# Patient Record
Sex: MALE | Race: BLACK OR AFRICAN AMERICAN | NOT HISPANIC OR LATINO | Employment: OTHER | URBAN - METROPOLITAN AREA
[De-identification: names, ages, dates, MRNs, and addresses within clinical notes are randomized per-mention and may not be internally consistent; named-entity substitution may affect disease eponyms.]

---

## 2017-02-06 ENCOUNTER — APPOINTMENT (OUTPATIENT)
Dept: CARDIOLOGY | Facility: HOSPITAL | Age: 27
End: 2017-02-06

## 2017-02-06 ENCOUNTER — APPOINTMENT (OUTPATIENT)
Dept: GENERAL RADIOLOGY | Facility: HOSPITAL | Age: 27
End: 2017-02-06

## 2017-02-06 ENCOUNTER — HOSPITAL ENCOUNTER (INPATIENT)
Facility: HOSPITAL | Age: 27
LOS: 3 days | Discharge: HOME | End: 2017-02-09
Attending: EMERGENCY MEDICINE | Admitting: INTERNAL MEDICINE

## 2017-02-06 DIAGNOSIS — I50.22 CHRONIC SYSTOLIC CONGESTIVE HEART FAILURE (HCC): ICD-10-CM

## 2017-02-06 DIAGNOSIS — R09.02 HYPOXIA: ICD-10-CM

## 2017-02-06 DIAGNOSIS — I50.9 ACUTE ON CHRONIC CONGESTIVE HEART FAILURE, UNSPECIFIED CONGESTIVE HEART FAILURE TYPE: Primary | ICD-10-CM

## 2017-02-06 DIAGNOSIS — R06.02 SHORTNESS OF BREATH: ICD-10-CM

## 2017-02-06 LAB
ALBUMIN SERPL-MCNC: 3.8 G/DL (ref 3.2–4.8)
ALBUMIN/GLOB SERPL: 1.2 G/DL (ref 1.5–2.5)
ALP SERPL-CCNC: 61 U/L (ref 25–100)
ALT SERPL W P-5'-P-CCNC: 19 U/L (ref 7–40)
ANION GAP SERPL CALCULATED.3IONS-SCNC: 7 MMOL/L (ref 3–11)
AST SERPL-CCNC: 17 U/L (ref 0–33)
BASOPHILS # BLD AUTO: 0.01 10*3/MM3 (ref 0–0.2)
BASOPHILS NFR BLD AUTO: 0.1 % (ref 0–1)
BILIRUB SERPL-MCNC: 1.2 MG/DL (ref 0.3–1.2)
BNP SERPL-MCNC: 452 PG/ML (ref 0–100)
BUN BLD-MCNC: 9 MG/DL (ref 9–23)
BUN/CREAT SERPL: 10 (ref 7–25)
CALCIUM SPEC-SCNC: 9.1 MG/DL (ref 8.7–10.4)
CHLORIDE SERPL-SCNC: 100 MMOL/L (ref 99–109)
CO2 SERPL-SCNC: 29 MMOL/L (ref 20–31)
CREAT BLD-MCNC: 0.9 MG/DL (ref 0.6–1.3)
DEPRECATED RDW RBC AUTO: 42.4 FL (ref 37–54)
EOSINOPHIL # BLD AUTO: 0.18 10*3/MM3 (ref 0.1–0.3)
EOSINOPHIL NFR BLD AUTO: 2.6 % (ref 0–3)
ERYTHROCYTE [DISTWIDTH] IN BLOOD BY AUTOMATED COUNT: 15 % (ref 11.3–14.5)
GFR SERPL CREATININE-BSD FRML MDRD: 126 ML/MIN/1.73
GLOBULIN UR ELPH-MCNC: 3.3 GM/DL
GLUCOSE BLD-MCNC: 121 MG/DL (ref 70–100)
HCT VFR BLD AUTO: 37.6 % (ref 38.9–50.9)
HGB BLD-MCNC: 12.3 G/DL (ref 13.1–17.5)
HOLD SPECIMEN: NORMAL
HOLD SPECIMEN: NORMAL
IMM GRANULOCYTES # BLD: 0.01 10*3/MM3 (ref 0–0.03)
IMM GRANULOCYTES NFR BLD: 0.1 % (ref 0–0.6)
LYMPHOCYTES # BLD AUTO: 1.06 10*3/MM3 (ref 0.6–4.8)
LYMPHOCYTES NFR BLD AUTO: 15.1 % (ref 24–44)
MCH RBC QN AUTO: 25.4 PG (ref 27–31)
MCHC RBC AUTO-ENTMCNC: 32.7 G/DL (ref 32–36)
MCV RBC AUTO: 77.7 FL (ref 80–99)
MONOCYTES # BLD AUTO: 0.65 10*3/MM3 (ref 0–1)
MONOCYTES NFR BLD AUTO: 9.3 % (ref 0–12)
NEUTROPHILS # BLD AUTO: 5.11 10*3/MM3 (ref 1.5–8.3)
NEUTROPHILS NFR BLD AUTO: 72.8 % (ref 41–71)
PLATELET # BLD AUTO: 325 10*3/MM3 (ref 150–450)
PMV BLD AUTO: 9.5 FL (ref 6–12)
POTASSIUM BLD-SCNC: 3.5 MMOL/L (ref 3.5–5.5)
PROT SERPL-MCNC: 7.1 G/DL (ref 5.7–8.2)
RBC # BLD AUTO: 4.84 10*6/MM3 (ref 4.2–5.76)
SODIUM BLD-SCNC: 136 MMOL/L (ref 132–146)
TROPONIN I SERPL-MCNC: 0.06 NG/ML (ref 0–0.07)
WBC NRBC COR # BLD: 7.02 10*3/MM3 (ref 3.5–10.8)
WHOLE BLOOD HOLD SPECIMEN: NORMAL
WHOLE BLOOD HOLD SPECIMEN: NORMAL

## 2017-02-06 PROCEDURE — C8929 TTE W OR WO FOL WCON,DOPPLER: HCPCS

## 2017-02-06 PROCEDURE — 83880 ASSAY OF NATRIURETIC PEPTIDE: CPT | Performed by: EMERGENCY MEDICINE

## 2017-02-06 PROCEDURE — 71010 HC CHEST PA OR AP: CPT

## 2017-02-06 PROCEDURE — 25010000002 FUROSEMIDE PER 20 MG: Performed by: EMERGENCY MEDICINE

## 2017-02-06 PROCEDURE — 94799 UNLISTED PULMONARY SVC/PX: CPT

## 2017-02-06 PROCEDURE — 25010000002 SULFUR HEXAFLUORIDE MICROSPH 60.7-25 MG RECONSTITUTED SUSPENSION: Performed by: EMERGENCY MEDICINE

## 2017-02-06 PROCEDURE — 25010000002 ENOXAPARIN PER 10 MG: Performed by: INTERNAL MEDICINE

## 2017-02-06 PROCEDURE — 25010000002 DIGOXIN PER 500 MCG: Performed by: NURSE PRACTITIONER

## 2017-02-06 PROCEDURE — 93005 ELECTROCARDIOGRAM TRACING: CPT

## 2017-02-06 PROCEDURE — 93306 TTE W/DOPPLER COMPLETE: CPT | Performed by: INTERNAL MEDICINE

## 2017-02-06 PROCEDURE — 84484 ASSAY OF TROPONIN QUANT: CPT

## 2017-02-06 PROCEDURE — 99223 1ST HOSP IP/OBS HIGH 75: CPT | Performed by: INTERNAL MEDICINE

## 2017-02-06 PROCEDURE — 80053 COMPREHEN METABOLIC PANEL: CPT | Performed by: EMERGENCY MEDICINE

## 2017-02-06 PROCEDURE — 94640 AIRWAY INHALATION TREATMENT: CPT

## 2017-02-06 PROCEDURE — 85025 COMPLETE CBC W/AUTO DIFF WBC: CPT | Performed by: EMERGENCY MEDICINE

## 2017-02-06 PROCEDURE — 94760 N-INVAS EAR/PLS OXIMETRY 1: CPT

## 2017-02-06 PROCEDURE — 99285 EMERGENCY DEPT VISIT HI MDM: CPT

## 2017-02-06 RX ORDER — SODIUM CHLORIDE 0.9 % (FLUSH) 0.9 %
10 SYRINGE (ML) INJECTION AS NEEDED
Status: DISCONTINUED | OUTPATIENT
Start: 2017-02-06 | End: 2017-02-09 | Stop reason: HOSPADM

## 2017-02-06 RX ORDER — SPIRONOLACTONE 25 MG/1
25 TABLET ORAL DAILY
Status: DISCONTINUED | OUTPATIENT
Start: 2017-02-06 | End: 2017-02-07

## 2017-02-06 RX ORDER — ASPIRIN 81 MG/1
81 TABLET ORAL DAILY
COMMUNITY
End: 2017-02-21 | Stop reason: SDUPTHER

## 2017-02-06 RX ORDER — FUROSEMIDE 10 MG/ML
40 INJECTION INTRAMUSCULAR; INTRAVENOUS EVERY 12 HOURS SCHEDULED
Status: DISCONTINUED | OUTPATIENT
Start: 2017-02-07 | End: 2017-02-07

## 2017-02-06 RX ORDER — LISINOPRIL 20 MG/1
20 TABLET ORAL
Status: DISCONTINUED | OUTPATIENT
Start: 2017-02-06 | End: 2017-02-07

## 2017-02-06 RX ORDER — CARVEDILOL 12.5 MG/1
25 TABLET ORAL 2 TIMES DAILY WITH MEALS
Status: DISCONTINUED | OUTPATIENT
Start: 2017-02-06 | End: 2017-02-09 | Stop reason: HOSPADM

## 2017-02-06 RX ORDER — DIGOXIN 0.25 MG/ML
500 INJECTION INTRAMUSCULAR; INTRAVENOUS ONCE
Status: COMPLETED | OUTPATIENT
Start: 2017-02-06 | End: 2017-02-06

## 2017-02-06 RX ORDER — POTASSIUM CHLORIDE 750 MG/1
40 CAPSULE, EXTENDED RELEASE ORAL ONCE
Status: COMPLETED | OUTPATIENT
Start: 2017-02-06 | End: 2017-02-06

## 2017-02-06 RX ORDER — GUAIFENESIN AND DEXTROMETHORPHAN HYDROBROMIDE 600; 30 MG/1; MG/1
1 TABLET, EXTENDED RELEASE ORAL 2 TIMES DAILY PRN
Status: DISCONTINUED | OUTPATIENT
Start: 2017-02-06 | End: 2017-02-09 | Stop reason: HOSPADM

## 2017-02-06 RX ORDER — METOLAZONE 2.5 MG/1
2.5 TABLET ORAL DAILY
Status: DISCONTINUED | OUTPATIENT
Start: 2017-02-06 | End: 2017-02-07

## 2017-02-06 RX ORDER — FUROSEMIDE 10 MG/ML
60 INJECTION INTRAMUSCULAR; INTRAVENOUS ONCE
Status: COMPLETED | OUTPATIENT
Start: 2017-02-06 | End: 2017-02-06

## 2017-02-06 RX ORDER — FUROSEMIDE 10 MG/ML
40 INJECTION INTRAMUSCULAR; INTRAVENOUS EVERY 12 HOURS SCHEDULED
Status: DISCONTINUED | OUTPATIENT
Start: 2017-02-06 | End: 2017-02-06

## 2017-02-06 RX ORDER — IPRATROPIUM BROMIDE AND ALBUTEROL SULFATE 2.5; .5 MG/3ML; MG/3ML
3 SOLUTION RESPIRATORY (INHALATION)
Status: DISCONTINUED | OUTPATIENT
Start: 2017-02-06 | End: 2017-02-09 | Stop reason: HOSPADM

## 2017-02-06 RX ADMIN — SPIRONOLACTONE 25 MG: 25 TABLET, FILM COATED ORAL at 19:32

## 2017-02-06 RX ADMIN — LISINOPRIL 20 MG: 10 TABLET ORAL at 18:58

## 2017-02-06 RX ADMIN — CARVEDILOL 25 MG: 12.5 TABLET, FILM COATED ORAL at 19:31

## 2017-02-06 RX ADMIN — POTASSIUM CHLORIDE 40 MEQ: 750 CAPSULE, EXTENDED RELEASE ORAL at 17:57

## 2017-02-06 RX ADMIN — METOLAZONE 2.5 MG: 2.5 TABLET ORAL at 18:58

## 2017-02-06 RX ADMIN — IPRATROPIUM BROMIDE AND ALBUTEROL SULFATE 3 ML: .5; 3 SOLUTION RESPIRATORY (INHALATION) at 20:20

## 2017-02-06 RX ADMIN — ENOXAPARIN SODIUM 80 MG: 80 INJECTION SUBCUTANEOUS at 19:33

## 2017-02-06 RX ADMIN — NITROGLYCERIN 1 INCH: 20 OINTMENT TOPICAL at 15:51

## 2017-02-06 RX ADMIN — DIGOXIN 500 MCG: 0.25 INJECTION INTRAMUSCULAR; INTRAVENOUS at 18:57

## 2017-02-06 RX ADMIN — FUROSEMIDE 60 MG: 10 INJECTION, SOLUTION INTRAMUSCULAR; INTRAVENOUS at 15:51

## 2017-02-06 RX ADMIN — SULFUR HEXAFLUORIDE 2 ML: KIT at 18:00

## 2017-02-06 NOTE — ED PROVIDER NOTES
Subjective   HPI Comments: Mr. Gannon is a 26 y.o male who presents to the ED c/o SOA starting 4 days ago. He notes that he has had worsening SOA that causes him to have trouble breathing when laying down. He also complains of congestion, hot flashes, a productive white-yellow cough, and a sore throat. He denies any fever, N/V/D, blood in stool or urine, or any other acute sx at this time. He does report a hx of CHF, and HTN, but denies any hx of MI, or cardiomyopathy.     He does state that these sx are similar to previous CHF exacerbations.     He has a EF of 20-25% on April and November of 2014.    Patient is a 26 y.o. male presenting with shortness of breath.   History provided by:  Patient  Shortness of Breath   Severity:  Moderate  Onset quality:  Sudden  Duration:  4 days  Timing:  Constant  Progression:  Worsening  Chronicity:  Recurrent  Relieved by:  None tried  Exacerbated by: Laying down.  Ineffective treatments:  None tried  Associated symptoms: cough, sore throat and sputum production    Associated symptoms: no abdominal pain, no fever, no headaches and no vomiting    Risk factors: obesity        Review of Systems   Constitutional: Negative for chills and fever.   HENT: Positive for congestion and sore throat.    Respiratory: Positive for cough, sputum production and shortness of breath.    Gastrointestinal: Negative for abdominal pain, diarrhea, nausea and vomiting.   Neurological: Negative for headaches.   All other systems reviewed and are negative.      Past Medical History   Diagnosis Date   • CHF (congestive heart failure)        No Known Allergies    Past Surgical History   Procedure Laterality Date   • Nose surgery     • Adenoidectomy     • Tonsillectomy         History reviewed. No pertinent family history.    Social History     Social History   • Marital status: Single     Spouse name: N/A   • Number of children: N/A   • Years of education: N/A     Social History Main Topics   • Smoking  status: Former Smoker   • Smokeless tobacco: None      Comment: 2009   • Alcohol use No   • Drug use: No   • Sexual activity: Not Asked     Other Topics Concern   • None     Social History Narrative   • None         Objective   Physical Exam   Constitutional: He is oriented to person, place, and time. He appears well-developed and well-nourished. No distress.   HENT:   Head: Normocephalic and atraumatic.   Eyes: Conjunctivae are normal.   Mild injection of the medial aspect of the right eye   Neck: Normal range of motion. Neck supple.   Cardiovascular: Regular rhythm, normal heart sounds and intact distal pulses.  Tachycardia present.    Pulmonary/Chest: Effort normal. No respiratory distress. He has no wheezes. He has no rales.   Decreased breath sounds   Abdominal: Soft. There is no tenderness.   Obese   Musculoskeletal: Normal range of motion. He exhibits edema (Trace BLE edema).   Neurological: He is alert and oriented to person, place, and time.   Skin: Skin is warm and dry.   Psychiatric: He has a normal mood and affect. His behavior is normal.   Nursing note and vitals reviewed.      Procedures         ED Course  ED Course   Comment By Time   Discussed findings and evaluation with the patient.  He reports he has not missed any of his cardiac medications.  Discussed evaluation here in the ED.  He is tachycardic and was hypoxemic on evaluation.  Patient agrees with plan of care Zac Dahl MD 02/06 1544   Consulted cardio and states Dr. Avila will see the pt. - Alvin Galvan 02/06 1645   Pt had phenomenal diuresis but despite that will be seen by cardiology who would like for him to be admitted from the ED. - Alvin Galvan 02/06 1822     Recent Results (from the past 24 hour(s))   Comprehensive Metabolic Panel    Collection Time: 02/06/17  3:48 PM   Result Value Ref Range    Glucose 121 (H) 70 - 100 mg/dL    BUN 9 9 - 23 mg/dL    Creatinine 0.90 0.60 - 1.30 mg/dL    Sodium 136 132 - 146 mmol/L    Potassium  3.5 3.5 - 5.5 mmol/L    Chloride 100 99 - 109 mmol/L    CO2 29.0 20.0 - 31.0 mmol/L    Calcium 9.1 8.7 - 10.4 mg/dL    Total Protein 7.1 5.7 - 8.2 g/dL    Albumin 3.80 3.20 - 4.80 g/dL    ALT (SGPT) 19 7 - 40 U/L    AST (SGOT) 17 0 - 33 U/L    Alkaline Phosphatase 61 25 - 100 U/L    Total Bilirubin 1.2 0.3 - 1.2 mg/dL    eGFR  African Amer 126 >60 mL/min/1.73    Globulin 3.3 gm/dL    A/G Ratio 1.2 (L) 1.5 - 2.5 g/dL    BUN/Creatinine Ratio 10.0 7.0 - 25.0    Anion Gap 7.0 3.0 - 11.0 mmol/L   BNP    Collection Time: 02/06/17  3:48 PM   Result Value Ref Range    .0 (H) 0.0 - 100.0 pg/mL   Light Blue Top    Collection Time: 02/06/17  3:48 PM   Result Value Ref Range    Extra Tube hold for add-on    Green Top (Gel)    Collection Time: 02/06/17  3:48 PM   Result Value Ref Range    Extra Tube Hold for add-ons.    Lavender Top    Collection Time: 02/06/17  3:48 PM   Result Value Ref Range    Extra Tube hold for add-on    Gold Top - SST    Collection Time: 02/06/17  3:48 PM   Result Value Ref Range    Extra Tube Hold for add-ons.    CBC Auto Differential    Collection Time: 02/06/17  3:48 PM   Result Value Ref Range    WBC 7.02 3.50 - 10.80 10*3/mm3    RBC 4.84 4.20 - 5.76 10*6/mm3    Hemoglobin 12.3 (L) 13.1 - 17.5 g/dL    Hematocrit 37.6 (L) 38.9 - 50.9 %    MCV 77.7 (L) 80.0 - 99.0 fL    MCH 25.4 (L) 27.0 - 31.0 pg    MCHC 32.7 32.0 - 36.0 g/dL    RDW 15.0 (H) 11.3 - 14.5 %    RDW-SD 42.4 37.0 - 54.0 fl    MPV 9.5 6.0 - 12.0 fL    Platelets 325 150 - 450 10*3/mm3    Neutrophil % 72.8 (H) 41.0 - 71.0 %    Lymphocyte % 15.1 (L) 24.0 - 44.0 %    Monocyte % 9.3 0.0 - 12.0 %    Eosinophil % 2.6 0.0 - 3.0 %    Basophil % 0.1 0.0 - 1.0 %    Immature Grans % 0.1 0.0 - 0.6 %    Neutrophils, Absolute 5.11 1.50 - 8.30 10*3/mm3    Lymphocytes, Absolute 1.06 0.60 - 4.80 10*3/mm3    Monocytes, Absolute 0.65 0.00 - 1.00 10*3/mm3    Eosinophils, Absolute 0.18 0.10 - 0.30 10*3/mm3    Basophils, Absolute 0.01 0.00 - 0.20 10*3/mm3     Immature Grans, Absolute 0.01 0.00 - 0.03 10*3/mm3   POC Troponin, Rapid    Collection Time: 02/06/17  3:56 PM   Result Value Ref Range    Troponin I 0.06 0.00 - 0.07 ng/mL   Adult Transthoracic Echo Complete    Collection Time: 02/06/17  7:39 PM   Result Value Ref Range    BSA 2.8 m^2     CV ECHO ADE - RVDD 2.7 cm    IVSd 1.1 cm    LVIDd 7.0 cm    LVIDs 6.8 cm    LVPWd 1.2 cm    IVS/LVPW 0.89     FS 3.1 %    EDV(Teich) 257.9 ml    ESV(Teich) 240.0 ml    EF(Teich) 6.9 %    EDV(cubed) 347.4 ml    ESV(cubed) 315.8 ml    EF(cubed) 9.1 %    LV mass(C)d 372.2 grams    LV mass(C)dI 131.6 grams/m^2    SV(Teich) 17.9 ml    SI(Teich) 6.3 ml/m^2    SV(cubed) 31.6 ml    SI(cubed) 11.2 ml/m^2    Ao root diam 3.2 cm    Ao root area 8.0 cm^2    LA dimension 5.3 cm    LA/Ao 1.7     Ao root area (BSA corrected) 1.1     MV E max pravin 54.8 cm/sec    Ao pk pravin 74.5 cm/sec    Ao max PG 2.2 mmHg    Ao max PG (full) 1.7 mmHg    Ao V2 mean 55.6 cm/sec    Ao mean PG 1.0 mmHg    Ao mean PG (full) 1.0 mmHg    Ao V2 VTI 15.1 cm    LV V1 max PG 0.52 mmHg    LV V1 mean PG 0 mmHg    LV V1 max 36.1 cm/sec    LV V1 mean 24.5 cm/sec    LV V1 VTI 5.3 cm    SV(Ao) 121.4 ml    SI(Ao) 42.9 ml/m^2    PA V2 max 104.2 cm/sec    PA max PG 4.5 mmHg    TR max pravin 212.5 cm/sec     CV ECHO ADE - BZI_BMI 61.9 kilograms/m^2     CV ECHO ADE - BSA(HAYCOCK) 3.2 m^2     CV ECHO ADE - BZI_METRIC_WEIGHT 190.1 kg    BH CV ECHO ADE - BZI_METRIC_HEIGHT 175.3 cm    RAP systole 8 mmHg    RVSP(TR) 26 mmHg     Note: In addition to lab results from this visit, the labs listed above may include labs taken at another facility or during a different encounter within the last 24 hours. Please correlate lab times with ED admission and discharge times for further clarification of the services performed during this visit.    XR Chest 1 View   Preliminary Result   Heart is enlarged with slight prominence of the pulmonary   vascularity.       D:  02/06/2017   E:   "02/06/2017                Vitals:    02/06/17 1514 02/06/17 1540 02/06/17 2020   BP: (!) 175/107 157/99    BP Location: Left arm     Patient Position: Sitting     Pulse: (!) 144 (!) 129 118   Resp: 24  24   Temp: 98.2 °F (36.8 °C)     TempSrc: Oral     SpO2: (!) 81% 94% 97%   Weight: (!) 416 lb (189 kg)     Height: 69\" (175.3 cm)       Medications   sodium chloride 0.9 % flush 10 mL (not administered)   carvedilol (COREG) tablet 25 mg (25 mg Oral Given 2/6/17 1931)   albuterol (PROVENTIL) nebulizer solution 0.5% 2.5 mg/0.5mL (not administered)   lisinopril (PRINIVIL,ZESTRIL) tablet 20 mg (20 mg Oral Given 2/6/17 1858)   metOLazone (ZAROXOLYN) tablet 2.5 mg (2.5 mg Oral Given 2/6/17 1858)   spironolactone (ALDACTONE) tablet 25 mg (25 mg Oral Given 2/6/17 1932)   ipratropium-albuterol (DUO-NEB) nebulizer solution 3 mL (3 mL Nebulization Given 2/6/17 2020)   guaifenesin-dextromethorphan (MUCINEX DM) tablet 1 tablet (not administered)   furosemide (LASIX) injection 40 mg (not administered)   furosemide (LASIX) injection 60 mg (60 mg Intravenous Given 2/6/17 1551)   nitroglycerin (NITROSTAT) ointment 1 inch (1 inch Topical Given 2/6/17 1551)   potassium chloride (MICRO-K) CR capsule 40 mEq (40 mEq Oral Given 2/6/17 1757)   digoxin (LANOXIN) injection 500 mcg (500 mcg Intravenous Given 2/6/17 1857)   enoxaparin (LOVENOX) syringe 150 mg (80 mg Subcutaneous Given 2/6/17 1933)   Sulfur Hexafluoride Microsph 60.7-25 MG reconstituted suspension 2 mL (2 mL Intravenous Given 2/6/17 1800)     ECG/EMG Results (last 24 hours)     Procedure Component Value Units Date/Time    ECG 12 Lead [[id number]] Collected:  02/06/17 1519     Updated:  02/06/17 1543    Narrative:       Test Reason : SOA  Blood Pressure : **/** mmHG  Vent. Rate : 129 BPM     Atrial Rate : 129 BPM     P-R Int : 134 ms          QRS Dur : 098 ms      QT Int : 314 ms       P-R-T Axes : 055 059 059 degrees     QTc Int : 460 ms    Sinus tachycardia  Nonspecific T wave " abnormality  Abnormal ECG  When compared with ECG of 09-OCT-2016 19:41,  Sinus rhythm has replaced Ectopic atrial rhythm  Confirmed by TIMI DAHL MD ([id number]) on 2/6/2017 3:43:13 PM    Referred By:  EDMD           Confirmed By:TIMI DAHL MD                          MDM  Number of Diagnoses or Management Options  Chronic systolic congestive heart failure: new and requires workup  Shortness of breath: new and requires workup     Amount and/or Complexity of Data Reviewed  Clinical lab tests: ordered and reviewed  Tests in the radiology section of CPT®: ordered and reviewed  Tests in the medicine section of CPT®: ordered and reviewed  Review and summarize past medical records: yes  Discuss the patient with other providers: yes  Independent visualization of images, tracings, or specimens: yes        Final diagnoses:   Shortness of breath   Chronic systolic congestive heart failure   Hypoxia       Documentation assistance provided by josé JONES.  Information recorded by the scribe was done at my direction and has been verified and validated by me.     Alvin Jones  02/06/17 1544       Alvin Jones  02/06/17 1547       Alvin Jones  02/06/17 1822       Zac Dahl MD  02/06/17 8948

## 2017-02-06 NOTE — H&P
Evan Bhakta  [id number]  1990   LOS: 0 days   Patient Care Team:  No Known Provider as PCP - General  No Known Provider as PCP - Family Medicine     Mr. bhakta is a 26-year-old single   male from [facility]    Chief Complaint:  SOB    Problem List:  1. Dilated cardiomyopathy  A. Echocardiogram 4/28/14; LVEF 0.20-0.25, mild MR, moderate TR   B. Echocardiogram 11/7/2014; LVEF 0.20-0.25, all valves structurally and functionally normal with no hemodynamically significant valve disease  2. Chronic congestive heart failure;   A. Heart failure Center visits December 2015  B. HF clinic December 2016  C. [facility] ED 2/6/17  3. Hypertension  4. Morbid obesity; BMI 61  5. Obstructive sleep apnea; compliant with CPAP nightly  6. History of noncompliance with medication administration/follow up visits  7. Surgical history; tonsils and adenoids, nasal surgery    No Known Allergies    (Not in a hospital admission)  Scheduled Meds:   Continuous Infusions:   PRN Meds:.•  sodium chloride       History of Present Illness:    26-year-old male presents to [facility] ED 2/6/2017 with shortness of breath that started 4 days ago that was more prominent when supine.  He also has complaints of congestion, hot flashes, cough with sputum production and sore throat.  He denies any fever, nausea, vomiting, diaphoresis, diarrhea, melena.  He has a history of congestive heart failure and hypertension, cardiomyopathy.  He denies any MIs, CAD, CVA's, TIAs, PEs, DVTs, stress tests, left heart catheterizations.  He states that these symptoms are similar to his previous CHF exacerbations.  He has had noncompliance with his medications and has ran out of prescriptions of Coreg, lasix, and lisinopril in the past.  He states that with his insurance changes he was unable to get back to the doctor.  He stated that he is able to get his spironolactone but that was the only medication.  His last echocardiogram was a couple  "years ago.  He is inquisitive on whether he is going to have to stay overnight and says he does not want to have a CT scan.  He has obstructive sleep apnea and uses CPAP nightly.    Cardiac risk factors: hypertension, male gender and obesity (BMI >= 30 kg/m2).    Social History     Social History   • Marital status: Single     Spouse name: N/A   • Number of children: N/A   • Years of education: N/A     Occupational History   • Not on file.     Social History Main Topics   • Smoking status: Never   • Smokeless tobacco: Not on file      Comment: 2009   • Alcohol use No   • Drug use: No   • Sexual activity: Not on file     Other Topics Concern   • Not on file     Social History Narrative   • No narrative on file       Mother:  at age 26 during childbirth  Father: Diabetes mellitus  Review of Systems  Pertinent items are noted in HPI and problem list.     Objective:       Physical Exam  Visit Vitals   • /99   • Pulse (!) 129   • Temp 98.2 °F (36.8 °C) (Oral)   • Resp 24   • Ht 69\" (175.3 cm)   • Wt (!) 416 lb (189 kg)   • SpO2 94%   • BMI 61.43 kg/m2     Last 2 weights    17  1514   Weight: (!) 416 lb (189 kg)     Body mass index is 61.43 kg/(m^2).    Intake/Output Summary (Last 24 hours) at 17 1739  Last data filed at 17 1636   Gross per 24 hour   Intake      0 ml   Output   1350 ml   Net  -1350 ml       General Appearance:  Alert, cooperative, no distress, appears stated age   Head:  Normocephalic, without obvious abnormality, atraumatic   Eyes:  PERRL, conjunctiva/corneas clear, EOM's intact, fundi benign, both eyes   Throat: Lips, mucosa, and tongue normal; teeth and gums normal   Neck: Supple, symmetrical, trachea midline, no adenopathy, thyroid: not enlarged, symmetric, no tenderness/mass/nodules, no carotid bruit or JVD   Lungs:    Diminished to auscultation bilaterally, respirations unlabored   Heart:  Regular rhythm and tachycardia rate  S1, S2 normal, grade 1/6 murmur,no rub or " gallop   Abdomen:   Soft, non-tender, no masses, no organomegaly   Extremities: trace edema   Pulses: 2+ and symmetric   Skin: Skin color, texture, turgor normal, no rashes or lesions   Neurologic: Normal       Cardiographics  · EK2017; sinus tachycardia, nonspecific T wave abnormality, 129 bpm  · Echocardiogram 2017; no report  available    Imaging  · Chest x-ray: 2017; heart is enlarged with slight prominence of the pulmonary vascularity; reviewed.    Lab Review     Results from last 7 days  Lab Units 17  1548   SODIUM mmol/L 136   POTASSIUM mmol/L 3.5   CHLORIDE mmol/L 100   TOTAL CO2 mmol/L 29.0   BUN mg/dL 9   CREATININE mg/dL 0.90   GLUCOSE mg/dL 121*   CALCIUM mg/dL 9.1       Results from last 7 days  Lab Units 17  1548   WBC 10*3/mm3 7.02   HEMOGLOBIN g/dL 12.3*   HEMATOCRIT % 37.6*   PLATELETS 10*3/mm3 325                      Assessment:        CHF exacerbation due to non-compliance with medications. Admit for telemetry monitoring and management of CHF exacerbation, diuresis. Would probably benefit from eventual Entresto instead of lisinopril.  Needs to follow-up with Dr. Olsen.  Defer [facility] at this time.      Plan:        1.  Review echocardiogram when available  2. Admit for tele monitoring  3. Strict I/O, daily weights  4. Regular cardiac diet  5. Restart Coreg 25 mg bid, lasix 40 mg bid, lisinopril 20mg daily, zaroxolyn 2.5 daily, spironolactone 25 mg daily  6. Mucinex DM  7. Duonebs q 4-6 hours  8. Digoxin 0.5mg IV x once  9. Follow up with Dr. Olsen  10.Consider eventual ENTRESTO to replace lisinopril as well as the addition of BIDIL if congestive heart failure and hypertension persist.      SONAM Cunningham    I, Kenn Alegria MD, [facility], personally performed the services described in this documentation as scribed by the above named individual in my presence, and it is both accurate and complete.

## 2017-02-07 PROBLEM — R06.02 SHORTNESS OF BREATH: Status: ACTIVE | Noted: 2017-02-07

## 2017-02-07 PROBLEM — J96.01 ACUTE RESPIRATORY FAILURE WITH HYPOXIA: Status: ACTIVE | Noted: 2017-02-07

## 2017-02-07 PROBLEM — I50.23 ACUTE ON CHRONIC SYSTOLIC (CONGESTIVE) HEART FAILURE (HCC): Status: ACTIVE | Noted: 2017-02-07

## 2017-02-07 PROBLEM — E66.01 MORBID OBESITY (HCC): Status: ACTIVE | Noted: 2017-02-07

## 2017-02-07 PROBLEM — G47.33 OBSTRUCTIVE SLEEP APNEA: Status: ACTIVE | Noted: 2017-02-07

## 2017-02-07 PROBLEM — I10 HYPERTENSION: Status: ACTIVE | Noted: 2017-02-07

## 2017-02-07 LAB
ANION GAP SERPL CALCULATED.3IONS-SCNC: 5 MMOL/L (ref 3–11)
ARTERIAL PATENCY WRIST A: ABNORMAL
ATMOSPHERIC PRESS: ABNORMAL MMHG
BASE EXCESS BLDA CALC-SCNC: 7.6 MMOL/L (ref 0–2)
BDY SITE: ABNORMAL
BH CV ECHO MEAS - AO MAX PG (FULL): 1.7 MMHG
BH CV ECHO MEAS - AO MAX PG: 2.2 MMHG
BH CV ECHO MEAS - AO MEAN PG (FULL): 1 MMHG
BH CV ECHO MEAS - AO MEAN PG: 1 MMHG
BH CV ECHO MEAS - AO ROOT AREA (BSA CORRECTED): 1.1
BH CV ECHO MEAS - AO ROOT AREA: 8 CM^2
BH CV ECHO MEAS - AO ROOT DIAM: 3.2 CM
BH CV ECHO MEAS - AO V2 MAX: 74.5 CM/SEC
BH CV ECHO MEAS - AO V2 MEAN: 55.6 CM/SEC
BH CV ECHO MEAS - AO V2 VTI: 15.1 CM
BH CV ECHO MEAS - BSA(HAYCOCK): 3.2 M^2
BH CV ECHO MEAS - BSA: 2.8 M^2
BH CV ECHO MEAS - BZI_BMI: 61.9 KILOGRAMS/M^2
BH CV ECHO MEAS - BZI_METRIC_HEIGHT: 175.3 CM
BH CV ECHO MEAS - BZI_METRIC_WEIGHT: 190.1 KG
BH CV ECHO MEAS - EDV(CUBED): 347.4 ML
BH CV ECHO MEAS - EDV(TEICH): 257.9 ML
BH CV ECHO MEAS - EF(CUBED): 9.1 %
BH CV ECHO MEAS - EF(TEICH): 6.9 %
BH CV ECHO MEAS - ESV(CUBED): 315.8 ML
BH CV ECHO MEAS - ESV(TEICH): 240 ML
BH CV ECHO MEAS - FS: 3.1 %
BH CV ECHO MEAS - IVS/LVPW: 0.89
BH CV ECHO MEAS - IVSD: 1.1 CM
BH CV ECHO MEAS - LA DIMENSION: 5.3 CM
BH CV ECHO MEAS - LA/AO: 1.7
BH CV ECHO MEAS - LV MASS(C)D: 372.2 GRAMS
BH CV ECHO MEAS - LV MASS(C)DI: 131.6 GRAMS/M^2
BH CV ECHO MEAS - LV MAX PG: 0.52 MMHG
BH CV ECHO MEAS - LV MEAN PG: 0 MMHG
BH CV ECHO MEAS - LV V1 MAX: 36.1 CM/SEC
BH CV ECHO MEAS - LV V1 MEAN: 24.5 CM/SEC
BH CV ECHO MEAS - LV V1 VTI: 5.3 CM
BH CV ECHO MEAS - LVIDD: 7 CM
BH CV ECHO MEAS - LVIDS: 6.8 CM
BH CV ECHO MEAS - LVPWD: 1.2 CM
BH CV ECHO MEAS - MV E MAX VEL: 54.8 CM/SEC
BH CV ECHO MEAS - PA MAX PG: 4.5 MMHG
BH CV ECHO MEAS - PA V2 MAX: 104.2 CM/SEC
BH CV ECHO MEAS - RAP SYSTOLE: 8 MMHG
BH CV ECHO MEAS - RVDD: 2.7 CM
BH CV ECHO MEAS - RVSP: 26 MMHG
BH CV ECHO MEAS - SI(AO): 42.9 ML/M^2
BH CV ECHO MEAS - SI(CUBED): 11.2 ML/M^2
BH CV ECHO MEAS - SI(TEICH): 6.3 ML/M^2
BH CV ECHO MEAS - SV(AO): 121.4 ML
BH CV ECHO MEAS - SV(CUBED): 31.6 ML
BH CV ECHO MEAS - SV(TEICH): 17.9 ML
BH CV ECHO MEAS - TR MAX VEL: 212.5 CM/SEC
BUN BLD-MCNC: 9 MG/DL (ref 9–23)
BUN/CREAT SERPL: 9 (ref 7–25)
CALCIUM SPEC-SCNC: 9.4 MG/DL (ref 8.7–10.4)
CHLORIDE SERPL-SCNC: 99 MMOL/L (ref 99–109)
CO2 BLDA-SCNC: 34.8 MMOL/L (ref 22–33)
CO2 SERPL-SCNC: 30 MMOL/L (ref 20–31)
COHGB MFR BLD: 2.1 % (ref 0–2)
CREAT BLD-MCNC: 1 MG/DL (ref 0.6–1.3)
DEPRECATED RDW RBC AUTO: 42.9 FL (ref 37–54)
ERYTHROCYTE [DISTWIDTH] IN BLOOD BY AUTOMATED COUNT: 15.1 % (ref 11.3–14.5)
GFR SERPL CREATININE-BSD FRML MDRD: 111 ML/MIN/1.73
GLUCOSE BLD-MCNC: 119 MG/DL (ref 70–100)
HCO3 BLDA-SCNC: 33.2 MMOL/L (ref 20–26)
HCT VFR BLD AUTO: 39.3 % (ref 38.9–50.9)
HCT VFR BLD CALC: 38 %
HGB BLD-MCNC: 12.8 G/DL (ref 13.1–17.5)
HGB BLDA-MCNC: 12.4 G/DL (ref 13.5–17.5)
HOROWITZ INDEX BLD+IHG-RTO: 30 %
LV EF 2D ECHO EST: 20 %
MCH RBC QN AUTO: 25.5 PG (ref 27–31)
MCHC RBC AUTO-ENTMCNC: 32.6 G/DL (ref 32–36)
MCV RBC AUTO: 78.4 FL (ref 80–99)
METHGB BLD QL: 0.6 % (ref 0–1.5)
MODALITY: ABNORMAL
OXYHGB MFR BLDV: 92.5 % (ref 94–99)
PCO2 BLDA: 52.7 MM HG (ref 35–48)
PH BLDA: 7.41 PH UNITS (ref 7.35–7.45)
PLATELET # BLD AUTO: 366 10*3/MM3 (ref 150–450)
PMV BLD AUTO: 10 FL (ref 6–12)
PO2 BLDA: 73 MM HG (ref 83–108)
POTASSIUM BLD-SCNC: 3.9 MMOL/L (ref 3.5–5.5)
RBC # BLD AUTO: 5.01 10*6/MM3 (ref 4.2–5.76)
SODIUM BLD-SCNC: 134 MMOL/L (ref 132–146)
WBC NRBC COR # BLD: 6.24 10*3/MM3 (ref 3.5–10.8)

## 2017-02-07 PROCEDURE — 25010000002 FUROSEMIDE PER 20 MG: Performed by: INTERNAL MEDICINE

## 2017-02-07 PROCEDURE — 5A09357 ASSISTANCE WITH RESPIRATORY VENTILATION, LESS THAN 24 CONSECUTIVE HOURS, CONTINUOUS POSITIVE AIRWAY PRESSURE: ICD-10-PCS | Performed by: FAMILY MEDICINE

## 2017-02-07 PROCEDURE — 25010000002 FUROSEMIDE PER 20 MG

## 2017-02-07 PROCEDURE — 99232 SBSQ HOSP IP/OBS MODERATE 35: CPT | Performed by: INTERNAL MEDICINE

## 2017-02-07 PROCEDURE — 85027 COMPLETE CBC AUTOMATED: CPT | Performed by: NURSE PRACTITIONER

## 2017-02-07 PROCEDURE — 94799 UNLISTED PULMONARY SVC/PX: CPT

## 2017-02-07 PROCEDURE — 94640 AIRWAY INHALATION TREATMENT: CPT

## 2017-02-07 PROCEDURE — 80048 BASIC METABOLIC PNL TOTAL CA: CPT | Performed by: NURSE PRACTITIONER

## 2017-02-07 PROCEDURE — 94760 N-INVAS EAR/PLS OXIMETRY 1: CPT

## 2017-02-07 PROCEDURE — 94660 CPAP INITIATION&MGMT: CPT

## 2017-02-07 PROCEDURE — 99253 IP/OBS CNSLTJ NEW/EST LOW 45: CPT | Performed by: HOSPITALIST

## 2017-02-07 PROCEDURE — 36600 WITHDRAWAL OF ARTERIAL BLOOD: CPT | Performed by: INTERNAL MEDICINE

## 2017-02-07 PROCEDURE — 82805 BLOOD GASES W/O2 SATURATION: CPT | Performed by: INTERNAL MEDICINE

## 2017-02-07 RX ORDER — FUROSEMIDE 10 MG/ML
80 INJECTION INTRAMUSCULAR; INTRAVENOUS EVERY 12 HOURS SCHEDULED
Status: DISCONTINUED | OUTPATIENT
Start: 2017-02-07 | End: 2017-02-09 | Stop reason: HOSPADM

## 2017-02-07 RX ORDER — METOLAZONE 5 MG/1
5 TABLET ORAL DAILY
Status: DISCONTINUED | OUTPATIENT
Start: 2017-02-08 | End: 2017-02-09 | Stop reason: HOSPADM

## 2017-02-07 RX ORDER — SPIRONOLACTONE 50 MG/1
50 TABLET, FILM COATED ORAL DAILY
Status: DISCONTINUED | OUTPATIENT
Start: 2017-02-08 | End: 2017-02-09 | Stop reason: HOSPADM

## 2017-02-07 RX ORDER — LISINOPRIL 40 MG/1
40 TABLET ORAL
Status: DISCONTINUED | OUTPATIENT
Start: 2017-02-08 | End: 2017-02-09 | Stop reason: HOSPADM

## 2017-02-07 RX ADMIN — IPRATROPIUM BROMIDE AND ALBUTEROL SULFATE 3 ML: .5; 3 SOLUTION RESPIRATORY (INHALATION) at 12:02

## 2017-02-07 RX ADMIN — IPRATROPIUM BROMIDE AND ALBUTEROL SULFATE 3 ML: .5; 3 SOLUTION RESPIRATORY (INHALATION) at 16:07

## 2017-02-07 RX ADMIN — ALBUTEROL SULFATE 2.5 MG: 2.5 SOLUTION RESPIRATORY (INHALATION) at 01:04

## 2017-02-07 RX ADMIN — SPIRONOLACTONE 25 MG: 25 TABLET, FILM COATED ORAL at 08:57

## 2017-02-07 RX ADMIN — IPRATROPIUM BROMIDE AND ALBUTEROL SULFATE 3 ML: .5; 3 SOLUTION RESPIRATORY (INHALATION) at 07:30

## 2017-02-07 RX ADMIN — CARVEDILOL 25 MG: 12.5 TABLET, FILM COATED ORAL at 17:05

## 2017-02-07 RX ADMIN — IPRATROPIUM BROMIDE AND ALBUTEROL SULFATE 3 ML: .5; 3 SOLUTION RESPIRATORY (INHALATION) at 19:55

## 2017-02-07 RX ADMIN — CARVEDILOL 25 MG: 12.5 TABLET, FILM COATED ORAL at 08:57

## 2017-02-07 RX ADMIN — METOLAZONE 2.5 MG: 2.5 TABLET ORAL at 08:57

## 2017-02-07 RX ADMIN — FUROSEMIDE 80 MG: 10 INJECTION, SOLUTION INTRAMUSCULAR; INTRAVENOUS at 17:07

## 2017-02-07 RX ADMIN — FUROSEMIDE 40 MG: 10 INJECTION, SOLUTION INTRAMUSCULAR; INTRAVENOUS at 06:24

## 2017-02-07 RX ADMIN — LISINOPRIL 20 MG: 10 TABLET ORAL at 08:57

## 2017-02-07 NOTE — PROGRESS NOTES
Discharge Planning Assessment  [facility]     Patient Name: Evan Gannon  MRN: [id number]  Today's Date: 2/7/2017    Admit Date: 2/6/2017          Discharge Needs Assessment       02/07/17 1100    Living Environment    Lives With parent(s);other (see comments)   pt reports he lives with his parents but also stays with his girlfriend, Kiah sometimes    Living Arrangements house    Provides Primary Care For no one    Quality Of Family Relationships supportive;helpful;involved    Able to Return to Prior Living Arrangements yes    Discharge Needs Assessment    Concerns To Be Addressed denies needs/concerns at this time    Readmission Within The Last 30 Days no previous admission in last 30 days    Anticipated Changes Related to Illness none    Equipment Currently Used at Home respiratory supplies   pt has CPAP machine    Equipment Needed After Discharge other (see comments)   to be determined    Transportation Available car;family or friend will provide    Discharge Contact Information if Applicable [phone]            Discharge Plan       02/07/17 1102    Case Management/Social Work Plan    Plan home    Patient/Family In Agreement With Plan yes    Additional Comments Spoke with pt at bedside, pt not feeling well and on CPAP currently. Pt does not have a PCP and may need assistance arranging one prior to discharge. Pt plans to return home, unsure if pt will need additional DME prior to discharge. CM will continue to follow.        Discharge Placement     No information found                Demographic Summary       02/07/17 1058    Referral Information    Referral Source admission list    Contact Information    Permission Granted to Share Information With     Primary Care Physician Information    Name no pcp            Functional Status       02/07/17 1058    Functional Status Current    Current Functional Level Comment see previous charting    Functional Status Prior    Ambulation 0-->independent     Transferring 0-->independent    Toileting 0-->independent    Bathing 0-->independent    Dressing 0-->independent    Eating 0-->independent    Communication 0-->understands/communicates without difficulty    Swallowing 0-->swallows foods/liquids without difficulty    IADL    Medications independent   pt confirms he has prescription drug coverage and gillian issues obtaining or affording current medications    Meal Preparation independent    Housekeeping independent    Laundry independent    Shopping independent    Oral Care independent    Activity Tolerance    Current Activity Limitations other (see comments)   shortness of breath    Usual Activity Tolerance moderate    Current Activity Tolerance poor    Employment/Financial    Financial Concerns none   pt denies concerns or disruption in coverage from [employer] or [employer] insurance providers            Psychosocial     None            Abuse/Neglect     None            Legal     None            Substance Abuse     None            Patient Forms     None          Jodi Gutierrez

## 2017-02-07 NOTE — CONSULTS
Hospital Medicine Consult    Date of Admission: 2/6/2017  Date of Consult: 02/07/17    Primary Care Physician: No Known Provider  Referring Physician:     Chief complaint/Reason for consultation SOA  Subjective     History of Present Illness  Mr. Gannon is a 26 year old male with history of dilated cardiomyopathy with EF from echocardiogram this admission of 20%, hypertension, systolic heart failure, morbid obesity, and obstructive sleep apnea presented on 2/6/2017 with cough and increasing shortness of breath ×4 days.  And also has a history with medication noncompliance and states he ran out of prescriptions approximately 1 week prior to admission secondary to insurance changes and inability to follow-up with his physician.  He shouldn't states he has been compliant with CPAP that he does wear nightly.  Patient did have noted BNP that was elevated at 452 and was admitted for acute on chronic systolic heart failure per cardiology.  Patient has had episodes of accelerated hypertension as well as hypoxia/tachypnea since admission.  Hospitalists were consulted for management of obstructive sleep apnea.  Upon evaluation patient is lying supine without any distress and not hypoxic.  He is wearing CPAP and tolerating well.  Review of Systems   Constitutional: Negative.  Negative for appetite change and fatigue.   HENT: Negative.    Eyes: Negative.    Respiratory: Positive for cough and shortness of breath. Negative for chest tightness and wheezing.    Cardiovascular: Negative for chest pain, palpitations and leg swelling.   Gastrointestinal: Negative.    Endocrine: Negative.    Genitourinary: Negative.    Musculoskeletal: Negative.    Neurological: Negative.    Hematological: Negative.    Psychiatric/Behavioral: Negative.       Otherwise complete ROS is negative except as mentioned above.    Past Medical History:  has a past medical history of CHF (congestive heart failure).    Past Surgical History:  has a past surgical  history that includes Nose surgery; Adenoidectomy; and Tonsillectomy.    Family History: family history is not on file.    Social History:  reports that he has quit smoking. He does not have any smokeless tobacco history on file. He reports that he does not drink alcohol or use illicit drugs.    Medications: Scheduled Meds:    carvedilol 25 mg Oral BID With Meals   furosemide 80 mg Intravenous Q12H   ipratropium-albuterol 3 mL Nebulization 4x Daily - RT   [START ON 2/8/2017] lisinopril 40 mg Oral Q24H   [START ON 2/8/2017] metOLazone 5 mg Oral Daily   pharmacy consult - MTM  Does not apply Daily   [START ON 2/8/2017] spironolactone 50 mg Oral Daily     Continuous Infusions:   PRN Meds:.•  albuterol  •  guaifenesin-dextromethorphan  •  sodium chloride  No Known Allergies    Objective    Physical Exam:   Vital Signs have been reviewed  Physical Exam   Constitutional: He is oriented to person, place, and time. He appears well-developed and well-nourished. No distress.   Morbidly obese   HENT:   Head: Normocephalic and atraumatic.   Eyes: Pupils are equal, round, and reactive to light. No scleral icterus.   Neck: Normal range of motion. No JVD present.   Cardiovascular: Normal rate and regular rhythm.    Distant 2/2 body habitus   Pulmonary/Chest: Effort normal. No respiratory distress. He has no wheezes.   Decreased/ distant 2/2 to body habitus   Abdominal: Soft. Bowel sounds are normal. He exhibits no distension. There is no tenderness.   obese   Musculoskeletal: Normal range of motion. He exhibits edema (trace bilateral pedal edema).   Neurological: He is alert and oriented to person, place, and time.   Skin: Skin is warm.   Psychiatric: He has a normal mood and affect. His behavior is normal. Judgment and thought content normal.         Results Reviewed:  I have personally reviewed current lab, radiology, and data and agree with results.    Results from last 7 days  Lab Units 02/07/17  0859   WBC 10*3/mm3 6.24    HEMOGLOBIN g/dL 12.8*   PLATELETS 10*3/mm3 366       Results from last 7 days  Lab Units 02/07/17  0859   SODIUM mmol/L 134   POTASSIUM mmol/L 3.9   TOTAL CO2 mmol/L 30.0   BUN mg/dL 9   CREATININE mg/dL 1.00   GLUCOSE mg/dL 119*   CALCIUM mg/dL 9.4           Principal Problem:    Acute on chronic systolic (congestive) heart failure  Active Problems:    Shortness of breath    Hypertension    Acute respiratory failure with hypoxia    Morbid obesity    Obstructive sleep apnea        Assessment/Plan   Assessment & Plan  26 year old with acute hypoxic respiratory failure in the setting of A/C Systolic CHF, severe ICM with EF 20%, morbid obesity and COLLIN    -- CXR reviewed and consistent with CHF exacerbation  -- Lasix increased to 80mg bid IV per cards  -- medical non compliance: counseled about importance of consistently taking medications and adverse events if not  -- Likely respiratory compromise worsened by immobility/ lying while hospitalized  -- asymptomatic with CPAP. Consult sleep medicine    I discussed the patients findings and my recommendations with:Dr. Paramjit Hannon, APRN  02/07/17  3:20 PM    Consults      Brief Attending Note       I have seen and examined the patient, performing an independent face-to-face diagnostic evaluation with plan of care reviewed and developed with the advanced practice clinician (APC).      Brief Summary Statement/HPI:   Mr. Gannon is a pleasant 25 yo M with history of dilated cardiomyopathy with EF 20%, obstructive sleep apnea, and morbid obesity presents with several days of worsening dyspnea and cough. He had no refills left on his prescriptions for his heart meds and had a recent insurance change, was unable to follow up with a physician and so has been without his meds for at least 1 week. He has COLLIN and is compliant nightly with CPAP, although he says his machine is at least 3 years old and thinks he may need a new one. He has called [employer] and they  are apparently supposed to deliver O2 to his home soon and evaluate his CPAP machine. He feels better since admission, responding well to IV Lasix. No recent fevers, chills, nausea, vomiting, abdominal pain. No chest pain or palpitations. Hospital medicine consulted for management of sleep apnea.     Attending Physical Exam:  Temp:  [97.7 °F (36.5 °C)-99.9 °F (37.7 °C)] 98.3 °F (36.8 °C)  Heart Rate:  [] 93  Resp:  [18-36] 24  BP: (113-149)/() 149/93  Constitutional: no acute distress, awake, alert  Eyes: PERRLA, sclerae anicteric, no conjunctival injection  Neck: supple, no thyromegaly, trachea midline  Respiratory: Diminished breath sounds throughout likely in part due to body habitus, no wheezes or rales appreciated, nonlabored respirations   Cardiovascular: RRR, no murmurs, rubs, or gallops, palpable pedal pulses bilaterally  Gastrointestinal: Positive bowel sounds, soft, nontender, nondistended  Musculoskeletal: 1-2+ bilateral ankle edema, no clubbing or cyanosis to bilateral lower extremities  Psychiatric: oriented x 3, appropriate affect, cooperative  Neurologic: Strength symmetric in all extremities, Cranial Nerves grossly intact to confrontation       Brief Assessment/Plan:  27 yo M with hx of dilated cardiomyopathy and COLLIN who presents with worsening dyspnea and cough.    PLAN:  --Picture is most consistent with acute on chronic systolic heart failure, with underlying morbid obesity and immobility while hospitalized. Diuresing with IV Lasix per Cardiology. Continue daily weights and I/O's.   --Continue CPAP nightly. He is compliant with this at home.   --Consult to Sleep Medicine, although I think he will simply require outpatient follow up and reevaluation of his CPAP machine (which [facility] may already be in the process of doing). Consult CM for assistance.   --Will continue to follow along with you.       See above for further detailed assessment and plan developed with APC which I  have reviewed and/or edited.      Karen Yusuf MD  02/07/17  4:51 PM

## 2017-02-07 NOTE — PROGRESS NOTES
"  [facility] at [facility]   Inpatient Progress Note       LOS: 0 days   Patient Care Team:  No Known Provider as PCP - General  No Known Provider as PCP - Family Medicine    Chief Complaint:  Acute on chronic systolic CHF    Subjective     Interval History:   Patient sitting up eating breakfast. States that he still has some dyspnea but feels that it is improved from yesterday. Denies any chest pain. States that he just \"didn't go back\" to follow-up with us. States that he ran out of refills because he did not keep his follow-up appointments.  Patient is \"sleepy\", and does not really want to talk right now. Thinks his dyspnea is mildly improved, he is wearing BiPAP currently.  He has been noncompliant with medications and has not followed up with us.  His cardiovascular medicines in at least 9 months.  History taken from: patient    Review of Systems:   Pertinent positives noted in history, exam, and assessment. Otherwise reviewed and negative.      Objective     Vitals:  Blood pressure 130/81, pulse 98, temperature 97.7 °F (36.5 °C), temperature source Oral, resp. rate (!) 29, height 69\" (175.3 cm), weight (!) 416 lb (189 kg), SpO2 100 %.     Intake/Output Summary (Last 24 hours) at 02/07/17 0827  Last data filed at 02/06/17 1937   Gross per 24 hour   Intake      0 ml   Output   2150 ml   Net  -2150 ml     Physical Exam   Constitutional: He is oriented to person, place, and time. He appears well-developed and well-nourished.   Morbidly obese   HENT:   Mouth/Throat: Oropharynx is clear and moist.   Neck: No JVD present. Carotid bruit is not present. No thyromegaly present.   Cardiovascular: Regular rhythm, S1 normal, S2 normal, normal heart sounds and intact distal pulses.  Exam reveals no gallop, no S3 and no S4.    No murmur heard.  Pulses:       Carotid pulses are 2+ on the right side, and 2+ on the left side.       Radial pulses are 2+ on the right side, and 2+ on the left side.   Pulmonary/Chest: " He has decreased breath sounds.   Abdominal: Soft. Bowel sounds are normal. He exhibits no mass. There is no tenderness.   Musculoskeletal: He exhibits edema (1-2+ bilateral).   Neurological: He is alert and oriented to person, place, and time.   Skin: Skin is warm and dry. No rash noted.          Results Review:     I reviewed the patient's new clinical results.      Results from last 7 days  Lab Units 02/06/17  1548   WBC 10*3/mm3 7.02   HEMOGLOBIN g/dL 12.3*   HEMATOCRIT % 37.6*   PLATELETS 10*3/mm3 325       Results from last 7 days  Lab Units 02/06/17  1548   SODIUM mmol/L 136   POTASSIUM mmol/L 3.5   CHLORIDE mmol/L 100   TOTAL CO2 mmol/L 29.0   BUN mg/dL 9   CREATININE mg/dL 0.90   CALCIUM mg/dL 9.1   BILIRUBIN mg/dL 1.2   ALK PHOS U/L 61   ALT (SGPT) U/L 19   AST (SGOT) U/L 17   GLUCOSE mg/dL 121*       Results from last 7 days  Lab Units 02/06/17  1548   SODIUM mmol/L 136   POTASSIUM mmol/L 3.5   CHLORIDE mmol/L 100   TOTAL CO2 mmol/L 29.0   BUN mg/dL 9   CREATININE mg/dL 0.90   GLUCOSE mg/dL 121*   CALCIUM mg/dL 9.1         No results found for: TROPONINI              ECHO:LVEF 15-20 percent, with dilated left ventricle.  Tele:  ST    Assessment/Plan     Active Problems:    * No active hospital problems. *      1. Acute on chronic systolic congestive heart failure.  2. Medical noncompliance  3. Morbid obesity  4. Hypertension  5. Obstructive sleep apnea    Plan:  Severe cardiomyopathy, due to hypertension and morbid obesity.  He is medically noncompliant, a lot of his symptoms are volume, but also a significant percentage is due to his morbid obesity and hypertension.  He understands his prognosis is dismal if he does not beginto take his medicines. We will continue diuresis.  Check a blood gas.  And have consultation and help treat his sleep apnea.  Marina Rolon, SONAM  02/07/17  8:27 AM    Please note that portions of this note may have been completed with a voice recognition program. Efforts were  made to edit the dictations, but occasionally words are mistranscribed.

## 2017-02-07 NOTE — PAYOR COMM NOTE
"Sivakumar Queen (26 y.o. Male)     NOTIFICATION OF INPATIENT ADMISSION   SECONDARY PAYOR - PASSPORT ID # [id number]   AWAITING AUTH   THANK YOU   RETURN [phone]       Date of Birth Social Security Number Address Home Phone MRN    1990  [id number] [facility] [id number] [phone] [id number]    Hindu Marital Status          None Single       Admission Date Admission Type Admitting Provider Attending Provider Department, Room/Bed    2/6/17 Emergency Balbir Olsen MD Scully, Kenn GRUBBS MD [facility] 6A, N604/1    Discharge Date Discharge Disposition Discharge Destination                      Attending Provider: Kenn Alegria MD     Allergies:  No Known Allergies    Isolation:  None   Infection:  None   Code Status:  Not on file    Ht:  69\" (175.3 cm)   Wt:  416 lb (189 kg)    Admission Cmt:  None   Principal Problem:  None                Active Insurance as of 2/6/2017     Primary Coverage     Payor Plan Insurance Group Employer/Plan Group    [facility] BLUE CROSS [facility] BLUE CROSS BLUE SHIELD [facility] [id number]     Payor Plan Address Payor Plan Phone Number Effective From Effective To    [address] [phone] 7/1/2014     [place] [address]       Subscriber Name Subscriber Birth Date Member ID       HEATHER QUEEN 12/5/1968 [id number]           Secondary Coverage     Payor Plan Insurance Group Employer/Plan Group    PASSPORT PASSPORT      Payor Plan Address Payor Plan Phone Number Effective From Effective To    [address] [phone] 10/1/2014     [place] [address]       Subscriber Name Subscriber Birth Date Member ID       SIVAKUMAR QUEEN 1990 [id number]                 Emergency Contacts      (Rel.) Home Phone Work Phone Mobile Phone    AmielissethKiah (Significant Other) [phone] -- --    Aramis Queen (Father) [phone] -- --            Problem List           Codes Noted - Resolved       Hospital    Shortness of breath ICD-10-CM: " R06.02  ICD-9-CM: 786.05 2/7/2017 - Present             History & Physical      Kenn Alegria MD at 2/6/2017  5:38 PM            Evan Gannon  [id number]  1990   LOS: 0 days   Patient Care Team:  No Known Provider as PCP - General  No Known Provider as PCP - Family Medicine     Mr. gannon is a 26-year-old single   male from [facility]    Chief Complaint:  SOB    Problem List:  1. Dilated cardiomyopathy  A. Echocardiogram 4/28/14; LVEF 0.20-0.25, mild MR, moderate TR   B. Echocardiogram 11/7/2014; LVEF 0.20-0.25, all valves structurally and functionally normal with no hemodynamically significant valve disease  2. Chronic congestive heart failure;   A. Heart failure Center visits December 2015  B. HF clinic December 2016  C. [facility] ED 2/6/17  3. Hypertension  4. Morbid obesity; BMI 61  5. Obstructive sleep apnea; compliant with CPAP nightly  6. History of noncompliance with medication administration/follow up visits  7. Surgical history; tonsils and adenoids, nasal surgery    No Known Allergies    (Not in a hospital admission)  Scheduled Meds:   Continuous Infusions:   PRN Meds:.•  sodium chloride       History of Present Illness:    26-year-old male presents to [facility] ED 2/6/2017 with shortness of breath that started 4 days ago that was more prominent when supine.  He also has complaints of congestion, hot flashes, cough with sputum production and sore throat.  He denies any fever, nausea, vomiting, diaphoresis, diarrhea, melena.  He has a history of congestive heart failure and hypertension, cardiomyopathy.  He denies any MIs, CAD, CVA's, TIAs, PEs, DVTs, stress tests, left heart catheterizations.  He states that these symptoms are similar to his previous CHF exacerbations.  He has had noncompliance with his medications and has ran out of prescriptions of Coreg, lasix, and lisinopril in the past.  He states that with his insurance changes he was unable to get back to the doctor.   "He stated that he is able to get his spironolactone but that was the only medication.  His last echocardiogram was a couple years ago.  He is inquisitive on whether he is going to have to stay overnight and says he does not want to have a CT scan.  He has obstructive sleep apnea and uses CPAP nightly.    Cardiac risk factors: hypertension, male gender and obesity (BMI >= 30 kg/m2).    Social History     Social History   • Marital status: Single     Spouse name: N/A   • Number of children: N/A   • Years of education: N/A     Occupational History   • Not on file.     Social History Main Topics   • Smoking status: Never   • Smokeless tobacco: Not on file      Comment: 2009   • Alcohol use No   • Drug use: No   • Sexual activity: Not on file     Other Topics Concern   • Not on file     Social History Narrative   • No narrative on file       Mother:  at age 26 during childbirth  Father: Diabetes mellitus  Review of Systems  Pertinent items are noted in HPI and problem list.     Objective:       Physical Exam  Visit Vitals   • /99   • Pulse (!) 129   • Temp 98.2 °F (36.8 °C) (Oral)   • Resp 24   • Ht 69\" (175.3 cm)   • Wt (!) 416 lb (189 kg)   • SpO2 94%   • BMI 61.43 kg/m2     Last 2 weights    17  1514   Weight: (!) 416 lb (189 kg)     Body mass index is 61.43 kg/(m^2).    Intake/Output Summary (Last 24 hours) at 17 1739  Last data filed at 17 1636   Gross per 24 hour   Intake      0 ml   Output   1350 ml   Net  -1350 ml       General Appearance:  Alert, cooperative, no distress, appears stated age   Head:  Normocephalic, without obvious abnormality, atraumatic   Eyes:  PERRL, conjunctiva/corneas clear, EOM's intact, fundi benign, both eyes   Throat: Lips, mucosa, and tongue normal; teeth and gums normal   Neck: Supple, symmetrical, trachea midline, no adenopathy, thyroid: not enlarged, symmetric, no tenderness/mass/nodules, no carotid bruit or JVD   Lungs:    Diminished to auscultation " bilaterally, respirations unlabored   Heart:  Regular rhythm and tachycardia rate  S1, S2 normal, grade 1/6 murmur,no rub or gallop   Abdomen:   Soft, non-tender, no masses, no organomegaly   Extremities: trace edema   Pulses: 2+ and symmetric   Skin: Skin color, texture, turgor normal, no rashes or lesions   Neurologic: Normal       Cardiographics  · EK2017; sinus tachycardia, nonspecific T wave abnormality, 129 bpm  · Echocardiogram 2017; no report  available    Imaging  · Chest x-ray: 2017; heart is enlarged with slight prominence of the pulmonary vascularity; reviewed.    Lab Review     Results from last 7 days  Lab Units 17  1548   SODIUM mmol/L 136   POTASSIUM mmol/L 3.5   CHLORIDE mmol/L 100   TOTAL CO2 mmol/L 29.0   BUN mg/dL 9   CREATININE mg/dL 0.90   GLUCOSE mg/dL 121*   CALCIUM mg/dL 9.1       Results from last 7 days  Lab Units 17  1548   WBC 10*3/mm3 7.02   HEMOGLOBIN g/dL 12.3*   HEMATOCRIT % 37.6*   PLATELETS 10*3/mm3 325                      Assessment:        CHF exacerbation due to non-compliance with medications. Admit for telemetry monitoring and management of CHF exacerbation, diuresis. Would probably benefit from eventual Entresto instead of lisinopril.  Needs to follow-up with Dr. Olsen.  [facility] at this time.      Plan:        1.  Review echocardiogram when available  2. Admit for tele monitoring  3. Strict I/O, daily weights  4. Regular cardiac diet  5. Restart Coreg 25 mg bid, lasix 40 mg bid, lisinopril 20mg daily, zaroxolyn 2.5 daily, spironolactone 25 mg daily  6. Mucinex DM  7. Duonebs q 4-6 hours  8. Digoxin 0.5mg IV x once  9. Follow up with Dr. Olsen  10.Consider eventual ENTRESTO to replace lisinopril as well as the addition of BIDIL if congestive heart failure and hypertension persist.      SONAM Cunningham    I, Kenn Alegria MD, [facility], personally performed the services described in this documentation as scribed by the above named  individual in my presence, and it is both accurate and complete.      Electronically signed by Kenn Alegria MD at 2/6/2017  7:05 PM        Vital Signs (last 24 hours)       02/06 0700  -  02/07 0659 02/07 0700  -  02/07 1449   Most Recent    Temp (°F) 97.7 -  99.9    98.2 -  98.3     98.3 (36.8)    Heart Rate 99 -  (!)144    89 -  98     92    Resp 18 -  24    20 -  (!)36     20    /73 -  (!) 175/107    (!) 146/107 -  149/93     149/93    SpO2 (%) (!)81 -  97    94 -  100     94          Prior to Admission Medications     Prescriptions Last Dose Informant Patient Reported? Taking?    albuterol (PROVENTIL HFA;VENTOLIN HFA) 108 (90 BASE) MCG/ACT inhaler  Self No Yes    Inhale 2 puffs Every 6 (Six) Hours As Needed for wheezing.    aspirin 81 MG EC tablet 2/6/2017 Self Yes Yes    Take 81 mg by mouth Daily.    carvedilol (COREG) 25 MG tablet  Self Yes Yes    Take 25 mg by mouth 2 (Two) Times a Day.    furosemide (LASIX) 40 MG tablet  Self Yes Yes    Take 40 mg by mouth 2 (Two) Times a Day.    ipratropium (ATROVENT HFA) 17 MCG/ACT inhaler  Self No Yes    Inhale 2 puffs 4 (Four) Times a Day.    lisinopril (PRINIVIL,ZESTRIL) 20 MG tablet  Self Yes Yes    Take 20 mg by mouth Daily.    metolazone (ZAROXOLYN) 2.5 MG tablet  Self Yes Yes    Take 2.5 mg by mouth 2 (Two) Times a Day As Needed.    spironolactone (ALDACTONE) 25 MG tablet  Self Yes Yes    Take 25 mg by mouth Daily.          Hospital Medications (all)       Dose Frequency Start End    albuterol (PROVENTIL) nebulizer solution 0.5% 2.5 mg/0.5mL 2.5 mg Every 6 Hours PRN 2/6/2017     Sig - Route: Take 0.5 mL by nebulization Every 6 (Six) Hours As Needed for wheezing. - Nebulization    carvedilol (COREG) tablet 25 mg 25 mg 2 Times Daily With Meals 2/6/2017     Sig - Route: Take 2 tablets by mouth 2 (Two) Times a Day With Meals. - Oral    digoxin (LANOXIN) injection 500 mcg 500 mcg Once 2/6/2017 2/6/2017    Sig - Route: Infuse 2 mL into a venous catheter 1 (One)  Time. - Intravenous    enoxaparin (LOVENOX) syringe 150 mg 0.794 mg/kg × 189 kg Once 2/6/2017 2/6/2017    Sig - Route: Inject 1.5 mL under the skin 1 (One) Time. - Subcutaneous    furosemide (LASIX) injection 60 mg 60 mg Once 2/6/2017 2/6/2017    Sig - Route: Infuse 6 mL into a venous catheter 1 (One) Time. - Intravenous    furosemide (LASIX) injection 80 mg 80 mg Every 12 Hours Scheduled 2/7/2017     Sig - Route: Infuse 8 mL into a venous catheter Every 12 (Twelve) Hours. - Intravenous    guaifenesin-dextromethorphan (MUCINEX DM) tablet 1 tablet 1 tablet 2 Times Daily PRN 2/6/2017     Sig - Route: Take 1 tablet by mouth 2 (Two) Times a Day As Needed for cough. - Oral    ipratropium-albuterol (DUO-NEB) nebulizer solution 3 mL 3 mL 4 Times Daily - RT 2/6/2017     Sig - Route: Take 3 mL by nebulization 4 (Four) Times a Day. - Nebulization    lisinopril (PRINIVIL,ZESTRIL) tablet 40 mg 40 mg Every 24 Hours Scheduled 2/8/2017     Sig - Route: Take 1 tablet by mouth Daily. - Oral    metOLazone (ZAROXOLYN) tablet 5 mg 5 mg Daily 2/8/2017     Sig - Route: Take 1 tablet by mouth Daily. - Oral    nitroglycerin (NITROSTAT) ointment 1 inch 1 inch Once 2/6/2017 2/6/2017    Sig - Route: Apply 1 inch topically 1 (One) Time. - Topical    Pharmacy Consult - [facility]  Daily 2/7/2017     Sig - Route: Daily. - Does not apply    potassium chloride (MICRO-K) CR capsule 40 mEq 40 mEq Once 2/6/2017 2/6/2017    Sig - Route: Take 4 capsules by mouth 1 (One) Time. - Oral    sodium chloride 0.9 % flush 10 mL 10 mL As Needed 2/6/2017     Sig - Route: Infuse 10 mL into a venous catheter As Needed for line care. - Intravenous    Cosign for Ordering: Accepted by Zac Dahl MD on 2/6/2017  3:41 PM    spironolactone (ALDACTONE) tablet 50 mg 50 mg Daily 2/8/2017     Sig - Route: Take 1 tablet by mouth Daily. - Oral    Sulfur Hexafluoride Microsph 60.7-25 MG reconstituted suspension 2 mL 2 mL Once in Imaging 2/6/2017 2/6/2017    Sig - Route: Infuse 2  mL into a venous catheter Once. - Intravenous    furosemide (LASIX) injection 40 mg (Discontinued) 40 mg Every 12 Hours Scheduled 2/6/2017 2/6/2017    Sig - Route: Infuse 4 mL into a venous catheter Every 12 (Twelve) Hours. - Intravenous    furosemide (LASIX) injection 40 mg (Discontinued) 40 mg Every 12 Hours Scheduled 2/7/2017 2/7/2017    Sig - Route: Infuse 4 mL into a venous catheter Every 12 (Twelve) Hours. - Intravenous    ipratropium (ATROVENT HFA) inhaler 2 puff (Discontinued) 2 puff 4 Times Daily - RT 2/6/2017 2/6/2017    Sig - Route: Inhale 2 puffs 4 (Four) Times a Day. - Inhalation    Reason for Discontinue: Duplicate order    lisinopril (PRINIVIL,ZESTRIL) tablet 20 mg (Discontinued) 20 mg Every 24 Hours Scheduled 2/6/2017 2/7/2017    Sig - Route: Take 1 tablet by mouth Daily. - Oral    metOLazone (ZAROXOLYN) tablet 2.5 mg (Discontinued) 2.5 mg Daily 2/6/2017 2/7/2017    Sig - Route: Take 1 tablet by mouth Daily. - Oral    spironolactone (ALDACTONE) tablet 25 mg (Discontinued) 25 mg Daily 2/6/2017 2/7/2017    Sig - Route: Take 1 tablet by mouth Daily. - Oral            Lab Results (last 24 hours)     Procedure Component Value Units Date/Time    CBC & Differential [[id number]] Collected:  02/06/17 1548    Specimen:  Blood Updated:  02/06/17 1558    Narrative:       The following orders were created for panel order CBC & Differential.  Procedure                               Abnormality         Status                     ---------                               -----------         ------                     CBC Auto Differential[[id number]]         Abnormal            Final result                 Please view results for these tests on the individual orders.    CBC Auto Differential [[id number]]  (Abnormal) Collected:  02/06/17 1548    Specimen:  Blood Updated:  02/06/17 1558     WBC 7.02 10*3/mm3      RBC 4.84 10*6/mm3      Hemoglobin 12.3 (L) g/dL      Hematocrit 37.6 (L) %      MCV 77.7 (L) fL      MCH 25.4 (L)  pg      MCHC 32.7 g/dL      RDW 15.0 (H) %      RDW-SD 42.4 fl      MPV 9.5 fL      Platelets 325 10*3/mm3      Neutrophil % 72.8 (H) %      Lymphocyte % 15.1 (L) %      Monocyte % 9.3 %      Eosinophil % 2.6 %      Basophil % 0.1 %      Immature Grans % 0.1 %      Neutrophils, Absolute 5.11 10*3/mm3      Lymphocytes, Absolute 1.06 10*3/mm3      Monocytes, Absolute 0.65 10*3/mm3      Eosinophils, Absolute 0.18 10*3/mm3      Basophils, Absolute 0.01 10*3/mm3      Immature Grans, Absolute 0.01 10*3/mm3     POC Troponin, Rapid [[id number]]  (Normal) Collected:  02/06/17 1556    Specimen:  Blood Updated:  02/06/17 1611     Troponin I 0.06 ng/mL       Serial Number: [id number]    : [id number]       Comprehensive Metabolic Panel [[id number]]  (Abnormal) Collected:  02/06/17 1548    Specimen:  Blood Updated:  02/06/17 1617     Glucose 121 (H) mg/dL      BUN 9 mg/dL      Creatinine 0.90 mg/dL      Sodium 136 mmol/L      Potassium 3.5 mmol/L      Chloride 100 mmol/L      CO2 29.0 mmol/L      Calcium 9.1 mg/dL      Total Protein 7.1 g/dL      Albumin 3.80 g/dL      ALT (SGPT) 19 U/L      AST (SGOT) 17 U/L      Alkaline Phosphatase 61 U/L      Total Bilirubin 1.2 mg/dL      eGFR  African Amer 126 mL/min/1.73      Globulin 3.3 gm/dL      A/G Ratio 1.2 (L) g/dL      BUN/Creatinine Ratio 10.0      Anion Gap 7.0 mmol/L     Narrative:       National Kidney Foundation Guidelines    Stage                           Description                             GFR                      1                               Normal or High                          90+  2                               Mild decrease                            60-89  3                               Moderate decrease                   30-59  4                               Severe decrease                       15-29  5                               Kidney failure                             <15    BNP [[id number]]  (Abnormal) Collected:  02/06/17 1548    Specimen:  Blood  Updated:  02/06/17 1626     .0 (H) pg/mL     [place] Draw [[id number]] Collected:  02/06/17 1548    Specimen:  Blood Updated:  02/06/17 2001    Narrative:       The following orders were created for panel order [place] Draw.  Procedure                               Abnormality         Status                     ---------                               -----------         ------                     Light Blue Top[[id number]]                                    Final result               Green Top (Gel)[[id number]]                                   Final result               Lavender Top[[id number]]                                      Final result               Gold Top - SST[[id number]]                                    Final result               Green Top (No Gel)[[id number]]                                                             Please view results for these tests on the individual orders.    Light Blue Top [[id number]] Collected:  02/06/17 1548    Specimen:  Blood Updated:  02/06/17 2001     Extra Tube hold for add-on       Auto resulted       Green Top (Gel) [[id number]] Collected:  02/06/17 1548    Specimen:  Blood Updated:  02/06/17 2001     Extra Tube Hold for add-ons.       Auto resulted.       Lavender Top [[id number]] Collected:  02/06/17 1548    Specimen:  Blood Updated:  02/06/17 2001     Extra Tube hold for add-on       Auto resulted       Gold Top - SST [[id number]] Collected:  02/06/17 1548    Specimen:  Blood Updated:  02/06/17 2001     Extra Tube Hold for add-ons.       Auto resulted.       CBC (No Diff) [[id number]]  (Abnormal) Collected:  02/07/17 0859    Specimen:  Blood Updated:  02/07/17 0927     WBC 6.24 10*3/mm3      RBC 5.01 10*6/mm3      Hemoglobin 12.8 (L) g/dL      Hematocrit 39.3 %      MCV 78.4 (L) fL      MCH 25.5 (L) pg      MCHC 32.6 g/dL      RDW 15.1 (H) %      RDW-SD 42.9 fl      MPV 10.0 fL      Platelets 366 10*3/mm3     Basic Metabolic Panel [[id number]]  (Abnormal) Collected:  02/07/17  0859    Specimen:  Blood Updated:  02/07/17 0939     Glucose 119 (H) mg/dL      BUN 9 mg/dL      Creatinine 1.00 mg/dL      Sodium 134 mmol/L      Potassium 3.9 mmol/L      Chloride 99 mmol/L      CO2 30.0 mmol/L      Calcium 9.4 mg/dL      eGFR  African Amer 111 mL/min/1.73      BUN/Creatinine Ratio 9.0      Anion Gap 5.0 mmol/L     Narrative:       National Kidney Foundation Guidelines    Stage                           Description                             GFR                      1                               Normal or High                          90+  2                               Mild decrease                            60-89  3                               Moderate decrease                   30-59  4                               Severe decrease                       15-29  5                               Kidney failure                             <15    Blood Gas, Arterial [[id number]]  (Abnormal) Collected:  02/07/17 1322    Specimen:  Arterial Blood Updated:  02/07/17 1330     Site Arterial: right radial      Pro's Test N/A      pH, Arterial 7.407 pH units      pCO2, Arterial 52.7 (H) mm Hg      pO2, Arterial 73.0 (L) mm Hg      HCO3, Arterial 33.2 (H) mmol/L      Base Excess, Arterial 7.6 (H) mmol/L      Hemoglobin, Blood Gas 12.4 (L) g/dL      Hematocrit, Blood Gas 38.0 %      Oxyhemoglobin 92.5 (L) %      Methemoglobin 0.6 %      Carboxyhemoglobin 2.1 (H) %      CO2 Content 34.8 (H)      Barometric Pressure for Blood Gas -- mmHg       N/A        Modality Full Face CPAP      FIO2 30 %         Imaging Results (last 24 hours)     Procedure Component Value Units Date/Time    XR Chest 1 View [[id number]] Collected:  02/06/17 1653     Updated:  02/06/17 1653    Narrative:       EXAMINATION: XR CHEST 1 VW- 02/06/2017     INDICATION: SOA triage protocol      COMPARISON: 10/09/2016     FINDINGS: Portable chest reveals heart to be enlarged. Mild increased  pulmonary vascularity bilaterally. No pleural  effusion or pneumothorax.  Bony structures are unremarkable.           Impression:       Heart is enlarged with slight prominence of the pulmonary  vascularity.     D:  02/06/2017  E:  02/06/2017                Orders (last 24 hrs)     Start     Ordered    02/08/17 0900  spironolactone (ALDACTONE) tablet 50 mg  Daily      02/07/17 1311    02/08/17 0900  lisinopril (PRINIVIL,ZESTRIL) tablet 40 mg  Every 24 Hours Scheduled      02/07/17 1311    02/08/17 0900  metOLazone (ZAROXOLYN) tablet 5 mg  Daily      02/07/17 1311    02/08/17 0600  Basic Metabolic Panel  Morning Draw      02/07/17 0833    02/08/17 0600  Basic Metabolic Panel  Morning Draw      02/07/17 1311    02/08/17 0600  CBC (No Diff)  Morning Draw      02/07/17 1311    02/08/17 0600  Magnesium  Morning Draw      02/07/17 1311    02/07/17 1800  furosemide (LASIX) injection 80 mg  Every 12 Hours Scheduled      02/07/17 1311    02/07/17 1416  Inpatient Consult to Sleep Medicine  Once     Specialty:  Sleep Medicine  Provider:  (Not yet assigned)    02/07/17 1415    02/07/17 1416  No Narcotics / Sedatives / Antiemetics / Sleeping Agents  Once      02/07/17 1415    02/07/17 1416  Respiratory communication  Once     Comments:  CPAP/Bipap at hs    02/07/17 1416    02/07/17 1310  Blood Gas, Arterial  STAT      02/07/17 1311    02/07/17 1309  Inpatient Consult to Hospitalist  Once     Specialty:  Hospitalist  Provider:  Humera Gaviria, DO    02/07/17 1311    02/07/17 1117  Inpatient Admission  Once      02/07/17 1116    02/07/17 1045  Pharmacy Consult - MTM  Daily      02/07/17 1006    02/07/17 0833  Basic Metabolic Panel  STAT      02/07/17 0833    02/07/17 0833  CBC (No Diff)  Once      02/07/17 0833    02/07/17 0600  furosemide (LASIX) injection 40 mg  Every 12 Hours Scheduled,   Status:  Discontinued      02/06/17 1856    02/07/17 0100  . CPAP  Until Discontinued      02/07/17 0250    02/06/17 2100  furosemide (LASIX) injection 40 mg  Every 12 Hours Scheduled,    Status:  Discontinued      02/06/17 1811 02/06/17 2030  ipratropium (ATROVENT HFA) inhaler 2 puff  4 Times Daily - RT,   Status:  Discontinued      02/06/17 1809 02/06/17 2030  ipratropium-albuterol (DUO-NEB) nebulizer solution 3 mL  4 Times Daily - RT      02/06/17 1815 02/06/17 1822  enoxaparin (LOVENOX) syringe 150 mg  Once      02/06/17 1820    02/06/17 1817  digoxin (LANOXIN) injection 500 mcg  Once      02/06/17 1815 02/06/17 1815  guaifenesin-dextromethorphan (MUCINEX DM) tablet 1 tablet  2 Times Daily PRN      02/06/17 1815 02/06/17 1815  Tele Bed Request  Once      02/06/17 1814 02/06/17 1815  Diet Regular; Cardiac  Diet Effective Now      02/06/17 1815 02/06/17 1811  carvedilol (COREG) tablet 25 mg  2 Times Daily With Meals      02/06/17 1809 02/06/17 1811  lisinopril (PRINIVIL,ZESTRIL) tablet 20 mg  Every 24 Hours Scheduled,   Status:  Discontinued      02/06/17 1809 02/06/17 1811  metOLazone (ZAROXOLYN) tablet 2.5 mg  Daily,   Status:  Discontinued      02/06/17 1810 02/06/17 1811  spironolactone (ALDACTONE) tablet 25 mg  Daily,   Status:  Discontinued      02/06/17 1810 02/06/17 1808  albuterol (PROVENTIL) nebulizer solution 0.5% 2.5 mg/0.5mL  Every 6 Hours PRN      02/06/17 1809 02/06/17 1800  Sulfur Hexafluoride Microsph 60.7-25 MG reconstituted suspension 2 mL  Once in Imaging      02/06/17 1940    02/06/17 1749  Adult Transthoracic Echo Complete  Once      02/06/17 1748    02/06/17 1745  potassium chloride (MICRO-K) CR capsule 40 mEq  Once      02/06/17 1743    02/06/17 1612  POC Troponin, Rapid  Once      02/06/17 1611    02/06/17 1545  furosemide (LASIX) injection 60 mg  Once      02/06/17 1543    02/06/17 1545  nitroglycerin (NITROSTAT) ointment 1 inch  Once      02/06/17 1543    02/06/17 1520  NPO Diet  Diet Effective Now,   Status:  Canceled      02/06/17 1519    02/06/17 1520  Undress and Gown  Once      02/06/17 1519    02/06/17 1520  Cardiac monitoring   Per Hospital Policy      02/06/17 1519    02/06/17 1520  Continuous Pulse Oximetry  Per Hospital Policy      02/06/17 1519    02/06/17 1520  Vital Signs  Per Hospital Policy      02/06/17 1519    02/06/17 1520  XR Chest 1 View  1 Time Imaging      02/06/17 1519    02/06/17 1520  Insert peripheral IV  Once      02/06/17 1519    02/06/17 1520  [place] Draw  Once      02/06/17 1519    02/06/17 1520  CBC & Differential  Once      02/06/17 1519    02/06/17 1520  Comprehensive Metabolic Panel  Once      02/06/17 1519    02/06/17 1520  BNP  Once      02/06/17 1519    02/06/17 1520  POCT Troponin, Rapid  Once      02/06/17 1519    02/06/17 1520  Light Blue Top  PROCEDURE ONCE      02/06/17 1519    02/06/17 1520  Green Top (Gel)  PROCEDURE ONCE      02/06/17 1519    02/06/17 1520  Lavender Top  PROCEDURE ONCE      02/06/17 1519    02/06/17 1520  Gold Top - SST  PROCEDURE ONCE      02/06/17 1519    02/06/17 1520  Green Top (No Gel)  PROCEDURE ONCE,   Status:  Canceled      02/06/17 1519    02/06/17 1520  CBC Auto Differential  PROCEDURE ONCE      02/06/17 1519    02/06/17 1519  sodium chloride 0.9 % flush 10 mL  As Needed      02/06/17 1519    02/06/17 1511  ECG 12 Lead  Once      02/06/17 1510    Unscheduled  Oxygen Therapy- Nasal Cannula; 2 LPM; Titrate for spO2: equal to or greater than, 92%  As Needed      02/06/17 1519    Unscheduled  Patient May Use Home CPAP / BIPAP For Sleep or As Needed  As Needed      02/07/17 1415    --  aspirin 81 MG EC tablet  Daily      02/06/17 1841    --  SCANNED - TELEMETRY        02/06/17 0000    --  SCANNED - TELEMETRY        02/06/17 0000             Physician Progress Notes (last 24 hours) (Notes from 2/6/2017  2:49 PM through 2/7/2017  2:49 PM)      Balbir Olsen MD at 2/7/2017  8:26 AM  Version 2 of 2           [facility]   Inpatient Progress Note       LOS: 0 days   Patient Care Team:  No Known Provider as PCP - General  No Known Provider as PCP - Family  "Medicine    Chief Complaint:  Acute on chronic systolic CHF    Subjective     Interval History: sitting up eating breakfast. States that he still has some dyspnea but feels that it is improved from yesterday. Denies any chest pain. States that he just \"didn't go back\" to follow-up with us. States that he ran out of refills because he did not keep his follow-up appointments.is \"sleepy\", and does not really want to talk right now. Thinks his dyspnea is mildly improved, he is wearing BiPAP currently.  He has been noncompliant with medications and has not followed up with us.  His cardiovascular medicines in at least 9 months.  History taken from: patient    Review of Systems:   Pertinent positives noted in history, exam, and assessment. Otherwise reviewed and negative.      Objective     Vitals:  Blood pressure 130/81, pulse 98, temperature 97.7 °F (36.5 °C), temperature source Oral, resp. rate (!) 29, height 69\" (175.3 cm), weight (!) 416 lb (189 kg), SpO2 100 %.     Intake/Output Summary (Last 24 hours) at 02/07/17 0827  Last data filed at 02/06/17 1937   Gross per 24 hour   Intake      0 ml   Output   2150 ml   Net  -2150 ml     Physical Exam   Constitutional: He is oriented to person, place, and time. He appears well-developed and well-nourished.   Morbidly obese   HENT:   Mouth/Throat: Oropharynx is clear and moist.   Neck: No JVD present. Carotid bruit is not present. No thyromegaly present.   Cardiovascular: Regular rhythm, S1 normal, S2 normal, normal heart sounds and intact distal pulses.  Exam reveals no gallop, no S3 and no S4.  murmur heard.  Pulses:       Carotid pulses are 2+ on the right side, and 2+ on the left side.       Radial pulses are 2+ on the right side, and 2+ on the left side.   Pulmonary/Chest: He has decreased breath sounds.   Abdominal: Soft. Bowel sounds are normal. He exhibits no mass. There is no tenderness.   Musculoskeletal: He exhibits edema (1-2+ bilateral).   Neurological: He is " alert and oriented to person, place, and time.   Skin: Skin is warm and dry. No rash noted.          Results Review:     I reviewed the patient's new clinical results.      Results from last 7 days  Lab Units 02/06/17  1548   WBC 10*3/mm3 7.02   HEMOGLOBIN g/dL 12.3*   HEMATOCRIT % 37.6*   PLATELETS 10*3/mm3 325       Results from last 7 days  Lab Units 02/06/17  1548   SODIUM mmol/L 136   POTASSIUM mmol/L 3.5   CHLORIDE mmol/L 100   TOTAL CO2 mmol/L 29.0   BUN mg/dL 9   CREATININE mg/dL 0.90   CALCIUM mg/dL 9.1   BILIRUBIN mg/dL 1.2   ALK PHOS U/L 61   ALT (SGPT) U/L 19   AST (SGOT) U/L 17   GLUCOSE mg/dL 121*       Results from last 7 days  Lab Units 02/06/17  1548   SODIUM mmol/L 136   POTASSIUM mmol/L 3.5   CHLORIDE mmol/L 100   TOTAL CO2 mmol/L 29.0   BUN mg/dL 9   CREATININE mg/dL 0.90   GLUCOSE mg/dL 121*   CALCIUM mg/dL 9.1         No results found for: TROPONINI            :LVEF 15-20 percent, with dilated left ventricle.  Tele:  ST    Assessment&#47;Plan     Active Problems:    * No active hospital problems. *      8. Acute on chronic systolic congestive heart failure.  9. Medical noncompliance  10. Morbid obesity  11. Hypertension  12. Obstructive sleep apnea    Plan:cardiomyopathy, due to hypertension and morbid obesity.  He is medically noncompliant, a lot of his symptoms are volume, but also a significant percentage is due to his morbid obesity and hypertension.  He understands his prognosis is dismal if he does not beginto take his medicines. We will continue diuresis.  Check a blood gas.  And have consultation and help treat his sleep apnea.  SONAM Reynolds  02/07/17  8:27 AM    Please note that portions of this note may have been completed with a voice recognition program. Efforts were made to edit the dictations, but occasionally words are mistranscribed.       Electronically signed by Balbir Olsen MD at 2/7/2017  1:11 PM      SONAM Reynolds at 2/7/2017  8:26 AM  Version 1 of 2  "          [facility]   Inpatient Progress Note       LOS: 0 days   Patient Care Team:  No Known Provider as PCP - General  No Known Provider as PCP - Family Medicine    Chief Complaint:  Acute on chronic systolic CHF    Subjective     Interval History: sitting up eating breakfast. States that he still has some dyspnea but feels that it is improved from yesterday. Denies any chest pain. States that he just \"didn't go back\" to follow-up with us. States that he ran out of refills because he did not keep his follow-up appointments.    History taken from: patient    Review of Systems:   Pertinent positives noted in history, exam, and assessment. Otherwise reviewed and negative.      Objective     Vitals:  Blood pressure 130/81, pulse 98, temperature 97.7 °F (36.5 °C), temperature source Oral, resp. rate (!) 29, height 69\" (175.3 cm), weight (!) 416 lb (189 kg), SpO2 100 %.     Intake/Output Summary (Last 24 hours) at 02/07/17 0827  Last data filed at 02/06/17 1937   Gross per 24 hour   Intake      0 ml   Output   2150 ml   Net  -2150 ml     Physical Exam       Results Review:     I reviewed the patient's new clinical results.      Results from last 7 days  Lab Units 02/06/17  1548   WBC 10*3/mm3 7.02   HEMOGLOBIN g/dL 12.3*   HEMATOCRIT % 37.6*   PLATELETS 10*3/mm3 325       Results from last 7 days  Lab Units 02/06/17  1548   SODIUM mmol/L 136   POTASSIUM mmol/L 3.5   CHLORIDE mmol/L 100   TOTAL CO2 mmol/L 29.0   BUN mg/dL 9   CREATININE mg/dL 0.90   CALCIUM mg/dL 9.1   BILIRUBIN mg/dL 1.2   ALK PHOS U/L 61   ALT (SGPT) U/L 19   AST (SGOT) U/L 17   GLUCOSE mg/dL 121*       Results from last 7 days  Lab Units 02/06/17  1548   SODIUM mmol/L 136   POTASSIUM mmol/L 3.5   CHLORIDE mmol/L 100   TOTAL CO2 mmol/L 29.0   BUN mg/dL 9   CREATININE mg/dL 0.90   GLUCOSE mg/dL 121*   CALCIUM mg/dL 9.1         No results found for: TROPONINI            : pending.    Tele:  ST    Assessment&#47;Plan "     Active Problems:    * No active hospital problems. *      13. Acute on chronic systolic congestive heart failure.  14. Medical noncompliance  15. Morbid obesity  16. Hypertension  17. Obstructive sleep apnea    Plan:  Check labs this AM as they were not ordered for today when he was admitted. Await echo results.    SONAM Reynolds  02/07/17  8:27 AM    Please note that portions of this note may have been completed with a voice recognition program. Efforts were made to edit the dictations, but occasionally words are mistranscribed.       Electronically signed by SONAM Reynolds at 2/7/2017  8:39 AM        Consult Notes (last 24 hours) (Notes from 2/6/2017  2:49 PM through 2/7/2017  2:49 PM)     No notes of this type exist for this encounter.

## 2017-02-07 NOTE — PLAN OF CARE
Problem: Patient Care Overview (Adult)  Goal: Plan of Care Review  Outcome: Ongoing (interventions implemented as appropriate)  Pt vitals are stable.  He slept with Cpap and had a 6 beat run of Vtach around 4 am.      02/07/17 0404   Coping/Psychosocial Response Interventions   Plan Of Care Reviewed With patient   Patient Care Overview   Progress no change

## 2017-02-08 LAB
ANION GAP SERPL CALCULATED.3IONS-SCNC: 7 MMOL/L (ref 3–11)
BUN BLD-MCNC: 12 MG/DL (ref 9–23)
BUN/CREAT SERPL: 12 (ref 7–25)
CALCIUM SPEC-SCNC: 9.6 MG/DL (ref 8.7–10.4)
CHLORIDE SERPL-SCNC: 95 MMOL/L (ref 99–109)
CO2 SERPL-SCNC: 35 MMOL/L (ref 20–31)
CREAT BLD-MCNC: 1 MG/DL (ref 0.6–1.3)
DEPRECATED RDW RBC AUTO: 42.4 FL (ref 37–54)
ERYTHROCYTE [DISTWIDTH] IN BLOOD BY AUTOMATED COUNT: 14.9 % (ref 11.3–14.5)
GFR SERPL CREATININE-BSD FRML MDRD: 111 ML/MIN/1.73
GLUCOSE BLD-MCNC: 114 MG/DL (ref 70–100)
HCT VFR BLD AUTO: 39.7 % (ref 38.9–50.9)
HGB BLD-MCNC: 12.4 G/DL (ref 13.1–17.5)
MAGNESIUM SERPL-MCNC: 1.8 MG/DL (ref 1.3–2.7)
MCH RBC QN AUTO: 24.7 PG (ref 27–31)
MCHC RBC AUTO-ENTMCNC: 31.2 G/DL (ref 32–36)
MCV RBC AUTO: 78.9 FL (ref 80–99)
PLATELET # BLD AUTO: 328 10*3/MM3 (ref 150–450)
PMV BLD AUTO: 10.2 FL (ref 6–12)
POTASSIUM BLD-SCNC: 3.6 MMOL/L (ref 3.5–5.5)
RBC # BLD AUTO: 5.03 10*6/MM3 (ref 4.2–5.76)
SODIUM BLD-SCNC: 137 MMOL/L (ref 132–146)
WBC NRBC COR # BLD: 6.08 10*3/MM3 (ref 3.5–10.8)

## 2017-02-08 PROCEDURE — 85027 COMPLETE CBC AUTOMATED: CPT | Performed by: INTERNAL MEDICINE

## 2017-02-08 PROCEDURE — 99232 SBSQ HOSP IP/OBS MODERATE 35: CPT | Performed by: FAMILY MEDICINE

## 2017-02-08 PROCEDURE — 25010000002 FUROSEMIDE PER 20 MG: Performed by: INTERNAL MEDICINE

## 2017-02-08 PROCEDURE — 94799 UNLISTED PULMONARY SVC/PX: CPT

## 2017-02-08 PROCEDURE — 99231 SBSQ HOSP IP/OBS SF/LOW 25: CPT | Performed by: INTERNAL MEDICINE

## 2017-02-08 PROCEDURE — 25010000002 HEPARIN (PORCINE) PER 1000 UNITS: Performed by: FAMILY MEDICINE

## 2017-02-08 PROCEDURE — 94640 AIRWAY INHALATION TREATMENT: CPT

## 2017-02-08 PROCEDURE — 94760 N-INVAS EAR/PLS OXIMETRY 1: CPT

## 2017-02-08 PROCEDURE — 80048 BASIC METABOLIC PNL TOTAL CA: CPT | Performed by: NURSE PRACTITIONER

## 2017-02-08 PROCEDURE — 83735 ASSAY OF MAGNESIUM: CPT | Performed by: INTERNAL MEDICINE

## 2017-02-08 PROCEDURE — 94660 CPAP INITIATION&MGMT: CPT

## 2017-02-08 RX ORDER — HEPARIN SODIUM 5000 [USP'U]/ML
5000 INJECTION, SOLUTION INTRAVENOUS; SUBCUTANEOUS EVERY 12 HOURS SCHEDULED
Status: DISCONTINUED | OUTPATIENT
Start: 2017-02-08 | End: 2017-02-09 | Stop reason: HOSPADM

## 2017-02-08 RX ORDER — POLYETHYLENE GLYCOL 3350 17 G/17G
17 POWDER, FOR SOLUTION ORAL DAILY
Status: DISCONTINUED | OUTPATIENT
Start: 2017-02-08 | End: 2017-02-09 | Stop reason: HOSPADM

## 2017-02-08 RX ADMIN — CARVEDILOL 25 MG: 12.5 TABLET, FILM COATED ORAL at 08:28

## 2017-02-08 RX ADMIN — IPRATROPIUM BROMIDE AND ALBUTEROL SULFATE 3 ML: .5; 3 SOLUTION RESPIRATORY (INHALATION) at 12:42

## 2017-02-08 RX ADMIN — FUROSEMIDE 80 MG: 10 INJECTION, SOLUTION INTRAMUSCULAR; INTRAVENOUS at 17:40

## 2017-02-08 RX ADMIN — POLYETHYLENE GLYCOL 3350 17 G: 17 POWDER, FOR SOLUTION ORAL at 14:35

## 2017-02-08 RX ADMIN — IPRATROPIUM BROMIDE AND ALBUTEROL SULFATE 3 ML: .5; 3 SOLUTION RESPIRATORY (INHALATION) at 20:40

## 2017-02-08 RX ADMIN — SPIRONOLACTONE 50 MG: 50 TABLET ORAL at 08:29

## 2017-02-08 RX ADMIN — CARVEDILOL 25 MG: 12.5 TABLET, FILM COATED ORAL at 17:40

## 2017-02-08 RX ADMIN — IPRATROPIUM BROMIDE AND ALBUTEROL SULFATE 3 ML: .5; 3 SOLUTION RESPIRATORY (INHALATION) at 16:11

## 2017-02-08 RX ADMIN — METOLAZONE 5 MG: 5 TABLET ORAL at 08:28

## 2017-02-08 RX ADMIN — LISINOPRIL 40 MG: 40 TABLET ORAL at 08:27

## 2017-02-08 RX ADMIN — IPRATROPIUM BROMIDE AND ALBUTEROL SULFATE 3 ML: .5; 3 SOLUTION RESPIRATORY (INHALATION) at 07:24

## 2017-02-08 RX ADMIN — FUROSEMIDE 80 MG: 10 INJECTION, SOLUTION INTRAMUSCULAR; INTRAVENOUS at 05:50

## 2017-02-08 RX ADMIN — HEPARIN SODIUM 5000 UNITS: 5000 INJECTION, SOLUTION INTRAVENOUS; SUBCUTANEOUS at 20:18

## 2017-02-08 NOTE — PROGRESS NOTES
[facility] Medicine Services    ASSESSMENT / PLAN          1.Acute resp failure, use cpap as per need at home, ef 20%, systolic heart failure, acute on chronic,lasix dicammieiper cardio, med adjutsment per cardio  2. COLLIN, OHS contributing to resp failure, continue bipap for now.   3. HTN bettter  4. severe ICM , per cardio  5.Morbid obesity, counseling     Length of Stay 2 Days   Code- FC  DVT Prophylaxis- SQ Hep  Condition-- serious Prognosis-- guarded  Expected Discharge disposition in    2-3       Days to home per cardiology  --Highly complex set of issues with high risk for further serious morbidity and other serious sequela, Time spent >25 minutes with > 50% time spent in the room face to face with patient and relatives       SUBJECTIVE--HPI/ Events overnight / CC- Hospital Follow up visit/ ROS-not detailed ,  as performed below    Feel bter, still sob, no chest pain, no leg pain, swelling legs slight better  Gen -no fevers, chills  CV-no chest pain, palpitations   dry cough   GI-no N/V/D, abd pain      OBJECTIVE        Vital Sign Min/Max for last 24 hours  Temp  Min: 96.9 °F (36.1 °C)  Max: 98.2 °F (36.8 °C)   BP  Min: 126/88  Max: 153/89   Pulse  Min: 84  Max: 96   Resp  Min: 18  Max: 30   SpO2  Min: 90 %  Max: 96 %   No Data Recorded      Intake/Output Summary (Last 24 hours) at 02/08/17 1539  Last data filed at 02/08/17 1356   Gross per 24 hour   Intake    480 ml   Output    301 ml   Net    179 ml           Gen/Constitutional-no acute distress, morbid obese, on bipap   CV-RRR, S1 S2 normal, no m/r/g  Resp-CTAB except bilateral lower lobe crackles , no wheezes  Abd-soft, NT, ND, +BS  Ext-1+ edema, chronic venous staisis skin findings with skin thickning/fibrosis   Neuro-A&Ox3, no focal deficits  Psych-appropriate mood  Skin, no new rashes over last 24 hours     Scheduled Medications    carvedilol 25 mg Oral BID With Meals   furosemide 80 mg Intravenous Q12H   heparin (porcine) 5,000  Units Subcutaneous Q12H   ipratropium-albuterol 3 mL Nebulization 4x Daily - RT   lisinopril 40 mg Oral Q24H   metOLazone 5 mg Oral Daily   pharmacy consult - MTM  Does not apply Daily   polyethylene glycol 17 g Oral Daily   spironolactone 50 mg Oral Daily     I have reviewed the labs, culture data, radiology results, and diagnostic studies.    Results from last 7 days  Lab Units 02/08/17  0453 02/07/17  0859 02/06/17  1548   SODIUM mmol/L 137 134 136   POTASSIUM mmol/L 3.6 3.9 3.5   CHLORIDE mmol/L 95* 99 100   TOTAL CO2 mmol/L 35.0* 30.0 29.0   BUN mg/dL 12 9 9   CREATININE mg/dL 1.00 1.00 0.90   CALCIUM mg/dL 9.6 9.4 9.1   BILIRUBIN mg/dL  --   --  1.2   ALK PHOS U/L  --   --  61   ALT (SGPT) U/L  --   --  19   AST (SGOT) U/L  --   --  17   GLUCOSE mg/dL 114* 119* 121*       Results from last 7 days  Lab Units 02/08/17  0453 02/07/17  0859 02/06/17  1548   WBC 10*3/mm3 6.08 6.24 7.02   HEMOGLOBIN g/dL 12.4* 12.8* 12.3*   HEMATOCRIT % 39.7 39.3 37.6*   PLATELETS 10*3/mm3 328 366 325       Results from last 7 days  Lab Units 02/07/17  1322   PH, ARTERIAL pH units 7.407   PO2 ART mm Hg 73.0*   PCO2, ARTERIAL mm Hg 52.7*   HCO3 ART mmol/L 33.2*       Culture Data:    Radiology Results:  Imaging Results (last 24 hours)     Procedure Component Value Units Date/Time    XR Chest 1 View [[id number]] Collected:  02/06/17 1653     Updated:  02/08/17 0923    Narrative:       EXAMINATION: XR CHEST 1 VW- 02/06/2017     INDICATION: SOA triage protocol      COMPARISON: 10/09/2016     FINDINGS: Portable chest reveals heart to be enlarged. Mild increased  pulmonary vascularity bilaterally. No pleural effusion or pneumothorax.  Bony structures are unremarkable.           Impression:       Heart is enlarged with slight prominence of the pulmonary  vascularity.     D:  02/06/2017  E:  02/06/2017     This report was finalized on 2/8/2017 9:21 AM by Dr. Crystal Brunner MD.           *. Please note that portions of this note were  completed with a voice recognition program. Efforts were made to edit the dictations, but occasionally words are mistranscribed.  Oniel Schroeder MD02/08/173:39 PM

## 2017-02-08 NOTE — PLAN OF CARE
Problem: Patient Care Overview (Adult)  Goal: Plan of Care Review  Outcome: Ongoing (interventions implemented as appropriate)    02/08/17 7967   Outcome Evaluation   Outcome Summary/Follow up Plan pt has had no compliants today. I did give him miralax to have a bm. pt has been on room air all day. pt wears a bipap at night to sleep. no complaints of pain        Goal: Discharge Needs Assessment  Outcome: Ongoing (interventions implemented as appropriate)    Problem: Cardiac: Heart Failure (Adult)  Goal: Signs and Symptoms of Listed Potential Problems Will be Absent or Manageable (Cardiac: Heart Failure)  Outcome: Ongoing (interventions implemented as appropriate)    Problem: Fall Risk (Adult)  Goal: Identify Related Risk Factors and Signs and Symptoms  Outcome: Ongoing (interventions implemented as appropriate)  Goal: Absence of Falls  Outcome: Ongoing (interventions implemented as appropriate)

## 2017-02-08 NOTE — PROGRESS NOTES
"  [facility]   Inpatient Progress Note       LOS: 2 days   Patient Care Team:  No Known Provider as PCP - General  No Known Provider as PCP - Family Medicine    Chief Complaint:  Acute on chronic systolic CHF    Subjective     Interval History:   Patient in bed on CPAP. Denies any current dyspnea. States he feels somewhat better. No CV complaints    History taken from: patient    Review of Systems:   Pertinent positives noted in history, exam, and assessment. Otherwise reviewed and negative.      Objective     Vitals:  Blood pressure 126/88, pulse 93, temperature 96.9 °F (36.1 °C), temperature source Oral, resp. rate (!) 30, height 69\" (175.3 cm), weight (!) 416 lb (189 kg), SpO2 96 %.     Intake/Output Summary (Last 24 hours) at 02/08/17 0759  Last data filed at 02/08/17 0500   Gross per 24 hour   Intake    720 ml   Output    301 ml   Net    419 ml     Physical Exam   Constitutional: He is oriented to person, place, and time. He appears well-developed and well-nourished.   Morbidly obese   HENT:   Mouth/Throat: Oropharynx is clear and moist.   Neck: No JVD present. Carotid bruit is not present. No thyromegaly present.   Cardiovascular: Regular rhythm, S1 normal, S2 normal, normal heart sounds and intact distal pulses.  Exam reveals no gallop, no S3 and no S4.    No murmur heard.  Pulses:       Carotid pulses are 2+ on the right side, and 2+ on the left side.       Radial pulses are 2+ on the right side, and 2+ on the left side.   Pulmonary/Chest: He has decreased breath sounds.   Abdominal: Soft. Bowel sounds are normal. He exhibits no mass. There is no tenderness.   Musculoskeletal: He exhibits edema (1-2+ bilateral).   Neurological: He is alert and oriented to person, place, and time.   Skin: Skin is warm and dry. No rash noted.          Results Review:     I reviewed the patient's new clinical results.      Results from last 7 days  Lab Units 02/08/17  0453   WBC 10*3/mm3 6.08 "   HEMOGLOBIN g/dL 12.4*   HEMATOCRIT % 39.7   PLATELETS 10*3/mm3 328       Results from last 7 days  Lab Units 02/08/17  0453  02/06/17  1548   SODIUM mmol/L 137  < > 136   POTASSIUM mmol/L 3.6  < > 3.5   CHLORIDE mmol/L 95*  < > 100   TOTAL CO2 mmol/L 35.0*  < > 29.0   BUN mg/dL 12  < > 9   CREATININE mg/dL 1.00  < > 0.90   CALCIUM mg/dL 9.6  < > 9.1   BILIRUBIN mg/dL  --   --  1.2   ALK PHOS U/L  --   --  61   ALT (SGPT) U/L  --   --  19   AST (SGOT) U/L  --   --  17   GLUCOSE mg/dL 114*  < > 121*   < > = values in this interval not displayed.    Results from last 7 days  Lab Units 02/08/17  0453   SODIUM mmol/L 137   POTASSIUM mmol/L 3.6   CHLORIDE mmol/L 95*   TOTAL CO2 mmol/L 35.0*   BUN mg/dL 12   CREATININE mg/dL 1.00   GLUCOSE mg/dL 114*   CALCIUM mg/dL 9.6         No results found for: TROPONINI              ECHO:LVEF 15-20 percent, with dilated left ventricle.  Tele:  ST    Assessment/Plan     Principal Problem:    Acute on chronic systolic (congestive) heart failure  Active Problems:    Shortness of breath    Hypertension    Acute respiratory failure with hypoxia    Morbid obesity    Obstructive sleep apnea      1. Acute on chronic systolic congestive heart failure.  2. Medical noncompliance  3. Morbid obesity  4. Hypertension  5. Obstructive sleep apnea    Plan:  Continue diuresis. Await sleep med input. Primary issue is morbid obesity and non compliance which has lead to his HTNive CM.    Marnia Rolon, SONAM  02/08/17  7:59 AM  IBalbir have reviewed the note in full and agree with all aspects of the above including physical exam, assessment, labs and plan with changes made accordingly.     Balbir Olsen MD  02/09/17  9:05 AM        Please note that portions of this note may have been completed with a voice recognition program. Efforts were made to edit the dictations, but occasionally words are mistranscribed.

## 2017-02-09 VITALS
BODY MASS INDEX: 46.65 KG/M2 | WEIGHT: 315 LBS | HEART RATE: 89 BPM | DIASTOLIC BLOOD PRESSURE: 74 MMHG | RESPIRATION RATE: 20 BRPM | OXYGEN SATURATION: 98 % | HEIGHT: 69 IN | TEMPERATURE: 98 F | SYSTOLIC BLOOD PRESSURE: 111 MMHG

## 2017-02-09 PROBLEM — Z91.199 PERSONAL HISTORY OF NONCOMPLIANCE WITH MEDICAL TREATMENT: Status: ACTIVE | Noted: 2017-02-09

## 2017-02-09 PROBLEM — I42.9 CARDIOMYOPATHY (HCC): Status: ACTIVE | Noted: 2017-02-09

## 2017-02-09 LAB
ANION GAP SERPL CALCULATED.3IONS-SCNC: 7 MMOL/L (ref 3–11)
BASOPHILS # BLD AUTO: 0.01 10*3/MM3 (ref 0–0.2)
BASOPHILS NFR BLD AUTO: 0.2 % (ref 0–1)
BUN BLD-MCNC: 15 MG/DL (ref 9–23)
BUN/CREAT SERPL: 13.6 (ref 7–25)
CALCIUM SPEC-SCNC: 9.8 MG/DL (ref 8.7–10.4)
CHLORIDE SERPL-SCNC: 94 MMOL/L (ref 99–109)
CO2 SERPL-SCNC: 35 MMOL/L (ref 20–31)
CREAT BLD-MCNC: 1.1 MG/DL (ref 0.6–1.3)
DEPRECATED RDW RBC AUTO: 42.7 FL (ref 37–54)
EOSINOPHIL # BLD AUTO: 0.28 10*3/MM3 (ref 0.1–0.3)
EOSINOPHIL NFR BLD AUTO: 4.6 % (ref 0–3)
ERYTHROCYTE [DISTWIDTH] IN BLOOD BY AUTOMATED COUNT: 14.8 % (ref 11.3–14.5)
GFR SERPL CREATININE-BSD FRML MDRD: 100 ML/MIN/1.73
GLUCOSE BLD-MCNC: 102 MG/DL (ref 70–100)
HCT VFR BLD AUTO: 40.5 % (ref 38.9–50.9)
HGB BLD-MCNC: 12.9 G/DL (ref 13.1–17.5)
IMM GRANULOCYTES # BLD: 0.01 10*3/MM3 (ref 0–0.03)
IMM GRANULOCYTES NFR BLD: 0.2 % (ref 0–0.6)
LYMPHOCYTES # BLD AUTO: 1.62 10*3/MM3 (ref 0.6–4.8)
LYMPHOCYTES NFR BLD AUTO: 26.6 % (ref 24–44)
MAGNESIUM SERPL-MCNC: 1.9 MG/DL (ref 1.3–2.7)
MCH RBC QN AUTO: 25.3 PG (ref 27–31)
MCHC RBC AUTO-ENTMCNC: 31.9 G/DL (ref 32–36)
MCV RBC AUTO: 79.4 FL (ref 80–99)
MONOCYTES # BLD AUTO: 0.57 10*3/MM3 (ref 0–1)
MONOCYTES NFR BLD AUTO: 9.4 % (ref 0–12)
NEUTROPHILS # BLD AUTO: 3.59 10*3/MM3 (ref 1.5–8.3)
NEUTROPHILS NFR BLD AUTO: 59 % (ref 41–71)
PLATELET # BLD AUTO: 398 10*3/MM3 (ref 150–450)
PMV BLD AUTO: 9.9 FL (ref 6–12)
POTASSIUM BLD-SCNC: 3.7 MMOL/L (ref 3.5–5.5)
RBC # BLD AUTO: 5.1 10*6/MM3 (ref 4.2–5.76)
SODIUM BLD-SCNC: 136 MMOL/L (ref 132–146)
WBC NRBC COR # BLD: 6.08 10*3/MM3 (ref 3.5–10.8)

## 2017-02-09 PROCEDURE — 80048 BASIC METABOLIC PNL TOTAL CA: CPT | Performed by: FAMILY MEDICINE

## 2017-02-09 PROCEDURE — 94760 N-INVAS EAR/PLS OXIMETRY 1: CPT

## 2017-02-09 PROCEDURE — 94640 AIRWAY INHALATION TREATMENT: CPT

## 2017-02-09 PROCEDURE — 25010000002 HEPARIN (PORCINE) PER 1000 UNITS: Performed by: FAMILY MEDICINE

## 2017-02-09 PROCEDURE — 85025 COMPLETE CBC W/AUTO DIFF WBC: CPT | Performed by: FAMILY MEDICINE

## 2017-02-09 PROCEDURE — 94799 UNLISTED PULMONARY SVC/PX: CPT

## 2017-02-09 PROCEDURE — 99232 SBSQ HOSP IP/OBS MODERATE 35: CPT | Performed by: INTERNAL MEDICINE

## 2017-02-09 PROCEDURE — 83735 ASSAY OF MAGNESIUM: CPT | Performed by: FAMILY MEDICINE

## 2017-02-09 PROCEDURE — 99238 HOSP IP/OBS DSCHRG MGMT 30/<: CPT | Performed by: NURSE PRACTITIONER

## 2017-02-09 PROCEDURE — 94660 CPAP INITIATION&MGMT: CPT

## 2017-02-09 PROCEDURE — 25010000002 FUROSEMIDE PER 20 MG: Performed by: INTERNAL MEDICINE

## 2017-02-09 RX ORDER — LISINOPRIL 40 MG/1
40 TABLET ORAL
Qty: 30 TABLET | Refills: 11 | Status: SHIPPED | OUTPATIENT
Start: 2017-02-09 | End: 2017-03-03 | Stop reason: HOSPADM

## 2017-02-09 RX ORDER — METOLAZONE 5 MG/1
5 TABLET ORAL DAILY PRN
Qty: 30 TABLET | Refills: 6 | Status: SHIPPED | OUTPATIENT
Start: 2017-02-09 | End: 2017-04-27 | Stop reason: SDUPTHER

## 2017-02-09 RX ORDER — SPIRONOLACTONE 50 MG/1
50 TABLET, FILM COATED ORAL DAILY
Qty: 30 TABLET | Refills: 11 | Status: SHIPPED | OUTPATIENT
Start: 2017-02-09 | End: 2017-04-27 | Stop reason: SDUPTHER

## 2017-02-09 RX ADMIN — IPRATROPIUM BROMIDE AND ALBUTEROL SULFATE 3 ML: .5; 3 SOLUTION RESPIRATORY (INHALATION) at 08:27

## 2017-02-09 RX ADMIN — CARVEDILOL 25 MG: 12.5 TABLET, FILM COATED ORAL at 08:09

## 2017-02-09 RX ADMIN — SPIRONOLACTONE 50 MG: 50 TABLET ORAL at 08:09

## 2017-02-09 RX ADMIN — FUROSEMIDE 80 MG: 10 INJECTION, SOLUTION INTRAMUSCULAR; INTRAVENOUS at 05:37

## 2017-02-09 RX ADMIN — IPRATROPIUM BROMIDE AND ALBUTEROL SULFATE 3 ML: .5; 3 SOLUTION RESPIRATORY (INHALATION) at 12:24

## 2017-02-09 RX ADMIN — HEPARIN SODIUM 5000 UNITS: 5000 INJECTION, SOLUTION INTRAVENOUS; SUBCUTANEOUS at 08:11

## 2017-02-09 RX ADMIN — METOLAZONE 5 MG: 5 TABLET ORAL at 08:09

## 2017-02-09 RX ADMIN — IPRATROPIUM BROMIDE AND ALBUTEROL SULFATE 3 ML: .5; 3 SOLUTION RESPIRATORY (INHALATION) at 16:50

## 2017-02-09 NOTE — PROGRESS NOTES
Continued Stay Note  [facility]     Patient Name: Evan Gannon  MRN: [id number]  Today's Date: 2/9/2017    Admit Date: 2/6/2017          Discharge Plan       02/09/17 1151    Case Management/Social Work Plan    Plan Home.     Patient/Family In Agreement With Plan yes    Additional Comments Referral received to arrange outpt sleep study. Appointment made with Jeremiah at [facility] at ext [phone] for tomorrow with Dr. Cristofer Shrestha at 1300. Called Jordyn, transition nurse, at ext. [phone] to arrange a PCP appt within 1 week for pt. CM will follow.                Discharge Codes     None        Expected Discharge Date and Time     Expected Discharge Date Expected Discharge Time    Feb 10, 2017             Amie Mondragon

## 2017-02-09 NOTE — PROGRESS NOTES
"    [facility] Medicine Services  INPATIENT PROGRESS NOTE    Date of Admission: 2/6/2017  Length of Stay: 3  Primary Care Physician: No Known Provider    Subjective     Chief Complaint: dyspnea  HPI:  Breathing better; no fever, no chills, urinating \" a lot\"    Review Of Systems:   Review of Systems    No f/c, no dyspnea  Objective      Temp:  [97.2 °F (36.2 °C)-98.2 °F (36.8 °C)] 97.9 °F (36.6 °C)  Heart Rate:  [81-95] 88  Resp:  [18-22] 20  BP: (124-142)/(57-92) 133/78  Physical Exam  Alert, obese, ox4  Ncat, oroph clear  rrr  ctab  abd soft, nontender  No cce  LE edema noted (no warmth or erythema)  Face symmetric, speech clear, equal     Results Review:    I have reviewed the labs, radiology results and diagnostic studies.      Results from last 7 days  Lab Units 02/09/17  0523   WBC 10*3/mm3 6.08   HEMOGLOBIN g/dL 12.9*   PLATELETS 10*3/mm3 398       Results from last 7 days  Lab Units 02/09/17  0522   SODIUM mmol/L 136   POTASSIUM mmol/L 3.7   TOTAL CO2 mmol/L 35.0*   CREATININE mg/dL 1.10   GLUCOSE mg/dL 102*       I have reviewed the medications.    Assessment/Plan   *Consulting for sleep apnea*      Assessment/Problem List  Principal Problem:    Acute on chronic systolic (congestive) heart failure  Active Problems:    Shortness of breath    Hypertension    Acute respiratory failure with hypoxia    Morbid obesity    Obstructive sleep apnea           Plan  Diuresis per cards  -Regarding sleep apnea, has cpap already. I discussed w/ pulm over phone; recommended outpt sleep titration study; case management to call sleep lab and set up this appointment prior to discharge  -Needs PCP; requested staff to provide list [facility] pcp's, and to set up follow up 1st available (within a week of discharge if possible) to establish care & follow up chf & kay  -d/c home when ok w/ cards; not adding much at this point    Scott Vazquez MD   02/09/17   11:28 AM    Please note that portions of " this note may have been completed with a voice recognition program. Efforts were made to edit the dictations, but occasionally words are mistranscribed.

## 2017-02-09 NOTE — DISCHARGE SUMMARY
"Date of Discharge:  2/9/2017  Date of Admit: 2/6/2017    No Known Provider      Discharge Diagnosis:Principal Problem:    Acute on chronic systolic (congestive) heart failure  Active Problems:    Shortness of breath    Hypertension    Acute respiratory failure with hypoxia    Morbid obesity    Obstructive sleep apnea    Cardiomyopathy    Personal history of noncompliance with medical treatment      Hospital Course:   Patient is a 26-year-old morbidly obese -American male who presented to the emergency department on 2/6/17 with complaints of increasing shortness of breath, orthopnea, and edema.  Patient had been noncompliant and not taking any of his medicines for the last 8 months roughly.  He was seen initially by Dr. Kenn Alegria and admitted for further evaluation.  During his admission he underwent diuretic therapy.  He was noted to be lethargic at times, had a known diagnosis of sleep apnea and the hospitalist were consulted.  They consulted sleep medicine, however consultation was not performed in the hospital and an outpatient evaluation was scheduled.  Patient remained in stable condition throughout his course.  It was deemed on 2/9/17 that he had reached maximum benefit from his stay in the hospital and was stable and ready for discharge home.  Arrangements were made at the time of discharge for LifeVest given the patient's noted severely decreased LVEF and high risk for life-threatening arrhythmia.    Procedures Performed  None       Consults     Date and Time Order Name Status Description    2/7/2017 1415 Inpatient Consult to Sleep Medicine      2/7/2017 1311 Inpatient Consult to Hospitalist Completed                 Discharge Physical Exam:  Visit Vitals   • /74 (BP Location: Right arm, Patient Position: Sitting)   • Pulse 86   • Temp 98 °F (36.7 °C) (Oral)   • Resp 20   • Ht 69\" (175.3 cm)   • Wt (!) 416 lb (189 kg)   • SpO2 98%   • BMI 61.43 kg/m2         General Appearance No acute " distress   Neck No adenopathy, supple, trachea midline, no thyromegaly, no carotid bruit, no JVD   Lungs Clear to auscultation,respirations regular, even and unlabored   Heart Regular rhythm and normal rate, normal S1 and S2, no murmur, no gallop, no rub, no click   Chest wall No abnormalities observed   Abdomen Normal bowel sounds, no masses, no hepatomegaly, soft   Extremities Moves all extremities well, no edema, no cyanosis, no redness   Neurological Alert and oriented x 3     Discharge Medications   [place], [facility]   Home Medication Instructions BENI:[id number]    Printed on:02/09/17 1420   Medication Information                      albuterol (PROVENTIL HFA;VENTOLIN HFA) 108 (90 BASE) MCG/ACT inhaler  Inhale 2 puffs Every 6 (Six) Hours As Needed for wheezing.             aspirin 81 MG EC tablet  Take 81 mg by mouth Daily.             carvedilol (COREG) 25 MG tablet  Take 25 mg by mouth 2 (Two) Times a Day.             furosemide (LASIX) 40 MG tablet  Take 40 mg by mouth 2 (Two) Times a Day.             lisinopril (PRINIVIL,ZESTRIL) 40 MG tablet  Take 1 tablet by mouth Daily.             metOLazone (ZAROXOLYN) 5 MG tablet  Take 1 tablet by mouth Daily As Needed (Edema or shortness of breath).             spironolactone (ALDACTONE) 50 MG tablet  Take 1 tablet by mouth Daily.                 Discharge Diet:  cardiac, low-sodium, low carb    Activity at Discharge:  as tolerated    Discharge disposition: home    Condition on Discharge: stable    Follow-up Appointments  Future Appointments  Date Time Provider Department Center   2/10/2017 1:00 PM Cristofer Shrestha MD [facility] None   2/21/2017 1:15 PM Barbara Mills MD [facility] None     Follow-up with the [facility] center in one week  Follow-up with Dr. Olsen in 4 weeks       SONAM Reynolds  02/09/17  2:20 PM      Balbir KAMINSKI have reviewed the note in full and agree with all aspects of the above including physical exam, assessment,  labs and plan with changes made accordingly.     Balbir Olsen MD  02/09/17  2:23 PM        EMR Dragon/Transcription disclaimer:  Much of this encounter note is an electronic transcription/translation of spoken language to printed text. Electronic translation of spoken language may permit erroneous, or at times, nonsensical words or phrases to be inadvertently transcribed. Although I have reviewed the note for such errors, some may still exist.

## 2017-02-09 NOTE — PLAN OF CARE
Problem: Patient Care Overview (Adult)  Goal: Plan of Care Review  Outcome: Ongoing (interventions implemented as appropriate)    02/09/17 0411   Coping/Psychosocial Response Interventions   Plan Of Care Reviewed With patient   Patient Care Overview   Progress no change   Outcome Evaluation   Outcome Summary/Follow up Plan pt slept throughout the night with no c/o pain or distress noted. remains NSR with VSS.          Problem: Cardiac: Heart Failure (Adult)  Goal: Signs and Symptoms of Listed Potential Problems Will be Absent or Manageable (Cardiac: Heart Failure)  Outcome: Ongoing (interventions implemented as appropriate)    Problem: Fall Risk (Adult)  Goal: Identify Related Risk Factors and Signs and Symptoms  Outcome: Ongoing (interventions implemented as appropriate)  Goal: Absence of Falls  Outcome: Ongoing (interventions implemented as appropriate)

## 2017-02-21 ENCOUNTER — OFFICE VISIT (OUTPATIENT)
Dept: INTERNAL MEDICINE | Facility: CLINIC | Age: 27
End: 2017-02-21

## 2017-02-21 VITALS
DIASTOLIC BLOOD PRESSURE: 74 MMHG | BODY MASS INDEX: 46.65 KG/M2 | WEIGHT: 315 LBS | HEIGHT: 69 IN | TEMPERATURE: 97.9 F | SYSTOLIC BLOOD PRESSURE: 130 MMHG

## 2017-02-21 DIAGNOSIS — I10 ESSENTIAL HYPERTENSION: ICD-10-CM

## 2017-02-21 DIAGNOSIS — R06.02 SHORTNESS OF BREATH: ICD-10-CM

## 2017-02-21 DIAGNOSIS — I50.22 CHRONIC SYSTOLIC CONGESTIVE HEART FAILURE (HCC): ICD-10-CM

## 2017-02-21 DIAGNOSIS — I42.0 NONISCHEMIC CONGESTIVE CARDIOMYOPATHY (HCC): Primary | ICD-10-CM

## 2017-02-21 DIAGNOSIS — E66.01 MORBID OBESITY DUE TO EXCESS CALORIES (HCC): ICD-10-CM

## 2017-02-21 PROBLEM — J96.01 ACUTE RESPIRATORY FAILURE WITH HYPOXIA: Status: RESOLVED | Noted: 2017-02-07 | Resolved: 2017-02-21

## 2017-02-21 PROCEDURE — 99204 OFFICE O/P NEW MOD 45 MIN: CPT | Performed by: INTERNAL MEDICINE

## 2017-02-21 RX ORDER — ASPIRIN 81 MG/1
81 TABLET ORAL DAILY
Qty: 365 TABLET | Refills: 0 | Status: SHIPPED | OUTPATIENT
Start: 2017-02-21 | End: 2017-03-27 | Stop reason: SDUPTHER

## 2017-02-21 RX ORDER — ALBUTEROL SULFATE 90 UG/1
2 AEROSOL, METERED RESPIRATORY (INHALATION) EVERY 6 HOURS PRN
Qty: 1 INHALER | Refills: 11 | Status: SHIPPED | OUTPATIENT
Start: 2017-02-21 | End: 2017-03-27 | Stop reason: SDUPTHER

## 2017-02-21 NOTE — PATIENT INSTRUCTIONS
Calorie Counting for Weight Loss  Calories are energy you get from the things you eat and drink. Your body uses this energy to keep you going throughout the day. The number of calories you eat affects your weight. When you eat more calories than your body needs, your body stores the extra calories as fat. When you eat fewer calories than your body needs, your body burns fat to get the energy it needs.  Calorie counting means keeping track of how many calories you eat and drink each day. If you make sure to eat fewer calories than your body needs, you should lose weight. In order for calorie counting to work, you will need to eat the number of calories that are right for you in a day to lose a healthy amount of weight per week. A healthy amount of weight to lose per week is usually 1-2 lb (0.5-0.9 kg). A dietitian can determine how many calories you need in a day and give you suggestions on how to reach your calorie goal.   WHAT IS MY MY PLAN?  My goal is to have __________ calories per day.   If I have this many calories per day, I should lose around __________ pounds per week.  WHAT DO I NEED TO KNOW ABOUT CALORIE COUNTING?  In order to meet your daily calorie goal, you will need to:  · Find out how many calories are in each food you would like to eat. Try to do this before you eat.  · Decide how much of the food you can eat.  · Write down what you ate and how many calories it had. Doing this is called keeping a food log.  WHERE DO I FIND CALORIE INFORMATION?  The number of calories in a food can be found on a Nutrition Facts label. Note that all the information on a label is based on a specific serving of the food. If a food does not have a Nutrition Facts label, try to look up the calories online or ask your dietitian for help.  HOW DO I DECIDE HOW MUCH TO EAT?  To decide how much of the food you can eat, you will need to consider both the number of calories in one serving and the size of one serving. This  information can be found on the Nutrition Facts label. If a food does not have a Nutrition Facts label, look up the information online or ask your dietitian for help.  Remember that calories are listed per serving. If you choose to have more than one serving of a food, you will have to multiply the calories per serving by the amount of servings you plan to eat. For example, the label on a package of bread might say that a serving size is 1 slice and that there are 90 calories in a serving. If you eat 1 slice, you will have eaten 90 calories. If you eat 2 slices, you will have eaten 180 calories.  HOW DO I KEEP A FOOD LOG?  After each meal, record the following information in your food log:  · What you ate.  · How much of it you ate.  · How many calories it had.  · Then, add up your calories.  Keep your food log near you, such as in a small notebook in your pocket. Another option is to use a mobile ivory or website. Some programs will calculate calories for you and show you how many calories you have left each time you add an item to the log.  WHAT ARE SOME CALORIE COUNTING TIPS?  · Use your calories on foods and drinks that will fill you up and not leave you hungry. Some examples of this include foods like nuts and nut butters, vegetables, lean proteins, and high-fiber foods (more than 5 g fiber per serving).  · Eat nutritious foods and avoid empty calories. Empty calories are calories you get from foods or beverages that do not have many nutrients, such as candy and soda. It is better to have a nutritious high-calorie food (such as an avocado) than a food with few nutrients (such as a bag of chips).  · Know how many calories are in the foods you eat most often. This way, you do not have to look up how many calories they have each time you eat them.  · Look out for foods that may seem like low-calorie foods but are really high-calorie foods, such as baked goods, soda, and fat-free candy.  · Pay attention to calories  in drinks. Drinks such as sodas, specialty coffee drinks, alcohol, and juices have a lot of calories yet do not fill you up. Choose low-calorie drinks like water and diet drinks.  · Focus your calorie counting efforts on higher calorie items. Logging the calories in a garden salad that contains only vegetables is less important than calculating the calories in a milk shake.  · Find a way of tracking calories that works for you. Get creative. Most people who are successful find ways to keep track of how much they eat in a day, even if they do not count every calorie.  WHAT ARE SOME PORTION CONTROL TIPS?  · Know how many calories are in a serving. This will help you know how many servings of a certain food you can have.  · Use a measuring cup to measure serving sizes. This is helpful when you start out. With time, you will be able to estimate serving sizes for some foods.  · Take some time to put servings of different foods on your favorite plates, bowls, and cups so you know what a serving looks like.  · Try not to eat straight from a bag or box. Doing this can lead to overeating. Put the amount you would like to eat in a cup or on a plate to make sure you are eating the right portion.  · Use smaller plates, glasses, and bowls to prevent overeating. This is a quick and easy way to practice portion control. If your plate is smaller, less food can fit on it.  · Try not to multitask while eating, such as watching TV or using your computer. If it is time to eat, sit down at a table and enjoy your food. Doing this will help you to start recognizing when you are full. It will also make you more aware of what and how much you are eating.  HOW CAN I CALORIE COUNT WHEN EATING OUT?  · Ask for smaller portion sizes or child-sized portions.  · Consider sharing an entree and sides instead of getting your own entree.  · If you get your own entree, eat only half. Ask for a box at the beginning of your meal and put the rest of your  "entree in it so you are not tempted to eat it.  · Look for the calories on the menu. If calories are listed, choose the lower calorie options.  · Choose dishes that include vegetables, fruits, whole grains, low-fat dairy products, and lean protein. Focusing on smart food choices from each of the 5 food groups can help you stay on track at restaurants.  · Choose items that are boiled, broiled, grilled, or steamed.  · Choose water, milk, unsweetened iced tea, or other drinks without added sugars. If you want an alcoholic beverage, choose a lower calorie option. For example, a regular zay can have up to 700 calories and a glass of wine has around 150.  · Stay away from items that are buttered, battered, fried, or served with cream sauce. Items labeled \"crispy\" are usually fried, unless stated otherwise.  · Ask for dressings, sauces, and syrups on the side. These are usually very high in calories, so do not eat much of them.  · Watch out for salads. Many people think salads are a healthy option, but this is often not the case. Many salads come with lin, fried chicken, lots of cheese, fried chips, and dressing. All of these items have a lot of calories. If you want a salad, choose a garden salad and ask for grilled meats or steak. Ask for the dressing on the side, or ask for olive oil and vinegar or lemon to use as dressing.  · Estimate how many servings of a food you are given. For example, a serving of cooked rice is ½ cup or about the size of half a tennis ball or one cupcake wrapper. Knowing serving sizes will help you be aware of how much food you are eating at restaurants. The list below tells you how big or small some common portion sizes are based on everyday objects.    1 oz--4 stacked dice.    3 oz--1 deck of cards.    1 tsp--1 dice.    1 Tbsp--½ a Ping-Pong ball.    2 Tbsp--1 Ping-Pong ball.    ½ cup--1 tennis ball or 1 cupcake wrapper.    1 cup--1 baseball.     This information is not intended to " replace advice given to you by your health care provider. Make sure you discuss any questions you have with your health care provider.     Document Released: 12/18/2006 Document Revised: 01/08/2016 Document Reviewed: 10/23/2014  [employer] Interactive Patient Education ©2016 [employer]

## 2017-02-21 NOTE — PROGRESS NOTES
"Hal Gannon is a 26 y.o. male here to establish care for nonischemic cardiomyopathy, chronic systolic heart failure, HTN. He was first diagnosed with these 2 years ago when he had what he thought was a URI and he ended up going to ED where they discovered his acute heart failure. He is seen by Marah Almeida and Dr. Olsen. He missed appt yesterday with Elle. He says he takes his meds daily. Has not yet taken spironolactone today. Struggles with his diet, had lost several pounds but is back up now he thinks due to fluid. Lasix does not make him urinate more, he says he doesn't urinate frequently during the day at all. Denies SOA or CP. Supposed to be wearing LifeVest but it keeps coming off due to his size and him not being able to keep it closed. He had been using an ace bandage to keep it closed but that doesn't work so he hasn't been wearing it. It comes off with walking or doing any physical activity. He does not exercise. He has 10lb weight limit on lifting. He does not desire surgery for defibrillator placement or for bariatric surgery due to his history of \"not waking up well\" from general anesthesia in the past. He is very apprehensive about being put to sleep under anesthesia and says he would rather wear the LifeVest the rest of his life than get the surgery at this time.    Review of Systems   Constitutional: Negative.    HENT: Negative.    Eyes: Negative.    Respiratory: Negative.    Cardiovascular: Negative.    Gastrointestinal: Negative.    Endocrine: Negative.    Genitourinary: Negative.    Musculoskeletal: Negative.    Skin: Negative.    Allergic/Immunologic: Negative.    Neurological: Negative.    Hematological: Negative.    Psychiatric/Behavioral: Negative.        Past Medical History   Diagnosis Date   • CHF (congestive heart failure)      Family History   Problem Relation Age of Onset   • Diabetes Father      Past Surgical History   Procedure Laterality Date   • Nose surgery     • " "Adenoidectomy     • Tonsillectomy       Social History     Social History   • Marital status: Single     Spouse name: N/A   • Number of children: N/A   • Years of education: N/A     Occupational History   • Not on file.     Social History Main Topics   • Smoking status: Former Smoker   • Smokeless tobacco: Not on file      Comment: 2009 04/13/2016 never smoker    • Alcohol use No   • Drug use: No   • Sexual activity: Not on file     Other Topics Concern   • Not on file     Social History Narrative         Current Outpatient Prescriptions:   •  albuterol (PROVENTIL HFA;VENTOLIN HFA) 108 (90 BASE) MCG/ACT inhaler, Inhale 2 puffs Every 6 (Six) Hours As Needed for wheezing., Disp: 1 inhaler, Rfl: 0  •  aspirin 81 MG EC tablet, Take 81 mg by mouth Daily., Disp: , Rfl:   •  carvedilol (COREG) 25 MG tablet, Take 25 mg by mouth 2 (Two) Times a Day., Disp: , Rfl:   •  furosemide (LASIX) 40 MG tablet, Take 40 mg by mouth 2 (Two) Times a Day., Disp: , Rfl:   •  lisinopril (PRINIVIL,ZESTRIL) 40 MG tablet, Take 1 tablet by mouth Daily., Disp: 30 tablet, Rfl: 11  •  metOLazone (ZAROXOLYN) 5 MG tablet, Take 1 tablet by mouth Daily As Needed (Edema or shortness of breath)., Disp: 30 tablet, Rfl: 6  •  spironolactone (ALDACTONE) 50 MG tablet, Take 1 tablet by mouth Daily., Disp: 30 tablet, Rfl: 11    Objective   Visit Vitals   • /74 (BP Location: Right arm, Patient Position: Sitting, Cuff Size: Large Adult)   • Temp 97.9 °F (36.6 °C) (Temporal Artery )   • Ht 69\" (175.3 cm)   • Wt (!) 423 lb (192 kg)   • BMI 62.47 kg/m2     Physical Exam   Constitutional: He is oriented to person, place, and time. He appears well-developed and well-nourished.   Morbidly obese   HENT:   Head: Normocephalic and atraumatic.   Cardiovascular: Normal rate, regular rhythm and normal heart sounds.  Exam reveals no gallop and no friction rub.    No murmur heard.  Heart sounds difficult to auscultate given pt habitus    Pulmonary/Chest: Effort normal " and breath sounds normal. He has no wheezes.   Musculoskeletal:   Normal gait and station   Neurological: He is alert and oriented to person, place, and time.   Skin: Skin is warm and dry.   Keloids on skin   Psychiatric: He has a normal mood and affect. His behavior is normal. Judgment and thought content normal.   Vitals reviewed.      Assessment/Plan   Evan was seen today for establish care.    Diagnoses and all orders for this visit:    Nonischemic congestive cardiomyopathy  -     aspirin 81 MG EC tablet; Take 1 tablet by mouth Daily.  -     Ambulatory Referral to Nutrition Services  -     Continue current meds, sees cards. Missed appt yesterday so told pt to reschedule that ASAP. Also gave him appt info for his Dr. Olsen appt in March and told him to              definitely go to that  -     Gave pt rx stating that he should not drive at all due to his severe heart failure and potential for life-threatening cardiac arrhythmia. He does not have a license but was thinking of pursuing that--I told him to not do that at this time as he is prohibited from driving due to medical condition  -     Told pt to wear LifeVest at all times due to possibility of fatal cardiac arrhythmia. Told him to go to [facility] or [facility]'s with vest and see if they have any materials that could help him hold it closed. Rec safety pins with the ace bandage to keep it in place until then    Chronic systolic congestive heart failure  -     aspirin 81 MG EC tablet; Take 1 tablet by mouth Daily.  -     Ambulatory Referral to Nutrition Services    Morbid obesity due to excess calories  -     Ambulatory Referral to Nutrition Services  -     Supposed to be abiding by a low sodium, cardiac, calorie restrictive diet but has difficulty making the right choices    Essential hypertension  -     Ambulatory Referral to Nutrition Services    Shortness of breath  -     albuterol (PROVENTIL HFA;VENTOLIN HFA) 108 (90 BASE) MCG/ACT inhaler; Inhale 2  puffs Every 6 (Six) Hours As Needed for wheezing.

## 2017-02-28 ENCOUNTER — HOSPITAL ENCOUNTER (INPATIENT)
Facility: HOSPITAL | Age: 27
LOS: 3 days | Discharge: HOME | End: 2017-03-03
Attending: EMERGENCY MEDICINE | Admitting: HOSPITALIST

## 2017-02-28 ENCOUNTER — APPOINTMENT (OUTPATIENT)
Dept: GENERAL RADIOLOGY | Facility: HOSPITAL | Age: 27
End: 2017-02-28

## 2017-02-28 DIAGNOSIS — I50.23 ACUTE ON CHRONIC SYSTOLIC (CONGESTIVE) HEART FAILURE (HCC): Primary | ICD-10-CM

## 2017-02-28 DIAGNOSIS — Z74.09 IMPAIRED FUNCTIONAL MOBILITY, BALANCE, GAIT, AND ENDURANCE: ICD-10-CM

## 2017-02-28 DIAGNOSIS — I42.0 NONISCHEMIC CONGESTIVE CARDIOMYOPATHY (HCC): ICD-10-CM

## 2017-02-28 DIAGNOSIS — E66.01 MORBID OBESITY DUE TO EXCESS CALORIES (HCC): ICD-10-CM

## 2017-02-28 DIAGNOSIS — G47.33 OBSTRUCTIVE SLEEP APNEA: ICD-10-CM

## 2017-02-28 PROBLEM — D50.9 MICROCYTIC ANEMIA: Status: ACTIVE | Noted: 2017-02-28

## 2017-02-28 LAB
ALBUMIN SERPL-MCNC: 3.6 G/DL (ref 3.2–4.8)
ALBUMIN/GLOB SERPL: 1.1 G/DL (ref 1.5–2.5)
ALP SERPL-CCNC: 66 U/L (ref 25–100)
ALT SERPL W P-5'-P-CCNC: 17 U/L (ref 7–40)
ANION GAP SERPL CALCULATED.3IONS-SCNC: 5 MMOL/L (ref 3–11)
AST SERPL-CCNC: 17 U/L (ref 0–33)
BASOPHILS # BLD AUTO: 0.01 10*3/MM3 (ref 0–0.2)
BASOPHILS NFR BLD AUTO: 0.2 % (ref 0–1)
BILIRUB SERPL-MCNC: 0.8 MG/DL (ref 0.3–1.2)
BNP SERPL-MCNC: 455 PG/ML (ref 0–100)
BUN BLD-MCNC: 9 MG/DL (ref 9–23)
BUN/CREAT SERPL: 9 (ref 7–25)
CALCIUM SPEC-SCNC: 9.3 MG/DL (ref 8.7–10.4)
CHLORIDE SERPL-SCNC: 105 MMOL/L (ref 99–109)
CO2 SERPL-SCNC: 29 MMOL/L (ref 20–31)
CREAT BLD-MCNC: 1 MG/DL (ref 0.6–1.3)
DEPRECATED RDW RBC AUTO: 43.2 FL (ref 37–54)
EOSINOPHIL # BLD AUTO: 0.03 10*3/MM3 (ref 0.1–0.3)
EOSINOPHIL NFR BLD AUTO: 0.6 % (ref 0–3)
ERYTHROCYTE [DISTWIDTH] IN BLOOD BY AUTOMATED COUNT: 15.3 % (ref 11.3–14.5)
FERRITIN SERPL-MCNC: 57 NG/ML (ref 22–322)
FOLATE SERPL-MCNC: 4.85 NG/ML (ref 3.2–20)
GFR SERPL CREATININE-BSD FRML MDRD: 111 ML/MIN/1.73
GLOBULIN UR ELPH-MCNC: 3.2 GM/DL
GLUCOSE BLD-MCNC: 106 MG/DL (ref 70–100)
HCT VFR BLD AUTO: 37.3 % (ref 38.9–50.9)
HGB BLD-MCNC: 11.9 G/DL (ref 13.1–17.5)
HOLD SPECIMEN: NORMAL
HOLD SPECIMEN: NORMAL
IMM GRANULOCYTES # BLD: 0.01 10*3/MM3 (ref 0–0.03)
IMM GRANULOCYTES NFR BLD: 0.2 % (ref 0–0.6)
IRON 24H UR-MRATE: 32 MCG/DL (ref 50–175)
IRON SATN MFR SERPL: 10 % (ref 20–50)
LYMPHOCYTES # BLD AUTO: 1.35 10*3/MM3 (ref 0.6–4.8)
LYMPHOCYTES NFR BLD AUTO: 28.6 % (ref 24–44)
MCH RBC QN AUTO: 24.7 PG (ref 27–31)
MCHC RBC AUTO-ENTMCNC: 31.9 G/DL (ref 32–36)
MCV RBC AUTO: 77.4 FL (ref 80–99)
MONOCYTES # BLD AUTO: 0.52 10*3/MM3 (ref 0–1)
MONOCYTES NFR BLD AUTO: 11 % (ref 0–12)
NEUTROPHILS # BLD AUTO: 2.8 10*3/MM3 (ref 1.5–8.3)
NEUTROPHILS NFR BLD AUTO: 59.4 % (ref 41–71)
PLATELET # BLD AUTO: 334 10*3/MM3 (ref 150–450)
PMV BLD AUTO: 10 FL (ref 6–12)
POTASSIUM BLD-SCNC: 4.3 MMOL/L (ref 3.5–5.5)
PROT SERPL-MCNC: 6.8 G/DL (ref 5.7–8.2)
RBC # BLD AUTO: 4.82 10*6/MM3 (ref 4.2–5.76)
RETICS/RBC NFR AUTO: 2.41 % (ref 0.5–1.5)
SODIUM BLD-SCNC: 139 MMOL/L (ref 132–146)
TIBC SERPL-MCNC: 328 MCG/DL (ref 250–450)
TROPONIN I SERPL-MCNC: 0.06 NG/ML (ref 0–0.07)
VIT B12 BLD-MCNC: 517 PG/ML (ref 211–911)
WBC NRBC COR # BLD: 4.72 10*3/MM3 (ref 3.5–10.8)
WHOLE BLOOD HOLD SPECIMEN: NORMAL
WHOLE BLOOD HOLD SPECIMEN: NORMAL

## 2017-02-28 PROCEDURE — 83540 ASSAY OF IRON: CPT | Performed by: NURSE PRACTITIONER

## 2017-02-28 PROCEDURE — 80053 COMPREHEN METABOLIC PANEL: CPT | Performed by: EMERGENCY MEDICINE

## 2017-02-28 PROCEDURE — 82728 ASSAY OF FERRITIN: CPT | Performed by: NURSE PRACTITIONER

## 2017-02-28 PROCEDURE — 99284 EMERGENCY DEPT VISIT MOD MDM: CPT

## 2017-02-28 PROCEDURE — 85045 AUTOMATED RETICULOCYTE COUNT: CPT | Performed by: NURSE PRACTITIONER

## 2017-02-28 PROCEDURE — 82746 ASSAY OF FOLIC ACID SERUM: CPT | Performed by: NURSE PRACTITIONER

## 2017-02-28 PROCEDURE — 84484 ASSAY OF TROPONIN QUANT: CPT

## 2017-02-28 PROCEDURE — 99223 1ST HOSP IP/OBS HIGH 75: CPT | Performed by: HOSPITALIST

## 2017-02-28 PROCEDURE — 25010000002 FUROSEMIDE PER 20 MG: Performed by: EMERGENCY MEDICINE

## 2017-02-28 PROCEDURE — 93005 ELECTROCARDIOGRAM TRACING: CPT

## 2017-02-28 PROCEDURE — 82607 VITAMIN B-12: CPT | Performed by: NURSE PRACTITIONER

## 2017-02-28 PROCEDURE — 85025 COMPLETE CBC W/AUTO DIFF WBC: CPT | Performed by: EMERGENCY MEDICINE

## 2017-02-28 PROCEDURE — 71010 HC CHEST PA OR AP: CPT

## 2017-02-28 PROCEDURE — 83880 ASSAY OF NATRIURETIC PEPTIDE: CPT | Performed by: EMERGENCY MEDICINE

## 2017-02-28 PROCEDURE — 83550 IRON BINDING TEST: CPT | Performed by: NURSE PRACTITIONER

## 2017-02-28 RX ORDER — SODIUM CHLORIDE 0.9 % (FLUSH) 0.9 %
10 SYRINGE (ML) INJECTION AS NEEDED
Status: DISCONTINUED | OUTPATIENT
Start: 2017-02-28 | End: 2017-03-03 | Stop reason: HOSPADM

## 2017-02-28 RX ORDER — ACETAMINOPHEN 325 MG/1
650 TABLET ORAL EVERY 4 HOURS PRN
Status: DISCONTINUED | OUTPATIENT
Start: 2017-02-28 | End: 2017-03-03 | Stop reason: HOSPADM

## 2017-02-28 RX ORDER — CARVEDILOL 12.5 MG/1
25 TABLET ORAL 2 TIMES DAILY
Status: DISCONTINUED | OUTPATIENT
Start: 2017-03-01 | End: 2017-03-03 | Stop reason: HOSPADM

## 2017-02-28 RX ORDER — IPRATROPIUM BROMIDE AND ALBUTEROL SULFATE 2.5; .5 MG/3ML; MG/3ML
3 SOLUTION RESPIRATORY (INHALATION) EVERY 4 HOURS PRN
Status: DISCONTINUED | OUTPATIENT
Start: 2017-02-28 | End: 2017-03-03 | Stop reason: HOSPADM

## 2017-02-28 RX ORDER — FUROSEMIDE 10 MG/ML
40 INJECTION INTRAMUSCULAR; INTRAVENOUS DAILY
Status: DISCONTINUED | OUTPATIENT
Start: 2017-03-01 | End: 2017-03-01

## 2017-02-28 RX ORDER — ASPIRIN 81 MG/1
81 TABLET ORAL DAILY
Status: DISCONTINUED | OUTPATIENT
Start: 2017-03-01 | End: 2017-03-03 | Stop reason: HOSPADM

## 2017-02-28 RX ORDER — FUROSEMIDE 10 MG/ML
60 INJECTION INTRAMUSCULAR; INTRAVENOUS ONCE
Status: COMPLETED | OUTPATIENT
Start: 2017-02-28 | End: 2017-02-28

## 2017-02-28 RX ORDER — SODIUM CHLORIDE 0.9 % (FLUSH) 0.9 %
1-10 SYRINGE (ML) INJECTION AS NEEDED
Status: DISCONTINUED | OUTPATIENT
Start: 2017-02-28 | End: 2017-03-03 | Stop reason: HOSPADM

## 2017-02-28 RX ORDER — LISINOPRIL 40 MG/1
40 TABLET ORAL
Status: DISCONTINUED | OUTPATIENT
Start: 2017-03-01 | End: 2017-03-01

## 2017-02-28 RX ADMIN — FUROSEMIDE 60 MG: 10 INJECTION, SOLUTION INTRAMUSCULAR; INTRAVENOUS at 20:28

## 2017-03-01 LAB
ALBUMIN SERPL-MCNC: 3.7 G/DL (ref 3.2–4.8)
ALBUMIN/GLOB SERPL: 1.1 G/DL (ref 1.5–2.5)
ALP SERPL-CCNC: 60 U/L (ref 25–100)
ALT SERPL W P-5'-P-CCNC: 13 U/L (ref 7–40)
ANION GAP SERPL CALCULATED.3IONS-SCNC: 6 MMOL/L (ref 3–11)
AST SERPL-CCNC: 13 U/L (ref 0–33)
BASOPHILS # BLD AUTO: 0.02 10*3/MM3 (ref 0–0.2)
BASOPHILS NFR BLD AUTO: 0.4 % (ref 0–1)
BILIRUB SERPL-MCNC: 0.8 MG/DL (ref 0.3–1.2)
BUN BLD-MCNC: 9 MG/DL (ref 9–23)
BUN/CREAT SERPL: 10 (ref 7–25)
CALCIUM SPEC-SCNC: 9.2 MG/DL (ref 8.7–10.4)
CHLORIDE SERPL-SCNC: 103 MMOL/L (ref 99–109)
CO2 SERPL-SCNC: 28 MMOL/L (ref 20–31)
CREAT BLD-MCNC: 0.9 MG/DL (ref 0.6–1.3)
DEPRECATED RDW RBC AUTO: 42.9 FL (ref 37–54)
EOSINOPHIL # BLD AUTO: 0.05 10*3/MM3 (ref 0.1–0.3)
EOSINOPHIL NFR BLD AUTO: 1 % (ref 0–3)
ERYTHROCYTE [DISTWIDTH] IN BLOOD BY AUTOMATED COUNT: 15.2 % (ref 11.3–14.5)
GFR SERPL CREATININE-BSD FRML MDRD: 126 ML/MIN/1.73
GLOBULIN UR ELPH-MCNC: 3.4 GM/DL
GLUCOSE BLD-MCNC: 100 MG/DL (ref 70–100)
HBA1C MFR BLD: 6 % (ref 4.8–5.6)
HCT VFR BLD AUTO: 37.2 % (ref 38.9–50.9)
HGB BLD-MCNC: 11.8 G/DL (ref 13.1–17.5)
IMM GRANULOCYTES # BLD: 0.02 10*3/MM3 (ref 0–0.03)
IMM GRANULOCYTES NFR BLD: 0.4 % (ref 0–0.6)
LYMPHOCYTES # BLD AUTO: 1.63 10*3/MM3 (ref 0.6–4.8)
LYMPHOCYTES NFR BLD AUTO: 32.5 % (ref 24–44)
MCH RBC QN AUTO: 24.6 PG (ref 27–31)
MCHC RBC AUTO-ENTMCNC: 31.7 G/DL (ref 32–36)
MCV RBC AUTO: 77.7 FL (ref 80–99)
MONOCYTES # BLD AUTO: 0.49 10*3/MM3 (ref 0–1)
MONOCYTES NFR BLD AUTO: 9.8 % (ref 0–12)
NEUTROPHILS # BLD AUTO: 2.81 10*3/MM3 (ref 1.5–8.3)
NEUTROPHILS NFR BLD AUTO: 55.9 % (ref 41–71)
PLATELET # BLD AUTO: 332 10*3/MM3 (ref 150–450)
PMV BLD AUTO: 10.6 FL (ref 6–12)
POTASSIUM BLD-SCNC: 3.7 MMOL/L (ref 3.5–5.5)
PROT SERPL-MCNC: 7.1 G/DL (ref 5.7–8.2)
RBC # BLD AUTO: 4.79 10*6/MM3 (ref 4.2–5.76)
SODIUM BLD-SCNC: 137 MMOL/L (ref 132–146)
WBC NRBC COR # BLD: 5.02 10*3/MM3 (ref 3.5–10.8)

## 2017-03-01 PROCEDURE — 25010000002 ENOXAPARIN PER 10 MG: Performed by: NURSE PRACTITIONER

## 2017-03-01 PROCEDURE — 94660 CPAP INITIATION&MGMT: CPT

## 2017-03-01 PROCEDURE — 94799 UNLISTED PULMONARY SVC/PX: CPT

## 2017-03-01 PROCEDURE — 99233 SBSQ HOSP IP/OBS HIGH 50: CPT | Performed by: INTERNAL MEDICINE

## 2017-03-01 PROCEDURE — 85025 COMPLETE CBC W/AUTO DIFF WBC: CPT | Performed by: NURSE PRACTITIONER

## 2017-03-01 PROCEDURE — 25010000002 NA FERRIC GLUC CPLX PER 12.5 MG: Performed by: INTERNAL MEDICINE

## 2017-03-01 PROCEDURE — 80053 COMPREHEN METABOLIC PANEL: CPT | Performed by: NURSE PRACTITIONER

## 2017-03-01 PROCEDURE — 83036 HEMOGLOBIN GLYCOSYLATED A1C: CPT | Performed by: HOSPITALIST

## 2017-03-01 PROCEDURE — 97162 PT EVAL MOD COMPLEX 30 MIN: CPT

## 2017-03-01 PROCEDURE — 25010000002 FUROSEMIDE PER 20 MG: Performed by: NURSE PRACTITIONER

## 2017-03-01 PROCEDURE — 97110 THERAPEUTIC EXERCISES: CPT

## 2017-03-01 PROCEDURE — 97116 GAIT TRAINING THERAPY: CPT

## 2017-03-01 RX ORDER — TORSEMIDE 20 MG/1
40 TABLET ORAL DAILY
Status: DISCONTINUED | OUTPATIENT
Start: 2017-03-01 | End: 2017-03-03 | Stop reason: HOSPADM

## 2017-03-01 RX ADMIN — ASPIRIN 81 MG: 81 TABLET, COATED ORAL at 08:30

## 2017-03-01 RX ADMIN — CARVEDILOL 25 MG: 12.5 TABLET, FILM COATED ORAL at 17:17

## 2017-03-01 RX ADMIN — SODIUM CHLORIDE 125 MG: 9 INJECTION, SOLUTION INTRAVENOUS at 16:24

## 2017-03-01 RX ADMIN — SACUBITRIL AND VALSARTAN 1 TABLET: 24; 26 TABLET, FILM COATED ORAL at 20:58

## 2017-03-01 RX ADMIN — FUROSEMIDE 40 MG: 10 INJECTION, SOLUTION INTRAMUSCULAR; INTRAVENOUS at 08:33

## 2017-03-01 RX ADMIN — CARVEDILOL 25 MG: 12.5 TABLET, FILM COATED ORAL at 08:32

## 2017-03-01 RX ADMIN — ENOXAPARIN SODIUM 40 MG: 40 INJECTION SUBCUTANEOUS at 08:31

## 2017-03-01 RX ADMIN — LISINOPRIL 40 MG: 40 TABLET ORAL at 08:32

## 2017-03-01 RX ADMIN — ENOXAPARIN SODIUM 40 MG: 40 INJECTION SUBCUTANEOUS at 20:58

## 2017-03-01 RX ADMIN — SACUBITRIL AND VALSARTAN 1 TABLET: 24; 26 TABLET, FILM COATED ORAL at 10:30

## 2017-03-01 NOTE — NURSING NOTE
[facility]  RE:  F    Medical records reviewed.  Hx of non-compliance.  Well known [facility].    Heart Failure Education discussed: What his heart failure, causes, ejection fraction teaching, signs and symptoms of heart failure worsening, decreasing NA.  Discussed fluid restriction of less then 2000 ml or as directed by cardiology, daily weight monitoring, medication management, roll of the heart failure center and when to call.      Pt to be scheduled [facility] f/u at d/c (scheduled 03/16/17)

## 2017-03-01 NOTE — PLAN OF CARE
Problem: Patient Care Overview (Adult)  Goal: Plan of Care Review  Outcome: Ongoing (interventions implemented as appropriate)    03/01/17 1001   Coping/Psychosocial Response Interventions   Plan Of Care Reviewed With patient   Patient Care Overview   Progress progress towards functional goals is fair   Outcome Evaluation   Outcome Summary/Follow up Plan Adm w/ A/C CHF,elev.BNP & ANEMIA; H/o med.non-compliance (only taking 2 of 3 diuretics prescribed, & not wearing Life Vest); presents w/ evolving sympt, incl. elev BP/HR, weaknes/fat., mod incr. SOB & o2 desat to 84% w/ activ.; WILL benefit from IPPT for strengthening, prog. monitored mobility, & instruction in energy conservation measures; recommend HHPT f/u to ensure goals met & pt. ret. to PLOF         Problem: Inpatient Physical Therapy  Goal: Bed Mobility Goal LTG- PT  Outcome: Ongoing (interventions implemented as appropriate)    03/01/17 1001   Bed Mobility PT LTG   Bed Mobility PT LTG, Date Established 03/01/17   Bed Mobility PT LTG, Time to Achieve 5 days   Bed Mobility PT LTG, Activity Type all bed mobility   Bed Mobility PT LTG, [place] Level independent       Goal: Transfer Training Goal 1 LTG- PT  Outcome: Ongoing (interventions implemented as appropriate)    03/01/17 1001   Transfer Training PT LTG   Transfer Training PT LTG, Date Established 03/01/17   Transfer Training PT LTG, Time to Achieve 5 days   Transfer Training PT LTG, Activity Type sit to stand/stand to sit;bed to chair /chair to bed   Transfer Training PT LTG, [place] Level independent   Transfer Training PT LTG, Assist Device (w/ STABLE VS)       Goal: Gait Training Goal LTG- PT  Outcome: Ongoing (interventions implemented as appropriate)    03/01/17 1001   Gait Training PT LTG   Gait Training Goal PT LTG, Date Established 03/01/17   Gait Training Goal PT LTG, Time to Achieve 5 days   Gait Training Goal PT LTG, [place] Level independent   Gait Training Goal PT LTG, Distance  to Achieve 200  (w/ stable VS)

## 2017-03-01 NOTE — PAYOR COMM NOTE
"Sivakumar Queen (26 y.o. Male)     INITIAL NOTIFICATION AND CLINICALS      Date of Birth Social Security Number Address Home Phone MRN    [id number]  [id number] [facility] [address] [phone] [id number]    Yazidi Marital Status          None Single       Admission Date Admission Type Admitting Provider Attending Provider Department, Room/Bed    2/28/17 Emergency Chema Gutierrez MD [place], Phonekeo, MD [facility] 6A, N608/1    Discharge Date Discharge Disposition Discharge Destination                      Attending Provider: Chema Gutierrez MD     Allergies:  No Known Allergies    Isolation:  None   Infection:  None   Code Status:  FULL    Ht:  70\" (177.8 cm)   Wt:  428 lb 9.6 oz (194 kg)    Admission Cmt:  None   Principal Problem:  Acute on chronic systolic (congestive) heart failure [I50.23]                 Active Insurance as of 2/28/2017     Primary Coverage     Payor Plan Insurance Group Employer/Plan Group    ANTHEM BLUE CROSS ANTHEM BLUE CROSS BLUE SHIELD PPO [id number]     Payor Plan Address Payor Plan Phone Number Effective From Effective To    [address] [phone] 7/1/2014     [place] [address]       Subscriber Name Subscriber Birth Date Member ID       HEATHER QUEEN 12/5/1968 [id number]           Secondary Coverage     Payor Plan Insurance Group Employer/Plan Group    PASSPORT PASSPORT      Payor Plan Address Payor Plan Phone Number Effective From Effective To    [address] [phone] 10/1/2014     [place] [address]       Subscriber Name Subscriber Birth Date Member ID       SIVAKUMAR QUEEN 1990 [id number]                 Emergency Contacts      (Rel.) Home Phone Work Phone Mobile Phone    Kiah Austin (Significant Other) [phone] -- --    Aramis Queen (Father) [phone] -- --            Insurance Information                ANTHEM BLUE CROSS/ANTHEM BLUE CROSS BLUE SHIELD PPO Phone: [phone]    Subscriber: " Stephania Gannon Subscriber#: [id number]    Group#: [id number] Precert#:         PASSPORT/PASSPORT Phone: [phone]    Subscriber: Evan Gannon Subscriber#: [id number]    Group#:  Precert#:           History & Physical     No notes of this type exist for this encounter.           Emergency Department Notes      Kael Richmond MD at 2/28/2017  7:41 PM          Subjective   HPI Comments: Evan Gannon is a 26 y.o.male who presents to the ED with c/o shortness of breath. Patient was recently admitted here 2 weeks ago for shortness of breath and orthopnea where he received diuretic therapy. He complained of a productive cough at that time. Since being discharged, he has had persistent shortness of breath and cough. He states that he has been taking his water pill but still feels as if he has fluid on his lungs. He denies any F/C, N/V/D, or any other acute sx at this time.    Patient is a 26 y.o. male presenting with shortness of breath.   History provided by:  Patient  Shortness of Breath   Severity:  Moderate  Onset quality:  Gradual  Duration:  2 weeks  Timing:  Constant  Progression:  Worsening  Relieved by:  Nothing  Worsened by:  Nothing  Ineffective treatments:  Diuretics  Associated symptoms: no abdominal pain, no chest pain, no fever, no headaches, no rash and no sore throat        Review of Systems   Constitutional: Negative for chills, fatigue and fever.   HENT: Negative for congestion and sore throat.    Respiratory: Positive for shortness of breath.    Cardiovascular: Negative for chest pain.   Gastrointestinal: Negative for abdominal pain.   Genitourinary: Negative for dysuria.   Musculoskeletal: Negative for back pain.   Skin: Negative for rash.   Neurological: Negative for headaches.   Psychiatric/Behavioral: Negative for agitation and confusion. other systems reviewed and are negative.      Past Medical History   Diagnosis Date   • CHF (congestive heart failure)        No Known  Allergies    Past Surgical History   Procedure Laterality Date   • Nose surgery     • Adenoidectomy     • Tonsillectomy         Family History   Problem Relation Age of Onset   • Diabetes Father        Social History     Social History   • Marital status: Single     Spouse name: N/A   • Number of children: N/A   • Years of education: N/A     Social History Main Topics   • Smoking status: Never Smoker   • Smokeless tobacco: Never Used   • Alcohol use No   • Drug use: No   • Sexual activity: Defer     Other Topics Concern   • None     Social History Narrative   • None         Objective   Physical Exam   Constitutional: He is oriented to person, place, and time. He appears well-developed and well-nourished. No distress.   HENT:   Head: Normocephalic and atraumatic.   Eyes: Conjunctivae are normal. No scleral icterus.   Neck: Normal range of motion. Neck supple.   Cardiovascular: Normal rate, regular rhythm and normal heart sounds.    Pulmonary/Chest: Effort normal. No respiratory distress. He has rales (bilateral bases).   diminished breath sounds bilaterally   Abdominal: Soft. Bowel sounds are normal. There is no tenderness.   Morbidly obese   Musculoskeletal: Normal range of motion. He exhibits edema.   Neurological: He is alert and oriented to person, place, and time.   Skin: Skin is warm and dry.   Psychiatric: He has a normal mood and affect. His behavior is normal. note and vitals reviewed.      Procedures        ED Course  ED Course   EKG ST.  CXR shows mild CHF.  BNP elevated.  Pt using three PO diuretics without relief.  Recommend admission for further care.  IV Lasix given.    Of note, records indicate pt should be wearing a life vest.  He says it does not fit properly so he is not wearing it.                  MDM  Number of Diagnoses or Management Options  Acute on chronic systolic (congestive) heart failure:      Amount and/or Complexity of Data Reviewed  Clinical lab tests: ordered and reviewed  Tests in  the radiology section of CPT®:  ordered and reviewed  Decide to obtain previous medical records or to obtain history from someone other than the patient: yes  Discuss the patient with other providers: yes  Independent visualization of images, tracings, or specimens: yes        Final diagnoses:   Acute on chronic systolic (congestive) heart failure       Documentation assistance provided by josé Santamaria.  Information recorded by the scribe was done at my direction and has been verified and validated by me.    Jordyn Santamaria  02/28/17 2029      Kael Richmond MD  03/01/17 0124       Electronically signed by Kael Richmond MD at 3/1/2017  1:24 AM        Hospital Medications (active)       Dose Frequency Start End    acetaminophen (TYLENOL) tablet 650 mg 650 mg Every 4 Hours PRN 2/28/2017     Sig - Route: Take 2 tablets by mouth Every 4 (Four) Hours As Needed for mild pain (1-3). - Oral    aspirin EC tablet 81 mg 81 mg Daily 3/1/2017     Sig - Route: Take 1 tablet by mouth Daily. - Oral    carvedilol (COREG) tablet 25 mg 25 mg 2 Times Daily 3/1/2017     Sig - Route: Take 2 tablets by mouth 2 (Two) Times a Day. - Oral    enoxaparin (LOVENOX) syringe 40 mg 40 mg Daily 3/1/2017     Sig - Route: Inject 0.4 mL under the skin Daily. - Subcutaneous    furosemide (LASIX) injection 40 mg 40 mg Daily 3/1/2017     Sig - Route: Infuse 4 mL into a venous catheter Daily. - Intravenous    furosemide (LASIX) injection 60 mg 60 mg Once 2/28/2017 2/28/2017    Sig - Route: Infuse 6 mL into a venous catheter 1 (One) Time. - Intravenous    ipratropium-albuterol (DUO-NEB) nebulizer solution 3 mL 3 mL Every 4 Hours PRN 2/28/2017     Sig - Route: Take 3 mL by nebulization Every 4 (Four) Hours As Needed for shortness of air. - Nebulization    lisinopril (PRINIVIL,ZESTRIL) tablet 40 mg 40 mg Every 24 Hours Scheduled 3/1/2017     Sig - Route: Take 1 tablet by mouth Daily. - Oral    sodium chloride 0.9 % flush 1-10 mL 1-10 mL  As Needed 2/28/2017     Sig - Route: Infuse 1-10 mL into a venous catheter As Needed for line care. - Intravenous    sodium chloride 0.9 % flush 10 mL 10 mL As Needed 2/28/2017     Sig - Route: Infuse 10 mL into a venous catheter As Needed for line care. - Intravenous    Cosign for Ordering: Required by Kael Richmond MD            Lab Results (last 24 hours)     Procedure Component Value Units Date/Time    POC Troponin, Rapid [[id number]]  (Normal) Collected:  02/28/17 1834    Specimen:  Blood Updated:  02/28/17 1850     Troponin I 0.06 ng/mL       Serial Number: [id number]    : [id number]       CBC & Differential [[id number]] Collected:  02/28/17 1827    Specimen:  Blood Updated:  02/28/17 1855    Narrative:       The following orders were created for panel order CBC & Differential.  Procedure                               Abnormality         Status                     ---------                               -----------         ------                     CBC Auto Differential[[id number]]         Abnormal            Final result                 Please view results for these tests on the individual orders.    CBC Auto Differential [[id number]]  (Abnormal) Collected:  02/28/17 1827    Specimen:  Blood Updated:  02/28/17 1855     WBC 4.72 10*3/mm3      RBC 4.82 10*6/mm3      Hemoglobin 11.9 (L) g/dL      Hematocrit 37.3 (L) %      MCV 77.4 (L) fL      MCH 24.7 (L) pg      MCHC 31.9 (L) g/dL      RDW 15.3 (H) %      RDW-SD 43.2 fl      MPV 10.0 fL      Platelets 334 10*3/mm3      Neutrophil % 59.4 %      Lymphocyte % 28.6 %      Monocyte % 11.0 %      Eosinophil % 0.6 %      Basophil % 0.2 %      Immature Grans % 0.2 %      Neutrophils, Absolute 2.80 10*3/mm3      Lymphocytes, Absolute 1.35 10*3/mm3      Monocytes, Absolute 0.52 10*3/mm3      Eosinophils, Absolute 0.03 (L) 10*3/mm3      Basophils, Absolute 0.01 10*3/mm3      Immature Grans, Absolute 0.01 10*3/mm3     Comprehensive Metabolic Panel [[id number]]  (Abnormal)  Collected:  02/28/17 1827    Specimen:  Blood Updated:  02/28/17 1858     Glucose 106 (H) mg/dL      BUN 9 mg/dL      Creatinine 1.00 mg/dL      Sodium 139 mmol/L      Potassium 4.3 mmol/L      Chloride 105 mmol/L      CO2 29.0 mmol/L      Calcium 9.3 mg/dL      Total Protein 6.8 g/dL      Albumin 3.60 g/dL      ALT (SGPT) 17 U/L      AST (SGOT) 17 U/L      Alkaline Phosphatase 66 U/L      Total Bilirubin 0.8 mg/dL      eGFR  African Amer 111 mL/min/1.73      Globulin 3.2 gm/dL      A/G Ratio 1.1 (L) g/dL      BUN/Creatinine Ratio 9.0      Anion Gap 5.0 mmol/L     Narrative:       National Kid[employer] Guidelines    Stage                           Description                             GFR                      1                               Normal or High                          90+  2                               Mild decrease                            60-89  3                               Moderate decrease                   30-59  4                               Severe decrease                       15-29  5                               Kidney failure                             <15    BNP [[id number]]  (Abnormal) Collected:  02/28/17 1827    Specimen:  Blood Updated:  02/28/17 1907     .0 (H) pg/mL     Light Blue Top [[id number]] Collected:  02/28/17 1827    Specimen:  Blood Updated:  02/28/17 2301     Extra Tube hold for add-on       Auto resulted       Gold Top - SST [[id number]] Collected:  02/28/17 1827    Specimen:  Blood Updated:  02/28/17 2301     Extra Tube Hold for add-ons.       Auto resulted.       [place] Draw [[id number]] Collected:  02/28/17 1827    Specimen:  Blood Updated:  02/28/17 2301    Narrative:       The following orders were created for panel order [place] Draw.  Procedure                               Abnormality         Status                     ---------                               -----------         ------                     Light Blue Top[[id number]]                                     Final result               Green Top (Gel)[[id number]]                                   Final result               Lavender Top[[id number]]                                      Final result               Gold Top - SST[[id number]]                                    Final result               Green Top (No Gel)[[id number]]                                                             Please view results for these tests on the individual orders.    Green Top (Gel) [[id number]] Collected:  02/28/17 1827    Specimen:  Blood Updated:  02/28/17 2301     Extra Tube Hold for add-ons.       Auto resulted.       Lavender Top [[id number]] Collected:  02/28/17 1827    Specimen:  Blood Updated:  02/28/17 2301     Extra Tube hold for add-on       Auto resulted       Reticulocytes [[id number]]  (Abnormal) Collected:  02/28/17 2308    Specimen:  Blood Updated:  02/28/17 2310     Reticulocyte % 2.41 (H) %     Vitamin B12 [[id number]]  (Normal) Collected:  02/28/17 2312    Specimen:  Blood Updated:  02/28/17 2335     Vitamin B-12 517 pg/mL     Ferritin [[id number]]  (Normal) Collected:  02/28/17 2312    Specimen:  Blood Updated:  02/28/17 2335     Ferritin 57.00 ng/mL     Folate [[id number]]  (Normal) Collected:  02/28/17 2312    Specimen:  Blood Updated:  02/28/17 2354     Folate 4.85 ng/mL     Narrative:         Folate Reference Ranges:    Deficient:            Less than 1.2 ng/mL  Indeterminant:        1.2-3.1 ng/mL  Normal:               3.2-20.0 ng/mL    Iron Profile [[id number]]  (Abnormal) Collected:  02/28/17 2312    Specimen:  Blood Updated:  02/28/17 2354     Iron 32 (L) mcg/dL      TIBC 328 mcg/dL      Iron Saturation 10 (L) %         Imaging Results (last 24 hours)     Procedure Component Value Units Date/Time    XR Chest 1 View [[id number]]      Updated:  02/28/17 1916          Physician Progress Notes (last 24 hours) (Notes from 2/28/2017  1:40 AM through 3/1/2017  1:40 AM)     No notes of this type exist for this encounter.         Consult Notes (last 24 hours) (Notes from 2/28/2017  1:40 AM through 3/1/2017  1:40 AM)     No notes of this type exist for this encounter.

## 2017-03-01 NOTE — PROGRESS NOTES
"    [facility] Medicine Services  INPATIENT PROGRESS NOTE    Date of Admission: 2/28/2017  Length of Stay: 1  Primary Care Physician: Barbara Mills MD    Subjective   CC: SOB  HPI:  He is feeling better today after some diuresis.  \"I can breathe\".  Feels edema is better.    Review Of Systems:   Review of Systems   Constitutional: Negative for fever.   Respiratory: Positive for shortness of breath.    Cardiovascular: Positive for leg swelling. Negative for chest pain.   All other systems reviewed and are negative.      Objective      Temp:  [96.7 °F (35.9 °C)-98 °F (36.7 °C)] 97.9 °F (36.6 °C)  Heart Rate:  [] 93  Resp:  [16-36] 16  BP: (101-142)/() 132/84  Physical Exam   Constitutional: He is oriented to person, place, and time. He appears well-developed and well-nourished.   Comfortable lying in bed   HENT:   Head: Normocephalic.   Large neck circumference   Eyes: Pupils are equal, round, and reactive to light.   Cardiovascular: Normal rate, regular rhythm and normal heart sounds.    Pulmonary/Chest:   Diminished d/t body habitus   Abdominal:   Morbidly obese, soft, NT   Musculoskeletal:   Has compression stockings on, 2+ edema   Neurological: He is alert and oriented to person, place, and time.   Skin: Skin is warm and dry.   Psychiatric: He has a normal mood and affect.   Vitals reviewed.        Results Review:    I have reviewed the labs, radiology results and diagnostic studies.      Results from last 7 days  Lab Units 03/01/17  0512 02/28/17  1827   WBC 10*3/mm3 5.02 4.72   HEMOGLOBIN g/dL 11.8* 11.9*   HEMATOCRIT % 37.2* 37.3*   PLATELETS 10*3/mm3 332 334       Results from last 7 days  Lab Units 03/01/17  0512 02/28/17  1827   SODIUM mmol/L 137 139   POTASSIUM mmol/L 3.7 4.3   CHLORIDE mmol/L 103 105   TOTAL CO2 mmol/L 28.0 29.0   BUN mg/dL 9 9   CREATININE mg/dL 0.90 1.00   CALCIUM mg/dL 9.2 9.3   BILIRUBIN mg/dL 0.8 0.8   ALK PHOS U/L 60 66   ALT (SGPT) U/L " 13 17   AST (SGOT) U/L 13 17   GLUCOSE mg/dL 100 106*               Imaging Results (last 24 hours)     Procedure Component Value Units Date/Time    XR Chest 1 View [[id number]]      Updated:  02/28/17 1916          I have reviewed the medications.    Current Facility-Administered Medications:   •  acetaminophen (TYLENOL) tablet 650 mg, 650 mg, Oral, Q4H PRN, Hillary Alaina, APRN  •  aspirin EC tablet 81 mg, 81 mg, Oral, Daily, Hillary Alaina, APRN, 81 mg at 03/01/17 0830  •  carvedilol (COREG) tablet 25 mg, 25 mg, Oral, BID, Hillary Alaina, APRN, 25 mg at 03/01/17 0832  •  enoxaparin (LOVENOX) syringe 40 mg, 40 mg, Subcutaneous, Q12H, Debbi Ferris APRN  •  ipratropium-albuterol (DUO-NEB) nebulizer solution 3 mL, 3 mL, Nebulization, Q4H PRN, Ihllary Alaina, APRN  •  Pharmacy Consult - [facility], , Does not apply, Daily, Mukesh Stockton [facility]  •  Pharmacy Meds to Bed Consult, , Does not apply, Daily, Mukesh Stockton [facility]  •  sacubitril-valsartan (ENTRESTO) 24-26 MG tablet 1 tablet, 1 tablet, Oral, Q12H, Louis Bailey MD, 1 tablet at 03/01/17 1030  •  sodium chloride 0.9 % flush 1-10 mL, 1-10 mL, Intravenous, PRN, Hillary Alaina, APRN  •  sodium chloride 0.9 % flush 10 mL, 10 mL, Intravenous, PRN, Kael Richmond MD  •  torsemide (DEMADEX) tablet 40 mg, 40 mg, Oral, Daily, Louis Bailey MD    Assessment/Plan     Problem List  Principal Problem:    Acute on chronic systolic (congestive) heart failure  Active Problems:    Shortness of breath    Hypertension    Morbid obesity    Obstructive sleep apnea    Nonischemic congestive cardiomyopathy    Personal history of noncompliance with medical treatment    Microcytic anemia       Assessment/Plan:  - appreciate card recs.  Plan to d/c ACE, add entresto.  Titrate up BB.  - diuretic changed to torsemide  - needs AICD, and he is starting to consider.  - continue CPAP  - anemia is chronic and stable, iron stores ok.  - discussed possible gastric sleeve and  potential benefits  - home when improved and ok with cardiology  - d/w patient and his father at bedside    DVT prophylaxis: lovtierney Aguilar MD   03/01/17   11:37 AM    Please note that portions of this note may have been completed with a voice recognition program. Efforts were made to edit the dictations, but occasionally words are mistranscribed.

## 2017-03-01 NOTE — ED PROVIDER NOTES
Subjective   HPI Comments: Evan Gannon is a 26 y.o.male who presents to the ED with c/o shortness of breath. Patient was recently admitted here 2 weeks ago for shortness of breath and orthopnea where he received diuretic therapy. He complained of a productive cough at that time. Since being discharged, he has had persistent shortness of breath and cough. He states that he has been taking his water pill but still feels as if he has fluid on his lungs. He denies any F/C, N/V/D, or any other acute sx at this time.    Patient is a 26 y.o. male presenting with shortness of breath.   History provided by:  Patient  Shortness of Breath   Severity:  Moderate  Onset quality:  Gradual  Duration:  2 weeks  Timing:  Constant  Progression:  Worsening  Relieved by:  Nothing  Worsened by:  Nothing  Ineffective treatments:  Diuretics  Associated symptoms: no abdominal pain, no chest pain, no fever, no headaches, no rash and no sore throat        Review of Systems   Constitutional: Negative for chills, fatigue and fever.   HENT: Negative for congestion and sore throat.    Respiratory: Positive for shortness of breath.    Cardiovascular: Negative for chest pain.   Gastrointestinal: Negative for abdominal pain.   Genitourinary: Negative for dysuria.   Musculoskeletal: Negative for back pain.   Skin: Negative for rash.   Neurological: Negative for headaches.   Psychiatric/Behavioral: Negative for agitation and confusion.   All other systems reviewed and are negative.      Past Medical History   Diagnosis Date   • CHF (congestive heart failure)        No Known Allergies    Past Surgical History   Procedure Laterality Date   • Nose surgery     • Adenoidectomy     • Tonsillectomy         Family History   Problem Relation Age of Onset   • Diabetes Father        Social History     Social History   • Marital status: Single     Spouse name: N/A   • Number of children: N/A   • Years of education: N/A     Social History Main Topics   • Smoking  status: Never Smoker   • Smokeless tobacco: Never Used   • Alcohol use No   • Drug use: No   • Sexual activity: Defer     Other Topics Concern   • None     Social History Narrative   • None         Objective   Physical Exam   Constitutional: He is oriented to person, place, and time. He appears well-developed and well-nourished. No distress.   HENT:   Head: Normocephalic and atraumatic.   Eyes: Conjunctivae are normal. No scleral icterus.   Neck: Normal range of motion. Neck supple.   Cardiovascular: Normal rate, regular rhythm and normal heart sounds.    Pulmonary/Chest: Effort normal. No respiratory distress. He has rales (bilateral bases).   diminished breath sounds bilaterally   Abdominal: Soft. Bowel sounds are normal. There is no tenderness.   Morbidly obese   Musculoskeletal: Normal range of motion. He exhibits edema.   Neurological: He is alert and oriented to person, place, and time.   Skin: Skin is warm and dry.   Psychiatric: He has a normal mood and affect. His behavior is normal.   Nursing note and vitals reviewed.      Procedures         ED Course  ED Course   EKG ST.  CXR shows mild CHF.  BNP elevated.  Pt using three PO diuretics without relief.  Recommend admission for further care.  IV Lasix given.    Of note, records indicate pt should be wearing a life vest.  He says it does not fit properly so he is not wearing it.                  MDM  Number of Diagnoses or Management Options  Acute on chronic systolic (congestive) heart failure:      Amount and/or Complexity of Data Reviewed  Clinical lab tests: ordered and reviewed  Tests in the radiology section of CPT®: ordered and reviewed  Decide to obtain previous medical records or to obtain history from someone other than the patient: yes  Discuss the patient with other providers: yes  Independent visualization of images, tracings, or specimens: yes        Final diagnoses:   Acute on chronic systolic (congestive) heart failure       Documentation  assistance provided by josé Santamaria.  Information recorded by the lanieibe was done at my direction and has been verified and validated by me.     Jordyn Santamaria  02/28/17 2029       Kael Richmond MD  03/01/17 0126

## 2017-03-01 NOTE — PLAN OF CARE
Problem: Patient Care Overview (Adult)  Goal: Plan of Care Review  Outcome: Ongoing (interventions implemented as appropriate)    03/01/17 0408   Coping/Psychosocial Response Interventions   Plan Of Care Reviewed With patient   Patient Care Overview   Progress improving   Outcome Evaluation   Outcome Summary/Follow up Plan Pt's vitals stable (slightly Hypertensive 149/112). Pt c/o shortness of air upon arriving to the floor, but had no complaints or event the rest of the night.        Goal: Discharge Needs Assessment  Outcome: Ongoing (interventions implemented as appropriate)    02/28/17 2200 03/01/17 0408   Discharge Needs Assessment   Concerns To Be Addressed --  denies needs/concerns at this time   Readmission Within The Last 30 Days --  no previous admission in last 30 days   Discharge Disposition --  still a patient   Living Environment   Transportation Available car;family or friend will provide --    Self-Care   Equipment Currently Used at Home respiratory supplies --          Problem: Cardiac: Heart Failure (Adult)  Goal: Signs and Symptoms of Listed Potential Problems Will be Absent or Manageable (Cardiac: Heart Failure)  Outcome: Ongoing (interventions implemented as appropriate)    03/01/17 0408   Cardiac: Heart Failure   Problems Assessed (Heart Failure) all   Problems Present (Heart Failure) cardiac pump dysfunction;fluid/electrolyte imbalance;functional decline/self-care deficit;respiratory compromise

## 2017-03-01 NOTE — PROGRESS NOTES
[facility]  IP Progress Note      Chief Complaint/Reason for service:  Congestive heart failure    Subjective: 26-year-old obese -American male who is known to our practice.  Diagnosed with a cardiomyopathy 3 years ago.  He had seen Dr. Olsen in the past.  The patient was offered a AICD previously but he did not 11 because in times past when he had surgery he said he was difficult to arouse and wake up and got the ventilator.  He was also told to consider gastric sleeve surgery to lose weight.  He does have sleep apnea which is quite severe and he wears CPAP with O2 2 L.  Is a history of noncompliance that he states that he's been taking his medication but notices that here when he gets IV Lasix he has a nice diuretic response but at home he doesn't make much at all.  So sounds like he may drink more water than he should.  He denies chest pain palpitations syncope or presyncope    Past medical, surgical, social and family history reviewed in the patient's electronic medical record.         Vital Sign Min/Max for last 24 hours  Temp  Min: 96.7 °F (35.9 °C)  Max: 98 °F (36.7 °C)   BP  Min: 101/80  Max: 142/116   Pulse  Min: 100  Max: 114   Resp  Min: 18  Max: 36   SpO2  Min: 91 %  Max: 100 %   Flow (L/min)  Min: 2  Max: 2      Intake/Output Summary (Last 24 hours) at 03/01/17 0947  Last data filed at 03/01/17 0000   Gross per 24 hour   Intake      0 ml   Output    600 ml   Net   -600 ml             Current Facility-Administered Medications:   •  acetaminophen (TYLENOL) tablet 650 mg, 650 mg, Oral, Q4H PRN, Hillary Alaina, APRN  •  aspirin EC tablet 81 mg, 81 mg, Oral, Daily, Hillary Alaina, APRN, 81 mg at 03/01/17 0830  •  carvedilol (COREG) tablet 25 mg, 25 mg, Oral, BID, Hillary Alaina, APRN, 25 mg at 03/01/17 0832  •  enoxaparin (LOVENOX) syringe 40 mg, 40 mg, Subcutaneous, Daily, Hillary Alaina, APRN, 40 mg at 03/01/17 0831  •  furosemide (LASIX) injection 40 mg,  40 mg, Intravenous, Daily, Hillary Alaina, APRN, 40 mg at 03/01/17 0833  •  ipratropium-albuterol (DUO-NEB) nebulizer solution 3 mL, 3 mL, Nebulization, Q4H PRN, Hillary Alaina, APRN  •  Pharmacy Consult - [facility], , Does not apply, Daily, Mukesh Stockton [facility]  •  Pharmacy Meds to Bed Consult, , Does not apply, Daily, Mukesh Stockton [facility]  •  sacubitril-valsartan (ENTRESTO) 24-26 MG tablet 1 tablet, 1 tablet, Oral, Q12H, Louis Bailey MD  •  sodium chloride 0.9 % flush 1-10 mL, 1-10 mL, Intravenous, PRN, Hillary Alaina, APRN  •  sodium chloride 0.9 % flush 10 mL, 10 mL, Intravenous, PRN, Kael Richmond MD  •  torsemide (DEMADEX) tablet 40 mg, 40 mg, Oral, Daily, Louis Bailey MD    Physical Exam: General  obese -American male no acute distress        HEENT: His neck is very obese/no JVP is noted/tongue is midline/no icterus       Respiratory:  Equal bilateral symmetrical expansion/clear to auscultation bilaterally       Cardiovascular: Tachycardic without murmur gallop or click/no edema       GI: Operative bowel sounds       Lower Extremities: No edema       Neuro: Cranial nerves II through XII are grossly normal/moves all 4 extremities       Skin:  No edema to palpation/no peripheral ecchymosis or petechiae       Psych: Oriented ×3/asked numerous questions/pleasant affect    Results Review:   I reviewed the patient's recent labs in the electronic medical record.      Radiology Results:  Imaging Results (last 72 hours)     Procedure Component Value Units Date/Time    XR Chest 1 View [[id number]]      Updated:  02/28/17 1916          EKG: Sinus rhythm with a borderline increased QRS interval    ECHO: Severe left ventricular dysfunction with EF 2025% which is been documented since 2014    Tele:  Sinus tach    Assessment:  1 this patient has a nonischemic cardiomyopathy with severe left ventricular dysfunction documented for 3 years.  He's been unwilling to have an AICD placed because he  thought he had to have general anesthesia for intensive local anesthesia.  The risk of sudden cardiac death is been explained to the patient and he understands that  2 severe left ventricular dysfunction with readmission for heart failure-states he has been compliant with his current medication  3 morbid obesity  4 severe sleep apnea    Plan: 1 to make several changes in the patient's medications-1 I'm going to DC his lisinopril and start him on Entresto and titrate up as blood pressure will tolerate  2 place him on torsemide 40 mg a day since his Lasix at home does not appear to keep medical heart failure  3 we'll titrate up his Coreg also depending on his blood pressure response to medication change #1  4 encourage the patient to get an AICD since he now knows he doesn't have to have general anesthesia  5 encourage the patient also to go ahead and proceed with gastric sleeve.  His artery seen Dr. Whittington about a previously    Louis Bailey MD  03/01/17  9:47 AM

## 2017-03-01 NOTE — PROGRESS NOTES
Discharge Planning Assessment  [facility]     Patient Name: Evan Gannon  MRN: [id number]  Today's Date: 3/1/2017    Admit Date: 2/28/2017          Discharge Needs Assessment       03/01/17 1103    Living Environment    Lives With parent(s);significant other   pt reports he lives with his parents part time and his S.[place]ing[place] Millana part time    Living Arrangements house    Provides Primary Care For no one    Quality Of Family Relationships supportive;helpful;involved    Able to Return to Prior Living Arrangements yes    Discharge Needs Assessment    Concerns To Be Addressed denies needs/concerns at this time    Readmission Within The Last 30 Days previous discharge plan unsuccessful    Equipment Currently Used at Home respiratory supplies;other (see comments)   pt has a CPAP machine provided by [facility] and a Life Vest, pt does not wear life vest as he reports they are working on getting it to fit him    Equipment Needed After Discharge oxygen   pt reports he was supposed to have oxygen but hasnt arranged with [facility] a delivery date, CM called [facility] and they do not have an oxygen order for the pt. If pt will need oxygen new order will need to be obtained    Transportation Available car;family or friend will provide    Discharge Contact Information if Applicable [phone]            Discharge Plan       03/01/17 1109    Case Management/Social Work Plan    Plan home    Patient/Family In Agreement With Plan yes    Additional Comments Spoke with pt at bedside, pt plans to return home with family. Pt did have request for oxygen at discharge, informed CM he was supposed to have oxygen but he never arranged a time for [facility] to [facility]. CM called [facility] and they do not have an order for oxygen for pt, if pt will need oxygen at discharge he will need an order for DME. CM will continue to follow.         Discharge Placement     No information found        Expected Discharge Date and Time      Expected Discharge Date Expected Discharge Time    Mar 2, 2017               Demographic Summary       03/01/17 1100    Referral Information    Referral Source admission list    Contact Information    Permission Granted to Share Information With     Primary Care Physician Information    Name Barbara Bolivar            Functional Status       03/01/17 1101    Functional Status Current    Current Functional Level Comment see previous charting    Functional Status Prior    Ambulation 0-->independent    Transferring 0-->independent    Toileting 0-->independent    Bathing 0-->independent    Dressing 0-->independent    Eating 0-->independent    Communication 0-->understands/communicates without difficulty    Swallowing 0-->swallows foods/liquids without difficulty    IADL    Medications independent   pt confirms he has prescription drug coverage    Meal Preparation independent    Housekeeping independent    Laundry independent    Shopping independent    Oral Care independent    Activity Tolerance    Current Activity Limitations other (see comments)   shortness of air    Usual Activity Tolerance moderate    Current Activity Tolerance fair    Employment/Financial    Financial Concerns none   pt confirms he has [employer] insurance coverage and denies concerns or disruption in coverage            Psychosocial     None            Abuse/Neglect     None            Legal     None            Substance Abuse     None            Patient Forms     None          Jodi Gutierrez

## 2017-03-01 NOTE — PROGRESS NOTES
Continued Stay Note  [facility]     Patient Name: Evan Gannon  MRN: [id number]  Today's Date: 3/1/2017    Admit Date: 2/28/2017          Discharge Plan     Mr. Gannon consented to participate in the [facility] at d/c. Tracy Alford RN                Discharge Codes     None        Expected Discharge Date and Time     Expected Discharge Date Expected Discharge Time    Mar 2, 2017             Tracy Alford RN

## 2017-03-01 NOTE — PLAN OF CARE
Problem: Patient Care Overview (Adult)  Goal: Plan of Care Review  Outcome: Ongoing (interventions implemented as appropriate)    03/01/17 1617   Coping/Psychosocial Response Interventions   Plan Of Care Reviewed With patient   Patient Care Overview   Progress improving   Outcome Evaluation   Outcome Summary/Follow up Plan Pt still on 2L nasal cannula. VSS. D/C'd lasix and started torsemide. Pt will probably received an ICD during this visit. Remains in NSR to sinus tach on monitor.       Goal: Discharge Needs Assessment  Outcome: Ongoing (interventions implemented as appropriate)    03/01/17 0408 03/01/17 1103   Discharge Needs Assessment   Concerns To Be Addressed --  denies needs/concerns at this time   Readmission Within The Last 30 Days --  previous discharge plan unsuccessful   Equipment Needed After Discharge --  oxygen  (pt reports he was supposed to have oxygen but hasnt arranged with [facility] a delivery date, CM called [facility] and they do not have an oxygen order for the pt. If pt will need oxygen new order will need to be obtained)   Discharge Disposition still a patient --    Living Environment   Transportation Available --  car;family or friend will provide   Self-Care   Equipment Currently Used at Home --  respiratory supplies;other (see comments)  (pt has a CPAP machine provided by [employer] and a Life Vest, pt does not wear life vest as he reports they are working on getting it to fit him)         Problem: Cardiac: Heart Failure (Adult)  Goal: Signs and Symptoms of Listed Potential Problems Will be Absent or Manageable (Cardiac: Heart Failure)  Outcome: Ongoing (interventions implemented as appropriate)    03/01/17 1617   Cardiac: Heart Failure   Problems Assessed (Heart Failure) all   Problems Present (Heart Failure) none

## 2017-03-01 NOTE — PROGRESS NOTES
Acute Care - Physical Therapy Initial Evaluation   [facility]     Patient Name: Evan Gannon  : 1990  MRN: [id number]  Today's Date: 3/1/2017   Onset of Illness/Injury or Date of Surgery Date: 17  Date of Referral to PT: 17  Referring Physician: MD Matt      Admit Date: 2017     Visit Dx:    ICD-10-CM ICD-9-CM   1. Acute on chronic systolic (congestive) heart failure I50.23 428.23     428.0   2. Impaired functional mobility, balance, gait, and endurance Z74.09 V49.89     Patient Active Problem List   Diagnosis   • Shortness of breath   • Hypertension   • Morbid obesity   • Obstructive sleep apnea   • Nonischemic congestive cardiomyopathy   • Personal history of noncompliance with medical treatment   • Acute on chronic systolic (congestive) heart failure   • Microcytic anemia     Past Medical History   Diagnosis Date   • CHF (congestive heart failure)      Past Surgical History   Procedure Laterality Date   • Nose surgery     • Adenoidectomy     • Tonsillectomy            PT ASSESSMENT (last 72 hours)      PT Evaluation       17 1103 17 0825    Rehab Evaluation    Document Type  evaluation  -DM    Subjective Information  agree to therapy;complains of;weakness;swelling;dyspnea   freq void d/t Lasix;req.o2 ext.forBR(done);mult meds per nsg  -DM    Patient Effort, Rehab Treatment  good  -DM    Symptoms Noted During/After Treatment  fatigue;significant change in vital signs;shortness of breath  -DM    Symptoms Noted Comment  desat to 84% w/ gt;incr.o2 to 3L briefly for gt. to room  -DM    General Information    Patient Profile Review  yes  -DM    Onset of Illness/Injury or Date of Surgery Date  17  -DM    Referring Physician  MD Matt  -DM    General Observations  TALAVERA's posn.in bed;o2;IV hep locked;all extrem swollen;nsg assessing,then gave meds/shot;req. void w/ urinal (P.T. stepped out per pt req.)  -DM    Pertinent History Of Current Problem  recent hosp  "[facility] 2-6 TO 2-9-17 W/SOB;placed on 3 diuretics for d/c home;didn't take 3rd one;onset incr.SOB 2-26;ret. to ER; CXR: johnathon.CHF;had elev BNP; readm  &placed on Lasix;was to wear Life Vest at D/C d/t EF=20% but didn't (\"doesn't fit\");Needs AICD per MD but declines (fearful of anesthesia);dx: A/C SYSTOLIC CHF  -DM    Precautions/Limitations  fall precautions;oxygen therapy device and L/min   SHOULD WEAR LIFE VEST ('doesn't fit\")  -DM    Prior Level of Function  independent:;all household mobility;community mobility;ADL's;min assist:;home management   indep gt w/o A.D.\"around house only\";S.O./parents cook,clean  -DM    Equipment Currently Used at Home respiratory supplies;other (see comments)   pt has a CPAP machine provided by [employer] and a Life Vest, pt does not wear life vest as he reports they are working on getting it to fit him  - oxygen  -DM    Plans/Goals Discussed With  patient;agreed upon  -DM    Risks Reviewed  patient:;LOB;increased discomfort;change in vital signs;lines disloged  -DM    Benefits Reviewed  patient:;improve function;increase independence;increase strength;increase balance;increase knowledge  -DM    Barriers to Rehab  ineffective coping   med.non-compliance  -DM    Living Environment    Lives With parent(s);significant other   pt reports he lives with his parents part time and his S.O. Kiah part time  - parent(s);significant other   alt.living w/parents vs.S.O. 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  -DM    Criteria for Skilled Therapeutic Interventions Met  yes;treatment indicated  -DM    " Pathology/Pathophysiology Noted (Describe Specifically for Each System)  cardiovascular  -DM    Impairments Found (describe specific impairments)  gait, locomotion, and balance  -DM    Rehab Potential  good, to achieve stated therapy goals  -DM    Vital Signs    Pre Systolic BP Rehab  145  -DM    Pre Treatment Diastolic BP  87  -DM    Post Systolic BP Rehab  156  -DM    Post Treatment Diastolic BP  94  -DM    Pretreatment Heart Rate (beats/min)  103  -DM    Intratreatment Heart Rate (beats/min)  132  -DM    Posttreatment Heart Rate (beats/min)  104  -DM    Pre SpO2 (%)  94  -DM    O2 Delivery Pre Treatment  supplemental O2   2 L  -DM    Intra SpO2 (%)  84  -DM    O2 Delivery Intra Treatment  supplemental O2   85% w/gt. on 2L;turned to 3L;sat decr to 84% w/return walk  -DM    Post SpO2 (%)  91  -DM    O2 Delivery Post Treatment  supplemental O2  -DM    Pre Patient Position  Supine  -DM    Intra Patient Position  Standing  -DM    Post Patient Position  Sitting  -DM    Pain Assessment    Pain Assessment  No/denies pain  -DM    Cognitive Assessment/Intervention    Current Cognitive/Communication Assessment  functional  -DM    Orientation Status  oriented x 4  -DM    Follows Commands/Answers Questions  100% of the time;able to follow single-step instructions;needs cueing  -DM    Personal Safety  mild impairment;impulsive  -DM    Personal Safety Interventions  fall prevention program maintained;gait belt;nonskid shoes/slippers when out of bed  -DM    Short/Long Term Memory  intact short term memory;intact long term memory  -DM    ROM (Range of Motion)    General ROM  upper extremity range of motion deficits identified;lower extremity range of motion deficits identified   all extrem limited approx 25% by swelling/tissue approx  -DM    MMT (Manual Muscle Testing)    General MMT Assessment  lower extremity strength deficits identified   B hips grossly 3+ to 4-/5  -DM    Bed Mobility, Assessment/Treatment    Bed Mobility,  Assistive Device  head of bed elevated;bed rails  -DM    Bed Mobility, Roll Right, [place]  conditional independence  -DM    Bed Mobility, Scoot/Bridge, [place]  conditional independence  -DM    Bed Mob, Supine to Sit, [place]  conditional independence  -DM    Bed Mobility, Impairments  strength decreased  -DM    Transfer Assessment/Treatment    Transfers, Sit-Stand [place]  verbal cues required;contact guard assist  -DM    Transfers, Stand-Sit [place]  verbal cues required;supervision required  -DM    Toilet Transfer, [place]  supervision required  -DM    Transfer, Safety Issues  impulsivity  -DM    Transfer, Impairments  strength decreased  -DM    Transfer, Comment  after settled in chair,o2 sat.85%;PLB ex;sat incr.(90%)   req. gt to BR for BM;Issued o2 ext.;gt to BR;req.2nd O2 ext.  -DM    Gait Assessment/Treatment    Gait, [place] Level  contact guard assist  -DM    Gait, Distance (Feet)  150   1 stand rest(sats 85%);incr.to 3 L;sats 84% w/ret.to room   -DM    Gait, Gait Pattern Analysis  swing-through gait  -DM    Gait, Gait Deviations  step length decreased  -DM    Gait, Safety Issues  step length decreased;supplemental O2  -DM    Gait, Impairments  strength decreased   mod.SOB;leans on chair rail(cautioned to avoid;offered HHA)  -DM    Gait, Comment  once rested UIC,did cooldowns;sats91%;gt to BR for BM  -DM    Therapy Exercises    Bilateral Lower Extremities  AROM:;10 reps;sitting;ankle pumps/circles;hip flexion;LAQ  -DM    Bilateral Upper Extremity  --   DECLINED  -DM    Positioning and Restraints    Pre-Treatment Position  in bed  -DM    Post Treatment Position  bathroom  -DM    Bathroom  sitting;call light within reach;notified nsg   ON O2 ext.  -DM      02/28/17 2200       General Information    Equipment Currently Used at Home respiratory supplies  -SS     Living Environment    Lives With parent(s);other (see comments)  -     Living Arrangements house  -     Home  Accessibility no concerns  -SS     Stair Railings at Home none  -SS     Type of Financial/Environmental Concern none  -SS     Transportation Available car;family or friend will provide  -SS       User Key  (r) = Recorded By, (t) = Taken By, (c) = Cosigned By    Initials Name Provider Type    DM Ailyn Whaley, PT Physical Therapist    TED Gutierrez     SS Re Castillo, RN Registered Nurse          Physical Therapy Education     Title: PT OT SLP Therapies (Active)     Topic: Physical Therapy (Active)     Point: Mobility training (Active)    Learning Progress Summary    Learner Readiness Method Response Comment Documented by Status   Patient Eager D,E NR  DM 03/01/17 1001 Active               Point: Home exercise program (Active)    Learning Progress Summary    Learner Readiness Method Response Comment Documented by Status   Patient Eager D,E NR  DM 03/01/17 1001 Active               Point: Body mechanics (Active)    Learning Progress Summary    Learner Readiness Method Response Comment Documented by Status   Patient Eager MELVINA,E NR  DM 03/01/17 1001 Active               Point: Precautions (Active)    Learning Progress Summary    Learner Readiness Method Response Comment Documented by Status   Patient Eager MELVINA,E NR  DM 03/01/17 1001 Active                      User Key     Initials Effective Dates Name Provider Type Discipline    DM 06/19/15 -  Ailyn Whaley, PT Physical Therapist PT                PT Recommendation and Plan  Anticipated Discharge Disposition: home with home health  PT Frequency: daily  Plan of Care Review  Plan Of Care Reviewed With: patient  Progress: progress towards functional goals is fair  Outcome Summary/Follow up Plan: Adm w/ A/C CHF,elev.BNP  & ANEMIA; H/o med.non-compliance (only taking 2 of 3 diuretics prescribed, & not wearing Life Vest); presents w/ evolving sympt, incl. elev BP/HR, weaknes/fat., mod incr. SOB & o2 desat to 84% w/ activ.; WILL benefit from IPPT for  strengthening, prog. monitored mobility, & instruction in energy conservation measures; recommend HHPT f/u to ensure goals met & pt. ret. to PLOF          IP PT Goals       03/01/17 1001          Bed Mobility PT LTG    Bed Mobility PT LTG, Date Established 03/01/17  -DM      Bed Mobility PT LTG, Time to Achieve 5 days  -DM      Bed Mobility PT LTG, Activity Type all bed mobility  -DM      Bed Mobility PT LTG, [place] Level independent  -DM      Transfer Training PT LTG    Transfer Training PT LTG, Date Established 03/01/17  -DM      Transfer Training PT LTG, Time to Achieve 5 days  -DM      Transfer Training PT LTG, Activity Type sit to stand/stand to sit;bed to chair /chair to bed  -DM      Transfer Training PT LTG, [place] Level independent  -DM      Transfer Training PT LTG, Assist Device --   w/ STABLE VS  -DM      Gait Training PT LTG    Gait Training Goal PT LTG, Date Established 03/01/17  -DM      Gait Training Goal PT LTG, Time to Achieve 5 days  -DM      Gait Training Goal PT LTG, [place] Level independent  -DM      Gait Training Goal PT LTG, Distance to Achieve 200   w/ stable VS  -DM        User Key  (r) = Recorded By, (t) = Taken By, (c) = Cosigned By    Initials Name Provider Type    DM Ailyn Whaley, PT Physical Therapist                Outcome Measures       03/01/17 0825          How much help from another person do you currently need...    Turning from your back to your side while in flat bed without using bedrails? 4  -DM      Moving from lying on back to sitting on the side of a flat bed without bedrails? 4  -DM      Moving to and from a bed to a chair (including a wheelchair)? 3  -DM      Standing up from a chair using your arms (e.g., wheelchair, bedside chair)? 3  -DM      Climbing 3-5 steps with a railing? 2  -DM      To walk in hospital room? 3  -DM      AM-PAC 6 Clicks Score 19  -DM      Functional Assessment    Outcome Measure Options AM-PAC 6 Clicks Basic Mobility (PT)   -DM        User Key  (r) = Recorded By, (t) = Taken By, (c) = Cosigned By    Initials Name Provider Type    RHIANNON Whaley, PT Physical Therapist           Time Calculation:         PT Charges       03/01/17 1002          Time Calculation    Start Time 0825  -DM      PT Received On 03/01/17  -DM      PT Goal Re-Cert Due Date 03/11/17  -DM      Time Calculation- PT    Total Timed Code Minutes- PT 37 minute(s)  -DM        User Key  (r) = Recorded By, (t) = Taken By, (c) = Cosigned By    Initials Name Provider Type    RHIANNON Whaley, PT Physical Therapist          Therapy Charges for Today     Code Description Service Date Service Provider Modifiers Qty    50436902485 HC PT EVAL MOD COMPLEXITY 4 3/1/2017 Ailyn Whaley, PT GP 1    54362695606 HC PT THER PROC EA 15 MIN 3/1/2017 Ailyn Whaley, PT GP 1    17981586509 HC GAIT TRAINING EA 15 MIN 3/1/2017 Ailyn Whaley, PT GP 1          PT G-Codes  Outcome Measure Options: AM-PAC 6 Clicks Basic Mobility (PT)      Ailyn Whaley, PT  3/1/2017

## 2017-03-01 NOTE — H&P
[facility] Medicine Services  HISTORY AND PHYSICAL    Primary Care Physician: Barbara Mills MD    Subjective     Chief Complaint shortness of air    History of Present Illness  This is a pleasant 26 year old male with a history of non-ischemic cardiomyopathy, Systolic congestive heart failure, COLLIN, and Hypertension who presents to the ED with complaints of increased shortness of air, onset 2 days ago.  Patient was recently admitted to [facility] from 2-6-2017 to 2-9-2017 with shortness of air.  Cardiology was consulted due to the patient having NICM.  He was treated with diuretic therapy and discharged on metolazone, lasix, and aldactone.  He also was instructed to wear a life vest due to his EF 20%.  Since then the patient states he has had increased shortness of air when lying down and with exertion.  He denies any fevers but states he has had intermitted chills.  He also denies any cough, chest pain, dizziness, syncope or palpitations.  The patient does state that he has been taking his lasix and metolazone but was unaware of him needing to take aldactone.  He also has not been able to wear his lifevest due to his obesity.  Patient was told he needed an AICD but states he does not want due to increased fear of anesthesia.  He will be admitted to [facility] under the care of the Hospitalist for further evaluation and treatment.          Review of Systems   Constitutional: Positive for activity change, chills and fatigue. Negative for fever.   HENT: Negative.    Eyes: Negative.    Respiratory: Positive for shortness of breath. Negative for cough and wheezing.    Cardiovascular: Negative for chest pain, palpitations and leg swelling.   Gastrointestinal: Negative for abdominal distention, abdominal pain, constipation, diarrhea, nausea and vomiting.   Endocrine: Negative.    Genitourinary: Negative for difficulty urinating, dysuria, flank pain, frequency and hematuria.   Musculoskeletal:  Negative.    Skin: Negative.    Allergic/Immunologic: Negative.    Neurological: Negative for dizziness, tremors, facial asymmetry, light-headedness and headaches.   Hematological: Negative.    Psychiatric/Behavioral: Negative.       Otherwise complete ROS reviewed and negative except as mentioned in the HPI.      Past Medical History:   Past Medical History   Diagnosis Date   • CHF (congestive heart failure)        Past Surgical History:  Past Surgical History   Procedure Laterality Date   • Nose surgery     • Adenoidectomy     • Tonsillectomy         Family History:   Family History   Problem Relation Age of Onset   • Diabetes Father      Social History:   Social History     Social History   • Marital status: Single     Spouse name: N/A   • Number of children: N/A   • Years of education: N/A     Occupational History   • Not on file.     Social History Main Topics   • Smoking status: Never Smoker   • Smokeless tobacco: Not on file   • Alcohol use No   • Drug use: No   • Sexual activity: Defer     Other Topics Concern   • Not on file     Social History Narrative   • No narrative on file       Medications:  Prescriptions Prior to Admission   Medication Sig Dispense Refill Last Dose   • albuterol (PROVENTIL HFA;VENTOLIN HFA) 108 (90 BASE) MCG/ACT inhaler Inhale 2 puffs Every 6 (Six) Hours As Needed for wheezing. 1 inhaler 11    • aspirin 81 MG EC tablet Take 1 tablet by mouth Daily. 365 tablet 0    • carvedilol (COREG) 25 MG tablet Take 25 mg by mouth 2 (Two) Times a Day.   Taking   • furosemide (LASIX) 40 MG tablet Take 40 mg by mouth 2 (Two) Times a Day.   Taking   • lisinopril (PRINIVIL,ZESTRIL) 40 MG tablet Take 1 tablet by mouth Daily. 30 tablet 11 Taking   • metOLazone (ZAROXOLYN) 5 MG tablet Take 1 tablet by mouth Daily As Needed (Edema or shortness of breath). 30 tablet 6 Taking   • spironolactone (ALDACTONE) 50 MG tablet Take 1 tablet by mouth Daily. 30 tablet 11 Taking     Allergies:  No Known  "Allergies    Objective     Vital Signs:   Visit Vitals   • /80   • Pulse 109   • Temp 98 °F (36.7 °C) (Oral)   • Resp (!) 32   • Ht 70\" (177.8 cm)   • Wt (!) 426 lb (193 kg)   • SpO2 97%   • BMI 61.12 kg/m2     Physical Exam   Constitutional: He is oriented to person, place, and time. He appears well-developed and well-nourished. No distress.   Alert and oriented x3 pleasant cooperative.  Speaks in short sentences due to shortness of air.  Currently sitting up on the side of the bed.    HENT:   Head: Normocephalic and atraumatic.   Mouth/Throat: No oropharyngeal exudate.   Eyes: Conjunctivae and EOM are normal. Pupils are equal, round, and reactive to light. Right eye exhibits no discharge. Left eye exhibits no discharge. No scleral icterus.   Neck: Normal range of motion. Neck supple. No JVD present. No tracheal deviation present. No thyromegaly present.   Cardiovascular: Regular rhythm, normal heart sounds and intact distal pulses.  Exam reveals no gallop and no friction rub.    No murmur heard.  Tachycardic, HR: 109   Pulmonary/Chest: No stridor. He has no wheezes. He has rales. He exhibits no tenderness.   Rales to bilateral lower lobes.     Abdominal: Soft. Bowel sounds are normal. He exhibits no distension and no mass. There is no tenderness. There is no rebound and no guarding. No hernia.   Musculoskeletal: Normal range of motion. He exhibits edema. He exhibits no tenderness or deformity.   moderate bilateral leg edema   Lymphadenopathy:     He has no cervical adenopathy.   Neurological: He is alert and oriented to person, place, and time. He has normal reflexes.   Skin: Skin is warm and dry. No rash noted. He is not diaphoretic. No erythema. No pallor.   Psychiatric: He has a normal mood and affect. His behavior is normal. Judgment and thought content normal.   Vitals reviewed.            Results Reviewed:    Results from last 7 days  Lab Units 02/28/17  1827   WBC 10*3/mm3 4.72   HEMOGLOBIN g/dL 11.9* "   PLATELETS 10*3/mm3 334       Results from last 7 days  Lab Units 02/28/17  1827   SODIUM mmol/L 139   POTASSIUM mmol/L 4.3   TOTAL CO2 mmol/L 29.0   CREATININE mg/dL 1.00   GLUCOSE mg/dL 106*   CALCIUM mg/dL 9.3     Imaging Results (last 24 hours)     Procedure Component Value Units Date/Time    XR Chest 1 View [[id number]]      Updated:  02/28/17 1916          I have personally reviewed and interpreted the radiology studies and ECG obtained at time of admission.     Assessment / Plan      Assessment & Plan  Principal Problem:    Acute on chronic systolic (congestive) heart failure  Active Problems:    Shortness of breath    Nonischemic congestive cardiomyopathy    Microcytic anemia    Hypertension    Morbid obesity    Obstructive sleep apnea    Personal history of noncompliance with medical treatment      1) Acute on Chronic Systolic congestive hear failure  -CXR pending, bnp: 455  -continue lasix  -consult cardiology to see in am  -last echo 2-6-2017: EF 20%  -currently unable to wear life vest due to obesity and does not wish to have AICD  -strict I &O  -daily weights  -duo-nebs prn    2) Non-ischemic congestive cardiomyopathy  -cardiology to see in am  -echo 2-6-2017, EF: 20%  -continue diuretics    3) Microcytic anemia  -appears to be at his baseline  -check anemia panel  -continue to monitor    4) COLLIN  -cpap at HS    5) Essential Hypertension  -continue home medication    6) Non-compliance  -when hospitalized previously patient had not been taking his medications in over 8 months  -currently states he has missed his cardiology F/U appointments    7) VTE prophylaxis  -teds/scds, lovenox    8) code status:  -full code        I discussed the patients findings and my recommendations with:patient and primary care team.     SONAM Kurtz 02/28/17 10:05 PM      Brief Attending Note   I have seen and examined the patient, performing an independent face-to-face diagnostic evaluation    The plan of care reviewed  and developed with the advanced practice clinician (APC):  Maite Foley .  Brief Summary Statement/HPI:   25 y/o AAM with morbid obesity, COLLIN, HTN, SHF/NICM with LVEF 20%, who was supposed to wear his Lefe Vest, but had been noncompliant because it doesn't fit him. He had been compliant with medications.Today he came in due to progressive dyspnea/SOA with orthopnea and productive cough with yellow phlegm. No fever or chills. No chest pain or palpitations. He reports a 5lb weight gain, but stated that it's due to fluid because he had been extra thirsty and drinks a lot.    Attending Physical Exam:  General Assessment: No acute cardiopulmonary distress. Well developed and well nourished.    HEENT: NCAT, PERRL, MMM    Neck: Supple, no carotid bruit bilat    CVS: RRR, S1S2 normal    Resp: Diminished BS with basilar crackles bilaterally, resp only labored with exertion    Abd: Obese, soft, NT, ND, normal BS, no guarding or peritoneal signs    Ext: 2+ edema, both calves are symmetric and NTTP    Neuro: Nonfocal    Skin: W/D/I. + dark/thickened skin at neck    Psych: Affect is appropriate    Brief Assessment/Plan :  1. SOA secondary to volume overload. Diuresed, strict I&O, daily weight  2. NICM/SHF with LVEF 20%. Not able to fit in life vest. Card to evaluate.  3. COLLIN, CPAP  3. Acanthosis nigrans. Check for DM2.    See above for further detailed assessment and plan developed with APC which I have reviewed and/or edited.    I believe this patient requires INPATIENT status due to the need for care which can only be reasonably provided in an hospital setting such as aggressive/expedited ancillary services and/or consultation services and the necessity for IV medications, close physician monitoring and/or the possible need for procedures.  In such, I feel patient’s risk for adverse outcomes and need for care warrant INPATIENT evaluation and predict the patient’s care encounter to likely last beyond 2 midnights.    Hillary  SONAM Foley 02/28/17 10:05 PM

## 2017-03-02 LAB
ANION GAP SERPL CALCULATED.3IONS-SCNC: 0 MMOL/L (ref 3–11)
BNP SERPL-MCNC: 390 PG/ML (ref 0–100)
BNP SERPL-MCNC: 461 PG/ML (ref 0–100)
BUN BLD-MCNC: 12 MG/DL (ref 9–23)
BUN/CREAT SERPL: 13.3 (ref 7–25)
CALCIUM SPEC-SCNC: 9.2 MG/DL (ref 8.7–10.4)
CHLORIDE SERPL-SCNC: 103 MMOL/L (ref 99–109)
CO2 SERPL-SCNC: 35 MMOL/L (ref 20–31)
CREAT BLD-MCNC: 0.9 MG/DL (ref 0.6–1.3)
GFR SERPL CREATININE-BSD FRML MDRD: 126 ML/MIN/1.73
GLUCOSE BLD-MCNC: 85 MG/DL (ref 70–100)
POTASSIUM BLD-SCNC: 4.2 MMOL/L (ref 3.5–5.5)
SODIUM BLD-SCNC: 138 MMOL/L (ref 132–146)

## 2017-03-02 PROCEDURE — 94640 AIRWAY INHALATION TREATMENT: CPT

## 2017-03-02 PROCEDURE — 94760 N-INVAS EAR/PLS OXIMETRY 1: CPT

## 2017-03-02 PROCEDURE — 80048 BASIC METABOLIC PNL TOTAL CA: CPT | Performed by: INTERNAL MEDICINE

## 2017-03-02 PROCEDURE — 94799 UNLISTED PULMONARY SVC/PX: CPT

## 2017-03-02 PROCEDURE — 97116 GAIT TRAINING THERAPY: CPT

## 2017-03-02 PROCEDURE — 83880 ASSAY OF NATRIURETIC PEPTIDE: CPT | Performed by: INTERNAL MEDICINE

## 2017-03-02 PROCEDURE — 97110 THERAPEUTIC EXERCISES: CPT

## 2017-03-02 PROCEDURE — 99232 SBSQ HOSP IP/OBS MODERATE 35: CPT | Performed by: INTERNAL MEDICINE

## 2017-03-02 PROCEDURE — 83880 ASSAY OF NATRIURETIC PEPTIDE: CPT | Performed by: HOSPITALIST

## 2017-03-02 PROCEDURE — 99232 SBSQ HOSP IP/OBS MODERATE 35: CPT | Performed by: NURSE PRACTITIONER

## 2017-03-02 RX ORDER — LISINOPRIL 40 MG/1
40 TABLET ORAL
Status: DISCONTINUED | OUTPATIENT
Start: 2017-03-03 | End: 2017-03-03

## 2017-03-02 RX ADMIN — TORSEMIDE 40 MG: 20 TABLET ORAL at 08:54

## 2017-03-02 RX ADMIN — ASPIRIN 81 MG: 81 TABLET, COATED ORAL at 08:54

## 2017-03-02 RX ADMIN — CARVEDILOL 25 MG: 12.5 TABLET, FILM COATED ORAL at 17:16

## 2017-03-02 RX ADMIN — SACUBITRIL AND VALSARTAN 1 TABLET: 24; 26 TABLET, FILM COATED ORAL at 08:54

## 2017-03-02 RX ADMIN — IPRATROPIUM BROMIDE AND ALBUTEROL SULFATE 3 ML: .5; 3 SOLUTION RESPIRATORY (INHALATION) at 21:44

## 2017-03-02 RX ADMIN — CARVEDILOL 25 MG: 12.5 TABLET, FILM COATED ORAL at 08:54

## 2017-03-02 NOTE — PROGRESS NOTES
"    [facility] Medicine Services  INPATIENT PROGRESS NOTE    Date of Admission: 2/28/2017  Length of Stay: 2  Primary Care Physician: Barbara Mills MD  Subjective   CC: SOB    HPI:  States he didn't sleep all night when discussing why he was refusing his Lovenox, PT/OT, lab draw this morning.  He states that everybody was in the room at the same time and all he wanted do a sleep, so he decided to refuse everything.  Patient has his BiPAP on and was in no distress.  He is feeling better today after some diuresis.  \"I can breathe\".  Feels edema is better.    Review Of Systems:   Review of Systems   Constitutional: Positive for fatigue. Negative for fever.   HENT: Negative for congestion, mouth sores, rhinorrhea and sore throat.    Respiratory: Negative for shortness of breath.    Cardiovascular: Negative for chest pain and leg swelling.   Gastrointestinal: Negative for abdominal pain, diarrhea, nausea and vomiting.   Musculoskeletal: Negative for neck pain and neck stiffness.   Psychiatric/Behavioral: Positive for sleep disturbance (last night). Negative for behavioral problems and confusion. The patient is not nervous/anxious.    All other systems reviewed and are negative.    Objective    Temp:  [97.7 °F (36.5 °C)-98.6 °F (37 °C)] 98.6 °F (37 °C)  Heart Rate:  [] 102  Resp:  [16-24] 24  BP: (115-147)/(64-81) 123/81  Physical Exam   Constitutional: He appears well-developed and well-nourished.   Patient sitting up in bed with BiPAP on, because he wants to go to sleep.  Patient is in no distress    HENT:   Head: Normocephalic.   Mouth/Throat: Oropharynx is clear and moist.   Large neck circumference   Eyes: Conjunctivae are normal. Pupils are equal, round, and reactive to light. No scleral icterus.   Neck: Normal range of motion. Neck supple. No JVD present. No tracheal deviation present.   Cardiovascular: Normal rate, regular rhythm, normal heart sounds and intact distal " pulses.    Pulmonary/Chest: No stridor.   Diminished d/t body habitus   Abdominal:   Morbidly obese, soft, NT   Musculoskeletal: He exhibits no tenderness.   No compression stockings on and no pitting edema.  +2 DP pulses.  Decreased range of motion due to morbid obesity   Neurological: He is alert.   Oriented to person, place, time and situation.  Speech is clear, follows all commands, recent remote memory intact.   Skin: Skin is warm and dry. No rash noted. No erythema.   Psychiatric: He has a normal mood and affect. His behavior is normal. Thought content normal.   Vitals reviewed.    Results Review:    I have reviewed the labs, radiology results and diagnostic studies.    Results from last 7 days  Lab Units 03/01/17  0512 02/28/17  1827   WBC 10*3/mm3 5.02 4.72   HEMOGLOBIN g/dL 11.8* 11.9*   HEMATOCRIT % 37.2* 37.3*   PLATELETS 10*3/mm3 332 334       Results from last 7 days  Lab Units 03/02/17  1006 03/01/17  0512 02/28/17  1827   SODIUM mmol/L 138 137 139   POTASSIUM mmol/L 4.2 3.7 4.3   CHLORIDE mmol/L 103 103 105   TOTAL CO2 mmol/L 35.0* 28.0 29.0   BUN mg/dL 12 9 9   CREATININE mg/dL 0.90 0.90 1.00   CALCIUM mg/dL 9.2 9.2 9.3   BILIRUBIN mg/dL  --  0.8 0.8   ALK PHOS U/L  --  60 66   ALT (SGPT) U/L  --  13 17   AST (SGOT) U/L  --  13 17   GLUCOSE mg/dL 85 100 106*     Imaging Results (last 24 hours)     Procedure Component Value Units Date/Time    XR Chest 1 View [[id number]]      Updated:  02/28/17 1916        I have reviewed the medications.    Current Facility-Administered Medications:   •  acetaminophen (TYLENOL) tablet 650 mg, 650 mg, Oral, Q4H PRN, Hillary Alaina, APRN  •  aspirin EC tablet 81 mg, 81 mg, Oral, Daily, Hillary Alaina, APRN, 81 mg at 03/02/17 0854  •  carvedilol (COREG) tablet 25 mg, 25 mg, Oral, BID, Hillary Alaina, APRN, 25 mg at 03/02/17 0854  •  enoxaparin (LOVENOX) syringe 40 mg, 40 mg, Subcutaneous, Q12H, SONAM Murray, 40 mg at 03/01/17 2058  •  ipratropium-albuterol  (DUO-NEB) nebulizer solution 3 mL, 3 mL, Nebulization, Q4H PRN, Hillary Alaina, APRN  •  [START ON 3/3/2017] lisinopril (PRINIVIL,ZESTRIL) tablet 40 mg, 40 mg, Oral, Q24H, Marina Rolon, APRN  •  Pharmacy Consult - [facility], , Does not apply, Daily, Mukesh Stockton [facility]  •  Pharmacy Meds to Bed Consult, , Does not apply, Daily, Mukesh Stockton [facility]  •  sodium chloride 0.9 % flush 1-10 mL, 1-10 mL, Intravenous, PRN, Hillary Alaina, APRN  •  sodium chloride 0.9 % flush 10 mL, 10 mL, Intravenous, PRN, Kael Richmond MD  •  torsemide (DEMADEX) tablet 40 mg, 40 mg, Oral, Daily, Louis Bailey MD, 40 mg at 03/02/17 0854    Assessment/Plan   Problem List  Principal Problem:    Acute on chronic systolic (congestive) heart failure  Active Problems:    Shortness of breath    Hypertension    Morbid obesity    Obstructive sleep apnea    Nonischemic congestive cardiomyopathy    Personal history of noncompliance with medical treatment    Microcytic anemia     Assessment/Plan:  - appreciate card recs.  Plan was to stop ACE and add Entresto, but the patient cannot afford this medication, so they will keep him on the ACE.  Titrate up BB.  - diuretic changed to torsemide  - needs AICD and he is considering  - continue CPAP  - anemia is chronic and stable, iron stores ok.  - discussed possible gastric sleeve and potential benefits  - home when improved and ok with cardiology (Pretty said in AM)  - Need to get the patient's LifeVest in the hospital and get it fixed so that he can wear it.  Patient states that it's broken and that is reasonable is not wearing it.    DVT prophylaxis: lovneox  Dispo:  Discharge tomorrow; Home O2 order placed    >45 min (20 min in direct pt care) spent with patient and coordinating care with  and nursing staff.    SONAM Murray   03/02/17   2:02 PM    Please note that portions of this note may have been completed with a voice recognition program. Efforts were made to edit  the dictations, but occasionally words are mistranscribed.

## 2017-03-02 NOTE — PROGRESS NOTES
Acute Care - Physical Therapy Treatment Note  [facility]     Patient Name: Evan Gannon  : 1990  MRN: [id number]  Today's Date: 3/2/2017  Onset of Illness/Injury or Date of Surgery Date: 17  Date of Referral to PT: 17  Referring Physician: MD Matt    Admit Date: 2017    Visit Dx:    ICD-10-CM ICD-9-CM   1. Acute on chronic systolic (congestive) heart failure I50.23 428.23     428.0   2. Impaired functional mobility, balance, gait, and endurance Z74.09 V49.89   3. Nonischemic congestive cardiomyopathy I42.9 425.4   4. Morbid obesity due to excess calories E66.01 278.01   5. Obstructive sleep apnea G47.33 327.23     Patient Active Problem List   Diagnosis   • Shortness of breath   • Hypertension   • Morbid obesity   • Obstructive sleep apnea   • Nonischemic congestive cardiomyopathy   • Personal history of noncompliance with medical treatment   • Acute on chronic systolic (congestive) heart failure   • Microcytic anemia               Adult Rehabilitation Note       17 1305          Rehab Assessment/Intervention    Discipline physical therapy assistant  -AS      Document Type therapy note (daily note)  -AS      Subjective Information agree to therapy;complains of;weakness;swelling  -AS      Patient Effort, Rehab Treatment good  -AS      Symptoms Noted During/After Treatment fatigue  -AS      Precautions/Limitations fall precautions;oxygen therapy device and L/min  -AS      Recorded by [AS] Libia Donato PTA      Pain Assessment    Pain Assessment No/denies pain  -AS      Recorded by [AS] Libia Donato PTA      Cognitive Assessment/Intervention    Current Cognitive/Communication Assessment functional  -AS      Orientation Status oriented x 4  -AS      Follows Commands/Answers Questions 100% of the time;able to follow single-step instructions  -AS      Personal Safety mild impairment;impulsive  -AS      Personal Safety Interventions fall prevention program  maintained;gait belt;nonskid shoes/slippers when out of bed  -AS      Recorded by [AS] Libia Donato PTA      Bed Mobility, Assessment/Treatment    Bed Mob, Supine to Sit, [place] conditional independence  -AS      Bed Mobility, Impairments strength decreased  -AS      Recorded by [AS] Libia Donato PTA      Transfer Assessment/Treatment    Transfers, Sit-Stand [place] verbal cues required;contact guard assist  -AS      Transfers, Stand-Sit [place] verbal cues required;contact guard assist  -AS      Transfers, Sit-Stand-Sit, Assist Device rolling walker  -AS      Transfer, Safety Issues impulsivity  -AS      Transfer, Impairments strength decreased  -AS      Transfer, Comment cues for hand placement  -AS      Recorded by [AS] Libia Donato PTA      Gait Assessment/Treatment    Gait, [place] Level verbal cues required;contact guard assist  -AS      Gait, Distance (Feet) 200  -AS      Gait, Gait Deviations step length decreased  -AS      Gait, Safety Issues step length decreased;supplemental O2  -AS      Gait, Impairments strength decreased  -AS      Gait, Comment 1 standing rest break  -AS      Recorded by [AS] Libia Donato PTA      Therapy Exercises    Bilateral Lower Extremities AROM:;10 reps;sitting;ankle pumps/circles;hip flexion;LAQ  -AS      Recorded by [AS] Libia Donato PTA      Positioning and Restraints    Pre-Treatment Position in bed  -AS      Post Treatment Position bed  -AS      In Bed sitting EOB;call light within reach;encouraged to call for assist;notified nsg  -AS      Recorded by [AS] Libia Donato PTA        User Key  (r) = Recorded By, (t) = Taken By, (c) = Cosigned By    Initials Name Effective Dates    AS Libia Donato PTA 06/22/15 -                 IP PT Goals       03/02/17 1430 03/01/17 1001       Bed Mobility PT LTG    Bed Mobility PT LTG, Date Established  03/01/17  -DM     Bed Mobility PT LTG, Time to Achieve  5 days  -DM      Bed Mobility PT LTG, Activity Type  all bed mobility  -DM     Bed Mobility PT LTG, [place] Level  independent  -DM     Bed Mobility PT LTG, Date Goal Reviewed 03/02/17  -AS      Bed Mobility PT LTG, Outcome goal ongoing  -AS      Transfer Training PT LTG    Transfer Training PT LTG, Date Established  03/01/17  -DM     Transfer Training PT LTG, Time to Achieve  5 days  -DM     Transfer Training PT LTG, Activity Type  sit to stand/stand to sit;bed to chair /chair to bed  -DM     Transfer Training PT LTG, [place] Level  independent  -DM     Transfer Training PT LTG, Assist Device  --   w/ STABLE VS  -DM     Transfer Training PT  LTG, Date Goal Reviewed 03/02/17  -AS      Transfer Training PT LTG, Outcome goal ongoing  -AS      Gait Training PT LTG    Gait Training Goal PT LTG, Date Established  03/01/17  -DM     Gait Training Goal PT LTG, Time to Achieve  5 days  -DM     Gait Training Goal PT LTG, [place] Level  independent  -DM     Gait Training Goal PT LTG, Distance to Achieve  200   w/ stable VS  -DM     Gait Training Goal PT LTG, Date Goal Reviewed 03/02/17  -AS      Gait Training Goal PT LTG, Outcome goal ongoing  -AS        User Key  (r) = Recorded By, (t) = Taken By, (c) = Cosigned By    Initials Name Provider Type    DM Ailyn Whaley, PT Physical Therapist    AS Libia Donato PTA Physical Therapy Assistant          Physical Therapy Education     Title: PT OT SLP Therapies (Active)     Topic: Physical Therapy (Active)     Point: Mobility training (Active)    Learning Progress Summary    Learner Readiness Method Response Comment Documented by Status   Patient Acceptance E NR  AS 03/02/17 1430 Active    Eager D,E NR  DM 03/01/17 1001 Active               Point: Home exercise program (Active)    Learning Progress Summary    Learner Readiness Method Response Comment Documented by Status   Patient Acceptance E NR  AS 03/02/17 1430 Active    Eager D,E NR  DM 03/01/17 1001 Active                Point: Body mechanics (Active)    Learning Progress Summary    Learner Readiness Method Response Comment Documented by Status   Patient Acceptance E NR  AS 03/02/17 1430 Active    Eager D,E NR  DM 03/01/17 1001 Active               Point: Precautions (Active)    Learning Progress Summary    Learner Readiness Method Response Comment Documented by Status   Patient Acceptance E NR  AS 03/02/17 1430 Active    Eager D,E NR  DM 03/01/17 1001 Active                      User Key     Initials Effective Dates Name Provider Type Discipline    DM 06/19/15 -  Ailyn Whaley, PT Physical Therapist PT    AS 06/22/15 -  Libia Donato PTA Physical Therapy Assistant PT                    PT Recommendation and Plan  Anticipated Discharge Disposition: home with home health  PT Frequency: daily  Plan of Care Review  Plan Of Care Reviewed With: patient  Progress: improving  Outcome Summary/Follow up Plan: Vital stables throughout session, 1 small standing rest break due to SOA  and weakness.          Outcome Measures       03/02/17 1305 03/01/17 0825       How much help from another person do you currently need...    Turning from your back to your side while in flat bed without using bedrails? 4  -AS 4  -DM     Moving from lying on back to sitting on the side of a flat bed without bedrails? 4  -AS 4  -DM     Moving to and from a bed to a chair (including a wheelchair)? 3  -AS 3  -DM     Standing up from a chair using your arms (e.g., wheelchair, bedside chair)? 3  -AS 3  -DM     Climbing 3-5 steps with a railing? 2  -AS 2  -DM     To walk in hospital room? 3  -AS 3  -DM     AM-PAC 6 Clicks Score 19  -AS 19  -DM     Functional Assessment    Outcome Measure Options AM-PAC 6 Clicks Basic Mobility (PT)  -AS AM-PAC 6 Clicks Basic Mobility (PT)  -DM       User Key  (r) = Recorded By, (t) = Taken By, (c) = Cosigned By    Initials Name Provider Type    DM Ailyn Whaley, PT Physical Therapist    AS Libia Donato PTA Physical  Therapy Assistant           Time Calculation:         PT Charges       03/02/17 1432          Time Calculation    Start Time 1305  -AS      PT Received On 03/02/17  -AS      PT Goal Re-Cert Due Date 03/11/17  -AS      Time Calculation- PT    Total Timed Code Minutes- PT 25 minute(s)  -AS        User Key  (r) = Recorded By, (t) = Taken By, (c) = Cosigned By    Initials Name Provider Type    AS Libia Donato PTA Physical Therapy Assistant          Therapy Charges for Today     Code Description Service Date Service Provider Modifiers Qty    24230024246 HC GAIT TRAINING EA 15 MIN 3/2/2017 Libia Donato PTA GP 1    77367914883 HC PT THER PROC EA 15 MIN 3/2/2017 Libia Donato PTA GP 1    23510105069 HC PT THER SUPP EA 15 MIN 3/2/2017 Libia Donato PTA GP 2          PT G-Codes  Outcome Measure Options: AM-PAC 6 Clicks Basic Mobility (PT)    Libia Donato PTA  3/2/2017

## 2017-03-02 NOTE — PLAN OF CARE
Problem: Inpatient Physical Therapy  Goal: Transfer Training Goal 1 LTG- PT  Outcome: Ongoing (interventions implemented as appropriate)    03/01/17 1001 03/02/17 1430   Transfer Training PT LTG   Transfer Training PT LTG, Date Established 03/01/17 --    Transfer Training PT LTG, Time to Achieve 5 days --    Transfer Training PT LTG, Activity Type sit to stand/stand to sit;bed to chair /chair to bed --    Transfer Training PT LTG, [place] Level independent --    Transfer Training PT LTG, Assist Device (w/ STABLE VS) --    Transfer Training PT LTG, Date Goal Reviewed --  03/02/17   Transfer Training PT LTG, Outcome --  goal ongoing       Goal: Gait Training Goal LTG- PT  Outcome: Ongoing (interventions implemented as appropriate)    03/01/17 1001 03/02/17 1430   Gait Training PT LTG   Gait Training Goal PT LTG, Date Established 03/01/17 --    Gait Training Goal PT LTG, Time to Achieve 5 days --    Gait Training Goal PT LTG, [place] Level independent --    Gait Training Goal PT LTG, Distance to Achieve 200  (w/ stable VS) --    Gait Training Goal PT LTG, Date Goal Reviewed --  03/02/17   Gait Training Goal PT LTG, Outcome --  goal ongoing

## 2017-03-02 NOTE — DISCHARGE PLACEMENT REQUEST
"From: Amie Mondragon, RN, Case Management  [phone]  Sivakumar Queen (26 y.o. Male)     Date of Birth Social Security Number Address Home Phone MRN    1990  [id number] [facility] [phone] [id number]    Pent[employer]al Marital Status          None Single       Admission Date Admission Type Admitting Provider Attending Provider Department, Room/Bed    2/28/17 Emergency Julius Aguilar MD Dossett, Lee M, MD [facility] 6A, N608/1    Discharge Date Discharge Disposition Discharge Destination                      Attending Provider: Julius Aguilar MD     Allergies:  No Known Allergies    Isolation:  None   Infection:  None   Code Status:  FULL    Ht:  70\" (177.8 cm)   Wt:  434 lb 8 oz (197 kg)    Admission Cmt:  None   Principal Problem:  Acute on chronic systolic (congestive) heart failure [I50.23]                 Active Insurance as of 2/28/2017     Primary Coverage     Payor Plan Insurance Group Employer/Plan Group    [facility] BLUE CROSS [facility] BLUE CROSS BLUE [facility] [id number]     Payor Plan Address Payor Plan Phone Number Effective From Effective To    [address] [phone] 7/1/2014     [place] [address]       Subscriber Name Subscriber Birth Date Member ID       HEATHER QUEEN 12/5/1968 [id number]           Secondary Coverage     Payor Plan Insurance Group Employer/Plan Group    PASSPORT PASSPORT      Payor Plan Address Payor Plan Phone Number Effective From Effective To    [address] [phone] 10/1/2014     [place] [address]       Subscriber Name Subscriber Birth Date Member ID       SIVAKUMAR UQEEN 1990 [id number]                 Emergency Contacts      (Rel.) Home Phone Work Phone Mobile Phone    Kiah Austin (Significant Other) [phone] -- --    Aramis Queen (Father) [phone] -- --         Vital Signs (last 2 days)        Date/Time    Temp    Pulse    Resp    BP    O2 Device    SpO2       03/02/17 1323  --  102  --  --  room " air   83      Oxygen Therapy [EQ60] (Order [id number])   General Supply    Date: 3/2/2017   Department: [address]   Ordering/Authorizing: SONAM Murray           Order History  Outpatient       Date/Time Action Taken User Additional Information       03/02/17 1058 Sign Amie Mondragon Ordering Mode: Telephone with readback           Order Details        Frequency Duration Priority Order Class       None None Routine External           Start Date/Time        Start Date       03/02/17           Order Questions        Question Answer Comment       Delivery Modality Nasal Cannula        Liters Per Minute: 2        Duration: Continuous        Equipment  Oxygen Concentrator &  &  Portable Gaseous Oxygen System & Portable Oxygen Contents Gaseous &  Conserving Regulator        The face to face evaluation was performed on 3/2/2017        Length of Need (99 Months = Lifetime) 99 Months = Lifetime        Note:  Custom values to enter length of need for DME           Verbal Order Info        Action Created on Order Mode Entered by Responsible Provider Signed by Signed on       Ordering 03/02/17 1058 Telephone with readback SONAM Rosa             Source Order Set / Preference List        Preference List       AMB SUPPLIES [1416]           Clinical Indications           ICD-10-CM ICD-9-CM       Acute on chronic systolic (congestive) heart failure  - Primary     I50.23 428.23  428.0       Morbid obesity due to excess calories     E66.01 278.01       Obstructive sleep apnea     G47.33 327.23           Reprint Order Requisition        OXYGEN THERAPY (Order #[id number]) on 3/2/17         Encounter-Level Cardiology Documents:        There are no encounter-level cardiology documents.         Encounter        View Encounter         Order Provider Info            Office phone Pager E-mail       Ordering User Amie Mondragon -- -- --       Authorizing Provider SONAM Murray  [phone] -- --       Attending Provider Julius Aguilar [phone] -- --       Entered By Amie Mondragon -- -- --       Ordering Provider SONAM Murray [phone] -- --           Linked Charges        No charges linked to this procedure         Order Transmittal Tracking        OXYGEN THERAPY (Order #[id number]) on 3/2/17         Authorized by: SONAM Murray (NPI: [id number])                 Insurance Information                [facility] BLUE CROSS/[facility] BLUE CROSS BLUE SHIELD PPO Phone: [phone]    Subscriber: Stephania Gannon Subscriber#: [id number]    Group#: [id number] Precert#: [id number]        PASSPORT/PASSPORT Phone: [phone]    Subscriber: Evan Gannon Subscriber#: [id number]    Group#:  Precert#:              History & Physical      Chema Gutierrez MD at 2/28/2017  9:49 PM            [facility]  HISTORY AND PHYSICAL    Primary Care Physician: Barbara Mills MD    Subjective     Chief Complaint shortness of air    History of Present Illness  This is a pleasant 26 year old male with a history of non-ischemic cardiomyopathy, Systolic congestive heart failure, COLLIN, and Hypertension who presents to the ED with complaints of increased shortness of air, onset 2 days ago.  Patient was recently admitted to [facility] from 2-6-2017 to 2-9-2017 with shortness of air.  Cardiology was consulted due to the patient having NICM.  He was treated with diuretic therapy and discharged on metolazone, lasix, and aldactone.  He also was instructed to wear a life vest due to his EF 20%.  Since then the patient states he has had increased shortness of air when lying down and with exertion.  He denies any fevers but states he has had intermitted chills.  He also denies any cough, chest pain, dizziness, syncope or palpitations.  The patient does state that he has been taking his lasix and metolazone but was unaware of him needing to take aldactone.  He  also has not been able to wear his lifevest due to his obesity.  Patient was told he needed an AICD but states he does not want due to increased fear of anesthesia.  He will be admitted to [facility] under the care of the Hospitalist for further evaluation and treatment.          Review of Systems   Constitutional: Positive for activity change, chills and fatigue. Negative for fever.   HENT: Negative.    Eyes: Negative.    Respiratory: Positive for shortness of breath. Negative for cough and wheezing.    Cardiovascular: Negative for chest pain, palpitations and leg swelling.   Gastrointestinal: Negative for abdominal distention, abdominal pain, constipation, diarrhea, nausea and vomiting.   Endocrine: Negative.    Genitourinary: Negative for difficulty urinating, dysuria, flank pain, frequency and hematuria.   Musculoskeletal: Negative.    Skin: Negative.    Allergic/Immunologic: Negative.    Neurological: Negative for dizziness, tremors, facial asymmetry, light-headedness and headaches.   Hematological: Negative.    Psychiatric/Behavioral: Negative.       Otherwise complete ROS reviewed and negative except as mentioned in the HPI.      Past Medical History:   Past Medical History   Diagnosis Date   • CHF (congestive heart failure)        Past Surgical History:  Past Surgical History   Procedure Laterality Date   • Nose surgery     • Adenoidectomy     • Tonsillectomy         Family History:   Family History   Problem Relation Age of Onset   • Diabetes Father      Social History:   Social History     Social History   • Marital status: Single     Spouse name: N/A   • Number of children: N/A   • Years of education: N/A     Occupational History   • Not on file.     Social History Main Topics   • Smoking status: Never Smoker   • Smokeless tobacco: Not on file   • Alcohol use No   • Drug use: No   • Sexual activity: Defer     Other Topics Concern   • Not on file     Social History Narrative   • No narrative on file  "      Medications:  Prescriptions Prior to Admission   Medication Sig Dispense Refill Last Dose   • albuterol (PROVENTIL HFA;VENTOLIN HFA) 108 (90 BASE) MCG/ACT inhaler Inhale 2 puffs Every 6 (Six) Hours As Needed for wheezing. 1 inhaler 11    • aspirin 81 MG EC tablet Take 1 tablet by mouth Daily. 365 tablet 0    • carvedilol (COREG) 25 MG tablet Take 25 mg by mouth 2 (Two) Times a Day.   Taking   • furosemide (LASIX) 40 MG tablet Take 40 mg by mouth 2 (Two) Times a Day.   Taking   • lisinopril (PRINIVIL,ZESTRIL) 40 MG tablet Take 1 tablet by mouth Daily. 30 tablet 11 Taking   • metOLazone (ZAROXOLYN) 5 MG tablet Take 1 tablet by mouth Daily As Needed (Edema or shortness of breath). 30 tablet 6 Taking   • spironolactone (ALDACTONE) 50 MG tablet Take 1 tablet by mouth Daily. 30 tablet 11 Taking     Allergies:  No Known Allergies    Objective     Vital Signs:   Visit Vitals   • /80   • Pulse 109   • Temp 98 °F (36.7 °C) (Oral)   • Resp (!) 32   • Ht 70\" (177.8 cm)   • Wt (!) 426 lb (193 kg)   • SpO2 97%   • BMI 61.12 kg/m2     Physical Exam   Constitutional: He is oriented to person, place, and time. He appears well-developed and well-nourished. No distress.   Alert and oriented x3 pleasant cooperative.  Speaks in short sentences due to shortness of air.  Currently sitting up on the side of the bed.    HENT:   Head: Normocephalic and atraumatic.   Mouth/Throat: No oropharyngeal exudate.   Eyes: Conjunctivae and EOM are normal. Pupils are equal, round, and reactive to light. Right eye exhibits no discharge. Left eye exhibits no discharge. No scleral icterus.   Neck: Normal range of motion. Neck supple. No JVD present. No tracheal deviation present. No thyromegaly present.   Cardiovascular: Regular rhythm, normal heart sounds and intact distal pulses.  Exam reveals no gallop and no friction rub.    No murmur heard.  Tachycardic, HR: 109   Pulmonary/Chest: No stridor. He has no wheezes. He has rales. He exhibits " no tenderness.   Rales to bilateral lower lobes.     Abdominal: Soft. Bowel sounds are normal. He exhibits no distension and no mass. There is no tenderness. There is no rebound and no guarding. No hernia.   Musculoskeletal: Normal range of motion. He exhibits edema. He exhibits no tenderness or deformity.   moderate bilateral leg edema   Lymphadenopathy:     He has no cervical adenopathy.   Neurological: He is alert and oriented to person, place, and time. He has normal reflexes.   Skin: Skin is warm and dry. No rash noted. He is not diaphoretic. No erythema. No pallor.   Psychiatric: He has a normal mood and affect. His behavior is normal. Judgment and thought content normal.   Vitals reviewed.            Results Reviewed:    Results from last 7 days  Lab Units 02/28/17  1827   WBC 10*3/mm3 4.72   HEMOGLOBIN g/dL 11.9*   PLATELETS 10*3/mm3 334       Results from last 7 days  Lab Units 02/28/17  1827   SODIUM mmol/L 139   POTASSIUM mmol/L 4.3   TOTAL CO2 mmol/L 29.0   CREATININE mg/dL 1.00   GLUCOSE mg/dL 106*   CALCIUM mg/dL 9.3     Imaging Results (last 24 hours)     Procedure Component Value Units Date/Time    XR Chest 1 View [[id number]]      Updated:  02/28/17 1916          I have personally reviewed and interpreted the radiology studies and ECG obtained at time of admission.     Assessment / Plan      Assessment & Plan  Principal Problem:    Acute on chronic systolic (congestive) heart failure  Active Problems:    Shortness of breath    Nonischemic congestive cardiomyopathy    Microcytic anemia    Hypertension    Morbid obesity    Obstructive sleep apnea    Personal history of noncompliance with medical treatment      1) Acute on Chronic Systolic congestive hear failure  -CXR pending, bnp: 455  -continue lasix  -consult cardiology to see in am  -last echo 2-6-2017: EF 20%  -currently unable to wear life vest due to obesity and does not wish to have AICD  -strict I &O  -daily weights  -duo-nebs prn    2)  Non-ischemic congestive cardiomyopathy  -cardiology to see in am  -echo 2-6-2017, EF: 20%  -continue diuretics    3) Microcytic anemia  -appears to be at his baseline  -check anemia panel  -continue to monitor    4) COLLIN  -cpap at HS    5) Essential Hypertension  -continue home medication    6) Non-compliance  -when hospitalized previously patient had not been taking his medications in over 8 months  -currently states he has missed his cardiology F/U appointments    7) VTE prophylaxis  -teds/scds, lovenox    8) code status:  -full code        I discussed the patients findings and my recommendations with:patient and primary care team.     Hillary Foley, APRN 02/28/17 10:05 PM      Brief Attending Note   I have seen and examined the patient, performing an independent face-to-face diagnostic evaluation    The plan of care reviewed and developed with the advanced practice clinician (APC):  Maite Foley .  Brief Summary Statement/HPI:   27 y/o AAM with morbid obesity, COLLIN, HTN, SHF/NICM with LVEF 20%, who was supposed to wear his Lefe Vest, but had been noncompliant because it doesn't fit him. He had been compliant with medications.Today he came in due to progressive dyspnea/SOA with orthopnea and productive cough with yellow phlegm. No fever or chills. No chest pain or palpitations. He reports a 5lb weight gain, but stated that it's due to fluid because he had been extra thirsty and drinks a lot.    Attending Physical Exam:  General Assessment: No acute cardiopulmonary distress. Well developed and well nourished.    HEENT: NCAT, PERRL, MMM    Neck: Supple, no carotid bruit bilat    CVS: RRR, S1S2 normal    Resp: Diminished BS with basilar crackles bilaterally, resp only labored with exertion    Abd: Obese, soft, NT, ND, normal BS, no guarding or peritoneal signs    Ext: 2+ edema, both calves are symmetric and NTTP    Neuro: Nonfocal    Skin: W/D/I. + dark/thickened skin at neck    Psych: Affect is  appropriate    Brief Assessment/Plan :  1. SOA secondary to volume overload. Diuresed, strict I&O, daily weight  2. NICM/SHF with LVEF 20%. Not able to fit in life vest. Card to evaluate.  3. COLLIN, CPAP  3. Acanthosis nigrans. Check for DM2.    See above for further detailed assessment and plan developed with APC which I have reviewed and/or edited.    I believe this patient requires INPATIENT status due to the need for care which can only be reasonably provided in an hospital setting such as aggressive/expedited ancillary services and/or consultation services and the necessity for IV medications, close physician monitoring and/or the possible need for procedures.  In such, I feel patient’s risk for adverse outcomes and need for care warrant INPATIENT evaluation and predict the patient’s care encounter to likely last beyond 2 midnights.    SONAM Kurtz 02/28/17 10:05 PM           Electronically signed by Chema Gutierrez MD at 3/1/2017  1:48 AM

## 2017-03-02 NOTE — PROGRESS NOTES
"  [facility]   Inpatient Progress Note       LOS: 2 days   Patient Care Team:  Barbara Mills MD as PCP - General (Internal Medicine)    Chief Complaint:  Follow-up for CHF    Subjective     Interval History:   Patient in bed wearing CPAP. States that he did not have a ride to get to his appointments with Sleep medicine and the heart [facility] valve center. States that he is considering bariatric surgery. States that he feels good currently. Patient has been refusing lab draws this morning and PT/OT. Refused to be weighed.  Patient overall feels better this morning and notes he is diuresing well.  He is now interested in bariatric surgery and his ICD.  History taken from: patient    Review of Systems:   Pertinent positives noted in history, exam, and assessment. Otherwise reviewed and negative.      Objective     Vitals:  Blood pressure 123/81, pulse 95, temperature 98.6 °F (37 °C), temperature source Oral, resp. rate 24, height 70\" (177.8 cm), weight (!) 434 lb 8 oz (197 kg), SpO2 92 %.     Intake/Output Summary (Last 24 hours) at 03/02/17 1000  Last data filed at 03/02/17 0900   Gross per 24 hour   Intake    590 ml   Output   1550 ml   Net   -960 ml     Physical Exam   Constitutional: He is oriented to person, place, and time. He appears well-developed and well-nourished.   Morbidly obese   HENT:   Mouth/Throat: Oropharynx is clear and moist.   Neck: No JVD present. Carotid bruit is not present. No thyromegaly present.   Cardiovascular: Regular rhythm, S1 normal, S2 normal, normal heart sounds and intact distal pulses.  Exam reveals no gallop, no S3 and no S4.    No murmur heard.  Pulses:       Carotid pulses are 2+ on the right side, and 2+ on the left side.       Radial pulses are 2+ on the right side, and 2+ on the left side.   Pulmonary/Chest: He has decreased breath sounds.   Abdominal: Soft. Bowel sounds are normal. He exhibits no mass. There is no tenderness. "   Musculoskeletal: He exhibits edema (1-2+ bilateral).   Neurological: He is alert and oriented to person, place, and time.   Skin: Skin is warm and dry. No rash noted.          Results Review:     I reviewed the patient's new clinical results.      Results from last 7 days  Lab Units 03/01/17  0512   WBC 10*3/mm3 5.02   HEMOGLOBIN g/dL 11.8*   HEMATOCRIT % 37.2*   PLATELETS 10*3/mm3 332       Results from last 7 days  Lab Units 03/01/17  0512   SODIUM mmol/L 137   POTASSIUM mmol/L 3.7   CHLORIDE mmol/L 103   TOTAL CO2 mmol/L 28.0   BUN mg/dL 9   CREATININE mg/dL 0.90   CALCIUM mg/dL 9.2   BILIRUBIN mg/dL 0.8   ALK PHOS U/L 60   ALT (SGPT) U/L 13   AST (SGOT) U/L 13   GLUCOSE mg/dL 100       Results from last 7 days  Lab Units 03/01/17  0512   SODIUM mmol/L 137   POTASSIUM mmol/L 3.7   CHLORIDE mmol/L 103   TOTAL CO2 mmol/L 28.0   BUN mg/dL 9   CREATININE mg/dL 0.90   GLUCOSE mg/dL 100   CALCIUM mg/dL 9.2         No results found for: TROPONINI                  Tele:  SR    Assessment/Plan     Principal Problem:    Acute on chronic systolic (congestive) heart failure  Active Problems:    Shortness of breath    Hypertension    Morbid obesity    Obstructive sleep apnea    Nonischemic congestive cardiomyopathy    Personal history of noncompliance with medical treatment    Microcytic anemia      1. Acute on chronic systolic congestive heart failure  2. Presumed NICM- EF 20% by ECHO earlier this month   3. Hypertension  4. COLLIN  5. Morbid obesity  6. Medical noncompliance     Plan:   Home today is okay with me.. Change home diuretic from lasix to torsemide. Keep outpatient follow-up as scheduled. Needs close follow-up with the heart and valve center. Will not place him on entresto as he will not be able to afford this medication and compliance has been an issue.  As an outpatient will have him see electrophysiology for possible ICD at the patient remains interested.  He has a LifeVest at home from previous admissions,  which he does not wear..    Marina Rolon, APRN  03/02/17  10:00 AM    I, Balbir Olsen MD, personally performed the services described in this documentation as scribed by the above individual in my presence, and it is both accurate and complete    Please note that portions of this note may have been completed with a voice recognition program. Efforts were made to edit the dictations, but occasionally words are mistranscribed.

## 2017-03-02 NOTE — PLAN OF CARE
Problem: Patient Care Overview (Adult)  Goal: Plan of Care Review  Outcome: Ongoing (interventions implemented as appropriate)    03/02/17 0439   Coping/Psychosocial Response Interventions   Plan Of Care Reviewed With patient   Patient Care Overview   Progress improving   Outcome Evaluation   Outcome Summary/Follow up Plan Vitals stable - normotensive. Pt had no complaints or events through out the night.        Goal: Discharge Needs Assessment  Outcome: Ongoing (interventions implemented as appropriate)    03/01/17 0408 03/01/17 1103 03/02/17 0439   Discharge Needs Assessment   Concerns To Be Addressed --  --  denies needs/concerns at this time   Readmission Within The Last 30 Days --  previous discharge plan unsuccessful --    Discharge Disposition still a patient --  --    Living Environment   Transportation Available --  car;family or friend will provide --          Problem: Cardiac: Heart Failure (Adult)  Goal: Signs and Symptoms of Listed Potential Problems Will be Absent or Manageable (Cardiac: Heart Failure)  Outcome: Ongoing (interventions implemented as appropriate)    03/02/17 0439   Cardiac: Heart Failure   Problems Assessed (Heart Failure) all   Problems Present (Heart Failure) fluid/electrolyte imbalance;functional decline/self-care deficit;respiratory compromise

## 2017-03-02 NOTE — PROGRESS NOTES
Continued Stay Note   [place]     Patient Name: Evan Gannon  MRN: [id number]  Today's Date: 3/2/2017    Admit Date: 2/28/2017          Discharge Plan       03/02/17 1424    Case Management/Social Work Plan    Plan oxygen    Patient/Family In Agreement With Plan yes    Additional Comments Discussed with pt about oxygen being ordered and he is agreeable. He will use [facility] since his CPAP is with them. Called and efaxed referral to [place] at [facility]. Per pt, [facility] will bring portable tank to bedside prior to d/c  and arrange home set up. CM will follow.               Discharge Codes     None        Expected Discharge Date and Time     Expected Discharge Date Expected Discharge Time    Mar 2, 2017             Amie Mondragon

## 2017-03-02 NOTE — PROGRESS NOTES
Continued Stay Note   [place]     Patient Name: Evan Gannon  MRN: [id number]  Today's Date: 3/2/2017    Admit Date: 2/28/2017          Discharge Plan       03/02/17 1113    Case Management/Social Work Plan    Plan Home    Patient/Family In Agreement With Plan yes    Additional Comments Order received for Oxygen. Pt will need a qualifying sat and CM will arrange with [facility] if pt agreeable.               Discharge Codes     None        Expected Discharge Date and Time     Expected Discharge Date Expected Discharge Time    Mar 2, 2017             Amie Mondragon

## 2017-03-02 NOTE — NURSING NOTE
"Pt refused lab draw - \"they stuck me twice and I'm tired\". I reviewed the importance of having current labs in order to accurately provide care. Pt still refused, \"they can try later, I'm not doing it right now\".   "

## 2017-03-02 NOTE — PLAN OF CARE
Problem: Patient Care Overview (Adult)  Goal: Plan of Care Review  Outcome: Ongoing (interventions implemented as appropriate)    03/02/17 1430   Coping/Psychosocial Response Interventions   Plan Of Care Reviewed With patient   Patient Care Overview   Progress improving   Outcome Evaluation   Outcome Summary/Follow up Plan Vital stables throughout session, 1 small standing rest break due to SOA and weakness.         Problem: Inpatient Physical Therapy  Goal: Bed Mobility Goal LTG- PT  Outcome: Ongoing (interventions implemented as appropriate)    03/01/17 1001 03/02/17 1430   Bed Mobility PT LTG   Bed Mobility PT LTG, Date Established 03/01/17 --    Bed Mobility PT LTG, Time to Achieve 5 days --    Bed Mobility PT LTG, Activity Type all bed mobility --    Bed Mobility PT LTG, [place] Level independent --    Bed Mobility PT LTG, Date Goal Reviewed --  03/02/17   Bed Mobility PT LTG, Outcome --  goal ongoing

## 2017-03-03 VITALS
OXYGEN SATURATION: 100 % | WEIGHT: 315 LBS | RESPIRATION RATE: 24 BRPM | TEMPERATURE: 97.9 F | SYSTOLIC BLOOD PRESSURE: 122 MMHG | BODY MASS INDEX: 45.1 KG/M2 | DIASTOLIC BLOOD PRESSURE: 80 MMHG | HEIGHT: 70 IN | HEART RATE: 93 BPM

## 2017-03-03 PROCEDURE — 94799 UNLISTED PULMONARY SVC/PX: CPT

## 2017-03-03 PROCEDURE — 99239 HOSP IP/OBS DSCHRG MGMT >30: CPT | Performed by: NURSE PRACTITIONER

## 2017-03-03 RX ORDER — LOSARTAN POTASSIUM 100 MG/1
100 TABLET ORAL
Qty: 30 TABLET | Refills: 0 | Status: SHIPPED | OUTPATIENT
Start: 2017-03-03 | End: 2017-04-27 | Stop reason: SDUPTHER

## 2017-03-03 RX ORDER — TORSEMIDE 20 MG/1
40 TABLET ORAL DAILY
Qty: 30 TABLET | Refills: 0 | Status: SHIPPED | OUTPATIENT
Start: 2017-03-03 | End: 2017-03-03 | Stop reason: SDUPTHER

## 2017-03-03 RX ORDER — LOSARTAN POTASSIUM 50 MG/1
100 TABLET ORAL
Status: DISCONTINUED | OUTPATIENT
Start: 2017-03-03 | End: 2017-03-03 | Stop reason: HOSPADM

## 2017-03-03 RX ORDER — BENZONATATE 100 MG/1
100 CAPSULE ORAL 3 TIMES DAILY PRN
Status: DISCONTINUED | OUTPATIENT
Start: 2017-03-03 | End: 2017-03-03 | Stop reason: HOSPADM

## 2017-03-03 RX ADMIN — LOSARTAN POTASSIUM 100 MG: 50 TABLET, FILM COATED ORAL at 10:04

## 2017-03-03 RX ADMIN — ASPIRIN 81 MG: 81 TABLET, COATED ORAL at 09:03

## 2017-03-03 RX ADMIN — CARVEDILOL 25 MG: 12.5 TABLET, FILM COATED ORAL at 09:03

## 2017-03-03 RX ADMIN — BENZONATATE 100 MG: 100 CAPSULE, LIQUID FILLED ORAL at 01:15

## 2017-03-03 RX ADMIN — TORSEMIDE 40 MG: 20 TABLET ORAL at 09:03

## 2017-03-03 NOTE — PLAN OF CARE
Problem: Patient Care Overview (Adult)  Goal: Plan of Care Review  Outcome: Outcome(s) achieved Date Met:  03/03/17 03/03/17 1024   Coping/Psychosocial Response Interventions   Plan Of Care Reviewed With patient   Patient Care Overview   Progress improving   Outcome Evaluation   Outcome Summary/Follow up Plan VSS. No c/o of cp or soa. Zoll will visit him at home today after D/C. Pt still on 2L nasal cannula. Switched lisinopril to losartan today.        Goal: Adult Individualization and Mutuality  Outcome: Outcome(s) achieved Date Met:  03/03/17  Goal: Discharge Needs Assessment  Outcome: Outcome(s) achieved Date Met:  03/03/17 03/01/17 0408 03/01/17 1103 03/03/17 0502   Discharge Needs Assessment   Concerns To Be Addressed --  --  compliance issue concerns   Readmission Within The Last 30 Days --  previous discharge plan unsuccessful --    Equipment Needed After Discharge --  oxygen  (pt reports he was supposed to have oxygen but hasnt arranged with [facility] a delivery date, CM called [facility] and they do not have an oxygen order for the pt. If pt will need oxygen new order will need to be obtained) --    Discharge Disposition still a patient --  --    Living Environment   Transportation Available --  car;family or friend will provide --    Self-Care   Equipment Currently Used at Home --  respiratory supplies;other (see comments)  (pt has a CPAP machine provided by [employer]. and a Life Vest, pt does not wear life vest as he reports they are working on getting it to fit him) --          Problem: Cardiac: Heart Failure (Adult)  Goal: Signs and Symptoms of Listed Potential Problems Will be Absent or Manageable (Cardiac: Heart Failure)  Outcome: Outcome(s) achieved Date Met:  03/03/17 03/03/17 1024   Cardiac: Heart Failure   Problems Assessed (Heart Failure) all   Problems Present (Heart Failure) decreased quality of life;functional decline/self-care deficit;respiratory compromise

## 2017-03-03 NOTE — PLAN OF CARE
Problem: Patient Care Overview (Adult)  Goal: Plan of Care Review  Outcome: Ongoing (interventions implemented as appropriate)    03/03/17 0502   Coping/Psychosocial Response Interventions   Plan Of Care Reviewed With patient   Patient Care Overview   Progress no change   Outcome Evaluation   Outcome Summary/Follow up Plan Pt's vitals stable. Pt refused Lovenox injection - doesn't believe it's necessary since he is going home tomorrow. Pt appeared confused that Entresto would not be continued, reported he was never told about medication expense. Pt c/o persistent, dry cough - lung sounds clear. Order for Tesselon Perles obtained, humidification added to NC - appeared to help. Pt had no other complaints or events through out the night.        Goal: Discharge Needs Assessment  Outcome: Ongoing (interventions implemented as appropriate)    03/01/17 0408 03/01/17 1103 03/03/17 0502   Discharge Needs Assessment   Concerns To Be Addressed --  --  compliance issue concerns   Readmission Within The Last 30 Days --  previous discharge plan unsuccessful --    Discharge Disposition still a patient --  --    Living Environment   Transportation Available --  car;family or friend will provide --          Problem: Cardiac: Heart Failure (Adult)  Goal: Signs and Symptoms of Listed Potential Problems Will be Absent or Manageable (Cardiac: Heart Failure)  Outcome: Ongoing (interventions implemented as appropriate)    03/02/17 0439 03/03/17 0502   Cardiac: Heart Failure   Problems Assessed (Heart Failure) --  all   Problems Present (Heart Failure) fluid/electrolyte imbalance;functional decline/self-care deficit;respiratory compromise --

## 2017-03-03 NOTE — DISCHARGE SUMMARY
[facility] Medicine Services  DISCHARGE SUMMARY       Date of Admission: 2/28/2017  Date of Discharge:  3/3/2017  Primary Care Physician: Barbara Mills MD    Discharge Diagnoses:  Active Hospital Problems (** Indicates Principal Problem)    Diagnosis Date Noted   • **Acute on chronic systolic (congestive) heart failure [I50.23] 02/28/2017   • Microcytic anemia [D50.9] 02/28/2017   • Nonischemic congestive cardiomyopathy [I42.9] 02/09/2017   • Personal history of noncompliance with medical treatment [Z91.19] 02/09/2017   • Shortness of breath [R06.02] 02/07/2017   • Hypertension [I10] 02/07/2017   • Morbid obesity [E66.01] 02/07/2017   • Obstructive sleep apnea [G47.33] 02/07/2017      Resolved Hospital Problems    Diagnosis Date Noted Date Resolved   No resolved problems to display.       Presenting Problem/History of Present Illness  Acute on chronic systolic (congestive) heart failure [I50.23]     Chief Complaint on Day of Discharge: shortness of breath    History of Present Illness on Day of Discharge:   Patient is sleeping in bed with BiPAP on and in no distress.  Woke to talk with me and states he is ready to go home.  RN reports he is refusing lab work and some medications.  Denies chest pain.  States breathing is better.    Hospital Course  Patient is a 26 y.o. male nonischemic cardiomyopathy, systolic congestive heart failure, COLLIN and hypertension.  He presented to [facility] ED with complaints of increased shortness of air.  Patient had a recent hospitalization at [facility] from 2/6 through 2/9/17 with shortness of air.  Cardiology was consulted during that hospitalization and he was treated with diuretic therapy and was discharge home on metolazone, lasix and aldactone.  he was instructed to wear LifeVest due to his EF of 20% but reports he has not been wearing it.  He was admitted to the hospital medicine service for further evaluation and treatment.   Patient has a history of noncompliance and has been offered an AICD previously but has declined due to difficulty with anesthesia.  He reports that he gets the better response from IV diuretics when he is in the hospital.  Cardiology was consulted and followed patient during hospitalization.  Patient has improved clinically and is ready for discharge home on current medication.  He was treated with diuretics and his medications were adjusted.  His lasix was changed to torsemide.  Initially changed to Entresto from [facility] but unable to afford so switched to losartan by [facility].  Patient will need to follow up with PCP within 1 week.  He needs to keep appointment and close follow up with heart and valve clinic.  Patient is now agreeable to AICD and gastric sleeve surgery.  Patient is medically stable and ready for discharge home today.  Life Vest has been contacted and will go to patient's home to evaluated vest and adjust as needed.  Discharge plans and instructions were reviewed with patient and he verbalized an understanding.      Procedures Performed:  None       Consults:   Consults     Date and Time Order Name Status Description    2/28/2017 2222 Inpatient Consult to Cardiology      2/7/2017 1311 Inpatient Consult to Hospitalist Completed         Pertinent Test Results:    Results from last 7 days  Lab Units 03/01/17  0512 02/28/17  1827   WBC 10*3/mm3 5.02 4.72   HEMOGLOBIN g/dL 11.8* 11.9*   HEMATOCRIT % 37.2* 37.3*   PLATELETS 10*3/mm3 332 334       Results from last 7 days  Lab Units 03/02/17  1006 03/01/17  0512 02/28/17  1827   SODIUM mmol/L 138 137 139   POTASSIUM mmol/L 4.2 3.7 4.3   CHLORIDE mmol/L 103 103 105   TOTAL CO2 mmol/L 35.0* 28.0 29.0   BUN mg/dL 12 9 9   CREATININE mg/dL 0.90 0.90 1.00   GLUCOSE mg/dL 85 100 106*   CALCIUM mg/dL 9.2 9.2 9.3     Imaging Results (last 72 hours)     Procedure Component Value Units Date/Time    XR Chest 1 View [[id number]] Collected:  03/01/17 1223     Updated:   "03/01/17 1252    Narrative:       EXAMINATION: XR CHEST 1 VW-      INDICATION: Shortness of air triage protocol.      COMPARISON: None.     FINDINGS:   1. There is marked enlargement of the cardiac silhouette. Hazy opacities  are seen at both lung bases, worse on the right than left.  2. The upper lung zones are clear.           Impression:       Compared to previous examinations of 02/06/2017, there is  mild volume loss at both bases with slight increase hazy opacity right  base. Cardiomegaly persists and the upper lung zones remain clear.     D:  03/01/2017  E:  03/01/2017     This report was finalized on 3/1/2017 12:50 PM by Dr. Chandu Machuca MD.           Condition on Discharge:  stable    Physical Exam on Discharge:  Visit Vitals   • /80 (BP Location: Right arm)   • Pulse 93   • Temp 97.9 °F (36.6 °C) (Oral)   • Resp 24   • Ht 70\" (177.8 cm)   • Wt (!) 425 lb 12.8 oz (193 kg)   • SpO2 100%   • BMI 61.1 kg/m2     Physical Exam   Constitutional: He is oriented to person, place, and time. He appears well-developed and well-nourished. No distress.   Lying in bed with BiPAP    Morbidly obese   Eyes: Conjunctivae are normal.   Neck: Neck supple.   Large neck circumference   Cardiovascular: Normal rate, regular rhythm and normal heart sounds.    No murmur heard.  Pulmonary/Chest: Effort normal and breath sounds normal. No respiratory distress. He has no wheezes.   Diminished in bases   Abdominal: Soft. Bowel sounds are normal. He exhibits no distension. There is no tenderness.   Musculoskeletal: Normal range of motion. He exhibits edema (mild BLE).   Neurological: He is alert and oriented to person, place, and time.   Skin: Skin is warm and dry.   Psychiatric: He has a normal mood and affect. His behavior is normal.     Discharge Disposition  Home or Self Care    Discharge Medications   Gannon, Evan   Home Medication Instructions BENI:[id number]    Printed on:03/03/17 0944   Medication Information            "           albuterol (PROVENTIL HFA;VENTOLIN HFA) 108 (90 BASE) MCG/ACT inhaler  Inhale 2 puffs Every 6 (Six) Hours As Needed for wheezing.             aspirin 81 MG EC tablet  Take 1 tablet by mouth Daily.             carvedilol (COREG) 25 MG tablet  Take 25 mg by mouth 2 (Two) Times a Day.             losartan (COZAAR) 100 MG tablet  Take 1 tablet by mouth Daily.             metOLazone (ZAROXOLYN) 5 MG tablet  Take 1 tablet by mouth Daily As Needed (Edema or shortness of breath).             spironolactone (ALDACTONE) 50 MG tablet  Take 1 tablet by mouth Daily.             torsemide (DEMADEX) 20 MG tablet  Take 2 tablets by mouth Daily.                 Discharge Diet:   Diet Instructions     Diet: Regular, Cardiac; Thin Liquids, No Restrictions       Discharge Diet:   Regular  Cardiac      Fluid Consistency:  Thin Liquids, No Restrictions                 Activity at Discharge:   Activity Instructions     Activity as Tolerated                     Follow-up Appointments  Future Appointments  Date Time Provider Department Center   3/16/2017 1:00 PM SONAM Oates [facility] None   4/24/2017 2:00 PM Balbir Olsen MD [facility] None   8/22/2017 1:45 PM Barbara Mills MD [facility] None     Additional Instructions for the Follow-ups that You Need to Schedule     Discharge Follow-Up With Specified Provider    As directed    To:  Dr. Whittington   Follow Up Details:  first available for evaluation for gastric sleeve       Discharge Follow-up with PCP    As directed    Follow Up Details:  within 1 week for hospital follow up       Discharge Follow-up with Specialty    As directed    Specialty:  Cardiology   Follow Up Details:  as scheduled       Discharge Follow-up with Specialty    As directed    Specialty:  EP   Follow Up Details:  1-2 weeks for evaluation for AICD                    SONAM Leiva 03/03/17 9:44 AM    Time: Discharge 40 min    Please note that portions of this note may have been  completed with a voice recognition program. Efforts were made to edit the dictations, but occasionally words are mistranscribed.

## 2017-03-03 NOTE — PAYOR COMM NOTE
"Jagdeep Sivakumar (26 y.o. Male) Auth # [id number] Discharge notification    Date of Birth Social Security Number Address Home Phone MRN    1990  [id number] [facility] [address] [phone] [id number]    Qu[employer] Marital Status          None Single       Admission Date Admission Type Admitting Provider Attending Provider Department, Room/Bed    2/28/17 Emergency ArianatJulius MD  [facility] 6A, N608/1    Discharge Date Discharge Disposition Discharge Destination        3/3/2017 Home or Self Care             Attending Provider: (none)    Allergies:  No Known Allergies    Isolation:  None   Infection:  None   Code Status:  FULL    Ht:  70\" (177.8 cm)   Wt:  425 lb 12.8 oz (193 kg)    Admission Cmt:  None   Principal Problem:  Acute on chronic systolic (congestive) heart failure [I50.23]                 Active Insurance as of 2/28/2017     Primary Coverage     Payor Plan Insurance Group Employer/Plan Group    ANTHEM BLUE CROSS ANTHEM BLUE CROSS BLUE SHIELD PPO [id number]     Payor Plan Address Payor Plan Phone Number Effective From Effective To    [address] [phone] 7/1/2014     [place] [address]       Subscriber Name Subscriber Birth Date Member ID       [id number] 12/5/1968 [id number]           Secondary Coverage     Payor Plan Insurance Group Employer/Plan Group    PASSPORT PASSPORT      Payor Plan Address Payor Plan Phone Number Effective From Effective To    [address] [phone] 10/1/2014     [place] [address]       Subscriber Name Subscriber Birth Date Member ID       SIVAKUMAR QUEEN 1990 [id number]                 Emergency Contacts      (Rel.) Home Phone Work Phone Mobile Phone    Kiah Austin (Significant Other) [phone] -- --    JagdeepAramis (Father) [phone] -- --            Insurance Information                ANTHEM BLUE CROSS/ANTHEM BLUE CROSS BLUE SHIELD PPO Phone: [phone]    Subscriber: Stephania Queen Subscriber#: " [id number]    Group#: [id number] Precert#: [id number]        PASSPORT/PASSPORT Phone: [phone]    Subscriber: Evan Gannon Subscriber#: [id number]    Group#:  Precert#:

## 2017-03-06 NOTE — PAYOR COMM NOTE
"Sivakumar Queen (26 y.o. Male)   Auth # [id number]  Libia Zaragoza RN, BSN  Phone # [phone]  Fax # [phone]    Date of Birth Social Security Number Address Home Phone MRN    1990  [id number] [facility] [address] [phone] [id number]    Restorationist Marital Status          None Single       Admission Date Admission Type Admitting Provider Attending Provider Department, Room/Bed    2/28/17 Emergency Julius Aguilar MD  [facility] 6A, N608/1    Discharge Date Discharge Disposition Discharge Destination        3/3/2017 Home or Self Care             Attending Provider: (none)    Allergies:  No Known Allergies    Isolation:  None   Infection:  None   Code Status:  Prior    Ht:  70\" (177.8 cm)   Wt:  425 lb 12.8 oz (193 kg)    Admission Cmt:  None   Principal Problem:  Acute on chronic systolic (congestive) heart failure [I50.23]                 Active Insurance as of 2/28/2017     Primary Coverage     Payor Plan Insurance Group Employer/Plan Group    ANTHEM BLUE CROSS ANTHEM BLUE CROSS BLUE SHIELD PPO [id number]     Payor Plan Address Payor Plan Phone Number Effective From Effective To    [address] [phone] 7/1/2014     [place] [address]       Subscriber Name Subscriber Birth Date Member ID       HEATHER QUEEN [birth year]1970 [id number]           Secondary Coverage     Payor Plan Insurance Group Employer/Plan Group    PASSPORT PASSPORT      Payor Plan Address Payor Plan Phone Number Effective From Effective To    [address] [phone] 10/1/2014     [place] [address]       Subscriber Name Subscriber Birth Date Member ID       SIVAKUMAR QUEEN 1990 [id number]                 Emergency Contacts      (Rel.) Home Phone Work Phone Mobile Phone    Kiah Austin (Significant Other) [phone] -- --    JagdeepAramis (Father) [phone] -- --            Insurance Information                ANTHEM BLUE CROSS/ANTHEM BLUE CROSS BLUE SHIELD PPO Phone: " [phone]    Subscriber: Stephania Gannon Subscriber#: [id number]    Group#: [id number] Precert#: [id number]        PASSPORT/PASSPORT Phone: [phone]    Subscriber: Evan Gannon Subscriber#: [id number]    Group#:  Precert#:              Discharge Summary      SONAM Leiva at 3/3/2017  9:10 AM     Attestation signed by Julius Aguilar MD at 3/3/2017  4:47 PM        I supervised care of the patient on day of discharge with direct care provided by the advanced care provider (APC).                                     [facility] Medicine Services  DISCHARGE SUMMARY       Date of Admission: 2/28/2017  Date of Discharge:  3/3/2017  Primary Care Physician: Barbara Mills MD    Discharge Diagnoses:  Active Hospital Problems (** Indicates Principal Problem)    Diagnosis Date Noted   • **Acute on chronic systolic (congestive) heart failure [I50.23] 02/28/2017   • Microcytic anemia [D50.9] 02/28/2017   • Nonischemic congestive cardiomyopathy [I42.9] 02/09/2017   • Personal history of noncompliance with medical treatment [Z91.19] 02/09/2017   • Shortness of breath [R06.02] 02/07/2017   • Hypertension [I10] 02/07/2017   • Morbid obesity [E66.01] 02/07/2017   • Obstructive sleep apnea [G47.33] 02/07/2017      Resolved Hospital Problems    Diagnosis Date Noted Date Resolved   No resolved problems to display.       Presenting Problem/History of Present Illness  Acute on chronic systolic (congestive) heart failure [I50.23]     Chief Complaint on Day of Discharge: shortness of breath    History of Present Illness on Day of Discharge:   Patient is sleeping in bed with BiPAP on and in no distress.  Woke to talk with me and states he is ready to go home.  RN reports he is refusing lab work and some medications.  Denies chest pain.  States breathing is better.    Hospital Course  Patient is a 26 y.o. male nonischemic cardiomyopathy, systolic congestive heart failure, COLLIN and  hypertension.  He presented to [facility] ED with complaints of increased shortness of air.  Patient had a recent hospitalization at [facility] from 2/6 through 2/9/17 with shortness of air.  Cardiology was consulted during that hospitalization and he was treated with diuretic therapy and was discharge home on metolazone, lasix and aldactone.  he was instructed to wear LifeVest due to his EF of 20% but reports he has not been wearing it.  He was admitted to the hospital medicine service for further evaluation and treatment.  Patient has a history of noncompliance and has been offered an AICD previously but has declined due to difficulty with anesthesia.  He reports that he gets the better response from IV diuretics when he is in the hospital.  Cardiology was consulted and followed patient during hospitalization.  Patient has improved clinically and is ready for discharge home on current medication.  He was treated with diuretics and his medications were adjusted.  His lasix was changed to torsemide.  Initially changed to Entresto from [facility] but unable to afford so switched to losartan by [facility].  Patient will need to follow up with PCP within 1 week.  He needs to keep appointment and close follow up with heart and valve clinic.  Patient is now agreeable to AICD and gastric sleeve surgery.  Patient is medically stable and ready for discharge home today.  Life Vest has been contacted and will go to patient's home to evaluated vest and adjust as needed.  Discharge plans and instructions were reviewed with patient and he verbalized an understanding.      Procedures Performed:  None       Consults:   Consults     Date and Time Order Name Status Description    2/28/2017 2222 Inpatient Consult to Cardiology      2/7/2017 1311 Inpatient Consult to Hospitalist Completed         Pertinent Test Results:    Results from last 7 days  Lab Units 03/01/17  0512 02/28/17  1827   WBC 10*3/mm3 5.02 4.72   HEMOGLOBIN g/dL  "11.8* 11.9*   HEMATOCRIT % 37.2* 37.3*   PLATELETS 10*3/mm3 332 334       Results from last 7 days  Lab Units 03/02/17  1006 03/01/17  0512 02/28/17  1827   SODIUM mmol/L 138 137 139   POTASSIUM mmol/L 4.2 3.7 4.3   CHLORIDE mmol/L 103 103 105   TOTAL CO2 mmol/L 35.0* 28.0 29.0   BUN mg/dL 12 9 9   CREATININE mg/dL 0.90 0.90 1.00   GLUCOSE mg/dL 85 100 106*   CALCIUM mg/dL 9.2 9.2 9.3     Imaging Results (last 72 hours)     Procedure Component Value Units Date/Time    XR Chest 1 View [[id number]] Collected:  03/01/17 1223     Updated:  03/01/17 1252    Narrative:       EXAMINATION: XR CHEST 1 VW-      INDICATION: Shortness of air triage protocol.      COMPARISON: None.     FINDINGS:   1. There is marked enlargement of the cardiac silhouette. Hazy opacities  are seen at both lung bases, worse on the right than left.  2. The upper lung zones are clear.           Impression:       Compared to previous examinations of 02/06/2017, there is  mild volume loss at both bases with slight increase hazy opacity right  base. Cardiomegaly persists and the upper lung zones remain clear.     D:  03/01/2017  E:  03/01/2017     This report was finalized on 3/1/2017 12:50 PM by Dr. Chandu Machuca MD.           Condition on Discharge:  stable    Physical Exam on Discharge:  Visit Vitals   • /80 (BP Location: Right arm)   • Pulse 93   • Temp 97.9 °F (36.6 °C) (Oral)   • Resp 24   • Ht 70\" (177.8 cm)   • Wt (!) 425 lb 12.8 oz (193 kg)   • SpO2 100%   • BMI 61.1 kg/m2     Physical Exam   Constitutional: He is oriented to person, place, and time. He appears well-developed and well-nourished. No distress.   Lying in bed with BiPAP    Morbidly obese   Eyes: Conjunctivae are normal.   Neck: Neck supple.   Large neck circumference   Cardiovascular: Normal rate, regular rhythm and normal heart sounds.    No murmur heard.  Pulmonary/Chest: Effort normal and breath sounds normal. No respiratory distress. He has no wheezes.   Diminished in " bases   Abdominal: Soft. Bowel sounds are normal. He exhibits no distension. There is no tenderness.   Musculoskeletal: Normal range of motion. He exhibits edema (mild BLE).   Neurological: He is alert and oriented to person, place, and time.   Skin: Skin is warm and dry.   Psychiatric: He has a normal mood and affect. His behavior is normal.     Discharge Disposition  Home or Self Care    Discharge Medications   Jagdeep [facility] Medication Instructions BENI:[id number]    Printed on:03/03/17 0982   Medication Information                      albuterol (PROVENTIL HFA;VENTOLIN HFA) 108 (90 BASE) MCG/ACT inhaler  Inhale 2 puffs Every 6 (Six) Hours As Needed for wheezing.             aspirin 81 MG EC tablet  Take 1 tablet by mouth Daily.             carvedilol (COREG) 25 MG tablet  Take 25 mg by mouth 2 (Two) Times a Day.             losartan (COZAAR) 100 MG tablet  Take 1 tablet by mouth Daily.             metOLazone (ZAROXOLYN) 5 MG tablet  Take 1 tablet by mouth Daily As Needed (Edema or shortness of breath).             spironolactone (ALDACTONE) 50 MG tablet  Take 1 tablet by mouth Daily.             torsemide (DEMADEX) 20 MG tablet  Take 2 tablets by mouth Daily.                 Discharge Diet:   Diet Instructions     Diet: Regular, Cardiac; Thin Liquids, No Restrictions       Discharge Diet:   Regular  Cardiac      Fluid Consistency:  Thin Liquids, No Restrictions                 Activity at Discharge:   Activity Instructions     Activity as Tolerated                     Follow-up Appointments  Future Appointments  Date Time Provider Department Center   3/16/2017 1:00 PM SONAM Oates [facility] None   4/24/2017 2:00 PM Balbir Olsen MD [facility] None   8/22/2017 1:45 PM Barbara Mills MD [facility] None     Additional Instructions for the Follow-ups that You Need to Schedule     Discharge Follow-Up With Specified Provider    As directed    To:  Dr. Whittington   Follow Up Details:   first available for evaluation for gastric sleeve       Discharge Follow-up with PCP    As directed    Follow Up Details:  within 1 week for hospital follow up       Discharge Follow-up with Specialty    As directed    Specialty:  Cardiology   Follow Up Details:  as scheduled       Discharge Follow-up with Specialty    As directed    Specialty:  EP   Follow Up Details:  1-2 weeks for evaluation for AICD                    Zoya Zelaya, SONAM 03/03/17 9:44 AM    Time: Discharge 40 min    Please note that portions of this note may have been completed with a voice recognition program. Efforts were made to edit the dictations, but occasionally words are mistranscribed.       Electronically signed by Julius Aguilar MD at 3/3/2017  4:47 PM        Discharge Order     Start     Ordered    03/03/17 0944  Discharge patient  Once     Expected Discharge Date:  03/03/17    Discharge Disposition:  Home or Self Care        03/03/17 0944

## 2017-03-06 NOTE — THERAPY DISCHARGE NOTE
Acute Care - Physical Therapy Discharge Summary   [facility]       Patient Name: Evan Gannon  : 1990  MRN: [id number]    Today's Date: 3/6/2017  Onset of Illness/Injury or Date of Surgery Date: 17    Date of Referral to PT: 17  Referring Physician: MD Matt      Admit Date: 2017      PT Recommendation and Plan    Visit Dx:    ICD-10-CM ICD-9-CM   1. Acute on chronic systolic (congestive) heart failure I50.23 428.23     428.0   2. Impaired functional mobility, balance, gait, and endurance Z74.09 V49.89   3. Nonischemic congestive cardiomyopathy I42.9 425.4   4. Morbid obesity due to excess calories E66.01 278.01   5. Obstructive sleep apnea G47.33 327.23                       IP PT Goals       17 1430 17 1001       Bed Mobility PT LTG    Bed Mobility PT LTG, Date Established  17  -DM     Bed Mobility PT LTG, Time to Achieve  5 days  -DM     Bed Mobility PT LTG, Activity Type  all bed mobility  -DM     Bed Mobility PT LTG, Rogers Level  independent  -DM     Bed Mobility PT LTG, Date Goal Reviewed 17  -AS      Bed Mobility PT LTG, Outcome goal ongoing  -AS      Transfer Training PT LTG    Transfer Training PT LTG, Date Established  17  -DM     Transfer Training PT LTG, Time to Achieve  5 days  -DM     Transfer Training PT LTG, Activity Type  sit to stand/stand to sit;bed to chair /chair to bed  -DM     Transfer Training PT LTG, Rogers Level  independent  -DM     Transfer Training PT LTG, Assist Device  --   w/ STABLE VS  -DM     Transfer Training PT  LTG, Date Goal Reviewed 17  -AS      Transfer Training PT LTG, Outcome goal ongoing  -AS      Gait Training PT LTG    Gait Training Goal PT LTG, Date Established  17  -DM     Gait Training Goal PT LTG, Time to Achieve  5 days  -DM     Gait Training Goal PT LTG, Rogers Level  independent  -DM     Gait Training Goal PT LTG, Distance to Achieve  200   w/ stable VS  -DM     Gait  Training Goal PT LTG, Date Goal Reviewed 03/02/17  -AS      Gait Training Goal PT LTG, Outcome goal ongoing  -AS        User Key  (r) = Recorded By, (t) = Taken By, (c) = Cosigned By    Initials Name Provider Type    DM Ailyn Whaley, PT Physical Therapist    AS Libia Donato, PTA Physical Therapy Assistant            Goals Status: Treatment plan discontinued secondary to discharge from acute facility.    PT Discharge Summary  Reason for Discharge: Discharge from facility  Outcomes Achieved: Refer to plan of care for updates on goals achieved  Discharge Destination: Home with assist      Ginna Zavala, PT   3/6/2017

## 2017-03-16 ENCOUNTER — OFFICE VISIT (OUTPATIENT)
Dept: CARDIOLOGY | Facility: HOSPITAL | Age: 27
End: 2017-03-16

## 2017-03-16 VITALS
RESPIRATION RATE: 20 BRPM | WEIGHT: 315 LBS | TEMPERATURE: 97.9 F | OXYGEN SATURATION: 94 % | BODY MASS INDEX: 46.65 KG/M2 | HEART RATE: 116 BPM | HEIGHT: 69 IN | DIASTOLIC BLOOD PRESSURE: 91 MMHG | SYSTOLIC BLOOD PRESSURE: 137 MMHG

## 2017-03-16 DIAGNOSIS — G47.33 OBSTRUCTIVE SLEEP APNEA: Primary | ICD-10-CM

## 2017-03-16 DIAGNOSIS — I42.0 NONISCHEMIC CONGESTIVE CARDIOMYOPATHY (HCC): ICD-10-CM

## 2017-03-16 DIAGNOSIS — R06.02 SHORTNESS OF BREATH: ICD-10-CM

## 2017-03-16 DIAGNOSIS — I50.22 CHRONIC SYSTOLIC CONGESTIVE HEART FAILURE (HCC): ICD-10-CM

## 2017-03-16 PROCEDURE — 99214 OFFICE O/P EST MOD 30 MIN: CPT | Performed by: NURSE PRACTITIONER

## 2017-03-16 NOTE — PROGRESS NOTES
Encounter Date:03/16/2017      Patient ID: Evan Gannon is a 26 y.o. male.        Subjective:     Chief Complaint: Follow-up (heart failure)     History of Present Illness  Patient presents to the HF center today for ongoing evaluation of his NICM/Systolic heart failure. Patient had 2 admissions in February for heart failure exacerbations. Patient was discharged home with a life vest but patient presents to the office without it today. He notes dyspnea on exertion and an intermittent cough. He denies chest pain, chest pressure, palpitations, tachycardia,  presyncope, syncope, orthopnea, PND, abdominal fullness, early satiety, claudication, or edema. Plan is for ICD implantation in the near future and possible gastric surgery due to morbid obesity.   Patient Active Problem List   Diagnosis   • Shortness of breath   • Hypertension   • Morbid obesity   • Obstructive sleep apnea   • Nonischemic congestive cardiomyopathy   • Personal history of noncompliance with medical treatment   • Acute on chronic systolic (congestive) heart failure   • Microcytic anemia       Past Surgical History:   Procedure Laterality Date   • ADENOIDECTOMY     • NOSE SURGERY     • TONSILLECTOMY         No Known Allergies      Current Outpatient Prescriptions:   •  albuterol (PROVENTIL HFA;VENTOLIN HFA) 108 (90 BASE) MCG/ACT inhaler, Inhale 2 puffs Every 6 (Six) Hours As Needed for Wheezing., Disp: 1 inhaler, Rfl: 11  •  aspirin 81 MG EC tablet, Take 1 tablet by mouth Daily., Disp: 365 tablet, Rfl: 0  •  carvedilol (COREG) 25 MG tablet, Take 1 tablet by mouth 2 (Two) Times a Day., Disp: 60 tablet, Rfl: 5  •  losartan (COZAAR) 100 MG tablet, Take 1 tablet by mouth Daily., Disp: 30 tablet, Rfl: 0  •  metOLazone (ZAROXOLYN) 5 MG tablet, Take 1 tablet by mouth Daily As Needed (Edema or shortness of breath)., Disp: 30 tablet, Rfl: 6  •  spironolactone (ALDACTONE) 50 MG tablet, Take 1 tablet by mouth Daily., Disp: 30 tablet, Rfl: 11  •  torsemide  (DEMADEX) 20 MG tablet, Take 2 tablets by mouth Daily., Disp: 60 tablet, Rfl: 0    The following portions of the chart were reviewed and updated as appropriate: Allergies, current medications, past family history, social history, past medical history.     Review of Systems   Constitution: Positive for weakness and weight loss. Negative for chills, decreased appetite, diaphoresis, fever, malaise/fatigue, night sweats and weight gain.   HENT: Negative for congestion, headaches and nosebleeds.    Eyes: Negative for blurred vision, visual disturbance and visual halos.   Cardiovascular: Positive for dyspnea on exertion. Negative for chest pain, claudication, cyanosis, irregular heartbeat, leg swelling, near-syncope, orthopnea, palpitations, paroxysmal nocturnal dyspnea and syncope.   Respiratory: Positive for cough and shortness of breath. Negative for hemoptysis, sleep disturbances due to breathing, snoring, sputum production and wheezing.    Endocrine: Negative for cold intolerance, heat intolerance, polydipsia, polyphagia and polyuria.   Hematologic/Lymphatic: Does not bruise/bleed easily.   Skin: Negative for dry skin, itching and rash.   Musculoskeletal: Negative for falls, joint pain, joint swelling, muscle weakness and myalgias.   Gastrointestinal: Negative for bloating, abdominal pain, constipation, diarrhea, dysphagia, heartburn, melena, nausea and vomiting.   Genitourinary: Negative for dysuria, flank pain, hematuria and nocturia.   Neurological: Negative for difficulty with concentration, excessive daytime sleepiness, dizziness and loss of balance.   Psychiatric/Behavioral: Negative for altered mental status and depression. The patient is not nervous/anxious.    Allergic/Immunologic: Negative for environmental allergies.           Objective:     Vitals:    03/16/17 1317 03/16/17 1320 03/16/17 1326   BP: 143/88 137/87 137/91   BP Location: Right arm Left arm Left arm   Patient Position: Sitting Sitting Standing  "  Pulse: 112  116   Resp: 20     Temp: 97.9 °F (36.6 °C)     TempSrc: Temporal Artery      SpO2: 94%     Weight: (!) 408 lb (185 kg)     Height: 69\" (175.3 cm)           Physical Exam   Constitutional: He is oriented to person, place, and time. He appears well-developed and well-nourished. He is active and cooperative. No distress.   HENT:   Head: Normocephalic and atraumatic.   Mouth/Throat: Oropharynx is clear and moist.   Eyes: Conjunctivae and EOM are normal. Pupils are equal, round, and reactive to light.   Neck: Normal range of motion. Neck supple. No JVD present. No tracheal deviation present. No thyromegaly present.   Cardiovascular: Normal rate, regular rhythm, normal heart sounds and intact distal pulses.    Pulmonary/Chest: Effort normal and breath sounds normal.   Abdominal: Soft. Bowel sounds are normal. He exhibits no distension. There is no tenderness.   Musculoskeletal: Normal range of motion.   Neurological: He is alert and oriented to person, place, and time.   Skin: Skin is warm, dry and intact.   Psychiatric: He has a normal mood and affect. His behavior is normal.   Nursing note and vitals reviewed.      Lab and Diagnostic Review:    reviewed from recent hospitalization      Assessment and Plan:         1. Nonischemic congestive cardiomyopathy  euvolemic  Patient discharged home with life vest but patient has been noncompliant with life vest. Long discussion with patient regarding the purpose of the life vest and plan for implantation of ICD in the near future.    2. Chronic systolic congestive heart failure  euvolemic  Heart failure education today including signs and symptoms, the role of the heart failure center, daily weights, low sodium diet (less than 1500 mg per day), and daily physical activity. Reviewed HF Zones with patient and family.  Patient to continue current medications as previously ordered.   -3. Shortness of breath    - albuterol (PROVENTIL HFA;VENTOLIN HFA) 108 (90 BASE) " MCG/ACT inhaler; Inhale 2 puffs Every 6 (Six) Hours As Needed for Wheezing.  Dispense: 1 inhaler; Refill: 11    4. Obstructive sleep apnea  Compliant with cpap therapy    It has been a pleasure to participate in the care of this patient.  Patient was instructed to call the [facility] with any questions, concerns, or worsening symptoms.      * Please note that portions of this note were completed with a voice recognition program. Efforts were made to edit the dictation but occasionally words are transcribed.

## 2017-03-27 RX ORDER — CARVEDILOL 25 MG/1
25 TABLET ORAL 2 TIMES DAILY
Qty: 60 TABLET | Refills: 5 | Status: SHIPPED | OUTPATIENT
Start: 2017-03-27 | End: 2017-04-27 | Stop reason: SDUPTHER

## 2017-03-27 RX ORDER — ASPIRIN 81 MG/1
81 TABLET ORAL DAILY
Qty: 365 TABLET | Refills: 0 | Status: SHIPPED | OUTPATIENT
Start: 2017-03-27 | End: 2017-04-27 | Stop reason: SDUPTHER

## 2017-03-27 RX ORDER — ALBUTEROL SULFATE 90 UG/1
2 AEROSOL, METERED RESPIRATORY (INHALATION) EVERY 6 HOURS PRN
Qty: 1 INHALER | Refills: 11 | Status: SHIPPED | OUTPATIENT
Start: 2017-03-27 | End: 2017-04-27 | Stop reason: SDUPTHER

## 2017-04-27 ENCOUNTER — OFFICE VISIT (OUTPATIENT)
Dept: CARDIOLOGY | Facility: HOSPITAL | Age: 27
End: 2017-04-27

## 2017-04-27 VITALS
RESPIRATION RATE: 22 BRPM | OXYGEN SATURATION: 93 % | DIASTOLIC BLOOD PRESSURE: 81 MMHG | TEMPERATURE: 97.4 F | BODY MASS INDEX: 46.65 KG/M2 | HEART RATE: 108 BPM | HEIGHT: 69 IN | WEIGHT: 315 LBS | SYSTOLIC BLOOD PRESSURE: 124 MMHG

## 2017-04-27 DIAGNOSIS — E66.01 MORBID OBESITY DUE TO EXCESS CALORIES (HCC): ICD-10-CM

## 2017-04-27 DIAGNOSIS — I42.0 NONISCHEMIC CONGESTIVE CARDIOMYOPATHY (HCC): Primary | ICD-10-CM

## 2017-04-27 DIAGNOSIS — I10 ESSENTIAL HYPERTENSION: ICD-10-CM

## 2017-04-27 DIAGNOSIS — I50.22 CHRONIC SYSTOLIC HEART FAILURE (HCC): ICD-10-CM

## 2017-04-27 PROCEDURE — 99213 OFFICE O/P EST LOW 20 MIN: CPT | Performed by: NURSE PRACTITIONER

## 2017-04-27 RX ORDER — ALBUTEROL SULFATE 90 UG/1
2 AEROSOL, METERED RESPIRATORY (INHALATION) EVERY 6 HOURS PRN
Qty: 1 INHALER | Refills: 6 | Status: SHIPPED | OUTPATIENT
Start: 2017-04-27 | End: 2017-07-11

## 2017-04-27 RX ORDER — CARVEDILOL 25 MG/1
25 TABLET ORAL 2 TIMES DAILY
Qty: 60 TABLET | Refills: 6 | Status: ON HOLD | OUTPATIENT
Start: 2017-04-27 | End: 2017-07-18

## 2017-04-27 RX ORDER — ASPIRIN 81 MG/1
81 TABLET ORAL DAILY
Qty: 30 TABLET | Refills: 6 | Status: SHIPPED | OUTPATIENT
Start: 2017-04-27 | End: 2017-08-28 | Stop reason: SDUPTHER

## 2017-04-27 RX ORDER — LOSARTAN POTASSIUM 100 MG/1
100 TABLET ORAL
Qty: 30 TABLET | Refills: 6 | Status: SHIPPED | OUTPATIENT
Start: 2017-04-27 | End: 2017-07-19 | Stop reason: HOSPADM

## 2017-04-27 RX ORDER — SPIRONOLACTONE 50 MG/1
50 TABLET, FILM COATED ORAL DAILY
Qty: 30 TABLET | Refills: 6 | Status: ON HOLD | OUTPATIENT
Start: 2017-04-27 | End: 2017-07-18

## 2017-04-27 RX ORDER — TORSEMIDE 20 MG/1
40 TABLET ORAL DAILY
Qty: 60 TABLET | Refills: 6 | Status: SHIPPED | OUTPATIENT
Start: 2017-04-27 | End: 2017-07-11

## 2017-04-27 RX ORDER — METOLAZONE 5 MG/1
5 TABLET ORAL DAILY PRN
Qty: 30 TABLET | Refills: 6 | Status: SHIPPED | OUTPATIENT
Start: 2017-04-27 | End: 2018-02-07 | Stop reason: SDUPTHER

## 2017-04-27 NOTE — PATIENT INSTRUCTIONS
Take Metolazone 5 mg 30  Minutes before Torsade 40 mg for 2 days.    Then resume torsemide 40 mg twice a day  Spironolactone 50 mg daily.    Follow up with Marah in 4 weeks, but sooner if your symptoms worsen.

## 2017-04-27 NOTE — PROGRESS NOTES
[facility]  [facility]      Encounter Date:04/27/2017     Evan Gannon  [address] [facility] [address]  [phone]    1990    Barbara Mills MD    Evan Gannon is a 26 y.o. male.      Subjective:     Chief Complaint:  Follow-up (SHF) and Shortness of Breath       HPI     Pt called yesterday reporting weight gain and SOB for 2 weeks and asking to be seen.  Pt reports he had been doing well, but he had noticed gradually worsening SOA.  Worsened this week after starting his new job.  Stated that he is having trouble working the 6 hours scheduled (fatigue, SOA). State he even vomited X1 but was able to continue working.  Reports that he has been taking his meds as scheduled, but is now out of all his meds and needs refills (hx of med non-compliance).  Denies CP, pressure, palpitations, orthopnea, PND, dizziness, syncope, worsening edema.  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      Patient Active Problem List    Diagnosis   • Acute on chronic systolic (congestive) heart failure [I50.23]   • Microcytic anemia [D50.9]   • Nonischemic congestive cardiomyopathy [I42.9]   • Personal history of noncompliance with medical treatment [Z91.19]   • Shortness of breath [R06.02]   • Hypertension [I10]   • Morbid obesity [E66.01]   • Obstructive sleep apnea [G47.33]     Past Surgical History:   Procedure Laterality Date   • ADENOIDECTOMY     • NOSE SURGERY     • TONSILLECTOMY         No Known Allergies      Current Outpatient Prescriptions:   •  albuterol (PROVENTIL HFA;VENTOLIN HFA) 108 (90 BASE) MCG/ACT inhaler, Inhale 2 puffs Every 6 (Six) Hours As Needed for Wheezing., Disp: 1 inhaler, Rfl: 6  •  aspirin 81 MG EC tablet, Take 1 tablet by mouth Daily., Disp: 30 tablet, Rfl: 6  •  carvedilol (COREG) 25 MG tablet, Take 1 tablet by mouth 2 (Two) Times a Day., Disp: 60 tablet, Rfl: 6  •  losartan (COZAAR) 100 MG  tablet, Take 1 tablet by mouth Daily., Disp: 30 tablet, Rfl: 6  •  metOLazone (ZAROXOLYN) 5 MG tablet, Take 1 tablet by mouth Daily As Needed (Edema or shortness of breath)., Disp: 30 tablet, Rfl: 6  •  spironolactone (ALDACTONE) 50 MG tablet, Take 1 tablet by mouth Daily., Disp: 30 tablet, Rfl: 6  •  torsemide (DEMADEX) 20 MG tablet, Take 2 tablets by mouth Daily., Disp: 60 tablet, Rfl: 6    The following portions of the patient's history were reviewed and updated as appropriate: allergies, current medications, past family history, past medical history, past social history, past surgical history and problem list.    Review of Systems   Constitution: Positive for malaise/fatigue. Negative for chills, decreased appetite, diaphoresis, fever, weakness, night sweats, weight gain and weight loss.   HENT: Negative for congestion, headaches and nosebleeds.    Eyes: Negative for blurred vision, visual disturbance and visual halos.   Cardiovascular: Positive for dyspnea on exertion. Negative for chest pain, claudication, cyanosis, irregular heartbeat, leg swelling, near-syncope, palpitations, paroxysmal nocturnal dyspnea and syncope.   Respiratory: Positive for snoring. Negative for cough, hemoptysis, shortness of breath, sleep disturbances due to breathing, sputum production and wheezing.         CPAP   Endocrine: Positive for polydipsia, polyphagia and polyuria. Negative for cold intolerance and heat intolerance.   Hematologic/Lymphatic: Does not bruise/bleed easily.   Skin: Positive for dry skin, itching and rash.   Musculoskeletal: Negative for falls, joint pain, joint swelling, muscle weakness and myalgias.   Gastrointestinal: Positive for bloating. Negative for abdominal pain, constipation, diarrhea, dysphagia, heartburn, melena, nausea and vomiting.   Genitourinary: Positive for nocturia. Negative for dysuria, flank pain and hematuria.   Neurological: Positive for excessive daytime sleepiness. Negative for difficulty  "with concentration, dizziness and loss of balance.   Psychiatric/Behavioral: Negative for altered mental status and depression. The patient is not nervous/anxious.    Allergic/Immunologic: Negative for environmental allergies.       Objective:     Vitals:    04/27/17 1332   BP: 124/81   BP Location: Right arm   Patient Position: Sitting   Cuff Size: Large Adult   Pulse: 108   Resp: 22   Temp: 97.4 °F (36.3 °C)   TempSrc: Temporal Artery    SpO2: 93%   Weight: (!) 415 lb (188 kg)   Height: 69\" (175.3 cm)         Physical Exam   Constitutional: He is oriented to person, place, and time. He appears well-developed and well-nourished. No distress.   HENT:   Head: Normocephalic and atraumatic.   Mouth/Throat: Oropharynx is clear and moist.   Eyes: Conjunctivae are normal. Pupils are equal, round, and reactive to light. No scleral icterus.   Neck: No hepatojugular reflux and no JVD present. Carotid bruit is not present. No tracheal deviation present. No thyromegaly present.   Cardiovascular: Normal rate, regular rhythm, normal heart sounds and intact distal pulses.  Exam reveals no friction rub.    No murmur heard.  Pulmonary/Chest: Effort normal. He has decreased breath sounds.   Abdominal: Soft. Bowel sounds are normal. He exhibits no distension. There is no tenderness.   Musculoskeletal: He exhibits edema (1+ pitting edema).   Lymphadenopathy:     He has no cervical adenopathy.   Neurological: He is alert and oriented to person, place, and time.   Skin: Skin is warm, dry and intact. No rash noted. No cyanosis or erythema. No pallor.   Psychiatric: He has a normal mood and affect. His behavior is normal. Thought content normal.   Vitals reviewed.      Lab and Diagnostic Review:  Admission on 02/28/2017, Discharged on 03/03/2017   Component Date Value Ref Range Status   • Glucose 02/28/2017 106* 70 - 100 mg/dL Final   • BUN 02/28/2017 9  9 - 23 mg/dL Final   • Creatinine 02/28/2017 1.00  0.60 - 1.30 mg/dL Final   • Sodium " 02/28/2017 139  132 - 146 mmol/L Final   • Potassium 02/28/2017 4.3  3.5 - 5.5 mmol/L Final   • Chloride 02/28/2017 105  99 - 109 mmol/L Final   • CO2 02/28/2017 29.0  20.0 - 31.0 mmol/L Final   • Calcium 02/28/2017 9.3  8.7 - 10.4 mg/dL Final   • Total Protein 02/28/2017 6.8  5.7 - 8.2 g/dL Final   • Albumin 02/28/2017 3.60  3.20 - 4.80 g/dL Final   • ALT (SGPT) 02/28/2017 17  7 - 40 U/L Final   • AST (SGOT) 02/28/2017 17  0 - 33 U/L Final   • Alkaline Phosphatase 02/28/2017 66  25 - 100 U/L Final   • Total Bilirubin 02/28/2017 0.8  0.3 - 1.2 mg/dL Final   • eGFR   Amer 02/28/2017 111  >60 mL/min/1.73 Final   • Globulin 02/28/2017 3.2  gm/dL Final   • A/G Ratio 02/28/2017 1.1* 1.5 - 2.5 g/dL Final   • BUN/Creatinine Ratio 02/28/2017 9.0  7.0 - 25.0 Final   • Anion Gap 02/28/2017 5.0  3.0 - 11.0 mmol/L Final   • BNP 02/28/2017 455.0* 0.0 - 100.0 pg/mL Final   • Extra Tube 02/28/2017 hold for add-on   Final    Auto resulted   • Extra Tube 02/28/2017 Hold for add-ons.   Final    Auto resulted.   • Extra Tube 02/28/2017 hold for add-on   Final    Auto resulted   • Extra Tube 02/28/2017 Hold for add-ons.   Final    Auto resulted.   • WBC 02/28/2017 4.72  3.50 - 10.80 10*3/mm3 Final   • RBC 02/28/2017 4.82  4.20 - 5.76 10*6/mm3 Final   • Hemoglobin 02/28/2017 11.9* 13.1 - 17.5 g/dL Final   • Hematocrit 02/28/2017 37.3* 38.9 - 50.9 % Final   • MCV 02/28/2017 77.4* 80.0 - 99.0 fL Final   • MCH 02/28/2017 24.7* 27.0 - 31.0 pg Final   • MCHC 02/28/2017 31.9* 32.0 - 36.0 g/dL Final   • RDW 02/28/2017 15.3* 11.3 - 14.5 % Final   • RDW-SD 02/28/2017 43.2  37.0 - 54.0 fl Final   • MPV 02/28/2017 10.0  6.0 - 12.0 fL Final   • Platelets 02/28/2017 334  150 - 450 10*3/mm3 Final   • Neutrophil % 02/28/2017 59.4  41.0 - 71.0 % Final   • Lymphocyte % 02/28/2017 28.6  24.0 - 44.0 % Final   • Monocyte % 02/28/2017 11.0  0.0 - 12.0 % Final   • Eosinophil % 02/28/2017 0.6  0.0 - 3.0 % Final   • Basophil % 02/28/2017 0.2  0.0 - 1.0  % Final   • Immature Grans % 02/28/2017 0.2  0.0 - 0.6 % Final   • Neutrophils, Absolute 02/28/2017 2.80  1.50 - 8.30 10*3/mm3 Final   • Lymphocytes, Absolute 02/28/2017 1.35  0.60 - 4.80 10*3/mm3 Final   • Monocytes, Absolute 02/28/2017 0.52  0.00 - 1.00 10*3/mm3 Final   • Eosinophils, Absolute 02/28/2017 0.03* 0.10 - 0.30 10*3/mm3 Final   • Basophils, Absolute 02/28/2017 0.01  0.00 - 0.20 10*3/mm3 Final   • Immature Grans, Absolute 02/28/2017 0.01  0.00 - 0.03 10*3/mm3 Final   • Troponin I 02/28/2017 0.06  0.00 - 0.07 ng/mL Final    Serial Number: [id number]    : [id number]   • Vitamin B-12 02/28/2017 517  211 - 911 pg/mL Final   • Folate 02/28/2017 4.85  3.20 - 20.00 ng/mL Final   • Ferritin 02/28/2017 57.00  22.00 - 322.00 ng/mL Final   • Reticulocyte % 02/28/2017 2.41* 0.50 - 1.50 % Final   • Iron 02/28/2017 32* 50 - 175 mcg/dL Final   • TIBC 02/28/2017 328  250 - 450 mcg/dL Final   • Iron Saturation 02/28/2017 10* 20 - 50 % Final   • WBC 03/01/2017 5.02  3.50 - 10.80 10*3/mm3 Final   • RBC 03/01/2017 4.79  4.20 - 5.76 10*6/mm3 Final   • Hemoglobin 03/01/2017 11.8* 13.1 - 17.5 g/dL Final   • Hematocrit 03/01/2017 37.2* 38.9 - 50.9 % Final   • MCV 03/01/2017 77.7* 80.0 - 99.0 fL Final   • MCH 03/01/2017 24.6* 27.0 - 31.0 pg Final   • MCHC 03/01/2017 31.7* 32.0 - 36.0 g/dL Final   • RDW 03/01/2017 15.2* 11.3 - 14.5 % Final   • RDW-SD 03/01/2017 42.9  37.0 - 54.0 fl Final   • MPV 03/01/2017 10.6  6.0 - 12.0 fL Final   • Platelets 03/01/2017 332  150 - 450 10*3/mm3 Final   • Neutrophil % 03/01/2017 55.9  41.0 - 71.0 % Final   • Lymphocyte % 03/01/2017 32.5  24.0 - 44.0 % Final   • Monocyte % 03/01/2017 9.8  0.0 - 12.0 % Final   • Eosinophil % 03/01/2017 1.0  0.0 - 3.0 % Final   • Basophil % 03/01/2017 0.4  0.0 - 1.0 % Final   • Immature Grans % 03/01/2017 0.4  0.0 - 0.6 % Final   • Neutrophils, Absolute 03/01/2017 2.81  1.50 - 8.30 10*3/mm3 Final   • Lymphocytes, Absolute 03/01/2017 1.63  0.60 - 4.80 10*3/mm3  Final   • Monocytes, Absolute 03/01/2017 0.49  0.00 - 1.00 10*3/mm3 Final   • Eosinophils, Absolute 03/01/2017 0.05* 0.10 - 0.30 10*3/mm3 Final   • Basophils, Absolute 03/01/2017 0.02  0.00 - 0.20 10*3/mm3 Final   • Immature Grans, Absolute 03/01/2017 0.02  0.00 - 0.03 10*3/mm3 Final   • Glucose 03/01/2017 100  70 - 100 mg/dL Final   • BUN 03/01/2017 9  9 - 23 mg/dL Final   • Creatinine 03/01/2017 0.90  0.60 - 1.30 mg/dL Final   • Sodium 03/01/2017 137  132 - 146 mmol/L Final   • Potassium 03/01/2017 3.7  3.5 - 5.5 mmol/L Final   • Chloride 03/01/2017 103  99 - 109 mmol/L Final   • CO2 03/01/2017 28.0  20.0 - 31.0 mmol/L Final   • Calcium 03/01/2017 9.2  8.7 - 10.4 mg/dL Final   • Total Protein 03/01/2017 7.1  5.7 - 8.2 g/dL Final   • Albumin 03/01/2017 3.70  3.20 - 4.80 g/dL Final   • ALT (SGPT) 03/01/2017 13  7 - 40 U/L Final   • AST (SGOT) 03/01/2017 13  0 - 33 U/L Final   • Alkaline Phosphatase 03/01/2017 60  25 - 100 U/L Final   • Total Bilirubin 03/01/2017 0.8  0.3 - 1.2 mg/dL Final   • eGFR  African Amer 03/01/2017 126  >60 mL/min/1.73 Final   • Globulin 03/01/2017 3.4  gm/dL Final   • A/G Ratio 03/01/2017 1.1* 1.5 - 2.5 g/dL Final   • BUN/Creatinine Ratio 03/01/2017 10.0  7.0 - 25.0 Final   • Anion Gap 03/01/2017 6.0  3.0 - 11.0 mmol/L Final   • Hemoglobin A1C 03/01/2017 6.00* 4.80 - 5.60 % Final   • BNP 03/02/2017 461.0* 0.0 - 100.0 pg/mL Final   • BNP 03/02/2017 390.0* 0.0 - 100.0 pg/mL Final   • Glucose 03/02/2017 85  70 - 100 mg/dL Final   • BUN 03/02/2017 12  9 - 23 mg/dL Final   • Creatinine 03/02/2017 0.90  0.60 - 1.30 mg/dL Final   • Sodium 03/02/2017 138  132 - 146 mmol/L Final   • Potassium 03/02/2017 4.2  3.5 - 5.5 mmol/L Final   • Chloride 03/02/2017 103  99 - 109 mmol/L Final   • CO2 03/02/2017 35.0* 20.0 - 31.0 mmol/L Final   • Calcium 03/02/2017 9.2  8.7 - 10.4 mg/dL Final   • eGFR  African Amer 03/02/2017 126  >60 mL/min/1.73 Final   • BUN/Creatinine Ratio 03/02/2017 13.3  7.0 - 25.0 Final    • Anion Gap 03/02/2017 0.0* 3.0 - 11.0 mmol/L Final       Assessment and Plan:         1. Nonischemic congestive cardiomyopathy  Refills patient medications.      Tried to place IV after 3 attempts for IV diuretics unsuccessful.  Pt then refused lab draw    Pt will Resume current medication regimen (with addition of Metolazone 5 mg 30 minutes prior to Torsemide (am dose of torsemide only) daily for 2 days.     Call if s/s worsen or do not improve.    Discussed importance of physical activity to tolerance.  Encouraged pt to consider discuss with employer current needs.  He may need restriction to help until physical activity tolerance improves, but we do want him to attempt working if possible.  Discuss concerns with Dr. Olsen.      Discussed cardiac rehab, pt will consider.    2. Chronic systolic congestive heart failure    3 Hypertension    4  Morbid Obesity    F/u 4 weeks or sooner if s/s worsen or do not improve.    *Please note that portions of this note were completed with a voice recognition program. Efforts were made to edit the dictations, but occasionally words are mistranscribed.

## 2017-05-02 ENCOUNTER — APPOINTMENT (OUTPATIENT)
Dept: GENERAL RADIOLOGY | Facility: HOSPITAL | Age: 27
End: 2017-05-02

## 2017-05-02 ENCOUNTER — HOSPITAL ENCOUNTER (EMERGENCY)
Facility: HOSPITAL | Age: 27
Discharge: HOME | End: 2017-05-02
Attending: EMERGENCY MEDICINE | Admitting: EMERGENCY MEDICINE

## 2017-05-02 ENCOUNTER — APPOINTMENT (OUTPATIENT)
Dept: CT IMAGING | Facility: HOSPITAL | Age: 27
End: 2017-05-02

## 2017-05-02 VITALS
TEMPERATURE: 98.3 F | HEIGHT: 69 IN | SYSTOLIC BLOOD PRESSURE: 93 MMHG | BODY MASS INDEX: 46.65 KG/M2 | WEIGHT: 315 LBS | HEART RATE: 93 BPM | DIASTOLIC BLOOD PRESSURE: 49 MMHG | OXYGEN SATURATION: 100 % | RESPIRATION RATE: 24 BRPM

## 2017-05-02 DIAGNOSIS — R10.84 GENERALIZED ABDOMINAL PAIN: ICD-10-CM

## 2017-05-02 DIAGNOSIS — R06.09 DYSPNEA ON EXERTION: Primary | ICD-10-CM

## 2017-05-02 DIAGNOSIS — I50.9 CONGESTIVE HEART FAILURE, UNSPECIFIED CONGESTIVE HEART FAILURE CHRONICITY, UNSPECIFIED CONGESTIVE HEART FAILURE TYPE: ICD-10-CM

## 2017-05-02 DIAGNOSIS — R06.02 SHORTNESS OF BREATH: ICD-10-CM

## 2017-05-02 DIAGNOSIS — I50.23 ACUTE ON CHRONIC SYSTOLIC (CONGESTIVE) HEART FAILURE (HCC): ICD-10-CM

## 2017-05-02 DIAGNOSIS — I42.0 NONISCHEMIC CONGESTIVE CARDIOMYOPATHY (HCC): ICD-10-CM

## 2017-05-02 LAB
ALBUMIN SERPL-MCNC: 3.6 G/DL (ref 3.2–4.8)
ALBUMIN/GLOB SERPL: 1 G/DL (ref 1.5–2.5)
ALP SERPL-CCNC: 63 U/L (ref 25–100)
ALT SERPL W P-5'-P-CCNC: 10 U/L (ref 7–40)
ANION GAP SERPL CALCULATED.3IONS-SCNC: 9 MMOL/L (ref 3–11)
AST SERPL-CCNC: 13 U/L (ref 0–33)
BASOPHILS # BLD AUTO: 0.02 10*3/MM3 (ref 0–0.2)
BASOPHILS NFR BLD AUTO: 0.4 % (ref 0–1)
BILIRUB SERPL-MCNC: 0.8 MG/DL (ref 0.3–1.2)
BILIRUB UR QL STRIP: ABNORMAL
BNP SERPL-MCNC: 410 PG/ML (ref 0–100)
BUN BLD-MCNC: 12 MG/DL (ref 9–23)
BUN/CREAT SERPL: 10 (ref 7–25)
CALCIUM SPEC-SCNC: 9.2 MG/DL (ref 8.7–10.4)
CHLORIDE SERPL-SCNC: 106 MMOL/L (ref 99–109)
CLARITY UR: ABNORMAL
CO2 SERPL-SCNC: 25 MMOL/L (ref 20–31)
COLOR UR: ABNORMAL
CREAT BLD-MCNC: 1.2 MG/DL (ref 0.6–1.3)
DEPRECATED RDW RBC AUTO: 46 FL (ref 37–54)
EOSINOPHIL # BLD AUTO: 0.02 10*3/MM3 (ref 0.1–0.3)
EOSINOPHIL NFR BLD AUTO: 0.4 % (ref 0–3)
ERYTHROCYTE [DISTWIDTH] IN BLOOD BY AUTOMATED COUNT: 16.7 % (ref 11.3–14.5)
GFR SERPL CREATININE-BSD FRML MDRD: 90 ML/MIN/1.73
GLOBULIN UR ELPH-MCNC: 3.6 GM/DL
GLUCOSE BLD-MCNC: 106 MG/DL (ref 70–100)
GLUCOSE UR STRIP-MCNC: NEGATIVE MG/DL
HCT VFR BLD AUTO: 39.3 % (ref 38.9–50.9)
HGB BLD-MCNC: 12.3 G/DL (ref 13.1–17.5)
HGB UR QL STRIP.AUTO: NEGATIVE
HOLD SPECIMEN: NORMAL
HOLD SPECIMEN: NORMAL
IMM GRANULOCYTES # BLD: 0.02 10*3/MM3 (ref 0–0.03)
IMM GRANULOCYTES NFR BLD: 0.4 % (ref 0–0.6)
KETONES UR QL STRIP: ABNORMAL
LEUKOCYTE ESTERASE UR QL STRIP.AUTO: NEGATIVE
LIPASE SERPL-CCNC: 22 U/L (ref 6–51)
LYMPHOCYTES # BLD AUTO: 1.48 10*3/MM3 (ref 0.6–4.8)
LYMPHOCYTES NFR BLD AUTO: 27.9 % (ref 24–44)
MCH RBC QN AUTO: 24 PG (ref 27–31)
MCHC RBC AUTO-ENTMCNC: 31.3 G/DL (ref 32–36)
MCV RBC AUTO: 76.6 FL (ref 80–99)
MONOCYTES # BLD AUTO: 0.49 10*3/MM3 (ref 0–1)
MONOCYTES NFR BLD AUTO: 9.2 % (ref 0–12)
NEUTROPHILS # BLD AUTO: 3.28 10*3/MM3 (ref 1.5–8.3)
NEUTROPHILS NFR BLD AUTO: 61.7 % (ref 41–71)
NITRITE UR QL STRIP: NEGATIVE
PH UR STRIP.AUTO: 5 [PH] (ref 5–8)
PLAT MORPH BLD: NORMAL
PLATELET # BLD AUTO: 310 10*3/MM3 (ref 150–450)
PMV BLD AUTO: 9.8 FL (ref 6–12)
POTASSIUM BLD-SCNC: 4 MMOL/L (ref 3.5–5.5)
PROT SERPL-MCNC: 7.2 G/DL (ref 5.7–8.2)
PROT UR QL STRIP: ABNORMAL
RBC # BLD AUTO: 5.13 10*6/MM3 (ref 4.2–5.76)
RBC MORPH BLD: NORMAL
SODIUM BLD-SCNC: 140 MMOL/L (ref 132–146)
SP GR UR STRIP: 1.01 (ref 1–1.03)
TROPONIN I SERPL-MCNC: 0.06 NG/ML (ref 0–0.07)
TROPONIN I SERPL-MCNC: 0.08 NG/ML (ref 0–0.07)
UROBILINOGEN UR QL STRIP: ABNORMAL
WBC MORPH BLD: NORMAL
WBC NRBC COR # BLD: 5.31 10*3/MM3 (ref 3.5–10.8)
WHOLE BLOOD HOLD SPECIMEN: NORMAL
WHOLE BLOOD HOLD SPECIMEN: NORMAL

## 2017-05-02 PROCEDURE — 83880 ASSAY OF NATRIURETIC PEPTIDE: CPT | Performed by: EMERGENCY MEDICINE

## 2017-05-02 PROCEDURE — 25010000002 FUROSEMIDE PER 20 MG: Performed by: PHYSICIAN ASSISTANT

## 2017-05-02 PROCEDURE — 93005 ELECTROCARDIOGRAM TRACING: CPT | Performed by: EMERGENCY MEDICINE

## 2017-05-02 PROCEDURE — 71010 HC CHEST PA OR AP: CPT

## 2017-05-02 PROCEDURE — 96374 THER/PROPH/DIAG INJ IV PUSH: CPT

## 2017-05-02 PROCEDURE — 74176 CT ABD & PELVIS W/O CONTRAST: CPT

## 2017-05-02 PROCEDURE — 93005 ELECTROCARDIOGRAM TRACING: CPT | Performed by: PHYSICIAN ASSISTANT

## 2017-05-02 PROCEDURE — 85025 COMPLETE CBC W/AUTO DIFF WBC: CPT | Performed by: EMERGENCY MEDICINE

## 2017-05-02 PROCEDURE — 80053 COMPREHEN METABOLIC PANEL: CPT | Performed by: EMERGENCY MEDICINE

## 2017-05-02 PROCEDURE — 81003 URINALYSIS AUTO W/O SCOPE: CPT | Performed by: PHYSICIAN ASSISTANT

## 2017-05-02 PROCEDURE — 85007 BL SMEAR W/DIFF WBC COUNT: CPT | Performed by: EMERGENCY MEDICINE

## 2017-05-02 PROCEDURE — 99284 EMERGENCY DEPT VISIT MOD MDM: CPT

## 2017-05-02 PROCEDURE — 83690 ASSAY OF LIPASE: CPT | Performed by: PHYSICIAN ASSISTANT

## 2017-05-02 PROCEDURE — 84484 ASSAY OF TROPONIN QUANT: CPT

## 2017-05-02 RX ORDER — FUROSEMIDE 10 MG/ML
40 INJECTION INTRAMUSCULAR; INTRAVENOUS ONCE
Status: COMPLETED | OUTPATIENT
Start: 2017-05-02 | End: 2017-05-02

## 2017-05-02 RX ORDER — SODIUM CHLORIDE 0.9 % (FLUSH) 0.9 %
10 SYRINGE (ML) INJECTION AS NEEDED
Status: DISCONTINUED | OUTPATIENT
Start: 2017-05-02 | End: 2017-05-03 | Stop reason: HOSPADM

## 2017-05-02 RX ADMIN — Medication 10 ML: at 18:20

## 2017-05-02 RX ADMIN — FUROSEMIDE 40 MG: 10 INJECTION, SOLUTION INTRAMUSCULAR; INTRAVENOUS at 18:20

## 2017-06-05 ENCOUNTER — HOSPITAL ENCOUNTER (EMERGENCY)
Facility: HOSPITAL | Age: 27
Discharge: HOME | End: 2017-06-05
Attending: EMERGENCY MEDICINE | Admitting: EMERGENCY MEDICINE

## 2017-06-05 ENCOUNTER — OFFICE VISIT (OUTPATIENT)
Dept: INTERNAL MEDICINE | Facility: CLINIC | Age: 27
End: 2017-06-05

## 2017-06-05 ENCOUNTER — APPOINTMENT (OUTPATIENT)
Dept: GENERAL RADIOLOGY | Facility: HOSPITAL | Age: 27
End: 2017-06-05

## 2017-06-05 VITALS
WEIGHT: 315 LBS | HEIGHT: 69 IN | DIASTOLIC BLOOD PRESSURE: 72 MMHG | BODY MASS INDEX: 46.65 KG/M2 | OXYGEN SATURATION: 94 % | SYSTOLIC BLOOD PRESSURE: 117 MMHG | TEMPERATURE: 97.9 F | HEART RATE: 109 BPM | RESPIRATION RATE: 22 BRPM

## 2017-06-05 VITALS
WEIGHT: 315 LBS | DIASTOLIC BLOOD PRESSURE: 92 MMHG | SYSTOLIC BLOOD PRESSURE: 148 MMHG | BODY MASS INDEX: 46.65 KG/M2 | TEMPERATURE: 97.6 F | HEIGHT: 69 IN

## 2017-06-05 DIAGNOSIS — I50.22 CHRONIC SYSTOLIC CHF (CONGESTIVE HEART FAILURE), NYHA CLASS 3 (HCC): Primary | ICD-10-CM

## 2017-06-05 DIAGNOSIS — G47.33 OBSTRUCTIVE SLEEP APNEA: ICD-10-CM

## 2017-06-05 DIAGNOSIS — E66.01 MORBID OBESITY DUE TO EXCESS CALORIES (HCC): ICD-10-CM

## 2017-06-05 DIAGNOSIS — I42.0 NONISCHEMIC CONGESTIVE CARDIOMYOPATHY (HCC): ICD-10-CM

## 2017-06-05 DIAGNOSIS — R11.0 NAUSEA: ICD-10-CM

## 2017-06-05 DIAGNOSIS — R06.02 SHORTNESS OF BREATH: ICD-10-CM

## 2017-06-05 DIAGNOSIS — I50.23 ACUTE ON CHRONIC SYSTOLIC (CONGESTIVE) HEART FAILURE (HCC): Primary | ICD-10-CM

## 2017-06-05 LAB
ALBUMIN SERPL-MCNC: 3.9 G/DL (ref 3.2–4.8)
ALBUMIN/GLOB SERPL: 1.1 G/DL (ref 1.5–2.5)
ALP SERPL-CCNC: 73 U/L (ref 25–100)
ALT SERPL W P-5'-P-CCNC: 10 U/L (ref 7–40)
ANION GAP SERPL CALCULATED.3IONS-SCNC: 13 MMOL/L (ref 3–11)
AST SERPL-CCNC: 13 U/L (ref 0–33)
BASOPHILS # BLD AUTO: 0.01 10*3/MM3 (ref 0–0.2)
BASOPHILS NFR BLD AUTO: 0.2 % (ref 0–1)
BILIRUB SERPL-MCNC: 0.8 MG/DL (ref 0.3–1.2)
BNP SERPL-MCNC: 525 PG/ML (ref 0–100)
BUN BLD-MCNC: 11 MG/DL (ref 9–23)
BUN/CREAT SERPL: 11 (ref 7–25)
CALCIUM SPEC-SCNC: 9.3 MG/DL (ref 8.7–10.4)
CHLORIDE SERPL-SCNC: 103 MMOL/L (ref 99–109)
CO2 SERPL-SCNC: 22 MMOL/L (ref 20–31)
CREAT BLD-MCNC: 1 MG/DL (ref 0.6–1.3)
DEPRECATED RDW RBC AUTO: 48.4 FL (ref 37–54)
EOSINOPHIL # BLD AUTO: 0.07 10*3/MM3 (ref 0.1–0.3)
EOSINOPHIL NFR BLD AUTO: 1.1 % (ref 0–3)
ERYTHROCYTE [DISTWIDTH] IN BLOOD BY AUTOMATED COUNT: 17.7 % (ref 11.3–14.5)
GFR SERPL CREATININE-BSD FRML MDRD: 111 ML/MIN/1.73
GLOBULIN UR ELPH-MCNC: 3.7 GM/DL
GLUCOSE BLD-MCNC: 114 MG/DL (ref 70–100)
HCT VFR BLD AUTO: 39.6 % (ref 38.9–50.9)
HGB BLD-MCNC: 12.7 G/DL (ref 13.1–17.5)
HOLD SPECIMEN: NORMAL
HOLD SPECIMEN: NORMAL
IMM GRANULOCYTES # BLD: 0.02 10*3/MM3 (ref 0–0.03)
IMM GRANULOCYTES NFR BLD: 0.3 % (ref 0–0.6)
LYMPHOCYTES # BLD AUTO: 1.33 10*3/MM3 (ref 0.6–4.8)
LYMPHOCYTES NFR BLD AUTO: 21.3 % (ref 24–44)
MCH RBC QN AUTO: 24.3 PG (ref 27–31)
MCHC RBC AUTO-ENTMCNC: 32.1 G/DL (ref 32–36)
MCV RBC AUTO: 75.7 FL (ref 80–99)
MONOCYTES # BLD AUTO: 0.51 10*3/MM3 (ref 0–1)
MONOCYTES NFR BLD AUTO: 8.2 % (ref 0–12)
NEUTROPHILS # BLD AUTO: 4.29 10*3/MM3 (ref 1.5–8.3)
NEUTROPHILS NFR BLD AUTO: 68.9 % (ref 41–71)
PLATELET # BLD AUTO: 340 10*3/MM3 (ref 150–450)
PMV BLD AUTO: 10 FL (ref 6–12)
POTASSIUM BLD-SCNC: 3.8 MMOL/L (ref 3.5–5.5)
PROT SERPL-MCNC: 7.6 G/DL (ref 5.7–8.2)
RBC # BLD AUTO: 5.23 10*6/MM3 (ref 4.2–5.76)
SODIUM BLD-SCNC: 138 MMOL/L (ref 132–146)
TROPONIN I SERPL-MCNC: 0.05 NG/ML (ref 0–0.07)
WBC NRBC COR # BLD: 6.23 10*3/MM3 (ref 3.5–10.8)
WHOLE BLOOD HOLD SPECIMEN: NORMAL
WHOLE BLOOD HOLD SPECIMEN: NORMAL

## 2017-06-05 PROCEDURE — 84484 ASSAY OF TROPONIN QUANT: CPT

## 2017-06-05 PROCEDURE — 96374 THER/PROPH/DIAG INJ IV PUSH: CPT

## 2017-06-05 PROCEDURE — 99214 OFFICE O/P EST MOD 30 MIN: CPT | Performed by: INTERNAL MEDICINE

## 2017-06-05 PROCEDURE — 85025 COMPLETE CBC W/AUTO DIFF WBC: CPT | Performed by: EMERGENCY MEDICINE

## 2017-06-05 PROCEDURE — 80053 COMPREHEN METABOLIC PANEL: CPT | Performed by: EMERGENCY MEDICINE

## 2017-06-05 PROCEDURE — 83880 ASSAY OF NATRIURETIC PEPTIDE: CPT | Performed by: EMERGENCY MEDICINE

## 2017-06-05 PROCEDURE — 25010000002 FUROSEMIDE PER 20 MG: Performed by: EMERGENCY MEDICINE

## 2017-06-05 PROCEDURE — 93005 ELECTROCARDIOGRAM TRACING: CPT

## 2017-06-05 PROCEDURE — 99284 EMERGENCY DEPT VISIT MOD MDM: CPT

## 2017-06-05 PROCEDURE — 71010 HC CHEST PA OR AP: CPT

## 2017-06-05 RX ORDER — LOSARTAN POTASSIUM 50 MG/1
100 TABLET ORAL
Status: DISCONTINUED | OUTPATIENT
Start: 2017-06-05 | End: 2017-06-05 | Stop reason: HOSPADM

## 2017-06-05 RX ORDER — FUROSEMIDE 10 MG/ML
80 INJECTION INTRAMUSCULAR; INTRAVENOUS ONCE
Status: COMPLETED | OUTPATIENT
Start: 2017-06-05 | End: 2017-06-05

## 2017-06-05 RX ORDER — RANITIDINE 150 MG/1
150 CAPSULE ORAL EVERY EVENING
Qty: 30 CAPSULE | Refills: 11 | Status: SHIPPED | OUTPATIENT
Start: 2017-06-05 | End: 2017-07-11

## 2017-06-05 RX ORDER — SODIUM CHLORIDE 0.9 % (FLUSH) 0.9 %
10 SYRINGE (ML) INJECTION AS NEEDED
Status: DISCONTINUED | OUTPATIENT
Start: 2017-06-05 | End: 2017-06-05 | Stop reason: HOSPADM

## 2017-06-05 RX ORDER — CARVEDILOL 12.5 MG/1
25 TABLET ORAL EVERY 12 HOURS SCHEDULED
Status: DISCONTINUED | OUTPATIENT
Start: 2017-06-05 | End: 2017-06-05 | Stop reason: HOSPADM

## 2017-06-05 RX ADMIN — CARVEDILOL 25 MG: 12.5 TABLET, FILM COATED ORAL at 18:20

## 2017-06-05 RX ADMIN — FUROSEMIDE 80 MG: 10 INJECTION, SOLUTION INTRAMUSCULAR; INTRAVENOUS at 16:42

## 2017-06-05 RX ADMIN — LOSARTAN POTASSIUM 100 MG: 50 TABLET, FILM COATED ORAL at 18:20

## 2017-06-05 RX ADMIN — NITROGLYCERIN 1 INCH: 20 OINTMENT TOPICAL at 16:43

## 2017-06-05 NOTE — ED PROVIDER NOTES
Subjective   HPI Comments: Evan Gannon is a 26 y.o.male who presents to the ED with c/o SOA. Patient has a history of CHF and hx of being noncompliant with his medications. The patient reports he has not taken his medications the last 3 out of 4 days, including today. He states he began experiencing worsening SOA and BLE swelling onset 3 days ago. He was seen by Dr. Mcfadden today, and referred into the ED for further evaluation. The patient notes SOA is worse with exertion and when lying down flat. Patient c/o occasional nausea, but chest pain,  vomiting, dairrhea, fever, chills, cough, congestion, hematuria, dysuria, urinary frequency, urinary urgency, or any other acute complaints at this time.     Patient is a 26 y.o. male presenting with shortness of breath.   History provided by:  Patient  Shortness of Breath   Severity:  Moderate  Onset quality:  Sudden  Duration:  2 days  Timing:  Constant  Progression:  Unchanged  Chronicity:  New  Context comment:  Sudden onset of worsening sx 3 days ago  Relieved by:  None tried  Worsened by:  Exertion (lying flat)  Ineffective treatments:  None tried  Associated symptoms: no chest pain, no cough, no fever and no vomiting    Risk factors: obesity        Review of Systems   Constitutional: Negative for chills and fever.   HENT: Negative for congestion.    Respiratory: Positive for shortness of breath. Negative for cough.    Cardiovascular: Positive for leg swelling (BLE). Negative for chest pain.   Gastrointestinal: Negative for diarrhea and vomiting.   Genitourinary: Negative for dysuria, frequency and urgency.   All other systems reviewed and are negative.      Past Medical History:   Diagnosis Date   • CHF (congestive heart failure)    • Sleep apnea        No Known Allergies    Past Surgical History:   Procedure Laterality Date   • ADENOIDECTOMY     • NOSE SURGERY     • TONSILLECTOMY         Family History   Problem Relation Age of Onset   • Diabetes Father         Social History     Social History   • Marital status: Single     Spouse name: N/A   • Number of children: 0   • Years of education: N/A     Social History Main Topics   • Smoking status: Never Smoker   • Smokeless tobacco: Never Used   • Alcohol use No   • Drug use: No   • Sexual activity: Defer     Other Topics Concern   • None     Social History Narrative    Patient denies caffeine intake.     Patient lives with girlfriend and at home.              Objective   Physical Exam   Constitutional: He is oriented to person, place, and time. He appears well-developed and well-nourished.   HENT:   Head: Normocephalic and atraumatic.   Eyes: Conjunctivae are normal.   Neck: Normal range of motion. Neck supple.   Neck is supple, thick and short. No obvious JVD.    Cardiovascular: Regular rhythm and normal heart sounds.  Tachycardia present.    Tachycardic s1, s2, and occasionally s3. Regular rhythm.    Pulmonary/Chest: Effort normal and breath sounds normal. No respiratory distress. He has no wheezes. He has no rales.   Lungs are clear. No rales, wheezes, or rhonchi.    Abdominal: Soft. Bowel sounds are normal. There is no tenderness.   Abdomen is soft and non-tender.   Musculoskeletal: Normal range of motion. He exhibits edema.   1+ edema in bilateral lower extremities.   Neurological: He is alert and oriented to person, place, and time.   Skin: Skin is warm and dry.   Psychiatric: He has a normal mood and affect. His behavior is normal.   Nursing note and vitals reviewed.      Procedures         ED Course  ED Course   Comment By Time   Patient is diuresing very well in the ED.  His heart rate remains mildly elevated, but again her Coreg which should improve that.  He reports feeling better and wanted to go home with follow-up to the CHF clinic of his cardiologist concurs.  Chest x-ray does not show pulmonary edema but he has had significant was depressed EF.  He reports he has all of his medications at home to take.   He is not out of any of them.I discussed attention to his medical care in multiple things to improve his longevity health and symptoms.  We discussed weight loss, strict and neurotic attention to his medications, and prompt ED evaluation as he waited 3 days after worsening of his symptoms, we felt the symptoms which he reports really is only shortness of breath dyspnea on exertion and orthopnea, there were significant enough that he stopped taking his medications for 3 to last 4 days.Very pleasant patient verbalizes understanding agreement with the plan of care Zac Dahl MD 06/05 8476   Discussed with Dr. Solorio.  I reviewed his radiographic and laboratory data today as well as his medical history and plan of care.  He is considering a by the ICD.  She feels that he can follow up at the CHF clinic tomorrow to further discuss his options.I discussed strict attention to his medications and if he feels poorly enough instead of stopping his medications to follow-up here at the emergency department with us or his cardiologist or primary care's office in order to optimize his care had maximize his health, functional status, and longevity.I discussed indications for early return to the ED.  Very pleasant patient verbalizes understanding agreement and reports that he'll follow-up the CHF clinic tomorrow as discussed by his cardiologist Zac Dahl MD 06/05 4525     Recent Results (from the past 24 hour(s))   Comprehensive Metabolic Panel    Collection Time: 06/05/17  4:10 PM   Result Value Ref Range    Glucose 114 (H) 70 - 100 mg/dL    BUN 11 9 - 23 mg/dL    Creatinine 1.00 0.60 - 1.30 mg/dL    Sodium 138 132 - 146 mmol/L    Potassium 3.8 3.5 - 5.5 mmol/L    Chloride 103 99 - 109 mmol/L    CO2 22.0 20.0 - 31.0 mmol/L    Calcium 9.3 8.7 - 10.4 mg/dL    Total Protein 7.6 5.7 - 8.2 g/dL    Albumin 3.90 3.20 - 4.80 g/dL    ALT (SGPT) 10 7 - 40 U/L    AST (SGOT) 13 0 - 33 U/L    Alkaline Phosphatase 73 25 - 100  U/L    Total Bilirubin 0.8 0.3 - 1.2 mg/dL    eGFR  African Amer 111 >60 mL/min/1.73    Globulin 3.7 gm/dL    A/G Ratio 1.1 (L) 1.5 - 2.5 g/dL    BUN/Creatinine Ratio 11.0 7.0 - 25.0    Anion Gap 13.0 (H) 3.0 - 11.0 mmol/L   Green Top (Gel)    Collection Time: 06/05/17  4:10 PM   Result Value Ref Range    Extra Tube Hold for add-ons.    BNP    Collection Time: 06/05/17  4:13 PM   Result Value Ref Range    .0 (H) 0.0 - 100.0 pg/mL   Light Blue Top    Collection Time: 06/05/17  4:13 PM   Result Value Ref Range    Extra Tube hold for add-on    Lavender Top    Collection Time: 06/05/17  4:13 PM   Result Value Ref Range    Extra Tube hold for add-on    Gold Top - SST    Collection Time: 06/05/17  4:13 PM   Result Value Ref Range    Extra Tube Hold for add-ons.    CBC Auto Differential    Collection Time: 06/05/17  4:13 PM   Result Value Ref Range    WBC 6.23 3.50 - 10.80 10*3/mm3    RBC 5.23 4.20 - 5.76 10*6/mm3    Hemoglobin 12.7 (L) 13.1 - 17.5 g/dL    Hematocrit 39.6 38.9 - 50.9 %    MCV 75.7 (L) 80.0 - 99.0 fL    MCH 24.3 (L) 27.0 - 31.0 pg    MCHC 32.1 32.0 - 36.0 g/dL    RDW 17.7 (H) 11.3 - 14.5 %    RDW-SD 48.4 37.0 - 54.0 fl    MPV 10.0 6.0 - 12.0 fL    Platelets 340 150 - 450 10*3/mm3    Neutrophil % 68.9 41.0 - 71.0 %    Lymphocyte % 21.3 (L) 24.0 - 44.0 %    Monocyte % 8.2 0.0 - 12.0 %    Eosinophil % 1.1 0.0 - 3.0 %    Basophil % 0.2 0.0 - 1.0 %    Immature Grans % 0.3 0.0 - 0.6 %    Neutrophils, Absolute 4.29 1.50 - 8.30 10*3/mm3    Lymphocytes, Absolute 1.33 0.60 - 4.80 10*3/mm3    Monocytes, Absolute 0.51 0.00 - 1.00 10*3/mm3    Eosinophils, Absolute 0.07 (L) 0.10 - 0.30 10*3/mm3    Basophils, Absolute 0.01 0.00 - 0.20 10*3/mm3    Immature Grans, Absolute 0.02 0.00 - 0.03 10*3/mm3   POC Troponin, Rapid    Collection Time: 06/05/17  4:19 PM   Result Value Ref Range    Troponin I 0.05 0.00 - 0.07 ng/mL     Note: In addition to lab results from this visit, the labs listed above may include labs taken  "at another facility or during a different encounter within the last 24 hours. Please correlate lab times with ED admission and discharge times for further clarification of the services performed during this visit.    XR Chest 1 View   Preliminary Result   Enlargement of the cardiac silhouette. No focal parenchymal   opacification present.       D:  06/05/2017   E:  06/05/2017                Vitals:    06/05/17 1535 06/05/17 1555 06/05/17 1633 06/05/17 1739   BP:  117/94 117/84    BP Location:  Right arm     Patient Position:  Sitting     Pulse: 115 113 109 114   Resp: 24 22     Temp: 97.9 °F (36.6 °C)      TempSrc: Oral      SpO2: 95% 94% 98% 98%   Weight: (!) 416 lb (189 kg)      Height: 69\" (175.3 cm)        Medications   sodium chloride 0.9 % flush 10 mL (not administered)   losartan (COZAAR) tablet 100 mg (not administered)   carvedilol (COREG) tablet 25 mg (not administered)   furosemide (LASIX) injection 80 mg (80 mg Intravenous Given 6/5/17 1642)   nitroglycerin (NITROSTAT) ointment 1 inch (1 inch Topical Given 6/5/17 1643)     ECG/EMG Results (last 24 hours)     Procedure Component Value Units Date/Time    ECG 12 Lead [[id number]] Collected:  06/05/17 1544     Updated:  06/05/17 1730                  MDM    Final diagnoses:   Acute on chronic systolic (congestive) heart failure   Shortness of breath   Morbid obesity due to excess calories   Obstructive sleep apnea       Documentation assistance provided by josé Christina.  Information recorded by the scrvignesh was done at my direction and has been verified and validated by me.     Livia Christina  06/05/17 1654       Livia Christina  06/05/17 1751       Zac Dahl MD  06/05/17 1817    "

## 2017-06-05 NOTE — PATIENT INSTRUCTIONS
Heart Failure  Heart failure is a condition in which the heart has trouble pumping blood. This means your heart does not pump blood efficiently for your body to work well. In some cases of heart failure, fluid may back up into your lungs or you may have swelling (edema) in your lower legs. Heart failure is usually a long-term (chronic) condition. It is important for you to take good care of yourself and follow your health care provider's treatment plan.  CAUSES   Some health conditions can cause heart failure. Those health conditions include:  · High blood pressure (hypertension). Hypertension causes the heart muscle to work harder than normal. When pressure in the blood vessels is high, the heart needs to pump (contract) with more force in order to circulate blood throughout the body. High blood pressure eventually causes the heart to become stiff and weak.  · Coronary artery disease (CAD). CAD is the buildup of cholesterol and fat (plaque) in the arteries of the heart. The blockage in the arteries deprives the heart muscle of oxygen and blood. This can cause chest pain and may lead to a heart attack. High blood pressure can also contribute to CAD.  · Heart attack (myocardial infarction). A heart attack occurs when one or more arteries in the heart become blocked. The loss of oxygen damages the muscle tissue of the heart. When this happens, part of the heart muscle dies. The injured tissue does not contract as well and weakens the heart's ability to pump blood.  · Abnormal heart valves. When the heart valves do not open and close properly, it can cause heart failure. This makes the heart muscle pump harder to keep the blood flowing.  · Heart muscle disease (cardiomyopathy or myocarditis). Heart muscle disease is damage to the heart muscle from a variety of causes. These can include drug or alcohol abuse, infections, or unknown reasons. These can increase the risk of heart failure.  · Lung disease. Lung disease  makes the heart work harder because the lungs do not work properly. This can cause a strain on the heart, leading it to fail.  · Diabetes. Diabetes increases the risk of heart failure. High blood sugar contributes to high fat (lipid) levels in the blood. Diabetes can also cause slow damage to tiny blood vessels that carry important nutrients to the heart muscle. When the heart does not get enough oxygen and food, it can cause the heart to become weak and stiff. This leads to a heart that does not contract efficiently.  · Other conditions can contribute to heart failure. These include abnormal heart rhythms, thyroid problems, and low blood counts (anemia).  Certain unhealthy behaviors can increase the risk of heart failure, including:  · Being overweight.  · Smoking or chewing tobacco.  · Eating foods high in fat and cholesterol.  · Abusing illicit drugs or alcohol.  · Lacking physical activity.  SYMPTOMS   Heart failure symptoms may vary and can be hard to detect. Symptoms may include:  · Shortness of breath with activity, such as climbing stairs.  · Persistent cough.  · Swelling of the feet, ankles, legs, or abdomen.  · Unexplained weight gain.  · Difficulty breathing when lying flat (orthopnea).  · Waking from sleep because of the need to sit up and get more air.  · Rapid heartbeat.  · Fatigue and loss of energy.  · Feeling light-headed, dizzy, or close to fainting.  · Loss of appetite.  · Nausea.  · Increased urination during the night (nocturia).  DIAGNOSIS   A diagnosis of heart failure is based on your history, symptoms, physical examination, and diagnostic tests. Diagnostic tests for heart failure may include:  · Echocardiography.  · Electrocardiography.  · Chest X-ray.  · Blood tests.  · Exercise stress test.  · Cardiac angiography.  · Radionuclide scans.  TREATMENT   Treatment is aimed at managing the symptoms of heart failure. Medicines, behavioral changes, or surgical intervention may be necessary to  treat heart failure.  · Medicines to help treat heart failure may include:    Angiotensin-converting enzyme (ACE) inhibitors. This type of medicine blocks the effects of a blood protein called angiotensin-converting enzyme. ACE inhibitors relax (dilate) the blood vessels and help lower blood pressure.    Angiotensin receptor blockers (ARBs). This type of medicine blocks the actions of a blood protein called angiotensin. Angiotensin receptor blockers dilate the blood vessels and help lower blood pressure.    Water pills (diuretics). Diuretics cause the kidneys to remove salt and water from the blood. The extra fluid is removed through urination. This loss of extra fluid lowers the volume of blood the heart pumps.    Beta blockers. These prevent the heart from beating too fast and improve heart muscle strength.    Digitalis. This increases the force of the heartbeat.  · Healthy behavior changes include:    Obtaining and maintaining a healthy weight.    Stopping smoking or chewing tobacco.    Eating heart-healthy foods.    Limiting or avoiding alcohol.    Stopping illicit drug use.    Physical activity as directed by your health care provider.  · Surgical treatment for heart failure may include:    A procedure to open blocked arteries, repair damaged heart valves, or remove damaged heart muscle tissue.    A pacemaker to improve heart muscle function and control certain abnormal heart rhythms.    An internal cardioverter defibrillator to treat certain serious abnormal heart rhythms.    A left ventricular assist device (LVAD) to assist the pumping ability of the heart.  HOME CARE INSTRUCTIONS   · Take medicines only as directed by your health care provider. Medicines are important in reducing the workload of your heart, slowing the progression of heart failure, and improving your symptoms.    Do not stop taking your medicine unless directed by your health care provider.    Do not skip any dose of medicine.    Refill your  prescriptions before you run out of medicine. Your medicines are needed every day.  · Engage in moderate physical activity if directed by your health care provider. Moderate physical activity can benefit some people. The elderly and people with severe heart failure should consult with a health care provider for physical activity recommendations.  · Eat heart-healthy foods. Food choices should be free of trans fat and low in saturated fat, cholesterol, and salt (sodium). Healthy choices include fresh or frozen fruits and vegetables, fish, lean meats, legumes, fat-free or low-fat dairy products, and whole grain or high fiber foods. Talk to a dietitian to learn more about heart-healthy foods.  · Limit sodium if directed by your health care provider. Sodium restriction may reduce symptoms of heart failure in some people. Talk to a dietitian to learn more about heart-healthy seasonings.  · Use healthy cooking methods. Healthy cooking methods include roasting, grilling, broiling, baking, poaching, steaming, or stir-frying. Talk to a dietitian to learn more about healthy cooking methods.  · Limit fluids if directed by your health care provider. Fluid restriction may reduce symptoms of heart failure in some people.  · Weigh yourself every day. Daily weights are important in the early recognition of excess fluid. You should weigh yourself every morning after you urinate and before you eat breakfast. Wear the same amount of clothing each time you weigh yourself. Record your daily weight. Provide your health care provider with your weight record.  · Monitor and record your blood pressure if directed by your health care provider.  · Check your pulse if directed by your health care provider.  · Lose weight if directed by your health care provider. Weight loss may reduce symptoms of heart failure in some people.  · Stop smoking or chewing tobacco. Nicotine makes your heart work harder by causing your blood vessels to constrict.  Do not use nicotine gum or patches before talking to your health care provider.  · Keep all follow-up visits as directed by your health care provider. This is important.  · Limit alcohol intake to no more than 1 drink per day for nonpregnant women and 2 drinks per day for men. One drink equals 12 ounces of beer, 5 ounces of wine, or 1½ ounces of hard liquor. Drinking more than that is harmful to your heart. Tell your health care provider if you drink alcohol several times a week. Talk with your health care provider about whether alcohol is safe for you. If your heart has already been damaged by alcohol or you have severe heart failure, drinking alcohol should be stopped completely.  · Stop illicit drug use.  · Stay up-to-date with immunizations. It is especially important to prevent respiratory infections through current pneumococcal and influenza immunizations.  · Manage other health conditions such as hypertension, diabetes, thyroid disease, or abnormal heart rhythms as directed by your health care provider.  · Learn to manage stress.  · Plan rest periods when fatigued.  · Learn strategies to manage high temperatures. If the weather is extremely hot:    Avoid vigorous physical activity.    Use air conditioning or fans or seek a cooler location.    Avoid caffeine and alcohol.    Wear loose-fitting, lightweight, and light-colored clothing.  · Learn strategies to manage cold temperatures. If the weather is extremely cold:    Avoid vigorous physical activity.    Layer clothes.    Wear mittens or gloves, a hat, and a scarf when going outside.    Avoid alcohol.  · Obtain ongoing education and support as needed.  · Participate in or seek rehabilitation as needed to maintain or improve independence and quality of life.  SEEK MEDICAL CARE IF:   · You have a rapid weight gain.  · You have increasing shortness of breath that is unusual for you.  · You are unable to participate in your usual physical activities.  · You tire  easily.  · You cough more than normal, especially with physical activity.  · You have any or more swelling in areas such as your hands, feet, ankles, or abdomen.  · You are unable to sleep because it is hard to breathe.  · You feel like your heart is beating fast (palpitations).  · You become dizzy or light-headed upon standing up.  SEEK IMMEDIATE MEDICAL CARE IF:   · You have difficulty breathing.  · There is a change in mental status such as decreased alertness or difficulty with concentration.  · You have a pain or discomfort in your chest.  · You have an episode of fainting (syncope).  MAKE SURE YOU:   · Understand these instructions.  · Will watch your condition.  · Will get help right away if you are not doing well or get worse.     This information is not intended to replace advice given to you by your health care provider. Make sure you discuss any questions you have with your health care provider.     Document Released: 12/18/2006 Document Revised: 05/03/2016 Document Reviewed: 01/17/2014  [employer] Patient Education ©2017 [employer]

## 2017-06-05 NOTE — DISCHARGE INSTRUCTIONS
Take your medications as prescribed    Follow-up with the CHF clinic tomorrow for further discussion of ICD placement, and optimization of medications, initially relieved adequately diuresed between now and then    Regular CPAP every night in any time he will now poor sleep.  This is imperative    Return to the ED at once if you have recurrence of symptoms, increasing shortness of breath, or you're feeling worse as discussed.  Come in early if you're having worsening symptoms to optimize your health

## 2017-06-05 NOTE — PROGRESS NOTES
"Subjective   Evan Gannon is a 26 y.o. male here for follow-up chronic systolic HF, NICM. He is having worsening sx of SOA, dyspnea at rest and worse with exertion, and nausea when he takes his meds. He did go to his [facility] appt but missed his May one recently which he was unaware. He has dyspnea at rest though it is mild and when he walks or does any kind of exertion it is intensified. It is unclear if he has been taking his meds as prescribed, but says he has been taking them last few days and it has caused nausea but no vomiting. This really bothers him. It is a new symptom. He denies any CP, palpitations. He can't wear his shock vest due to it not fitting due to size. He was thinking of going to the ED last few days due to how badly he has felt. He wants a rolling walker with seat as he has to take frequent breaks when he walks. Denies acid reflux or epigastric burning.    The following portions of the patient's history were reviewed and updated as appropriate: allergies, current medications, past medical history, past social history and problem list.    Review of Systems:  General: fatigue  CV: negative, no edema or CP, palpitations  Respiratory: dyspnea at rest and worse with exertion  GI: nausea no vomiting  Neuro: negative  Psych: negative    Objective   /92 (BP Location: Right arm, Patient Position: Sitting, Cuff Size: Large Adult)  Temp 97.6 °F (36.4 °C) (Temporal Artery )   Ht 69\" (175.3 cm)  Wt (!) 416 lb 6.4 oz (189 kg)  BMI 61.49 kg/m2    Physical Exam   Constitutional: He is oriented to person, place, and time. He appears well-developed and well-nourished.   Cardiovascular: Normal rate, regular rhythm and normal heart sounds.    No murmur heard.  No peripheral edema, no JVD   Pulmonary/Chest: Breath sounds normal. He is in respiratory distress. He has no wheezes. He has no rales.   Abdominal: Soft. Bowel sounds are normal.   Musculoskeletal: He exhibits no edema.   Labored gait due " to respiratory status/body habitus   Neurological: He is alert and oriented to person, place, and time. No cranial nerve deficit.   Skin: Skin is warm and dry.   Chronic venous stasis changes b/l LE   Psychiatric: He has a normal mood and affect. His behavior is normal. Thought content normal.   Vitals reviewed.      Assessment/Plan   Evan was seen today for hypertension.    Diagnoses and all orders for this visit:    Chronic systolic CHF (congestive heart failure), NYHA class 3  -quite symptomatic today, a definite decline from the last evaluation though does not appear to be volume overloaded, likely just a progression of his HF (with potential lowering of EF) as his meds at this time are not optimized and he has hx of noncompliance  -advised pt to go to ED again as his respiratory status is labored even at rest today though mild. He needs med titration or some other intervention at this point  -pt not wearing shock vest as it doesn't fit and this has been a chronic problem  -he is considering the ICD placement now as his sx have progressed  -rolling walker with seat rx given per pt request  -will send msg to [facility] updating them and urged pt to definitely make f/u with them as he missed his last appt end of May    Nonischemic congestive cardiomyopathy  -see above    Shortness of breath  -multifactorial due to NICM, sCHF with low EF, morbid obesity, deconditioning  -advised pt to go to ED    Nausea  -     ranitidine (ZANTAC) 150 MG capsule; Take 1 capsule by mouth Every Evening.        -     Could be ASA that is causing his nausea so will try this first to help with sx and compliance

## 2017-06-18 ENCOUNTER — APPOINTMENT (OUTPATIENT)
Dept: GENERAL RADIOLOGY | Facility: HOSPITAL | Age: 27
End: 2017-06-18

## 2017-06-18 ENCOUNTER — HOSPITAL ENCOUNTER (EMERGENCY)
Facility: HOSPITAL | Age: 27
Discharge: HOME | End: 2017-06-18
Attending: EMERGENCY MEDICINE | Admitting: EMERGENCY MEDICINE

## 2017-06-18 VITALS
BODY MASS INDEX: 46.65 KG/M2 | WEIGHT: 315 LBS | SYSTOLIC BLOOD PRESSURE: 113 MMHG | TEMPERATURE: 97.8 F | DIASTOLIC BLOOD PRESSURE: 90 MMHG | HEART RATE: 98 BPM | HEIGHT: 69 IN | OXYGEN SATURATION: 100 % | RESPIRATION RATE: 26 BRPM

## 2017-06-18 DIAGNOSIS — I50.23 ACUTE ON CHRONIC SYSTOLIC (CONGESTIVE) HEART FAILURE (HCC): ICD-10-CM

## 2017-06-18 DIAGNOSIS — I50.21 ACUTE SYSTOLIC CONGESTIVE HEART FAILURE (HCC): Primary | ICD-10-CM

## 2017-06-18 DIAGNOSIS — I42.8 NONISCHEMIC CARDIOMYOPATHY (HCC): ICD-10-CM

## 2017-06-18 LAB
ALBUMIN SERPL-MCNC: 3.9 G/DL (ref 3.2–4.8)
ALBUMIN/GLOB SERPL: 1 G/DL (ref 1.5–2.5)
ALP SERPL-CCNC: 78 U/L (ref 25–100)
ALT SERPL W P-5'-P-CCNC: 12 U/L (ref 7–40)
ANION GAP SERPL CALCULATED.3IONS-SCNC: 7 MMOL/L (ref 3–11)
AST SERPL-CCNC: 14 U/L (ref 0–33)
BASOPHILS # BLD AUTO: 0.02 10*3/MM3 (ref 0–0.2)
BASOPHILS NFR BLD AUTO: 0.3 % (ref 0–1)
BILIRUB SERPL-MCNC: 1.4 MG/DL (ref 0.3–1.2)
BNP SERPL-MCNC: 770 PG/ML (ref 0–100)
BUN BLD-MCNC: 11 MG/DL (ref 9–23)
BUN/CREAT SERPL: 10 (ref 7–25)
CALCIUM SPEC-SCNC: 9.4 MG/DL (ref 8.7–10.4)
CHLORIDE SERPL-SCNC: 104 MMOL/L (ref 99–109)
CO2 SERPL-SCNC: 28 MMOL/L (ref 20–31)
CREAT BLD-MCNC: 1.1 MG/DL (ref 0.6–1.3)
DEPRECATED RDW RBC AUTO: 50.2 FL (ref 37–54)
EOSINOPHIL # BLD AUTO: 0.02 10*3/MM3 (ref 0.1–0.3)
EOSINOPHIL NFR BLD AUTO: 0.3 % (ref 0–3)
ERYTHROCYTE [DISTWIDTH] IN BLOOD BY AUTOMATED COUNT: 17.7 % (ref 11.3–14.5)
GFR SERPL CREATININE-BSD FRML MDRD: 100 ML/MIN/1.73
GLOBULIN UR ELPH-MCNC: 3.8 GM/DL
GLUCOSE BLD-MCNC: 89 MG/DL (ref 70–100)
HCT VFR BLD AUTO: 39.8 % (ref 38.9–50.9)
HGB BLD-MCNC: 12.4 G/DL (ref 13.1–17.5)
HOLD SPECIMEN: NORMAL
HOLD SPECIMEN: NORMAL
IMM GRANULOCYTES # BLD: 0.01 10*3/MM3 (ref 0–0.03)
IMM GRANULOCYTES NFR BLD: 0.2 % (ref 0–0.6)
LYMPHOCYTES # BLD AUTO: 1.41 10*3/MM3 (ref 0.6–4.8)
LYMPHOCYTES NFR BLD AUTO: 23.6 % (ref 24–44)
MCH RBC QN AUTO: 24.4 PG (ref 27–31)
MCHC RBC AUTO-ENTMCNC: 31.2 G/DL (ref 32–36)
MCV RBC AUTO: 78.2 FL (ref 80–99)
MONOCYTES # BLD AUTO: 0.72 10*3/MM3 (ref 0–1)
MONOCYTES NFR BLD AUTO: 12.1 % (ref 0–12)
NEUTROPHILS # BLD AUTO: 3.79 10*3/MM3 (ref 1.5–8.3)
NEUTROPHILS NFR BLD AUTO: 63.5 % (ref 41–71)
PLATELET # BLD AUTO: 296 10*3/MM3 (ref 150–450)
PMV BLD AUTO: 9.6 FL (ref 6–12)
POTASSIUM BLD-SCNC: 4 MMOL/L (ref 3.5–5.5)
PROT SERPL-MCNC: 7.7 G/DL (ref 5.7–8.2)
RBC # BLD AUTO: 5.09 10*6/MM3 (ref 4.2–5.76)
SODIUM BLD-SCNC: 139 MMOL/L (ref 132–146)
TROPONIN I SERPL-MCNC: 0.06 NG/ML (ref 0–0.07)
TROPONIN I SERPL-MCNC: 0.06 NG/ML (ref 0–0.07)
WBC NRBC COR # BLD: 5.97 10*3/MM3 (ref 3.5–10.8)
WHOLE BLOOD HOLD SPECIMEN: NORMAL
WHOLE BLOOD HOLD SPECIMEN: NORMAL

## 2017-06-18 PROCEDURE — 85025 COMPLETE CBC W/AUTO DIFF WBC: CPT | Performed by: EMERGENCY MEDICINE

## 2017-06-18 PROCEDURE — 93005 ELECTROCARDIOGRAM TRACING: CPT

## 2017-06-18 PROCEDURE — 25010000002 FUROSEMIDE PER 20 MG: Performed by: EMERGENCY MEDICINE

## 2017-06-18 PROCEDURE — 84484 ASSAY OF TROPONIN QUANT: CPT

## 2017-06-18 PROCEDURE — 80053 COMPREHEN METABOLIC PANEL: CPT | Performed by: EMERGENCY MEDICINE

## 2017-06-18 PROCEDURE — 93005 ELECTROCARDIOGRAM TRACING: CPT | Performed by: EMERGENCY MEDICINE

## 2017-06-18 PROCEDURE — 83880 ASSAY OF NATRIURETIC PEPTIDE: CPT | Performed by: EMERGENCY MEDICINE

## 2017-06-18 PROCEDURE — 71010 HC CHEST PA OR AP: CPT

## 2017-06-18 PROCEDURE — 99283 EMERGENCY DEPT VISIT LOW MDM: CPT

## 2017-06-18 PROCEDURE — 96374 THER/PROPH/DIAG INJ IV PUSH: CPT

## 2017-06-18 RX ORDER — SODIUM CHLORIDE 0.9 % (FLUSH) 0.9 %
10 SYRINGE (ML) INJECTION AS NEEDED
Status: DISCONTINUED | OUTPATIENT
Start: 2017-06-18 | End: 2017-06-18 | Stop reason: HOSPADM

## 2017-06-18 RX ORDER — FUROSEMIDE 10 MG/ML
80 INJECTION INTRAMUSCULAR; INTRAVENOUS ONCE
Status: COMPLETED | OUTPATIENT
Start: 2017-06-18 | End: 2017-06-18

## 2017-06-18 RX ADMIN — FUROSEMIDE 80 MG: 10 INJECTION, SOLUTION INTRAMUSCULAR; INTRAVENOUS at 15:55

## 2017-06-18 NOTE — PROGRESS NOTES
Discharge Planning Assessment  [facility]     Patient Name: Evan Gannon  MRN: [id number]  Today's Date: 6/18/2017    Admit Date: 6/18/2017          Discharge Needs Assessment     None            Discharge Plan       06/18/17 1605    Case Management/Social Work Plan    Plan d/c    Additional Comments Pt being discharged, needs portable O2 tank for travel home. CM called [facility], [phone] (pt is already their customer) spoke with Gopal, , and he will deliver a tank shortly to the ED room        Discharge Placement     No information found                Demographic Summary     None            Functional Status     None            Psychosocial     None            Abuse/Neglect     None            Legal     None            Substance Abuse     None            Patient Forms     None          Susie Barone

## 2017-06-18 NOTE — ED PROVIDER NOTES
Subjective   HPI Comments: Evan Gannon is a 26 y.o.male who presents to the ED with c/o SOA with onset 3 days ago. He reports he has been feeling progressively more SOA than his baseline. He uses a CPAP with supplemental oxygen while sleeping, however, several days ago the oxygen tank ran out. He had the tank replaced temporarily until yesterday. He has been without the oxygen since. Additionally, he reports having a syncopal episode this morning. 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 He then passed out for a few short moments prior to coming to and returning to his baseline. He states he immediately started driving again but other members of the [employer] contacted EMS, who transported him here for evaluation. He denies cough, fevers, chills, or any other complaints at this time.  He reports that he has been skipping his medications.  He tells me that he feels better off the medicines so skips all of his medicines for entire days.  He skipping for 2 days in a row 3 days ago.         Patient is a 26 y.o. male presenting with shortness of breath.   History provided by:  Patient  Shortness of Breath   Severity:  Moderate  Onset quality:  Gradual  Timing:  Constant  Progression:  Worsening  Chronicity:  Chronic  Context comment:  He uses a CPAP with supplemental oxygen while sleeping, however, several days ago the oxygen tank ran out. He had the tank replaced temporarily until yesterday. He has been without the oxygen since.  Relieved by:  None tried  Worsened by:  Nothing  Ineffective treatments:  None tried  Associated symptoms: diaphoresis and syncope    Associated symptoms: no abdominal pain, no chest pain, no cough, no fever, no headaches, no neck pain, no sore throat and no vomiting    Risk factors: obesity        Review of Systems   Constitutional: Positive for diaphoresis. Negative for chills and fever.        Felt hot.   HENT: Negative for  congestion, rhinorrhea and sore throat.    Respiratory: Positive for shortness of breath. Negative for cough.    Cardiovascular: Positive for syncope. Negative for chest pain.   Gastrointestinal: Negative for abdominal pain, diarrhea, nausea and vomiting.   Musculoskeletal: Negative for back pain and neck pain.   Neurological: Positive for syncope. Negative for dizziness, weakness, light-headedness and headaches.   All other systems reviewed and are negative.      Past Medical History:   Diagnosis Date   • CHF (congestive heart failure)    • Sleep apnea        No Known Allergies    Past Surgical History:   Procedure Laterality Date   • ADENOIDECTOMY     • NOSE SURGERY     • TONSILLECTOMY         Family History   Problem Relation Age of Onset   • Diabetes Father        Social History     Social History   • Marital status: Single     Spouse name: N/A   • Number of children: 0   • Years of education: N/A     Social History Main Topics   • Smoking status: Never Smoker   • Smokeless tobacco: Never Used   • Alcohol use No   • Drug use: No   • Sexual activity: Defer     Other Topics Concern   • None     Social History Narrative    Patient denies caffeine intake.     Patient lives with girlfriend and at home.              Objective   Physical Exam   Constitutional: He is oriented to person, place, and time. He appears well-developed and well-nourished. No distress.   HENT:   Head: Normocephalic and atraumatic.   Nose: Nose normal.   Mouth/Throat: Oropharynx is clear and moist.   Eyes: Conjunctivae are normal. No scleral icterus.   Neck: Normal range of motion. Neck supple. No JVD present.   Cardiovascular: Normal rate, regular rhythm, normal heart sounds and intact distal pulses.    No murmur heard.  Pulmonary/Chest: Effort normal and breath sounds normal. No respiratory distress.   Abdominal: Soft. Bowel sounds are normal. He exhibits no mass. There is no tenderness. There is no rebound and no guarding.   Morbidly obese.    Musculoskeletal: Normal range of motion. He exhibits edema (Mild, pitting edema).   Lymphadenopathy:     He has no cervical adenopathy.   Neurological: He is alert and oriented to person, place, and time.   Skin: Skin is warm and dry.   Psychiatric: He has a normal mood and affect. His behavior is normal.   Nursing note and vitals reviewed.      Procedures         ED Course  ED Course   Comment By Time   I reviewed Mr. bhakta records.  His ejection fraction is less than 20%.  He does not have coronary artery disease. Cirilo Altman MD 06/18 1507   Consultation the  on call to see if we can arrange oxygen for him at home. Cirilo Altman MD 06/18 1550   Mr. bhakta has urinated about 3 L.  He tells me he feels a little bit better.  We have obtained an oxygen tank for him.  He will follow up in the heart and valve clinic.  I urged him to be compliant with his medications.  He tells me that his CPAP mask is broken.  He is going to discuss this with his doctor.  I impressed upon him the importance of being compliant with his CPAP.  He tells me he is able to wear out and make it work the way it is now however. Cirilo Altman MD 06/18 1910                     MDM  Number of Diagnoses or Management Options  Acute on chronic systolic (congestive) heart failure: established and worsening  Acute systolic congestive heart failure: established and worsening  Nonischemic cardiomyopathy: established and worsening     Amount and/or Complexity of Data Reviewed  Clinical lab tests: ordered and reviewed  Tests in the radiology section of CPT®: ordered and reviewed  Review and summarize past medical records: yes  Independent visualization of images, tracings, or specimens: yes    Patient Progress  Patient progress: improved      Final diagnoses:   Acute systolic congestive heart failure   Nonischemic cardiomyopathy   Acute on chronic systolic (congestive) heart failure       Documentation assistance provided by josé  Bing Fernando.  Information recorded by the scribe was done at my direction and has been verified and validated by me.     Bing Fernando  06/18/17 1506       Cirilo Altman MD  06/18/17 [id number]

## 2017-06-18 NOTE — DISCHARGE INSTRUCTIONS
Call your primary care provider in the morning and arrange follow-up with her in the next few days.  Call the heart and valve clinic in the morning and arrange follow-up with them as well.  Return here at any point if worsening breathing or any concerns

## 2017-06-26 ENCOUNTER — OFFICE VISIT (OUTPATIENT)
Dept: CARDIOLOGY | Facility: HOSPITAL | Age: 27
End: 2017-06-26

## 2017-06-26 VITALS
DIASTOLIC BLOOD PRESSURE: 96 MMHG | RESPIRATION RATE: 28 BRPM | WEIGHT: 315 LBS | SYSTOLIC BLOOD PRESSURE: 169 MMHG | TEMPERATURE: 97.8 F | OXYGEN SATURATION: 95 % | HEART RATE: 110 BPM | HEIGHT: 70 IN | BODY MASS INDEX: 45.1 KG/M2

## 2017-06-26 DIAGNOSIS — I50.22 CHRONIC SYSTOLIC HEART FAILURE (HCC): ICD-10-CM

## 2017-06-26 DIAGNOSIS — I42.8 NICM (NONISCHEMIC CARDIOMYOPATHY) (HCC): Primary | ICD-10-CM

## 2017-06-26 DIAGNOSIS — I10 ESSENTIAL HYPERTENSION: ICD-10-CM

## 2017-06-26 DIAGNOSIS — G47.33 OSA (OBSTRUCTIVE SLEEP APNEA): ICD-10-CM

## 2017-06-26 PROCEDURE — 99215 OFFICE O/P EST HI 40 MIN: CPT | Performed by: NURSE PRACTITIONER

## 2017-06-26 NOTE — PROGRESS NOTES
Encounter Date:2017      Patient ID: Evan Gannon is a 26 y.o. male.        Subjective:     Chief Complaint: Follow-up (s/p ED Visit for Syncope)   NICM, Chronic systolic heart failure  History of Present Illness patient presents [facility] the [facility] today for ongoing evaluation of his nonischemic cardiomyopathy and chronic systolic heart failure.  Patient has had multiple visits to the ER at [facility] recently due to fluid overload.  Patient notes he is not wearing his life vest that had been prescribed.  Patient notes he only wears his oxygen intermittently at home but never wears oxygen when he is away from home.  He reports his dry weight to be 400 pounds.  Patient notes he is nervous about establishing with bariatric surgery but is agreeable for consultation to discuss various bariatric surgeries.  Patient notes that he has been compliant with his medicines although he has a long history of noncompliance in the past.  He reports he is currently taking his metolazone daily for symptom management.  Patient notes dyspnea on exertion, pedal edema, intermittent dizziness and lightheadedness and recent syncopal episode.  Patient reports he experienced syncope after significant shortness of breath.  Please note patient was not wearing his oxygen at that time.  Patient notes a weight loss of 8 pounds over the last week but is concerned about fluid overload.  He does deny chest pain, chest pressure, palpitations, tachycardia, orthopnea, PND, abdominal fullness, early satiety, claudication.  Patient notes that he is not currently wearing his CPAP at home due to an  prescription.  He is unable to recall where he had his sleep study and notes he was a teenager when he had his last sleep study.  Patient Active Problem List   Diagnosis   • Shortness of breath   • Hypertension   • Morbid obesity   • Obstructive sleep apnea   • Nonischemic congestive cardiomyopathy   • Personal history  of noncompliance with medical treatment   • Acute on chronic systolic (congestive) heart failure   • Microcytic anemia     Past Surgical History:   Procedure Laterality Date   • ADENOIDECTOMY     • NOSE SURGERY     • TONSILLECTOMY         No Known Allergies      Current Outpatient Prescriptions:   •  albuterol (PROVENTIL HFA;VENTOLIN HFA) 108 (90 BASE) MCG/ACT inhaler, Inhale 2 puffs Every 6 (Six) Hours As Needed for Wheezing., Disp: 1 inhaler, Rfl: 6  •  aspirin 81 MG EC tablet, Take 1 tablet by mouth Daily., Disp: 30 tablet, Rfl: 6  •  carvedilol (COREG) 25 MG tablet, Take 1 tablet by mouth 2 (Two) Times a Day., Disp: 60 tablet, Rfl: 6  •  losartan (COZAAR) 100 MG tablet, Take 1 tablet by mouth Daily., Disp: 30 tablet, Rfl: 6  •  metOLazone (ZAROXOLYN) 5 MG tablet, Take 1 tablet by mouth Daily As Needed (Edema or shortness of breath)., Disp: 30 tablet, Rfl: 6  •  spironolactone (ALDACTONE) 50 MG tablet, Take 1 tablet by mouth Daily., Disp: 30 tablet, Rfl: 6  •  torsemide (DEMADEX) 20 MG tablet, Take 2 tablets by mouth Daily., Disp: 60 tablet, Rfl: 6  •  ranitidine (ZANTAC) 150 MG capsule, Take 1 capsule by mouth Every Evening., Disp: 30 capsule, Rfl: 11    The following portions of the chart were reviewed and updated as appropriate: Allergies, current medications, past family history, social history, past medical history.     Review of Systems   Constitution: Positive for weight loss (8 lbs). Negative for chills, decreased appetite, diaphoresis, fever, weakness, malaise/fatigue and night sweats.   HENT: Negative for congestion, headaches, hearing loss, hoarse voice and nosebleeds.    Eyes: Negative for blurred vision, visual disturbance and visual halos.   Cardiovascular: Positive for dyspnea on exertion, leg swelling and syncope. Negative for chest pain, claudication, cyanosis, irregular heartbeat, near-syncope, orthopnea, palpitations and paroxysmal nocturnal dyspnea.   Respiratory: Positive for shortness of  "breath and sleep disturbances due to breathing. Negative for cough, hemoptysis, snoring, sputum production and wheezing.    Hematologic/Lymphatic: Negative for bleeding problem. Does not bruise/bleed easily.   Skin: Positive for itching. Negative for dry skin and rash.   Musculoskeletal: Negative for arthritis, joint pain, joint swelling and myalgias.   Gastrointestinal: Negative for bloating, abdominal pain, constipation, diarrhea, flatus, heartburn, hematemesis, hematochezia, melena, nausea and vomiting.   Genitourinary: Negative for dysuria, frequency, hematuria, nocturia and urgency.   Neurological: Positive for dizziness and light-headedness. Negative for excessive daytime sleepiness and loss of balance.   Psychiatric/Behavioral: Negative for depression. The patient does not have insomnia and is not nervous/anxious.            Objective:     Vitals:    06/26/17 1142 06/26/17 1145 06/26/17 1146   BP: 151/97 154/95 169/96   BP Location: Right arm Left arm Left arm   Patient Position: Sitting Sitting Standing   Cuff Size: Adult     Pulse: 112  110   Resp: 28     Temp: 97.8 °F (36.6 °C)     TempSrc: Temporal Artery      SpO2: 95%     Weight: (!) 404 lb (183 kg)     Height: 70\" (177.8 cm)         Physical Exam   Constitutional: He is oriented to person, place, and time. He appears well-developed and well-nourished. He is active and cooperative. No distress.   HENT:   Head: Normocephalic and atraumatic.   Mouth/Throat: Oropharynx is clear and moist.   Eyes: Conjunctivae and EOM are normal. Pupils are equal, round, and reactive to light.   Neck: Normal range of motion. Neck supple. No JVD present. No tracheal deviation present. No thyromegaly present.   Cardiovascular: Normal rate, regular rhythm, normal heart sounds and intact distal pulses.    Pulmonary/Chest: Effort normal and breath sounds normal.   Abdominal: Soft. Bowel sounds are normal. He exhibits no distension. There is no tenderness.   Musculoskeletal: " Normal range of motion. He exhibits edema (trace edema noted bilaterally).   Neurological: He is alert and oriented to person, place, and time.   Skin: Skin is warm, dry and intact.   Psychiatric: He has a normal mood and affect. His behavior is normal.   Nursing note and vitals reviewed.      Lab and Diagnostic Review:      Results for orders placed or performed during the hospital encounter of 06/18/17   Comprehensive Metabolic Panel   Result Value Ref Range    Glucose 89 70 - 100 mg/dL    BUN 11 9 - 23 mg/dL    Creatinine 1.10 0.60 - 1.30 mg/dL    Sodium 139 132 - 146 mmol/L    Potassium 4.0 3.5 - 5.5 mmol/L    Chloride 104 99 - 109 mmol/L    CO2 28.0 20.0 - 31.0 mmol/L    Calcium 9.4 8.7 - 10.4 mg/dL    Total Protein 7.7 5.7 - 8.2 g/dL    Albumin 3.90 3.20 - 4.80 g/dL    ALT (SGPT) 12 7 - 40 U/L    AST (SGOT) 14 0 - 33 U/L    Alkaline Phosphatase 78 25 - 100 U/L    Total Bilirubin 1.4 (H) 0.3 - 1.2 mg/dL    eGFR  African Amer 100 >60 mL/min/1.73    Globulin 3.8 gm/dL    A/G Ratio 1.0 (L) 1.5 - 2.5 g/dL    BUN/Creatinine Ratio 10.0 7.0 - 25.0    Anion Gap 7.0 3.0 - 11.0 mmol/L   BNP   Result Value Ref Range    .0 (H) 0.0 - 100.0 pg/mL   CBC Auto Differential   Result Value Ref Range    WBC 5.97 3.50 - 10.80 10*3/mm3    RBC 5.09 4.20 - 5.76 10*6/mm3    Hemoglobin 12.4 (L) 13.1 - 17.5 g/dL    Hematocrit 39.8 38.9 - 50.9 %    MCV 78.2 (L) 80.0 - 99.0 fL    MCH 24.4 (L) 27.0 - 31.0 pg    MCHC 31.2 (L) 32.0 - 36.0 g/dL    RDW 17.7 (H) 11.3 - 14.5 %    RDW-SD 50.2 37.0 - 54.0 fl    MPV 9.6 6.0 - 12.0 fL    Platelets 296 150 - 450 10*3/mm3    Neutrophil % 63.5 41.0 - 71.0 %    Lymphocyte % 23.6 (L) 24.0 - 44.0 %    Monocyte % 12.1 (H) 0.0 - 12.0 %    Eosinophil % 0.3 0.0 - 3.0 %    Basophil % 0.3 0.0 - 1.0 %    Immature Grans % 0.2 0.0 - 0.6 %    Neutrophils, Absolute 3.79 1.50 - 8.30 10*3/mm3    Lymphocytes, Absolute 1.41 0.60 - 4.80 10*3/mm3    Monocytes, Absolute 0.72 0.00 - 1.00 10*3/mm3    Eosinophils,  Absolute 0.02 (L) 0.10 - 0.30 10*3/mm3    Basophils, Absolute 0.02 0.00 - 0.20 10*3/mm3    Immature Grans, Absolute 0.01 0.00 - 0.03 10*3/mm3   POC Troponin, Rapid   Result Value Ref Range    Troponin I 0.06 0.00 - 0.07 ng/mL   POC Troponin, Rapid   Result Value Ref Range    Troponin I 0.06 0.00 - 0.07 ng/mL       Assessment and Plan:         1. NICM (nonischemic cardiomyopathy)  Patient to increase torsemide to 3 tablets (60 mg) daily.   - Ambulatory Referral to Bariatric Surgery  Long discussion with patient about history of noncompliance. Patient encouraged to wear life vest. Long discussion with patient regarding low EF and progression of disease if he continues with noncompliance.  2. Chronic systolic heart failure  euvolemic today but patient has had frequent ER visits due to fluid overload.   Patient to increase torsemide to 60 mg qd  Heart failure education today including signs and symptoms, the role of the heart failure center, daily weights, low sodium diet (less than 1500 mg per day), and daily physical activity. Reviewed HF Zones with patient and family.  Patient to continue current medications as previously ordered.     3. COLLIN (obstructive sleep apnea)  Patient has been noncompliant with CPAP/BIPAP usage due to broken equipment  Patient to be re-evaluated at sleep center for re-evaluation  - Ambulatory Referral to Sleep Medicine    4. Essential hypertension  Patient usually under good control but notes that he has not taken his medications yet today  HTN Education provided today including signs and symptoms, medication management, daily blood pressure monitoring. Patient encouraged to call the Heart and Valve center with any abnormal readings.   - Ambulatory Referral to Bariatric Surgery    5. BMI 50.0-59.9, adult    - Ambulatory Referral to Bariatric Surgery-Dr Whittington to discuss bypass surgery      It has been a pleasure to participate in the care of this patient.  Patient was instructed to call the  [facility] with any questions, concerns, or worsening symptoms.    * Please note that portions of this note were completed with a voice recognition program. Efforts were made to edit the dictation but occasionally words are transcribed.

## 2017-07-11 ENCOUNTER — APPOINTMENT (OUTPATIENT)
Dept: GENERAL RADIOLOGY | Facility: HOSPITAL | Age: 27
End: 2017-07-11

## 2017-07-11 ENCOUNTER — APPOINTMENT (OUTPATIENT)
Dept: CT IMAGING | Facility: HOSPITAL | Age: 27
End: 2017-07-11

## 2017-07-11 ENCOUNTER — HOSPITAL ENCOUNTER (INPATIENT)
Facility: HOSPITAL | Age: 27
LOS: 8 days | Discharge: HOME | End: 2017-07-19
Attending: EMERGENCY MEDICINE | Admitting: INTERNAL MEDICINE

## 2017-07-11 DIAGNOSIS — R07.9 CHEST PAIN, UNSPECIFIED TYPE: Primary | ICD-10-CM

## 2017-07-11 DIAGNOSIS — I42.9 CARDIOMYOPATHY (HCC): ICD-10-CM

## 2017-07-11 DIAGNOSIS — I21.4 NSTEMI (NON-ST ELEVATED MYOCARDIAL INFARCTION) (HCC): ICD-10-CM

## 2017-07-11 DIAGNOSIS — I50.23 ACUTE ON CHRONIC SYSTOLIC (CONGESTIVE) HEART FAILURE (HCC): ICD-10-CM

## 2017-07-11 DIAGNOSIS — R06.02 SHORTNESS OF BREATH: ICD-10-CM

## 2017-07-11 DIAGNOSIS — I50.23 ACUTE ON CHRONIC SYSTOLIC CONGESTIVE HEART FAILURE (HCC): ICD-10-CM

## 2017-07-11 DIAGNOSIS — I50.22 CHRONIC SYSTOLIC HEART FAILURE (HCC): ICD-10-CM

## 2017-07-11 PROBLEM — I50.9 CHF (CONGESTIVE HEART FAILURE), NYHA CLASS IV: Status: ACTIVE | Noted: 2017-07-11

## 2017-07-11 PROBLEM — R07.1 CHEST PAIN ON BREATHING: Status: ACTIVE | Noted: 2017-07-11

## 2017-07-11 LAB
ALBUMIN SERPL-MCNC: 3.9 G/DL (ref 3.2–4.8)
ALBUMIN/GLOB SERPL: 1 G/DL (ref 1.5–2.5)
ALP SERPL-CCNC: 78 U/L (ref 25–100)
ALT SERPL W P-5'-P-CCNC: 15 U/L (ref 7–40)
ANION GAP SERPL CALCULATED.3IONS-SCNC: 7 MMOL/L (ref 3–11)
AST SERPL-CCNC: 16 U/L (ref 0–33)
BASOPHILS # BLD AUTO: 0.01 10*3/MM3 (ref 0–0.2)
BASOPHILS NFR BLD AUTO: 0.2 % (ref 0–1)
BILIRUB SERPL-MCNC: 0.8 MG/DL (ref 0.3–1.2)
BNP SERPL-MCNC: 809 PG/ML (ref 0–100)
BUN BLD-MCNC: 10 MG/DL (ref 9–23)
BUN/CREAT SERPL: 11.1 (ref 7–25)
CALCIUM SPEC-SCNC: 9.7 MG/DL (ref 8.7–10.4)
CHLORIDE SERPL-SCNC: 104 MMOL/L (ref 99–109)
CO2 SERPL-SCNC: 25 MMOL/L (ref 20–31)
CREAT BLD-MCNC: 0.9 MG/DL (ref 0.6–1.3)
DEPRECATED RDW RBC AUTO: 49.4 FL (ref 37–54)
EOSINOPHIL # BLD AUTO: 0.03 10*3/MM3 (ref 0–0.3)
EOSINOPHIL NFR BLD AUTO: 0.6 % (ref 0–3)
ERYTHROCYTE [DISTWIDTH] IN BLOOD BY AUTOMATED COUNT: 17.2 % (ref 11.3–14.5)
GFR SERPL CREATININE-BSD FRML MDRD: 126 ML/MIN/1.73
GLOBULIN UR ELPH-MCNC: 4.1 GM/DL
GLUCOSE BLD-MCNC: 96 MG/DL (ref 70–100)
HCT VFR BLD AUTO: 41.1 % (ref 38.9–50.9)
HGB BLD-MCNC: 13 G/DL (ref 13.1–17.5)
HOLD SPECIMEN: NORMAL
HOLD SPECIMEN: NORMAL
IMM GRANULOCYTES # BLD: 0.01 10*3/MM3 (ref 0–0.03)
IMM GRANULOCYTES NFR BLD: 0.2 % (ref 0–0.6)
LYMPHOCYTES # BLD AUTO: 1.42 10*3/MM3 (ref 0.6–4.8)
LYMPHOCYTES NFR BLD AUTO: 29.4 % (ref 24–44)
MCH RBC QN AUTO: 24.7 PG (ref 27–31)
MCHC RBC AUTO-ENTMCNC: 31.6 G/DL (ref 32–36)
MCV RBC AUTO: 78.1 FL (ref 80–99)
MONOCYTES # BLD AUTO: 0.53 10*3/MM3 (ref 0–1)
MONOCYTES NFR BLD AUTO: 11 % (ref 0–12)
NEUTROPHILS # BLD AUTO: 2.83 10*3/MM3 (ref 1.5–8.3)
NEUTROPHILS NFR BLD AUTO: 58.6 % (ref 41–71)
PLATELET # BLD AUTO: 312 10*3/MM3 (ref 150–450)
PMV BLD AUTO: 10.5 FL (ref 6–12)
POTASSIUM BLD-SCNC: 3.7 MMOL/L (ref 3.5–5.5)
PROT SERPL-MCNC: 8 G/DL (ref 5.7–8.2)
RBC # BLD AUTO: 5.26 10*6/MM3 (ref 4.2–5.76)
SODIUM BLD-SCNC: 136 MMOL/L (ref 132–146)
TROPONIN I SERPL-MCNC: 0.13 NG/ML (ref 0–0.07)
TROPONIN I SERPL-MCNC: 0.14 NG/ML (ref 0–0.07)
WBC NRBC COR # BLD: 4.83 10*3/MM3 (ref 3.5–10.8)
WHOLE BLOOD HOLD SPECIMEN: NORMAL
WHOLE BLOOD HOLD SPECIMEN: NORMAL

## 2017-07-11 PROCEDURE — 94799 UNLISTED PULMONARY SVC/PX: CPT

## 2017-07-11 PROCEDURE — 94660 CPAP INITIATION&MGMT: CPT

## 2017-07-11 PROCEDURE — 84484 ASSAY OF TROPONIN QUANT: CPT

## 2017-07-11 PROCEDURE — 71275 CT ANGIOGRAPHY CHEST: CPT

## 2017-07-11 PROCEDURE — 99223 1ST HOSP IP/OBS HIGH 75: CPT | Performed by: INTERNAL MEDICINE

## 2017-07-11 PROCEDURE — 99285 EMERGENCY DEPT VISIT HI MDM: CPT

## 2017-07-11 PROCEDURE — 0 IOPAMIDOL PER 1 ML: Performed by: INTERNAL MEDICINE

## 2017-07-11 PROCEDURE — 99222 1ST HOSP IP/OBS MODERATE 55: CPT | Performed by: INTERNAL MEDICINE

## 2017-07-11 PROCEDURE — 85025 COMPLETE CBC W/AUTO DIFF WBC: CPT | Performed by: EMERGENCY MEDICINE

## 2017-07-11 PROCEDURE — 71020 HC CHEST PA AND LATERAL: CPT

## 2017-07-11 PROCEDURE — 80053 COMPREHEN METABOLIC PANEL: CPT | Performed by: EMERGENCY MEDICINE

## 2017-07-11 PROCEDURE — 93005 ELECTROCARDIOGRAM TRACING: CPT

## 2017-07-11 PROCEDURE — 83880 ASSAY OF NATRIURETIC PEPTIDE: CPT | Performed by: EMERGENCY MEDICINE

## 2017-07-11 PROCEDURE — 25010000002 FUROSEMIDE PER 20 MG: Performed by: EMERGENCY MEDICINE

## 2017-07-11 PROCEDURE — 25010000002 HEPARIN (PORCINE) PER 1000 UNITS: Performed by: INTERNAL MEDICINE

## 2017-07-11 PROCEDURE — 25010000002 FUROSEMIDE PER 20 MG: Performed by: INTERNAL MEDICINE

## 2017-07-11 PROCEDURE — 94760 N-INVAS EAR/PLS OXIMETRY 1: CPT

## 2017-07-11 PROCEDURE — 94640 AIRWAY INHALATION TREATMENT: CPT

## 2017-07-11 RX ORDER — FUROSEMIDE 10 MG/ML
80 INJECTION INTRAMUSCULAR; INTRAVENOUS ONCE
Status: COMPLETED | OUTPATIENT
Start: 2017-07-11 | End: 2017-07-11

## 2017-07-11 RX ORDER — ONDANSETRON 2 MG/ML
4 INJECTION INTRAMUSCULAR; INTRAVENOUS EVERY 6 HOURS PRN
Status: DISCONTINUED | OUTPATIENT
Start: 2017-07-11 | End: 2017-07-19 | Stop reason: HOSPADM

## 2017-07-11 RX ORDER — SODIUM CHLORIDE 0.9 % (FLUSH) 0.9 %
10 SYRINGE (ML) INJECTION AS NEEDED
Status: DISCONTINUED | OUTPATIENT
Start: 2017-07-11 | End: 2017-07-19 | Stop reason: HOSPADM

## 2017-07-11 RX ORDER — CARVEDILOL 12.5 MG/1
25 TABLET ORAL 2 TIMES DAILY WITH MEALS
Status: DISCONTINUED | OUTPATIENT
Start: 2017-07-11 | End: 2017-07-14

## 2017-07-11 RX ORDER — FAMOTIDINE 20 MG/1
20 TABLET, FILM COATED ORAL 2 TIMES DAILY
Status: DISCONTINUED | OUTPATIENT
Start: 2017-07-11 | End: 2017-07-19 | Stop reason: HOSPADM

## 2017-07-11 RX ORDER — TORSEMIDE 20 MG/1
60 TABLET ORAL DAILY
COMMUNITY
End: 2017-07-19 | Stop reason: HOSPADM

## 2017-07-11 RX ORDER — ACETAMINOPHEN 325 MG/1
650 TABLET ORAL EVERY 4 HOURS PRN
Status: DISCONTINUED | OUTPATIENT
Start: 2017-07-11 | End: 2017-07-19 | Stop reason: HOSPADM

## 2017-07-11 RX ORDER — HEPARIN SODIUM 5000 [USP'U]/ML
5000 INJECTION, SOLUTION INTRAVENOUS; SUBCUTANEOUS EVERY 8 HOURS SCHEDULED
Status: DISCONTINUED | OUTPATIENT
Start: 2017-07-11 | End: 2017-07-13

## 2017-07-11 RX ORDER — ASPIRIN 81 MG/1
81 TABLET ORAL DAILY
Status: DISCONTINUED | OUTPATIENT
Start: 2017-07-11 | End: 2017-07-13

## 2017-07-11 RX ORDER — TORSEMIDE 20 MG/1
40 TABLET ORAL DAILY
Status: DISCONTINUED | OUTPATIENT
Start: 2017-07-11 | End: 2017-07-11

## 2017-07-11 RX ORDER — ONDANSETRON 4 MG/1
4 TABLET, FILM COATED ORAL EVERY 6 HOURS PRN
Status: DISCONTINUED | OUTPATIENT
Start: 2017-07-11 | End: 2017-07-19 | Stop reason: HOSPADM

## 2017-07-11 RX ORDER — SPIRONOLACTONE 50 MG/1
50 TABLET, FILM COATED ORAL DAILY
Status: DISCONTINUED | OUTPATIENT
Start: 2017-07-11 | End: 2017-07-19 | Stop reason: HOSPADM

## 2017-07-11 RX ORDER — FUROSEMIDE 10 MG/ML
40 INJECTION INTRAMUSCULAR; INTRAVENOUS EVERY 12 HOURS
Status: DISCONTINUED | OUTPATIENT
Start: 2017-07-11 | End: 2017-07-13

## 2017-07-11 RX ORDER — HYDROCODONE BITARTRATE AND ACETAMINOPHEN 5; 325 MG/1; MG/1
1 TABLET ORAL EVERY 4 HOURS PRN
Status: DISCONTINUED | OUTPATIENT
Start: 2017-07-11 | End: 2017-07-19 | Stop reason: HOSPADM

## 2017-07-11 RX ORDER — ALBUTEROL SULFATE 90 UG/1
AEROSOL, METERED RESPIRATORY (INHALATION)
Status: DISPENSED
Start: 2017-07-11 | End: 2017-07-12

## 2017-07-11 RX ORDER — LOSARTAN POTASSIUM 50 MG/1
100 TABLET ORAL
Status: DISCONTINUED | OUTPATIENT
Start: 2017-07-11 | End: 2017-07-11

## 2017-07-11 RX ORDER — METOLAZONE 5 MG/1
5 TABLET ORAL DAILY PRN
Status: DISCONTINUED | OUTPATIENT
Start: 2017-07-11 | End: 2017-07-19 | Stop reason: HOSPADM

## 2017-07-11 RX ORDER — ALBUTEROL SULFATE 90 UG/1
2 AEROSOL, METERED RESPIRATORY (INHALATION) EVERY 6 HOURS PRN
Status: DISCONTINUED | OUTPATIENT
Start: 2017-07-11 | End: 2017-07-19 | Stop reason: HOSPADM

## 2017-07-11 RX ORDER — SODIUM CHLORIDE 0.9 % (FLUSH) 0.9 %
1-10 SYRINGE (ML) INJECTION AS NEEDED
Status: DISCONTINUED | OUTPATIENT
Start: 2017-07-11 | End: 2017-07-19 | Stop reason: HOSPADM

## 2017-07-11 RX ADMIN — FUROSEMIDE 40 MG: 10 INJECTION, SOLUTION INTRAMUSCULAR; INTRAVENOUS at 16:22

## 2017-07-11 RX ADMIN — IOPAMIDOL 80 ML: 755 INJECTION, SOLUTION INTRAVENOUS at 14:30

## 2017-07-11 RX ADMIN — LOSARTAN POTASSIUM 100 MG: 50 TABLET, FILM COATED ORAL at 16:21

## 2017-07-11 RX ADMIN — HEPARIN SODIUM 5000 UNITS: 5000 INJECTION, SOLUTION INTRAVENOUS; SUBCUTANEOUS at 16:21

## 2017-07-11 RX ADMIN — FAMOTIDINE 20 MG: 20 TABLET ORAL at 17:37

## 2017-07-11 RX ADMIN — SPIRONOLACTONE 50 MG: 50 TABLET ORAL at 17:37

## 2017-07-11 RX ADMIN — CARVEDILOL 25 MG: 12.5 TABLET, FILM COATED ORAL at 17:37

## 2017-07-11 RX ADMIN — ALBUTEROL SULFATE 2 PUFF: 90 AEROSOL, METERED RESPIRATORY (INHALATION) at 15:06

## 2017-07-11 RX ADMIN — FUROSEMIDE 80 MG: 10 INJECTION, SOLUTION INTRAMUSCULAR; INTRAVENOUS at 13:14

## 2017-07-11 NOTE — PROGRESS NOTES
Discharge Planning Assessment  [facility]     Patient Name: Evan Gannon  MRN: [id number]  Today's Date: 7/11/2017    Admit Date: 7/11/2017          Discharge Needs Assessment       07/11/17 1423    Living Environment    Lives With parent(s);significant other    Living Arrangements house    Transportation Available family or friend will provide    Living Environment    Provides Primary Care For no one    Primary Care Provided By parent(s)    Quality Of Family Relationships supportive    Able to Return to Prior Living Arrangements yes    Discharge Needs Assessment    Concerns To Be Addressed transportation    Concerns Comments Pt requesting to speak to  regarding transportation options to MD appointments, pt currently has Wheels    Readmission Within The Last 30 Days no previous admission in last 30 days    Equipment Currently Used at Home oxygen;bipap/ cpap   Cont. oxygen @ 2L/NC provided by [employer], pt will need a portable tank at [place].     Discharge Disposition still a patient            Discharge Plan       07/11/17 1432    Case Management/Social Work Plan    Plan Home    Patient/Family In Agreement With Plan yes    Additional Comments Spoke with patient at . Pt lives with his parents some and his girlfriend some in [facility]. Pt. is independent with ADL's and mobility. Patient uses a CPAP, cont. oxygen @3L/NC provided by  [employer] (portable oxygen tank needed) and does not use any other DME or HH currently. His  PCP is Barbara Mills and medications are covered by insurance. Pt is requesting to speak to  regarding transportation options to and from MD appointment; he currently has Wheels.  CM will continue to follow. Goal is to discharge home with family when medically ready.        Discharge Placement     No information found                Demographic Summary       07/11/17 1428    Referral Information    Arrived From home or self-care    Reason For Consult discharge planning     Contact Information    Permission Granted to Share Information With family/designee    Comments AmieKiah montanez (Significant Other) Home Phone: [phone]    Primary Care Physician Information    Name KEON CRUZ            Functional Status       07/11/17 1420    Functional Status Prior    Ambulation 0-->independent    Transferring 0-->independent    Toileting 0-->independent    Bathing 0-->independent    Dressing 0-->independent    Eating 0-->independent    Communication 0-->understands/communicates without difficulty    Swallowing 0-->swallows foods/liquids without difficulty    IADL    Medications independent    Meal Preparation independent    Housekeeping independent    Laundry independent    Shopping independent    Oral Care independent    Activity Tolerance    Usual Activity Tolerance fair    Current Activity Tolerance poor    Employment/Financial    Employment/Finance Comments Healthcare and medication coverage: [employer], [employer]            Psychosocial     None            Abuse/Neglect     None            Legal     None            Substance Abuse     None            Patient Forms     None          Jolene Lincoln RN

## 2017-07-11 NOTE — H&P
Hospital Medicine History and Physical    Primary Care Physician: Barbara Mills MD    Chief complaint sob, pleuretic CP    Subjective     History of Present Illness  Patient is a 26-year-old -American male with past history significant for nonischemic cardiomyopathy with very low ejection fraction around 20%, hypertension, obstructive sleep apnea, morbid obesity, psoriasis.  Patient also is noncompliant with medication and follow-up.  He has had multiple admissions to the hospital for exacerbation of heart failure.  Last time that he was here during March evidently patient was agreeable for AICD placement and also gastric bypass surgery.  She tells me that he was in his usual state of health until about 3-4 days ago when he started noticing that the swelling of lower extremities is getting worse.  Concomitant with that he started having more breathing problem.  In terms gradually got worse and today patient decided to come to the emergency room because the symptoms were very bad.  Also tells me that for the past couple days he has had right-sided pleuritic chest pain/discomfort.  He tells me that only when he takes a deep breath he has discomfort on the right side.  Denies any fever or chills.  No nausea, vomiting, diarrhea, abdominal pain.  At the emergency room patient was given 80 mg of Lasix IV and currently his symptoms are actually better.  Review of Systems   Otherwise complete ROS is negative except as mentioned in the HPI.    Past Medical History:   Past Medical History:   Diagnosis Date   • CHF (congestive heart failure)    • Hypertension    • Plaque psoriasis    • Sleep apnea        Past Surgical History:  Past Surgical History:   Procedure Laterality Date   • ADENOIDECTOMY     • NOSE SURGERY     • TONSILLECTOMY         Family History: family history includes Diabetes in his father.     Social History:  reports that he has never smoked. He has never used smokeless tobacco. He reports  "that he does not drink alcohol or use illicit drugs.    Medications:    (Not in a hospital admission)  No Known Allergies    Objective    Physical Exam:   Vital Signs: /86  Pulse 106  Temp 97.3 °F (36.3 °C) (Oral)   Resp 28  Ht 69\" (175.3 cm)  Wt (!) 400 lb (181 kg)  SpO2 96%  BMI 59.07 kg/m2  Physical Exam  General: Resting in bed in no acute distress and looks comfortable.  Neurological exam: Awake, alert, oriented ×3, no focal weakness or numbness present.  HEENT: PERRLA.  Neck: Supple, no cervical lymphadenopathy palpable.  Lungs: Diminished lung sounds bilaterally, clear to auscultation, breathing without any labor.  Cardiovascular: Very distant heart sounds.  Regular rhythm and rate, no murmur, gallop, rubs audible.  GI: Abdomen is very obese, soft, nontender, not distended, bowel sounds present.  Extremities: Severe edema lower extremities bilaterally.  Thickened skin of lower extremities bilaterally secondary to chronic edema.  Skin: Singular color and scaly lesions on trunk, limbs, scalp.  Lesions are psoriasis.    Results Reviewed:  I have personally reviewed current lab, radiology, and data and agree.    Results from last 7 days  Lab Units 07/11/17  1138   WBC 10*3/mm3 4.83   HEMOGLOBIN g/dL 13.0*   PLATELETS 10*3/mm3 312       Results from last 7 days  Lab Units 07/11/17  1138   SODIUM mmol/L 136   POTASSIUM mmol/L 3.7   CO2 mmol/L 25.0   CREATININE mg/dL 0.90   GLUCOSE mg/dL 96   CALCIUM mg/dL 9.7           Assessment/Plan   Assessment & Plan  Active Problems:    Acute on chronic systolic CHF (congestive heart failure), NYHA class IV. EF about  20 %.    Nonischemic cardiomyopathy with dietitian of ventricles.    Pleuritic chest pain on the right side.    Pleural effusion and chest a evident evidence of pulmonary congestion.      Hypertension      Morbid obesity      Obstructive sleep apnea      Personal history of noncompliance with medical treatment      Plan:  - Admit to " telemetry.  -Continue home medication and also increase diuretics with IV Lasix.  -Repeat labs in a.m.  - Soft cardiology  - Heparin and SCD for DVT prophylaxis.        I discussed the patients findings and my recommendations with the patient at the bedside.    Nico Obando MD 07/11/17 2:04 PM

## 2017-07-11 NOTE — CONSULTS
[facility] at [facility]  CARDIOLOGY CONSULTATION NOTE    Evan Gannon  : 1990  MRN:[id number]  Home Phone:[phone]    Date of Admission:2017  Date of Consultation: 17    PCP: Barbara Mills MD    IDENTIFICATION: A 26 y.o. male resident of [place], [place].    Chief Complaint   Patient presents with   • Shortness of Breath   • Chest Pain         Problem List:  1. Dilated cardiomyopathy    A. Echocardiogram 14; LVEF 0.20-0.25, mild MR,    moderate TR     B. Echocardiogram 2014; LVEF 0.20-0.25, all valves    structurally and functionally normal with no                     C. Echocardiogram  EF 20%    D. Life Vest, Medical therapy BB,ARB,diuretics    E. Multiple ER visits for acute SHF    F. Admit to [facility] ER with Pleuritic chest pain and Positive   troponin 17  2. Chronic congestive heart failure;     A. Heart failure Center visits 2015    B. HF clinic 2016    C. [facility] ED 17, 17  3. Hypertension  4. Morbid obesity; BMI 61  5. Obstructive sleep apnea; compliant with CPAP nightly  6. History of noncompliance with medication administration/follow up visits  7. Surgical history; tonsils and adenoids, nasal surgery        ALLERGIES: No Known Allergies    HPI:   The patient is a pleasant and unfortunate 26-year-old Afro-American male who is admitted to [facility] today for worsening shortness of breath symptoms and sudden onset pleuritic right-sided chest pain occurring early this a.m.  The patient has had a cardiomyopathy for 3 years and has been managed medically by Dr. Olsen as well as heart failure clinic.  There is a history of some noncompliance with medications although recently the patient has been doing his best to take his medicines on a regular basis and has been seen by the heart failure clinic on a regular basis.  He has been wearing his LifeVest most the time but sometimes takes it off as is  uncomfortable.  He recently was in the heart failure clinic and had increased his torsemide to 3 tablets a day.  However, he states starting on Sunday he began having worsening shortness of breath.  He has been a little bit less active the past few days as shortness of breath limited his ability to get up and move very often.  He also developed some pains in the back of his calf some both legs.  This  mid morning he began develop sharp stabbing knifelike pains on the right side of his chest worse when he takes deep breaths.  He does have some orthopnea and peripheral edema as well as severe obstructive sleep apnea which she is wearing his CPAP at home with.  He states he has been scheduled to see Dr. Whittington regarding possible weight loss therapy and he is schedule near future to see Dr. Olsen with follow-up echocardiogram.  He presented to the ER today because of this new onset pleuritic chest pain as well as shortness breath over the past 3 or 4 days.  He denies fevers, chills, cough.  He denies any palpitations near-syncope or syncope events.  Recurrent dyspnea orthopnea and new onset pleuritic chest pain fairly rapid over the last 1-2 weeks.  Claims being compliant with CPAP, not so much with his LifeVest.  Claims medical compliance.  See Dr. Whittington in the near future.  ROS: All systems have been reviewed and are negative with the exception of those mentioned in the HPI and problem list above.    Surgical History:   Past Surgical History:   Procedure Laterality Date   • ADENOIDECTOMY     • NOSE SURGERY     • TONSILLECTOMY         Social History:   Social History     Social History   • Marital status: Single     Spouse name: N/A   • Number of children: 0   • Years of education: N/A     Occupational History   • disabled      Social History Main Topics   • Smoking status: Never Smoker   • Smokeless tobacco: Never Used   • Alcohol use No   • Drug use: No   • Sexual activity: Defer     Other Topics Concern   • Not on  "file     Social History Narrative    Patient denies caffeine intake.     Patient lives with girlfriend and at home.            Family History:   Family History   Problem Relation Age of Onset   • Diabetes Father        Objective     /86  Pulse 106  Temp 97.3 °F (36.3 °C) (Oral)   Resp 28  Ht 69\" (175.3 cm)  Wt (!) 400 lb (181 kg)  SpO2 96%  BMI 59.07 kg/m2  No intake or output data in the 24 hours ending 07/11/17 1343    PHYSICAL EXAM:  Constitutional:  Morbidly obese, cooperative, in no acute distress.   Head:  Normocephalic, without obvious abnormality, atraumatic.   Neck: No adenopathy, supple, trachea midline, no thyromegaly, no    carotid bruit, no JVD.   Respiratory:   Clear to auscultation bilaterally; respirations regular, even and unlabored. No wheezes, rales or rhonchi.    Cardiovascular:  Regular rhythm and normal rate, normal S1 and S2, no            murmur, no gallop, no rub, no click.   Pulses: Peripheral pulses are present and equal bilaterally.   GI:   Soft, non-distended. Bowel sounds heard throughout. No organomegaly or masses. Non-tender to palpation, no guarding.   Extremities: Two plus edema.   Skin: Large areas of psoriasis and dry \"alligator like\" skin on lower extremities. No bleeding, bruising or rash.   Neurological: Alert, oriented to time, person and place. No focal deficits.   I have examined the patient and agree with the above  Labs/Diagnostic Data    Results from last 7 days  Lab Units 07/11/17  1138   SODIUM mmol/L 136   POTASSIUM mmol/L 3.7   CHLORIDE mmol/L 104   CO2 mmol/L 25.0   BUN mg/dL 10   CREATININE mg/dL 0.90   GLUCOSE mg/dL 96   CALCIUM mg/dL 9.7           Results from last 7 days  Lab Units 07/11/17  1138   WBC 10*3/mm3 4.83   HEMOGLOBIN g/dL 13.0*   HEMATOCRIT % 41.1   PLATELETS 10*3/mm3 312                               I personally reviewed the patient's EKG/Telemetry data    Radiology Data:   CXR: CHF, Cardiomegaly  CTA: Pending  Current Medications:     "          Interpretation Summary  2/17  · Left ventricular function is severely decreased. Estimated EF = 20%.  · Right ventricular cavity is mildly dilated.  · The cardiac valves are anatomically and functionally normal.  · The left ventricular cavity is moderately dilated.         Assessment and Plan:     1. Pleuritic Chest pain and elevated Troponin.   A)Rule out PE   B)Serial Cardiac markers, EKG's  2. Severe Cardiomyopathy  3. Acute on chronic SHF  4. Severe obesity  5.  Severe Plaque psoriasis     PLAN:  · CTA rule out PE  · Serial cardiac markers, Repeat Echocardiogram, last LVEF 20%.  · Continue medical therapy for cardiomyopathy, SHF  · Consider [facility] possible CBI in future, although extremely unlikely he has an ischemic cardiomyopathy.  This most likely represents a combination of obstructive sleep apnea, hypertension, noncompliance, and morbid obesity.  · Transition from ACE inhibitor to Entresto    Scribed for Dr. Olsen by Cuong Carrizales PA-C. 7/11/2017  1:43 PM  I, Balbir Olsen MD, personally performed the services described in this documentation as scribed by the above individual in my presence, and it is both accurate and complete    Thank you for allowing me to participate in the care of Evan Gannon. Feel free to contact me directly with any further questions or concerns.

## 2017-07-11 NOTE — ED PROVIDER NOTES
Subjective   HPI Comments: Evan Gannon is a 26 y.o.male who presents to the ED with c/o CP. For the past three days, Mr. Gannon has developed worsening SoA and intermittent mid sternal CP. He states that today the pain is exacerbated by deep inspiration and not relieved by anything, although it seems to randomly resolve. Due to continued sx, he presents to the ED where he says that he has been frequently dizzy and in the last few days he has been swelling slightly more than baseline. He denies N/V, HA, diarrhea, abdominal pain or any other acute sx. He sleeps with an oxygen mask. Mr. Gannon denies similar pains in the past.    Patient is a 26 y.o. male presenting with chest pain.   History provided by:  Patient  Chest Pain   Chest pain location: Mid sternal area.  Pain radiates to:  Does not radiate  Onset quality:  Gradual  Duration:  3 days  Timing:  Constant  Progression:  Worsening  Context: breathing    Relieved by:  Nothing  Worsened by:  Deep breathing  Ineffective treatments:  None tried  Associated symptoms: dizziness, lower extremity edema and shortness of breath    Associated symptoms: no abdominal pain, no fever, no headache, no nausea and no vomiting        Review of Systems   Constitutional: Negative for fever.   Respiratory: Positive for shortness of breath.    Cardiovascular: Positive for chest pain and leg swelling.   Gastrointestinal: Negative for abdominal pain, nausea and vomiting.   Neurological: Positive for dizziness. Negative for headaches.   All other systems reviewed and are negative.      Past Medical History:   Diagnosis Date   • CHF (congestive heart failure)    • Hypertension    • Plaque psoriasis    • Sleep apnea        No Known Allergies    Past Surgical History:   Procedure Laterality Date   • ADENOIDECTOMY     • NOSE SURGERY     • TONSILLECTOMY         Family History   Problem Relation Age of Onset   • Diabetes Father        Social History     Social History   • Marital status:  Single     Spouse name: N/A   • Number of children: 0   • Years of education: N/A     Occupational History   • disabled      Social History Main Topics   • Smoking status: Never Smoker   • Smokeless tobacco: Never Used   • Alcohol use No   • Drug use: No   • Sexual activity: Defer     Other Topics Concern   • None     Social History Narrative    Patient denies caffeine intake.     Patient lives with girlfriend and at home.              Objective   Physical Exam   Constitutional: He is oriented to person, place, and time. He appears well-developed and well-nourished. No distress.   Obese   HENT:   Head: Normocephalic and atraumatic.   Mouth/Throat: No oropharyngeal exudate.   Eyes: Conjunctivae are normal. No scleral icterus.   Neck: Normal range of motion. Neck supple. No JVD present.   Cardiovascular: Normal rate, regular rhythm and normal heart sounds.  Exam reveals no gallop and no friction rub.    No murmur heard.  Pulmonary/Chest: Effort normal and breath sounds normal. No respiratory distress. He has no wheezes. He has no rales.   Abdominal: Soft. Bowel sounds are normal. He exhibits no distension. There is no tenderness. There is no rebound and no guarding.   Musculoskeletal: Normal range of motion. He exhibits edema.   Non pitting edema of lower extremities   Neurological: He is alert and oriented to person, place, and time.   Skin: Skin is warm and dry. Rash noted. He is not diaphoretic.   Skin rash consistent with psoriasis.   Psychiatric: He has a normal mood and affect. His behavior is normal.   Nursing note and vitals reviewed.      Procedures         ED Course  ED Course   Comment By Time   His chest x-ray voice clip indicates pulmonary edema.  His troponin is mildly elevated.  I called Dr. Olsen's office and requested a consult.  I have ordered 80 mg of IV Lasix Cirilo Altman MD 07/11 1233   Dr. Altman spoke with Dr. Tafoya, hospitalist, who will admit. Saurabh Hebert 07/11 1322   I spoke with  the cardiology PA who was on the phone with Dr. Olsen.  They're both very familiar with Evan.  They asked that the hospitalist admit and attempt to evaluate for PE.  Hopefully we can get a CTA although at this point don't have enough IV access for that.  I spoke with Dr. Tafoya who will admit. Cirilo Altman MD 07/11 1326                     MDM  Number of Diagnoses or Management Options  Acute on chronic systolic congestive heart failure: established and worsening  Cardiomyopathy: established and worsening  Chest pain, unspecified type: new and requires workup  Shortness of breath: established and worsening     Amount and/or Complexity of Data Reviewed  Clinical lab tests: ordered and reviewed  Tests in the radiology section of CPT®: ordered and reviewed  Review and summarize past medical records: yes  Discuss the patient with other providers: yes  Independent visualization of images, tracings, or specimens: yes    Patient Progress  Patient progress: stable    EMR Dragon/Transcription disclaimer:   Much of this encounter note is an electronic transcription/translation of spoken language to printed text. The electronic translation of spoken language may permit erroneous, or at times, nonsensical words or phrases to be inadvertently transcribed; Although I have reviewed the note for such errors, some may still exist.     Final diagnoses:   Chest pain, unspecified type   Shortness of breath   Cardiomyopathy   Acute on chronic systolic congestive heart failure       Documentation assistance provided by josé Hebert.  Information recorded by the josé was done at my direction and has been verified and validated by me.     Saurabh Hebert  07/11/17 1146       Cirilo Altman MD  07/11/17 3952

## 2017-07-11 NOTE — PROGRESS NOTES
Continued Stay Note  [facility]     Patient Name: Evan Gannon  MRN: [id number]  Today's Date: 7/11/2017    Admit Date: 7/11/2017          Discharge Plan       07/11/17 1509    Case Management/Social Work Plan    Plan Social work called and Mr. Gannon is eligible for Medicaid [employer], [phone]. Social work can complete a [employer] application that can take up to 21 days for the application to be processed.        07/11/17 1505    Case Management/Social Work Plan    Plan Social work called [employer]      07/11/17 1433    Case Management/Social Work Plan    Plan Home    Patient/Family In Agreement With Plan yes    Additional Comments Spoke with patient at . Pt lives with his parents some and his girlfriend some in [facility] [place]. Pt. is independent with ADL's and mobility. Patient uses a CPAP, cont. oxygen @3L/NC provided by  [employer] (portable oxygen tank needed) and does not use any other DME or HH currently. His  PCP is Barbara Mills and medications are covered by insurance. Pt is requesting to speak to  regarding transportation options to and from MD appointment; he currently has Whe[employer].  CM will continue to follow. Goal is to discharge home with family when medically ready.              Discharge Codes     None            SAWYER Edmondson

## 2017-07-12 LAB
ALBUMIN SERPL-MCNC: 3.9 G/DL (ref 3.2–4.8)
ALBUMIN/GLOB SERPL: 1 G/DL (ref 1.5–2.5)
ALP SERPL-CCNC: 80 U/L (ref 25–100)
ALT SERPL W P-5'-P-CCNC: 14 U/L (ref 7–40)
ANION GAP SERPL CALCULATED.3IONS-SCNC: 6 MMOL/L (ref 3–11)
ARTICHOKE IGE QN: 80 MG/DL (ref 0–130)
AST SERPL-CCNC: 16 U/L (ref 0–33)
BILIRUB SERPL-MCNC: 0.7 MG/DL (ref 0.3–1.2)
BUN BLD-MCNC: 10 MG/DL (ref 9–23)
BUN/CREAT SERPL: 10 (ref 7–25)
CALCIUM SPEC-SCNC: 9.6 MG/DL (ref 8.7–10.4)
CHLORIDE SERPL-SCNC: 104 MMOL/L (ref 99–109)
CHOLEST SERPL-MCNC: 109 MG/DL (ref 0–200)
CO2 SERPL-SCNC: 26 MMOL/L (ref 20–31)
CREAT BLD-MCNC: 1 MG/DL (ref 0.6–1.3)
GFR SERPL CREATININE-BSD FRML MDRD: 111 ML/MIN/1.73
GLOBULIN UR ELPH-MCNC: 4 GM/DL
GLUCOSE BLD-MCNC: 107 MG/DL (ref 70–100)
HDLC SERPL-MCNC: 24 MG/DL (ref 40–60)
MAGNESIUM SERPL-MCNC: 1.9 MG/DL (ref 1.3–2.7)
PHOSPHATE SERPL-MCNC: 4.3 MG/DL (ref 2.4–5.1)
POTASSIUM BLD-SCNC: 4 MMOL/L (ref 3.5–5.5)
PROT SERPL-MCNC: 7.9 G/DL (ref 5.7–8.2)
SODIUM BLD-SCNC: 136 MMOL/L (ref 132–146)
TRIGL SERPL-MCNC: 77 MG/DL (ref 0–150)

## 2017-07-12 PROCEDURE — 80061 LIPID PANEL: CPT | Performed by: INTERNAL MEDICINE

## 2017-07-12 PROCEDURE — 99232 SBSQ HOSP IP/OBS MODERATE 35: CPT | Performed by: FAMILY MEDICINE

## 2017-07-12 PROCEDURE — 84100 ASSAY OF PHOSPHORUS: CPT | Performed by: INTERNAL MEDICINE

## 2017-07-12 PROCEDURE — 99232 SBSQ HOSP IP/OBS MODERATE 35: CPT | Performed by: INTERNAL MEDICINE

## 2017-07-12 PROCEDURE — 80053 COMPREHEN METABOLIC PANEL: CPT | Performed by: INTERNAL MEDICINE

## 2017-07-12 PROCEDURE — 94799 UNLISTED PULMONARY SVC/PX: CPT

## 2017-07-12 PROCEDURE — 83735 ASSAY OF MAGNESIUM: CPT | Performed by: INTERNAL MEDICINE

## 2017-07-12 PROCEDURE — 25010000002 FUROSEMIDE PER 20 MG: Performed by: INTERNAL MEDICINE

## 2017-07-12 PROCEDURE — 94660 CPAP INITIATION&MGMT: CPT

## 2017-07-12 RX ORDER — DOCUSATE SODIUM 100 MG/1
100 CAPSULE, LIQUID FILLED ORAL 2 TIMES DAILY
Status: DISCONTINUED | OUTPATIENT
Start: 2017-07-12 | End: 2017-07-19 | Stop reason: HOSPADM

## 2017-07-12 RX ADMIN — SACUBITRIL AND VALSARTAN 1 TABLET: 97; 103 TABLET, FILM COATED ORAL at 09:08

## 2017-07-12 RX ADMIN — CARVEDILOL 25 MG: 12.5 TABLET, FILM COATED ORAL at 09:08

## 2017-07-12 RX ADMIN — CARVEDILOL 25 MG: 12.5 TABLET, FILM COATED ORAL at 18:14

## 2017-07-12 RX ADMIN — SPIRONOLACTONE 50 MG: 50 TABLET ORAL at 09:08

## 2017-07-12 RX ADMIN — DOCUSATE SODIUM 100 MG: 100 CAPSULE, LIQUID FILLED ORAL at 18:14

## 2017-07-12 RX ADMIN — SACUBITRIL AND VALSARTAN 1 TABLET: 97; 103 TABLET, FILM COATED ORAL at 20:04

## 2017-07-12 RX ADMIN — FAMOTIDINE 20 MG: 20 TABLET ORAL at 09:09

## 2017-07-12 RX ADMIN — ASPIRIN 81 MG: 81 TABLET, COATED ORAL at 09:08

## 2017-07-12 RX ADMIN — FUROSEMIDE 40 MG: 10 INJECTION, SOLUTION INTRAMUSCULAR; INTRAVENOUS at 03:43

## 2017-07-12 RX ADMIN — FAMOTIDINE 20 MG: 20 TABLET ORAL at 18:13

## 2017-07-12 RX ADMIN — Medication 5 MG: at 00:57

## 2017-07-12 RX ADMIN — FUROSEMIDE 40 MG: 10 INJECTION, SOLUTION INTRAMUSCULAR; INTRAVENOUS at 16:01

## 2017-07-12 NOTE — PROGRESS NOTES
"  [facility] at [facility]   Inpatient Progress Note       LOS: 1 day   Patient Care Team:  Barbara Mills MD as PCP - General (Internal Medicine)    Chief Complaint:  Heart failure nonischemic cardio myopathy    Subjective     Interval History:   Post dyspnea is better, thinks his improved since admission with diuresis..  Edema not as bad as usual    History taken from: patient chart    Review of Systems:   Pertinent positives noted in history, exam, and assessment. Otherwise reviewed and negative.      Objective     Vitals:  Blood pressure 113/78, pulse 85, temperature 97.4 °F (36.3 °C), temperature source Oral, resp. rate 18, height 69\" (175.3 cm), weight (!) 408 lb 4.8 oz (185 kg), SpO2 100 %.     Intake/Output Summary (Last 24 hours) at 07/12/17 1049  Last data filed at 07/11/17 1700   Gross per 24 hour   Intake              480 ml   Output             2300 ml   Net            -1820 ml     Physical Exam   Constitutional: He is oriented to person, place, and time. He appears well-developed and well-nourished.   HENT:   Mouth/Throat: Oropharynx is clear and moist.   Neck: No JVD present. Carotid bruit is not present. No thyromegaly present.   Cardiovascular: Regular rhythm, S1 normal, S2 normal, normal heart sounds and intact distal pulses.  Exam reveals no gallop, no S3 and no S4.    No murmur heard.  Pulses:       Carotid pulses are 2+ on the right side, and 2+ on the left side.       Radial pulses are 2+ on the right side, and 2+ on the left side.   Pulmonary/Chest: Breath sounds normal.   Abdominal: Soft. Bowel sounds are normal. He exhibits no mass. There is no tenderness.   Musculoskeletal: He exhibits edema (2+ bilateral).   Neurological: He is alert and oriented to person, place, and time.   Skin: Skin is warm and dry. Rash noted.          Results Review:     I reviewed the patient's new clinical results.      Results from last 7 days  Lab Units 07/11/17  1138   WBC 10*3/mm3 4.83 "   HEMOGLOBIN g/dL 13.0*   HEMATOCRIT % 41.1   PLATELETS 10*3/mm3 312       Results from last 7 days  Lab Units 07/12/17  0828   SODIUM mmol/L 136   POTASSIUM mmol/L 4.0   CHLORIDE mmol/L 104   CO2 mmol/L 26.0   BUN mg/dL 10   CREATININE mg/dL 1.00   CALCIUM mg/dL 9.6   BILIRUBIN mg/dL 0.7   ALK PHOS U/L 80   ALT (SGPT) U/L 14   AST (SGOT) U/L 16   GLUCOSE mg/dL 107*       Results from last 7 days  Lab Units 07/12/17  0828   SODIUM mmol/L 136   POTASSIUM mmol/L 4.0   CHLORIDE mmol/L 104   CO2 mmol/L 26.0   BUN mg/dL 10   CREATININE mg/dL 1.00   GLUCOSE mg/dL 107*   CALCIUM mg/dL 9.6         No results found for: TROPONINI        Results from last 7 days  Lab Units 07/12/17  0828   CHOLESTEROL mg/dL 109   TRIGLYCERIDES mg/dL 77   HDL CHOL mg/dL 24*             Tele:  Sinus rhythm    Assessment/Plan     Active Problems:    Hypertension    Morbid obesity    Obstructive sleep apnea    Personal history of noncompliance with medical treatment    Acute on chronic systolic (congestive) heart failure    Chest pain on breathing    CHF (congestive heart failure), NYHA class IV      Continue diuresis.  Needs evaluation for possible bariatric surgery.  Initiate Entresto today.  Echocardiogram pending.        Balbir Olsen MD  07/12/17  10:49 AM    Please note that portions of this note may have been completed with a voice recognition program. Efforts were made to edit the dictations, but occasionally words are mistranscribed.

## 2017-07-12 NOTE — PLAN OF CARE
Problem: Cardiac: Heart Failure (Adult)  Goal: Signs and Symptoms of Listed Potential Problems Will be Absent or Manageable (Cardiac: Heart Failure)  Outcome: Ongoing (interventions implemented as appropriate)    Problem: Patient Care Overview (Adult)  Goal: Plan of Care Review  Outcome: Ongoing (interventions implemented as appropriate)  Goal: Adult Individualization and Mutuality  Outcome: Ongoing (interventions implemented as appropriate)  Goal: Discharge Needs Assessment  Outcome: Ongoing (interventions implemented as appropriate)

## 2017-07-12 NOTE — PLAN OF CARE
Problem: Cardiac: Heart Failure (Adult)  Goal: Signs and Symptoms of Listed Potential Problems Will be Absent or Manageable (Cardiac: Heart Failure)  Outcome: Ongoing (interventions implemented as appropriate)    Problem: Patient Care Overview (Adult)  Goal: Plan of Care Review  Outcome: Ongoing (interventions implemented as appropriate)    07/12/17 0435   Coping/Psychosocial Response Interventions   Plan Of Care Reviewed With patient   Patient Care Overview   Progress no change   Outcome Evaluation   Outcome Summary/Follow up Plan PT complains of SOA with exertion, and tachypneic. No complaints of pain, refused SQ heparin

## 2017-07-12 NOTE — PROGRESS NOTES
Continued Stay Note  [facility]     Patient Name: Evan Gannon  MRN: [id number]  Today's Date: 7/12/2017    Admit Date: 7/11/2017          Discharge Plan     Consent obtained for the participation in the [facility] Transitions Program. Tracy Alford RN                Discharge Codes     None        Expected Discharge Date and Time     Expected Discharge Date Expected Discharge Time    Jul 14, 2017             Tracy Alford RN

## 2017-07-12 NOTE — PROGRESS NOTES
[facility] HOSPITALIST    PROGRESS NOTE    Name:  Evan Gannon   Age:  26 y.o.  Sex:  male  :  1990  MRN:  [id number]   Visit Number:  [id number]  Admission Date:  2017  Date Of Service:  17  Primary Care Physician:  Barbara Mills MD     LOS: 1 day :      Chief Complaint:  Shortness of breath. Follow up chf exacerbation        Subjective / Interval History: Patient was up last night, not slept well so he is sleeping this morning. Late entry note, as patient seen around 0900 today.     He awakens easily, was wearing bipap as ordered. He denies chest pain, nausea, vomiting, abdominal pain, worsening edema. He had shortness of breath with pleuritic chest pain, currently resolved at rest on bipap machine.       Review of Systems:     General ROS: Patient denies any fevers, chills or loss of consciousness.  Ophthalmic ROS: Denies any diplopia or transient loss of vision.  ENT ROS: Denies sore throat, nasal congestion or ear pain.   Respiratory ROS: Denies cough or shortness of breath.  Cardiovascular ROS: Denies chest pain or palpitations. No history of exertional chest pain. Currently improved shortness of breath and pleuritic chest pain  Gastrointestinal ROS: Denies nausea and vomiting. Denies any abdominal pain. No diarrhea.  Genito-Urinary ROS: Denies dysuria or hematuria.  Musculoskeletal ROS: Denies chronic back pain. No muscle pain. No calf pain.  Neurological ROS: Denies any focal weakness. No loss of consciousness. Denies any numbness. Denies neck pain.   Dermatological ROS: Denies any redness or pruritis.    Vital Signs:    Temp:  [97 °F (36.1 °C)-98.6 °F (37 °C)] 98.6 °F (37 °C)  Heart Rate:  [] 75  Resp:  [16-20] 18  BP: (110-126)/(42-84) 110/42    Intake and output:    I/O last 3 completed shifts:  In: 480 [P.O.:480]  Out: 2300 [Urine:2300]       Physical Examination:    General Appearance:    Alert after easily awakened and cooperative, not in  any acute distress. Morbidly obese, lying in bed asleep on arrival on bipap   Head:    Atraumatic and normocephalic, without obvious abnormality.   Eyes:            PERRLA, conjunctivae and sclerae normal, no Icterus. No pallor. Extra-occular movements are within normal limits.   Throat:   No oral lesions, no thrush, oral mucosa moist.   Neck:   Supple, trachea midline, no thyromegaly, no carotid bruit.   Lungs:     Chest shape is normal. Breath sounds heard bilaterally equally but greatly reduced.  No crackles or wheezing. No Pleural rub or bronchial breathing.    Heart:    Normal S1 and S2, no murmur, no gallop, no rub. No JVD   Abdomen:     Normal bowel sounds, no masses, no organomegaly. Soft        non-tender, non-distended, no guarding, no rebound                Tenderness, severely obese   Extremities:   Moves all extremities well, tr edema, no cyanosis, no             clubbing   Skin:   No bleeding, bruising or rash. Chronic venous stasis dermatitis   Neurologic:   Cranial nerves 2 - 12 grossly intact, sensation intact, Motor power is normal and equal bilaterally.   Laboratory results:    Lab Results (last 24 hours)     Procedure Component Value Units Date/Time    [facility] Draw [[id number]] Collected:  07/11/17 1138    Specimen:  Blood Updated:  07/11/17 2580    Narrative:       The following orders were created for panel order [facility].  Procedure                               Abnormality         Status                     ---------                               -----------         ------                     Light Blue Top[[id number]]                                   Final result               Green Top (Gel)[[id number]]                                  Final result               Lavender Top[[id number]]                                     Final result               Gold Top - SST[[id number]]                                   Final result               Green Top (No Gel)[[id number]]                                                             Please view results for these tests on the individual orders.    Comprehensive Metabolic Panel [[id number]]  (Abnormal) Collected:  07/12/17 0828    Specimen:  Blood Updated:  07/12/17 0914     Glucose 107 (H) mg/dL      BUN 10 mg/dL      Creatinine 1.00 mg/dL      Sodium 136 mmol/L      Potassium 4.0 mmol/L      Chloride 104 mmol/L      CO2 26.0 mmol/L      Calcium 9.6 mg/dL      Total Protein 7.9 g/dL      Albumin 3.90 g/dL      ALT (SGPT) 14 U/L      AST (SGOT) 16 U/L      Alkaline Phosphatase 80 U/L      Total Bilirubin 0.7 mg/dL      eGFR  African Amer 111 mL/min/1.73      Globulin 4.0 gm/dL      A/G Ratio 1.0 (L) g/dL      BUN/Creatinine Ratio 10.0     Anion Gap 6.0 mmol/L     Narrative:       [employer] Guidelines    Stage     Description        GFR  1         Normal or High     90+  2         Mild decrease      60-89  3         Moderate decrease  30-59  4         Severe decrease    15-29  5         Kidney failure     <15    Lipid Panel [[id number]]  (Abnormal) Collected:  07/12/17 0828    Specimen:  Blood Updated:  07/12/17 0914     Total Cholesterol 109 mg/dL      Triglycerides 77 mg/dL      HDL Cholesterol 24 (L) mg/dL      LDL Cholesterol  80 mg/dL     Narrative:       Cholesterol Reference Ranges:   Desirable       < 200 mg/dL   Borderline    200-239 mg/dL   High Risk       > 239 mg/dL    Triglyceride Reference Ranges:   Normal          < 150 mg/dL   Borderline    150-199 mg/dL   High          200-499 mg/dL   Very High       > 499 mg/dL    HDL Reference Ranges:   Low              < 40 mg/dL   High             > 59 mg/dL    LDL Reference Ranges:   Optimal         < 100 mg/dL   Near Optimal  100-129 mg/dL   Borderline    130-159 mg/dL   High          160-189 mg/dL   Very High       > 189 mg/dL    Magnesium [[id number]]  (Normal) Collected:  07/12/17 0828    Specimen:  Blood Updated:  07/12/17 0914     Magnesium 1.9 mg/dL     Phosphorus [[id number]]  (Normal)  Collected:  07/12/17 0828    Specimen:  Blood Updated:  07/12/17 0914     Phosphorus 4.3 mg/dL           I have reviewed the patient's laboratory results.    Radiology results:    Imaging Results (last 24 hours)     Procedure Component Value Units Date/Time    CT Angiogram Chest With Contrast [[id number]] Collected:  07/11/17 1611     Updated:  07/11/17 1903    Narrative:       EXAMINATION: CT ANGIOGRAM CHEST W CONTRAST- 0711/2017     INDICATION: R07.9-Chest pain, unspecified; R06.02-Shortness of breath;  I42.9-Cardiomyopathy, unspecified; I50.23-Acute on chronic systolic  (congestive) heart failure         TECHNIQUE: CT of the chest utilizing CTA protocol with intravenous  contrast administration.     The radiation dose reduction device was turned on for each scan per the  ALARA (As Low as Reasonably Achievable) protocol.     COMPARISON: NONE     FINDINGS: Thyroid is homogeneous. Residual thymic tissue within the  anterior mediastinum, however no suspicious mediastinal masses or  mediastinal adenopathy. Central pulmonary venous congestion. No focal  airspace opacity. Extensive four-chamber cardiomegaly. No evidence of  pulmonary arterial filling defect to suggest embolus. No right heart  strain. RV/LV ratio is normal. No acute osseous abnormality.     Visualized portions of the upper abdomen demonstrate low-attenuation  throughout the liver, consistent with congestive hepatopathy given  reflux of contrast into the IVC and hepatic veins from intravenous  contrast administration.       Impression:       1. No PE.  2. Extensive cardiomegaly with reflux of contrast into the IVC and  hepatic veins consistent with right pathology and resultant  low-attenuation in the liver likely congestive hepatopathy.  3. Pulmonary venous congestion without overt edema.     D:  07/11/2017  E:  07/11/2017        This report was finalized on 7/11/2017 7:01 PM by Dr. Giorgi Blanco.             I have reviewed the patient's radiology  reports.    Medication Review:     I have reviewed the patients active and prn medications.     Assessment:    Active Problems:    Hypertension    Morbid obesity    Obstructive sleep apnea    Personal history of noncompliance with medical treatment    Acute on chronic systolic (congestive) heart failure    Chest pain on breathing    CHF (congestive heart failure), NYHA class IV    Patient admitted with history of nonischemic cardiomyopathy EF 20%, with acute on chronic CHF, pleuritic chest pain, and elevated troponin.    Plan:  Continue iv lasix. Entro added. Continue to monitor on telemetry. Daily labs. Titrate medications on daily basis as needed. Echo pending.    Continue bipap.     Anticipate discharge in the next 2 days.       Medication risks and benefits were discussed in detail. Patient reported satisfaction with care delivered and treatment plan.     Keyona Vazquez DO  07/12/17  4:23 PM        Addendum: Will discuss possible bariatric surgery with the patient.

## 2017-07-12 NOTE — PAYOR COMM NOTE
"Hayden Queenabhay BLAKE (26 y.o. Male)   Id # [id number]  Libia Zaragoza RN, BSN  Phone # [phone]  Fax # [phone]    Date of Birth Social Security Number Address Home Phone MRN    1990  [id number] [facility] [id number] [phone] [id number]    Baptism Marital Status          None Single       Admission Date Admission Type Admitting Provider Attending Provider Department, Room/Bed    7/11/17 Emergency Keyona Vazquez, Keyona Varghese,  [facility] 3E, S337/1    Discharge Date Discharge Disposition Discharge Destination                      Attending Provider: Keyona Vazquez DO     Allergies:  No Known Allergies    Isolation:  None   Infection:  None   Code Status:  FULL    Ht:  69\" (175.3 cm)   Wt:  408 lb 4.8 oz (185 kg)    Admission Cmt:  None   Principal Problem:  None                Active Insurance as of 7/11/2017     Primary Coverage     Payor Plan Insurance Group Employer/Plan Group    ANTHEM BLUE CROSS ANTHEM BLUE CROSS BLUE SHIELD PPO [id number]     Payor Plan Address Payor Plan Phone Number Effective From Effective To    [address] [phone] 7/1/2014     [place] [address]       Subscriber Name Subscriber Birth Date Member ID       [id number] 12/5/1968 [id number]           Secondary Coverage     Payor Plan Insurance Group Employer/Plan Group    PASSPORT PASSPORT      Payor Plan Address Payor Plan Phone Number Effective From Effective To    [address] [phone] 10/1/2014     [place] [address]       Subscriber Name Subscriber Birth Date Member ID       SIVAKUMAR QUEEN 1990 [id number]                 Emergency Contacts      (Rel.) Home Phone Work Phone Mobile Phone    Kiah Austin (Significant Other) [phone] -- --    Aramis Queen (Father) [phone] -- --            Insurance Information                ANTHEM BLUE CROSS/ANTHEM BLUE CROSS BLUE SHIELD PPO Phone: [phone]    Subscriber: Stephania Queen Subscriber#: " [id number]    Group#: [id number] Precert#: [id number]        PASSPORT/PASSPORT Phone: [phone]    Subscriber: Evan Gannon Subscriber#: [id number]    Group#:  Precert#:              History & Physical      Nico Obando MD at 7/11/2017  2:03 PM          Hospital Medicine History and Physical    Primary Care Physician: Barbara Mills MD    Chief complaint sob, pleuretic CP    Subjective     History of Present Illness  Patient is a 26-year-old -American male with past history significant for nonischemic cardiomyopathy with very low ejection fraction around 20%, hypertension, obstructive sleep apnea, morbid obesity, psoriasis.  Patient also is noncompliant with medication and follow-up.  He has had multiple admissions to the hospital for exacerbation of heart failure.  Last time that he was here during March evidently patient was agreeable for AICD placement and also gastric bypass surgery.  She tells me that he was in his usual state of health until about 3-4 days ago when he started noticing that the swelling of lower extremities is getting worse.  Concomitant with that he started having more breathing problem.  In terms gradually got worse and today patient decided to come to the emergency room because the symptoms were very bad.  Also tells me that for the past couple days he has had right-sided pleuritic chest pain/discomfort.  He tells me that only when he takes a deep breath he has discomfort on the right side.  Denies any fever or chills.  No nausea, vomiting, diarrhea, abdominal pain.  At the emergency room patient was given 80 mg of Lasix IV and currently his symptoms are actually better.  Review of Systems   Otherwise complete ROS is negative except as mentioned in the HPI.    Past Medical History:   Past Medical History:   Diagnosis Date   • CHF (congestive heart failure)    • Hypertension    • Plaque psoriasis    • Sleep apnea        Past Surgical History:  Past Surgical  "History:   Procedure Laterality Date   • ADENOIDECTOMY     • NOSE SURGERY     • TONSILLECTOMY         Family History: family history includes Diabetes in his father.     Social History:  reports that he has never smoked. He has never used smokeless tobacco. He reports that he does not drink alcohol or use illicit drugs.    Medications:    (Not in a hospital admission)  No Known Allergies    Objective    Physical Exam:   Vital Signs: /86  Pulse 106  Temp 97.3 °F (36.3 °C) (Oral)   Resp 28  Ht 69\" (175.3 cm)  Wt (!) 400 lb (181 kg)  SpO2 96%  BMI 59.07 kg/m2  Physical Exam  General: Resting in bed in no acute distress and looks comfortable.  Neurological exam: Awake, alert, oriented ×3, no focal weakness or numbness present.  HEENT: PERRLA.  Neck: Supple, no cervical lymphadenopathy palpable.  Lungs: Diminished lung sounds bilaterally, clear to auscultation, breathing without any labor.  Cardiovascular: Very distant heart sounds.  Regular rhythm and rate, no murmur, gallop, rubs audible.  GI: Abdomen is very obese, soft, nontender, not distended, bowel sounds present.  Extremities: Severe edema lower extremities bilaterally.  Thickened skin of lower extremities bilaterally secondary to chronic edema.  Skin: Singular color and scaly lesions on trunk, limbs, scalp.  Lesions are psoriasis.    Results Reviewed:  I have personally reviewed current lab, radiology, and data and agree.    Results from last 7 days  Lab Units 07/11/17  1138   WBC 10*3/mm3 4.83   HEMOGLOBIN g/dL 13.0*   PLATELETS 10*3/mm3 312       Results from last 7 days  Lab Units 07/11/17  1138   SODIUM mmol/L 136   POTASSIUM mmol/L 3.7   CO2 mmol/L 25.0   CREATININE mg/dL 0.90   GLUCOSE mg/dL 96   CALCIUM mg/dL 9.7           Assessment/Plan   Assessment & Plan  Active Problems:    Acute on chronic systolic CHF (congestive heart failure), NYHA class IV. EF about  20 %.    Nonischemic cardiomyopathy with dietitian of ventricles.    Pleuritic " chest pain on the right side.    Pleural effusion and chest a evident evidence of pulmonary congestion.      Hypertension      Morbid obesity      Obstructive sleep apnea      Personal history of noncompliance with medical treatment      Plan:  - Admit to telemetry.  -Continue home medication and also increase diuretics with IV Lasix.  -Repeat labs in a.m.  - Soft cardiology  - Heparin and SCD for DVT prophylaxis.        I discussed the patients findings and my recommendations with the patient at the bedside.    Nico Obando MD 07/11/17 2:04 PM         Electronically signed by Nico Obando MD at 7/11/2017  2:16 PM           Emergency Department Notes      Cirilo Altman MD at 7/11/2017 11:28 AM          Subjective   HPI Comments: Evan Gannon is a 26 y.o.male who presents to the ED with c/o CP. For the past three days, Mr. Gannon has developed worsening SoA and intermittent mid sternal CP. He states that today the pain is exacerbated by deep inspiration and not relieved by anything, although it seems to randomly resolve. Due to continued sx, he presents to the ED where he says that he has been frequently dizzy and in the last few days he has been swelling slightly more than baseline. He denies N/V, HA, diarrhea, abdominal pain or any other acute sx. He sleeps with an oxygen mask. Mr. Gannon denies similar pains in the past.    Patient is a 26 y.o. male presenting with chest pain.   History provided by:  Patient  Chest Pain   Chest pain location: Mid sternal area.  Pain radiates to:  Does not radiate  Onset quality:  Gradual  Duration:  3 days  Timing:  Constant  Progression:  Worsening  Context: breathing    Relieved by:  Nothing  Worsened by:  Deep breathing  Ineffective treatments:  None tried  Associated symptoms: dizziness, lower extremity edema and shortness of breath    Associated symptoms: no abdominal pain, no fever, no headache, no nausea and no vomiting        Review of Systems    Constitutional: Negative for fever.   Respiratory: Positive for shortness of breath.    Cardiovascular: Positive for chest pain and leg swelling.   Gastrointestinal: Negative for abdominal pain, nausea and vomiting.   Neurological: Positive for dizziness. Negative for headaches.   All other systems reviewed and are negative.      Past Medical History:   Diagnosis Date   • CHF (congestive heart failure)    • Hypertension    • Plaque psoriasis    • Sleep apnea        No Known Allergies    Past Surgical History:   Procedure Laterality Date   • ADENOIDECTOMY     • NOSE SURGERY     • TONSILLECTOMY         Family History   Problem Relation Age of Onset   • Diabetes Father        Social History     Social History   • Marital status: Single     Spouse name: N/A   • Number of children: 0   • Years of education: N/A     Occupational History   • disabled      Social History Main Topics   • Smoking status: Never Smoker   • Smokeless tobacco: Never Used   • Alcohol use No   • Drug use: No   • Sexual activity: Defer     Other Topics Concern   • None     Social History Narrative    Patient denies caffeine intake.     Patient lives with girlfriend and at home.              Objective   Physical Exam   Constitutional: He is oriented to person, place, and time. He appears well-developed and well-nourished. No distress.   Obese   HENT:   Head: Normocephalic and atraumatic.   Mouth/Throat: No oropharyngeal exudate.   Eyes: Conjunctivae are normal. No scleral icterus.   Neck: Normal range of motion. Neck supple. No JVD present.   Cardiovascular: Normal rate, regular rhythm and normal heart sounds.  Exam reveals no gallop and no friction rub.    No murmur heard.  Pulmonary/Chest: Effort normal and breath sounds normal. No respiratory distress. He has no wheezes. He has no rales.   Abdominal: Soft. Bowel sounds are normal. He exhibits no distension. There is no tenderness. There is no rebound and no guarding.   Musculoskeletal: Normal  range of motion. He exhibits edema.   Non pitting edema of lower extremities   Neurological: He is alert and oriented to person, place, and time.   Skin: Skin is warm and dry. Rash noted. He is not diaphoretic.   Skin rash consistent with psoriasis.   Psychiatric: He has a normal mood and affect. His behavior is normal.   Nursing note and vitals reviewed.      Procedures        ED Course  ED Course   Comment By Time   His chest x-ray voice clip indicates pulmonary edema.  His troponin is mildly elevated.  I called Dr. Olsen's office and requested a consult.  I have ordered 80 mg of IV Lasix Cirilo Altman MD 07/11 1233   Dr. Altman spoke with Dr. Tafoya, hospitalist, who will admit. Saurabh Hebert 07/11 1322   I spoke with the cardiology PA who was on the phone with Dr. Olsen.  They're both very familiar with Evan.  They asked that the hospitalist admit and attempt to evaluate for PE.  Hopefully we can get a CTA although at this point don't have enough IV access for that.  I spoke with Dr. Tafoya who will admit. Cirilo Altman MD 07/11 1326                     MDM  Number of Diagnoses or Management Options  Acute on chronic systolic congestive heart failure: established and worsening  Cardiomyopathy: established and worsening  Chest pain, unspecified type: new and requires workup  Shortness of breath: established and worsening     Amount and/or Complexity of Data Reviewed  Clinical lab tests: ordered and reviewed  Tests in the radiology section of CPT®:  ordered and reviewed  Review and summarize past medical records: yes  Discuss the patient with other providers: yes  Independent visualization of images, tracings, or specimens: yes    Patient Progress  Patient progress: stable    EMR Dragon/Transcription disclaimer:   Much of this encounter note is an electronic transcription/translation of spoken language to printed text. The electronic translation of spoken language may permit erroneous, or at times,  nonsensical words or phrases to be inadvertently transcribed; Although I have reviewed the note for such errors, some may still exist.     Final diagnoses:   Chest pain, unspecified type   Shortness of breath   Cardiomyopathy   Acute on chronic systolic congestive heart failure       Documentation assistance provided by josé Hebert.  Information recorded by the scribe was done at my direction and has been verified and validated by me.     Saurabh Hebert  17 1146       Cirilo Altman MD  17 1746       Electronically signed by Cirilo Altman MD at 2017  5:46 PM        Operative/Procedure Notes (last 72 hours) (Notes from 2017  8:54 AM through 2017  8:54 AM)     No notes of this type exist for this encounter.        Physician Progress Notes (last 72 hours) (Notes from 2017  8:54 AM through 2017  8:54 AM)     No notes of this type exist for this encounter.           Consult Notes (last 72 hours) (Notes from 2017  8:54 AM through 2017  8:54 AM)      Balbir Olsen MD at 2017  1:21 PM  Version 2 of 2         [facility]  CARDIOLOGY CONSULTATION NOTE    Evan Gannon  : 1990  MRN:[id number]  Home Phone:[phone]    Date of Admission:2017  Date of Consultation: 17    PCP: Barbara Mills MD    IDENTIFICATION: A 26 y.o. male resident of [place], [place].    Chief Complaint   Patient presents with   • Shortness of Breath   • Chest Pain         Problem List:  1. Dilated cardiomyopathy    A. Echocardiogram 14; LVEF 0.20-0.25, mild MR,    moderate TR     B. Echocardiogram 2014; LVEF 0.20-0.25, all valves    structurally and functionally normal with no                     C. Echocardiogram  EF 20%    D. Life Vest, Medical therapy BB,ARB,diuretics    E. Multiple ER visits for acute SHF    F. Admit to [facility] ER with Pleuritic chest pain and Positive   troponin 17  2. Chronic  congestive heart failure;     A. Heart failure Center visits December 2015    B. HF clinic December 2016    C. [facility] ED 2/6/17, 6/5/17  3. Hypertension  4. Morbid obesity; BMI 61  5. Obstructive sleep apnea; compliant with CPAP nightly  6. History of noncompliance with medication administration/follow up visits  7. Surgical history; tonsils and adenoids, nasal surgery        ALLERGIES: No Known Allergies    HPI:   The patient is a pleasant and unfortunate 26-year-old Afro-American male who is admitted to [facility] today for worsening shortness of breath symptoms and sudden onset pleuritic right-sided chest pain occurring early this a.m.  The patient has had a cardiomyopathy for 3 years and has been managed medically by Dr. Olsen as well as heart failure clinic.  There is a history of some noncompliance with medications although recently the patient has been doing his best to take his medicines on a regular basis and has been seen by the heart failure clinic on a regular basis.  He has been wearing his LifeVest most the time but sometimes takes it off as is uncomfortable.  He recently was in the heart failure clinic and had increased his torsemide to 3 tablets a day.  However, he states starting on Sunday he began having worsening shortness of breath.  He has been a little bit less active the past few days as shortness of breath limited his ability to get up and move very often.  He also developed some pains in the back of his calf some both legs.  This  mid morning he began develop sharp stabbing knifelike pains on the right side of his chest worse when he takes deep breaths.  He does have some orthopnea and peripheral edema as well as severe obstructive sleep apnea which she is wearing his CPAP at home with.  He states he has been scheduled to see Dr. Whittington regarding possible weight loss therapy and he is schedule near future to see Dr. Olsen with follow-up echocardiogram.  He presented to the ER  "today because of this new onset pleuritic chest pain as well as shortness breath over the past 3 or 4 days.  He denies fevers, chills, cough.  He denies any palpitations near-syncope or syncope events.  Recurrent dyspnea orthopnea and new onset pleuritic chest pain fairly rapid over the last 1-2 weeks.  Claims being compliant with CPAP, not so much with his LifeVest.  Claims medical compliance.  See Dr. Whittington in the near future.  ROS: All systems have been reviewed and are negative with the exception of those mentioned in the HPI and problem list above.    Surgical History:   Past Surgical History:   Procedure Laterality Date   • ADENOIDECTOMY     • NOSE SURGERY     • TONSILLECTOMY         Social History:   Social History     Social History   • Marital status: Single     Spouse name: N/A   • Number of children: 0   • Years of education: N/A     Occupational History   • disabled      Social History Main Topics   • Smoking status: Never Smoker   • Smokeless tobacco: Never Used   • Alcohol use No   • Drug use: No   • Sexual activity: Defer     Other Topics Concern   • Not on file     Social History Narrative    Patient denies caffeine intake.     Patient lives with girlfriend and at home.            Family History:   Family History   Problem Relation Age of Onset   • Diabetes Father        Objective     /86  Pulse 106  Temp 97.3 °F (36.3 °C) (Oral)   Resp 28  Ht 69\" (175.3 cm)  Wt (!) 400 lb (181 kg)  SpO2 96%  BMI 59.07 kg/m2  No intake or output data in the 24 hours ending 07/11/17 1343    PHYSICAL EXAM:  Constitutional:  Morbidly obese, cooperative, in no acute distress.   Head:  Normocephalic, without obvious abnormality, atraumatic.   Neck: No adenopathy, supple, trachea midline, no thyromegaly, no    carotid bruit, no JVD.   Respiratory:   Clear to auscultation bilaterally; respirations regular, even and unlabored. No wheezes, rales or rhonchi.    Cardiovascular:  Regular rhythm and normal rate, " "normal S1 and S2, no            murmur, no gallop, no rub, no click.   Pulses: Peripheral pulses are present and equal bilaterally.   GI:   Soft, non-distended. Bowel sounds heard throughout. No organomegaly or masses. Non-tender to palpation, no guarding.   Extremities: Two plus edema.   Skin: Large areas of psoriasis and dry \"alligator like\" skin on lower extremities. No bleeding, bruising or rash.   Neurological: Alert, oriented to time, person and place. No focal deficits.   I have examined the patient and agree with the above  Labs/Diagnostic Data    Results from last 7 days  Lab Units 07/11/17  1138   SODIUM mmol/L 136   POTASSIUM mmol/L 3.7   CHLORIDE mmol/L 104   CO2 mmol/L 25.0   BUN mg/dL 10   CREATININE mg/dL 0.90   GLUCOSE mg/dL 96   CALCIUM mg/dL 9.7           Results from last 7 days  Lab Units 07/11/17  1138   WBC 10*3/mm3 4.83   HEMOGLOBIN g/dL 13.0*   HEMATOCRIT % 41.1   PLATELETS 10*3/mm3 312                               I personally reviewed the patient's EKG/Telemetry data    Radiology Data:   CXR: CHF, Cardiomegaly  CTA: Pending  Current Medications:              Interpretation Summary  2/17  · Left ventricular function is severely decreased. Estimated EF = 20%.  · Right ventricular cavity is mildly dilated.  · The cardiac valves are anatomically and functionally normal.  · The left ventricular cavity is moderately dilated.         Assessment and Plan:     1. Pleuritic Chest pain and elevated Troponin.   A)Rule out PE   B)Serial Cardiac markers, EKG's  2. Severe Cardiomyopathy  3. Acute on chronic SHF  4. Severe obesity  5.  Severe Plaque psoriasis     PLAN:  · CTA rule out PE  · Serial cardiac markers, Repeat Echocardiogram, last LVEF 20%.  · Continue medical therapy for cardiomyopathy, SHF  · Consider [facility] possible CBI in future, although extremely unlikely he has an ischemic cardiomyopathy.  This most likely represents a combination of obstructive sleep apnea, hypertension, noncompliance, and " morbid obesity.  · Transition from ACE inhibitor to Entresto    Scribed for Dr. Olsen by Cuong Carrizales PA-C. 2017  1:43 PM  I, Balbir Olsen MD, personally performed the services described in this documentation as scribed by the above individual in my presence, and it is both accurate and complete    Thank you for allowing me to participate in the care of Evan Gannon. Feel free to contact me directly with any further questions or concerns.       Electronically signed by Balbir Olsen MD at 2017  5:13 PM      CARLOS Douglass at 2017  1:21 PM  Version 1 of 2         [facility] at [facility]  CARDIOLOGY CONSULTATION NOTE    Evan Gannon  : 1990  MRN:[id number]  Home Phone:[phone]    Date of Admission:2017  Date of Consultation: 17    PCP: Barbara Mills MD    IDENTIFICATION: A 26 y.o. male resident of [place], [place].    Chief Complaint   Patient presents with   • Shortness of Breath   • Chest Pain         Problem List:  6. Dilated cardiomyopathy    A. Echocardiogram 14; LVEF 0.20-0.25, mild MR,    moderate TR     B. Echocardiogram 2014; LVEF 0.20-0.25, all valves    structurally and functionally normal with no                     C. Echocardiogram  EF 20%    D. Life Vest, Medical therapy BB,ARB,diuretics    E. Multiple ER visits for acute SHF    F. Admit to [facility] operated by [facility] ER with Pleuritic chest pain and Positive   troponin 17  3. Chronic congestive heart failure;     A. Heart failure Center visits 2015    B. HF clinic 2016    C. [facility] ED 17, 17  8. Hypertension  9. Morbid obesity; BMI 61  10. Obstructive sleep apnea; compliant with CPAP nightly  11. History of noncompliance with medication administration/follow up visits  12. Surgical history; tonsils and adenoids, nasal surgery        ALLERGIES: No Known Allergies    HPI:   The patient is a pleasant and unfortunate 26-year-old Afro-American  male who is admitted to [facility] today for worsening shortness of breath symptoms and sudden onset pleuritic right-sided chest pain occurring early this a.m.  The patient has had a cardiomyopathy for 3 years and has been managed medically by Dr. Olsen as well as heart failure clinic.  There is a history of some noncompliance with medications although recently the patient has been doing his best to take his medicines on a regular basis and has been seen by the heart failure clinic on a regular basis.  He has been wearing his LifeVest most the time but sometimes takes it off as is uncomfortable.  He recently was in the heart failure clinic and had increased his torsemide to 3 tablets a day.  However, he states starting on Sunday he began having worsening shortness of breath.  He has been a little bit less active the past few days as shortness of breath limited his ability to get up and move very often.  He also developed some pains in the back of his calf some both legs.  This  mid morning he began develop sharp stabbing knifelike pains on the right side of his chest worse when he takes deep breaths.  He does have some orthopnea and peripheral edema as well as severe obstructive sleep apnea which she is wearing his CPAP at home with.  He states he has been scheduled to see Dr. Whittington regarding possible weight loss therapy and he is schedule near future to see Dr. Olsen with follow-up echocardiogram.  He presented to the ER today because of this new onset pleuritic chest pain as well as shortness breath over the past 3 or 4 days.  He denies fevers, chills, cough.  He denies any palpitations near-syncope or syncope events.    ROS: All systems have been reviewed and are negative with the exception of those mentioned in the HPI and problem list above.    Surgical History:   Past Surgical History:   Procedure Laterality Date   • ADENOIDECTOMY     • NOSE SURGERY     • TONSILLECTOMY         Social History:  "  Social History     Social History   • Marital status: Single     Spouse name: N/A   • Number of children: 0   • Years of education: N/A     Occupational History   • disabled      Social History Main Topics   • Smoking status: Never Smoker   • Smokeless tobacco: Never Used   • Alcohol use No   • Drug use: No   • Sexual activity: Defer     Other Topics Concern   • Not on file     Social History Narrative    Patient denies caffeine intake.     Patient lives with girlfriend and at home.            Family History:   Family History   Problem Relation Age of Onset   • Diabetes Father        Objective     /86  Pulse 106  Temp 97.3 °F (36.3 °C) (Oral)   Resp 28  Ht 69\" (175.3 cm)  Wt (!) 400 lb (181 kg)  SpO2 96%  BMI 59.07 kg/m2  No intake or output data in the 24 hours ending 07/11/17 1343    PHYSICAL EXAM:  Constitutional:  Morbidly obese, cooperative, in no acute distress.   Head:  Normocephalic, without obvious abnormality, atraumatic.   Neck: No adenopathy, supple, trachea midline, no thyromegaly, no    carotid bruit, no JVD.   Respiratory:   Clear to auscultation bilaterally; respirations regular, even and unlabored. No wheezes, rales or rhonchi.    Cardiovascular:  Regular rhythm and normal rate, normal S1 and S2, no            murmur, no gallop, no rub, no click.   Pulses: Peripheral pulses are present and equal bilaterally.   GI:   Soft, non-distended. Bowel sounds heard throughout. No organomegaly or masses. Non-tender to palpation, no guarding.   Extremities: One plus edema.   Skin: Large areas of psoriasis and dry \"alligator like\" skin on lower extremities. No bleeding, bruising or rash.   Neurological: Alert, oriented to time, person and place. No focal deficits.     Labs/Diagnostic Data    Results from last 7 days  Lab Units 07/11/17  1138   SODIUM mmol/L 136   POTASSIUM mmol/L 3.7   CHLORIDE mmol/L 104   CO2 mmol/L 25.0   BUN mg/dL 10   CREATININE mg/dL 0.90   GLUCOSE mg/dL 96   CALCIUM mg/dL " 9.7           Results from last 7 days  Lab Units 07/11/17  1138   WBC 10*3/mm3 4.83   HEMOGLOBIN g/dL 13.0*   HEMATOCRIT % 41.1   PLATELETS 10*3/mm3 312                               I personally reviewed the patient's EKG/Telemetry data    Radiology Data:   CXR: CHF, Cardiomegaly  CTA: Pending  Current Medications:              Interpretation Summary  2/17  · Left ventricular function is severely decreased. Estimated EF = 20%.  · Right ventricular cavity is mildly dilated.  · The cardiac valves are anatomically and functionally normal.  · The left ventricular cavity is moderately dilated.         Assessment and Plan:     1. Pleuritic Chest pain and elevated Troponin.   A)Rule out PE   B)Serial Cardiac markers, EKG's  2. Severe Cardiomyopathy  3. Acute on chronic SHF  4. Severe obesity  5.  Severe Plaque psoriasis     PLAN:  · CTA rule out PE  · Serial cardiac markers, Repeat Echocardiogram  · Continue medical therapy for cardiomyopathy, SHF  · Consider [facility] possible CBI in future.    Scribed for Dr. Olsen by Cuong Carrizales PA-C. 7/11/2017  1:43 PM    Thank you for allowing me to participate in the care of Evan Gannon. Feel free to contact me directly with any further questions or concerns.       Electronically signed by CARLOS Douglass at 7/11/2017  1:44 PM

## 2017-07-13 ENCOUNTER — APPOINTMENT (OUTPATIENT)
Dept: CARDIOLOGY | Facility: HOSPITAL | Age: 27
End: 2017-07-13

## 2017-07-13 LAB
ANION GAP SERPL CALCULATED.3IONS-SCNC: 7 MMOL/L (ref 3–11)
BH CV ECHO MEAS - AO ROOT AREA (BSA CORRECTED): 1
BH CV ECHO MEAS - AO ROOT AREA: 6.6 CM^2
BH CV ECHO MEAS - AO ROOT DIAM: 2.9 CM
BH CV ECHO MEAS - BSA(HAYCOCK): 3.1 M^2
BH CV ECHO MEAS - BSA: 2.8 M^2
BH CV ECHO MEAS - BZI_BMI: 60.1 KILOGRAMS/M^2
BH CV ECHO MEAS - BZI_METRIC_HEIGHT: 175.3 CM
BH CV ECHO MEAS - BZI_METRIC_WEIGHT: 184.6 KG
BH CV ECHO MEAS - EDV(CUBED): 356.4 ML
BH CV ECHO MEAS - EDV(TEICH): 262.9 ML
BH CV ECHO MEAS - EF(CUBED): 17.1 %
BH CV ECHO MEAS - EF(TEICH): 13.2 %
BH CV ECHO MEAS - ESV(CUBED): 295.4 ML
BH CV ECHO MEAS - ESV(TEICH): 228.2 ML
BH CV ECHO MEAS - FS: 6.1 %
BH CV ECHO MEAS - IVS/LVPW: 0.77
BH CV ECHO MEAS - IVSD: 0.95 CM
BH CV ECHO MEAS - LA DIMENSION: 4 CM
BH CV ECHO MEAS - LA/AO: 1.4
BH CV ECHO MEAS - LV MASS(C)D: 365.8 GRAMS
BH CV ECHO MEAS - LV MASS(C)DI: 130.9 GRAMS/M^2
BH CV ECHO MEAS - LVIDD: 7.1 CM
BH CV ECHO MEAS - LVIDS: 6.7 CM
BH CV ECHO MEAS - LVOT AREA (M): 4.5 CM^2
BH CV ECHO MEAS - LVOT AREA: 4.5 CM^2
BH CV ECHO MEAS - LVOT DIAM: 2.4 CM
BH CV ECHO MEAS - LVPWD: 1.2 CM
BH CV ECHO MEAS - MV A MAX VEL: 28.1 CM/SEC
BH CV ECHO MEAS - MV E MAX VEL: 62.7 CM/SEC
BH CV ECHO MEAS - MV E/A: 2.2
BH CV ECHO MEAS - PA ACC SLOPE: 451 CM/SEC^2
BH CV ECHO MEAS - PA ACC TIME: 0.09 SEC
BH CV ECHO MEAS - PA PR(ACCEL): 38.5 MMHG
BH CV ECHO MEAS - RAP SYSTOLE: 8 MMHG
BH CV ECHO MEAS - RVDD: 3.1 CM
BH CV ECHO MEAS - RVSP: 26.1 MMHG
BH CV ECHO MEAS - SI(CUBED): 21.8 ML/M^2
BH CV ECHO MEAS - SI(TEICH): 12.4 ML/M^2
BH CV ECHO MEAS - SV(CUBED): 61 ML
BH CV ECHO MEAS - SV(TEICH): 34.6 ML
BH CV ECHO MEAS - TR MAX VEL: 211 CM/SEC
BNP SERPL-MCNC: 386 PG/ML (ref 0–100)
BUN BLD-MCNC: 12 MG/DL (ref 9–23)
BUN/CREAT SERPL: 13.3 (ref 7–25)
CALCIUM SPEC-SCNC: 9.3 MG/DL (ref 8.7–10.4)
CHLORIDE SERPL-SCNC: 100 MMOL/L (ref 99–109)
CO2 SERPL-SCNC: 26 MMOL/L (ref 20–31)
CREAT BLD-MCNC: 0.9 MG/DL (ref 0.6–1.3)
DEPRECATED RDW RBC AUTO: 48.8 FL (ref 37–54)
ERYTHROCYTE [DISTWIDTH] IN BLOOD BY AUTOMATED COUNT: 17.2 % (ref 11.3–14.5)
GFR SERPL CREATININE-BSD FRML MDRD: 126 ML/MIN/1.73
GLUCOSE BLD-MCNC: 113 MG/DL (ref 70–100)
GLUCOSE BLDC GLUCOMTR-MCNC: 115 MG/DL (ref 70–130)
HCT VFR BLD AUTO: 42 % (ref 38.9–50.9)
HGB BLD-MCNC: 13.4 G/DL (ref 13.1–17.5)
LV EF 2D ECHO EST: 20 %
MCH RBC QN AUTO: 25 PG (ref 27–31)
MCHC RBC AUTO-ENTMCNC: 31.9 G/DL (ref 32–36)
MCV RBC AUTO: 78.5 FL (ref 80–99)
PLATELET # BLD AUTO: 314 10*3/MM3 (ref 150–450)
PMV BLD AUTO: 10.1 FL (ref 6–12)
POTASSIUM BLD-SCNC: 3.8 MMOL/L (ref 3.5–5.5)
RBC # BLD AUTO: 5.35 10*6/MM3 (ref 4.2–5.76)
SODIUM BLD-SCNC: 133 MMOL/L (ref 132–146)
TROPONIN I SERPL-MCNC: 1.14 NG/ML
WBC NRBC COR # BLD: 5.5 10*3/MM3 (ref 3.5–10.8)

## 2017-07-13 PROCEDURE — 25010000002 MIDAZOLAM PER 1 MG: Performed by: INTERNAL MEDICINE

## 2017-07-13 PROCEDURE — C1894 INTRO/SHEATH, NON-LASER: HCPCS | Performed by: INTERNAL MEDICINE

## 2017-07-13 PROCEDURE — 93306 TTE W/DOPPLER COMPLETE: CPT | Performed by: INTERNAL MEDICINE

## 2017-07-13 PROCEDURE — C8929 TTE W OR WO FOL WCON,DOPPLER: HCPCS

## 2017-07-13 PROCEDURE — 99024 POSTOP FOLLOW-UP VISIT: CPT | Performed by: INTERNAL MEDICINE

## 2017-07-13 PROCEDURE — 25010000002 FENTANYL CITRATE (PF) 100 MCG/2ML SOLUTION: Performed by: INTERNAL MEDICINE

## 2017-07-13 PROCEDURE — 93458 L HRT ARTERY/VENTRICLE ANGIO: CPT | Performed by: INTERNAL MEDICINE

## 2017-07-13 PROCEDURE — 99232 SBSQ HOSP IP/OBS MODERATE 35: CPT | Performed by: FAMILY MEDICINE

## 2017-07-13 PROCEDURE — 93005 ELECTROCARDIOGRAM TRACING: CPT | Performed by: FAMILY MEDICINE

## 2017-07-13 PROCEDURE — 84484 ASSAY OF TROPONIN QUANT: CPT | Performed by: INTERNAL MEDICINE

## 2017-07-13 PROCEDURE — B2111ZZ FLUOROSCOPY OF MULTIPLE CORONARY ARTERIES USING LOW OSMOLAR CONTRAST: ICD-10-PCS | Performed by: INTERNAL MEDICINE

## 2017-07-13 PROCEDURE — 94799 UNLISTED PULMONARY SVC/PX: CPT

## 2017-07-13 PROCEDURE — 83880 ASSAY OF NATRIURETIC PEPTIDE: CPT | Performed by: FAMILY MEDICINE

## 2017-07-13 PROCEDURE — B2151ZZ FLUOROSCOPY OF LEFT HEART USING LOW OSMOLAR CONTRAST: ICD-10-PCS | Performed by: INTERNAL MEDICINE

## 2017-07-13 PROCEDURE — 25010000002 ONDANSETRON PER 1 MG: Performed by: INTERNAL MEDICINE

## 2017-07-13 PROCEDURE — 93010 ELECTROCARDIOGRAM REPORT: CPT | Performed by: INTERNAL MEDICINE

## 2017-07-13 PROCEDURE — 94660 CPAP INITIATION&MGMT: CPT

## 2017-07-13 PROCEDURE — 4A023N7 MEASUREMENT OF CARDIAC SAMPLING AND PRESSURE, LEFT HEART, PERCUTANEOUS APPROACH: ICD-10-PCS | Performed by: INTERNAL MEDICINE

## 2017-07-13 PROCEDURE — 25010000002 FUROSEMIDE PER 20 MG: Performed by: INTERNAL MEDICINE

## 2017-07-13 PROCEDURE — 85027 COMPLETE CBC AUTOMATED: CPT | Performed by: FAMILY MEDICINE

## 2017-07-13 PROCEDURE — 82962 GLUCOSE BLOOD TEST: CPT

## 2017-07-13 PROCEDURE — 25010000002 HEPARIN (PORCINE) PER 1000 UNITS: Performed by: INTERNAL MEDICINE

## 2017-07-13 PROCEDURE — 0 IOPAMIDOL PER 1 ML: Performed by: INTERNAL MEDICINE

## 2017-07-13 PROCEDURE — C1769 GUIDE WIRE: HCPCS | Performed by: INTERNAL MEDICINE

## 2017-07-13 PROCEDURE — 25010000002 SULFUR HEXAFLUORIDE MICROSPH 60.7-25 MG RECONSTITUTED SUSPENSION: Performed by: FAMILY MEDICINE

## 2017-07-13 PROCEDURE — 80048 BASIC METABOLIC PNL TOTAL CA: CPT | Performed by: FAMILY MEDICINE

## 2017-07-13 RX ORDER — CLOPIDOGREL BISULFATE 75 MG/1
75 TABLET ORAL DAILY
Status: DISCONTINUED | OUTPATIENT
Start: 2017-07-14 | End: 2017-07-14

## 2017-07-13 RX ORDER — NALOXONE HCL 0.4 MG/ML
0.4 VIAL (ML) INJECTION
Status: DISCONTINUED | OUTPATIENT
Start: 2017-07-13 | End: 2017-07-19 | Stop reason: HOSPADM

## 2017-07-13 RX ORDER — MORPHINE SULFATE 2 MG/ML
1 INJECTION, SOLUTION INTRAMUSCULAR; INTRAVENOUS EVERY 4 HOURS PRN
Status: DISCONTINUED | OUTPATIENT
Start: 2017-07-13 | End: 2017-07-19 | Stop reason: HOSPADM

## 2017-07-13 RX ORDER — FENTANYL CITRATE 50 UG/ML
INJECTION, SOLUTION INTRAMUSCULAR; INTRAVENOUS AS NEEDED
Status: DISCONTINUED | OUTPATIENT
Start: 2017-07-13 | End: 2017-07-13 | Stop reason: HOSPADM

## 2017-07-13 RX ORDER — DIPHENHYDRAMINE HYDROCHLORIDE 50 MG/ML
25 INJECTION INTRAMUSCULAR; INTRAVENOUS EVERY 6 HOURS PRN
Status: DISCONTINUED | OUTPATIENT
Start: 2017-07-13 | End: 2017-07-19 | Stop reason: HOSPADM

## 2017-07-13 RX ORDER — CLOPIDOGREL BISULFATE 75 MG/1
600 TABLET ORAL ONCE
Status: COMPLETED | OUTPATIENT
Start: 2017-07-13 | End: 2017-07-13

## 2017-07-13 RX ORDER — HYDROCODONE BITARTRATE AND ACETAMINOPHEN 5; 325 MG/1; MG/1
1 TABLET ORAL EVERY 4 HOURS PRN
Status: DISCONTINUED | OUTPATIENT
Start: 2017-07-13 | End: 2017-07-19 | Stop reason: HOSPADM

## 2017-07-13 RX ORDER — ASPIRIN 81 MG/1
243 TABLET, CHEWABLE ORAL ONCE
Status: COMPLETED | OUTPATIENT
Start: 2017-07-13 | End: 2017-07-13

## 2017-07-13 RX ORDER — LIDOCAINE HYDROCHLORIDE 10 MG/ML
INJECTION, SOLUTION INFILTRATION; PERINEURAL AS NEEDED
Status: DISCONTINUED | OUTPATIENT
Start: 2017-07-13 | End: 2017-07-13 | Stop reason: HOSPADM

## 2017-07-13 RX ORDER — SODIUM CHLORIDE 0.9 % (FLUSH) 0.9 %
1-10 SYRINGE (ML) INJECTION AS NEEDED
Status: DISCONTINUED | OUTPATIENT
Start: 2017-07-13 | End: 2017-07-19 | Stop reason: HOSPADM

## 2017-07-13 RX ORDER — ACETAMINOPHEN 325 MG/1
650 TABLET ORAL EVERY 4 HOURS PRN
Status: DISCONTINUED | OUTPATIENT
Start: 2017-07-13 | End: 2017-07-19 | Stop reason: HOSPADM

## 2017-07-13 RX ORDER — NITROGLYCERIN 0.4 MG/1
0.4 TABLET SUBLINGUAL
Status: DISCONTINUED | OUTPATIENT
Start: 2017-07-13 | End: 2017-07-19 | Stop reason: HOSPADM

## 2017-07-13 RX ORDER — MIDAZOLAM HYDROCHLORIDE 1 MG/ML
INJECTION INTRAMUSCULAR; INTRAVENOUS AS NEEDED
Status: DISCONTINUED | OUTPATIENT
Start: 2017-07-13 | End: 2017-07-13 | Stop reason: HOSPADM

## 2017-07-13 RX ORDER — ASPIRIN 81 MG/1
81 TABLET ORAL DAILY
Status: DISCONTINUED | OUTPATIENT
Start: 2017-07-14 | End: 2017-07-19 | Stop reason: HOSPADM

## 2017-07-13 RX ORDER — BUMETANIDE 0.25 MG/ML
2 INJECTION INTRAMUSCULAR; INTRAVENOUS ONCE
Status: COMPLETED | OUTPATIENT
Start: 2017-07-13 | End: 2017-07-13

## 2017-07-13 RX ORDER — ATORVASTATIN CALCIUM 10 MG/1
10 TABLET, FILM COATED ORAL DAILY
Status: DISCONTINUED | OUTPATIENT
Start: 2017-07-13 | End: 2017-07-19 | Stop reason: HOSPADM

## 2017-07-13 RX ADMIN — SACUBITRIL AND VALSARTAN 1 TABLET: 97; 103 TABLET, FILM COATED ORAL at 08:38

## 2017-07-13 RX ADMIN — ASPIRIN 81 MG: 81 TABLET, COATED ORAL at 08:38

## 2017-07-13 RX ADMIN — HYDROCODONE BITARTRATE AND ACETAMINOPHEN 1 TABLET: 5; 325 TABLET ORAL at 18:30

## 2017-07-13 RX ADMIN — ONDANSETRON 4 MG: 2 INJECTION INTRAMUSCULAR; INTRAVENOUS at 08:58

## 2017-07-13 RX ADMIN — FUROSEMIDE 40 MG: 10 INJECTION, SOLUTION INTRAMUSCULAR; INTRAVENOUS at 02:12

## 2017-07-13 RX ADMIN — ASPIRIN 243 MG: 81 TABLET, CHEWABLE ORAL at 11:49

## 2017-07-13 RX ADMIN — NITROGLYCERIN 0.4 MG: 0.4 TABLET SUBLINGUAL at 08:59

## 2017-07-13 RX ADMIN — SACUBITRIL AND VALSARTAN 1 TABLET: 97; 103 TABLET, FILM COATED ORAL at 20:34

## 2017-07-13 RX ADMIN — BUMETANIDE 2 MG: 0.25 INJECTION INTRAMUSCULAR; INTRAVENOUS at 15:59

## 2017-07-13 RX ADMIN — BUMETANIDE 1 MG/HR: 0.25 INJECTION INTRAMUSCULAR; INTRAVENOUS at 15:59

## 2017-07-13 RX ADMIN — CLOPIDOGREL BISULFATE 75 MG: 75 TABLET ORAL at 11:49

## 2017-07-13 RX ADMIN — Medication: at 09:59

## 2017-07-13 RX ADMIN — CARVEDILOL 25 MG: 12.5 TABLET, FILM COATED ORAL at 18:24

## 2017-07-13 RX ADMIN — SULFUR HEXAFLUORIDE 3 ML: KIT at 11:45

## 2017-07-13 RX ADMIN — DOCUSATE SODIUM 100 MG: 100 CAPSULE, LIQUID FILLED ORAL at 08:38

## 2017-07-13 RX ADMIN — FAMOTIDINE 20 MG: 20 TABLET ORAL at 08:38

## 2017-07-13 RX ADMIN — ATORVASTATIN CALCIUM 10 MG: 10 TABLET, FILM COATED ORAL at 11:53

## 2017-07-13 RX ADMIN — FAMOTIDINE 20 MG: 20 TABLET ORAL at 18:24

## 2017-07-13 RX ADMIN — DOCUSATE SODIUM 100 MG: 100 CAPSULE, LIQUID FILLED ORAL at 18:25

## 2017-07-13 RX ADMIN — SPIRONOLACTONE 50 MG: 50 TABLET ORAL at 08:38

## 2017-07-13 RX ADMIN — CARVEDILOL 25 MG: 12.5 TABLET, FILM COATED ORAL at 08:39

## 2017-07-13 RX ADMIN — HYDROCODONE BITARTRATE AND ACETAMINOPHEN 1 TABLET: 5; 325 TABLET ORAL at 08:37

## 2017-07-13 RX ADMIN — NITROGLYCERIN 0.4 MG: 0.4 TABLET SUBLINGUAL at 08:40

## 2017-07-13 NOTE — PROGRESS NOTES
[facility] HOSPITALIST    PROGRESS NOTE    Name:  Evan Gannon   Age:  26 y.o.  Sex:  male  :  1990  MRN:  [id number]   Visit Number:  [id number]  Admission Date:  2017  Date Of Service:  17  Primary Care Physician:  Barbara Mills MD     LOS: 2 days :      Chief Complaint:  Chest pain. Follow up chf exacerbation        Subjective / Interval History:   The patient is sitting up in bed today. He just at breakfast. I first saw him about an hour ago as well when he had acute pain in midsternal, felt like squeezing and congestion in his chest, improved after 2 nitro and zofran.     He is currently lying in bed, resting comfortably for nap on his cpap per home. He awakened to discuss with me elevated troponin >1 today.    He denies further active chest pain since nitroglycerin. He denies chest pain, nausea, vomiting, abdominal pain current. He has still shortness of breath with minimal movement.     He had previously been ordered heparin, which he refused but now agreeable to all medications.    He had previously declined possible intervention but now with elevated troponin and NSTEMI, he has agreed to heart cath if needed. He is placed NPO since breakfast.          Review of Systems:     General ROS: Patient denies any fevers, chills or loss of consciousness.  Ophthalmic ROS: Denies any diplopia or transient loss of vision.  ENT ROS: Denies sore throat, nasal congestion or ear pain.   Respiratory ROS: Denies cough or shortness of breath.  Cardiovascular ROS: Positive chest pain or palpitations. Positivef exertional chest pain. Positive shortness of breath and pleuritic chest pain improved after nitro x 2  Gastrointestinal ROS: Denies nausea and vomiting. Denies any abdominal pain. No diarrhea.  Genito-Urinary ROS: Denies dysuria or hematuria.  Musculoskeletal ROS: Denies chronic back pain. No muscle pain. No calf pain.  Neurological ROS: Denies any focal weakness.  No loss of consciousness. Denies any numbness. Denies neck pain.   Dermatological ROS: Denies any redness or pruritis.    Vital Signs:    Temp:  [97.5 °F (36.4 °C)-98.6 °F (37 °C)] 97.6 °F (36.4 °C)  Heart Rate:  [70-91] 88  Resp:  [16-18] 18  BP: (104-134)/(42-86) 120/71    Intake and output:            Physical Examination:    General Appearance:    Alert and cooperative, not in any acute distress. Morbidly obese,sitting up earlier and now sleeping with cpap in place   Head:    Atraumatic and normocephalic, without obvious abnormality.   Eyes:            PERRLA, conjunctivae and sclerae normal, no Icterus. No pallor. Extra-occular movements are within normal limits.   Throat:   No oral lesions, no thrush, oral mucosa moist.   Neck:   Supple, trachea midline, no thyromegaly, no carotid bruit.   Lungs:     Chest shape is normal. Breath sounds heard bilaterally equally but greatly reduced but improved from yesterday.  No crackles or wheezing. No Pleural rub or bronchial breathing.    Heart:    Normal S1 and S2, no murmur, no gallop, no rub. No JVD   Abdomen:     Normal bowel sounds, no masses, no organomegaly. Soft        non-tender, non-distended, no guarding, no rebound                Tenderness, severely obese   Extremities:   Moves all extremities well, no edema, no cyanosis, no             clubbing   Skin:   No bleeding, bruising or rash. Chronic venous stasis dermatitis with diffuse psoriatic lesions throughout all dry without warmth or drainage   Neurologic:   Cranial nerves 2 - 12 grossly intact, sensation intact, Motor power is normal and equal bilaterally.   Laboratory results:    Lab Results (last 24 hours)     Procedure Component Value Units Date/Time    CBC (No Diff) [[id number]]  (Abnormal) Collected:  07/13/17 0854    Specimen:  Blood Updated:  07/13/17 0907     WBC 5.50 10*3/mm3      RBC 5.35 10*6/mm3      Hemoglobin 13.4 g/dL      Hematocrit 42.0 %      MCV 78.5 (L) fL      MCH 25.0 (L) pg      MCHC  31.9 (L) g/dL      RDW 17.2 (H) %      RDW-SD 48.8 fl      MPV 10.1 fL      Platelets 314 10*3/mm3     POC Glucose Fingerstick [[id number]]  (Normal) Collected:  07/13/17 0851    Specimen:  Blood Updated:  07/13/17 0913     Glucose 115 mg/dL     Narrative:       Meter: [id number] : [id number] Aaron Palmer    Basic Metabolic Panel [[id number]]  (Abnormal) Collected:  07/13/17 0854    Specimen:  Blood Updated:  07/13/17 0939     Glucose 113 (H) mg/dL      BUN 12 mg/dL      Creatinine 0.90 mg/dL      Sodium 133 mmol/L      Potassium 3.8 mmol/L      Chloride 100 mmol/L      CO2 26.0 mmol/L      Calcium 9.3 mg/dL      eGFR  African Amer 126 mL/min/1.73      BUN/Creatinine Ratio 13.3     Anion Gap 7.0 mmol/L     Narrative:       National Kidney Foundation Guidelines    Stage     Description        GFR  1         Normal or High     90+  2         Mild decrease      60-89  3         Moderate decrease  30-59  4         Severe decrease    15-29  5         Kidney failure     <15    Troponin [[id number]]  (Abnormal) Collected:  07/13/17 0854    Specimen:  Blood Updated:  07/13/17 0945     Troponin I 1.142 (C) ng/mL     Narrative:       Ultra Troponin I Reference Range:    <=0.039 ng/mL: Negative  0.04-0.779 ng/mL: Indeterminate Range. Clinical correlation required.  >=0.78  ng/mL: Consistent with myocardial injury. Clinical correlation required.    BNP [[id number]]  (Abnormal) Collected:  07/13/17 0854    Specimen:  Blood Updated:  07/13/17 0949     .0 (H) pg/mL           I have reviewed the patient's laboratory results.    Radiology results:    Imaging Results (last 24 hours)     ** No results found for the last 24 hours. **          I have reviewed the patient's radiology reports.    Medication Review:     I have reviewed the patients active and prn medications.     Assessment:  Patient is a 26 yr old admitted with history of nonischemic cardiomyopathy EF 20%, with acute on chronic CHF, pleuritic chest pain, and  elevated troponin.    1. Unstable Angina  2. Acute on chronic systolic congestive heart failure  3. Nonischemic cardiomyopathy with EF 20%  4. Obstructive sleep apnea  5. Noncompliance with medications        Plan:  Plavix load and daily ordered. Aspirin load and daily ordered.  Heparin drip initiated. Continue iv lasix. He has received Entresto. Echo pending. I placed him NPO until follow up cardiology again today. Again the patient is agreeable to intervention if cardiology feels necessary.     Continue aspirin, plavix, heparin, statin, betablocker with coreg. Nitroglycerin continued as needed if if persistant pain will start nitro drip.     Continue CPAP/bipap per home orders while napping or sleeping.     Anticipate discharge in the next 2 days.     He is interested in bariatric appointment after discharge.    Prophylaxis: Pepcid, heparin  Code: Full code    Medication risks and benefits were discussed in detail. Patient reported satisfaction with care delivered and treatment plan.     Keyona Vazquez DO  07/13/17  10:05 AM

## 2017-07-13 NOTE — PROGRESS NOTES
Continued Stay Note  [facility]     Patient Name: Evan Gannon  MRN: [id number]  Today's Date: 7/13/2017    Admit Date: 7/11/2017          Discharge Plan       07/13/17 1508    Case Management/Social Work Plan    Plan Home    Patient/Family In Agreement With Plan yes    Additional Comments SW will arrange for pt to get portable tank by [facility] prior to d/c.  SW continuing to follow for any other d/c needs he may have.              Discharge Codes     None        Expected Discharge Date and Time     Expected Discharge Date Expected Discharge Time    Jul 14, 2017             SAWYER Morales

## 2017-07-13 NOTE — PLAN OF CARE
Problem: Cardiac: Heart Failure (Adult)  Goal: Signs and Symptoms of Listed Potential Problems Will be Absent or Manageable (Cardiac: Heart Failure)  Outcome: Ongoing (interventions implemented as appropriate)    Problem: Patient Care Overview (Adult)  Goal: Plan of Care Review  Outcome: Ongoing (interventions implemented as appropriate)    07/13/17 0432   Coping/Psychosocial Response Interventions   Plan Of Care Reviewed With patient   Patient Care Overview   Progress no change   Outcome Evaluation   Outcome Summary/Follow up Plan VSS, NSR on monitor, on room air during the day and BIPAP at night. No complaints of pain or SOA.

## 2017-07-13 NOTE — PROGRESS NOTES
"  [facility] at [facility]   Inpatient Progress Note       LOS: 2 days   Patient Care Team:  Barbara Mills MD as PCP - General (Internal Medicine)    Chief Complaint:  Heart failure nonischemic cardio myopathy, NSTEMI    Subjective     Interval History:   Patient states that overall he is feeling a little better today. However, this morning while he was eating breakfast he had sudden onset of chest pain that was helped with NTG and zofran. Rapid response was called and troponin level now noted to be at NSTEMI level. Now pain free patient feels better today, chest pain was very atypical.      History taken from: patient chart    Review of Systems:   Pertinent positives noted in history, exam, and assessment. Otherwise reviewed and negative.      Objective     Vitals:  Blood pressure 120/71, pulse 88, temperature 97.6 °F (36.4 °C), temperature source Axillary, resp. rate 18, height 69\" (175.3 cm), weight (!) 407 lb 12.8 oz (185 kg), SpO2 96 %.     Physical Exam   Constitutional: He is oriented to person, place, and time. He appears well-developed and well-nourished. No distress.   Neck: No JVD present. No tracheal deviation present.   Cardiovascular: Normal rate, regular rhythm and normal heart sounds.  Exam reveals no friction rub.    No murmur heard.  Right allens test positive. Left allens marginal   Pulmonary/Chest: Effort normal and breath sounds normal.   Abdominal: Soft. Bowel sounds are normal. There is no tenderness.   Musculoskeletal: He exhibits edema (trace ble edema). He exhibits no deformity.   Neurological: He is alert and oriented to person, place, and time.   Skin: Skin is warm and dry.   Multiple psoriatic areas noted all over body     Agree with the above findings and have examined the patient     Results Review:     I reviewed the patient's new clinical results.      Results from last 7 days  Lab Units 07/13/17  0854   WBC 10*3/mm3 5.50   HEMOGLOBIN g/dL 13.4   HEMATOCRIT " % 42.0   PLATELETS 10*3/mm3 314       Results from last 7 days  Lab Units 07/13/17  0854 07/12/17  0828   SODIUM mmol/L 133 136   POTASSIUM mmol/L 3.8 4.0   CHLORIDE mmol/L 100 104   CO2 mmol/L 26.0 26.0   BUN mg/dL 12 10   CREATININE mg/dL 0.90 1.00   CALCIUM mg/dL 9.3 9.6   BILIRUBIN mg/dL  --  0.7   ALK PHOS U/L  --  80   ALT (SGPT) U/L  --  14   AST (SGOT) U/L  --  16   GLUCOSE mg/dL 113* 107*       Results from last 7 days  Lab Units 07/13/17  0854   SODIUM mmol/L 133   POTASSIUM mmol/L 3.8   CHLORIDE mmol/L 100   CO2 mmol/L 26.0   BUN mg/dL 12   CREATININE mg/dL 0.90   GLUCOSE mg/dL 113*   CALCIUM mg/dL 9.3           0  Lab Value Date/Time   TROPONINI 1.142 (C) 07/13/2017 0854           Results from last 7 days  Lab Units 07/12/17  0828   CHOLESTEROL mg/dL 109   TRIGLYCERIDES mg/dL 77   HDL CHOL mg/dL 24*             Tele:  Sinus rhythm    Assessment/Plan     Active Problems:    Hypertension    Morbid obesity    Obstructive sleep apnea    Personal history of noncompliance with medical treatment    Acute on chronic systolic (congestive) heart failure    Chest pain on breathing    CHF (congestive heart failure), NYHA class IV    Plan:  1. Mildly elevated troponins, not due to a myocardial infarction  - recurrent chest pain this morning.  2. Acute on chronic systolic congestive heart failure  3. Morbid obesity  4. Sleep apnea    Plan:  Left heart catheter today: Showed normal coronary arteries   His left ventricle by echo and cardiac catheter is severely dilated with ejection fraction of approximately 10%.  LV end-diastolic pressures greater than 35, significantly elevated.  He needs much more aggressive diuresis which has been ordered.  Discussed with the patient and his family, he may need some inotropic support if he does not diurese well with this.  Given his young age, despite his size, we will continue discussed the possibility of LVAD/transplantation.    07/13/17  10:45 AM  Balbir Olsen MD    Please  note that portions of this note may have been completed with a voice recognition program. Efforts were made to edit the dictations, but occasionally words are mistranscribed.

## 2017-07-14 LAB
ANION GAP SERPL CALCULATED.3IONS-SCNC: 6 MMOL/L (ref 3–11)
ARTICHOKE IGE QN: 68 MG/DL (ref 0–130)
BUN BLD-MCNC: 15 MG/DL (ref 9–23)
BUN/CREAT SERPL: 12.5 (ref 7–25)
CALCIUM SPEC-SCNC: 9.1 MG/DL (ref 8.7–10.4)
CHLORIDE SERPL-SCNC: 102 MMOL/L (ref 99–109)
CHOLEST SERPL-MCNC: 101 MG/DL (ref 0–200)
CO2 SERPL-SCNC: 28 MMOL/L (ref 20–31)
CREAT BLD-MCNC: 1.2 MG/DL (ref 0.6–1.3)
DEPRECATED RDW RBC AUTO: 50 FL (ref 37–54)
ERYTHROCYTE [DISTWIDTH] IN BLOOD BY AUTOMATED COUNT: 17.4 % (ref 11.3–14.5)
GFR SERPL CREATININE-BSD FRML MDRD: 90 ML/MIN/1.73
GLUCOSE BLD-MCNC: 102 MG/DL (ref 70–100)
HBA1C MFR BLD: 6.7 % (ref 4.8–5.6)
HCT VFR BLD AUTO: 42.2 % (ref 38.9–50.9)
HDLC SERPL-MCNC: 24 MG/DL (ref 40–60)
HGB BLD-MCNC: 13.1 G/DL (ref 13.1–17.5)
MCH RBC QN AUTO: 24.6 PG (ref 27–31)
MCHC RBC AUTO-ENTMCNC: 31 G/DL (ref 32–36)
MCV RBC AUTO: 79.2 FL (ref 80–99)
PLATELET # BLD AUTO: 298 10*3/MM3 (ref 150–450)
PMV BLD AUTO: 9.7 FL (ref 6–12)
POTASSIUM BLD-SCNC: 4.1 MMOL/L (ref 3.5–5.5)
RBC # BLD AUTO: 5.33 10*6/MM3 (ref 4.2–5.76)
SODIUM BLD-SCNC: 136 MMOL/L (ref 132–146)
TRIGL SERPL-MCNC: 97 MG/DL (ref 0–150)
WBC NRBC COR # BLD: 4.94 10*3/MM3 (ref 3.5–10.8)

## 2017-07-14 PROCEDURE — 94799 UNLISTED PULMONARY SVC/PX: CPT

## 2017-07-14 PROCEDURE — 99232 SBSQ HOSP IP/OBS MODERATE 35: CPT | Performed by: NURSE PRACTITIONER

## 2017-07-14 PROCEDURE — 80061 LIPID PANEL: CPT | Performed by: INTERNAL MEDICINE

## 2017-07-14 PROCEDURE — 80048 BASIC METABOLIC PNL TOTAL CA: CPT | Performed by: INTERNAL MEDICINE

## 2017-07-14 PROCEDURE — 85027 COMPLETE CBC AUTOMATED: CPT | Performed by: INTERNAL MEDICINE

## 2017-07-14 PROCEDURE — 99233 SBSQ HOSP IP/OBS HIGH 50: CPT | Performed by: INTERNAL MEDICINE

## 2017-07-14 PROCEDURE — 94660 CPAP INITIATION&MGMT: CPT

## 2017-07-14 PROCEDURE — 83036 HEMOGLOBIN GLYCOSYLATED A1C: CPT | Performed by: INTERNAL MEDICINE

## 2017-07-14 PROCEDURE — 25010000002 FUROSEMIDE PER 20 MG: Performed by: NURSE PRACTITIONER

## 2017-07-14 PROCEDURE — 25010000003 DOBUTAMINE PER 250 MG: Performed by: INTERNAL MEDICINE

## 2017-07-14 PROCEDURE — 25010000002 FUROSEMIDE PER 20 MG: Performed by: PHYSICIAN ASSISTANT

## 2017-07-14 RX ORDER — POLYETHYLENE GLYCOL 3350 17 G/17G
17 POWDER, FOR SOLUTION ORAL DAILY
Status: DISCONTINUED | OUTPATIENT
Start: 2017-07-14 | End: 2017-07-19 | Stop reason: HOSPADM

## 2017-07-14 RX ORDER — FUROSEMIDE 10 MG/ML
80 INJECTION INTRAMUSCULAR; INTRAVENOUS EVERY 12 HOURS
Status: DISCONTINUED | OUTPATIENT
Start: 2017-07-14 | End: 2017-07-17 | Stop reason: SDUPTHER

## 2017-07-14 RX ORDER — SENNA AND DOCUSATE SODIUM 50; 8.6 MG/1; MG/1
2 TABLET, FILM COATED ORAL 2 TIMES DAILY PRN
Status: DISCONTINUED | OUTPATIENT
Start: 2017-07-14 | End: 2017-07-19 | Stop reason: HOSPADM

## 2017-07-14 RX ORDER — DOBUTAMINE HYDROCHLORIDE 100 MG/100ML
2.5 INJECTION INTRAVENOUS CONTINUOUS
Status: DISCONTINUED | OUTPATIENT
Start: 2017-07-14 | End: 2017-07-18

## 2017-07-14 RX ORDER — FUROSEMIDE 10 MG/ML
40 INJECTION INTRAMUSCULAR; INTRAVENOUS ONCE
Status: COMPLETED | OUTPATIENT
Start: 2017-07-14 | End: 2017-07-14

## 2017-07-14 RX ORDER — FUROSEMIDE 10 MG/ML
80 INJECTION INTRAMUSCULAR; INTRAVENOUS ONCE
Status: COMPLETED | OUTPATIENT
Start: 2017-07-14 | End: 2017-07-14

## 2017-07-14 RX ADMIN — Medication 2 TABLET: at 17:23

## 2017-07-14 RX ADMIN — ATORVASTATIN CALCIUM 10 MG: 10 TABLET, FILM COATED ORAL at 08:48

## 2017-07-14 RX ADMIN — SPIRONOLACTONE 50 MG: 50 TABLET ORAL at 08:48

## 2017-07-14 RX ADMIN — DOBUTAMINE IN DEXTROSE 2.5 MCG/KG/MIN: 100 INJECTION, SOLUTION INTRAVENOUS at 17:27

## 2017-07-14 RX ADMIN — ACETAMINOPHEN 650 MG: 325 TABLET, FILM COATED ORAL at 12:28

## 2017-07-14 RX ADMIN — CLOPIDOGREL BISULFATE 75 MG: 75 TABLET, FILM COATED ORAL at 08:47

## 2017-07-14 RX ADMIN — FUROSEMIDE 40 MG: 10 INJECTION, SOLUTION INTRAMUSCULAR; INTRAVENOUS at 21:00

## 2017-07-14 RX ADMIN — ASPIRIN 81 MG: 81 TABLET, COATED ORAL at 08:46

## 2017-07-14 RX ADMIN — FAMOTIDINE 20 MG: 20 TABLET ORAL at 17:23

## 2017-07-14 RX ADMIN — FUROSEMIDE 80 MG: 10 INJECTION, SOLUTION INTRAMUSCULAR; INTRAVENOUS at 12:29

## 2017-07-14 RX ADMIN — DOCUSATE SODIUM 100 MG: 100 CAPSULE, LIQUID FILLED ORAL at 08:46

## 2017-07-14 RX ADMIN — ACETAMINOPHEN 650 MG: 325 TABLET ORAL at 12:31

## 2017-07-14 RX ADMIN — DOCUSATE SODIUM 100 MG: 100 CAPSULE, LIQUID FILLED ORAL at 17:23

## 2017-07-14 RX ADMIN — POLYETHYLENE GLYCOL 3350 17 G: 17 POWDER, FOR SOLUTION ORAL at 14:57

## 2017-07-14 RX ADMIN — FAMOTIDINE 20 MG: 20 TABLET ORAL at 08:46

## 2017-07-14 NOTE — PROGRESS NOTES
[facility] HOSPITALIST    PROGRESS NOTE    Name:  Evan Gannon   Age:  26 y.o.  Sex:  male  :  1990  MRN:  [id number]   Visit Number:  [id number]  Admission Date:  2017  Date Of Service:  17  Primary Care Physician:  Barbara Mills MD     LOS: 3 days :      Chief Complaint:  Chest pain. Follow up chf exacerbation        Subjective / Interval History:   Sitting up in bed with family at bedside. No pain today and feeling pretty good without soa. Asking if there is a gym in the facility to lift weights.     Only complaint is constipation    He had previously been ordered heparin, which he refused but now agreeable to all medications. Would like ZACHARIAH hose    He had previously declined possible intervention but now with elevated troponin and NSTEMI, he has agreed to heart cath if needed. He is placed NPO since breakfast.          Review of Systems:     General ROS: Patient denies any fevers, chills or loss of consciousness.  Ophthalmic ROS: Denies any diplopia or transient loss of vision.  ENT ROS: Denies sore throat, nasal congestion or ear pain.   Respiratory ROS: Denies cough or shortness of breath.  Cardiovascular ROS: denies chest pain or palpitations.   Gastrointestinal ROS: Denies nausea and vomiting. Denies any abdominal pain. No diarrhea.+ constipation  Genito-Urinary ROS: Denies dysuria or hematuria.  Musculoskeletal ROS: Denies chronic back pain. No muscle pain. No calf pain.  Neurological ROS: Denies any focal weakness. No loss of consciousness. Denies any numbness. Denies neck pain.   Dermatological ROS: Denies any redness or pruritis.    Vital Signs:    Temp:  [97.5 °F (36.4 °C)-98 °F (36.7 °C)] 98 °F (36.7 °C)  Heart Rate:  [76-88] 81  Resp:  [18-20] 18  BP: ()/(45-83) 100/73    Intake and output:    I/O last 3 completed shifts:  In: 500 [P.O.:500]  Out: 950 [Urine:950]  I/O this shift:  In: 240 [P.O.:240]  Out: -     Physical  Examination:    General Appearance:    Alert and cooperative, not in any acute distress. Morbidly obese,sitting up earlier and now sleeping with cpap in place   Head:    Atraumatic and normocephalic, without obvious abnormality.   Eyes:            PERRLA, conjunctivae and sclerae normal, no Icterus. No pallor. Extra-occular movements are within normal limits.   Throat:   No oral lesions, no thrush, oral mucosa moist.   Neck:   Supple, trachea midline, no thyromegaly, no carotid bruit.   Lungs:     Chest shape is normal. Breath sounds heard bilaterally equally but greatly reduced but improved from yesterday.  No crackles or wheezing. No Pleural rub or bronchial breathing.    Heart:    Normal S1 and S2, no murmur, no gallop, no rub. No JVD   Abdomen:     Normal bowel sounds, no masses, no organomegaly. Soft        non-tender, non-distended, no guarding, no rebound                Tenderness, severely obese   Extremities:   Moves all extremities well, no edema, no cyanosis, no             clubbing   Skin:   No bleeding, bruising or rash. Chronic venous stasis dermatitis with diffuse psoriatic lesions throughout all dry without warmth or drainage   Neurologic:   Cranial nerves 2 - 12 grossly intact, sensation intact, Motor power is normal and equal bilaterally.   Laboratory results:    Lab Results (last 24 hours)     Procedure Component Value Units Date/Time    CBC (No Diff) [[id number]]  (Abnormal) Collected:  07/14/17 0635    Specimen:  Blood Updated:  07/14/17 0701     WBC 4.94 10*3/mm3      RBC 5.33 10*6/mm3      Hemoglobin 13.1 g/dL      Hematocrit 42.2 %      MCV 79.2 (L) fL      MCH 24.6 (L) pg      MCHC 31.0 (L) g/dL      RDW 17.4 (H) %      RDW-SD 50.0 fl      MPV 9.7 fL      Platelets 298 10*3/mm3     Basic Metabolic Panel [[id number]]  (Abnormal) Collected:  07/14/17 0635    Specimen:  Blood Updated:  07/14/17 0724     Glucose 102 (H) mg/dL      BUN 15 mg/dL      Creatinine 1.20 mg/dL      Sodium 136 mmol/L       Potassium 4.1 mmol/L      Chloride 102 mmol/L      CO2 28.0 mmol/L      Calcium 9.1 mg/dL      eGFR  African Amer 90 mL/min/1.73      BUN/Creatinine Ratio 12.5     Anion Gap 6.0 mmol/L     Narrative:       National Kidney Foundation Guidelines    Stage     Description        GFR  1         Normal or High     90+  2         Mild decrease      60-89  3         Moderate decrease  30-59  4         Severe decrease    15-29  5         Kidney failure     <15    Lipid Panel [[id number]]  (Abnormal) Collected:  07/14/17 0635    Specimen:  Blood Updated:  07/14/17 0724     Total Cholesterol 101 mg/dL      Triglycerides 97 mg/dL      HDL Cholesterol 24 (L) mg/dL      LDL Cholesterol  68 mg/dL     Narrative:       Cholesterol Reference Ranges:   Desirable       < 200 mg/dL   Borderline    200-239 mg/dL   High Risk       > 239 mg/dL    Triglyceride Reference Ranges:   Normal          < 150 mg/dL   Borderline    150-199 mg/dL   High          200-499 mg/dL   Very High       > 499 mg/dL    HDL Reference Ranges:   Low              < 40 mg/dL   High             > 59 mg/dL    LDL Reference Ranges:   Optimal         < 100 mg/dL   Near Optimal  100-129 mg/dL   Borderline    130-159 mg/dL   High          160-189 mg/dL   Very High       > 189 mg/dL    Hemoglobin A1c [[id number]]  (Abnormal) Collected:  07/14/17 0635    Specimen:  Blood Updated:  07/14/17 0836     Hemoglobin A1C 6.70 (H) %     Narrative:       The American Diabetes Association recommends maintenance of Hemoglobin A1C at 7.0% or lower. Goals for Hemoglobin A1C reduction may need to be modified if hypoglycemia is a problem.          I have reviewed the patient's laboratory results.    Radiology results:    Imaging Results (last 24 hours)     ** No results found for the last 24 hours. **          I have reviewed the patient's radiology reports.    Medication Review:     I have reviewed the patients active and prn medications.     Assessment:  Patient is a 26 yr old admitted  with history of nonischemic cardiomyopathy EF 20%, with acute on chronic CHF, pleuritic chest pain, and elevated troponin.    1. Unstable Angina  2. Acute on chronic systolic congestive heart failure  3. Nonischemic cardiomyopathy with EF 20%  4. Obstructive sleep apnea  5. Noncompliance with medications        Plan:  Plavix daily ordered. Aspirin daily ordered.  Continue iv lasix. He has received Entresto. Needs continued diuresis, but BP low. Holding Entresto and coreg in order to give IV lasix    · ECHO: Left ventricular systolic function is severely decreased. Estimated EF = 20%.  · Moderately reduced right ventricular systolic function noted.    Mild mitral valve regurgitation is present    Continue aspirin, plavix, heparin, statin, betablocker with coreg. Nitroglycerin continued as needed if if persistant pain will start nitro drip.     Continue CPAP/bipap per home orders while napping or sleeping.     Anticipate discharge in the next 2 days.     He is interested in bariatric appointment after discharge.    Add bowel regimen    Prophylaxis: Pepcid, heparin  Code: Full code    Medication risks and benefits were discussed in detail. Patient reported satisfaction with care delivered and treatment plan.     Humera Hannon, APRN  07/14/17  12:06 PM

## 2017-07-14 NOTE — PLAN OF CARE
Problem: Cardiac: Heart Failure (Adult)  Goal: Signs and Symptoms of Listed Potential Problems Will be Absent or Manageable (Cardiac: Heart Failure)  Outcome: Ongoing (interventions implemented as appropriate)  Patient low bp today. Medications held for bp this am. Lasix, laxative ordered.     Problem: Patient Care Overview (Adult)  Goal: Plan of Care Review  Outcome: Ongoing (interventions implemented as appropriate)  Goal: Adult Individualization and Mutuality  Outcome: Ongoing (interventions implemented as appropriate)  Goal: Discharge Needs Assessment  Outcome: Ongoing (interventions implemented as appropriate)

## 2017-07-14 NOTE — PROGRESS NOTES
"  [facility] at [facility]   Inpatient Progress Note       LOS: 3 days   Patient Care Team:  Barbara Mills MD as PCP - General (Internal Medicine)    Chief Complaint:  Heart failure nonischemic cardio myopathy, NSTEMI    Subjective     Interval History:   Patient doesn't feel that he diuresed well with bumex. States that he usually does better with lasix. Otherwise no specific complaints. SBP has been in the 90's this morning prior to medications.  Overall feels that is near his baseline.  Mild edema.  Has not ambulated.      History taken from: patient chart    Review of Systems:   Pertinent positives noted in history, exam, and assessment. Otherwise reviewed and negative.      Objective      Intake/Output Summary (Last 24 hours) at 07/14/17 1032  Last data filed at 07/14/17 0940   Gross per 24 hour   Intake              240 ml   Output              950 ml   Net             -710 ml         Vitals:  Blood pressure 100/73, pulse 81, temperature 98 °F (36.7 °C), temperature source Axillary, resp. rate 18, height 69\" (175.3 cm), weight (!) 411 lb 3.2 oz (187 kg), SpO2 100 %.     Physical Exam   Constitutional: He is oriented to person, place, and time. He appears well-developed and well-nourished. No distress.   Neck: No JVD present. No tracheal deviation present.   Cardiovascular: Normal rate, regular rhythm and normal heart sounds.  Exam reveals no friction rub.    No murmur heard.  Right wrist, mildly tender but no hematoma or bruit   Pulmonary/Chest: Effort normal and breath sounds normal.   Abdominal: Soft. Bowel sounds are normal. There is no tenderness.   Musculoskeletal: He exhibits edema (trace ble edema). He exhibits no deformity.   Neurological: He is alert and oriented to person, place, and time.   Skin: Skin is warm and dry.   Multiple psoriatic areas noted all over body     I've examined the patient and agree with the above findings     Results Review:     I reviewed the " patient's new clinical results.      Results from last 7 days  Lab Units 07/14/17  0635   WBC 10*3/mm3 4.94   HEMOGLOBIN g/dL 13.1   HEMATOCRIT % 42.2   PLATELETS 10*3/mm3 298       Results from last 7 days  Lab Units 07/14/17  0635  07/12/17  0828   SODIUM mmol/L 136  < > 136   POTASSIUM mmol/L 4.1  < > 4.0   CHLORIDE mmol/L 102  < > 104   CO2 mmol/L 28.0  < > 26.0   BUN mg/dL 15  < > 10   CREATININE mg/dL 1.20  < > 1.00   CALCIUM mg/dL 9.1  < > 9.6   BILIRUBIN mg/dL  --   --  0.7   ALK PHOS U/L  --   --  80   ALT (SGPT) U/L  --   --  14   AST (SGOT) U/L  --   --  16   GLUCOSE mg/dL 102*  < > 107*   < > = values in this interval not displayed.    Results from last 7 days  Lab Units 07/14/17  0635   SODIUM mmol/L 136   POTASSIUM mmol/L 4.1   CHLORIDE mmol/L 102   CO2 mmol/L 28.0   BUN mg/dL 15   CREATININE mg/dL 1.20   GLUCOSE mg/dL 102*   CALCIUM mg/dL 9.1           0  Lab Value Date/Time   TROPONINI 1.142 (C) 07/13/2017 0854           Results from last 7 days  Lab Units 07/14/17  0635   CHOLESTEROL mg/dL 101   TRIGLYCERIDES mg/dL 97   HDL CHOL mg/dL 24*             Tele:  Sinus rhythm    Assessment/Plan     Active Problems:    Hypertension    Morbid obesity    Obstructive sleep apnea    Personal history of noncompliance with medical treatment    Acute on chronic systolic (congestive) heart failure    Chest pain on breathing    CHF (congestive heart failure), NYHA class IV    Plan:  1. Mildly elevated troponins, not due to a myocardial infarction  - Normal coronary arteries by cardiac catheter 7/13/70  2. Acute on chronic systolic congestive heart failure  - Class IV  - EF 10% by LV gram 7/13/17  3. Morbid obesity  4. Sleep apnea  5. Hypotension, secondary to cardiomyopathy and medications.        Plan:  Continue with diuresis today. Will likely need inotropic assistance and will add milrinone. Will currently hold coreg and entresto to help keep BP up and assist with diuresis.  I discussed this case with Dr. YANDY  "Omer, regarding possible LVAD/transplant evaluation.  His weight is an issue.  It may preclude further evaluation of this.  They are discussing his case, and we'll \"get back to us\".  For now we will continue IV diuresis, given his significantly elevated left ventricular end-diastolic pressures, and we'll try to aid with diuresis/output, with some IV inotropes.  Discussed the possibility of ICD, we will obviously need one, however will defer until we can assess whether or not he is a candidate for an LVAD.  07/14/17  10:31 AM  SONAM Reynolds, Balbir Olsen have reviewed the note in full and agree with all aspects of the above including physical exam, assessment, labs and plan with changes made accordingly.     Balbir Olsen MD  07/14/17  4:21 PM      Please note that portions of this note may have been completed with a voice recognition program. Efforts were made to edit the dictations, but occasionally words are mistranscribed.  "

## 2017-07-15 LAB
ANION GAP SERPL CALCULATED.3IONS-SCNC: 6 MMOL/L (ref 3–11)
BNP SERPL-MCNC: 100 PG/ML (ref 0–100)
BUN BLD-MCNC: 12 MG/DL (ref 9–23)
BUN/CREAT SERPL: 12 (ref 7–25)
CALCIUM SPEC-SCNC: 9.5 MG/DL (ref 8.7–10.4)
CHLORIDE SERPL-SCNC: 101 MMOL/L (ref 99–109)
CO2 SERPL-SCNC: 30 MMOL/L (ref 20–31)
CREAT BLD-MCNC: 1 MG/DL (ref 0.6–1.3)
DEPRECATED RDW RBC AUTO: 47.7 FL (ref 37–54)
ERYTHROCYTE [DISTWIDTH] IN BLOOD BY AUTOMATED COUNT: 17 % (ref 11.3–14.5)
GFR SERPL CREATININE-BSD FRML MDRD: 111 ML/MIN/1.73
GLUCOSE BLD-MCNC: 107 MG/DL (ref 70–100)
HCT VFR BLD AUTO: 44.6 % (ref 38.9–50.9)
HGB BLD-MCNC: 14.2 G/DL (ref 13.1–17.5)
MAGNESIUM SERPL-MCNC: 1.9 MG/DL (ref 1.3–2.7)
MCH RBC QN AUTO: 24.9 PG (ref 27–31)
MCHC RBC AUTO-ENTMCNC: 31.8 G/DL (ref 32–36)
MCV RBC AUTO: 78.1 FL (ref 80–99)
PLATELET # BLD AUTO: 266 10*3/MM3 (ref 150–450)
PMV BLD AUTO: 9.8 FL (ref 6–12)
POTASSIUM BLD-SCNC: 4.4 MMOL/L (ref 3.5–5.5)
RBC # BLD AUTO: 5.71 10*6/MM3 (ref 4.2–5.76)
SODIUM BLD-SCNC: 137 MMOL/L (ref 132–146)
WBC NRBC COR # BLD: 5.79 10*3/MM3 (ref 3.5–10.8)

## 2017-07-15 PROCEDURE — 80048 BASIC METABOLIC PNL TOTAL CA: CPT | Performed by: NURSE PRACTITIONER

## 2017-07-15 PROCEDURE — 94799 UNLISTED PULMONARY SVC/PX: CPT

## 2017-07-15 PROCEDURE — 25010000003 DOBUTAMINE PER 250 MG: Performed by: INTERNAL MEDICINE

## 2017-07-15 PROCEDURE — 99232 SBSQ HOSP IP/OBS MODERATE 35: CPT | Performed by: INTERNAL MEDICINE

## 2017-07-15 PROCEDURE — 83735 ASSAY OF MAGNESIUM: CPT | Performed by: INTERNAL MEDICINE

## 2017-07-15 PROCEDURE — 85027 COMPLETE CBC AUTOMATED: CPT | Performed by: INTERNAL MEDICINE

## 2017-07-15 PROCEDURE — 94660 CPAP INITIATION&MGMT: CPT

## 2017-07-15 PROCEDURE — 25010000002 FUROSEMIDE PER 20 MG: Performed by: INTERNAL MEDICINE

## 2017-07-15 PROCEDURE — 99232 SBSQ HOSP IP/OBS MODERATE 35: CPT | Performed by: FAMILY MEDICINE

## 2017-07-15 PROCEDURE — 83880 ASSAY OF NATRIURETIC PEPTIDE: CPT | Performed by: INTERNAL MEDICINE

## 2017-07-15 RX ADMIN — SPIRONOLACTONE 50 MG: 50 TABLET ORAL at 09:14

## 2017-07-15 RX ADMIN — DOCUSATE SODIUM 100 MG: 100 CAPSULE, LIQUID FILLED ORAL at 09:15

## 2017-07-15 RX ADMIN — DOBUTAMINE IN DEXTROSE 2.5 MCG/KG/MIN: 100 INJECTION, SOLUTION INTRAVENOUS at 15:18

## 2017-07-15 RX ADMIN — DOBUTAMINE IN DEXTROSE 2.5 MCG/KG/MIN: 100 INJECTION, SOLUTION INTRAVENOUS at 04:23

## 2017-07-15 RX ADMIN — FUROSEMIDE 80 MG: 10 INJECTION, SOLUTION INTRAMUSCULAR; INTRAVENOUS at 04:30

## 2017-07-15 RX ADMIN — FUROSEMIDE 80 MG: 10 INJECTION, SOLUTION INTRAMUSCULAR; INTRAVENOUS at 17:16

## 2017-07-15 RX ADMIN — ACETAMINOPHEN 650 MG: 325 TABLET, FILM COATED ORAL at 20:06

## 2017-07-15 RX ADMIN — FAMOTIDINE 20 MG: 20 TABLET ORAL at 17:16

## 2017-07-15 RX ADMIN — DOCUSATE SODIUM 100 MG: 100 CAPSULE, LIQUID FILLED ORAL at 17:16

## 2017-07-15 RX ADMIN — FAMOTIDINE 20 MG: 20 TABLET ORAL at 09:15

## 2017-07-15 RX ADMIN — ASPIRIN 81 MG: 81 TABLET, COATED ORAL at 09:15

## 2017-07-15 RX ADMIN — ATORVASTATIN CALCIUM 10 MG: 10 TABLET, FILM COATED ORAL at 09:14

## 2017-07-15 RX ADMIN — POLYETHYLENE GLYCOL 3350 17 G: 17 POWDER, FOR SOLUTION ORAL at 09:15

## 2017-07-15 NOTE — PLAN OF CARE
Problem: Cardiac: Heart Failure (Adult)  Goal: Signs and Symptoms of Listed Potential Problems Will be Absent or Manageable (Cardiac: Heart Failure)  Outcome: Ongoing (interventions implemented as appropriate)    07/15/17 0344   Cardiac: Heart Failure   Problems Assessed (Heart Failure) all   Problems Present (Heart Failure) decreased quality of life;cardiac pump dysfunction;functional decline/self-care deficit;sleep-disordered breathing

## 2017-07-15 NOTE — PROGRESS NOTES
[facility]PITAL[facility]    PROGRESS NOTE    Name:  Evan Gannon   Age:  26 y.o.  Sex:  male  :  1990  MRN:  [id number]   Visit Number:  [id number]  Admission Date:  2017  Date Of Service:  07/15/17  Primary Care Physician:  Barbara Mills MD     LOS: 4 days :      Chief Complaint:  Dyspnea improved.  Follow up chf exacerbation        Subjective / Interval History:   The patient is sitting up bed talking to family. He is wearing oxygen and CPAP as ordered.    He denies chest pain, shortness of breath, abdominal pain. He denies increased leg edema. He is smiling, sitting up awake and alert.       Review of Systems:     General ROS: Patient denies any fevers, chills or loss of consciousness.  Ophthalmic ROS: Denies any diplopia or transient loss of vision.  ENT ROS: Denies sore throat, nasal congestion or ear pain.   Respiratory ROS: Denies cough with improvement shortness of breath.  Cardiovascular ROS: Resolved chest pain or palpitations.   Gastrointestinal ROS: Denies nausea and vomiting. Denies any abdominal pain. No diarrhea.+ constipation  Genito-Urinary ROS: Denies dysuria or hematuria.  Musculoskeletal ROS: Denies chronic back pain. No muscle pain. No calf pain.  Neurological ROS: Denies any focal weakness. No loss of consciousness. Denies any numbness. Denies neck pain.   Dermatological ROS: Denies any redness or pruritis.    Vital Signs:    Temp:  [97.5 °F (36.4 °C)-99 °F (37.2 °C)] 99 °F (37.2 °C)  Heart Rate:  [] 86  Resp:  [16-18] 18  BP: (105-144)/(47-91) 133/82    Intake and output:    I/O last 3 completed shifts:  In: 1320 [P.O.:1320]  Out: 3050 [Urine:3050]  I/O this shift:  In: 720 [P.O.:720]  Out: 1900 [Urine:1900]    Physical Examination:    General Appearance:    Alert and cooperative, not in any acute distress. Morbidly obese,sitting up in bed talking to family.   Head:    Atraumatic and normocephalic, without obvious abnormality.   Eyes:             PERRLA, conjunctivae and sclerae normal, no Icterus. No pallor. Extra-occular movements are within normal limits.   Throat:   No oral lesions, no thrush, oral mucosa moist.   Neck:   Supple, trachea midline, no thyromegaly, no carotid bruit.   Lungs:     Chest shape is normal. Breath sounds heard bilaterally equally reduced.  No crackles or wheezing. No Pleural rub or bronchial breathing.    Heart:    Normal S1 and S2, no murmur, no gallop, no rub. No JVD   Abdomen:     Normal bowel sounds, no masses, no organomegaly. Soft        non-tender, non-distended, no guarding, no rebound                Tenderness, severely obese   Extremities:   Moves all extremities well, no edema, no cyanosis, no             clubbing   Skin:   No bleeding, bruising or rash. Chronic venous stasis dermatitis with diffuse psoriatic lesions throughout all dry without warmth or drainage   Neurologic:   Cranial nerves 2 - 12 grossly intact, sensation intact, Motor power is normal and equal bilaterally.   Laboratory results:    Lab Results (last 24 hours)     Procedure Component Value Units Date/Time    CBC (No Diff) [[id number]]  (Abnormal) Collected:  07/15/17 1227    Specimen:  Blood Updated:  07/15/17 1246     WBC 5.79 10*3/mm3      RBC 5.71 10*6/mm3      Hemoglobin 14.2 g/dL      Hematocrit 44.6 %      MCV 78.1 (L) fL      MCH 24.9 (L) pg      MCHC 31.8 (L) g/dL      RDW 17.0 (H) %      RDW-SD 47.7 fl      MPV 9.8 fL      Platelets 266 10*3/mm3     Basic Metabolic Panel [[id number]]  (Abnormal) Collected:  07/15/17 1227    Specimen:  Blood Updated:  07/15/17 1257     Glucose 107 (H) mg/dL      BUN 12 mg/dL      Creatinine 1.00 mg/dL      Sodium 137 mmol/L      Potassium 4.4 mmol/L      Chloride 101 mmol/L      CO2 30.0 mmol/L      Calcium 9.5 mg/dL      eGFR  African Amer 111 mL/min/1.73      BUN/Creatinine Ratio 12.0     Anion Gap 6.0 mmol/L     Narrative:       [employer] Guidelines    Stage     Description         GFR  1         Normal or High     90+  2         Mild decrease      60-89  3         Moderate decrease  30-59  4         Severe decrease    15-29  5         Kidney failure     <15    Magnesium [[id number]]  (Normal) Collected:  07/15/17 1227    Specimen:  Blood Updated:  07/15/17 1257     Magnesium 1.9 mg/dL     BNP [[id number]]  (Normal) Collected:  07/15/17 1411    Specimen:  Blood Updated:  07/15/17 1504     .0 pg/mL           I have reviewed the patient's laboratory results.    Radiology results:    Imaging Results (last 24 hours)     ** No results found for the last 24 hours. **          I have reviewed the patient's radiology reports.    Medication Review:     I have reviewed the patients active and prn medications.     Assessment:  Patient is a 26 yr old admitted with history of nonischemic cardiomyopathy EF 20%, with acute on chronic CHF, pleuritic chest pain, and elevated troponin.    1. Unstable Angina, improved   2. Acute on chronic systolic congestive heart failure on dobutamine   3. Nonischemic cardiomyopathy with EF 20%  4. Obstructive sleep apnea on cpap  5. Noncompliance with medications  6. Elevated troponin not due to myocardial infarction with normal coronaries on cardiac cath 7-13-17  7. Hypotension, secondary to medications      Plan:  Continue dobutamine, lasix iv.    Continue betablocker, statin, aspirin, aldactone.     Oxygen and cpap.     Await [facility] opinion if he would be candidate for LVAD/transplant vs defibrillator     He is interested in bariatric appointment after discharge.    Continue daily labs and monitor and titrate medications accordingly, monitoring strict in and out.        Prophylaxis: Pepcid, heparin  Code: Full code    Medication risks and benefits were discussed in detail. Patient reported satisfaction with care delivered and treatment plan.     Keyona Vazquez DO  07/15/17  7:00 PM

## 2017-07-15 NOTE — PLAN OF CARE
Problem: Patient Care Overview (Adult)  Goal: Plan of Care Review  Outcome: Ongoing (interventions implemented as appropriate)    07/15/17 0347   Coping/Psychosocial Response Interventions   Plan Of Care Reviewed With patient   Patient Care Overview   Progress no change   Outcome Evaluation   Outcome Summary/Follow up Plan PT non-compliant with diet. Spoke with PT regarding decreasing amount of sodium.

## 2017-07-15 NOTE — PROGRESS NOTES
"  [facility] at [facility]   Inpatient Progress Note       LOS: 4 days   Patient Care Team:  Barbara Mills MD as PCP - General (Internal Medicine)    Chief Complaint:  Heart failure nonischemic cardio myopathy, NSTEMI    Subjective     Interval History:   Patient doesn't feel that he diuresed well with bumex. States that he usually does better with lasix. Otherwise no specific complaints. SBP has been in the 90's this morning prior to medications.  Overall feels that is near his baseline.  Mild edema.  Has not ambulated.      History taken from: patient chart    Review of Systems:   Pertinent positives noted in history, exam, and assessment. Otherwise reviewed and negative.      Objective      Intake/Output Summary (Last 24 hours) at 07/15/17 1119  Last data filed at 07/15/17 0938   Gross per 24 hour   Intake             1440 ml   Output             2100 ml   Net             -660 ml         Vitals:  Blood pressure 131/91, pulse 84, temperature 97.6 °F (36.4 °C), temperature source Oral, resp. rate 18, height 69\" (175.3 cm), weight (!) 411 lb 3.2 oz (187 kg), SpO2 97 %.     Physical Exam   Constitutional: He is oriented to person, place, and time. He appears well-developed and well-nourished. No distress.   Neck: No JVD present. No tracheal deviation present.   Cardiovascular: Normal rate, regular rhythm and normal heart sounds.  Exam reveals no friction rub.    No murmur heard.  Right wrist, mildly tender but no hematoma or bruit   Pulmonary/Chest: Effort normal and breath sounds normal.   Abdominal: Soft. Bowel sounds are normal. There is no tenderness.   Musculoskeletal: He exhibits edema (trace ble edema). He exhibits no deformity.   Neurological: He is alert and oriented to person, place, and time.   Skin: Skin is warm and dry.   Multiple psoriatic areas noted all over body     I've examined the patient and agree with the above findings     Results Review:     I reviewed the patient's " new clinical results.      Results from last 7 days  Lab Units 07/14/17  0635   WBC 10*3/mm3 4.94   HEMOGLOBIN g/dL 13.1   HEMATOCRIT % 42.2   PLATELETS 10*3/mm3 298       Results from last 7 days  Lab Units 07/14/17  0635  07/12/17  0828   SODIUM mmol/L 136  < > 136   POTASSIUM mmol/L 4.1  < > 4.0   CHLORIDE mmol/L 102  < > 104   CO2 mmol/L 28.0  < > 26.0   BUN mg/dL 15  < > 10   CREATININE mg/dL 1.20  < > 1.00   CALCIUM mg/dL 9.1  < > 9.6   BILIRUBIN mg/dL  --   --  0.7   ALK PHOS U/L  --   --  80   ALT (SGPT) U/L  --   --  14   AST (SGOT) U/L  --   --  16   GLUCOSE mg/dL 102*  < > 107*   < > = values in this interval not displayed.    Results from last 7 days  Lab Units 07/14/17  0635   SODIUM mmol/L 136   POTASSIUM mmol/L 4.1   CHLORIDE mmol/L 102   CO2 mmol/L 28.0   BUN mg/dL 15   CREATININE mg/dL 1.20   GLUCOSE mg/dL 102*   CALCIUM mg/dL 9.1           0  Lab Value Date/Time   TROPONINI 1.142 (C) 07/13/2017 0854           Results from last 7 days  Lab Units 07/14/17  0635   CHOLESTEROL mg/dL 101   TRIGLYCERIDES mg/dL 97   HDL CHOL mg/dL 24*             Tele:  Sinus rhythm    Assessment/Plan     Active Problems:    Hypertension    Morbid obesity    Obstructive sleep apnea    Personal history of noncompliance with medical treatment    Acute on chronic systolic (congestive) heart failure    Chest pain on breathing    CHF (congestive heart failure), NYHA class IV    Plan:  1. Mildly elevated troponins, not due to a myocardial infarction  - Normal coronary arteries by cardiac catheter 7/13/07  2. Acute on chronic systolic congestive heart failure  - Class IV  - EF 10% by LV gram 7/13/17  3. Morbid obesity  4. Sleep apnea  5. Hypotension, secondary to cardiomyopathy and medications.        Plan:  Continue with diuresis today. On inotropic assistance w dobutrex . Will currently hold coreg and entresto to help keep BP up and assist with diuresis.   DR Olsen discussed this case with , Dr. Tello, regarding possible  "LVAD/transplant evaluation.  His weight is an issue.  It may preclude further evaluation of this.  They are discussing his case, and we'll \"get back to us\".  For now we will continue IV diuresis, given his significantly elevated left ventricular end-diastolic pressures, and we'll try to aid with diuresis/output, with some IV inotropes.  Discussed the possibility of ICD, we will obviously need one, however will defer until we can assess whether or not he is a candidate for an LVAD.    "

## 2017-07-16 PROCEDURE — 94799 UNLISTED PULMONARY SVC/PX: CPT

## 2017-07-16 PROCEDURE — 94660 CPAP INITIATION&MGMT: CPT

## 2017-07-16 PROCEDURE — 25010000003 DOBUTAMINE PER 250 MG: Performed by: INTERNAL MEDICINE

## 2017-07-16 PROCEDURE — 99232 SBSQ HOSP IP/OBS MODERATE 35: CPT | Performed by: FAMILY MEDICINE

## 2017-07-16 PROCEDURE — 25010000002 FUROSEMIDE PER 20 MG: Performed by: INTERNAL MEDICINE

## 2017-07-16 RX ADMIN — FAMOTIDINE 20 MG: 20 TABLET ORAL at 17:15

## 2017-07-16 RX ADMIN — DOBUTAMINE IN DEXTROSE 2.5 MCG/KG/MIN: 100 INJECTION, SOLUTION INTRAVENOUS at 21:52

## 2017-07-16 RX ADMIN — ASPIRIN 81 MG: 81 TABLET, COATED ORAL at 08:19

## 2017-07-16 RX ADMIN — SPIRONOLACTONE 50 MG: 50 TABLET ORAL at 08:19

## 2017-07-16 RX ADMIN — DOBUTAMINE IN DEXTROSE 2.5 MCG/KG/MIN: 100 INJECTION, SOLUTION INTRAVENOUS at 11:18

## 2017-07-16 RX ADMIN — ATORVASTATIN CALCIUM 10 MG: 10 TABLET, FILM COATED ORAL at 08:19

## 2017-07-16 RX ADMIN — DOBUTAMINE IN DEXTROSE 2.5 MCG/KG/MIN: 100 INJECTION, SOLUTION INTRAVENOUS at 00:01

## 2017-07-16 RX ADMIN — FAMOTIDINE 20 MG: 20 TABLET ORAL at 08:19

## 2017-07-16 RX ADMIN — FUROSEMIDE 80 MG: 10 INJECTION, SOLUTION INTRAMUSCULAR; INTRAVENOUS at 17:01

## 2017-07-16 RX ADMIN — FUROSEMIDE 80 MG: 10 INJECTION, SOLUTION INTRAMUSCULAR; INTRAVENOUS at 05:29

## 2017-07-16 NOTE — PROGRESS NOTES
"[facility] at [facility] Progress Note     LOS: 5 days   Patient Care Team:  Barbara Mills MD as PCP - General (Internal Medicine)  PCP:  Barbara Mills MD    Chief Complaint:  NICM    SUBJECTIVE:   Pt asleep on CPAP, I came back later and he was still asleep so I woke him. He is drowsy but denies CP, dyspnea and edema are improved close to baseline. BPs are higher.    Review of Systems:   All systems have been reviewed and are negative with the exception of those mentioned above.      OBJECTIVE:    Vital Sign Min/Max for last 24 hours  Temp  Min: 97.3 °F (36.3 °C)  Max: 99 °F (37.2 °C)   BP  Min: 125/73  Max: 146/97   Pulse  Min: 67  Max: 95   Resp  Min: 16  Max: 18   SpO2  Min: 94 %  Max: 96 %   Flow (L/min)  Min: 2  Max: 2   No Data Recorded     Flowsheet Rows         First Filed Value    Admission Height  69\" (175.3 cm) Documented at 07/11/2017 1117    Admission Weight  (!)  400 lb (181 kg) Documented at 07/11/2017 1117          Telemetry: nsr      Intake/Output Summary (Last 24 hours) at 07/16/17 1134  Last data filed at 07/16/17 1038   Gross per 24 hour   Intake              360 ml   Output             3600 ml   Net            -3240 ml     Intake & Output (last 3 days)       07/13 0701 - 07/14 0700 07/14 0701 - 07/15 0700 07/15 0701 - 07/16 0700 07/16 0701 - 07/17 0700    P.O.  1320 720     Total Intake(mL/kg)  1320 (7.1) 720 (3.9)     Urine (mL/kg/hr) 950 (0.2) 2100 (0.5) 3200 (0.7) 950 (1.1)    Total Output 950 2100 3200 950    Net -950 -780 -2480 -950            Unmeasured Urine Occurrence 1 x              Physical Exam:  Physical Exam   Constitutional: He is oriented to person, place, and time. He appears well-developed and well-nourished. No distress.   Neck: Neck supple. No JVD present.   Cardiovascular: Normal rate, regular rhythm, normal heart sounds and intact distal pulses.    No murmur heard.  Pulses:       Radial pulses are 2+ on the right side, and " 2+ on the left side.        Dorsalis pedis pulses are 2+ on the right side, and 2+ on the left side.        Posterior tibial pulses are 2+ on the right side, and 2+ on the left side.   Pulmonary/Chest: Effort normal and breath sounds normal. He has no wheezes. He has no rales.   Abdominal: Soft. Bowel sounds are normal. There is no tenderness. There is no guarding.   Musculoskeletal: He exhibits edema (trace bilat). He exhibits no tenderness.   Neurological: He is alert and oriented to person, place, and time.   Skin: Rash (poriasis) noted.   Nursing note and vitals reviewed.       LABS/DIAGNOSTIC DATA:    Results from last 7 days  Lab Units 07/15/17  1227 07/14/17  0635 07/13/17  0854   WBC 10*3/mm3 5.79 4.94 5.50   HEMOGLOBIN g/dL 14.2 13.1 13.4   HEMATOCRIT % 44.6 42.2 42.0   PLATELETS 10*3/mm3 266 298 314             Results from last 7 days  Lab Units 07/15/17  1227 07/14/17  0635 07/13/17  0854 07/12/17  0828 07/11/17  1138   SODIUM mmol/L 137 136 133 136 136   POTASSIUM mmol/L 4.4 4.1 3.8 4.0 3.7   CHLORIDE mmol/L 101 102 100 104 104   CO2 mmol/L 30.0 28.0 26.0 26.0 25.0   BUN mg/dL 12 15 12 10 10   CREATININE mg/dL 1.00 1.20 0.90 1.00 0.90   CALCIUM mg/dL 9.5 9.1 9.3 9.6 9.7   BILIRUBIN mg/dL  --   --   --  0.7 0.8   ALK PHOS U/L  --   --   --  80 78   ALT (SGPT) U/L  --   --   --  14 15   AST (SGOT) U/L  --   --   --  16 16   GLUCOSE mg/dL 107* 102* 113* 107* 96       Results from last 7 days  Lab Units 07/14/17  0635   HEMOGLOBIN A1C % 6.70*       Results from last 7 days  Lab Units 07/14/17  0635   CHOLESTEROL mg/dL 101   TRIGLYCERIDES mg/dL 97   HDL CHOL mg/dL 24*           Results from last 7 days  Lab Units 07/15/17  1411 07/13/17  0854 07/11/17  1138   BNP pg/mL 100.0 386.0* 809.0*       Medication Review:     aspirin 81 mg Oral Daily   atorvastatin 10 mg Oral Daily   docusate sodium 100 mg Oral BID   famotidine 20 mg Oral BID   furosemide 80 mg Intravenous Q12H   pharmacy consult - MTM  Does not  "apply Daily   Pharmacy Meds to Bed Consult  Does not apply Daily   polyethylene glycol 17 g Oral Daily   spironolactone 50 mg Oral Daily        DOBUTamine 2.5 mcg/kg/min Last Rate: 2.5 mcg/kg/min (07/16/17 1118)        Active Problems:    Hypertension    Morbid obesity    Obstructive sleep apnea    Personal history of noncompliance with medical treatment    Acute on chronic systolic (congestive) heart failure    Chest pain on breathing    CHF (congestive heart failure), NYHA class IV      ASSESSMENT/PLAN:    Assessment:  1. Mildly elevated troponins, not due to a MI  - Normal coronary arteries by cardiac catheter 7/13/07  2. Acute on chronic systolic congestive heart failure  - Class IV  - EF 10% by LV gram 7/13/17  3. Morbid obesity  4. Sleep apnea  5. Hypotension, secondary to cardiomyopathy and medications.    Plan:  Continue with diuresis, IV bumex ordered through tomorrow. Will reevaluate then. On inotropic assistance w dobutrex, BP is coming up. Will currently hold coreg and entresto to help keep BP up and assist with diuresis.     Dr. Olsen discussed this case with [facility], Dr. Tello, regarding possible LVAD/transplant evaluation. His weight is an issue. It may preclude further evaluation of this. They are discussing his case, and will \"get back to us\".   For now we will continue IV diuresis, given his significantly elevated left ventricular end-diastolic pressures, and we'll try to aid with diuresis/output, with some IV inotropes. Discussed the possibility of ICD,  however will defer until we can assess whether or not he is a candidate for an LVAD.    CARLOS Ziegler   07/16/17  11:34 AM    "

## 2017-07-16 NOTE — PROGRESS NOTES
[facility] HOSPITALIST    PROGRESS NOTE    Name:  Evan Gannon   Age:  26 y.o.  Sex:  male  :  1990  MRN:  [id number]   Visit Number:  [id number]  Admission Date:  2017  Date Of Service:  17  Primary Care Physician:  Barbara Mills MD     LOS: 5 days :      Chief Complaint:  Chest pain resolved. Low blood pressure improving.  Follow up chf exacerbation        Subjective / Interval History:   He was sitting up on bedside. He wore his CPAP overnight. Blood pressure improved  The patient is sitting up bed talking to family. He is wearing oxygen and CPAP as ordered.    He denies chest pain, shortness of breath, abdominal pain. He denies increased leg edema or weight gain. He is smiling, sitting up awake and alert.       Review of Systems:     General ROS: Patient denies any fevers, chills or loss of consciousness.  Ophthalmic ROS: Denies any diplopia or transient loss of vision.  ENT ROS: Denies sore throat, nasal congestion or ear pain.   Respiratory ROS: Denies cough and denies shortness of breath.  Cardiovascular ROS: No further chest pain or palpitations.   Gastrointestinal ROS: Denies nausea and vomiting. Denies any abdominal pain. No diarrhea.+ constipation  Genito-Urinary ROS: Denies dysuria or hematuria.  Musculoskeletal ROS: Denies chronic back pain. No muscle pain. No calf pain.  Neurological ROS: Denies any focal weakness. No loss of consciousness. Denies any numbness. Denies neck pain.   Dermatological ROS: Denies any redness or pruritis.    Vital Signs:    Temp:  [97.3 °F (36.3 °C)-99 °F (37.2 °C)] 97.3 °F (36.3 °C)  Heart Rate:  [] 67  Resp:  [16-18] 18  BP: (125-146)/(73-97) 125/73    Intake and output:    I/O last 3 completed shifts:  In: 1440 [P.O.:1440]  Out: 5300 [Urine:5300]       Physical Examination:    General Appearance:    Alert and cooperative, not in any acute distress. Morbidly obese,sitting up in bed talking to family.   Head:     Atraumatic and normocephalic, without obvious abnormality.   Eyes:            PERRLA, conjunctivae and sclerae normal, no Icterus. No pallor. Extra-occular movements are within normal limits.   Throat:   No oral lesions, no thrush, oral mucosa moist.   Neck:   Supple, trachea midline, no thyromegaly, no carotid bruit.   Lungs:     Chest shape is normal. Breath sounds heard bilaterally equally reduced.  No crackles or wheezing. No Pleural rub or bronchial breathing.    Heart:    Normal S1 and S2, no murmur   Abdomen:     Normal bowel sounds, no masses, no organomegaly. Soft        non-tender, non-distended, no guarding, no rebound                Tenderness, severely obese   Extremities:   Moves all extremities well, no edema, no cyanosis, no             clubbing   Skin:   No bleeding, bruising or rash. Chronic venous stasis dermatitis  With diffuse back and leg psoriatic changes chronic   Neurologic:   sensation intact, Motor power is normal and equal bilaterally.   Laboratory results:    Lab Results (last 24 hours)     Procedure Component Value Units Date/Time    CBC (No Diff) [[id number]]  (Abnormal) Collected:  07/15/17 1227    Specimen:  Blood Updated:  07/15/17 1246     WBC 5.79 10*3/mm3      RBC 5.71 10*6/mm3      Hemoglobin 14.2 g/dL      Hematocrit 44.6 %      MCV 78.1 (L) fL      MCH 24.9 (L) pg      MCHC 31.8 (L) g/dL      RDW 17.0 (H) %      RDW-SD 47.7 fl      MPV 9.8 fL      Platelets 266 10*3/mm3     Basic Metabolic Panel [[id number]]  (Abnormal) Collected:  07/15/17 1227    Specimen:  Blood Updated:  07/15/17 1257     Glucose 107 (H) mg/dL      BUN 12 mg/dL      Creatinine 1.00 mg/dL      Sodium 137 mmol/L      Potassium 4.4 mmol/L      Chloride 101 mmol/L      CO2 30.0 mmol/L      Calcium 9.5 mg/dL      eGFR  African Amer 111 mL/min/1.73      BUN/Creatinine Ratio 12.0     Anion Gap 6.0 mmol/L     Narrative:       [employer] Guidelines    Stage     Description        GFR  1         Normal  or High     90+  2         Mild decrease      60-89  3         Moderate decrease  30-59  4         Severe decrease    15-29  5         Kidney failure     <15    Magnesium [[id number]]  (Normal) Collected:  07/15/17 1227    Specimen:  Blood Updated:  07/15/17 1257     Magnesium 1.9 mg/dL     BNP [[id number]]  (Normal) Collected:  07/15/17 1411    Specimen:  Blood Updated:  07/15/17 1504     .0 pg/mL           I have reviewed the patient's laboratory results.    Radiology results:    Imaging Results (last 24 hours)     ** No results found for the last 24 hours. **          I have reviewed the patient's radiology reports.    Medication Review:     I have reviewed the patients active and prn medications.     Assessment:  Patient is a 26 yr old admitted with history of nonischemic cardiomyopathy EF 20%, with acute on chronic CHF, pleuritic chest pain, and elevated troponin.    1. Unstable Angina, improved   2. Acute on chronic systolic congestive heart failure    3. Nonischemic cardiomyopathy with EF 20%  4. Obstructive sleep apnea on cpap  5. Noncompliance with medications  6. Elevated troponin not due to myocardial infarction with normal coronaries on cardiac cath 7-13-17  7. Hypotension, secondary to medications      Plan:    Continue medications per cardiology. He has received dobutamine and lasix.  Continue CPAP and oxygen prn.  Await decision from cardiology regarding final treatment plan with defibrillator vs LVAD/transplant.  Expect discharge in the next 1-2 days.   Patient happy with current treatment plan.     Prophylaxis: Pepcid, heparin  Code: Full code    Medication risks and benefits were discussed in detail. Patient reported satisfaction with care delivered and treatment plan.     Keyona Vazquez DO  07/16/17  7:03 AM

## 2017-07-16 NOTE — PLAN OF CARE
Problem: Cardiac: Heart Failure (Adult)  Goal: Signs and Symptoms of Listed Potential Problems Will be Absent or Manageable (Cardiac: Heart Failure)  Outcome: Ongoing (interventions implemented as appropriate)    07/16/17 0040   Cardiac: Heart Failure   Problems Assessed (Heart Failure) all   Problems Present (Heart Failure) cardiac pump dysfunction;decreased quality of life

## 2017-07-17 LAB
ANION GAP SERPL CALCULATED.3IONS-SCNC: 9 MMOL/L (ref 3–11)
BUN BLD-MCNC: 11 MG/DL (ref 9–23)
BUN/CREAT SERPL: 13.8 (ref 7–25)
CALCIUM SPEC-SCNC: 10.3 MG/DL (ref 8.7–10.4)
CHLORIDE SERPL-SCNC: 102 MMOL/L (ref 99–109)
CO2 SERPL-SCNC: 25 MMOL/L (ref 20–31)
CREAT BLD-MCNC: 0.8 MG/DL (ref 0.6–1.3)
GFR SERPL CREATININE-BSD FRML MDRD: 144 ML/MIN/1.73
GLUCOSE BLD-MCNC: 107 MG/DL (ref 70–100)
POTASSIUM BLD-SCNC: 4.6 MMOL/L (ref 3.5–5.5)
SODIUM BLD-SCNC: 136 MMOL/L (ref 132–146)

## 2017-07-17 PROCEDURE — 99222 1ST HOSP IP/OBS MODERATE 55: CPT | Performed by: INTERNAL MEDICINE

## 2017-07-17 PROCEDURE — 80048 BASIC METABOLIC PNL TOTAL CA: CPT | Performed by: INTERNAL MEDICINE

## 2017-07-17 PROCEDURE — 25010000003 DOBUTAMINE PER 250 MG: Performed by: INTERNAL MEDICINE

## 2017-07-17 PROCEDURE — 94799 UNLISTED PULMONARY SVC/PX: CPT

## 2017-07-17 PROCEDURE — 99232 SBSQ HOSP IP/OBS MODERATE 35: CPT | Performed by: INTERNAL MEDICINE

## 2017-07-17 PROCEDURE — 25010000002 FUROSEMIDE PER 20 MG: Performed by: INTERNAL MEDICINE

## 2017-07-17 PROCEDURE — 94660 CPAP INITIATION&MGMT: CPT

## 2017-07-17 RX ORDER — FUROSEMIDE 10 MG/ML
80 INJECTION INTRAMUSCULAR; INTRAVENOUS EVERY 12 HOURS
Status: DISCONTINUED | OUTPATIENT
Start: 2017-07-17 | End: 2017-07-18

## 2017-07-17 RX ADMIN — ATORVASTATIN CALCIUM 10 MG: 10 TABLET, FILM COATED ORAL at 08:18

## 2017-07-17 RX ADMIN — FAMOTIDINE 20 MG: 20 TABLET ORAL at 08:19

## 2017-07-17 RX ADMIN — ACETAMINOPHEN 650 MG: 325 TABLET ORAL at 22:50

## 2017-07-17 RX ADMIN — DOBUTAMINE IN DEXTROSE 2.5 MCG/KG/MIN: 100 INJECTION, SOLUTION INTRAVENOUS at 09:25

## 2017-07-17 RX ADMIN — FUROSEMIDE 80 MG: 10 INJECTION, SOLUTION INTRAMUSCULAR; INTRAVENOUS at 05:51

## 2017-07-17 RX ADMIN — FAMOTIDINE 20 MG: 20 TABLET ORAL at 17:53

## 2017-07-17 RX ADMIN — FUROSEMIDE 80 MG: 10 INJECTION, SOLUTION INTRAMUSCULAR; INTRAVENOUS at 17:54

## 2017-07-17 RX ADMIN — SPIRONOLACTONE 50 MG: 50 TABLET ORAL at 08:18

## 2017-07-17 RX ADMIN — ASPIRIN 81 MG: 81 TABLET, COATED ORAL at 08:18

## 2017-07-17 RX ADMIN — SACUBITRIL AND VALSARTAN 1 TABLET: 24; 26 TABLET, FILM COATED ORAL at 20:31

## 2017-07-17 RX ADMIN — DOBUTAMINE IN DEXTROSE 2.5 MCG/KG/MIN: 100 INJECTION, SOLUTION INTRAVENOUS at 20:39

## 2017-07-17 NOTE — PROGRESS NOTES
[facility] HOSPITALIST    PROGRESS NOTE    Name:  Evan Gannon   Age:  26 y.o.  Sex:  male  :  1990  MRN:  [id number]   Visit Number:  [id number]  Admission Date:  2017  Date Of Service:  17  Primary Care Physician:  Barbara Mills MD     LOS: 6 days :      Chief Complaint:    chf    Subjective / Interval History:   Dyspnea on exertion but much improved from admisison  No fever, no chills, no chest pain, no abd pain, no n/v/d    Review of Systems:     General ROS: Patient denies any fevers, chills or loss of consciousness.  Ophthalmic ROS: Denies any diplopia or transient loss of vision.  ENT ROS: Denies sore throat, nasal congestion or ear pain.   Respiratory ROS: Denies cough and denies shortness of breath.  Cardiovascular ROS: No further chest pain or palpitations.   Gastrointestinal ROS: Denies nausea and vomiting. Denies any abdominal pain. No diarrhea.+ constipation  Genito-Urinary ROS: Denies dysuria or hematuria.  Musculoskeletal ROS: Denies chronic back pain. No muscle pain. No calf pain.  Neurological ROS: Denies any focal weakness. No loss of consciousness. Denies any numbness. Denies neck pain.       Vital Signs:    Temp:  [97.7 °F (36.5 °C)-98.3 °F (36.8 °C)] 97.9 °F (36.6 °C)  Heart Rate:  [] 91  Resp:  [16-22] 16  BP: (130-137)/(82-91) 134/82    Intake and output:    I/O last 3 completed shifts:  In: 400 [I.V.:400]  Out: 5705 [Urine:5705]       Physical Examination:    Alert, ox4, nontoxic appearing, no distress  Ncat, oroph clear  Morbidly obese, neck supple  Speech clear, equal   rrr  Distant/difficult auscultation due to obesity; however, grossly clear lungs, normal respiratory effort  abd soft, obese, nontender  No cce  Normal affect    Lab Results (last 24 hours)     ** No results found for the last 24 hours. **          I have reviewed the patient's laboratory results.    Radiology results:    Imaging Results (last 24 hours)      ** No results found for the last 24 hours. **          I have reviewed the patient's radiology reports.    Medication Review:     I have reviewed the patients active and prn medications.     Assessment:  Patient is a 26 yr old admitted with history of nonischemic cardiomyopathy EF 20%, with acute on chronic CHF, pleuritic chest pain, and elevated troponin.    *Mildly elevated Troponins (not due to myocardial infarction)   -normal left heart cath this hospitalization  *Acute on Chronic Systolic Heart failure  *Non-ischemic Cardiomyopathy (EF 10% by LV gram 7/13/17)  * Obstructive sleep apnea (on cpap)  *Morbid Obesity  *Hypotension (due to cardiomyopathy and medications)        Plan:  ------------------------------------  -Intravenous diuresis per Cards  -On dobutamin continuous infusion  -bmp pending  -Continue entresto, aldactone, cardiac meds per cards  -a.m. Bmp, mag   -Continue CPAP and oxygen prn.  -Await decision from cardiology regarding final treatment plan with defibrillator vs LVAD/transplant, apparently cards in contact w/ [facility]  -Discharge date unclear at this point.       Prophylaxis: Pepcid, heparin  Code: Full code    Medication risks and benefits were discussed in detail. Patient reported satisfaction with care delivered and treatment plan.     Scott Vazquez MD  07/17/17  10:41 AM

## 2017-07-17 NOTE — PROGRESS NOTES
"  [facility]   Inpatient Progress Note       LOS: 6 days   Patient Care Team:  Barbara Mills MD as PCP - General (Internal Medicine)    Chief Complaint:  Heart failure nonischemic cardio myopathy, NSTEMI    Subjective     Interval History:   Patient eating breakfast. States that his breathing feels good. Still on dobutrex. Has had good diuresis.  Awaiting word on possible LVAD since transplant although clinically improving      History taken from: patient chart    Review of Systems:   Pertinent positives noted in history, exam, and assessment. Otherwise reviewed and negative.      Objective      Intake/Output Summary (Last 24 hours) at 07/17/17 075  Last data filed at 07/17/17 0350   Gross per 24 hour   Intake              400 ml   Output             4405 ml   Net            -4005 ml         Vitals:  Blood pressure 134/82, pulse 91, temperature 97.9 °F (36.6 °C), temperature source Oral, resp. rate 16, height 69\" (175.3 cm), weight (!) 405 lb 1.6 oz (184 kg), SpO2 99 %.     Physical Exam   Constitutional: He is oriented to person, place, and time. He appears well-developed and well-nourished. No distress.   Neck: No JVD present. No tracheal deviation present.   Cardiovascular: Normal rate, regular rhythm and normal heart sounds.  Exam reveals no friction rub.    No murmur heard.  Right wrist, mildly tender but no hematoma or bruit   Pulmonary/Chest: Effort normal and breath sounds normal.   Abdominal: Soft. Bowel sounds are normal. There is no tenderness.   Musculoskeletal: He exhibits edema (trace ble edema). He exhibits no deformity.   Neurological: He is alert and oriented to person, place, and time.   Skin: Skin is warm and dry.   Multiple psoriatic areas noted all over body     Of examined the patient and agree with the above     Results Review:     I reviewed the patient's new clinical results.      Results from last 7 days  Lab Units 07/15/17  1227   WBC 10*3/mm3 5.79 "   HEMOGLOBIN g/dL 14.2   HEMATOCRIT % 44.6   PLATELETS 10*3/mm3 266       Results from last 7 days  Lab Units 07/15/17  1227  07/12/17  0828   SODIUM mmol/L 137  < > 136   POTASSIUM mmol/L 4.4  < > 4.0   CHLORIDE mmol/L 101  < > 104   CO2 mmol/L 30.0  < > 26.0   BUN mg/dL 12  < > 10   CREATININE mg/dL 1.00  < > 1.00   CALCIUM mg/dL 9.5  < > 9.6   BILIRUBIN mg/dL  --   --  0.7   ALK PHOS U/L  --   --  80   ALT (SGPT) U/L  --   --  14   AST (SGOT) U/L  --   --  16   GLUCOSE mg/dL 107*  < > 107*   < > = values in this interval not displayed.    Results from last 7 days  Lab Units 07/15/17  1227   SODIUM mmol/L 137   POTASSIUM mmol/L 4.4   CHLORIDE mmol/L 101   CO2 mmol/L 30.0   BUN mg/dL 12   CREATININE mg/dL 1.00   GLUCOSE mg/dL 107*   CALCIUM mg/dL 9.5           0  Lab Value Date/Time   TROPONINI 1.142 (C) 07/13/2017 0854           Results from last 7 days  Lab Units 07/14/17  0635   CHOLESTEROL mg/dL 101   TRIGLYCERIDES mg/dL 97   HDL CHOL mg/dL 24*             Tele:  Sinus rhythm    Assessment/Plan     Active Problems:    Hypertension    Morbid obesity    Obstructive sleep apnea    Personal history of noncompliance with medical treatment    Acute on chronic systolic (congestive) heart failure    Chest pain on breathing    CHF (congestive heart failure), NYHA class IV    Plan:  1. Mildly elevated troponins, not due to a myocardial infarction  - Normal coronary arteries by cardiac catheter 7/13/17  2. Acute on chronic systolic congestive heart failure  - Class IV  - EF 10% by LV gram 7/13/17  - on IV diuretics  3. Morbid obesity  4. Sleep apnea  5. Hypotension, secondary to cardiomyopathy and medications.  - currently on dobutrex to aid diuresis.        Plan:  Tinea. IV diuretic order another day.  Awaiting Dobutrex after today.  Arrange follow-up at [facility] transplant/heart failure clinic.    7:51 AM  SONAM Reynolds, Balbir Olsen have reviewed the note in full and agree with all aspects of the above  including physical exam, assessment, labs and plan with changes made accordingly.     Balbir Olsen MD  07/17/17  9:13 AM        Please note that portions of this note may have been completed with a voice recognition program. Efforts were made to edit the dictations, but occasionally words are mistranscribed.

## 2017-07-17 NOTE — PAYOR COMM NOTE
"Sivakumar Queen (26 y.o. Male)   Auth # [id number]  Libia Zaragoza RN, BSN  Phone # [phone]  Fax # [phone]    Date of Birth Social Security Number Address Home Phone MRN    1990  [id number] [facility] [id number] [phone] [id number]    Holiness Marital Status          None Single       Admission Date Admission Type Admitting Provider Attending Provider Department, Room/Bed    7/11/17 Emergency Scott Vazquez MD West, Christopher R, MD [facility] 6B, N644/1    Discharge Date Discharge Disposition Discharge Destination                      Attending Provider: Scott Vazquez MD     Allergies:  No Known Allergies    Isolation:  None   Infection:  None   Code Status:  FULL    Ht:  69\" (175.3 cm)   Wt:  405 lb 1.6 oz (184 kg)    Admission Cmt:  None   Principal Problem:  None                Active Insurance as of 7/11/2017     Primary Coverage     Payor Plan Insurance Group Employer/Plan Group    ANTH BLUE CROSS [facility] BLUE CROSS BLUE [facility] PPO [id number]     Payor Plan Address Payor Plan Phone Number Effective From Effective To    [address] [phone] 7/1/2014     [place] [address]       Subscriber Name Subscriber Birth Date Member ID       HEATHER QUEEN 12/5/1970 [id number]           Secondary Coverage     Payor Plan Insurance Group Employer/Plan Group    PASSPORT PASSPORT      Payor Plan Address Payor Plan Phone Number Effective From Effective To    [address] [phone] 10/1/2014     [place] [address]       Subscriber Name Subscriber Birth Date Member ID       SIVAKUMAR QUEEN 1990 [id number]                 Emergency Contacts      (Rel.) Home Phone Work Phone Mobile Phone    Kiah Austin (Significant Other) [phone] -- --    Aramis Queen (Father) [phone] -- --            Operative/Procedure Notes (last 72 hours) (Notes from 7/14/2017  1:55 PM through 7/17/2017  1:55 PM)     No notes of this type exist for this " "encounter.           Physician Progress Notes (last 72 hours) (Notes from 7/14/2017  1:55 PM through 7/17/2017  1:55 PM)      Chandu Perkins MD at 7/15/2017 11:19 AM  Version 1 of 1           [facility]   Inpatient Progress Note       LOS: 4 days   Patient Care Team:  Barbara Mills MD as PCP - General (Internal Medicine)    Chief Complaint:  Heart failure nonischemic cardio myopathy, NSTEMI    Subjective     Interval History:   Patient doesn't feel that he diuresed well with bumex. States that he usually does better with lasix. Otherwise no specific complaints. SBP has been in the 90's this morning prior to medications.  Overall feels that is near his baseline.  Mild edema.  Has not ambulated.      History taken from: patient chart    Review of Systems:   Pertinent positives noted in history, exam, and assessment. Otherwise reviewed and negative.      Objective      Intake/Output Summary (Last 24 hours) at 07/15/17 1119  Last data filed at 07/15/17 0938   Gross per 24 hour   Intake             1440 ml   Output             2100 ml   Net             -660 ml         Vitals:  Blood pressure 131/91, pulse 84, temperature 97.6 °F (36.4 °C), temperature source Oral, resp. rate 18, height 69\" (175.3 cm), weight (!) 411 lb 3.2 oz (187 kg), SpO2 97 %.     Physical Exam   Constitutional: He is oriented to person, place, and time. He appears well-developed and well-nourished. No distress.   Neck: No JVD present. No tracheal deviation present.   Cardiovascular: Normal rate, regular rhythm and normal heart sounds.  Exam reveals no friction rub.    No murmur heard.  Right wrist, mildly tender but no hematoma or bruit   Pulmonary/Chest: Effort normal and breath sounds normal.   Abdominal: Soft. Bowel sounds are normal. There is no tenderness.   Musculoskeletal: He exhibits edema (trace ble edema). He exhibits no deformity.   Neurological: He is alert and oriented to person, place, and time. "   Skin: Skin is warm and dry.   Multiple psoriatic areas noted all over body     I've examined the patient and agree with the above findings     Results Review:     I reviewed the patient's new clinical results.      Results from last 7 days  Lab Units 07/14/17  0635   WBC 10*3/mm3 4.94   HEMOGLOBIN g/dL 13.1   HEMATOCRIT % 42.2   PLATELETS 10*3/mm3 298       Results from last 7 days  Lab Units 07/14/17  0635  07/12/17  0828   SODIUM mmol/L 136  < > 136   POTASSIUM mmol/L 4.1  < > 4.0   CHLORIDE mmol/L 102  < > 104   CO2 mmol/L 28.0  < > 26.0   BUN mg/dL 15  < > 10   CREATININE mg/dL 1.20  < > 1.00   CALCIUM mg/dL 9.1  < > 9.6   BILIRUBIN mg/dL  --   --  0.7   ALK PHOS U/L  --   --  80   ALT (SGPT) U/L  --   --  14   AST (SGOT) U/L  --   --  16   GLUCOSE mg/dL 102*  < > 107*   < > = values in this interval not displayed.    Results from last 7 days  Lab Units 07/14/17  0635   SODIUM mmol/L 136   POTASSIUM mmol/L 4.1   CHLORIDE mmol/L 102   CO2 mmol/L 28.0   BUN mg/dL 15   CREATININE mg/dL 1.20   GLUCOSE mg/dL 102*   CALCIUM mg/dL 9.1           0  Lab Value Date/Time   TROPONINI 1.142 (C) 07/13/2017 0854           Results from last 7 days  Lab Units 07/14/17  0635   CHOLESTEROL mg/dL 101   TRIGLYCERIDES mg/dL 97   HDL CHOL mg/dL 24*             Tele:  Sinus rhythm    Assessment/Plan     Active Problems:    Hypertension    Morbid obesity    Obstructive sleep apnea    Personal history of noncompliance with medical treatment    Acute on chronic systolic (congestive) heart failure    Chest pain on breathing    CHF (congestive heart failure), NYHA class IV    Plan:  1. Mildly elevated troponins, not due to a myocardial infarction  - Normal coronary arteries by cardiac catheter 7/13/07  2. Acute on chronic systolic congestive heart failure  - Class IV  - EF 10% by LV gram 7/13/17  3. Morbid obesity  4. Sleep apnea  5. Hypotension, secondary to cardiomyopathy and medications.        Plan:  Continue with diuresis today. On  "inotropic assistance w dobutrex . Will currently hold coreg and entresto to help keep BP up and assist with diuresis.   DR Olsen discussed this case with [facility], Dr. Tello, regarding possible LVAD/transplant evaluation.  His weight is an issue.  It may preclude further evaluation of this.  They are discussing his case, and we'll \"get back to us\".  For now we will continue IV diuresis, given his significantly elevated left ventricular end-diastolic pressures, and we'll try to aid with diuresis/output, with some IV inotropes.  Discussed the possibility of ICD, we will obviously need one, however will defer until we can assess whether or not he is a candidate for an LVAD.       Electronically signed by Chandu Perkins MD at 7/15/2017 11:25 AM      Keyona Vazquez DO at 7/15/2017  7:00 PM  Version 1 of 1               [facility]    Name:  Evan Gannon   Age:  26 y.o.  Sex:  male  :  1990  MRN:  [id number]   Visit Number:  [id number]  Admission Date:  2017  Date Of Service:  07/15/17  Primary Care Physician:  Barbara Mills MD     LOS: 4 days :      Chief Complaint:  Dyspnea improved.  Follow up chf exacerbation        Subjective / Interval History:   The patient is sitting up bed talking to family. He is wearing oxygen and CPAP as ordered.    He denies chest pain, shortness of breath, abdominal pain. He denies increased leg edema. He is smiling, sitting up awake and alert.       Review of Systems:     General ROS: Patient denies any fevers, chills or loss of consciousness.  Ophthalmic ROS: Denies any diplopia or transient loss of vision.  ENT ROS: Denies sore throat, nasal congestion or ear pain.   Respiratory ROS: Denies cough with improvement shortness of breath.  Cardiovascular ROS: Resolved chest pain or palpitations.   Gastrointestinal ROS: Denies nausea and vomiting. Denies any abdominal pain. No diarrhea.+ constipation  Genito-Urinary ROS: " Denies dysuria or hematuria.  Musculoskeletal ROS: Denies chronic back pain. No muscle pain. No calf pain.  Neurological ROS: Denies any focal weakness. No loss of consciousness. Denies any numbness. Denies neck pain.   Dermatological ROS: Denies any redness or pruritis.    Vital Signs:    Temp:  [97.5 °F (36.4 °C)-99 °F (37.2 °C)] 99 °F (37.2 °C)  Heart Rate:  [] 86  Resp:  [16-18] 18  BP: (105-144)/(47-91) 133/82    Intake and output:    I/O last 3 completed shifts:  In: 1320 [P.O.:1320]  Out: 3050 [Urine:3050]  I/O this shift:  In: 720 [P.O.:720]  Out: 1900 [Urine:1900]    Physical Examination:    General Appearance:    Alert and cooperative, not in any acute distress. Morbidly obese,sitting up in bed talking to family.   Head:    Atraumatic and normocephalic, without obvious abnormality.   Eyes:            PERRLA, conjunctivae and sclerae normal, no Icterus. No pallor. Extra-occular movements are within normal limits.   Throat:   No oral lesions, no thrush, oral mucosa moist.   Neck:   Supple, trachea midline, no thyromegaly, no carotid bruit.   Lungs:     Chest shape is normal. Breath sounds heard bilaterally equally reduced.  No crackles or wheezing. No Pleural rub or bronchial breathing.    Heart:    Normal S1 and S2, no murmur, no gallop, no rub. No JVD   Abdomen:     Normal bowel sounds, no masses, no organomegaly. Soft        non-tender, non-distended, no guarding, no rebound                Tenderness, severely obese   Extremities:   Moves all extremities well, no edema, no cyanosis, no             clubbing   Skin:   No bleeding, bruising or rash. Chronic venous stasis dermatitis with diffuse psoriatic lesions throughout all dry without warmth or drainage   Neurologic:   Cranial nerves 2 - 12 grossly intact, sensation intact, Motor power is normal and equal bilaterally.   Laboratory results:    Lab Results (last 24 hours)     Procedure Component Value Units Date/Time    CBC (No Diff) [[id number]]   (Abnormal) Collected:  07/15/17 1227    Specimen:  Blood Updated:  07/15/17 1246     WBC 5.79 10*3/mm3      RBC 5.71 10*6/mm3      Hemoglobin 14.2 g/dL      Hematocrit 44.6 %      MCV 78.1 (L) fL      MCH 24.9 (L) pg      MCHC 31.8 (L) g/dL      RDW 17.0 (H) %      RDW-SD 47.7 fl      MPV 9.8 fL      Platelets 266 10*3/mm3     Basic Metabolic Panel [[id number]]  (Abnormal) Collected:  07/15/17 1227    Specimen:  Blood Updated:  07/15/17 1257     Glucose 107 (H) mg/dL      BUN 12 mg/dL      Creatinine 1.00 mg/dL      Sodium 137 mmol/L      Potassium 4.4 mmol/L      Chloride 101 mmol/L      CO2 30.0 mmol/L      Calcium 9.5 mg/dL      eGFR  African Amer 111 mL/min/1.73      BUN/Creatinine Ratio 12.0     Anion Gap 6.0 mmol/L     Narrative:       National Kidney Foundation Guidelines    Stage     Description        GFR  1         Normal or High     90+  2         Mild decrease      60-89  3         Moderate decrease  30-59  4         Severe decrease    15-29  5         Kidney failure     <15    Magnesium [[id number]]  (Normal) Collected:  07/15/17 1227    Specimen:  Blood Updated:  07/15/17 1257     Magnesium 1.9 mg/dL     BNP [[id number]]  (Normal) Collected:  07/15/17 1411    Specimen:  Blood Updated:  07/15/17 1504     .0 pg/mL           I have reviewed the patient's laboratory results.    Radiology results:    Imaging Results (last 24 hours)     ** No results found for the last 24 hours. **          I have reviewed the patient's radiology reports.    Medication Review:     I have reviewed the patients active and prn medications.     Assessment:  Patient is a 26 yr old admitted with history of nonischemic cardiomyopathy EF 20%, with acute on chronic CHF, pleuritic chest pain, and elevated troponin.    1. Unstable Angina, improved   2. Acute on chronic systolic congestive heart failure on dobutamine   3. Nonischemic cardiomyopathy with EF 20%  4. Obstructive sleep apnea on cpap  5. Noncompliance with  medications  6. Elevated troponin not due to myocardial infarction with normal coronaries on cardiac cath 17  7. Hypotension, secondary to medications      Plan:  Continue dobutamine, lasix iv.    Continue betablocker, statin, aspirin, aldactone.     Oxygen and cpap.     Await [facility] opinion if he would be candidate for LVAD/transplant vs defibrillator     He is interested in bariatric appointment after discharge.    Continue daily labs and monitor and titrate medications accordingly, monitoring strict in and out.        Prophylaxis: Pepcid, heparin  Code: Full code    Medication risks and benefits were discussed in detail. Patient reported satisfaction with care delivered and treatment plan.     Keyona Vazquez DO  07/15/17  7:00 PM             Electronically signed by Keyona Vazquez DO at 7/15/2017  8:26 PM      Keyona Vazquez DO at 2017  7:03 AM  Version 1 of 1               [facility]    Name:  Evan Gannon   Age:  26 y.o.  Sex:  male  :  1990  MRN:  [id number]   Visit Number:  [id number]  Admission Date:  2017  Date Of Service:  17  Primary Care Physician:  Barbara Mills MD     LOS: 5 days :      Chief Complaint:  Chest pain resolved. Low blood pressure improving.  Follow up chf exacerbation        Subjective / Interval History:   He was sitting up on bedside. He wore his CPAP overnight. Blood pressure improved  The patient is sitting up bed talking to family. He is wearing oxygen and CPAP as ordered.    He denies chest pain, shortness of breath, abdominal pain. He denies increased leg edema or weight gain. He is smiling, sitting up awake and alert.       Review of Systems:     General ROS: Patient denies any fevers, chills or loss of consciousness.  Ophthalmic ROS: Denies any diplopia or transient loss of vision.  ENT ROS: Denies sore throat, nasal congestion or ear pain.   Respiratory ROS: Denies cough and denies  shortness of breath.  Cardiovascular ROS: No further chest pain or palpitations.   Gastrointestinal ROS: Denies nausea and vomiting. Denies any abdominal pain. No diarrhea.+ constipation  Genito-Urinary ROS: Denies dysuria or hematuria.  Musculoskeletal ROS: Denies chronic back pain. No muscle pain. No calf pain.  Neurological ROS: Denies any focal weakness. No loss of consciousness. Denies any numbness. Denies neck pain.   Dermatological ROS: Denies any redness or pruritis.    Vital Signs:    Temp:  [97.3 °F (36.3 °C)-99 °F (37.2 °C)] 97.3 °F (36.3 °C)  Heart Rate:  [] 67  Resp:  [16-18] 18  BP: (125-146)/(73-97) 125/73    Intake and output:    I/O last 3 completed shifts:  In: 1440 [P.O.:1440]  Out: 5300 [Urine:5300]       Physical Examination:    General Appearance:    Alert and cooperative, not in any acute distress. Morbidly obese,sitting up in bed talking to family.   Head:    Atraumatic and normocephalic, without obvious abnormality.   Eyes:            PERRLA, conjunctivae and sclerae normal, no Icterus. No pallor. Extra-occular movements are within normal limits.   Throat:   No oral lesions, no thrush, oral mucosa moist.   Neck:   Supple, trachea midline, no thyromegaly, no carotid bruit.   Lungs:     Chest shape is normal. Breath sounds heard bilaterally equally reduced.  No crackles or wheezing. No Pleural rub or bronchial breathing.    Heart:    Normal S1 and S2, no murmur   Abdomen:     Normal bowel sounds, no masses, no organomegaly. Soft        non-tender, non-distended, no guarding, no rebound                Tenderness, severely obese   Extremities:   Moves all extremities well, no edema, no cyanosis, no             clubbing   Skin:   No bleeding, bruising or rash. Chronic venous stasis dermatitis  With diffuse back and leg psoriatic changes chronic   Neurologic:   sensation intact, Motor power is normal and equal bilaterally.   Laboratory results:    Lab Results (last 24 hours)     Procedure  Component Value Units Date/Time    CBC (No Diff) [[id number]]  (Abnormal) Collected:  07/15/17 1227    Specimen:  Blood Updated:  07/15/17 1246     WBC 5.79 10*3/mm3      RBC 5.71 10*6/mm3      Hemoglobin 14.2 g/dL      Hematocrit 44.6 %      MCV 78.1 (L) fL      MCH 24.9 (L) pg      MCHC 31.8 (L) g/dL      RDW 17.0 (H) %      RDW-SD 47.7 fl      MPV 9.8 fL      Platelets 266 10*3/mm3     Basic Metabolic Panel [[id number]]  (Abnormal) Collected:  07/15/17 1227    Specimen:  Blood Updated:  07/15/17 1257     Glucose 107 (H) mg/dL      BUN 12 mg/dL      Creatinine 1.00 mg/dL      Sodium 137 mmol/L      Potassium 4.4 mmol/L      Chloride 101 mmol/L      CO2 30.0 mmol/L      Calcium 9.5 mg/dL      eGFR  African Amer 111 mL/min/1.73      BUN/Creatinine Ratio 12.0     Anion Gap 6.0 mmol/L     Narrative:       National Kidney Foundation Guidelines    Stage     Description        GFR  1         Normal or High     90+  2         Mild decrease      60-89  3         Moderate decrease  30-59  4         Severe decrease    15-29  5         Kidney failure     <15    Magnesium [[id number]]  (Normal) Collected:  07/15/17 1227    Specimen:  Blood Updated:  07/15/17 1257     Magnesium 1.9 mg/dL     BNP [[id number]]  (Normal) Collected:  07/15/17 1411    Specimen:  Blood Updated:  07/15/17 1504     .0 pg/mL           I have reviewed the patient's laboratory results.    Radiology results:    Imaging Results (last 24 hours)     ** No results found for the last 24 hours. **          I have reviewed the patient's radiology reports.    Medication Review:     I have reviewed the patients active and prn medications.     Assessment:  Patient is a 26 yr old admitted with history of nonischemic cardiomyopathy EF 20%, with acute on chronic CHF, pleuritic chest pain, and elevated troponin.    1. Unstable Angina, improved   2. Acute on chronic systolic congestive heart failure    3. Nonischemic cardiomyopathy with EF 20%  4. Obstructive sleep  "apnea on cpap  5. Noncompliance with medications  6. Elevated troponin not due to myocardial infarction with normal coronaries on cardiac cath 7-13-17  7. Hypotension, secondary to medications      Plan:    Continue medications per cardiology. He has received dobutamine and lasix.  Continue CPAP and oxygen prn.  Await decision from cardiology regarding final treatment plan with defibrillator vs LVAD/transplant.  Expect discharge in the next 1-2 days.   Patient happy with current treatment plan.     Prophylaxis: Pepcid, heparin  Code: Full code    Medication risks and benefits were discussed in detail. Patient reported satisfaction with care delivered and treatment plan.     Keyona Vazquez DO  07/16/17  7:03 AM             Electronically signed by Keyona Vazquez DO at 7/16/2017  8:43 AM      Chandu Perkins MD at 7/16/2017 10:38 AM  Version 2 of 2         [facility] Progress Note     LOS: 5 days   Patient Care Team:  Barbara Mills MD as PCP - General (Internal Medicine)  PCP:  Barbara Mills MD    Chief Complaint:  NICM    SUBJECTIVE:   Pt asleep on CPAP, I came back later and he was still asleep so I woke him. He is drowsy but denies CP, dyspnea and edema are improved close to baseline. BPs are higher.    Review of Systems:   All systems have been reviewed and are negative with the exception of those mentioned above.      OBJECTIVE:    Vital Sign Min/Max for last 24 hours  Temp  Min: 97.3 °F (36.3 °C)  Max: 99 °F (37.2 °C)   BP  Min: 125/73  Max: 146/97   Pulse  Min: 67  Max: 95   Resp  Min: 16  Max: 18   SpO2  Min: 94 %  Max: 96 %   Flow (L/min)  Min: 2  Max: 2   No Data Recorded     Flowsheet Rows         First Filed Value    Admission Height  69\" (175.3 cm) Documented at 07/11/2017 1117    Admission Weight  (!)  400 lb (181 kg) Documented at 07/11/2017 1117          Telemetry: nsr      Intake/Output Summary (Last 24 hours) at 07/16/17 1134  Last " data filed at 07/16/17 1038   Gross per 24 hour   Intake              360 ml   Output             3600 ml   Net            -3240 ml     Intake & Output (last 3 days)       07/13 0701 - 07/14 0700 07/14 0701 - 07/15 0700 07/15 0701 - 07/16 0700 07/16 0701 - 07/17 0700    P.O.  1320 720     Total Intake(mL/kg)  1320 (7.1) 720 (3.9)     Urine (mL/kg/hr) 950 (0.2) 2100 (0.5) 3200 (0.7) 950 (1.1)    Total Output 950 2100 3200 950    Net -950 -780 -2480 -950            Unmeasured Urine Occurrence 1 x              Physical Exam:  Physical Exam   Constitutional: He is oriented to person, place, and time. He appears well-developed and well-nourished. No distress.   Neck: Neck supple. No JVD present.   Cardiovascular: Normal rate, regular rhythm, normal heart sounds and intact distal pulses.    No murmur heard.  Pulses:       Radial pulses are 2+ on the right side, and 2+ on the left side.        Dorsalis pedis pulses are 2+ on the right side, and 2+ on the left side.        Posterior tibial pulses are 2+ on the right side, and 2+ on the left side.   Pulmonary/Chest: Effort normal and breath sounds normal. He has no wheezes. He has no rales.   Abdominal: Soft. Bowel sounds are normal. There is no tenderness. There is no guarding.   Musculoskeletal: He exhibits edema (trace bilat). He exhibits no tenderness.   Neurological: He is alert and oriented to person, place, and time.   Skin: Rash (poriasis) noted.   Nursing note and vitals reviewed.       LABS/DIAGNOSTIC DATA:    Results from last 7 days  Lab Units 07/15/17  1227 07/14/17  0635 07/13/17  0854   WBC 10*3/mm3 5.79 4.94 5.50   HEMOGLOBIN g/dL 14.2 13.1 13.4   HEMATOCRIT % 44.6 42.2 42.0   PLATELETS 10*3/mm3 266 298 314             Results from last 7 days  Lab Units 07/15/17  1227 07/14/17  0635 07/13/17  0854 07/12/17  0828 07/11/17  1138   SODIUM mmol/L 137 136 133 136 136   POTASSIUM mmol/L 4.4 4.1 3.8 4.0 3.7   CHLORIDE mmol/L 101 102 100 104 104   CO2 mmol/L 30.0  28.0 26.0 26.0 25.0   BUN mg/dL 12 15 12 10 10   CREATININE mg/dL 1.00 1.20 0.90 1.00 0.90   CALCIUM mg/dL 9.5 9.1 9.3 9.6 9.7   BILIRUBIN mg/dL  --   --   --  0.7 0.8   ALK PHOS U/L  --   --   --  80 78   ALT (SGPT) U/L  --   --   --  14 15   AST (SGOT) U/L  --   --   --  16 16   GLUCOSE mg/dL 107* 102* 113* 107* 96       Results from last 7 days  Lab Units 07/14/17  0635   HEMOGLOBIN A1C % 6.70*       Results from last 7 days  Lab Units 07/14/17  0635   CHOLESTEROL mg/dL 101   TRIGLYCERIDES mg/dL 97   HDL CHOL mg/dL 24*           Results from last 7 days  Lab Units 07/15/17  1411 07/13/17  0854 07/11/17  1138   BNP pg/mL 100.0 386.0* 809.0*       Medication Review:     aspirin 81 mg Oral Daily   atorvastatin 10 mg Oral Daily   docusate sodium 100 mg Oral BID   famotidine 20 mg Oral BID   furosemide 80 mg Intravenous Q12H   pharmacy consult - MTM  Does not apply Daily   Pharmacy Meds to Bed Consult  Does not apply Daily   polyethylene glycol 17 g Oral Daily   spironolactone 50 mg Oral Daily        DOBUTamine 2.5 mcg/kg/min Last Rate: 2.5 mcg/kg/min (07/16/17 1118)        Active Problems:    Hypertension    Morbid obesity    Obstructive sleep apnea    Personal history of noncompliance with medical treatment    Acute on chronic systolic (congestive) heart failure    Chest pain on breathing    CHF (congestive heart failure), NYHA class IV      ASSESSMENT/PLAN:    Assessment:  1. Mildly elevated troponins, not due to a MI  - Normal coronary arteries by cardiac catheter 7/13/07  2. Acute on chronic systolic congestive heart failure  - Class IV  - EF 10% by LV gram 7/13/17  3. Morbid obesity  4. Sleep apnea  5. Hypotension, secondary to cardiomyopathy and medications.    Plan:  Continue with diuresis, IV bumex ordered through tomorrow. Will reevaluate then. On inotropic assistance w dobutrex, BP is coming up. Will currently hold coreg and entresto to help keep BP up and assist with diuresis.     Dr. Olsen discussed this  "case with , Dr. Tello, regarding possible LVAD/transplant evaluation. His weight is an issue. It may preclude further evaluation of this. They are discussing his case, and will \"get back to us\".   For now we will continue IV diuresis, given his significantly elevated left ventricular end-diastolic pressures, and we'll try to aid with diuresis/output, with some IV inotropes. Discussed the possibility of ICD,  however will defer until we can assess whether or not he is a candidate for an LVAD.    CARLOS Ziegler   07/16/17  11:34 AM       Electronically signed by Chandu Perkins MD at 7/16/2017  1:26 PM      Chandu Perkins MD at 7/16/2017 10:38 AM  Version 1 of 2         [facility] Cardiology at [facility] Progress Note     LOS: 5 days   Patient Care Team:  Barbara Mills MD as PCP - General (Internal Medicine)  PCP:  Barbara Mills MD    Chief Complaint:  NICM    SUBJECTIVE:   Pt asleep on CPAP, I came back later and he was still asleep so I woke him. He is drowsy but denies CP, dyspnea and edema are improved close to baseline. BPs are higher.    Review of Systems:   All systems have been reviewed and are negative with the exception of those mentioned above.      OBJECTIVE:    Vital Sign Min/Max for last 24 hours  Temp  Min: 97.3 °F (36.3 °C)  Max: 99 °F (37.2 °C)   BP  Min: 125/73  Max: 146/97   Pulse  Min: 67  Max: 95   Resp  Min: 16  Max: 18   SpO2  Min: 94 %  Max: 96 %   Flow (L/min)  Min: 2  Max: 2   No Data Recorded     Flowsheet Rows         First Filed Value    Admission Height  69\" (175.3 cm) Documented at 07/11/2017 1117    Admission Weight  (!)  400 lb (181 kg) Documented at 07/11/2017 1117          Telemetry: nsr      Intake/Output Summary (Last 24 hours) at 07/16/17 1134  Last data filed at 07/16/17 1038   Gross per 24 hour   Intake              360 ml   Output             3600 ml   Net            -3240 ml     Intake & Output (last 3 days)       07/13 0701 - " 07/14 0700 07/14 0701 - 07/15 0700 07/15 0701 - 07/16 0700 07/16 0701 - 07/17 0700    P.O.  1320 720     Total Intake(mL/kg)  1320 (7.1) 720 (3.9)     Urine (mL/kg/hr) 950 (0.2) 2100 (0.5) 3200 (0.7) 950 (1.1)    Total Output 950 2100 3200 950    Net -950 -780 -2480 -950            Unmeasured Urine Occurrence 1 x              Physical Exam:  Physical Exam   Constitutional: He is oriented to person, place, and time. He appears well-developed and well-nourished. No distress.   Neck: Neck supple. No JVD present.   Cardiovascular: Normal rate, regular rhythm, normal heart sounds and intact distal pulses.    No murmur heard.  Pulses:       Radial pulses are 2+ on the right side, and 2+ on the left side.        Dorsalis pedis pulses are 2+ on the right side, and 2+ on the left side.        Posterior tibial pulses are 2+ on the right side, and 2+ on the left side.   Pulmonary/Chest: Effort normal and breath sounds normal. He has no wheezes. He has no rales.   Abdominal: Soft. Bowel sounds are normal. There is no tenderness. There is no guarding.   Musculoskeletal: He exhibits edema (trace bilat). He exhibits no tenderness.   Neurological: He is alert and oriented to person, place, and time.   Skin: Rash (poriasis) noted.   Nursing note and vitals reviewed.       LABS/DIAGNOSTIC DATA:    Results from last 7 days  Lab Units 07/15/17  1227 07/14/17  0635 07/13/17  0854   WBC 10*3/mm3 5.79 4.94 5.50   HEMOGLOBIN g/dL 14.2 13.1 13.4   HEMATOCRIT % 44.6 42.2 42.0   PLATELETS 10*3/mm3 266 298 314             Results from last 7 days  Lab Units 07/15/17  1227 07/14/17  0635 07/13/17  0854 07/12/17  0828 07/11/17  1138   SODIUM mmol/L 137 136 133 136 136   POTASSIUM mmol/L 4.4 4.1 3.8 4.0 3.7   CHLORIDE mmol/L 101 102 100 104 104   CO2 mmol/L 30.0 28.0 26.0 26.0 25.0   BUN mg/dL 12 15 12 10 10   CREATININE mg/dL 1.00 1.20 0.90 1.00 0.90   CALCIUM mg/dL 9.5 9.1 9.3 9.6 9.7   BILIRUBIN mg/dL  --   --   --  0.7 0.8   ALK PHOS U/L  --   " --   --  80 78   ALT (SGPT) U/L  --   --   --  14 15   AST (SGOT) U/L  --   --   --  16 16   GLUCOSE mg/dL 107* 102* 113* 107* 96       Results from last 7 days  Lab Units 07/14/17  0635   HEMOGLOBIN A1C % 6.70*       Results from last 7 days  Lab Units 07/14/17  0635   CHOLESTEROL mg/dL 101   TRIGLYCERIDES mg/dL 97   HDL CHOL mg/dL 24*           Results from last 7 days  Lab Units 07/15/17  1411 07/13/17  0854 07/11/17  1138   BNP pg/mL 100.0 386.0* 809.0*       Medication Review:     aspirin 81 mg Oral Daily   atorvastatin 10 mg Oral Daily   docusate sodium 100 mg Oral BID   famotidine 20 mg Oral BID   furosemide 80 mg Intravenous Q12H   pharmacy consult - MTM  Does not apply Daily   Pharmacy Meds to Bed Consult  Does not apply Daily   polyethylene glycol 17 g Oral Daily   spironolactone 50 mg Oral Daily        DOBUTamine 2.5 mcg/kg/min Last Rate: 2.5 mcg/kg/min (07/16/17 1118)        Active Problems:    Hypertension    Morbid obesity    Obstructive sleep apnea    Personal history of noncompliance with medical treatment    Acute on chronic systolic (congestive) heart failure    Chest pain on breathing    CHF (congestive heart failure), NYHA class IV      ASSESSMENT/PLAN:    Assessment:  2. Mildly elevated troponins, not due to a MI  - Normal coronary arteries by cardiac catheter 7/13/07  3. Acute on chronic systolic congestive heart failure  - Class IV  - EF 10% by LV gram 7/13/17  6. Morbid obesity  7. Sleep apnea  8. Hypotension, secondary to cardiomyopathy and medications.    Plan:  Continue with diuresis, IV bumex ordered through tomorrow. Will reevaluate then. On inotropic assistance w dobutrex, BP is coming up. Will currently hold coreg and entresto to help keep BP up and assist with diuresis.     Dr. Olsen discussed this case with , Dr. Tello, regarding possible LVAD/transplant evaluation. His weight is an issue. It may preclude further evaluation of this. They are discussing his case, and will \"get " "back to us\".   For now we will continue IV diuresis, given his significantly elevated left ventricular end-diastolic pressures, and we'll try to aid with diuresis/output, with some IV inotropes. Discussed the possibility of ICD, we will obviously need one, however will defer until we can assess whether or not he is a candidate for an LVAD.    CARLOS Ziegler   07/16/17  11:34 AM       Electronically signed by Chandu Perkins MD at 7/16/2017  1:25 PM      Balbir Olsen MD at 7/17/2017  7:51 AM  Version 2 of 2           [facility]   Inpatient Progress Note       LOS: 6 days   Patient Care Team:  Barbara Mills MD as PCP - General (Internal Medicine)    Chief Complaint:  Heart failure nonischemic cardio myopathy, NSTEMI    Subjective     Interval History:   Patient eating breakfast. States that his breathing feels good. Still on dobutrex. Has had good diuresis.  Awaiting word on possible LVAD since transplant although clinically improving      History taken from: patient chart    Review of Systems:   Pertinent positives noted in history, exam, and assessment. Otherwise reviewed and negative.      Objective      Intake/Output Summary (Last 24 hours) at 07/17/17 0751  Last data filed at 07/17/17 0358   Gross per 24 hour   Intake              400 ml   Output             4405 ml   Net            -4005 ml         Vitals:  Blood pressure 134/82, pulse 91, temperature 97.9 °F (36.6 °C), temperature source Oral, resp. rate 16, height 69\" (175.3 cm), weight (!) 405 lb 1.6 oz (184 kg), SpO2 99 %.     Physical Exam   Constitutional: He is oriented to person, place, and time. He appears well-developed and well-nourished. No distress.   Neck: No JVD present. No tracheal deviation present.   Cardiovascular: Normal rate, regular rhythm and normal heart sounds.  Exam reveals no friction rub.    No murmur heard.  Right wrist, mildly tender but no hematoma or bruit   Pulmonary/Chest: Effort " normal and breath sounds normal.   Abdominal: Soft. Bowel sounds are normal. There is no tenderness.   Musculoskeletal: He exhibits edema (trace ble edema). He exhibits no deformity.   Neurological: He is alert and oriented to person, place, and time.   Skin: Skin is warm and dry.   Multiple psoriatic areas noted all over body     Of examined the patient and agree with the above     Results Review:     I reviewed the patient's new clinical results.      Results from last 7 days  Lab Units 07/15/17  1227   WBC 10*3/mm3 5.79   HEMOGLOBIN g/dL 14.2   HEMATOCRIT % 44.6   PLATELETS 10*3/mm3 266       Results from last 7 days  Lab Units 07/15/17  1227  07/12/17  0828   SODIUM mmol/L 137  < > 136   POTASSIUM mmol/L 4.4  < > 4.0   CHLORIDE mmol/L 101  < > 104   CO2 mmol/L 30.0  < > 26.0   BUN mg/dL 12  < > 10   CREATININE mg/dL 1.00  < > 1.00   CALCIUM mg/dL 9.5  < > 9.6   BILIRUBIN mg/dL  --   --  0.7   ALK PHOS U/L  --   --  80   ALT (SGPT) U/L  --   --  14   AST (SGOT) U/L  --   --  16   GLUCOSE mg/dL 107*  < > 107*   < > = values in this interval not displayed.    Results from last 7 days  Lab Units 07/15/17  1227   SODIUM mmol/L 137   POTASSIUM mmol/L 4.4   CHLORIDE mmol/L 101   CO2 mmol/L 30.0   BUN mg/dL 12   CREATININE mg/dL 1.00   GLUCOSE mg/dL 107*   CALCIUM mg/dL 9.5           0  Lab Value Date/Time   TROPONINI 1.142 (C) 07/13/2017 0854           Results from last 7 days  Lab Units 07/14/17  0635   CHOLESTEROL mg/dL 101   TRIGLYCERIDES mg/dL 97   HDL CHOL mg/dL 24*             Tele:  Sinus rhythm    Assessment/Plan     Active Problems:    Hypertension    Morbid obesity    Obstructive sleep apnea    Personal history of noncompliance with medical treatment    Acute on chronic systolic (congestive) heart failure    Chest pain on breathing    CHF (congestive heart failure), NYHA class IV    Plan:  6. Mildly elevated troponins, not due to a myocardial infarction  - Normal coronary arteries by cardiac catheter  "7/13/17  7. Acute on chronic systolic congestive heart failure  - Class IV  - EF 10% by LV gram 7/13/17  - on IV diuretics  8. Morbid obesity  9. Sleep apnea  10. Hypotension, secondary to cardiomyopathy and medications.  - currently on dobutrex to aid diuresis.        Plan:  Tinea. IV diuretic order another day.  Awaiting Dobutrex after today.  Arrange follow-up at  transplant/heart failure clinic.    7:51 AM  SONAM Reynolds    I, Balbir Olsen have reviewed the note in full and agree with all aspects of the above including physical exam, assessment, labs and plan with changes made accordingly.     Balbir Olsen MD  07/17/17  9:13 AM        Please note that portions of this note may have been completed with a voice recognition program. Efforts were made to edit the dictations, but occasionally words are mistranscribed.     Electronically signed by Balbir Olsen MD at 7/17/2017  9:13 AM      SONAM Reynolds at 7/17/2017  7:51 AM  Version 1 of 2           [facility]   Inpatient Progress Note       LOS: 6 days   Patient Care Team:  Barbara Mills MD as PCP - General (Internal Medicine)    Chief Complaint:  Heart failure nonischemic cardio myopathy, NSTEMI    Subjective     Interval History:   Patient eating breakfast. States that his breathing feels good. Still on dobutrex. Has had good diuresis.       History taken from: patient chart    Review of Systems:   Pertinent positives noted in history, exam, and assessment. Otherwise reviewed and negative.      Objective      Intake/Output Summary (Last 24 hours) at 07/17/17 0751  Last data filed at 07/17/17 0358   Gross per 24 hour   Intake              400 ml   Output             4405 ml   Net            -4005 ml         Vitals:  Blood pressure 134/82, pulse 91, temperature 97.9 °F (36.6 °C), temperature source Oral, resp. rate 16, height 69\" (175.3 cm), weight (!) 405 lb 1.6 oz (184 kg), SpO2 99 %. "     Physical Exam   Constitutional: He is oriented to person, place, and time. He appears well-developed and well-nourished. No distress.   Neck: No JVD present. No tracheal deviation present.   Cardiovascular: Normal rate, regular rhythm and normal heart sounds.  Exam reveals no friction rub.    No murmur heard.  Right wrist, mildly tender but no hematoma or bruit   Pulmonary/Chest: Effort normal and breath sounds normal.   Abdominal: Soft. Bowel sounds are normal. There is no tenderness.   Musculoskeletal: He exhibits edema (trace ble edema). He exhibits no deformity.   Neurological: He is alert and oriented to person, place, and time.   Skin: Skin is warm and dry.   Multiple psoriatic areas noted all over body          Results Review:     I reviewed the patient's new clinical results.      Results from last 7 days  Lab Units 07/15/17  1227   WBC 10*3/mm3 5.79   HEMOGLOBIN g/dL 14.2   HEMATOCRIT % 44.6   PLATELETS 10*3/mm3 266       Results from last 7 days  Lab Units 07/15/17  1227  07/12/17  0828   SODIUM mmol/L 137  < > 136   POTASSIUM mmol/L 4.4  < > 4.0   CHLORIDE mmol/L 101  < > 104   CO2 mmol/L 30.0  < > 26.0   BUN mg/dL 12  < > 10   CREATININE mg/dL 1.00  < > 1.00   CALCIUM mg/dL 9.5  < > 9.6   BILIRUBIN mg/dL  --   --  0.7   ALK PHOS U/L  --   --  80   ALT (SGPT) U/L  --   --  14   AST (SGOT) U/L  --   --  16   GLUCOSE mg/dL 107*  < > 107*   < > = values in this interval not displayed.    Results from last 7 days  Lab Units 07/15/17  1227   SODIUM mmol/L 137   POTASSIUM mmol/L 4.4   CHLORIDE mmol/L 101   CO2 mmol/L 30.0   BUN mg/dL 12   CREATININE mg/dL 1.00   GLUCOSE mg/dL 107*   CALCIUM mg/dL 9.5           0  Lab Value Date/Time   TROPONINI 1.142 (C) 07/13/2017 0854           Results from last 7 days  Lab Units 07/14/17  0635   CHOLESTEROL mg/dL 101   TRIGLYCERIDES mg/dL 97   HDL CHOL mg/dL 24*             Tele:  Sinus rhythm    Assessment/Plan     Active Problems:    Hypertension    Morbid obesity     Obstructive sleep apnea    Personal history of noncompliance with medical treatment    Acute on chronic systolic (congestive) heart failure    Chest pain on breathing    CHF (congestive heart failure), NYHA class IV    Plan:  11. Mildly elevated troponins, not due to a myocardial infarction  - Normal coronary arteries by cardiac catheter 17  12. Acute on chronic systolic congestive heart failure  - Class IV  - EF 10% by LV gram 17  - on IV diuretics  13. Morbid obesity  14. Sleep apnea  15. Hypotension, secondary to cardiomyopathy and medications.  - currently on dobutrex to aid diuresis.        Plan:  Needs either further IV diuretic or switched to PO. IV diuretic order is .    17  7:51 AM  SONAM Reynolds      Please note that portions of this note may have been completed with a voice recognition program. Efforts were made to edit the dictations, but occasionally words are mistranscribed.     Electronically signed by SONAM Reynolds at 2017  8:12 AM      Scott Vazquez MD at 2017 10:41 AM  Version 1 of 1               [facility]    [facility]    Name:  Evan Gannon   Age:  26 y.o.  Sex:  male  :  1990  MRN:  [id number]   Visit Number:  [id number]  Admission Date:  2017  Date Of Service:  17  Primary Care Physician:  Barbara Mills MD     LOS: 6 days :      Chief Complaint:    chf    Subjective / Interval History:   Dyspnea on exertion but much improved from admisison  No fever, no chills, no chest pain, no abd pain, no n/v/d    Review of Systems:     General ROS: Patient denies any fevers, chills or loss of consciousness.  Ophthalmic ROS: Denies any diplopia or transient loss of vision.  ENT ROS: Denies sore throat, nasal congestion or ear pain.   Respiratory ROS: Denies cough and denies shortness of breath.  Cardiovascular ROS: No further chest pain or palpitations.   Gastrointestinal ROS: Denies  nausea and vomiting. Denies any abdominal pain. No diarrhea.+ constipation  Genito-Urinary ROS: Denies dysuria or hematuria.  Musculoskeletal ROS: Denies chronic back pain. No muscle pain. No calf pain.  Neurological ROS: Denies any focal weakness. No loss of consciousness. Denies any numbness. Denies neck pain.       Vital Signs:    Temp:  [97.7 °F (36.5 °C)-98.3 °F (36.8 °C)] 97.9 °F (36.6 °C)  Heart Rate:  [] 91  Resp:  [16-22] 16  BP: (130-137)/(82-91) 134/82    Intake and output:    I/O last 3 completed shifts:  In: 400 [I.V.:400]  Out: 5705 [Urine:5705]       Physical Examination:    Alert, ox4, nontoxic appearing, no distress  [place]cat, oroph clear  Morbidly obese, neck supple  Speech clear, equal   rrr  Distant/difficult auscultation due to obesity; however, grossly clear lungs, normal respiratory effort  abd soft, obese, nontender  No cce  Normal affect    Lab Results (last 24 hours)     ** No results found for the last 24 hours. **          I have reviewed the patient's laboratory results.    Radiology results:    Imaging Results (last 24 hours)     ** No results found for the last 24 hours. **          I have reviewed the patient's radiology reports.    Medication Review:     I have reviewed the patients active and prn medications.     Assessment:  Patient is a 26 yr old admitted with history of nonischemic cardiomyopathy EF 20%, with acute on chronic CHF, pleuritic chest pain, and elevated troponin.    *Mildly elevated Troponins (not due to myocardial infarction)   -normal left heart cath this hospitalization  *Acute on Chronic Systolic Heart failure  *Non-ischemic Cardiomyopathy (EF 10% by LV gram 7/13/17)  * Obstructive sleep apnea (on cpap)  *Morbid Obesity  *Hypotension (due to cardiomyopathy and medications)        Plan:  ------------------------------------  -Intravenous diuresis per Cards  -On dobutamin continuous infusion  -bmp pending  -Continue entresto, aldactone, cardiac meds per  cards  -a.m. Bmp, mag   -Continue CPAP and oxygen prn.  -Await decision from cardiology regarding final treatment plan with defibrillator vs LVAD/transplant, apparently cards in contact w/ [facility]  -Discharge date unclear at this point.       Prophylaxis: Pepcid, heparin  Code: Full code    Medication risks and benefits were discussed in detail. Patient reported satisfaction with care delivered and treatment plan.     Scott Vazquez MD  07/17/17  10:41 AM             Electronically signed by Scott Vazquez MD at 7/17/2017 11:10 AM        Consult Notes (last 72 hours) (Notes from 7/14/2017  1:55 PM through 7/17/2017  1:55 PM)     No notes of this type exist for this encounter.

## 2017-07-17 NOTE — CONSULTS
[facility] at [facility]  Cardiovascular Consultation Note      Evan Gannon  [id number]  [id number]  1990    DATE OF ADMISSION: 7/11/2017  DATE OF CONSULTATION:  7/17/2017    Barbara Mills MD    Chief complaint/ Reason for Consultation: CMP  Consult Requested by: Dr Olsen    PROBLEM LIST  Problem List:  1. Dilated cardiomyopathy  A. Echocardiogram 4/28/14; LVEF 0.20-0.25, mild MR,    moderate TR   B. Echocardiogram 11/7/2014; LVEF 0.20-0.25, all valves    structurally and functionally normal with no   C. Echocardiogram 2/17 EF 20%  D. Life Vest, Medical therapy BB,ARB,diuretics  E. Multiple ER visits for acute SHF  F. Admit to [facility] ER with Pleuritic chest pain and Positive   troponin 7/11/17  2. Chronic congestive heart failure; EF 20% mild MR  3. Normal cors, EF 10%, LVED 30  A. Heart failure Center visits December 2015  B. HF clinic December 2016  C. [facility] ED 2/6/17, 6/5/17  3. Hypertension  4. Morbid obesity; BMI 61  5. Obstructive sleep apnea; compliant with CPAP nightly  6. History of noncompliance with medication administration/follow up visits  7. Surgical history; tonsils and adenoids, nasal surgery    History of Present Illness:  The patient is a pleasant and unfortunate 26-year-old Afro-American male who is admitted to [facility] today for worsening shortness of breath symptoms and sudden onset pleuritic right-sided chest pain occurring early this a.m. The patient has had a cardiomyopathy for 3 years and has been managed medically by Dr. Olsen as well as heart failure clinic. There is a history of some noncompliance with medications although recently the patient has been doing his best to take his medicines on a regular basis and has been seen by the heart failure clinic on a regular basis. He has been wearing his LifeVest most the time but sometimes takes it off as is uncomfortable. He recently was in the heart failure clinic and had increased  his torsemide to 3 tablets a day. However, he states starting on Sunday he began having worsening shortness of breath. He has been a little bit less active the past few days as shortness of breath limited his ability to get up and move very often. He also developed some pains in the back of his calf some both legs. On admission he began develop sharp stabbing knifelike pains on the right side of his chest worse when he takes deep breaths. He does have some orthopnea and peripheral edema as well as severe obstructive sleep apnea which she is wearing his CPAP at home with. He states he has been scheduled to see Dr. Whittington regarding possible weight loss therapy and he is schedule near future to see Dr. Olsen with follow-up echocardiogram. He presented to the ER because of this new onset pleuritic chest pain as well as shortness breath over the past 3 or 4 days. He denies fevers, chills, cough. He denies any palpitations near-syncope or syncope events.  Recurrent dyspnea orthopnea and new onset pleuritic chest pain fairly rapid over the last 1-2 weeks. Claims being compliant with CPAP, not so much with his LifeVest. Claims medical compliance.     Continued to be diuresed and had an echocardiogram this hospitalization ejection fraction still of 20%.  Left heart catheterization as well by Dr. Candelario with ejection fraction of 10-15% and LVEDP of 30 at that time.  We are consulted for possible VVI ICD.        Past Medical History:   Diagnosis Date   • CHF (congestive heart failure)    • Hypertension    • Plaque psoriasis    • Sleep apnea        Past Surgical History:   Procedure Laterality Date   • ADENOIDECTOMY     • CARDIAC CATHETERIZATION N/A 7/13/2017    Procedure: Left Heart Cath;  Surgeon: Balbir Olsen MD;  Location: [facility] CATH INVASIVE LOCATION;  Service:    • NOSE SURGERY     • TONSILLECTOMY         No current facility-administered medications on file prior to encounter.      Current Outpatient Prescriptions on  "File Prior to Encounter   Medication Sig Dispense Refill   • aspirin 81 MG EC tablet Take 1 tablet by mouth Daily. 30 tablet 6   • carvedilol (COREG) 25 MG tablet Take 1 tablet by mouth 2 (Two) Times a Day. 60 tablet 6   • losartan (COZAAR) 100 MG tablet Take 1 tablet by mouth Daily. 30 tablet 6   • metOLazone (ZAROXOLYN) 5 MG tablet Take 1 tablet by mouth Daily As Needed (Edema or shortness of breath). 30 tablet 6   • spironolactone (ALDACTONE) 50 MG tablet Take 1 tablet by mouth Daily. 30 tablet 6       Social History     Social History   • Marital status: Single     Spouse name: N/A   • Number of children: 0   • Years of education: N/A     Occupational History   • disabled      Social History Main Topics   • Smoking status: Never Smoker   • Smokeless tobacco: Never Used   • Alcohol use No   • Drug use: No   • Sexual activity: Defer     Other Topics Concern   • Not on file     Social History Narrative    Patient denies caffeine intake.     Patient lives with girlfriend and at home.          Family History   Problem Relation Age of Onset   • Diabetes Father          REVIEW OF SYSTEMS:   14 point ROS was performed and is Negative except as outlined in HPI    Objective:     Vitals:    07/17/17 0025 07/17/17 0358 07/17/17 0740 07/17/17 1236   BP: 137/91 134/82  147/88   BP Location: Left arm Left arm     Patient Position: Sitting Lying     Pulse: 99 81 91 96   Resp: 20 22 16 16   Temp: 98.3 °F (36.8 °C)  97.9 °F (36.6 °C) 97.7 °F (36.5 °C)   TempSrc: Oral  Oral Oral   SpO2: 97% 99% 99% 96%   Weight:  (!) 405 lb 1.6 oz (184 kg)     Height:         Body mass index is 59.82 kg/(m^2).  Flowsheet Rows         First Filed Value    Admission Height  69\" (175.3 cm) Documented at 07/11/2017 1117    Admission Weight  (!)  400 lb (181 kg) Documented at 07/11/2017 1117          Intake/Output Summary (Last 24 hours) at 07/17/17 1813  Last data filed at 07/17/17 0358   Gross per 24 hour   Intake                0 ml   Output         "     1375 ml   Net            -1375 ml       Physical Exam   Constitutional: oriented to person, place, and time.  well-developed and well-nourished. No distress.   HENT: Normocephalic.   Eyes: Conjunctivae are normal. No scleral icterus.   Neck: Normal carotid pulses, no hepatojugular reflux and no JVD present. Carotid bruit is not present. No tracheal deviation, no edema and no erythema present. No thyromegaly present.   Cardiovascular: Normal rate, regular rhythm, S1 normal, S2 normal, normal heart sounds and intact distal pulses.   No extrasystoles are present. PMI is not displaced.  Exam reveals no gallop, no distant heart sounds and no friction rub.    + JADE noted  Pulses:       Radial pulses are 2+ on the right side, and 2+ on the left side.       Dorsalis pedis pulses are 2+ on the right side, and 2+ on the left side.   Pulmonary/Chest: Effort normal and breath sounds normal. No respiratory distress.  no wheezes,  Rhonchi or rales.  no tenderness. Some decreased BS at bases  Abdominal: Soft. Bowel sounds are normal. She exhibits no distension and no mass. There is no hepatosplenomegaly. There is no tenderness. There is no rebound and no guarding.   Musculoskeletal:  exhibits no edema, tenderness or deformity.   EXT: minimal LE edema  Neurological: is alert and oriented to person, place, and time.   Skin: Skin is warm and dry. No rash noted. No diaphoretic. No cyanosis or erythema. No pallor. Nails show no clubbing.   Psychiatric: Normal mood and affect.Speech is normal and behavior is normal.      Lab Review:                  Results from last 7 days  Lab Units 07/17/17  1113   SODIUM mmol/L 136   POTASSIUM mmol/L 4.6   CHLORIDE mmol/L 102   CO2 mmol/L 25.0   BUN mg/dL 11   CREATININE mg/dL 0.80   GLUCOSE mg/dL 107*   CALCIUM mg/dL 10.3       Results from last 7 days  Lab Units 07/13/17  0854   TROPONIN I ng/mL 1.142*       Results from last 7 days  Lab Units 07/15/17  1227   WBC 10*3/mm3 5.79   HEMOGLOBIN  g/dL 14.2   HEMATOCRIT % 44.6   PLATELETS 10*3/mm3 266           Results from last 7 days  Lab Units 07/14/17  0635   CHOLESTEROL mg/dL 101       Results from last 7 days  Lab Units 07/15/17  1227   MAGNESIUM mg/dL 1.9       Results from last 7 days  Lab Units 07/14/17  0635   CHOLESTEROL mg/dL 101   TRIGLYCERIDES mg/dL 97   HDL CHOL mg/dL 24*     EKG: Normal sinus rhythm heart rate of 87 bpm.  IVCD QRS duration 110 ms.      I personally viewed and interpreted the patient's EKG/Telemetry data      Assessment/Plan:   1. Dilated nonischemic cardiomyopathy with ejection fraction of less than 25% since 2014 still wearing a LifeVest.  Patient has been on optimal medical therapy.  He continue have multiple ER visits for acute systolic heart failure presents most recently with acute on chronic congestive heart failure.  He did have an increased troponin with normal left heart catheterization.  The increased troponins were not due to an acute coronary syndrome but more likely due to acute fluid overload.  --> I do think the patient would benefit from a VVI ICD in the future.  I will have him continue wearing his LifeVest and continue on optimal medical therapy including Coreg, Entresto and Aldactone. Most recent EF 20% on echo.  She did have increased troponins however not secondary to acute coronary syndrome more secondary to the acute on chronic congestive heart failure/fluid overload.  I will set the defibrillator up as an outpatient.  --> Continue with LifeVest for now and will have the patient follow-up with  transplant center.    2.  On chronic systolic congestive heart failure.  He is currently a class 3-4.  Most recent echocardiogram EF 20% by LV gram 10%.  Continuing on diuretics.    3.  Morbid obesity    4.  Sleep apnea    Okay per my standpoint to be discharged home and will set the patient up for a VVI ICD as a outpt. Continue with LifeVest.  I think he needs to see the [facility] transplant center in the near future  as well.  Explained this to the patient at length.          Thank you for allowing me to participate in the care of Evan Gannon. Feel free to contact me directly with any further questions or concerns.    CC: Balbir Richmond,   07/17/17  6:13 PM.    EMR Dragon/Transcription disclaimer:  Much of this encounter note is an electronic transcription/translation of spoken language to printed text. Electronic translation of spoken language may permit erroneous, or at times, nonsensical words or phrases to be inadvertently transcribed. Although I have reviewed the note for such errors, some may still exist.

## 2017-07-17 NOTE — PLAN OF CARE
Problem: Cardiac: Heart Failure (Adult)  Goal: Signs and Symptoms of Listed Potential Problems Will be Absent or Manageable (Cardiac: Heart Failure)  Outcome: Ongoing (interventions implemented as appropriate)    07/16/17 0040   Cardiac: Heart Failure   Problems Assessed (Heart Failure) all   Problems Present (Heart Failure) cardiac pump dysfunction;decreased quality of life         Problem: Patient Care Overview (Adult)  Goal: Plan of Care Review  Outcome: Ongoing (interventions implemented as appropriate)  Goal: Adult Individualization and Mutuality  Outcome: Ongoing (interventions implemented as appropriate)  Goal: Discharge Needs Assessment  Outcome: Ongoing (interventions implemented as appropriate)

## 2017-07-17 NOTE — PROGRESS NOTES
"Continued Stay Note  [facility]     Patient Name: Evan Gannon  MRN: [id number]  Today's Date: 7/17/2017    Admit Date: 7/11/2017          Discharge Plan       07/17/17 1406    Case Management/Social Work Plan    Plan Home    Patient/Family In Agreement With Plan yes    Additional Comments Spoke to patient at bedside regarding discharge plan.  Patient concerned about if he is getting a Life Vest and says a few weeks ago he turne one in due ot being old and needing a new one but ended up in the hospital before he could get a new one.   Patient wanting a \"\" at home to help him do things around the house.  Advised patient that he could hire a private sitter and that Home Health would only be once or twice a week for a few weeks.  Patient verbalizes no other concerns/discharge needs at this time.  Patient plans to discharge home via car with family to transport.                Discharge Codes     None        Expected Discharge Date and Time     Expected Discharge Date Expected Discharge Time    Jul 14, 2017             Monica Saleem RN    "

## 2017-07-18 LAB
ANION GAP SERPL CALCULATED.3IONS-SCNC: 2 MMOL/L (ref 3–11)
BUN BLD-MCNC: 14 MG/DL (ref 9–23)
BUN/CREAT SERPL: 20 (ref 7–25)
CALCIUM SPEC-SCNC: 10.4 MG/DL (ref 8.7–10.4)
CHLORIDE SERPL-SCNC: 97 MMOL/L (ref 99–109)
CO2 SERPL-SCNC: 32 MMOL/L (ref 20–31)
CREAT BLD-MCNC: 0.7 MG/DL (ref 0.6–1.3)
GFR SERPL CREATININE-BSD FRML MDRD: >150 ML/MIN/1.73
GLUCOSE BLD-MCNC: 99 MG/DL (ref 70–100)
MAGNESIUM SERPL-MCNC: 2 MG/DL (ref 1.3–2.7)
POTASSIUM BLD-SCNC: 4.4 MMOL/L (ref 3.5–5.5)
SODIUM BLD-SCNC: 131 MMOL/L (ref 132–146)

## 2017-07-18 PROCEDURE — 83735 ASSAY OF MAGNESIUM: CPT | Performed by: INTERNAL MEDICINE

## 2017-07-18 PROCEDURE — 80048 BASIC METABOLIC PNL TOTAL CA: CPT | Performed by: INTERNAL MEDICINE

## 2017-07-18 PROCEDURE — 94660 CPAP INITIATION&MGMT: CPT

## 2017-07-18 PROCEDURE — 94799 UNLISTED PULMONARY SVC/PX: CPT

## 2017-07-18 PROCEDURE — 99232 SBSQ HOSP IP/OBS MODERATE 35: CPT | Performed by: NURSE PRACTITIONER

## 2017-07-18 PROCEDURE — 99232 SBSQ HOSP IP/OBS MODERATE 35: CPT | Performed by: INTERNAL MEDICINE

## 2017-07-18 PROCEDURE — 25010000002 FUROSEMIDE PER 20 MG: Performed by: INTERNAL MEDICINE

## 2017-07-18 PROCEDURE — 25010000003 DOBUTAMINE PER 250 MG: Performed by: INTERNAL MEDICINE

## 2017-07-18 RX ORDER — SPIRONOLACTONE 25 MG/1
25 TABLET ORAL DAILY
Qty: 30 TABLET | Refills: 11 | Status: SHIPPED | OUTPATIENT
Start: 2017-07-18 | End: 2017-08-28 | Stop reason: SDUPTHER

## 2017-07-18 RX ORDER — CARVEDILOL 6.25 MG/1
6.25 TABLET ORAL 2 TIMES DAILY
Qty: 60 TABLET | Refills: 11 | Status: SHIPPED | OUTPATIENT
Start: 2017-07-18 | End: 2017-08-14 | Stop reason: SDUPTHER

## 2017-07-18 RX ORDER — FUROSEMIDE 40 MG/1
80 TABLET ORAL 2 TIMES DAILY
Status: DISCONTINUED | OUTPATIENT
Start: 2017-07-18 | End: 2017-07-19 | Stop reason: HOSPADM

## 2017-07-18 RX ORDER — FUROSEMIDE 80 MG
80 TABLET ORAL DAILY
Qty: 30 TABLET | Refills: 11 | Status: SHIPPED | OUTPATIENT
Start: 2017-07-18 | End: 2017-08-14 | Stop reason: ALTCHOICE

## 2017-07-18 RX ADMIN — SPIRONOLACTONE 50 MG: 50 TABLET ORAL at 09:00

## 2017-07-18 RX ADMIN — SACUBITRIL AND VALSARTAN 1 TABLET: 24; 26 TABLET, FILM COATED ORAL at 09:00

## 2017-07-18 RX ADMIN — FAMOTIDINE 20 MG: 20 TABLET ORAL at 17:22

## 2017-07-18 RX ADMIN — ATORVASTATIN CALCIUM 10 MG: 10 TABLET, FILM COATED ORAL at 09:00

## 2017-07-18 RX ADMIN — FUROSEMIDE 80 MG: 40 TABLET ORAL at 17:22

## 2017-07-18 RX ADMIN — FAMOTIDINE 20 MG: 20 TABLET ORAL at 08:59

## 2017-07-18 RX ADMIN — FUROSEMIDE 80 MG: 10 INJECTION, SOLUTION INTRAMUSCULAR; INTRAVENOUS at 06:10

## 2017-07-18 RX ADMIN — ASPIRIN 81 MG: 81 TABLET, COATED ORAL at 08:59

## 2017-07-18 RX ADMIN — HYDROCODONE BITARTRATE AND ACETAMINOPHEN 1 TABLET: 5; 325 TABLET ORAL at 09:00

## 2017-07-18 RX ADMIN — DOBUTAMINE IN DEXTROSE 2.5 MCG/KG/MIN: 100 INJECTION, SOLUTION INTRAVENOUS at 06:11

## 2017-07-18 NOTE — PROGRESS NOTES
"  [facility] at [facility]   Inpatient Progress Note       LOS: 7 days   Patient Care Team:  Barbara Mills MD as PCP - General (Internal Medicine)    Chief Complaint:  Heart failure nonischemic cardio myopathy, NSTEMI    Subjective     Interval History:   Patient in bed. States that he feels good. Denies dyspnea. Wants to go home.      History taken from: patient chart    Review of Systems:   Pertinent positives noted in history, exam, and assessment. Otherwise reviewed and negative.      Objective      Intake/Output Summary (Last 24 hours) at 07/18/17 0929  Last data filed at 07/18/17 0903   Gross per 24 hour   Intake              646 ml   Output             5750 ml   Net            -5104 ml         Vitals:  Blood pressure 119/90, pulse 92, temperature 97.8 °F (36.6 °C), temperature source Oral, resp. rate 16, height 69\" (175.3 cm), weight (!) 405 lb 1.6 oz (184 kg), SpO2 96 %.     Physical Exam   Constitutional: He is oriented to person, place, and time. He appears well-developed and well-nourished. No distress.   Neck: No JVD present. No tracheal deviation present.   Cardiovascular: Normal rate, regular rhythm and normal heart sounds.  Exam reveals no friction rub.    No murmur heard.  Pulmonary/Chest: Effort normal and breath sounds normal.   Abdominal: Soft. Bowel sounds are normal. There is no tenderness.   Musculoskeletal: He exhibits edema (trace ble edema). He exhibits no deformity.   Neurological: He is alert and oriented to person, place, and time.   Skin: Skin is warm and dry.   Multiple psoriatic areas noted all over body   Psychiatric: His behavior is normal. Thought content normal.     I have examined the patient and agree with the above findings     Results Review:     I reviewed the patient's new clinical results.      Results from last 7 days  Lab Units 07/15/17  1227   WBC 10*3/mm3 5.79   HEMOGLOBIN g/dL 14.2   HEMATOCRIT % 44.6   PLATELETS 10*3/mm3 266       Results " from last 7 days  Lab Units 07/17/17  1113  07/12/17  0828   SODIUM mmol/L 136  < > 136   POTASSIUM mmol/L 4.6  < > 4.0   CHLORIDE mmol/L 102  < > 104   CO2 mmol/L 25.0  < > 26.0   BUN mg/dL 11  < > 10   CREATININE mg/dL 0.80  < > 1.00   CALCIUM mg/dL 10.3  < > 9.6   BILIRUBIN mg/dL  --   --  0.7   ALK PHOS U/L  --   --  80   ALT (SGPT) U/L  --   --  14   AST (SGOT) U/L  --   --  16   GLUCOSE mg/dL 107*  < > 107*   < > = values in this interval not displayed.    Results from last 7 days  Lab Units 07/17/17  1113   SODIUM mmol/L 136   POTASSIUM mmol/L 4.6   CHLORIDE mmol/L 102   CO2 mmol/L 25.0   BUN mg/dL 11   CREATININE mg/dL 0.80   GLUCOSE mg/dL 107*   CALCIUM mg/dL 10.3           0  Lab Value Date/Time   TROPONINI 1.142 (C) 07/13/2017 0854           Results from last 7 days  Lab Units 07/14/17  0635   CHOLESTEROL mg/dL 101   TRIGLYCERIDES mg/dL 97   HDL CHOL mg/dL 24*             Tele:  Sinus rhythm    Assessment/Plan     Active Problems:    Hypertension    Morbid obesity    Obstructive sleep apnea    Personal history of noncompliance with medical treatment    Acute on chronic systolic (congestive) heart failure    Chest pain on breathing    CHF (congestive heart failure), NYHA class IV    Plan:  1. Mildly elevated troponins, not due to a myocardial infarction  - Normal coronary arteries by cardiac catheter 7/13/17  2. Acute on chronic systolic congestive heart failure  - Class IV  - EF 10% by LV gram 7/13/17  - on IV diuretics  3. Morbid obesity  4. Sleep apnea  5. Hypotension, secondary to cardiomyopathy and medications.        Plan:    Our office is arranging outpatient follow-up with [facility] regarding transplant evaluation, They have been contacted, and will set up the patient for consultation within the next 2-3 weeks.. Our office will also be in contact with the patient regarding upcoming ICD implant that will be performed as an outpatient. Change diuretics to PO. Stop dobutamine and monitor BP. If stable could  possibly discharge later today or in the morning. Will reevaluate BP later today for further disposition. Will arrange lifevest as patient had to return his most recent one.Will add low dose carvedilol, to the patient's Entresto.  We will see him again as an outpatient in 1-2 weeks.    9:29 AM  SONAM Reynolds   07/18/17    I, Balbir Olsen have reviewed the note in full and agree with all aspects of the above including physical exam, assessment, labs and plan with changes made accordingly.     Balbir Olsen MD  07/18/17  11:31 AM        Please note that portions of this note may have been completed with a voice recognition program. Efforts were made to edit the dictations, but occasionally words are mistranscribed.

## 2017-07-18 NOTE — PLAN OF CARE
Problem: Cardiac: Heart Failure (Adult)  Goal: Signs and Symptoms of Listed Potential Problems Will be Absent or Manageable (Cardiac: Heart Failure)  Outcome: Ongoing (interventions implemented as appropriate)    Problem: Patient Care Overview (Adult)  Goal: Plan of Care Review  Outcome: Ongoing (interventions implemented as appropriate)    07/18/17 0505   Coping/Psychosocial Response Interventions   Plan Of Care Reviewed With patient   Patient Care Overview   Progress improving   Outcome Evaluation   Outcome Summary/Follow up Plan Pt had no acute episodes during my shift. Had one complaint of L foot pain relieved by medication. VSS and resting comfortably.        Goal: Adult Individualization and Mutuality  Outcome: Ongoing (interventions implemented as appropriate)  Goal: Discharge Needs Assessment  Outcome: Ongoing (interventions implemented as appropriate)

## 2017-07-18 NOTE — PROGRESS NOTES
[facility] HOSPITALIST    PROGRESS NOTE    Name:  Evan Gannon   Age:  26 y.o.  Sex:  male  :  1990  MRN:  [id number]   Visit Number:  [id number]  Admission Date:  2017  Date Of Service:  17  Primary Care Physician:  Barbara Mills MD     LOS: 7 days :      Chief Complaint:    chf    Subjective / Interval History:   Resting in bed with oxygen on, states breathing is back to baseline and feels well. Eager to go home.   No fever, no chills, no chest pain, no abd pain, no n/v/d    Review of Systems:     General ROS: Patient denies any fevers, chills or loss of consciousness.  Ophthalmic ROS: Denies any diplopia or transient loss of vision.  ENT ROS: Denies sore throat, nasal congestion or ear pain.   Respiratory ROS: Denies cough and denies shortness of breath.  Cardiovascular ROS: No further chest pain or palpitations.   Gastrointestinal ROS: Denies nausea and vomiting. Denies any abdominal pain. No diarrhea.+ constipation  Genito-Urinary ROS: Denies dysuria or hematuria.  Musculoskeletal ROS: Denies chronic back pain. No muscle pain. No calf pain.  Neurological ROS: Denies any focal weakness. No loss of consciousness. Denies any numbness. Denies neck pain.       Vital Signs:    Temp:  [97.8 °F (36.6 °C)-98.3 °F (36.8 °C)] 98.3 °F (36.8 °C)  Heart Rate:  [92] 92  Resp:  [16] 16  BP: (119-136)/(68-90) 136/68    Intake and output:    I/O last 3 completed shifts:  In: 646 [I.V.:646]  Out: 5275 [Urine:5275]  I/O this shift:  In: -   Out: 1550 [Urine:1550]    Physical Examination:    Alert, ox4, nontoxic appearing, no distress  Ncat, oroph clear  Morbidly obese, neck supple  Speech clear, equal   rrr  Distant/difficult auscultation due to obesity; however, grossly clear lungs, normal respiratory effort  abd soft, obese, nontender  No cce  Normal affect  Exam is unchanged     Lab Results (last 24 hours)     Procedure Component Value Units Date/Time    Magnesium  [[id number]]  (Normal) Collected:  07/18/17 1141    Specimen:  Blood Updated:  07/18/17 1304     Magnesium 2.0 mg/dL     Basic Metabolic Panel [[id number]]  (Abnormal) Collected:  07/18/17 1141    Specimen:  Blood Updated:  07/18/17 1309     Glucose 99 mg/dL      BUN 14 mg/dL      Creatinine 0.70 mg/dL      Sodium 131 (L) mmol/L      Potassium 4.4 mmol/L      Chloride 97 (L) mmol/L      CO2 32.0 (H) mmol/L      Calcium 10.4 mg/dL      eGFR  African Amer >150 mL/min/1.73      BUN/Creatinine Ratio 20.0     Anion Gap 2.0 (L) mmol/L     Narrative:       [employer] Guidelines    Stage     Description        GFR  1         Normal or High     90+  2         Mild decrease      60-89  3         Moderate decrease  30-59  4         Severe decrease    15-29  5         Kidney failure     <15          I have reviewed the patient's laboratory results.    Radiology results:    Imaging Results (last 24 hours)     ** No results found for the last 24 hours. **          I have reviewed the patient's radiology reports.    Medication Review:     I have reviewed the patients active and prn medications.     Assessment:  Patient is a 26 yr old admitted with history of nonischemic cardiomyopathy EF 20%, with acute on chronic CHF, pleuritic chest pain, and elevated troponin.    *Mildly elevated Troponins (not due to myocardial infarction)   -normal left heart cath this hospitalization  *Acute on Chronic Systolic Heart failure  *Non-ischemic Cardiomyopathy (EF 10% by LV gram 7/13/17)  * Obstructive sleep apnea (on cpap)  *Morbid Obesity  *Hypotension (due to cardiomyopathy and medications)        Plan:  ------------------------------------  -Intravenous diuresis changed to oral per Cards in anticipation of discharge  -dobutamin continuous infusion stopped today per cards; monitoring blood pressures and awaiting life vest.   - labs stable   -Continue entresto, aldactone, cardiac meds per cards  -Continue CPAP and oxygen prn.  -Per  Cardiology will be discharged with life vest, possibly tomorrow morning. Will follow up with Dr Olsen in 1-2 weeks to discuss ICD options as outpt. Added low dose Coreg today in addition to Entresto.   Cardiology office getting in contact with [facility] transplant office, office will call patient with an appointment/ consultation in 2-3 weeks to discuss options   I anticipate patient being discharged in AM if Life Vest is in place.     Prophylaxis: Pepcid, mechanical DVT PPX   Code: Full code    Medication risks and benefits were discussed in detail. Patient reported satisfaction with care delivered and treatment plan.     Shanda Melgoza, APRN  07/18/17  6:01 PM

## 2017-07-18 NOTE — PLAN OF CARE
Problem: Skin Integrity Impairment, Risk/Actual (Adult)  Goal: Identify Related Risk Factors and Signs and Symptoms  Outcome: Revised  Patient noted with Psoriasis from his head to his feet.  Pt. Reports this is not new and it does not bother him.  Skin issue reported to [facility], no new orders noted.    07/18/17 1553   Skin Integrity Impairment, Risk/Actual   Skin Integrity Impairment, Risk/Actual: Related Risk Factors edema;environmental exposure;immobility;skin disorders       Goal: Skin Integrity/Wound Healing  Outcome: Revised

## 2017-07-18 NOTE — PROGRESS NOTES
"Adult Nutrition  Assessment/PES    Patient Name:  Evan Gannon  YOB: 1990  MRN: [id number]  Admit Date:  7/11/2017    Assessment Date:  7/18/2017        Reason for Assessment       07/18/17 1022    Reason for Assessment    Reason For Assessment/Visit length of stay    Time Spent (min) 20    Cardiac HTN;CHF;Cardiomyopathy    Pulmonary/Critical Care Obstructive sleep apnea;Other (comment)   pleural effusion, pulmonary congestion    Other diagnosis unstable angina; h/o psoriasis, morbid obesity                Anthropometrics       07/18/17 1025    Anthropometrics    Height 175.3 cm (69\")    Weight (!)  184 kg (405 lb 1.6 oz)    Ideal Body Weight (IBW)    Ideal Body Weight (IBW), Male (kg) 73.69    % Ideal Body Weight 249.89    Body Mass Index (BMI)    BMI (kg/m2) 59.95            Labs/Tests/Procedures/Meds       07/18/17 1025    Labs/Tests/Procedures/Meds    Labs/Tests Review Reviewed                Nutrition Prescription Ordered       07/18/17 1025    Nutrition Prescription PO    Current PO Diet Regular    Common Modifiers Cardiac            Evaluation of Received Nutrient/Fluid Intake       07/18/17 1025    PO Evaluation    Number of Meals 6    % PO Intake 92              Problem/Interventions:        Problem 1       07/18/17 1025    Nutrition Diagnoses Problem 1    Problem 1 No Nutrition Diagnosis at this Time                    Intervention Goal       07/18/17 1025    Intervention Goal    General Nutrition support treatment            Nutrition Intervention       07/18/17 1025    Nutrition Intervention    RD/Tech Action Care plan reviewd;Follow Tx progress              Education/Evaluation       07/18/17 1025    Monitor/Evaluation    Monitor Per protocol            Electronically signed by:  Leonila Mancuso RD  07/18/17 10:25 AM  "

## 2017-07-18 NOTE — PROGRESS NOTES
Continued Stay Note  [facility]     Patient Name: Evan Gannon  MRN: [id number]  Today's Date: 7/18/2017    Admit Date: 7/11/2017          Discharge Plan       07/18/17 2970    Case Management/Social Work Plan    Plan update    Patient/Family In Agreement With Plan yes    Additional Comments CM submitted prior authorization for Entresto to Cover My Meds and provided 1 month free cards as well as copay cards.  Patient awaiting Life Vest.  CM following for discharge needs.              Discharge Codes     None        Expected Discharge Date and Time     Expected Discharge Date Expected Discharge Time    Jul 21, 2017             Monica Saleem, RN

## 2017-07-19 ENCOUNTER — DOCUMENTATION (OUTPATIENT)
Dept: CARDIOLOGY | Facility: CLINIC | Age: 27
End: 2017-07-19

## 2017-07-19 VITALS
OXYGEN SATURATION: 95 % | HEIGHT: 69 IN | RESPIRATION RATE: 18 BRPM | WEIGHT: 315 LBS | TEMPERATURE: 97.9 F | DIASTOLIC BLOOD PRESSURE: 77 MMHG | HEART RATE: 97 BPM | BODY MASS INDEX: 46.65 KG/M2 | SYSTOLIC BLOOD PRESSURE: 117 MMHG

## 2017-07-19 DIAGNOSIS — I42.0 DILATED CARDIOMYOPATHY (HCC): Primary | ICD-10-CM

## 2017-07-19 DIAGNOSIS — I50.22 CHRONIC SYSTOLIC CONGESTIVE HEART FAILURE (HCC): ICD-10-CM

## 2017-07-19 PROCEDURE — 99239 HOSP IP/OBS DSCHRG MGMT >30: CPT | Performed by: NURSE PRACTITIONER

## 2017-07-19 PROCEDURE — 94660 CPAP INITIATION&MGMT: CPT

## 2017-07-19 PROCEDURE — 94799 UNLISTED PULMONARY SVC/PX: CPT

## 2017-07-19 RX ORDER — ALBUTEROL SULFATE 90 UG/1
2 AEROSOL, METERED RESPIRATORY (INHALATION) EVERY 6 HOURS PRN
Start: 2017-07-19 | End: 2017-08-14

## 2017-07-19 RX ADMIN — ASPIRIN 81 MG: 81 TABLET, COATED ORAL at 08:34

## 2017-07-19 RX ADMIN — FAMOTIDINE 20 MG: 20 TABLET ORAL at 08:30

## 2017-07-19 RX ADMIN — ATORVASTATIN CALCIUM 10 MG: 10 TABLET, FILM COATED ORAL at 08:32

## 2017-07-19 RX ADMIN — FUROSEMIDE 80 MG: 40 TABLET ORAL at 08:30

## 2017-07-19 RX ADMIN — ACETAMINOPHEN 650 MG: 325 TABLET, FILM COATED ORAL at 08:33

## 2017-07-19 RX ADMIN — SACUBITRIL AND VALSARTAN 1 TABLET: 24; 26 TABLET, FILM COATED ORAL at 08:32

## 2017-07-19 RX ADMIN — SPIRONOLACTONE 50 MG: 50 TABLET ORAL at 08:30

## 2017-07-19 NOTE — PLAN OF CARE
Problem: Cardiac: Heart Failure (Adult)  Goal: Signs and Symptoms of Listed Potential Problems Will be Absent or Manageable (Cardiac: Heart Failure)  Outcome: Ongoing (interventions implemented as appropriate)    Problem: Patient Care Overview (Adult)  Goal: Plan of Care Review  Outcome: Ongoing (interventions implemented as appropriate)    07/19/17 0445   Outcome Evaluation   Outcome Summary/Follow up Plan PT appeared to rest well; no new compliants. Hopefully home today with lifevest and with f/u appts wt Dr. Olsen for possible ICD and with [facility]plant team.        Goal: Adult Individualization and Mutuality  Outcome: Ongoing (interventions implemented as appropriate)  Goal: Discharge Needs Assessment  Outcome: Ongoing (interventions implemented as appropriate)    Problem: Skin Integrity Impairment, Risk/Actual (Adult)  Goal: Identify Related Risk Factors and Signs and Symptoms  Outcome: Ongoing (interventions implemented as appropriate)  Goal: Skin Integrity/Wound Healing  Outcome: Ongoing (interventions implemented as appropriate)

## 2017-07-19 NOTE — DISCHARGE INSTR - LAB
Follow up with Dr. Olsen August 14, 2017 at 1:30pm    Call  [facility]IMARY [facility] (--)    [address] [address] [phone]             And ask to speak to Demetria to get setup with a new PCP.

## 2017-07-19 NOTE — CONSULTS
Adult Nutrition  Assessment/PES    Patient Name:  Evan Gannon  YOB: 1990  MRN: [id number]  Admit Date:  7/11/2017    Assessment Date:  7/19/2017        Reason for Assessment       07/19/17 1159    Reason for Assessment    Reason For Assessment/Visit physician consult    Identified At Risk By Screening Criteria need for education   pt requested to see RD r/t dietary management of CHF    Time Spent (min) 45              Nutrition/Diet History       07/19/17 1159    Nutrition/Diet History    Reported/Observed By Patient    Other Patient reports interest in recipes that are lower in sodium/fat. He eats at home more often than eating at restaurants; however, he usually consumes frozen/canned/processed foods.              Labs/Tests/Procedures/Meds       07/19/17 1200    Labs/Tests/Procedures/Meds    Labs/Tests Review Reviewed                Nutrition Prescription Ordered       07/19/17 1200    Nutrition Prescription PO    Current PO Diet Regular    Common Modifiers Cardiac                Problem/Interventions:        Problem 1       07/19/17 1201    Nutrition Diagnoses Problem 1    Problem 1 Knowledge Deficit    Etiology (related to) --   dietary management of Congestive Heart Failure    Signs/Symptoms (evidenced by) Reported  Information Deficit                    Intervention Goal       07/19/17 1201    Intervention Goal    General Provide information regarding MNT for treatment/condition            Nutrition Intervention       07/19/17 1201    Nutrition Intervention    RD/Tech Action Care plan reviewd;Follow Tx progress              Education/Evaluation       07/19/17 1201    Education    Education RD provided nutrition education. Written education materials provided: Nutrition Care Manual (Low sodium/Cardiac diet), [facility] materials (CHF and sodium). Pt voiced understanding of information reviewed and asked appropriate questions. Advised patient to follow-up with RD as needed during this adm and  outpatient services after discharge.    Provided education regarding;Education topics;Advised regarding habits/behavior    Provided education regarding Diet rationale;Key food habit change;Medical diagnosis    Education Topics Cardiac heart health;CHF;Low fat    Advised Regarding Habits/Behavior Appropriate portions;Appropriate beverage;Eating out;Food choices;Food prep;Food shopping;Label reading;Seasoning food;Snacks    Monitor/Evaluation    Monitor Per protocol    Education Follow-up Reinforce PRN            Electronically signed by:  Leonila Mancuso RD  07/19/17 2:13 PM

## 2017-07-19 NOTE — PROGRESS NOTES
Continued Stay Note  [facility]     Patient Name: Evan Gannon  MRN: [id number]  Today's Date: 7/19/2017    Admit Date: 7/11/2017          Discharge Plan       07/19/17 1405    Case Management/Social Work Plan    Plan Home    Patient/Family In Agreement With Plan yes    Additional Comments CM consulted to arrange for a portable O2 tank for transport home. Patient is current with [employer]. CM called and spoke with Dimitris, she will have a tank delivered to patient's room today. CM will follow.               Discharge Codes     None        Expected Discharge Date and Time     Expected Discharge Date Expected Discharge Time    Jul 19, 2017             Mary Ponce RN

## 2017-07-19 NOTE — DISCHARGE SUMMARY
[facility]  DISCHARGE SUMMARY       Date of Admission: 7/11/2017  Date of Discharge:  7/19/2017  Primary Care Physician: Barbara Mills MD  Consulting Physician(s)     Provider Relationship    Balbir Olsen MD Consulting Physician          Discharge Diagnoses:  Active Hospital Problems (** Indicates Principal Problem)    Diagnosis Date Noted   • Chest pain on breathing [R07.1] 07/11/2017   • CHF (congestive heart failure), NYHA class IV [I50.9] 07/11/2017   • Acute on chronic systolic (congestive) heart failure [I50.23] 02/28/2017   • Personal history of noncompliance with medical treatment [Z91.19] 02/09/2017   • Hypertension [I10] 02/07/2017   • Morbid obesity [E66.01] 02/07/2017   • Obstructive sleep apnea [G47.33] 02/07/2017      Resolved Hospital Problems    Diagnosis Date Noted Date Resolved   No resolved problems to display.       Presenting Problem/History of Present Illness  CHF (congestive heart failure), NYHA class IV, acute on chronic, systolic [I50.23]     Chief Complaint on Day of Discharge: chf    History of Present Illness on Day of Discharge:   Patient sitting up in chair and states he is ready to go home.  No chest pain noted.  States breathing is back at baseline.  Patient waiting to get life Vest today.  Hospital Course  Patient is a 26 y.o. -American male with past medical history significant for nonischemic cardiomyopathy with very low ejection fraction around 20%, hypertension, obstructive sleep apnea, morbid obesity, and psoriasis.  Patient also has history of noncompliance with medication and follow-up.  He has had multiple admissions to the hospital for exacerbation of heart failure.  Patient presented to [facility] ED with complaint of chest pain and worsening shortness of breath.  He reported pain is exacerbated by deep inspiration.  In the ED, patient was given 80 mg of Lasix IV and symptoms improved.  Patient was admitted to the  hospital medicine service for further evaluation and management.  Cardiology was consulted.  Troponins were mildly elevated not due to myocardial infarction.  Echo showed left ventricular systolic function severely decreased with estimated EF 20%.  Moderately reduced right ventricular systolic function noted and mild mitral valve regurgitation present.  Left heart catheterization showed normal coronary arteries.  Enresto was added to regimen and Coreg dose decreased per cardiology.  Cardiology office is arranging outpatient follow-up with [facility] regarding transplant evaluation.  I will contact patient for evaluation within the next 2-3 weeks.  Cardiology office will also contact patient regarding upcoming ICD implant that will be performed as an outpatient.  Life Vest has been arranged and delivered to patient prior to discharge.  Patient is medically stable and ready for discharge home today.  He will follow-up with cardiology in 1-2 weeks.  Discharge plans and instructions were reviewed with patient and he verbalized understanding    Procedures Performed  Procedure(s):  Left Heart Cath       Consults:   Consults     Date and Time Order Name Status Description    7/17/2017 1813 Inpatient Consult to Cardiac Electrophysiologist Completed     7/11/2017 1359 Inpatient Consult to Cardiology          Pertinent Test Results:    Results from last 7 days  Lab Units 07/15/17  1227 07/14/17  0635 07/13/17  0854   WBC 10*3/mm3 5.79 4.94 5.50   HEMOGLOBIN g/dL 14.2 13.1 13.4   HEMATOCRIT % 44.6 42.2 42.0   PLATELETS 10*3/mm3 266 298 314       Results from last 7 days  Lab Units 07/18/17  1141 07/17/17  1113 07/15/17  1227   SODIUM mmol/L 131* 136 137   POTASSIUM mmol/L 4.4 4.6 4.4   CHLORIDE mmol/L 97* 102 101   CO2 mmol/L 32.0* 25.0 30.0   BUN mg/dL 14 11 12   CREATININE mg/dL 0.70 0.80 1.00   GLUCOSE mg/dL 99 107* 107*   CALCIUM mg/dL 10.4 10.3 9.5     Imaging Results (last 72 hours)     ** No results found for the last 72 hours.  "**        Condition on Discharge:  stable    Physical Exam on Discharge:/77 (BP Location: Left arm, Patient Position: Sitting)  Pulse 97  Temp 97.9 °F (36.6 °C) (Oral)   Resp 18  Ht 69\" (175.3 cm)  Wt (!) 405 lb 3.2 oz (184 kg)  SpO2 95%  BMI 59.84 kg/m2  Physical Exam  Gen-no acute distress, morbidly obese  CV-RRR, S1 S2 normal, no m/r/g  Resp-CTAB, no wheezes, on oxygen at 2 liters  Abd-soft, NT, ND, +BS  Ext-no edema  Neuro-A&Ox3, no focal deficits  Psych-appropriate mood    Discharge Disposition  Home or Self Care    Discharge Medications   Evan Gannon   Home Medication Instructions BENI:[id number]    Printed on:07/19/17 0070   Medication Information                      albuterol (PROVENTIL HFA;VENTOLIN HFA) 108 (90 BASE) MCG/ACT inhaler  Inhale 2 puffs Every 6 (Six) Hours As Needed for Wheezing.             aspirin 81 MG EC tablet  Take 1 tablet by mouth Daily.             carvedilol (COREG) 6.25 MG tablet  Take 1 tablet by mouth 2 (Two) Times a Day.             furosemide (LASIX) 80 MG tablet  Take 1 tablet by mouth Daily.             metOLazone (ZAROXOLYN) 5 MG tablet  Take 1 tablet by mouth Daily As Needed (Edema or shortness of breath).             sacubitril-valsartan (ENTRESTO) 24-26 MG tablet  Take 1 tablet by mouth Every 12 (Twelve) Hours.             spironolactone (ALDACTONE) 25 MG tablet  Take 1 tablet by mouth Daily.               Discharge Diet:   Diet Instructions     Diet: Regular, Cardiac; Thin Liquids, No Restrictions       Discharge Diet:   Regular  Cardiac      Fluid Consistency:  Thin Liquids, No Restrictions               Discharge Care Plan / Instructions:    Activity at Discharge:   Activity Instructions     Activity as Tolerated                   Follow-up Appointments  Future Appointments  Date Time Provider Department Center   8/28/2017 2:45 PM MD JOSE Blankenship None     Additional Instructions for the Follow-ups that You Need to Schedule  "    Discharge Follow-Up With Specified Provider    As directed    To:  Dr. Olsen   Follow Up Details:  1-2 weeks       Discharge Follow-up with PCP    As directed    Follow Up Details:  within 1 week for hospital follow up               Test Results Pending at Discharge       SONAM Leiva 07/19/17 12:48 PM    Time: Discharge 45 min    Please note that portions of this note may have been completed with a voice recognition program. Efforts were made to edit the dictations, but occasionally words are mistranscribed.

## 2017-07-25 PROBLEM — I50.22 CHRONIC SYSTOLIC CONGESTIVE HEART FAILURE: Status: ACTIVE | Noted: 2017-07-25

## 2017-07-25 PROBLEM — I42.0 DILATED CARDIOMYOPATHY (HCC): Status: ACTIVE | Noted: 2017-07-25

## 2017-07-27 ENCOUNTER — TELEPHONE (OUTPATIENT)
Dept: INTERNAL MEDICINE | Facility: CLINIC | Age: 27
End: 2017-07-27

## 2017-07-31 ENCOUNTER — OFFICE VISIT (OUTPATIENT)
Dept: CARDIOLOGY | Facility: HOSPITAL | Age: 27
End: 2017-07-31

## 2017-07-31 VITALS
TEMPERATURE: 97.7 F | SYSTOLIC BLOOD PRESSURE: 140 MMHG | OXYGEN SATURATION: 95 % | HEIGHT: 69 IN | DIASTOLIC BLOOD PRESSURE: 95 MMHG | RESPIRATION RATE: 26 BRPM | HEART RATE: 112 BPM

## 2017-07-31 DIAGNOSIS — I10 ESSENTIAL HYPERTENSION: ICD-10-CM

## 2017-07-31 DIAGNOSIS — E66.01 MORBID OBESITY DUE TO EXCESS CALORIES (HCC): ICD-10-CM

## 2017-07-31 DIAGNOSIS — I42.0 DILATED CARDIOMYOPATHY (HCC): ICD-10-CM

## 2017-07-31 DIAGNOSIS — I50.23 ACUTE ON CHRONIC SYSTOLIC HEART FAILURE (HCC): Primary | ICD-10-CM

## 2017-07-31 LAB
ANION GAP SERPL CALCULATED.3IONS-SCNC: 8 MMOL/L (ref 3–11)
BNP SERPL-MCNC: 695 PG/ML (ref 0–100)
BUN BLD-MCNC: 10 MG/DL (ref 9–23)
BUN/CREAT SERPL: 10 (ref 7–25)
CALCIUM SPEC-SCNC: 9.4 MG/DL (ref 8.7–10.4)
CHLORIDE SERPL-SCNC: 102 MMOL/L (ref 99–109)
CO2 SERPL-SCNC: 27 MMOL/L (ref 20–31)
CREAT BLD-MCNC: 1 MG/DL (ref 0.6–1.3)
GFR SERPL CREATININE-BSD FRML MDRD: 111 ML/MIN/1.73
GLUCOSE BLD-MCNC: 117 MG/DL (ref 70–100)
POTASSIUM BLD-SCNC: 3.5 MMOL/L (ref 3.5–5.5)
SODIUM BLD-SCNC: 137 MMOL/L (ref 132–146)

## 2017-07-31 PROCEDURE — 80048 BASIC METABOLIC PNL TOTAL CA: CPT | Performed by: NURSE PRACTITIONER

## 2017-07-31 PROCEDURE — 99214 OFFICE O/P EST MOD 30 MIN: CPT | Performed by: NURSE PRACTITIONER

## 2017-07-31 PROCEDURE — 83880 ASSAY OF NATRIURETIC PEPTIDE: CPT | Performed by: NURSE PRACTITIONER

## 2017-07-31 NOTE — PROGRESS NOTES
Encounter Date:07/31/2017      Patient ID: Evan Gannon is a 26 y.o. male.        Subjective:     Chief Complaint: Follow-up (s/p Hospitalization on 7/11/17) and Shortness of Breath     History of Present Illness patient presents to the heart and valve center today for ongoing evaluation of his stage IV systolic heart failure. Patient was recently hospitalized at [facility] 7/11/2017 7/19/2017.  Patient underwent a left heart catheter per Dr. Olsen which showed no coronary disease.  He was aggressively diuresed and started on entresto prior to discharge from the hospital.  Patient was sent home with a life vest and is scheduled for ICD implant in the near future. He is scheduled for a LVAD workup at  in the near future.   He presents today with complaints of worsening dyspnea, abdominal fullness, pedal edema and fatigue. He notes compliance with his meds since discharge.   Patient Active Problem List   Diagnosis   • Shortness of breath   • Hypertension   • Morbid obesity   • Obstructive sleep apnea   • Nonischemic congestive cardiomyopathy   • Personal history of noncompliance with medical treatment   • Acute on chronic systolic (congestive) heart failure   • Microcytic anemia   • Chest pain on breathing   • CHF (congestive heart failure), NYHA class IV   • Dilated cardiomyopathy   • Chronic systolic congestive heart failure       Past Surgical History:   Procedure Laterality Date   • ADENOIDECTOMY     • CARDIAC CATHETERIZATION N/A 7/13/2017    Procedure: Left Heart Cath;  Surgeon: Balbir Olsen MD;  Location: [facility] INVASIVE LOCATION;  Service:    • NOSE SURGERY     • TONSILLECTOMY         No Known Allergies      Current Outpatient Prescriptions:   •  albuterol (PROVENTIL HFA;VENTOLIN HFA) 108 (90 BASE) MCG/ACT inhaler, Inhale 2 puffs Every 6 (Six) Hours As Needed for Wheezing., Disp: , Rfl:   •  aspirin 81 MG EC tablet, Take 1 tablet by mouth Daily., Disp: 30 tablet, Rfl: 6  •   carvedilol (COREG) 6.25 MG tablet, Take 1 tablet by mouth 2 (Two) Times a Day., Disp: 60 tablet, Rfl: 11  •  furosemide (LASIX) 80 MG tablet, Take 1 tablet by mouth Daily., Disp: 30 tablet, Rfl: 11  •  metOLazone (ZAROXOLYN) 5 MG tablet, Take 1 tablet by mouth Daily As Needed (Edema or shortness of breath)., Disp: 30 tablet, Rfl: 6  •  sacubitril-valsartan (ENTRESTO) 24-26 MG tablet, Take 1 tablet by mouth Every 12 (Twelve) Hours., Disp: 60 tablet, Rfl: 11  •  spironolactone (ALDACTONE) 25 MG tablet, Take 1 tablet by mouth Daily., Disp: 30 tablet, Rfl: 11    The following portions of the chart were reviewed and updated as appropriate: Allergies, current medications, past family history, social history, past medical history.     Review of Systems   Constitution: Positive for malaise/fatigue and weight gain (3-5 lbs in 3 days). Negative for chills, decreased appetite, diaphoresis, fever, weakness, night sweats and weight loss.   HENT: Negative for congestion, headaches, hearing loss, hoarse voice and nosebleeds.    Eyes: Negative for blurred vision, visual disturbance and visual halos.   Cardiovascular: Positive for chest pain, claudication, dyspnea on exertion, irregular heartbeat, leg swelling and orthopnea. Negative for cyanosis, near-syncope, palpitations, paroxysmal nocturnal dyspnea and syncope.   Respiratory: Positive for shortness of breath and sleep disturbances due to breathing. Negative for cough, hemoptysis, snoring, sputum production and wheezing.    Endocrine: Positive for polydipsia and polyphagia.   Hematologic/Lymphatic: Negative for bleeding problem. Does not bruise/bleed easily.   Skin: Negative for dry skin, itching and rash.   Musculoskeletal: Positive for joint pain. Negative for arthritis, joint swelling and myalgias.   Gastrointestinal: Negative for bloating, abdominal pain, constipation, diarrhea, flatus, heartburn, hematemesis, hematochezia, melena, nausea and vomiting.   Genitourinary:  "Negative for dysuria, frequency, hematuria, nocturia and urgency.   Neurological: Positive for excessive daytime sleepiness and dizziness. Negative for light-headedness and loss of balance.   Psychiatric/Behavioral: Negative for depression. The patient does not have insomnia and is not nervous/anxious.            Objective:     Vitals:    07/31/17 1143   BP: 140/95   BP Location: Right arm   Patient Position: Sitting   Cuff Size: Large Adult   Pulse: 112   Resp: 26   Temp: 97.7 °F (36.5 °C)   TempSrc: Temporal Artery    SpO2: 95%   Height: 69\" (175.3 cm)         Physical Exam   Constitutional: He is oriented to person, place, and time. He appears well-developed and well-nourished. He is active and cooperative. No distress.   HENT:   Head: Normocephalic and atraumatic.   Mouth/Throat: Oropharynx is clear and moist.   Eyes: Conjunctivae and EOM are normal. Pupils are equal, round, and reactive to light.   Neck: Normal range of motion. Neck supple. No JVD present. No tracheal deviation present. No thyromegaly present.   Cardiovascular: Normal rate, regular rhythm, normal heart sounds and intact distal pulses.    Pulmonary/Chest: Effort normal. He has rales.   Abdominal: Bowel sounds are normal. He exhibits distension. There is tenderness.   Musculoskeletal: Normal range of motion. He exhibits edema (2+ pitting edema noted bilaterally).   Neurological: He is alert and oriented to person, place, and time.   Skin: Skin is warm, dry and intact.   Psychiatric: He has a normal mood and affect. His behavior is normal.   Nursing note and vitals reviewed.      Lab and Diagnostic Review:      Results for orders placed or performed in visit on 07/31/17   Basic Metabolic Panel   Result Value Ref Range    Glucose 117 (H) 70 - 100 mg/dL    BUN 10 9 - 23 mg/dL    Creatinine 1.00 0.60 - 1.30 mg/dL    Sodium 137 132 - 146 mmol/L    Potassium 3.5 3.5 - 5.5 mmol/L    Chloride 102 99 - 109 mmol/L    CO2 27.0 20.0 - 31.0 mmol/L    Calcium " 9.4 8.7 - 10.4 mg/dL    eGFR  African Amer 111 >60 mL/min/1.73    BUN/Creatinine Ratio 10.0 7.0 - 25.0    Anion Gap 8.0 3.0 - 11.0 mmol/L   BNP   Result Value Ref Range    .0 (H) 0.0 - 100.0 pg/mL         Assessment and Plan:         1. Acute on chronic systolic heart failure  IV diuresis today in office. Patient received lasix 80 mg NDC [id number]  today through a # 22 in the left ac over slow IV push. During IV diuresis, vitals were monitored and stable. Please see IV diuresis record for those vitals. Patient voided 1200 ml in the office prior to discharge from the office. IV was d/c'd and area was free of erythema, ecchymosis, or drainage.  Patient was  instructed to record urinary output for the next 24 hours. Patient will receive a follow up call from the HF center in 24 hours to evaluate urinary output and reassess signs and symptoms.   - Basic Metabolic Panel  - BNP  Continue wearing life vest  icd implant in the near future  2.Essential hypertension  Under good control  HTN Education provided today including signs and symptoms, medication management, daily blood pressure monitoring. Patient encouraged to call the Heart and Valve center with any abnormal readings.     3. Dilated cardiomyopathy      4. Morbid obesity due to excess calories    Plan is for patient to undergo gastric bypass surgery at some point     It has been a pleasure to participate in the care of this patient.  Patient was instructed to call the Heart and Valve Center with any questions, concerns, or worsening symptoms.    * Please note that portions of this note were completed with a voice recognition program. Efforts were made to edit the dictation but occasionally words are transcribed.

## 2017-08-01 ENCOUNTER — TELEPHONE (OUTPATIENT)
Dept: CARDIOLOGY | Facility: HOSPITAL | Age: 27
End: 2017-08-01

## 2017-08-01 ENCOUNTER — PREP FOR SURGERY (OUTPATIENT)
Dept: OTHER | Facility: HOSPITAL | Age: 27
End: 2017-08-01

## 2017-08-01 DIAGNOSIS — I50.22 CHRONIC SYSTOLIC (CONGESTIVE) HEART FAILURE (HCC): Primary | ICD-10-CM

## 2017-08-01 RX ORDER — SODIUM CHLORIDE 0.9 % (FLUSH) 0.9 %
1-10 SYRINGE (ML) INJECTION AS NEEDED
Status: CANCELLED | OUTPATIENT
Start: 2017-08-01

## 2017-08-01 RX ORDER — CEFAZOLIN SODIUM 2 G/100ML
2 INJECTION, SOLUTION INTRAVENOUS
Status: CANCELLED | OUTPATIENT
Start: 2017-08-02 | End: 2017-08-03

## 2017-08-01 NOTE — TELEPHONE ENCOUNTER
F/u call to patient today. Patient notes significant improvement in his dyspnea, pedal edema and abdominal fullness. Patient to continue current medications and was instructed to call the office with any changes in symptoms.

## 2017-08-08 ENCOUNTER — APPOINTMENT (OUTPATIENT)
Dept: PREADMISSION TESTING | Facility: HOSPITAL | Age: 27
End: 2017-08-08

## 2017-08-08 DIAGNOSIS — I50.22 CHRONIC SYSTOLIC (CONGESTIVE) HEART FAILURE (HCC): ICD-10-CM

## 2017-08-08 LAB
ANION GAP SERPL CALCULATED.3IONS-SCNC: 6 MMOL/L (ref 3–11)
BUN BLD-MCNC: 7 MG/DL (ref 9–23)
BUN/CREAT SERPL: 7.8 (ref 7–25)
CALCIUM SPEC-SCNC: 8.9 MG/DL (ref 8.7–10.4)
CHLORIDE SERPL-SCNC: 106 MMOL/L (ref 99–109)
CO2 SERPL-SCNC: 24 MMOL/L (ref 20–31)
CREAT BLD-MCNC: 0.9 MG/DL (ref 0.6–1.3)
DEPRECATED RDW RBC AUTO: 44.3 FL (ref 37–54)
ERYTHROCYTE [DISTWIDTH] IN BLOOD BY AUTOMATED COUNT: 16.2 % (ref 11.3–14.5)
GFR SERPL CREATININE-BSD FRML MDRD: 126 ML/MIN/1.73
GLUCOSE BLD-MCNC: 128 MG/DL (ref 70–100)
HCT VFR BLD AUTO: 42.1 % (ref 38.9–50.9)
HGB BLD-MCNC: 13.5 G/DL (ref 13.1–17.5)
INR PPP: 1.12
MAGNESIUM SERPL-MCNC: 1.7 MG/DL (ref 1.3–2.7)
MCH RBC QN AUTO: 24.2 PG (ref 27–31)
MCHC RBC AUTO-ENTMCNC: 32.1 G/DL (ref 32–36)
MCV RBC AUTO: 75.4 FL (ref 80–99)
PLATELET # BLD AUTO: 353 10*3/MM3 (ref 150–450)
PMV BLD AUTO: 9.6 FL (ref 6–12)
POTASSIUM BLD-SCNC: 3.9 MMOL/L (ref 3.5–5.5)
PROTHROMBIN TIME: 12.3 SECONDS (ref 9.6–11.5)
RBC # BLD AUTO: 5.58 10*6/MM3 (ref 4.2–5.76)
SODIUM BLD-SCNC: 136 MMOL/L (ref 132–146)
WBC NRBC COR # BLD: 4.31 10*3/MM3 (ref 3.5–10.8)

## 2017-08-08 PROCEDURE — 85610 PROTHROMBIN TIME: CPT | Performed by: PHYSICIAN ASSISTANT

## 2017-08-08 PROCEDURE — 36415 COLL VENOUS BLD VENIPUNCTURE: CPT

## 2017-08-08 PROCEDURE — 80048 BASIC METABOLIC PNL TOTAL CA: CPT | Performed by: PHYSICIAN ASSISTANT

## 2017-08-08 PROCEDURE — 85027 COMPLETE CBC AUTOMATED: CPT | Performed by: PHYSICIAN ASSISTANT

## 2017-08-08 PROCEDURE — 83735 ASSAY OF MAGNESIUM: CPT | Performed by: PHYSICIAN ASSISTANT

## 2017-08-08 NOTE — DISCHARGE INSTRUCTIONS
The following instructions given during Pre Admission Testing visit:    Do not eat or drink anything after MN except for sips of water with your a.m. Prescription meds unless otherwise instructed by your physician.    Glasses and jewelry may be worn, but dentures must be removed prior to cath/procedure.    Leave any items you consider valuable at home.    Family members may wait in [facility] waiting area during procedure.    Bring all medications in their original containers the day of procedure.    Bring photo ID and insurance cards on the day of procedure.    Need to make arrangements for transportation prior to discharge.    The following handouts were given:     Heart Cath pathway (if applicable)   Cardiac Cath booklet published by [facility]    OR appropriate [facility] procedure booklet    If applicable, pt instructed to bring CPAP mask and tubing the day of procedure.

## 2017-08-14 ENCOUNTER — OFFICE VISIT (OUTPATIENT)
Dept: CARDIOLOGY | Facility: CLINIC | Age: 27
End: 2017-08-14

## 2017-08-14 VITALS
HEIGHT: 69 IN | HEART RATE: 119 BPM | SYSTOLIC BLOOD PRESSURE: 116 MMHG | BODY MASS INDEX: 46.65 KG/M2 | WEIGHT: 315 LBS | DIASTOLIC BLOOD PRESSURE: 86 MMHG

## 2017-08-14 DIAGNOSIS — I10 ESSENTIAL HYPERTENSION: ICD-10-CM

## 2017-08-14 DIAGNOSIS — I50.23 ACUTE ON CHRONIC SYSTOLIC (CONGESTIVE) HEART FAILURE (HCC): ICD-10-CM

## 2017-08-14 DIAGNOSIS — G47.33 OBSTRUCTIVE SLEEP APNEA: ICD-10-CM

## 2017-08-14 DIAGNOSIS — E66.01 MORBID OBESITY DUE TO EXCESS CALORIES (HCC): ICD-10-CM

## 2017-08-14 DIAGNOSIS — I42.0 NONISCHEMIC CONGESTIVE CARDIOMYOPATHY (HCC): Primary | ICD-10-CM

## 2017-08-14 PROCEDURE — S0260 H&P FOR SURGERY: HCPCS | Performed by: INTERNAL MEDICINE

## 2017-08-14 PROCEDURE — 93000 ELECTROCARDIOGRAM COMPLETE: CPT | Performed by: INTERNAL MEDICINE

## 2017-08-14 RX ORDER — TORSEMIDE 20 MG/1
60 TABLET ORAL DAILY
Qty: 90 TABLET | Refills: 11 | Status: SHIPPED | OUTPATIENT
Start: 2017-08-14 | End: 2018-02-07 | Stop reason: SDUPTHER

## 2017-08-14 RX ORDER — CARVEDILOL 12.5 MG/1
12.5 TABLET ORAL 2 TIMES DAILY
Qty: 60 TABLET | Refills: 11 | Status: SHIPPED | OUTPATIENT
Start: 2017-08-14 | End: 2017-08-28 | Stop reason: SDUPTHER

## 2017-08-14 NOTE — PROGRESS NOTES
Subjective:     Encounter Date:08/14/2017      Patient ID: Evan Gannon is a 26 y.o. male.    Chief Complaint: Congestive Heart Failure; Shortness of Breath; and Hypertension    PROBLEM LIST:  1. Acute on chronic systolic (congestive) heart failure  a. Echo, 2/7/2017: LVEF 20%.  b. Echo, 7/13/2017: LVEF 20%.  c. LHC, 7/13/2017: LVEF 10%; left ventricular end-diastolic pressure of 30.  d. July 2017 cardiac catheterization with normal coronary arteries.  2. Shortness of breath  3. Hypertension  4. Morbid obesity   5. Obstructive sleep apnea  6. Nonischemic congestive cardiomyopathy  7. Personal history of noncompliance with medical treatment  8. Acute on chronic systolic (congestive) heart failure  9. Microcytic anemia     History of Present Illness  Evan Gannon returns today for a 3 week follow up s/p left hearth catheterization And nonischemic cardiomyopathy.  Since last being seen patient notes that he is beginning to have some more shortness of breath.  His weight is up roughly 14 pounds.  He does not think that the Lasix is working well for him.  States that he seemed to do better when ever he was on torsemide.  He has home health coming into his home and was recently given some IV diuretics.  He has been attempting to take his Zaroxolyn therapy but notes that this does not seem to be helping.  He is scheduled to undergo ICD implant tomorrow.  He is been referred to the [facility] transplant center but notes that he has not heard from their office regarding an appointment.  Takes that he has been compliant with his medications.    No Known Allergies      Current Outpatient Prescriptions:   •  aspirin 81 MG EC tablet, Take 1 tablet by mouth Daily., Disp: 30 tablet, Rfl: 6  •  carvedilol (COREG) 6.25 MG tablet, Take 1 tablet by mouth 2 (Two) Times a Day., Disp: 60 tablet, Rfl: 11  •  furosemide (LASIX) 80 MG tablet, Take 1 tablet by mouth Daily., Disp: 30 tablet, Rfl: 11  •  metOLazone  "(ZAROXOLYN) 5 MG tablet, Take 1 tablet by mouth Daily As Needed (Edema or shortness of breath)., Disp: 30 tablet, Rfl: 6  •  sacubitril-valsartan (ENTRESTO) 24-26 MG tablet, Take 1 tablet by mouth Every 12 (Twelve) Hours., Disp: 60 tablet, Rfl: 11  •  spironolactone (ALDACTONE) 25 MG tablet, Take 1 tablet by mouth Daily., Disp: 30 tablet, Rfl: 11    The following portions of the patient's history were reviewed and updated as appropriate: allergies, current medications, past family history, past medical history, past social history, past surgical history and problem list.    Review of Systems   Constitution: Negative.   Respiratory: Negative.    Hematologic/Lymphatic: Negative for bleeding problem. Does not bruise/bleed easily.   Skin: Negative for rash.   Musculoskeletal: Negative for muscle weakness and myalgias.   Gastrointestinal: Negative for heartburn, nausea and vomiting.   Neurological: Negative.    Psychiatric/Behavioral: Negative.           Objective:     Vitals:    08/14/17 1331   BP: 116/86   BP Location: Right arm   Patient Position: Sitting   Pulse: 119   Weight: (!) 414 lb 9.6 oz (188 kg)   Height: 69\" (175.3 cm)         Physical Exam    Lab Review:      ECG 12 Lead  Date/Time: 8/14/2017 4:46 PM  Performed by: JLUIS CORDOBA  Authorized by: JLUIS CORDOBA   Rhythm: sinus tachycardia  Other findings: LAE and MAYA  Comments: Nonspecific T changes                Assessment:   Evan was seen today for congestive heart failure, shortness of breath and hypertension.    Diagnoses and all orders for this visit:    CHF (congestive heart failure), NYHA class IV, acute on chronic, combined    Nonischemic congestive cardiomyopathy    Morbid obesity due to excess calories        Impression  1. Nonischemic cardiomyopathy, most recent ejection fraction of 10%.With recent 14 pound weight gain, class III heart failure  2. Acute on chronic systolic congestive heart failure.  3. Morbid obesity    Plan:  1. Change Lasix " to torsemide 60 mg daily.  2. Increase Coreg to 12.5 mg twice daily.  3. Proceed with ICD implant tomorrow.  4. Attempt to reach [facility] transplant [place] for appointment.  5. Case was discussed with Dr. Richmond, patient will receive IV diuresis tomorrow after his ICD implant given his symptoms.  6. Revisit in 3 MO, or sooner as needed.    Scribed for Balbir Olsen MD by  Marina MASTERS. 8/14/2017  1:49 PM    I, Balbir Olsen MD, personally performed the services described in this documentation as scribed by the above individual in my presence, and it is both accurate and complete    Please note that portions of this note may have been completed with a voice recognition program. Efforts were made to edit the dictations, but occasionally words are mistranscribed.

## 2017-08-15 ENCOUNTER — HOSPITAL ENCOUNTER (OUTPATIENT)
Facility: HOSPITAL | Age: 27
Setting detail: OBSERVATION
Discharge: HOME | End: 2017-08-16
Attending: INTERNAL MEDICINE | Admitting: INTERNAL MEDICINE

## 2017-08-15 DIAGNOSIS — I42.0 DILATED CARDIOMYOPATHY (HCC): ICD-10-CM

## 2017-08-15 DIAGNOSIS — I50.22 CHRONIC SYSTOLIC CONGESTIVE HEART FAILURE (HCC): ICD-10-CM

## 2017-08-15 PROCEDURE — 25010000002 MIDAZOLAM PER 1 MG: Performed by: INTERNAL MEDICINE

## 2017-08-15 PROCEDURE — G0378 HOSPITAL OBSERVATION PER HR: HCPCS

## 2017-08-15 PROCEDURE — C1722 AICD, SINGLE CHAMBER: HCPCS | Performed by: INTERNAL MEDICINE

## 2017-08-15 PROCEDURE — 33249 INSJ/RPLCMT DEFIB W/LEAD(S): CPT | Performed by: INTERNAL MEDICINE

## 2017-08-15 PROCEDURE — 25010000002 ONDANSETRON PER 1 MG: Performed by: INTERNAL MEDICINE

## 2017-08-15 PROCEDURE — C1892 INTRO/SHEATH,FIXED,PEEL-AWAY: HCPCS | Performed by: INTERNAL MEDICINE

## 2017-08-15 PROCEDURE — 25010000002 FENTANYL CITRATE (PF) 100 MCG/2ML SOLUTION: Performed by: INTERNAL MEDICINE

## 2017-08-15 PROCEDURE — 94660 CPAP INITIATION&MGMT: CPT

## 2017-08-15 PROCEDURE — 25010000003 CEFAZOLIN IN DEXTROSE 2-4 GM/100ML-% SOLUTION: Performed by: PHYSICIAN ASSISTANT

## 2017-08-15 PROCEDURE — C1777 LEAD, AICD, ENDO SINGLE COIL: HCPCS | Performed by: INTERNAL MEDICINE

## 2017-08-15 DEVICE — IMPLANTABLE DEVICE: Type: IMPLANTABLE DEVICE | Site: CHEST | Status: FUNCTIONAL

## 2017-08-15 DEVICE — IMPLANTABLE DEVICE
Type: IMPLANTABLE DEVICE | Site: CHEST | Status: FUNCTIONAL
Brand: IPERIA 7 VR-T DX

## 2017-08-15 RX ORDER — BUPIVACAINE HYDROCHLORIDE 5 MG/ML
INJECTION, SOLUTION EPIDURAL; INTRACAUDAL AS NEEDED
Status: DISCONTINUED | OUTPATIENT
Start: 2017-08-15 | End: 2017-08-15 | Stop reason: HOSPADM

## 2017-08-15 RX ORDER — SODIUM CHLORIDE 0.9 % (FLUSH) 0.9 %
1-10 SYRINGE (ML) INJECTION AS NEEDED
Status: DISCONTINUED | OUTPATIENT
Start: 2017-08-15 | End: 2017-08-16 | Stop reason: HOSPADM

## 2017-08-15 RX ORDER — LIDOCAINE HYDROCHLORIDE 10 MG/ML
INJECTION, SOLUTION INFILTRATION; PERINEURAL AS NEEDED
Status: DISCONTINUED | OUTPATIENT
Start: 2017-08-15 | End: 2017-08-15 | Stop reason: HOSPADM

## 2017-08-15 RX ORDER — SODIUM CHLORIDE 0.9 % (FLUSH) 0.9 %
1-10 SYRINGE (ML) INJECTION AS NEEDED
Status: DISCONTINUED | OUTPATIENT
Start: 2017-08-15 | End: 2017-08-15 | Stop reason: HOSPADM

## 2017-08-15 RX ORDER — CARVEDILOL 6.25 MG/1
6.25 TABLET ORAL 2 TIMES DAILY
Status: DISCONTINUED | OUTPATIENT
Start: 2017-08-15 | End: 2017-08-16 | Stop reason: HOSPADM

## 2017-08-15 RX ORDER — FUROSEMIDE 80 MG
80 TABLET ORAL DAILY
COMMUNITY
End: 2017-08-28

## 2017-08-15 RX ORDER — MIDAZOLAM HYDROCHLORIDE 1 MG/ML
INJECTION INTRAMUSCULAR; INTRAVENOUS AS NEEDED
Status: DISCONTINUED | OUTPATIENT
Start: 2017-08-15 | End: 2017-08-15 | Stop reason: HOSPADM

## 2017-08-15 RX ORDER — ASPIRIN 81 MG/1
81 TABLET ORAL DAILY
Status: DISCONTINUED | OUTPATIENT
Start: 2017-08-15 | End: 2017-08-16 | Stop reason: HOSPADM

## 2017-08-15 RX ORDER — FENTANYL CITRATE 50 UG/ML
INJECTION, SOLUTION INTRAMUSCULAR; INTRAVENOUS AS NEEDED
Status: DISCONTINUED | OUTPATIENT
Start: 2017-08-15 | End: 2017-08-15 | Stop reason: HOSPADM

## 2017-08-15 RX ORDER — CEFAZOLIN SODIUM 2 G/100ML
2 INJECTION, SOLUTION INTRAVENOUS
Status: COMPLETED | OUTPATIENT
Start: 2017-08-15 | End: 2017-08-15

## 2017-08-15 RX ORDER — OXYCODONE HYDROCHLORIDE AND ACETAMINOPHEN 5; 325 MG/1; MG/1
1 TABLET ORAL EVERY 4 HOURS PRN
Status: DISCONTINUED | OUTPATIENT
Start: 2017-08-15 | End: 2017-08-16 | Stop reason: HOSPADM

## 2017-08-15 RX ORDER — SODIUM CHLORIDE 9 MG/ML
INJECTION, SOLUTION INTRAVENOUS CONTINUOUS PRN
Status: DISCONTINUED | OUTPATIENT
Start: 2017-08-15 | End: 2017-08-15 | Stop reason: HOSPADM

## 2017-08-15 RX ORDER — ONDANSETRON 2 MG/ML
4 INJECTION INTRAMUSCULAR; INTRAVENOUS EVERY 6 HOURS PRN
Status: DISCONTINUED | OUTPATIENT
Start: 2017-08-15 | End: 2017-08-16 | Stop reason: HOSPADM

## 2017-08-15 RX ORDER — ACETAMINOPHEN 325 MG/1
650 TABLET ORAL EVERY 4 HOURS PRN
Status: DISCONTINUED | OUTPATIENT
Start: 2017-08-15 | End: 2017-08-16 | Stop reason: HOSPADM

## 2017-08-15 RX ORDER — SPIRONOLACTONE 25 MG/1
25 TABLET ORAL DAILY
Status: DISCONTINUED | OUTPATIENT
Start: 2017-08-15 | End: 2017-08-16 | Stop reason: HOSPADM

## 2017-08-15 RX ORDER — ONDANSETRON 2 MG/ML
INJECTION INTRAMUSCULAR; INTRAVENOUS AS NEEDED
Status: DISCONTINUED | OUTPATIENT
Start: 2017-08-15 | End: 2017-08-15 | Stop reason: HOSPADM

## 2017-08-15 RX ADMIN — SACUBITRIL AND VALSARTAN 1 TABLET: 24; 26 TABLET, FILM COATED ORAL at 21:00

## 2017-08-15 RX ADMIN — CARVEDILOL 6.25 MG: 6.25 TABLET, FILM COATED ORAL at 21:00

## 2017-08-15 RX ADMIN — OXYCODONE AND ACETAMINOPHEN 1 TABLET: 5; 325 TABLET ORAL at 21:00

## 2017-08-15 RX ADMIN — CEFAZOLIN SODIUM 2 G: 2 INJECTION, SOLUTION INTRAVENOUS at 08:48

## 2017-08-15 RX ADMIN — BUMETANIDE 1 MG/HR: 0.25 INJECTION INTRAMUSCULAR; INTRAVENOUS at 11:31

## 2017-08-15 NOTE — H&P (VIEW-ONLY)
Subjective:     Encounter Date:08/14/2017      Patient ID: Evan Gannon is a 26 y.o. male.    Chief Complaint: Congestive Heart Failure; Shortness of Breath; and Hypertension    PROBLEM LIST:  1. Acute on chronic systolic (congestive) heart failure  a. Echo, 2/7/2017: LVEF 20%.  b. Echo, 7/13/2017: LVEF 20%.  c. LHC, 7/13/2017: LVEF 10%; left ventricular end-diastolic pressure of 30.  d. July 2017 cardiac catheterization with normal coronary arteries.  2. Shortness of breath  3. Hypertension  4. Morbid obesity   5. Obstructive sleep apnea  6. Nonischemic congestive cardiomyopathy  7. Personal history of noncompliance with medical treatment  8. Acute on chronic systolic (congestive) heart failure  9. Microcytic anemia     History of Present Illness  Evan Gannon returns today for a 3 week follow up s/p left hearth catheterization And nonischemic cardiomyopathy.  Since last being seen patient notes that he is beginning to have some more shortness of breath.  His weight is up roughly 14 pounds.  He does not think that the Lasix is working well for him.  States that he seemed to do better when ever he was on torsemide.  He has home health coming into his home and was recently given some IV diuretics.  He has been attempting to take his Zaroxolyn therapy but notes that this does not seem to be helping.  He is scheduled to undergo ICD implant tomorrow.  He is been referred to the [facility] transplant center but notes that he has not heard from their office regarding an appointment.  Takes that he has been compliant with his medications.    No Known Allergies      Current Outpatient Prescriptions:   •  aspirin 81 MG EC tablet, Take 1 tablet by mouth Daily., Disp: 30 tablet, Rfl: 6  •  carvedilol (COREG) 6.25 MG tablet, Take 1 tablet by mouth 2 (Two) Times a Day., Disp: 60 tablet, Rfl: 11  •  furosemide (LASIX) 80 MG tablet, Take 1 tablet by mouth Daily., Disp: 30 tablet, Rfl: 11  •  metOLazone  "(ZAROXOLYN) 5 MG tablet, Take 1 tablet by mouth Daily As Needed (Edema or shortness of breath)., Disp: 30 tablet, Rfl: 6  •  sacubitril-valsartan (ENTRESTO) 24-26 MG tablet, Take 1 tablet by mouth Every 12 (Twelve) Hours., Disp: 60 tablet, Rfl: 11  •  spironolactone (ALDACTONE) 25 MG tablet, Take 1 tablet by mouth Daily., Disp: 30 tablet, Rfl: 11    The following portions of the patient's history were reviewed and updated as appropriate: allergies, current medications, past family history, past medical history, past social history, past surgical history and problem list.    Review of Systems   Constitution: Negative.   Respiratory: Negative.    Hematologic/Lymphatic: Negative for bleeding problem. Does not bruise/bleed easily.   Skin: Negative for rash.   Musculoskeletal: Negative for muscle weakness and myalgias.   Gastrointestinal: Negative for heartburn, nausea and vomiting.   Neurological: Negative.    Psychiatric/Behavioral: Negative.           Objective:     Vitals:    08/14/17 1331   BP: 116/86   BP Location: Right arm   Patient Position: Sitting   Pulse: 119   Weight: (!) 414 lb 9.6 oz (188 kg)   Height: 69\" (175.3 cm)         Physical Exam    Lab Review:      ECG 12 Lead  Date/Time: 8/14/2017 4:46 PM  Performed by: JLUIS CORDOBA  Authorized by: JLUIS CORDOBA   Rhythm: sinus tachycardia  Other findings: LAE and MAYA  Comments: Nonspecific T changes                Assessment:   Evan was seen today for congestive heart failure, shortness of breath and hypertension.    Diagnoses and all orders for this visit:    CHF (congestive heart failure), NYHA class IV, acute on chronic, combined    Nonischemic congestive cardiomyopathy    Morbid obesity due to excess calories        Impression  1. Nonischemic cardiomyopathy, most recent ejection fraction of 10%.With recent 14 pound weight gain, class III heart failure  2. Acute on chronic systolic congestive heart failure.  3. Morbid obesity    Plan:  1. Change Lasix " to torsemide 60 mg daily.  2. Increase Coreg to 12.5 mg twice daily.  3. Proceed with ICD implant tomorrow.  4. Attempt to reach [facility] transplant [place] for appointment.  5. Case was discussed with Dr. Richmond, patient will receive IV diuresis tomorrow after his ICD implant given his symptoms.  6. Revisit in 3 MO, or sooner as needed.    Scribed for Balbir Olsen MD by  Marina MASTERS. 8/14/2017  1:49 PM    I, Balbir Olsen MD, personally performed the services described in this documentation as scribed by the above individual in my presence, and it is both accurate and complete    Please note that portions of this note may have been completed with a voice recognition program. Efforts were made to edit the dictations, but occasionally words are mistranscribed.

## 2017-08-15 NOTE — INTERVAL H&P NOTE
H&P reviewed. The patient was examined and there are no changes to the H&P.    Hailey Roberson PA-C      STAFF:  Patient is a 26-year-old male with a history of nonischemic cardiomyopathy with an ejection fraction that has been less than or equal to 20% since February 2017 well maintained on optimal medical therapy.  His most recent ejection fraction was noted to be 15%.  Patient is functional class 2-3.  Also with a history of morbid obesity obstructive sleep apnea and hypertension.  Patient has been maintained on optimal tolerable medical therapy.  Therefore presents today for an implantation of a VVI ICD.  He understands all risk and benefits and wished to proceed.  Seems that today he is volume overloaded as well and we'll start the patient on a Bumex drip for at least the next 4-5 hours to try to diurese him prior to DC.     Nathanael Richmond DO  10:42 AM  08/15/17

## 2017-08-15 NOTE — PLAN OF CARE
Problem: Patient Care Overview (Adult)  Goal: Plan of Care Review  Outcome: Ongoing (interventions implemented as appropriate)    08/15/17 0638   Coping/Psychosocial Response Interventions   Plan Of Care Reviewed With patient   Patient Care Overview   Progress no change   Outcome Evaluation   Outcome Summary/Follow up Plan awaiting icd implant

## 2017-08-16 ENCOUNTER — DOCUMENTATION (OUTPATIENT)
Dept: CARDIOLOGY | Facility: CLINIC | Age: 27
End: 2017-08-16

## 2017-08-16 ENCOUNTER — APPOINTMENT (OUTPATIENT)
Dept: GENERAL RADIOLOGY | Facility: HOSPITAL | Age: 27
End: 2017-08-16

## 2017-08-16 VITALS
SYSTOLIC BLOOD PRESSURE: 125 MMHG | OXYGEN SATURATION: 94 % | WEIGHT: 315 LBS | HEART RATE: 90 BPM | DIASTOLIC BLOOD PRESSURE: 82 MMHG | TEMPERATURE: 97.9 F | HEIGHT: 69 IN | BODY MASS INDEX: 46.65 KG/M2 | RESPIRATION RATE: 22 BRPM

## 2017-08-16 PROCEDURE — 93010 ELECTROCARDIOGRAM REPORT: CPT | Performed by: INTERNAL MEDICINE

## 2017-08-16 PROCEDURE — 94660 CPAP INITIATION&MGMT: CPT

## 2017-08-16 PROCEDURE — 99024 POSTOP FOLLOW-UP VISIT: CPT | Performed by: INTERNAL MEDICINE

## 2017-08-16 PROCEDURE — 93005 ELECTROCARDIOGRAM TRACING: CPT | Performed by: INTERNAL MEDICINE

## 2017-08-16 PROCEDURE — 94799 UNLISTED PULMONARY SVC/PX: CPT

## 2017-08-16 PROCEDURE — 71020 HC CHEST PA AND LATERAL: CPT

## 2017-08-16 PROCEDURE — G0378 HOSPITAL OBSERVATION PER HR: HCPCS

## 2017-08-16 RX ADMIN — OXYCODONE AND ACETAMINOPHEN 1 TABLET: 5; 325 TABLET ORAL at 06:49

## 2017-08-16 RX ADMIN — SACUBITRIL AND VALSARTAN 1 TABLET: 24; 26 TABLET, FILM COATED ORAL at 08:30

## 2017-08-16 RX ADMIN — ASPIRIN 81 MG: 81 TABLET, COATED ORAL at 08:30

## 2017-08-16 RX ADMIN — CARVEDILOL 6.25 MG: 6.25 TABLET, FILM COATED ORAL at 08:30

## 2017-08-16 RX ADMIN — SPIRONOLACTONE 25 MG: 25 TABLET, FILM COATED ORAL at 08:30

## 2017-08-16 NOTE — PROGRESS NOTES
Spoke with pt's nurse Ady to let patient know he needs to call the [facility] trasplant team for evaluation at [phone].  I spoke with Inocencia Short RN there was said they have tried to reach him several times.

## 2017-08-16 NOTE — PROGRESS NOTES
Discharge Planning Assessment  [facility]     Patient Name: Evan Gannon  MRN: [id number]  Today's Date: 8/16/2017    Admit Date: 8/15/2017          Discharge Needs Assessment       08/16/17 1137    Living Environment    Lives With parent(s);significant other    Living Arrangements house    Home Accessibility no concerns    Type of Financial/Environmental Concern none    Transportation Available car;family or friend will provide    Living Environment    Provides Primary Care For no one    Quality Of Family Relationships supportive;helpful;involved    Living Arrangement Comments Patient stays with parents sometimes and s.o. at others     Discharge Needs Assessment    Concerns To Be Addressed no discharge needs identified;denies needs/concerns at this time    Readmission Within The Last 30 Days planned readmission   Readmitted for ICD placement    Equipment Currently Used at Home oxygen;bipap/ cpap   Home oxygen and CPAP supplied by [facility]    Equipment Needed After Discharge none    Discharge Disposition still a patient    Discharge Contact Information if Applicable Aramis Gannon, father, [phone]            Discharge Plan       08/16/17 1139    Case Management/Social Work Plan    Plan Home    Patient/Family In Agreement With Plan yes    Additional Comments Met with patient in the room to initiate discharge planning. Patient lives with his parents or significant other in [facility]. He is independent with ADLS and wears home oxygen PRN and a CPAP at night. His goal is home at discharge, and he denies any needs at this time. His friend will provide his ride home. CM will continue to follow.         Discharge Placement     No information found        Expected Discharge Date and Time     Expected Discharge Date Expected Discharge Time    Aug 16, 2017               Demographic Summary       08/16/17 1136    Referral Information    Referral Source admission list    Reason For Consult discharge planning    Record  Reviewed history and physical;medical record;clinical discipline documentation    Contact Information    Permission Granted to Share Information With ;family/designee   father, Aramis Gannon; significant other, Kiah Amielisseth    Primary Care Physician Information    Name Barbara Mcfadden            Functional Status       08/16/17 1136    Functional Status Prior    Ambulation 0-->independent    Transferring 0-->independent    Toileting 0-->independent    Bathing 0-->independent    Dressing 0-->independent    Eating 0-->independent    Communication 0-->understands/communicates without difficulty    Swallowing 0-->swallows foods/liquids without difficulty    IADL    Medications independent    Meal Preparation independent    Housekeeping independent    Laundry independent    Shopping independent    Oral Care independent    Employment/Financial    Employment/Finance Comments Medical and rx coverage through [employer]            Psychosocial     None            Abuse/Neglect     None            Legal     None            Substance Abuse     None            Patient Forms     None          Lynn Carrillo

## 2017-08-16 NOTE — PROGRESS NOTES
Continued Stay Note   [facility]     Patient Name: Evan Gannon  MRN: [id number]  Today's Date: 8/16/2017    Admit Date: 8/15/2017          Discharge Plan       08/16/17 1139    Case Management/Social Work Plan    Plan Home    Patient/Family In Agreement With Plan yes    Additional Comments Met with patient in the room to initiate discharge planning. Patient lives with his parents or significant other in [facility]. He is independent with ADLS and wears home oxygen and a CPAP at night. His goal is home at discharge, and he denies any needs at this time. His friend will provide his ride home. CM will continue to follow.               Discharge Codes       08/16/17 1141    Discharge Codes    Discharge Codes 01  Discharge to home        Expected Discharge Date and Time     Expected Discharge Date Expected Discharge Time    Aug 16, 2017             Lynn Carrillo

## 2017-08-16 NOTE — PROGRESS NOTES
[facility]  Cardiovascular Progress Note  Evan Gannon  S347/1  [id number]  1990    DATE OF ADMISSION: 8/15/2017  DATE OF FOLLOW UP:  8/16/17    Barbara Mills MD    Subjective:     Patient ID: Evan Gannon is a 26 y.o. male.    Chief Complaint: ICD implant f/u     No Known Allergies    Current Facility-Administered Medications:   •  acetaminophen (TYLENOL) tablet 650 mg, 650 mg, Oral, Q4H PRN, Nathanael Yi, DO  •  aspirin EC tablet 81 mg, 81 mg, Oral, Daily, Nathanael Yi, DO, 81 mg at 08/16/17 0830  •  carvedilol (COREG) tablet 6.25 mg, 6.25 mg, Oral, BID, Nathanael Yi, DO, 6.25 mg at 08/16/17 0830  •  ondansetron (ZOFRAN) injection 4 mg, 4 mg, Intravenous, Q6H PRN, Nathanael Yi, DO  •  oxyCODONE-acetaminophen (PERCOCET) 5-325 MG per tablet 1 tablet, 1 tablet, Oral, Q4H PRN, Nathanael Yi, DO, 1 tablet at 08/16/17 0649  •  sacubitril-valsartan (ENTRESTO) 24-26 MG tablet 1 tablet, 1 tablet, Oral, Q12H, Nathanael Yi, DO, 1 tablet at 08/16/17 0830  •  sodium chloride 0.9 % flush 1-10 mL, 1-10 mL, Intravenous, PRN, Nathanael Yi, DO  •  spironolactone (ALDACTONE) tablet 25 mg, 25 mg, Oral, Daily, Nathanael Yi, DO, 25 mg at 08/16/17 0830    History of Present Illness    No specific complaints this morning except that he is tired. Breathing improved. Diuresed 4.5 L on Bumex gtt.     ROS   14 point ROS negative except as outlined in problem list, HPI and other parts of the note.    Procedures       Objective:       Vitals:    08/15/17 2352 08/16/17 0300 08/16/17 0355 08/16/17 0736   BP: 145/72  110/79 125/82   BP Location: Right arm  Right arm Right arm   Patient Position: Lying  Lying Lying   Pulse:  97 89 90   Resp: 18  18 22   Temp: 98.5 °F (36.9 °C)  96.4 °F (35.8 °C) 97.9 °F (36.6 °C)   TempSrc: Oral  Axillary Axillary   SpO2:       Weight:       Height:           Intake/Output Summary (Last 24 hours) at 08/16/17 0915  Last data filed at  08/16/17 0650   Gross per 24 hour   Intake             1160 ml   Output             5450 ml   Net            -4290 ml       GENERAL: Well-developed, well-nourished, morbidly obese patient in no acute distress.  HEENT: Normocephalic, atraumatic, PERRLA.   NECK: No JVD present at 30°. No carotid bruits auscultated.  LUNGS: Clear to auscultation.  CARDIOVASCULAR: Heart has a regular rate and rhythm. No murmurs, gallops or rubs noted.   EXT: pulses intact, no swelling.   SKIN: Pink, warm. ICD incision with no hematoma and steri strips CDI.   Abd: Obese    The patient's old records including ambulatory rhythm recordings (ECGs, Holter/event monitor) were reviewed and discussed.      CXR Lead in good position no PTX noted.       Assessment & Plan:     1. NICM w/ persistently low EF despite optimal rx   - s/p VVI ICD. CXR this AM looks good.   - continue optimal rx.   - patient was volume overloaded and s/p bumex gtt w/ 5L out. Continue home diuretics on d/c and now back to his baseline.     2. Morbid obesity     3. HTN       Patient will be discharged home in stable condition. Wound check in 7-10 days. Follow-up in 10-12 weeks as well as follow up with Heart Failure clinic in the next couple weeks and the Transplant center at     [facility]  08/16/17  9:15 AM    I, Nathanael Richmond, have reviewed the note in full and agree with all aspects of the above including physical exam, assessment, labs and plan with changes made accordingly. Face to Face Time was spent with the patient.    Nathanael Richmond DO  08/16/17  10:47 AM

## 2017-08-17 ENCOUNTER — TRANSITIONAL CARE MANAGEMENT TELEPHONE ENCOUNTER (OUTPATIENT)
Dept: INTERNAL MEDICINE | Facility: CLINIC | Age: 27
End: 2017-08-17

## 2017-08-24 ENCOUNTER — OFFICE VISIT (OUTPATIENT)
Dept: CARDIOLOGY | Facility: CLINIC | Age: 27
End: 2017-08-24

## 2017-08-24 ENCOUNTER — OFFICE VISIT (OUTPATIENT)
Dept: CARDIOLOGY | Facility: HOSPITAL | Age: 27
End: 2017-08-24

## 2017-08-24 VITALS
OXYGEN SATURATION: 91 % | HEIGHT: 69 IN | SYSTOLIC BLOOD PRESSURE: 130 MMHG | TEMPERATURE: 97 F | RESPIRATION RATE: 24 BRPM | BODY MASS INDEX: 46.65 KG/M2 | DIASTOLIC BLOOD PRESSURE: 96 MMHG | WEIGHT: 315 LBS | HEART RATE: 107 BPM

## 2017-08-24 DIAGNOSIS — I42.0 DILATED CARDIOMYOPATHY (HCC): ICD-10-CM

## 2017-08-24 DIAGNOSIS — I42.0 NONISCHEMIC CONGESTIVE CARDIOMYOPATHY (HCC): Primary | ICD-10-CM

## 2017-08-24 DIAGNOSIS — Z91.199 PERSONAL HISTORY OF NONCOMPLIANCE WITH MEDICAL TREATMENT: ICD-10-CM

## 2017-08-24 DIAGNOSIS — I10 ESSENTIAL HYPERTENSION: ICD-10-CM

## 2017-08-24 DIAGNOSIS — I50.43 CHF (CONGESTIVE HEART FAILURE), NYHA CLASS IV, ACUTE ON CHRONIC, COMBINED (HCC): Primary | ICD-10-CM

## 2017-08-24 DIAGNOSIS — I50.22 CHF (CONGESTIVE HEART FAILURE), NYHA CLASS IV, CHRONIC, SYSTOLIC (HCC): ICD-10-CM

## 2017-08-24 PROCEDURE — 99214 OFFICE O/P EST MOD 30 MIN: CPT | Performed by: NURSE PRACTITIONER

## 2017-08-24 PROCEDURE — 99024 POSTOP FOLLOW-UP VISIT: CPT | Performed by: INTERNAL MEDICINE

## 2017-08-24 NOTE — PROGRESS NOTES
WOUND CHECK    2017    Evan Gannon, : 1990    WOUND CHECK    B/P: (Sitting)  152/102 RIGHT ARM  (Standing)     Pulse: 101    Patient has fever: [] Temperature if indicated: 97.6    Wound Location: LEFT INFRACLAVICULAR     Dressing Removed [x]        Old Dressing Appearance:  Clean, dry []                  Old, bloody drainage []   MINIMAL                          Moist, serous drainage []                Moist, thick yellow/green drainage []       Wound Appearance: Redness []                  Drainage []                  Culture obtained []        Color: N/A     Consistency: N/A     Amount: none         Gloves used, wound cleansed with sterile 4x4 and peroxide [x]       MD notified [] MD orders:   Antibiotic started []  If checked, type   Other:     Appointment for follow-up scheduled for 3 months post procedure [x]    Future Appointments  Date Time Provider Department Center   2017 2:45 PM Barbara Mills MD [facility] None   2017 11:00 AM SONAM Oates [facility]   10/25/2017 3:30 PM Cristofer Shrestha MD [facility] None   2017 11:15 AM Nathanael Richmond DO [facility] None   2017 11:45 AM Balbir Olsen MD [facility] None           Taty Mendoza MA, 17      MD Signature:______________________________ Completed By/Date:

## 2017-08-24 NOTE — PROGRESS NOTES
"Encounter Date:08/24/2017      Patient ID: Evan Gannon is a 26 y.o. male.        Subjective:     Chief Complaint: Follow-up   F  History of Present Illness patient presents to the HF center today for ongoing evaluation of his chronic systolic heart failure. Patient recently underwent implantation of VVI ICD per Dr Richmond on 8/15/17. He was given IV bumex during his hospital stay and diuresed roughly 5 liters. He presents today and notes dyspnea, a dry cough and generalized weakness. HE notes that he has been taking torsemide and lasix together since discharge from the hospital and he notes that he \"feels Zapped\". Patient currently has his left arm in a sling but reports that he is not wearing his sling all the time.  Patient denies chest pain, chest pressure, palpitations, tachycardia, presyncope, syncope, orthopnea, PND, abdominal fullness, early satiety, claudication, or edema.  He notes that  has contacted him to set an appointment but that he has not returned their call.  Patient Active Problem List   Diagnosis   • Shortness of breath   • Hypertension   • Morbid obesity   • Obstructive sleep apnea   • Nonischemic congestive cardiomyopathy   • Personal history of noncompliance with medical treatment   • Acute on chronic systolic (congestive) heart failure   • Microcytic anemia   • Chest pain on breathing   • CHF (congestive heart failure), NYHA class IV   • Dilated cardiomyopathy   • Chronic systolic congestive heart failure   ;    Past Surgical History:   Procedure Laterality Date   • ADENOIDECTOMY     • CARDIAC CATHETERIZATION N/A 7/13/2017    Procedure: Left Heart Cath;  Surgeon: Balbir Olsen MD;  Location:  [facility];  Service:    • CARDIAC ELECTROPHYSIOLOGY PROCEDURE N/A 8/15/2017    Procedure: VVI ICD Implant;  Surgeon: Nathanael Richmond DO;  Location:  [facility];  Service:    • NOSE SURGERY     • TONSILLECTOMY         No Known Allergies      Current Outpatient " Prescriptions:   •  aspirin 81 MG EC tablet, Take 1 tablet by mouth Daily., Disp: 30 tablet, Rfl: 6  •  carvedilol (COREG) 12.5 MG tablet, Take 1 tablet by mouth 2 (Two) Times a Day. (Patient taking differently: Take 6.25 mg by mouth 2 (Two) Times a Day.), Disp: 60 tablet, Rfl: 11  •  furosemide (LASIX) 80 MG tablet, Take 80 mg by mouth Daily., Disp: , Rfl:   •  metOLazone (ZAROXOLYN) 5 MG tablet, Take 1 tablet by mouth Daily As Needed (Edema or shortness of breath)., Disp: 30 tablet, Rfl: 6  •  sacubitril-valsartan (ENTRESTO) 24-26 MG tablet, Take 1 tablet by mouth Every 12 (Twelve) Hours., Disp: 60 tablet, Rfl: 11  •  spironolactone (ALDACTONE) 25 MG tablet, Take 1 tablet by mouth Daily., Disp: 30 tablet, Rfl: 11  •  torsemide (DEMADEX) 20 MG tablet, Take 3 tablets by mouth Daily. (Patient taking differently: Take 60 mg by mouth Daily. Prescribed, has not taken yet), Disp: 90 tablet, Rfl: 11    The following portions of the chart were reviewed and updated as appropriate: Allergies, current medications, past family history, social history, past medical history.     Review of Systems   Constitution: Positive for weakness. Negative for chills, decreased appetite, diaphoresis, fever, malaise/fatigue, night sweats, weight gain and weight loss.   HENT: Negative for congestion, headaches, hearing loss, hoarse voice and nosebleeds.    Eyes: Negative for blurred vision, visual disturbance and visual halos.   Cardiovascular: Positive for dyspnea on exertion. Negative for chest pain, claudication, cyanosis, irregular heartbeat, leg swelling, near-syncope, orthopnea, palpitations, paroxysmal nocturnal dyspnea and syncope.   Respiratory: Positive for cough and shortness of breath. Negative for hemoptysis, sleep disturbances due to breathing, snoring, sputum production and wheezing.    Hematologic/Lymphatic: Negative for bleeding problem. Does not bruise/bleed easily.   Skin: Negative for dry skin, itching and rash.  "  Musculoskeletal: Negative for arthritis, joint pain, joint swelling and myalgias.   Gastrointestinal: Negative for bloating, abdominal pain, constipation, diarrhea, flatus, heartburn, hematemesis, hematochezia, melena, nausea and vomiting.   Genitourinary: Positive for nocturia. Negative for dysuria, frequency, hematuria and urgency.   Neurological: Positive for excessive daytime sleepiness and dizziness. Negative for light-headedness and loss of balance.   Psychiatric/Behavioral: Negative for depression. The patient does not have insomnia and is not nervous/anxious.            Objective:     Vitals:    08/24/17 1133 08/24/17 1134   BP: 124/90 130/96   BP Location: Right arm Right arm   Patient Position: Sitting Standing   Pulse: 106 107   Resp: 24    Temp: 97 °F (36.1 °C)    TempSrc: Temporal Artery     SpO2: 91%    Weight: (!) 414 lb 12.8 oz (188 kg)    Height: 69\" (175.3 cm)          Physical Exam   Constitutional: He is oriented to person, place, and time. He appears well-developed and well-nourished. He is active and cooperative. No distress.   Morbidly obese   HENT:   Head: Normocephalic and atraumatic.   Mouth/Throat: Oropharynx is clear and moist.   Eyes: Conjunctivae and EOM are normal. Pupils are equal, round, and reactive to light.   Neck: Normal range of motion. Neck supple. No JVD present. No tracheal deviation present. No thyromegaly present.   Cardiovascular: Normal rate, regular rhythm, normal heart sounds and intact distal pulses.    Pulmonary/Chest: Effort normal. He has rales (faint rales posterior bases).   Abdominal: Soft. Bowel sounds are normal. He exhibits no distension. There is no tenderness.   Musculoskeletal: Normal range of motion.   Neurological: He is alert and oriented to person, place, and time.   Skin: Skin is warm, dry and intact.   Psychiatric: He has a normal mood and affect. His behavior is normal.   Nursing note and vitals reviewed.  dressing to left subclavian CDI   Left arm " in sling    Lab and Diagnostic Review:   8/8/2017: Magnesium 1.7, glucose 128, BUN 7, creatinine 0.9, sodium 136, potassium 3.9, chloride 106, carbon dioxide 24, calcium 8.9 estimated        Assessment and Plan:         1. CHF (congestive heart failure), NYHA class IV, acute on chronic, combined  Patient to begin metolazone 5 mg 30 minutes prior to torsemide dose of 60 mg today and tomorrow. Patient may repeat on Saturday if dyspnea persists/  Patient has wound check immediately following this appt today; s/p ICD implant per Dr Richmond on 8/15/17  --discussed post ICD implant expectations and reviewed lifting restrictions and other pertinent information  Patient to be scheduled at [facility] for Heart transplant evaluation  Heart failure education today including signs and symptoms, the role of the heart failure center, daily weights, low sodium diet (less than 1500 mg per day), and daily physical activity. Reviewed HF Zones with patient and family.    2. Essential hypertension  Under good control  HTN Education provided today including signs and symptoms, medication management, daily blood pressure monitoring. Patient encouraged to call the [facility] center with any abnormal readings.     3. Dilated cardiomyopathy  Heart failure education today including signs and symptoms, the role of the heart failure center, daily weights, low sodium diet (less than 1500 mg per day), and daily physical activity. Reviewed HF Zones with patient and family.  Patient to continue current medications as previously ordered.     4. Personal history of noncompliance with medical treatment  Long discussion with the patient regarding compliance with medications and treatment plan      It has been a pleasure to participate in the care of this patient.  Patient was instructed to call the [facility] with any questions, concerns, or worsening symptoms.    * Please note that portions of this note were completed with a voice  recognition program. Efforts were made to edit the dictation but occasionally words are transcribed.

## 2017-08-24 NOTE — PATIENT INSTRUCTIONS
Please take metolazone 30 minutes before your torsemide today and then continue metolazone Tomorrow  May take metolazone again on Saturday if symptoms are still present  Stop lasix

## 2017-08-28 ENCOUNTER — OFFICE VISIT (OUTPATIENT)
Dept: INTERNAL MEDICINE | Facility: CLINIC | Age: 27
End: 2017-08-28

## 2017-08-28 VITALS
SYSTOLIC BLOOD PRESSURE: 128 MMHG | BODY MASS INDEX: 46.65 KG/M2 | TEMPERATURE: 97.1 F | OXYGEN SATURATION: 92 % | DIASTOLIC BLOOD PRESSURE: 85 MMHG | HEART RATE: 108 BPM | HEIGHT: 69 IN | WEIGHT: 315 LBS

## 2017-08-28 DIAGNOSIS — I42.0 NONISCHEMIC CONGESTIVE CARDIOMYOPATHY (HCC): Primary | ICD-10-CM

## 2017-08-28 DIAGNOSIS — I50.22 CHRONIC SYSTOLIC HEART FAILURE (HCC): ICD-10-CM

## 2017-08-28 DIAGNOSIS — I50.22 CHF (CONGESTIVE HEART FAILURE), NYHA CLASS IV, CHRONIC, SYSTOLIC (HCC): ICD-10-CM

## 2017-08-28 DIAGNOSIS — I10 ESSENTIAL HYPERTENSION: ICD-10-CM

## 2017-08-28 DIAGNOSIS — I42.0 DILATED CARDIOMYOPATHY (HCC): ICD-10-CM

## 2017-08-28 PROCEDURE — 99495 TRANSJ CARE MGMT MOD F2F 14D: CPT | Performed by: INTERNAL MEDICINE

## 2017-08-28 RX ORDER — CARVEDILOL 12.5 MG/1
12.5 TABLET ORAL 2 TIMES DAILY
Qty: 60 TABLET | Refills: 11
Start: 2017-08-28 | End: 2017-08-28 | Stop reason: SDUPTHER

## 2017-08-28 RX ORDER — SPIRONOLACTONE 25 MG/1
25 TABLET ORAL DAILY
Qty: 30 TABLET | Refills: 11 | Status: SHIPPED | OUTPATIENT
Start: 2017-08-28 | End: 2018-02-07 | Stop reason: SDUPTHER

## 2017-08-28 RX ORDER — CARVEDILOL 12.5 MG/1
12.5 TABLET ORAL 2 TIMES DAILY
Qty: 60 TABLET | Refills: 11 | Status: SHIPPED | OUTPATIENT
Start: 2017-08-28 | End: 2017-10-19 | Stop reason: HOSPADM

## 2017-08-28 RX ORDER — ASPIRIN 81 MG/1
81 TABLET ORAL DAILY
Qty: 365 TABLET | Refills: 0 | Status: SHIPPED | OUTPATIENT
Start: 2017-08-28 | End: 2018-02-07 | Stop reason: SDUPTHER

## 2017-08-28 NOTE — PATIENT INSTRUCTIONS
Torsemide tablets  What is this medicine?  TORSEMIDE (TORE se mide) is a diuretic. It helps you make more urine and to lose salt and excess water from your body. This medicine is used to treat high blood pressure, and edema or swelling from heart, kidney, or liver disease.  This medicine may be used for other purposes; ask your health care provider or pharmacist if you have questions.  COMMON BRAND NAME(S): Demadex  What should I tell my health care provider before I take this medicine?  They need to know if you have any of these conditions:  -abnormal blood electrolytes  -diabetes  -gout  -heart disease  -kidney disease  -liver disease  -small amounts of urine, or difficulty passing urine  -an unusual or allergic reaction to torsemide, sulfa drugs, other medicines, foods, dyes, or preservatives  -pregnant or trying to get pregnant  -breast-feeding  How should I use this medicine?  Take this medicine by mouth with a glass of water. Follow the directions on the prescription label. You may take this medicine with or without food. If it upsets your stomach, take it with food or milk. Do not take your medicine more often than directed. Remember that you will need to pass more urine after taking this medicine. Do not take your medicine at a time of day that will cause you problems. Do not take at bedtime.  Talk to your pediatrician regarding the use of this medicine in children. Special care may be needed.  Overdosage: If you think you have taken too much of this medicine contact a poison control center or emergency room at once.  NOTE: This medicine is only for you. Do not share this medicine with others.  What if I miss a dose?  If you miss a dose, take it as soon as you can. If it is almost time for your next dose, take only that dose. Do not take double or extra doses.  What may interact with this medicine?  -alcohol  -certain antibiotics given by injection  certain heart medicines like  digoxin  -diuretics  -lithium  -medicines for diabetes  -medicines for blood pressure  -medicines for cholesterol like cholestyramine  -medicines that relax muscles for surgery  -NSAIDs, medicines for pain and inflammation, like ibuprofen or naproxen  -OTC supplements like ginseng and ephedra  -probenecid  -steroid medicines like prednisone or cortisone  This list may not describe all possible interactions. Give your health care provider a list of all the medicines, herbs, non-prescription drugs, or dietary supplements you use. Also tell them if you smoke, drink alcohol, or use illegal drugs. Some items may interact with your medicine.  What should I watch for while using this medicine?  Visit your doctor or health care professional for regular checks on your progress. Check your blood pressure regularly. Ask your doctor or health care professional what your blood pressure should be, and when you should contact him or her. If you are a diabetic, check your blood sugar as directed.  You may need to be on a special diet while taking this medicine. Check with your doctor. Also, ask how many glasses of fluid you need to drink a day. You must not get dehydrated.  You may get drowsy or dizzy. Do not drive, use machinery, or do anything that needs mental alertness until you know how this drug affects you. Do not stand or sit up quickly, especially if you are an older patient. This reduces the risk of dizzy or fainting spells. Alcohol can make you more drowsy and dizzy. Avoid alcoholic drinks.  What side effects may I notice from receiving this medicine?  Side effects that you should report to your doctor or health care professional as soon as possible:  -allergic reactions such as skin rash or itching, hives, swelling of the lips, mouth, tongue or throat  -blood in urine or stool  -dry mouth  -hearing loss or ringing in the ears  -irregular heartbeat  -muscle pain, weakness or cramps  -pain or difficulty passing  urine  -unusually weak or tired  -vomiting or diarrhea  Side effects that usually do not require medical attention (report to your doctor or health care professional if they continue or are bothersome):  -dizzy or lightheaded  -headache  -increased thirst  -passing large amounts of urine  -sexual difficulties  -stomach pain, upset or nausea  This list may not describe all possible side effects. Call your doctor for medical advice about side effects. You may report side effects to FDA at 5-157-CAK-0437.  Where should I keep my medicine?  Keep out of the reach of children.  Store at room temperature between 15 and 30 degrees C (59 and 86 degrees F). Throw away any unused medicine after the expiration date.  NOTE: This sheet is a summary. It may not cover all possible information. If you have questions about this medicine, talk to your doctor, pharmacist, or health care provider.     © 2017, [employer]. (2009-09-03 11:35:45)

## 2017-08-28 NOTE — PROGRESS NOTES
Transitional Care Follow Up Visit  Subjective     Evan Gannon is a 26 y.o. male who presents for a transitional care management visit.    Within 48 business hours after discharge our office contacted him via telephone to coordinate his care and needs.      I reviewed and discussed the details of that call along with the discharge summary, hospital problems, inpatient lab results, inpatient diagnostic studies, and consultation reports with Evan.    Date of TCM Phone Call 8/17/2017   [facility]   Date of Admission 8/15/2017   Date of Discharge 8/16/2017   Risk for Readmission (LACE Score) 7   Discharge Disposition Home or Self Care       History of Present Illness   Course During Hospital Stay:  Pt was admitted on 8/15/17 and discharged the next day for ICD placement. He has hx of NICM and chronic systolic HF with reduced EF. His meds were adjusted the day before admission in cards clinic due to SOA and volume overload, and he has continued on with those. He has been following up with his cardiologist. He has noticed some migration of the ICD though there is no pain or drainage from insertion site. It has not fired. He meets with [facility] on Thursday for eval as his HF is severe and he has little hope of improvement given his age.  Complains of NESS and dry cough today both of which are improving. He is supposed to be on O2 but the O2 company has not delivered his tanks. Says he is trying to stay within the 1500mg Na per day but it can sometimes be hard.     The following portions of the patient's history were reviewed and updated as appropriate: allergies, current medications, past family history, past medical history, past social history, past surgical history and problem list.    Review of Systems   Constitutional: Positive for fatigue. Negative for unexpected weight change.   HENT: Negative.    Respiratory: Positive for cough and shortness of breath. Negative for choking, chest  tightness and wheezing.    Cardiovascular: Negative.    Neurological: Negative.    Hematological: Negative.    Psychiatric/Behavioral: Negative.        Objective   Physical Exam   Constitutional: He is oriented to person, place, and time. He appears well-developed and well-nourished.   Cardiovascular: Normal rate, regular rhythm and normal heart sounds.    Pulmonary/Chest: Effort normal and breath sounds normal. He has no wheezes. He has no rales.   Neurological: He is alert and oriented to person, place, and time.   Skin: Skin is warm and dry.   Psychiatric: He has a normal mood and affect. His behavior is normal. Thought content normal.   Vitals reviewed.      Assessment/Plan   Evan was seen today for follow-up.    Diagnoses and all orders for this visit:    Nonischemic congestive cardiomyopathy  -     aspirin 81 MG EC tablet; Take 1 tablet by mouth Daily.  -     spironolactone (ALDACTONE) 25 MG tablet; Take 1 tablet by mouth Daily.  -     carvedilol (COREG) 12.5 MG tablet; Take 1 tablet by mouth 2 (Two) Times a Day.    Essential hypertension  -no changes today, defer to cards    Dilated cardiomyopathy  -eval for tx    CHF (congestive heart failure), NYHA class IV, chronic, systolic  -stressed importance of 1500mg Na per day, weight checks, taking all meds    Chronic systolic heart failure  -     spironolactone (ALDACTONE) 25 MG tablet; Take 1 tablet by mouth Daily.

## 2017-08-30 ENCOUNTER — TELEPHONE (OUTPATIENT)
Dept: CARDIOLOGY | Facility: HOSPITAL | Age: 27
End: 2017-08-30

## 2017-08-30 NOTE — TELEPHONE ENCOUNTER
-spoke with patient today who notes that he still has some minor swelling at incision site of ICD. He denies any pain, drainage, redness or chills. Assured patient that minor swelling at the incision site Patient instructed to call the office with any questions, concerns or change in symptoms. Patient is scheduled to establish care at [facility] tomorrow 8/31/17 for workup for possible heart transplant.      ---- Message from Britt Alexis sent at 8/30/2017  2:44 PM EDT -----  Contact: patient  Please give him a call.

## 2017-08-31 ENCOUNTER — TELEPHONE (OUTPATIENT)
Dept: INTERNAL MEDICINE | Facility: CLINIC | Age: 27
End: 2017-08-31

## 2017-09-03 ENCOUNTER — APPOINTMENT (OUTPATIENT)
Dept: GENERAL RADIOLOGY | Facility: HOSPITAL | Age: 27
End: 2017-09-03

## 2017-09-03 ENCOUNTER — HOSPITAL ENCOUNTER (EMERGENCY)
Facility: HOSPITAL | Age: 27
Discharge: HOME | End: 2017-09-04
Attending: EMERGENCY MEDICINE | Admitting: EMERGENCY MEDICINE

## 2017-09-03 DIAGNOSIS — R06.02 SHORTNESS OF BREATH: ICD-10-CM

## 2017-09-03 DIAGNOSIS — E66.01 MORBID OBESITY DUE TO EXCESS CALORIES (HCC): ICD-10-CM

## 2017-09-03 DIAGNOSIS — R05.8 PRODUCTIVE COUGH: ICD-10-CM

## 2017-09-03 DIAGNOSIS — I42.0 DILATED CARDIOMYOPATHY (HCC): ICD-10-CM

## 2017-09-03 DIAGNOSIS — J20.9 ACUTE BRONCHITIS, UNSPECIFIED ORGANISM: Primary | ICD-10-CM

## 2017-09-03 DIAGNOSIS — G47.33 OBSTRUCTIVE SLEEP APNEA: ICD-10-CM

## 2017-09-03 DIAGNOSIS — I50.22 CHRONIC SYSTOLIC CONGESTIVE HEART FAILURE (HCC): ICD-10-CM

## 2017-09-03 LAB
ALBUMIN SERPL-MCNC: 3.6 G/DL (ref 3.2–4.8)
ALBUMIN/GLOB SERPL: 1 G/DL (ref 1.5–2.5)
ALP SERPL-CCNC: 92 U/L (ref 25–100)
ALT SERPL W P-5'-P-CCNC: 14 U/L (ref 7–40)
ANION GAP SERPL CALCULATED.3IONS-SCNC: 4 MMOL/L (ref 3–11)
AST SERPL-CCNC: 18 U/L (ref 0–33)
BASOPHILS # BLD AUTO: 0.01 10*3/MM3 (ref 0–0.2)
BASOPHILS NFR BLD AUTO: 0.1 % (ref 0–1)
BILIRUB SERPL-MCNC: 0.6 MG/DL (ref 0.3–1.2)
BNP SERPL-MCNC: 786 PG/ML (ref 0–100)
BUN BLD-MCNC: 12 MG/DL (ref 9–23)
BUN/CREAT SERPL: 13.3 (ref 7–25)
CALCIUM SPEC-SCNC: 8.6 MG/DL (ref 8.7–10.4)
CHLORIDE SERPL-SCNC: 104 MMOL/L (ref 99–109)
CO2 SERPL-SCNC: 27 MMOL/L (ref 20–31)
CREAT BLD-MCNC: 0.9 MG/DL (ref 0.6–1.3)
DEPRECATED RDW RBC AUTO: 45.7 FL (ref 37–54)
EOSINOPHIL # BLD AUTO: 0.16 10*3/MM3 (ref 0–0.3)
EOSINOPHIL NFR BLD AUTO: 2.3 % (ref 0–3)
ERYTHROCYTE [DISTWIDTH] IN BLOOD BY AUTOMATED COUNT: 16.3 % (ref 11.3–14.5)
GFR SERPL CREATININE-BSD FRML MDRD: 126 ML/MIN/1.73
GLOBULIN UR ELPH-MCNC: 3.6 GM/DL
GLUCOSE BLD-MCNC: 112 MG/DL (ref 70–100)
HCT VFR BLD AUTO: 40.8 % (ref 38.9–50.9)
HGB BLD-MCNC: 12.8 G/DL (ref 13.1–17.5)
IMM GRANULOCYTES # BLD: 0.02 10*3/MM3 (ref 0–0.03)
IMM GRANULOCYTES NFR BLD: 0.3 % (ref 0–0.6)
LYMPHOCYTES # BLD AUTO: 1.02 10*3/MM3 (ref 0.6–4.8)
LYMPHOCYTES NFR BLD AUTO: 14.4 % (ref 24–44)
MCH RBC QN AUTO: 24.2 PG (ref 27–31)
MCHC RBC AUTO-ENTMCNC: 31.4 G/DL (ref 32–36)
MCV RBC AUTO: 77 FL (ref 80–99)
MONOCYTES # BLD AUTO: 0.65 10*3/MM3 (ref 0–1)
MONOCYTES NFR BLD AUTO: 9.2 % (ref 0–12)
NEUTROPHILS # BLD AUTO: 5.23 10*3/MM3 (ref 1.5–8.3)
NEUTROPHILS NFR BLD AUTO: 73.7 % (ref 41–71)
PLATELET # BLD AUTO: 336 10*3/MM3 (ref 150–450)
PMV BLD AUTO: 9.9 FL (ref 6–12)
POTASSIUM BLD-SCNC: 3.9 MMOL/L (ref 3.5–5.5)
PROT SERPL-MCNC: 7.2 G/DL (ref 5.7–8.2)
RBC # BLD AUTO: 5.3 10*6/MM3 (ref 4.2–5.76)
SODIUM BLD-SCNC: 135 MMOL/L (ref 132–146)
TROPONIN I SERPL-MCNC: 0.07 NG/ML (ref 0–0.07)
WBC NRBC COR # BLD: 7.09 10*3/MM3 (ref 3.5–10.8)

## 2017-09-03 PROCEDURE — 83880 ASSAY OF NATRIURETIC PEPTIDE: CPT | Performed by: EMERGENCY MEDICINE

## 2017-09-03 PROCEDURE — 93005 ELECTROCARDIOGRAM TRACING: CPT

## 2017-09-03 PROCEDURE — 84484 ASSAY OF TROPONIN QUANT: CPT

## 2017-09-03 PROCEDURE — 85025 COMPLETE CBC W/AUTO DIFF WBC: CPT | Performed by: EMERGENCY MEDICINE

## 2017-09-03 PROCEDURE — 94640 AIRWAY INHALATION TREATMENT: CPT

## 2017-09-03 PROCEDURE — 93005 ELECTROCARDIOGRAM TRACING: CPT | Performed by: EMERGENCY MEDICINE

## 2017-09-03 PROCEDURE — 94799 UNLISTED PULMONARY SVC/PX: CPT

## 2017-09-03 PROCEDURE — 25010000002 DEXAMETHASONE PER 1 MG: Performed by: EMERGENCY MEDICINE

## 2017-09-03 PROCEDURE — 96372 THER/PROPH/DIAG INJ SC/IM: CPT

## 2017-09-03 PROCEDURE — 71020 HC CHEST PA AND LATERAL: CPT

## 2017-09-03 PROCEDURE — 80053 COMPREHEN METABOLIC PANEL: CPT | Performed by: EMERGENCY MEDICINE

## 2017-09-03 PROCEDURE — 99285 EMERGENCY DEPT VISIT HI MDM: CPT

## 2017-09-03 RX ORDER — SODIUM CHLORIDE 0.9 % (FLUSH) 0.9 %
10 SYRINGE (ML) INJECTION AS NEEDED
Status: DISCONTINUED | OUTPATIENT
Start: 2017-09-03 | End: 2017-09-04 | Stop reason: HOSPADM

## 2017-09-03 RX ORDER — IPRATROPIUM BROMIDE AND ALBUTEROL SULFATE 2.5; .5 MG/3ML; MG/3ML
3 SOLUTION RESPIRATORY (INHALATION) ONCE
Status: COMPLETED | OUTPATIENT
Start: 2017-09-03 | End: 2017-09-03

## 2017-09-03 RX ORDER — FUROSEMIDE 40 MG/1
40 TABLET ORAL ONCE
Status: COMPLETED | OUTPATIENT
Start: 2017-09-03 | End: 2017-09-03

## 2017-09-03 RX ADMIN — DEXAMETHASONE SODIUM PHOSPHATE 10 MG: 10 INJECTION INTRAMUSCULAR; INTRAVENOUS at 23:56

## 2017-09-03 RX ADMIN — FUROSEMIDE 40 MG: 40 TABLET ORAL at 23:57

## 2017-09-03 RX ADMIN — IPRATROPIUM BROMIDE AND ALBUTEROL SULFATE 3 ML: .5; 3 SOLUTION RESPIRATORY (INHALATION) at 23:42

## 2017-09-04 VITALS
WEIGHT: 315 LBS | DIASTOLIC BLOOD PRESSURE: 95 MMHG | RESPIRATION RATE: 24 BRPM | TEMPERATURE: 97.8 F | HEIGHT: 69 IN | SYSTOLIC BLOOD PRESSURE: 135 MMHG | OXYGEN SATURATION: 97 % | HEART RATE: 110 BPM | BODY MASS INDEX: 46.65 KG/M2

## 2017-09-04 LAB
HOLD SPECIMEN: NORMAL
HOLD SPECIMEN: NORMAL
TROPONIN I SERPL-MCNC: 0.07 NG/ML (ref 0–0.07)
WHOLE BLOOD HOLD SPECIMEN: NORMAL
WHOLE BLOOD HOLD SPECIMEN: NORMAL

## 2017-09-04 PROCEDURE — 93005 ELECTROCARDIOGRAM TRACING: CPT | Performed by: EMERGENCY MEDICINE

## 2017-09-04 PROCEDURE — 84484 ASSAY OF TROPONIN QUANT: CPT

## 2017-09-04 RX ORDER — BUMETANIDE 0.25 MG/ML
0.5 INJECTION INTRAMUSCULAR; INTRAVENOUS ONCE
Status: DISCONTINUED | OUTPATIENT
Start: 2017-09-04 | End: 2017-09-04

## 2017-09-04 RX ORDER — DOXYCYCLINE HYCLATE 100 MG
100 TABLET ORAL ONCE
Status: COMPLETED | OUTPATIENT
Start: 2017-09-04 | End: 2017-09-04

## 2017-09-04 RX ORDER — DOXYCYCLINE 100 MG/1
100 CAPSULE ORAL 2 TIMES DAILY
Qty: 14 CAPSULE | Refills: 0 | Status: ON HOLD | OUTPATIENT
Start: 2017-09-04 | End: 2017-09-08

## 2017-09-04 RX ORDER — BENZONATATE 100 MG/1
100 CAPSULE ORAL 4 TIMES DAILY PRN
Qty: 21 CAPSULE | Refills: 0 | Status: SHIPPED | OUTPATIENT
Start: 2017-09-04 | End: 2017-09-05

## 2017-09-04 RX ORDER — BUMETANIDE 0.25 MG/ML
0.5 INJECTION INTRAMUSCULAR; INTRAVENOUS ONCE
Status: COMPLETED | OUTPATIENT
Start: 2017-09-04 | End: 2017-09-04

## 2017-09-04 RX ADMIN — BUMETANIDE 0.5 MG: 0.25 INJECTION, SOLUTION INTRAMUSCULAR; INTRAVENOUS at 03:25

## 2017-09-04 RX ADMIN — DOXYCYCLINE HYCLATE 100 MG: 100 TABLET, COATED ORAL at 02:44

## 2017-09-04 NOTE — ED PROVIDER NOTES
Subjective   HPI Comments: Mr. Evan Gannon is a 26 y.o. male who presents to the ED with c/o SoA with onset 3 days ago. He notes of increase from his chronic SoA secondary to his CHF. He notes this SoA is exertional and that he is out of his albuterol so he is not able to alleviate his sx. He is chronically on 2L NC, and CPAP at night. He also has had a few episodes of hemoptysis  around noon today. He also reports of a burning, and scratching sore throat that onset yesterday that seems to coincide with the seasonal temperature changes this past week. He does complain of some chills, but denies any abdominal pain, N/V/D, constipation, dysuria, increased BLE edema from baseline, or any other acute sx at this time. He has had an ICD placed 3 weeks ago and a heart catheterization about one month ago. He has had a tonsillectomy.     Patient is a 26 y.o. male presenting with shortness of breath.   History provided by:  Patient  Shortness of Breath   Severity:  Moderate  Onset quality:  Sudden  Duration:  3 days  Timing:  Constant  Progression:  Worsening  Chronicity:  New  Relieved by:  None tried  Worsened by:  Exertion  Ineffective treatments:  None tried  Associated symptoms: cough (With blood), hemoptysis and sore throat    Associated symptoms: no abdominal pain, no fever and no vomiting        Review of Systems   Constitutional: Positive for chills. Negative for fever.   HENT: Positive for sore throat.    Respiratory: Positive for cough (With blood), hemoptysis and shortness of breath.    Cardiovascular: Positive for leg swelling (chronic and baseline).   Gastrointestinal: Negative for abdominal pain, constipation, diarrhea, nausea and vomiting.   Genitourinary: Negative for dysuria.   All other systems reviewed and are negative.      Past Medical History:   Diagnosis Date   • CHF (congestive heart failure)    • CPAP (continuous positive airway pressure) dependence    • Hypertension    • Morbid obesity    •  Myocardial infarction    • On home O2     2l at bedtime and prn   • Plaque psoriasis    • Sleep apnea    • Wears glasses        No Known Allergies    Past Surgical History:   Procedure Laterality Date   • ADENOIDECTOMY     • CARDIAC CATHETERIZATION N/A 7/13/2017    Procedure: Left Heart Cath;  Surgeon: Balbir Olsen MD;  Location:  [facility];  Service:    • CARDIAC ELECTROPHYSIOLOGY PROCEDURE N/A 8/15/2017    Procedure: VVI ICD Implant;  Surgeon: Nathanael Richmond DO;  Location:  [facility] INVAS[facility];  Service:    • NOSE SURGERY     • TONSILLECTOMY         Family History   Problem Relation Age of Onset   • Diabetes Father        Social History     Social History   • Marital status: Single     Spouse name: N/A   • Number of children: 0   • Years of education: N/A     Occupational History   • disabled      Social History Main Topics   • Smoking status: Never Smoker   • Smokeless tobacco: Never Used   • Alcohol use No   • Drug use: No   • Sexual activity: Defer     Other Topics Concern   • None     Social History Narrative    Patient denies caffeine intake.     Patient lives with girlfriend and at home.              Objective   Physical Exam   Constitutional: He is oriented to person, place, and time. He appears well-developed and well-nourished.   HENT:   Head: Normocephalic and atraumatic.   Nose: Nose normal.   Posterior oropharynx erythema   Eyes: Conjunctivae are normal. No scleral icterus.   Neck: Normal range of motion. Neck supple.   Cardiovascular: Normal rate, regular rhythm and intact distal pulses.    Decreased heart sounds   Pulmonary/Chest: Tachypnea noted. No respiratory distress. He exhibits tenderness (Tenderness at ICD site but site is clean, dy, and intact).   Decreased breath sounds   Abdominal: Soft. Bowel sounds are normal. There is no tenderness.   Musculoskeletal: Normal range of motion. He exhibits edema (2+ BLE, pitting).   BLE venous statis changes   Neurological: He  is alert and oriented to person, place, and time.   Skin: Skin is warm and dry. He is not diaphoretic.   Psychiatric: He has a normal mood and affect. His behavior is normal.   Nursing note and vitals reviewed.      Procedures         ED Course  ED Course   Value Comment By Time   BNP: (!) 786.0 (Reviewed) Laci Corea MD 09/03 2301   Troponin I: 0.07 (Reviewed) Laci Corea MD 09/04 0202    The patient has 2 stable sets of cardiac enzymes which are actually lower than his past values.  Patient has findings consistent with acute bronchitis without evidence of pneumonia.  He also has a chronic congestive heart failure from his cardiomyopathy.  This point there is no indication for keeping the patient in the hospital and he wants to go home.  We'll discharge and follow-up with his doctor for ongoing care with return to the ER for any concerns.  He voices understanding and agrees. Laci Corea MD 09/04 0202    We will prescribe antibiotics for this patient secondary to significant comorbidities and chronic respiratory issues with little reserved capacity. Laci Corea MD 09/04 0206     Recent Results (from the past 24 hour(s))   Comprehensive Metabolic Panel    Collection Time: 09/03/17 10:15 PM   Result Value Ref Range    Glucose 112 (H) 70 - 100 mg/dL    BUN 12 9 - 23 mg/dL    Creatinine 0.90 0.60 - 1.30 mg/dL    Sodium 135 132 - 146 mmol/L    Potassium 3.9 3.5 - 5.5 mmol/L    Chloride 104 99 - 109 mmol/L    CO2 27.0 20.0 - 31.0 mmol/L    Calcium 8.6 (L) 8.7 - 10.4 mg/dL    Total Protein 7.2 5.7 - 8.2 g/dL    Albumin 3.60 3.20 - 4.80 g/dL    ALT (SGPT) 14 7 - 40 U/L    AST (SGOT) 18 0 - 33 U/L    Alkaline Phosphatase 92 25 - 100 U/L    Total Bilirubin 0.6 0.3 - 1.2 mg/dL    eGFR  African Amer 126 >60 mL/min/1.73    Globulin 3.6 gm/dL    A/G Ratio 1.0 (L) 1.5 - 2.5 g/dL    BUN/Creatinine Ratio 13.3 7.0 - 25.0    Anion Gap 4.0 3.0 - 11.0 mmol/L   BNP    Collection Time: 09/03/17 10:15 PM    Result Value Ref Range    .0 (H) 0.0 - 100.0 pg/mL   Light Blue Top    Collection Time: 09/03/17 10:15 PM   Result Value Ref Range    Extra Tube hold for add-on    Green Top (Gel)    Collection Time: 09/03/17 10:15 PM   Result Value Ref Range    Extra Tube Hold for add-ons.    Lavender Top    Collection Time: 09/03/17 10:15 PM   Result Value Ref Range    Extra Tube hold for add-on    Gold Top - SST    Collection Time: 09/03/17 10:15 PM   Result Value Ref Range    Extra Tube Hold for add-ons.    CBC Auto Differential    Collection Time: 09/03/17 10:15 PM   Result Value Ref Range    WBC 7.09 3.50 - 10.80 10*3/mm3    RBC 5.30 4.20 - 5.76 10*6/mm3    Hemoglobin 12.8 (L) 13.1 - 17.5 g/dL    Hematocrit 40.8 38.9 - 50.9 %    MCV 77.0 (L) 80.0 - 99.0 fL    MCH 24.2 (L) 27.0 - 31.0 pg    MCHC 31.4 (L) 32.0 - 36.0 g/dL    RDW 16.3 (H) 11.3 - 14.5 %    RDW-SD 45.7 37.0 - 54.0 fl    MPV 9.9 6.0 - 12.0 fL    Platelets 336 150 - 450 10*3/mm3    Neutrophil % 73.7 (H) 41.0 - 71.0 %    Lymphocyte % 14.4 (L) 24.0 - 44.0 %    Monocyte % 9.2 0.0 - 12.0 %    Eosinophil % 2.3 0.0 - 3.0 %    Basophil % 0.1 0.0 - 1.0 %    Immature Grans % 0.3 0.0 - 0.6 %    Neutrophils, Absolute 5.23 1.50 - 8.30 10*3/mm3    Lymphocytes, Absolute 1.02 0.60 - 4.80 10*3/mm3    Monocytes, Absolute 0.65 0.00 - 1.00 10*3/mm3    Eosinophils, Absolute 0.16 0.00 - 0.30 10*3/mm3    Basophils, Absolute 0.01 0.00 - 0.20 10*3/mm3    Immature Grans, Absolute 0.02 0.00 - 0.03 10*3/mm3   POC Troponin, Rapid    Collection Time: 09/03/17 10:20 PM   Result Value Ref Range    Troponin I 0.07 0.00 - 0.07 ng/mL   POC Troponin, Rapid    Collection Time: 09/04/17  1:39 AM   Result Value Ref Range    Troponin I 0.07 0.00 - 0.07 ng/mL     Note: In addition to lab results from this visit, the labs listed above may include labs taken at another facility or during a different encounter within the last 24 hours. Please correlate lab times with ED admission and discharge times  for further clarification of the services performed during this visit.    XR Chest 2 View   Final Result   Abnormal     No acute cardiopulmonary disease.      THIS DOCUMENT HAS BEEN ELECTRONICALLY SIGNED BY QUINTIN BARROW MD        Vitals:    09/04/17 0040 09/04/17 0100 09/04/17 0137 09/04/17 0230   BP: 137/94 151/74  135/95   BP Location:       Patient Position:       Pulse: 116 116  110   Resp:       Temp:   97.8 °F (36.6 °C)    TempSrc:   Oral    SpO2: 96% 95%  97%   Weight:       Height:         Medications   sodium chloride 0.9 % flush 10 mL (not administered)   dexamethasone (DECADRON) 10 MG/ML oral solution 10 mg (10 mg Oral Given 9/3/17 2356)   ipratropium-albuterol (DUO-NEB) nebulizer solution 3 mL (3 mL Nebulization Given 9/3/17 2342)   furosemide (LASIX) tablet 40 mg (40 mg Oral Given 9/3/17 2357)   doxycycline (VIBRAMYICN) tablet 100 mg (100 mg Oral Given 9/4/17 0244)   bumetanide (BUMEX) injection 0.5 mg (0.5 mg Intramuscular Given 9/4/17 0325)     ECG/EMG Results (last 24 hours)     Procedure Component Value Units Date/Time    ECG 12 Lead [[id number]] Collected:  09/03/17 2155     Updated:  09/03/17 2155                          MDM  Number of Diagnoses or Management Options  Diagnosis management comments: ECG/EMG Results (last 24 hours)     Procedure Component Value Units Date/Time    ECG 12 Lead ([id number]) Collected:  09/04/17 0142     Updated:  09/04/17 0220    Narrative:       Test Reason : 2nd set  Blood Pressure : **/** mmHG  Vent. Rate : 111 BPM     Atrial Rate : 111 BPM     P-R Int : 166 ms          QRS Dur : 102 ms      QT Int : 356 ms       P-R-T Axes : 048 059 092 degrees     QTc Int : 484 ms    Sinus tachycardia  Nonspecific T wave abnormality  Abnormal ECG  When compared with ECG of 03-SEP-2017 21:55, (Unconfirmed)  No significant change was found  Confirmed by GENEVA PRATT MD ([id number]) on 9/4/2017 2:20:17 AM    Referred By:  SANTA           Confirmed By:GENEVA PRATT MD             Amount  and/or Complexity of Data Reviewed  Clinical lab tests: reviewed  Tests in the radiology section of CPT®: reviewed  Review and summarize past medical records: yes  Independent visualization of images, tracings, or specimens: yes        Final diagnoses:   Acute bronchitis, unspecified organism   Dilated cardiomyopathy   Chronic systolic congestive heart failure   Shortness of breath   Morbid obesity due to excess calories   Obstructive sleep apnea   Productive cough       Documentation assistance provided by josé JONES.  Information recorded by the josé was done at my direction and has been verified and validated by me.     Alvin Jones  09/03/17 1913       Alvin Jones  09/03/17 1051       Laci Corea MD  09/04/17 5451

## 2017-09-05 ENCOUNTER — TELEPHONE (OUTPATIENT)
Dept: CARDIOLOGY | Facility: HOSPITAL | Age: 27
End: 2017-09-05

## 2017-09-05 ENCOUNTER — HOSPITAL ENCOUNTER (OUTPATIENT)
Facility: HOSPITAL | Age: 27
Setting detail: OBSERVATION
Discharge: HOME | End: 2017-09-08
Attending: EMERGENCY MEDICINE | Admitting: INTERNAL MEDICINE

## 2017-09-05 ENCOUNTER — APPOINTMENT (OUTPATIENT)
Dept: GENERAL RADIOLOGY | Facility: HOSPITAL | Age: 27
End: 2017-09-05

## 2017-09-05 DIAGNOSIS — I50.22 CHRONIC SYSTOLIC CONGESTIVE HEART FAILURE (HCC): ICD-10-CM

## 2017-09-05 DIAGNOSIS — I42.8 NONISCHEMIC CARDIOMYOPATHY (HCC): ICD-10-CM

## 2017-09-05 DIAGNOSIS — R06.02 SHORTNESS OF BREATH: Primary | ICD-10-CM

## 2017-09-05 DIAGNOSIS — I42.0 DILATED CARDIOMYOPATHY (HCC): ICD-10-CM

## 2017-09-05 PROBLEM — L40.9 PSORIASIS: Status: ACTIVE | Noted: 2017-09-05

## 2017-09-05 LAB
ALBUMIN SERPL-MCNC: 3.8 G/DL (ref 3.2–4.8)
ALBUMIN/GLOB SERPL: 1 G/DL (ref 1.5–2.5)
ALP SERPL-CCNC: 90 U/L (ref 25–100)
ALT SERPL W P-5'-P-CCNC: 16 U/L (ref 7–40)
ANION GAP SERPL CALCULATED.3IONS-SCNC: 7 MMOL/L (ref 3–11)
AST SERPL-CCNC: 16 U/L (ref 0–33)
BASOPHILS # BLD AUTO: 0.01 10*3/MM3 (ref 0–0.2)
BASOPHILS NFR BLD AUTO: 0.1 % (ref 0–1)
BILIRUB SERPL-MCNC: 0.5 MG/DL (ref 0.3–1.2)
BNP SERPL-MCNC: 452 PG/ML (ref 0–100)
BUN BLD-MCNC: 12 MG/DL (ref 9–23)
BUN/CREAT SERPL: 13.3 (ref 7–25)
CALCIUM SPEC-SCNC: 9.2 MG/DL (ref 8.7–10.4)
CHLORIDE SERPL-SCNC: 104 MMOL/L (ref 99–109)
CO2 SERPL-SCNC: 28 MMOL/L (ref 20–31)
CREAT BLD-MCNC: 0.9 MG/DL (ref 0.6–1.3)
DEPRECATED RDW RBC AUTO: 46.1 FL (ref 37–54)
EOSINOPHIL # BLD AUTO: 0.03 10*3/MM3 (ref 0–0.3)
EOSINOPHIL NFR BLD AUTO: 0.4 % (ref 0–3)
ERYTHROCYTE [DISTWIDTH] IN BLOOD BY AUTOMATED COUNT: 16.4 % (ref 11.3–14.5)
GFR SERPL CREATININE-BSD FRML MDRD: 126 ML/MIN/1.73
GLOBULIN UR ELPH-MCNC: 3.9 GM/DL
GLUCOSE BLD-MCNC: 105 MG/DL (ref 70–100)
HCT VFR BLD AUTO: 42.3 % (ref 38.9–50.9)
HGB BLD-MCNC: 13.1 G/DL (ref 13.1–17.5)
HOLD SPECIMEN: NORMAL
HOLD SPECIMEN: NORMAL
IMM GRANULOCYTES # BLD: 0.04 10*3/MM3 (ref 0–0.03)
IMM GRANULOCYTES NFR BLD: 0.5 % (ref 0–0.6)
LYMPHOCYTES # BLD AUTO: 1.05 10*3/MM3 (ref 0.6–4.8)
LYMPHOCYTES NFR BLD AUTO: 12.4 % (ref 24–44)
MCH RBC QN AUTO: 23.7 PG (ref 27–31)
MCHC RBC AUTO-ENTMCNC: 31 G/DL (ref 32–36)
MCV RBC AUTO: 76.5 FL (ref 80–99)
MONOCYTES # BLD AUTO: 0.86 10*3/MM3 (ref 0–1)
MONOCYTES NFR BLD AUTO: 10.1 % (ref 0–12)
NEUTROPHILS # BLD AUTO: 6.49 10*3/MM3 (ref 1.5–8.3)
NEUTROPHILS NFR BLD AUTO: 76.5 % (ref 41–71)
PLATELET # BLD AUTO: 362 10*3/MM3 (ref 150–450)
PMV BLD AUTO: 9.5 FL (ref 6–12)
POTASSIUM BLD-SCNC: 3.7 MMOL/L (ref 3.5–5.5)
PROT SERPL-MCNC: 7.7 G/DL (ref 5.7–8.2)
RBC # BLD AUTO: 5.53 10*6/MM3 (ref 4.2–5.76)
SODIUM BLD-SCNC: 139 MMOL/L (ref 132–146)
TROPONIN I SERPL-MCNC: 0.06 NG/ML (ref 0–0.07)
TROPONIN I SERPL-MCNC: 0.07 NG/ML (ref 0–0.07)
WBC NRBC COR # BLD: 8.48 10*3/MM3 (ref 3.5–10.8)
WHOLE BLOOD HOLD SPECIMEN: NORMAL
WHOLE BLOOD HOLD SPECIMEN: NORMAL

## 2017-09-05 PROCEDURE — G0378 HOSPITAL OBSERVATION PER HR: HCPCS

## 2017-09-05 PROCEDURE — 94799 UNLISTED PULMONARY SVC/PX: CPT

## 2017-09-05 PROCEDURE — 85025 COMPLETE CBC W/AUTO DIFF WBC: CPT | Performed by: EMERGENCY MEDICINE

## 2017-09-05 PROCEDURE — 84484 ASSAY OF TROPONIN QUANT: CPT

## 2017-09-05 PROCEDURE — 99220 PR INITIAL OBSERVATION CARE/DAY 70 MINUTES: CPT | Performed by: NURSE PRACTITIONER

## 2017-09-05 PROCEDURE — 96376 TX/PRO/DX INJ SAME DRUG ADON: CPT

## 2017-09-05 PROCEDURE — 93005 ELECTROCARDIOGRAM TRACING: CPT

## 2017-09-05 PROCEDURE — 99284 EMERGENCY DEPT VISIT MOD MDM: CPT

## 2017-09-05 PROCEDURE — 25010000002 FUROSEMIDE PER 20 MG: Performed by: EMERGENCY MEDICINE

## 2017-09-05 PROCEDURE — 25010000002 METHYLPREDNISOLONE PER 125 MG: Performed by: EMERGENCY MEDICINE

## 2017-09-05 PROCEDURE — 83880 ASSAY OF NATRIURETIC PEPTIDE: CPT | Performed by: EMERGENCY MEDICINE

## 2017-09-05 PROCEDURE — 71010 HC CHEST PA OR AP: CPT

## 2017-09-05 PROCEDURE — 25010000002 METHYLPREDNISOLONE PER 125 MG: Performed by: FAMILY MEDICINE

## 2017-09-05 PROCEDURE — 36415 COLL VENOUS BLD VENIPUNCTURE: CPT

## 2017-09-05 PROCEDURE — 96375 TX/PRO/DX INJ NEW DRUG ADDON: CPT

## 2017-09-05 PROCEDURE — 93005 ELECTROCARDIOGRAM TRACING: CPT | Performed by: EMERGENCY MEDICINE

## 2017-09-05 PROCEDURE — 85025 COMPLETE CBC W/AUTO DIFF WBC: CPT | Performed by: FAMILY MEDICINE

## 2017-09-05 PROCEDURE — 80053 COMPREHEN METABOLIC PANEL: CPT | Performed by: EMERGENCY MEDICINE

## 2017-09-05 PROCEDURE — 25010000002 HEPARIN (PORCINE) PER 1000 UNITS: Performed by: NURSE PRACTITIONER

## 2017-09-05 PROCEDURE — 94640 AIRWAY INHALATION TREATMENT: CPT

## 2017-09-05 PROCEDURE — 96374 THER/PROPH/DIAG INJ IV PUSH: CPT

## 2017-09-05 PROCEDURE — 94660 CPAP INITIATION&MGMT: CPT

## 2017-09-05 RX ORDER — BISACODYL 10 MG
10 SUPPOSITORY, RECTAL RECTAL DAILY PRN
Status: DISCONTINUED | OUTPATIENT
Start: 2017-09-05 | End: 2017-09-08 | Stop reason: HOSPADM

## 2017-09-05 RX ORDER — METHYLPREDNISOLONE SODIUM SUCCINATE 125 MG/2ML
125 INJECTION, POWDER, LYOPHILIZED, FOR SOLUTION INTRAMUSCULAR; INTRAVENOUS ONCE
Status: COMPLETED | OUTPATIENT
Start: 2017-09-05 | End: 2017-09-05

## 2017-09-05 RX ORDER — SODIUM CHLORIDE 0.9 % (FLUSH) 0.9 %
1-10 SYRINGE (ML) INJECTION AS NEEDED
Status: DISCONTINUED | OUTPATIENT
Start: 2017-09-05 | End: 2017-09-08 | Stop reason: HOSPADM

## 2017-09-05 RX ORDER — ALBUTEROL SULFATE 90 UG/1
2 AEROSOL, METERED RESPIRATORY (INHALATION) EVERY 6 HOURS PRN
COMMUNITY
End: 2018-02-07 | Stop reason: SDUPTHER

## 2017-09-05 RX ORDER — DOXYCYCLINE HYCLATE 100 MG/1
100 CAPSULE ORAL EVERY 12 HOURS SCHEDULED
Status: DISCONTINUED | OUTPATIENT
Start: 2017-09-05 | End: 2017-09-08 | Stop reason: HOSPADM

## 2017-09-05 RX ORDER — SODIUM CHLORIDE 0.9 % (FLUSH) 0.9 %
10 SYRINGE (ML) INJECTION AS NEEDED
Status: DISCONTINUED | OUTPATIENT
Start: 2017-09-05 | End: 2017-09-08 | Stop reason: HOSPADM

## 2017-09-05 RX ORDER — SPIRONOLACTONE 25 MG/1
25 TABLET ORAL DAILY
Status: DISCONTINUED | OUTPATIENT
Start: 2017-09-06 | End: 2017-09-08 | Stop reason: HOSPADM

## 2017-09-05 RX ORDER — DOCUSATE SODIUM 100 MG/1
100 CAPSULE, LIQUID FILLED ORAL 2 TIMES DAILY PRN
Status: DISCONTINUED | OUTPATIENT
Start: 2017-09-05 | End: 2017-09-08 | Stop reason: HOSPADM

## 2017-09-05 RX ORDER — ACETAMINOPHEN 325 MG/1
650 TABLET ORAL EVERY 4 HOURS PRN
Status: DISCONTINUED | OUTPATIENT
Start: 2017-09-05 | End: 2017-09-08 | Stop reason: HOSPADM

## 2017-09-05 RX ORDER — IPRATROPIUM BROMIDE AND ALBUTEROL SULFATE 2.5; .5 MG/3ML; MG/3ML
3 SOLUTION RESPIRATORY (INHALATION) ONCE
Status: COMPLETED | OUTPATIENT
Start: 2017-09-05 | End: 2017-09-05

## 2017-09-05 RX ORDER — FUROSEMIDE 10 MG/ML
40 INJECTION INTRAMUSCULAR; INTRAVENOUS ONCE
Status: COMPLETED | OUTPATIENT
Start: 2017-09-05 | End: 2017-09-05

## 2017-09-05 RX ORDER — TORSEMIDE 20 MG/1
60 TABLET ORAL DAILY
Status: DISCONTINUED | OUTPATIENT
Start: 2017-09-06 | End: 2017-09-08 | Stop reason: HOSPADM

## 2017-09-05 RX ORDER — BENZONATATE 100 MG/1
100 CAPSULE ORAL 3 TIMES DAILY PRN
Status: DISCONTINUED | OUTPATIENT
Start: 2017-09-05 | End: 2017-09-08 | Stop reason: HOSPADM

## 2017-09-05 RX ORDER — METHYLPREDNISOLONE SODIUM SUCCINATE 40 MG/ML
40 INJECTION, POWDER, LYOPHILIZED, FOR SOLUTION INTRAMUSCULAR; INTRAVENOUS EVERY 12 HOURS
Status: DISCONTINUED | OUTPATIENT
Start: 2017-09-06 | End: 2017-09-06

## 2017-09-05 RX ORDER — CARVEDILOL 12.5 MG/1
12.5 TABLET ORAL 2 TIMES DAILY
Status: DISCONTINUED | OUTPATIENT
Start: 2017-09-06 | End: 2017-09-08 | Stop reason: HOSPADM

## 2017-09-05 RX ORDER — IPRATROPIUM BROMIDE AND ALBUTEROL SULFATE 2.5; .5 MG/3ML; MG/3ML
3 SOLUTION RESPIRATORY (INHALATION)
Status: DISCONTINUED | OUTPATIENT
Start: 2017-09-05 | End: 2017-09-08 | Stop reason: HOSPADM

## 2017-09-05 RX ORDER — HEPARIN SODIUM 5000 [USP'U]/ML
5000 INJECTION, SOLUTION INTRAVENOUS; SUBCUTANEOUS EVERY 12 HOURS SCHEDULED
Status: DISCONTINUED | OUTPATIENT
Start: 2017-09-05 | End: 2017-09-08 | Stop reason: HOSPADM

## 2017-09-05 RX ORDER — ASPIRIN 81 MG/1
81 TABLET ORAL DAILY
Status: DISCONTINUED | OUTPATIENT
Start: 2017-09-06 | End: 2017-09-08 | Stop reason: HOSPADM

## 2017-09-05 RX ORDER — METHYLPREDNISOLONE SODIUM SUCCINATE 125 MG/2ML
60 INJECTION, POWDER, LYOPHILIZED, FOR SOLUTION INTRAMUSCULAR; INTRAVENOUS ONCE
Status: COMPLETED | OUTPATIENT
Start: 2017-09-05 | End: 2017-09-05

## 2017-09-05 RX ORDER — IPRATROPIUM BROMIDE AND ALBUTEROL SULFATE 2.5; .5 MG/3ML; MG/3ML
3 SOLUTION RESPIRATORY (INHALATION) EVERY 6 HOURS PRN
Status: DISCONTINUED | OUTPATIENT
Start: 2017-09-05 | End: 2017-09-08 | Stop reason: HOSPADM

## 2017-09-05 RX ADMIN — NITROGLYCERIN 0.5 INCH: 20 OINTMENT TOPICAL at 13:15

## 2017-09-05 RX ADMIN — DOXYCYCLINE HYCLATE 100 MG: 100 CAPSULE ORAL at 23:23

## 2017-09-05 RX ADMIN — IPRATROPIUM BROMIDE AND ALBUTEROL SULFATE 3 ML: .5; 3 SOLUTION RESPIRATORY (INHALATION) at 15:34

## 2017-09-05 RX ADMIN — SACUBITRIL AND VALSARTAN 1 TABLET: 24; 26 TABLET, FILM COATED ORAL at 23:23

## 2017-09-05 RX ADMIN — HEPARIN SODIUM 5000 UNITS: 5000 INJECTION, SOLUTION INTRAVENOUS; SUBCUTANEOUS at 23:35

## 2017-09-05 RX ADMIN — METHYLPREDNISOLONE SODIUM SUCCINATE 125 MG: 125 INJECTION, POWDER, FOR SOLUTION INTRAMUSCULAR; INTRAVENOUS at 23:24

## 2017-09-05 RX ADMIN — METHYLPREDNISOLONE SODIUM SUCCINATE 125 MG: 125 INJECTION, POWDER, FOR SOLUTION INTRAMUSCULAR; INTRAVENOUS at 13:22

## 2017-09-05 RX ADMIN — IPRATROPIUM BROMIDE AND ALBUTEROL SULFATE 3 ML: .5; 3 SOLUTION RESPIRATORY (INHALATION) at 14:06

## 2017-09-05 RX ADMIN — FUROSEMIDE 40 MG: 10 INJECTION, SOLUTION INTRAMUSCULAR; INTRAVENOUS at 13:12

## 2017-09-05 NOTE — TELEPHONE ENCOUNTER
-spoke with patient who notes that he is headed to the ER for further evaluation. He notes that he was seen in the ER this weekend. He declined appt at  center today.       ---- Message from Shobha Muniz Rep sent at 9/5/2017  9:47 AM EDT -----  Regarding: FW: karen call  Contact: [phone]      ----- Message -----     From: Shobha Muniz Rep     Sent: 9/5/2017   8:30 AM       To: Isabel Mckeon  Subject: sandyn call                                     Patient states he called and left message last week and no one returned his call. He states he was in the ER over the weekend and he has some questions about his defibilator .

## 2017-09-06 LAB
ANION GAP SERPL CALCULATED.3IONS-SCNC: 7 MMOL/L (ref 3–11)
BASOPHILS # BLD AUTO: 0 10*3/MM3 (ref 0–0.2)
BASOPHILS # BLD AUTO: 0 10*3/MM3 (ref 0–0.2)
BASOPHILS NFR BLD AUTO: 0 % (ref 0–1)
BASOPHILS NFR BLD AUTO: 0 % (ref 0–1)
BNP SERPL-MCNC: 626 PG/ML (ref 0–100)
BUN BLD-MCNC: 15 MG/DL (ref 9–23)
BUN/CREAT SERPL: 18.8 (ref 7–25)
CALCIUM SPEC-SCNC: 9 MG/DL (ref 8.7–10.4)
CHLORIDE SERPL-SCNC: 101 MMOL/L (ref 99–109)
CO2 SERPL-SCNC: 27 MMOL/L (ref 20–31)
CREAT BLD-MCNC: 0.8 MG/DL (ref 0.6–1.3)
DEPRECATED RDW RBC AUTO: 46.4 FL (ref 37–54)
DEPRECATED RDW RBC AUTO: 46.8 FL (ref 37–54)
EOSINOPHIL # BLD AUTO: 0 10*3/MM3 (ref 0–0.3)
EOSINOPHIL # BLD AUTO: 0 10*3/MM3 (ref 0–0.3)
EOSINOPHIL NFR BLD AUTO: 0 % (ref 0–3)
EOSINOPHIL NFR BLD AUTO: 0 % (ref 0–3)
ERYTHROCYTE [DISTWIDTH] IN BLOOD BY AUTOMATED COUNT: 16.3 % (ref 11.3–14.5)
ERYTHROCYTE [DISTWIDTH] IN BLOOD BY AUTOMATED COUNT: 16.5 % (ref 11.3–14.5)
GFR SERPL CREATININE-BSD FRML MDRD: 144 ML/MIN/1.73
GLUCOSE BLD-MCNC: 142 MG/DL (ref 70–100)
HCT VFR BLD AUTO: 41.2 % (ref 38.9–50.9)
HCT VFR BLD AUTO: 42.8 % (ref 38.9–50.9)
HGB BLD-MCNC: 13.1 G/DL (ref 13.1–17.5)
HGB BLD-MCNC: 13.4 G/DL (ref 13.1–17.5)
IMM GRANULOCYTES # BLD: 0.02 10*3/MM3 (ref 0–0.03)
IMM GRANULOCYTES # BLD: 0.03 10*3/MM3 (ref 0–0.03)
IMM GRANULOCYTES NFR BLD: 0.3 % (ref 0–0.6)
IMM GRANULOCYTES NFR BLD: 0.4 % (ref 0–0.6)
LYMPHOCYTES # BLD AUTO: 0.41 10*3/MM3 (ref 0.6–4.8)
LYMPHOCYTES # BLD AUTO: 0.44 10*3/MM3 (ref 0.6–4.8)
LYMPHOCYTES NFR BLD AUTO: 5.8 % (ref 24–44)
LYMPHOCYTES NFR BLD AUTO: 6.5 % (ref 24–44)
MCH RBC QN AUTO: 24.2 PG (ref 27–31)
MCH RBC QN AUTO: 24.7 PG (ref 27–31)
MCHC RBC AUTO-ENTMCNC: 31.3 G/DL (ref 32–36)
MCHC RBC AUTO-ENTMCNC: 31.8 G/DL (ref 32–36)
MCV RBC AUTO: 77.4 FL (ref 80–99)
MCV RBC AUTO: 77.7 FL (ref 80–99)
MONOCYTES # BLD AUTO: 0.28 10*3/MM3 (ref 0–1)
MONOCYTES # BLD AUTO: 0.4 10*3/MM3 (ref 0–1)
MONOCYTES NFR BLD AUTO: 4.1 % (ref 0–12)
MONOCYTES NFR BLD AUTO: 5.7 % (ref 0–12)
NEUTROPHILS # BLD AUTO: 6.04 10*3/MM3 (ref 1.5–8.3)
NEUTROPHILS # BLD AUTO: 6.18 10*3/MM3 (ref 1.5–8.3)
NEUTROPHILS NFR BLD AUTO: 88.2 % (ref 41–71)
NEUTROPHILS NFR BLD AUTO: 89 % (ref 41–71)
PLATELET # BLD AUTO: 364 10*3/MM3 (ref 150–450)
PLATELET # BLD AUTO: 371 10*3/MM3 (ref 150–450)
PMV BLD AUTO: 9.6 FL (ref 6–12)
PMV BLD AUTO: 9.7 FL (ref 6–12)
POTASSIUM BLD-SCNC: 4.4 MMOL/L (ref 3.5–5.5)
RBC # BLD AUTO: 5.3 10*6/MM3 (ref 4.2–5.76)
RBC # BLD AUTO: 5.53 10*6/MM3 (ref 4.2–5.76)
SODIUM BLD-SCNC: 135 MMOL/L (ref 132–146)
WBC NRBC COR # BLD: 6.79 10*3/MM3 (ref 3.5–10.8)
WBC NRBC COR # BLD: 7.01 10*3/MM3 (ref 3.5–10.8)

## 2017-09-06 PROCEDURE — 94799 UNLISTED PULMONARY SVC/PX: CPT

## 2017-09-06 PROCEDURE — 90732 PPSV23 VACC 2 YRS+ SUBQ/IM: CPT | Performed by: INTERNAL MEDICINE

## 2017-09-06 PROCEDURE — 85025 COMPLETE CBC W/AUTO DIFF WBC: CPT | Performed by: NURSE PRACTITIONER

## 2017-09-06 PROCEDURE — 99212 OFFICE O/P EST SF 10 MIN: CPT | Performed by: INTERNAL MEDICINE

## 2017-09-06 PROCEDURE — G0008 ADMIN INFLUENZA VIRUS VAC: HCPCS | Performed by: FAMILY MEDICINE

## 2017-09-06 PROCEDURE — 90674 CCIIV4 VAC NO PRSV 0.5 ML IM: CPT | Performed by: FAMILY MEDICINE

## 2017-09-06 PROCEDURE — 83880 ASSAY OF NATRIURETIC PEPTIDE: CPT | Performed by: FAMILY MEDICINE

## 2017-09-06 PROCEDURE — 80048 BASIC METABOLIC PNL TOTAL CA: CPT | Performed by: FAMILY MEDICINE

## 2017-09-06 PROCEDURE — G0009 ADMIN PNEUMOCOCCAL VACCINE: HCPCS | Performed by: INTERNAL MEDICINE

## 2017-09-06 PROCEDURE — 25010000002 HEPARIN (PORCINE) PER 1000 UNITS: Performed by: NURSE PRACTITIONER

## 2017-09-06 PROCEDURE — G0378 HOSPITAL OBSERVATION PER HR: HCPCS

## 2017-09-06 PROCEDURE — 25010000002 METHYLPREDNISOLONE PER 40 MG: Performed by: NURSE PRACTITIONER

## 2017-09-06 PROCEDURE — 96376 TX/PRO/DX INJ SAME DRUG ADON: CPT

## 2017-09-06 PROCEDURE — 94760 N-INVAS EAR/PLS OXIMETRY 1: CPT

## 2017-09-06 PROCEDURE — 25010000002 INFLUENZA VAC SPLIT QUAD 0.5 ML SUSPENSION PREFILLED SYRINGE: Performed by: FAMILY MEDICINE

## 2017-09-06 PROCEDURE — 25010000002 PNEUMOCOCCAL VAC POLYVALENT PER 0.5 ML: Performed by: INTERNAL MEDICINE

## 2017-09-06 PROCEDURE — 99225 PR SBSQ OBSERVATION CARE/DAY 25 MINUTES: CPT | Performed by: INTERNAL MEDICINE

## 2017-09-06 RX ORDER — PREDNISONE 20 MG/1
40 TABLET ORAL
Status: DISCONTINUED | OUTPATIENT
Start: 2017-09-07 | End: 2017-09-08 | Stop reason: HOSPADM

## 2017-09-06 RX ADMIN — SPIRONOLACTONE 25 MG: 25 TABLET, FILM COATED ORAL at 10:00

## 2017-09-06 RX ADMIN — INFLUENZA VIRUS VACCINE 0.5 ML: 15; 15; 15; 15 SUSPENSION INTRAMUSCULAR at 19:49

## 2017-09-06 RX ADMIN — CARVEDILOL 12.5 MG: 12.5 TABLET, FILM COATED ORAL at 17:41

## 2017-09-06 RX ADMIN — DOXYCYCLINE HYCLATE 100 MG: 100 CAPSULE ORAL at 12:20

## 2017-09-06 RX ADMIN — IPRATROPIUM BROMIDE AND ALBUTEROL SULFATE 3 ML: .5; 3 SOLUTION RESPIRATORY (INHALATION) at 23:36

## 2017-09-06 RX ADMIN — TORSEMIDE 60 MG: 20 TABLET ORAL at 10:11

## 2017-09-06 RX ADMIN — CARVEDILOL 12.5 MG: 12.5 TABLET, FILM COATED ORAL at 10:00

## 2017-09-06 RX ADMIN — PNEUMOCOCCAL VACCINE POLYVALENT 0.5 ML
25; 25; 25; 25; 25; 25; 25; 25; 25; 25; 25; 25; 25; 25; 25; 25; 25; 25; 25; 25; 25; 25; 25 INJECTION, SOLUTION INTRAMUSCULAR; SUBCUTANEOUS at 19:48

## 2017-09-06 RX ADMIN — HEPARIN SODIUM 5000 UNITS: 5000 INJECTION, SOLUTION INTRAVENOUS; SUBCUTANEOUS at 20:39

## 2017-09-06 RX ADMIN — IPRATROPIUM BROMIDE AND ALBUTEROL SULFATE 3 ML: .5; 3 SOLUTION RESPIRATORY (INHALATION) at 15:21

## 2017-09-06 RX ADMIN — METHYLPREDNISOLONE SODIUM SUCCINATE 40 MG: 40 INJECTION, POWDER, FOR SOLUTION INTRAMUSCULAR; INTRAVENOUS at 10:11

## 2017-09-06 RX ADMIN — IPRATROPIUM BROMIDE AND ALBUTEROL SULFATE 3 ML: .5; 3 SOLUTION RESPIRATORY (INHALATION) at 18:52

## 2017-09-06 RX ADMIN — SACUBITRIL AND VALSARTAN 1 TABLET: 24; 26 TABLET, FILM COATED ORAL at 10:09

## 2017-09-06 RX ADMIN — HEPARIN SODIUM 5000 UNITS: 5000 INJECTION, SOLUTION INTRAVENOUS; SUBCUTANEOUS at 09:59

## 2017-09-06 RX ADMIN — DOXYCYCLINE HYCLATE 100 MG: 100 CAPSULE ORAL at 20:39

## 2017-09-06 RX ADMIN — SACUBITRIL AND VALSARTAN 1 TABLET: 24; 26 TABLET, FILM COATED ORAL at 20:39

## 2017-09-06 RX ADMIN — IPRATROPIUM BROMIDE AND ALBUTEROL SULFATE 3 ML: .5; 3 SOLUTION RESPIRATORY (INHALATION) at 09:23

## 2017-09-06 RX ADMIN — ASPIRIN 81 MG: 81 TABLET, COATED ORAL at 10:00

## 2017-09-07 PROCEDURE — 94660 CPAP INITIATION&MGMT: CPT

## 2017-09-07 PROCEDURE — G0378 HOSPITAL OBSERVATION PER HR: HCPCS

## 2017-09-07 PROCEDURE — 99213 OFFICE O/P EST LOW 20 MIN: CPT | Performed by: INTERNAL MEDICINE

## 2017-09-07 PROCEDURE — 94799 UNLISTED PULMONARY SVC/PX: CPT

## 2017-09-07 PROCEDURE — 63710000001 PREDNISONE PER 1 MG: Performed by: INTERNAL MEDICINE

## 2017-09-07 PROCEDURE — 96376 TX/PRO/DX INJ SAME DRUG ADON: CPT

## 2017-09-07 PROCEDURE — 94640 AIRWAY INHALATION TREATMENT: CPT

## 2017-09-07 PROCEDURE — 25010000002 HEPARIN (PORCINE) PER 1000 UNITS: Performed by: NURSE PRACTITIONER

## 2017-09-07 PROCEDURE — 99225 PR SBSQ OBSERVATION CARE/DAY 25 MINUTES: CPT | Performed by: INTERNAL MEDICINE

## 2017-09-07 RX ADMIN — IPRATROPIUM BROMIDE AND ALBUTEROL SULFATE 3 ML: .5; 3 SOLUTION RESPIRATORY (INHALATION) at 07:11

## 2017-09-07 RX ADMIN — HEPARIN SODIUM 5000 UNITS: 5000 INJECTION, SOLUTION INTRAVENOUS; SUBCUTANEOUS at 08:35

## 2017-09-07 RX ADMIN — SACUBITRIL AND VALSARTAN 1 TABLET: 24; 26 TABLET, FILM COATED ORAL at 21:50

## 2017-09-07 RX ADMIN — TORSEMIDE 60 MG: 20 TABLET ORAL at 08:34

## 2017-09-07 RX ADMIN — IPRATROPIUM BROMIDE AND ALBUTEROL SULFATE 3 ML: .5; 3 SOLUTION RESPIRATORY (INHALATION) at 12:45

## 2017-09-07 RX ADMIN — IPRATROPIUM BROMIDE AND ALBUTEROL SULFATE 3 ML: .5; 3 SOLUTION RESPIRATORY (INHALATION) at 18:36

## 2017-09-07 RX ADMIN — ASPIRIN 81 MG: 81 TABLET, COATED ORAL at 08:35

## 2017-09-07 RX ADMIN — DOXYCYCLINE HYCLATE 100 MG: 100 CAPSULE ORAL at 08:35

## 2017-09-07 RX ADMIN — SACUBITRIL AND VALSARTAN 1 TABLET: 24; 26 TABLET, FILM COATED ORAL at 08:35

## 2017-09-07 RX ADMIN — CARVEDILOL 12.5 MG: 12.5 TABLET, FILM COATED ORAL at 08:35

## 2017-09-07 RX ADMIN — CARVEDILOL 12.5 MG: 12.5 TABLET, FILM COATED ORAL at 16:56

## 2017-09-07 RX ADMIN — IPRATROPIUM BROMIDE AND ALBUTEROL SULFATE 3 ML: .5; 3 SOLUTION RESPIRATORY (INHALATION) at 02:53

## 2017-09-07 RX ADMIN — SPIRONOLACTONE 25 MG: 25 TABLET, FILM COATED ORAL at 08:35

## 2017-09-07 RX ADMIN — DOXYCYCLINE HYCLATE 100 MG: 100 CAPSULE ORAL at 21:51

## 2017-09-07 RX ADMIN — PREDNISONE 40 MG: 20 TABLET ORAL at 08:35

## 2017-09-08 VITALS
HEIGHT: 69 IN | BODY MASS INDEX: 46.65 KG/M2 | DIASTOLIC BLOOD PRESSURE: 89 MMHG | SYSTOLIC BLOOD PRESSURE: 138 MMHG | OXYGEN SATURATION: 96 % | WEIGHT: 315 LBS | HEART RATE: 99 BPM | TEMPERATURE: 98.1 F | RESPIRATION RATE: 20 BRPM

## 2017-09-08 PROCEDURE — 96376 TX/PRO/DX INJ SAME DRUG ADON: CPT

## 2017-09-08 PROCEDURE — G0378 HOSPITAL OBSERVATION PER HR: HCPCS

## 2017-09-08 PROCEDURE — 63710000001 PREDNISONE PER 1 MG: Performed by: INTERNAL MEDICINE

## 2017-09-08 PROCEDURE — 25010000002 HEPARIN (PORCINE) PER 1000 UNITS: Performed by: NURSE PRACTITIONER

## 2017-09-08 PROCEDURE — 94799 UNLISTED PULMONARY SVC/PX: CPT

## 2017-09-08 PROCEDURE — 99217 PR OBSERVATION CARE DISCHARGE MANAGEMENT: CPT | Performed by: NURSE PRACTITIONER

## 2017-09-08 PROCEDURE — 94640 AIRWAY INHALATION TREATMENT: CPT

## 2017-09-08 RX ORDER — ALBUTEROL SULFATE 90 UG/1
2 AEROSOL, METERED RESPIRATORY (INHALATION) EVERY 6 HOURS PRN
Status: DISCONTINUED | OUTPATIENT
Start: 2017-09-08 | End: 2017-09-08

## 2017-09-08 RX ORDER — PREDNISONE 20 MG/1
TABLET ORAL
Qty: 7 TABLET | Refills: 0 | Status: SHIPPED | OUTPATIENT
Start: 2017-09-08 | End: 2017-10-11

## 2017-09-08 RX ORDER — ALBUTEROL SULFATE 90 UG/1
2 AEROSOL, METERED RESPIRATORY (INHALATION) EVERY 6 HOURS PRN
Qty: 18 G | Refills: 0 | Status: ON HOLD | OUTPATIENT
Start: 2017-09-08 | End: 2017-10-16

## 2017-09-08 RX ORDER — DOXYCYCLINE 100 MG/1
100 CAPSULE ORAL 2 TIMES DAILY
Qty: 14 CAPSULE | Refills: 0 | Status: SHIPPED | OUTPATIENT
Start: 2017-09-08 | End: 2017-10-11

## 2017-09-08 RX ADMIN — IPRATROPIUM BROMIDE AND ALBUTEROL SULFATE 3 ML: .5; 3 SOLUTION RESPIRATORY (INHALATION) at 00:04

## 2017-09-08 RX ADMIN — CARVEDILOL 12.5 MG: 12.5 TABLET, FILM COATED ORAL at 08:35

## 2017-09-08 RX ADMIN — IPRATROPIUM BROMIDE AND ALBUTEROL SULFATE 3 ML: .5; 3 SOLUTION RESPIRATORY (INHALATION) at 02:56

## 2017-09-08 RX ADMIN — HEPARIN SODIUM 5000 UNITS: 5000 INJECTION, SOLUTION INTRAVENOUS; SUBCUTANEOUS at 08:35

## 2017-09-08 RX ADMIN — SPIRONOLACTONE 25 MG: 25 TABLET, FILM COATED ORAL at 08:34

## 2017-09-08 RX ADMIN — IPRATROPIUM BROMIDE AND ALBUTEROL SULFATE 3 ML: .5; 3 SOLUTION RESPIRATORY (INHALATION) at 12:10

## 2017-09-08 RX ADMIN — DOXYCYCLINE HYCLATE 100 MG: 100 CAPSULE ORAL at 08:34

## 2017-09-08 RX ADMIN — SACUBITRIL AND VALSARTAN 1 TABLET: 24; 26 TABLET, FILM COATED ORAL at 08:34

## 2017-09-08 RX ADMIN — ASPIRIN 81 MG: 81 TABLET, COATED ORAL at 08:35

## 2017-09-08 RX ADMIN — TORSEMIDE 60 MG: 20 TABLET ORAL at 08:35

## 2017-09-08 RX ADMIN — IPRATROPIUM BROMIDE AND ALBUTEROL SULFATE 3 ML: .5; 3 SOLUTION RESPIRATORY (INHALATION) at 06:46

## 2017-09-08 RX ADMIN — PREDNISONE 40 MG: 20 TABLET ORAL at 08:34

## 2017-09-11 ENCOUNTER — TRANSITIONAL CARE MANAGEMENT TELEPHONE ENCOUNTER (OUTPATIENT)
Dept: INTERNAL MEDICINE | Facility: CLINIC | Age: 27
End: 2017-09-11

## 2017-09-11 NOTE — OUTREACH NOTE
The patient was discharged from [facility] 9/8/17 and has a [facility] hospital f/u visit scheduled with PCP DR. Mills 9/12/17 which is within 2 business days of discharge.

## 2017-09-12 ENCOUNTER — TRANSITIONAL CARE MANAGEMENT TELEPHONE ENCOUNTER (OUTPATIENT)
Dept: INTERNAL MEDICINE | Facility: CLINIC | Age: 27
End: 2017-09-12

## 2017-09-12 ENCOUNTER — TELEPHONE (OUTPATIENT)
Dept: CARDIOLOGY | Facility: CLINIC | Age: 27
End: 2017-09-12

## 2017-09-12 NOTE — TELEPHONE ENCOUNTER
"Patient is wanting to be released to lift heavy weights per his request. He is s/p ICD on 8/15/17 with [facility]. I advised him for at least 6 weeks s/p implant he cannot lift anything heavier than 10 pounds and lift his arms above his heads. He is asking if he can lift \"heavy weights\" immediately after the 6 weeks is up or do you prefer him to wait until his next follow up which is 11/20?   He will be starting cardiac rehab soon.   "

## 2017-09-15 NOTE — TELEPHONE ENCOUNTER
Notified patient of instructions. He cannot lift heavy weights until his 11/20 appt. He inquired about playing drums in which I told him he would need to ask Dr. Richmond about since he put his ICD in and can give him more specific instructions.

## 2017-09-21 ENCOUNTER — OFFICE VISIT (OUTPATIENT)
Dept: INTERNAL MEDICINE | Facility: CLINIC | Age: 27
End: 2017-09-21

## 2017-09-21 VITALS
TEMPERATURE: 97.8 F | BODY MASS INDEX: 46.65 KG/M2 | SYSTOLIC BLOOD PRESSURE: 130 MMHG | HEIGHT: 69 IN | WEIGHT: 315 LBS | DIASTOLIC BLOOD PRESSURE: 82 MMHG

## 2017-09-21 DIAGNOSIS — Z09 HOSPITAL DISCHARGE FOLLOW-UP: Primary | ICD-10-CM

## 2017-09-21 DIAGNOSIS — L40.0 PLAQUE PSORIASIS: ICD-10-CM

## 2017-09-21 DIAGNOSIS — J40 BRONCHITIS: ICD-10-CM

## 2017-09-21 DIAGNOSIS — I50.22 CHRONIC SYSTOLIC CONGESTIVE HEART FAILURE (HCC): ICD-10-CM

## 2017-09-21 PROBLEM — L40.9 PSORIASIS: Status: RESOLVED | Noted: 2017-09-05 | Resolved: 2017-09-21

## 2017-09-21 PROCEDURE — 99495 TRANSJ CARE MGMT MOD F2F 14D: CPT | Performed by: INTERNAL MEDICINE

## 2017-09-21 NOTE — PROGRESS NOTES
Transitional Care Follow Up Visit  Subjective     Evan Gannon is a 26 y.o. male who presents for a transitional care management visit.    Within 48 business hours after discharge [facility] nurse contacted him via telephone to coordinate his care and needs.      I reviewed and discussed the details of that call along with the discharge summary, hospital problems, inpatient lab results, inpatient diagnostic studies, and consultation reports with Evan.     Current outpatient and discharge medications have been reconciled for the patient.    Date of TCM Phone Call 8/17/2017 9/11/2017 9/12/2017   [facility]   Date of Admission 8/15/2017 9/5/2017 9/5/2017   Date of Discharge 8/16/2017 9/8/2017 9/8/2017   Risk for Readmission (LACE Score) 7 9 -   Discharge Disposition Home or Self Care Home or Self Care Home or Self Care     Risk for Readmission (LACE) Score: 9    History of Present Illness   Course During Hospital Stay:  Pt was admitted on 9/5 and discharged 9/8 for acute bronchitis. He went to ED day before and was given abx, but breathing continued to worsen so he went to ED and was admitted given his significant comorbidities. It was not thought he was having acute CHF exacerbation though he did get cards consult and a dose of lasix while inpt. No cardiac meds were changed or added inpt. He was discharged on doxy, steroids, tessalon. He has finished those. He is still having productive cough with scant sputum but his breathing feels overall back to baseline. Struggling to get cards to write for his O2 concentrator. He is supposed to be on 2L at home. Needs referral for derm for plaque psoriasis. Has put steroid cream on it that does not help.     The following portions of the patient's history were reviewed and updated as appropriate: allergies, current medications, past medical history, past social history and problem list.    Review of Systems    Constitutional: Negative.    HENT: Negative.    Respiratory: Positive for cough. Negative for chest tightness, shortness of breath and wheezing.    Cardiovascular: Negative.    Genitourinary: Negative.    Musculoskeletal: Negative.    Skin: Positive for color change.   Allergic/Immunologic: Negative.    Neurological: Negative.        Objective   Physical Exam   Constitutional: He is oriented to person, place, and time. He appears well-developed and well-nourished.   Cardiovascular: Normal rate, regular rhythm and normal heart sounds.    No murmur heard.  Pulmonary/Chest: Effort normal and breath sounds normal. He has no wheezes. He has no rales.   Musculoskeletal: He exhibits no edema.   Neurological: He is alert and oriented to person, place, and time.   Skin: Skin is warm and dry.   Plaque psoriasis on arms. Cobblestoning on LE to the knee    Psychiatric: He has a normal mood and affect. His behavior is normal. Thought content normal.   Vitals reviewed.      Assessment/Plan   Evan was seen today for follow-up.    Diagnoses and all orders for this visit:    Hospital discharge follow-up  -finished steroids and abx  -continues to cough but that is expected, lung exam nl and no signs of decompensated HF    Chronic systolic congestive heart failure  -no med change made inpt, told pt when his cards f/u is  -pt will call [facility] today about O2 concentrator and cards if rx is not done    Bronchitis  -improving, still having some coughing which is to be expected    Plaque psoriasis  -self-referral to derm, discussed this with pt

## 2017-10-11 ENCOUNTER — OFFICE VISIT (OUTPATIENT)
Dept: CARDIOLOGY | Facility: HOSPITAL | Age: 27
End: 2017-10-11

## 2017-10-11 VITALS
HEIGHT: 69 IN | DIASTOLIC BLOOD PRESSURE: 73 MMHG | HEART RATE: 100 BPM | TEMPERATURE: 97.5 F | OXYGEN SATURATION: 93 % | WEIGHT: 315 LBS | RESPIRATION RATE: 22 BRPM | BODY MASS INDEX: 46.65 KG/M2 | SYSTOLIC BLOOD PRESSURE: 117 MMHG

## 2017-10-11 DIAGNOSIS — G47.33 OBSTRUCTIVE SLEEP APNEA: ICD-10-CM

## 2017-10-11 DIAGNOSIS — I10 ESSENTIAL HYPERTENSION: ICD-10-CM

## 2017-10-11 DIAGNOSIS — I50.22 CHRONIC SYSTOLIC HEART FAILURE (HCC): Primary | ICD-10-CM

## 2017-10-11 DIAGNOSIS — I42.0 NONISCHEMIC CONGESTIVE CARDIOMYOPATHY (HCC): ICD-10-CM

## 2017-10-11 PROCEDURE — 99214 OFFICE O/P EST MOD 30 MIN: CPT | Performed by: NURSE PRACTITIONER

## 2017-10-11 NOTE — PROGRESS NOTES
Encounter Date:10/11/2017      Patient ID: Evan Gannon is a 26 y.o. male.        Subjective:     Chief Complaint: Follow-up   [facility]  History of Present Illness patient presents to the office today for ongoing evaluation of his chronic systolic heart failure. Patient underwent ICD implant 8/15/17. He notes that he is doing well from a cardiac standpoint.  Patient denies chest pain, chest pressure, palpitations, tachycardia, dyspnea, presyncope, syncope, orthopnea, PND, abdominal fullness, early satiety, claudication, cough or edema. He is eager to start cardiac rehab.     Patient Active Problem List   Diagnosis   • Shortness of breath   • Hypertension   • Morbid obesity   • Obstructive sleep apnea   • Nonischemic congestive cardiomyopathy   • Personal history of noncompliance with medical treatment   • Acute on chronic systolic (congestive) heart failure   • Microcytic anemia   • Chest pain on breathing   • CHF (congestive heart failure), NYHA class IV   • Dilated cardiomyopathy   • Chronic systolic congestive heart failure   • Plaque psoriasis       Past Surgical History:   Procedure Laterality Date   • ADENOIDECTOMY     • CARDIAC CATHETERIZATION N/A 7/13/2017    Procedure: Left Heart Cath;  Surgeon: Balbir Olsen MD;  Location:  [facility] CATH [facility]VASIVE [facility];  Service:    • CARDIAC DEFIBRILLATOR PLACEMENT  08/2017   • CARDIAC ELECTROPHYSIOLOGY PROCEDURE N/A 8/15/2017    Procedure: VVI ICD Implant;  Surgeon: Nathanael Richmond DO;  Location:  [facility] EP INVASIVE LOCATION;  Service:    • INTERNAL CARDIAC DEFIBRILLATOR INSERTION Left 2017   • NOSE SURGERY     • TONSILLECTOMY         No Known Allergies      Current Outpatient Prescriptions:   •  albuterol (PROVENTIL HFA;VENTOLIN HFA) 108 (90 Base) MCG/ACT inhaler, Inhale 2 puffs Every 6 (Six) Hours As Needed for Wheezing., Disp: , Rfl:   •  albuterol (PROVENTIL HFA;VENTOLIN HFA) 108 (90 Base) MCG/ACT inhaler, Inhale 2 puffs Every 6 (Six) Hours As Needed for Wheezing.,  Disp: 18 g, Rfl: 0  •  aspirin 81 MG EC tablet, Take 1 tablet by mouth Daily., Disp: 365 tablet, Rfl: 0  •  carvedilol (COREG) 12.5 MG tablet, Take 1 tablet by mouth 2 (Two) Times a Day., Disp: 60 tablet, Rfl: 11  •  metOLazone (ZAROXOLYN) 5 MG tablet, Take 1 tablet by mouth Daily As Needed (Edema or shortness of breath)., Disp: 30 tablet, Rfl: 6  •  sacubitril-valsartan (ENTRESTO) 24-26 MG tablet, Take 1 tablet by mouth Every 12 (Twelve) Hours., Disp: 60 tablet, Rfl: 11  •  spironolactone (ALDACTONE) 25 MG tablet, Take 1 tablet by mouth Daily., Disp: 30 tablet, Rfl: 11  •  torsemide (DEMADEX) 20 MG tablet, Take 3 tablets by mouth Daily., Disp: 90 tablet, Rfl: 11    The following portions of the chart were reviewed and updated as appropriate: Allergies, current medications, past family history, social history, past medical history.     Review of Systems   Constitution: Negative for chills, decreased appetite, diaphoresis, fever, weakness, malaise/fatigue, night sweats, weight gain and weight loss.   HENT: Negative for congestion and nosebleeds.    Eyes: Negative for blurred vision, visual disturbance and visual halos.   Cardiovascular: Negative for chest pain, claudication, cyanosis, dyspnea on exertion, irregular heartbeat, leg swelling, near-syncope, orthopnea, palpitations, paroxysmal nocturnal dyspnea and syncope.   Respiratory: Negative for cough, hemoptysis, shortness of breath, sleep disturbances due to breathing, snoring, sputum production and wheezing.    Endocrine: Negative for cold intolerance, heat intolerance, polydipsia, polyphagia and polyuria.   Hematologic/Lymphatic: Does not bruise/bleed easily.   Skin: Negative for dry skin, itching and rash.   Musculoskeletal: Negative for falls, joint pain, joint swelling, muscle weakness and myalgias.   Gastrointestinal: Negative for bloating, abdominal pain, constipation, diarrhea, dysphagia, heartburn, melena, nausea and vomiting.   Genitourinary: Negative  "for dysuria, flank pain, hematuria and nocturia.   Neurological: Negative for difficulty with concentration, excessive daytime sleepiness, dizziness, headaches and loss of balance.   Psychiatric/Behavioral: Negative for altered mental status and depression. The patient is not nervous/anxious.    Allergic/Immunologic: Negative for environmental allergies.           Objective:     Vitals:    10/11/17 1256 10/11/17 1258 10/11/17 1300   BP: 138/83 129/76 117/73   BP Location: Right arm Left arm Left arm   Patient Position: Sitting Sitting Standing   Pulse: 102 104 100   Resp: 22     Temp: 97.5 °F (36.4 °C)     TempSrc: Temporal Artery      SpO2: 93%     Weight: (!) 416 lb (189 kg)     Height: 69\" (175.3 cm)           Physical Exam   Constitutional: He is oriented to person, place, and time. He appears well-developed and well-nourished. He is active and cooperative. No distress.   HENT:   Head: Normocephalic and atraumatic.   Mouth/Throat: Oropharynx is clear and moist.   Eyes: Conjunctivae and EOM are normal. Pupils are equal, round, and reactive to light.   Neck: Normal range of motion. Neck supple. No JVD present. No tracheal deviation present. No thyromegaly present.   Cardiovascular: Normal rate, regular rhythm, normal heart sounds and intact distal pulses.    Pulmonary/Chest: Effort normal and breath sounds normal.   Abdominal: Soft. Bowel sounds are normal. He exhibits no distension. There is no tenderness.   Musculoskeletal: Normal range of motion.   Neurological: He is alert and oriented to person, place, and time.   Skin: Skin is warm, dry and intact.   Psychiatric: He has a normal mood and affect. His behavior is normal.   Nursing note and vitals reviewed.      Lab and Diagnostic Review:      Results for orders placed or performed during the hospital encounter of 09/05/17   Comprehensive Metabolic Panel   Result Value Ref Range    Glucose 105 (H) 70 - 100 mg/dL    BUN 12 9 - 23 mg/dL    Creatinine 0.90 0.60 - " 1.30 mg/dL    Sodium 139 132 - 146 mmol/L    Potassium 3.7 3.5 - 5.5 mmol/L    Chloride 104 99 - 109 mmol/L    CO2 28.0 20.0 - 31.0 mmol/L    Calcium 9.2 8.7 - 10.4 mg/dL    Total Protein 7.7 5.7 - 8.2 g/dL    Albumin 3.80 3.20 - 4.80 g/dL    ALT (SGPT) 16 7 - 40 U/L    AST (SGOT) 16 0 - 33 U/L    Alkaline Phosphatase 90 25 - 100 U/L    Total Bilirubin 0.5 0.3 - 1.2 mg/dL    eGFR  African Amer 126 >60 mL/min/1.73    Globulin 3.9 gm/dL    A/G Ratio 1.0 (L) 1.5 - 2.5 g/dL    BUN/Creatinine Ratio 13.3 7.0 - 25.0    Anion Gap 7.0 3.0 - 11.0 mmol/L   BNP   Result Value Ref Range    .0 (H) 0.0 - 100.0 pg/mL   CBC Auto Differential   Result Value Ref Range    WBC 8.48 3.50 - 10.80 10*3/mm3    RBC 5.53 4.20 - 5.76 10*6/mm3    Hemoglobin 13.1 13.1 - 17.5 g/dL    Hematocrit 42.3 38.9 - 50.9 %    MCV 76.5 (L) 80.0 - 99.0 fL    MCH 23.7 (L) 27.0 - 31.0 pg    MCHC 31.0 (L) 32.0 - 36.0 g/dL    RDW 16.4 (H) 11.3 - 14.5 %    RDW-SD 46.1 37.0 - 54.0 fl    MPV 9.5 6.0 - 12.0 fL    Platelets 362 150 - 450 10*3/mm3    Neutrophil % 76.5 (H) 41.0 - 71.0 %    Lymphocyte % 12.4 (L) 24.0 - 44.0 %    Monocyte % 10.1 0.0 - 12.0 %    Eosinophil % 0.4 0.0 - 3.0 %    Basophil % 0.1 0.0 - 1.0 %    Immature Grans % 0.5 0.0 - 0.6 %    Neutrophils, Absolute 6.49 1.50 - 8.30 10*3/mm3    Lymphocytes, Absolute 1.05 0.60 - 4.80 10*3/mm3    Monocytes, Absolute 0.86 0.00 - 1.00 10*3/mm3    Eosinophils, Absolute 0.03 0.00 - 0.30 10*3/mm3    Basophils, Absolute 0.01 0.00 - 0.20 10*3/mm3    Immature Grans, Absolute 0.04 (H) 0.00 - 0.03 10*3/mm3       Assessment and Plan:         1. Chronic systolic heart failure  euvolemic  Heart failure education today including signs and symptoms, the role of the heart failure center, daily weights, low sodium diet (less than 1500 mg per day), and daily physical activity. Reviewed HF Zones with patient and family.  Patient to continue current medications as previously ordered.   - Ambulatory Referral to Cardiac  Rehab    2. Essential hypertension  Well controlled  HTN Education provided today including signs and symptoms, medication management, daily blood pressure monitoring. Patient encouraged to call the Heart and Valve center with any abnormal readings.     3. Nonischemic congestive cardiomyopathy  euvolemic  S/p ICD for primary prevention 8/15/17 Dr Richmond    4. Obstructive sleep apnea  Complaint with cpap therapy    It has been a pleasure to participate in the care of this patient.  Patient was instructed to call the [facility] with any questions, concerns, or worsening symptoms.      * Please note that portions of this note were completed with a voice recognition program. Efforts were made to edit the dictation but occasionally words are transcribed.

## 2017-10-16 ENCOUNTER — HOSPITAL ENCOUNTER (INPATIENT)
Facility: HOSPITAL | Age: 27
LOS: 3 days | Discharge: HOME | End: 2017-10-19
Attending: EMERGENCY MEDICINE | Admitting: INTERNAL MEDICINE

## 2017-10-16 ENCOUNTER — APPOINTMENT (OUTPATIENT)
Dept: GENERAL RADIOLOGY | Facility: HOSPITAL | Age: 27
End: 2017-10-16

## 2017-10-16 ENCOUNTER — APPOINTMENT (OUTPATIENT)
Dept: CARDIOLOGY | Facility: HOSPITAL | Age: 27
End: 2017-10-16

## 2017-10-16 DIAGNOSIS — R06.02 SHORTNESS OF BREATH: ICD-10-CM

## 2017-10-16 DIAGNOSIS — I50.9 CONGESTIVE HEART FAILURE, UNSPECIFIED CONGESTIVE HEART FAILURE CHRONICITY, UNSPECIFIED CONGESTIVE HEART FAILURE TYPE: Primary | ICD-10-CM

## 2017-10-16 DIAGNOSIS — I50.23 ACUTE ON CHRONIC SYSTOLIC CONGESTIVE HEART FAILURE (HCC): ICD-10-CM

## 2017-10-16 DIAGNOSIS — I42.0 NONISCHEMIC CONGESTIVE CARDIOMYOPATHY (HCC): ICD-10-CM

## 2017-10-16 DIAGNOSIS — I50.9 EDEMA DUE TO CONGESTIVE HEART FAILURE (HCC): ICD-10-CM

## 2017-10-16 LAB
ALBUMIN SERPL-MCNC: 4 G/DL (ref 3.2–4.8)
ALBUMIN/GLOB SERPL: 1.1 G/DL (ref 1.5–2.5)
ALP SERPL-CCNC: 84 U/L (ref 25–100)
ALT SERPL W P-5'-P-CCNC: 14 U/L (ref 7–40)
ANION GAP SERPL CALCULATED.3IONS-SCNC: 7 MMOL/L (ref 3–11)
AST SERPL-CCNC: 18 U/L (ref 0–33)
BASOPHILS # BLD AUTO: 0.02 10*3/MM3 (ref 0–0.2)
BASOPHILS NFR BLD AUTO: 0.5 % (ref 0–1)
BILIRUB SERPL-MCNC: 0.7 MG/DL (ref 0.3–1.2)
BNP SERPL-MCNC: 708 PG/ML (ref 0–100)
BUN BLD-MCNC: 10 MG/DL (ref 9–23)
BUN/CREAT SERPL: 8.3 (ref 7–25)
CALCIUM SPEC-SCNC: 9 MG/DL (ref 8.7–10.4)
CHLORIDE SERPL-SCNC: 98 MMOL/L (ref 99–109)
CO2 SERPL-SCNC: 32 MMOL/L (ref 20–31)
CREAT BLD-MCNC: 1.2 MG/DL (ref 0.6–1.3)
DEPRECATED RDW RBC AUTO: 46 FL (ref 37–54)
EOSINOPHIL # BLD AUTO: 0.06 10*3/MM3 (ref 0–0.3)
EOSINOPHIL NFR BLD AUTO: 1.4 % (ref 0–3)
ERYTHROCYTE [DISTWIDTH] IN BLOOD BY AUTOMATED COUNT: 16.6 % (ref 11.3–14.5)
GFR SERPL CREATININE-BSD FRML MDRD: 90 ML/MIN/1.73
GLOBULIN UR ELPH-MCNC: 3.6 GM/DL
GLUCOSE BLD-MCNC: 118 MG/DL (ref 70–100)
HCT VFR BLD AUTO: 43.9 % (ref 38.9–50.9)
HGB BLD-MCNC: 14.3 G/DL (ref 13.1–17.5)
HOLD SPECIMEN: NORMAL
HOLD SPECIMEN: NORMAL
IMM GRANULOCYTES # BLD: 0.01 10*3/MM3 (ref 0–0.03)
IMM GRANULOCYTES NFR BLD: 0.2 % (ref 0–0.6)
LYMPHOCYTES # BLD AUTO: 1.28 10*3/MM3 (ref 0.6–4.8)
LYMPHOCYTES NFR BLD AUTO: 29 % (ref 24–44)
MCH RBC QN AUTO: 25 PG (ref 27–31)
MCHC RBC AUTO-ENTMCNC: 32.6 G/DL (ref 32–36)
MCV RBC AUTO: 76.6 FL (ref 80–99)
MONOCYTES # BLD AUTO: 0.72 10*3/MM3 (ref 0–1)
MONOCYTES NFR BLD AUTO: 16.3 % (ref 0–12)
NEUTROPHILS # BLD AUTO: 2.33 10*3/MM3 (ref 1.5–8.3)
NEUTROPHILS NFR BLD AUTO: 52.6 % (ref 41–71)
PLATELET # BLD AUTO: 243 10*3/MM3 (ref 150–450)
PMV BLD AUTO: 9.8 FL (ref 6–12)
POTASSIUM BLD-SCNC: 3.5 MMOL/L (ref 3.5–5.5)
PROT SERPL-MCNC: 7.6 G/DL (ref 5.7–8.2)
RBC # BLD AUTO: 5.73 10*6/MM3 (ref 4.2–5.76)
SODIUM BLD-SCNC: 137 MMOL/L (ref 132–146)
TROPONIN I SERPL-MCNC: 0.06 NG/ML (ref 0–0.07)
TROPONIN I SERPL-MCNC: 0.08 NG/ML (ref 0–0.07)
WBC NRBC COR # BLD: 4.42 10*3/MM3 (ref 3.5–10.8)
WHOLE BLOOD HOLD SPECIMEN: NORMAL
WHOLE BLOOD HOLD SPECIMEN: NORMAL

## 2017-10-16 PROCEDURE — G0378 HOSPITAL OBSERVATION PER HR: HCPCS

## 2017-10-16 PROCEDURE — 25010000002 SULFUR HEXAFLUORIDE MICROSPH 60.7-25 MG RECONSTITUTED SUSPENSION: Performed by: INTERNAL MEDICINE

## 2017-10-16 PROCEDURE — 80053 COMPREHEN METABOLIC PANEL: CPT | Performed by: EMERGENCY MEDICINE

## 2017-10-16 PROCEDURE — 93005 ELECTROCARDIOGRAM TRACING: CPT | Performed by: EMERGENCY MEDICINE

## 2017-10-16 PROCEDURE — 94640 AIRWAY INHALATION TREATMENT: CPT

## 2017-10-16 PROCEDURE — 83880 ASSAY OF NATRIURETIC PEPTIDE: CPT | Performed by: EMERGENCY MEDICINE

## 2017-10-16 PROCEDURE — 85025 COMPLETE CBC W/AUTO DIFF WBC: CPT | Performed by: EMERGENCY MEDICINE

## 2017-10-16 PROCEDURE — 25010000002 FUROSEMIDE PER 20 MG: Performed by: NURSE PRACTITIONER

## 2017-10-16 PROCEDURE — 94799 UNLISTED PULMONARY SVC/PX: CPT

## 2017-10-16 PROCEDURE — 93308 TTE F-UP OR LMTD: CPT | Performed by: INTERNAL MEDICINE

## 2017-10-16 PROCEDURE — 99285 EMERGENCY DEPT VISIT HI MDM: CPT

## 2017-10-16 PROCEDURE — 93308 TTE F-UP OR LMTD: CPT

## 2017-10-16 PROCEDURE — 71010 HC CHEST PA OR AP: CPT

## 2017-10-16 PROCEDURE — 94660 CPAP INITIATION&MGMT: CPT

## 2017-10-16 PROCEDURE — 84484 ASSAY OF TROPONIN QUANT: CPT

## 2017-10-16 PROCEDURE — 99222 1ST HOSP IP/OBS MODERATE 55: CPT | Performed by: INTERNAL MEDICINE

## 2017-10-16 PROCEDURE — 93005 ELECTROCARDIOGRAM TRACING: CPT

## 2017-10-16 RX ORDER — ALBUTEROL SULFATE 90 UG/1
2 AEROSOL, METERED RESPIRATORY (INHALATION) EVERY 6 HOURS PRN
Status: DISCONTINUED | OUTPATIENT
Start: 2017-10-16 | End: 2017-10-16 | Stop reason: SDUPTHER

## 2017-10-16 RX ORDER — ASPIRIN 81 MG/1
81 TABLET ORAL DAILY
Status: DISCONTINUED | OUTPATIENT
Start: 2017-10-16 | End: 2017-10-19 | Stop reason: HOSPADM

## 2017-10-16 RX ORDER — SPIRONOLACTONE 25 MG/1
25 TABLET ORAL DAILY
Status: DISCONTINUED | OUTPATIENT
Start: 2017-10-16 | End: 2017-10-19 | Stop reason: HOSPADM

## 2017-10-16 RX ORDER — METOLAZONE 5 MG/1
5 TABLET ORAL DAILY PRN
Status: DISCONTINUED | OUTPATIENT
Start: 2017-10-16 | End: 2017-10-19 | Stop reason: HOSPADM

## 2017-10-16 RX ORDER — CARVEDILOL 12.5 MG/1
25 TABLET ORAL 2 TIMES DAILY WITH MEALS
Status: DISCONTINUED | OUTPATIENT
Start: 2017-10-16 | End: 2017-10-19 | Stop reason: HOSPADM

## 2017-10-16 RX ORDER — FUROSEMIDE 10 MG/ML
40 INJECTION INTRAMUSCULAR; INTRAVENOUS ONCE
Status: COMPLETED | OUTPATIENT
Start: 2017-10-16 | End: 2017-10-16

## 2017-10-16 RX ORDER — IPRATROPIUM BROMIDE AND ALBUTEROL SULFATE 2.5; .5 MG/3ML; MG/3ML
3 SOLUTION RESPIRATORY (INHALATION)
Status: DISCONTINUED | OUTPATIENT
Start: 2017-10-16 | End: 2017-10-19 | Stop reason: HOSPADM

## 2017-10-16 RX ORDER — FUROSEMIDE 10 MG/ML
40 INJECTION INTRAMUSCULAR; INTRAVENOUS EVERY 12 HOURS
Status: DISCONTINUED | OUTPATIENT
Start: 2017-10-16 | End: 2017-10-19 | Stop reason: HOSPADM

## 2017-10-16 RX ORDER — SODIUM CHLORIDE 0.9 % (FLUSH) 0.9 %
10 SYRINGE (ML) INJECTION AS NEEDED
Status: DISCONTINUED | OUTPATIENT
Start: 2017-10-16 | End: 2017-10-19 | Stop reason: HOSPADM

## 2017-10-16 RX ORDER — CARVEDILOL 12.5 MG/1
12.5 TABLET ORAL EVERY 12 HOURS SCHEDULED
Status: DISCONTINUED | OUTPATIENT
Start: 2017-10-16 | End: 2017-10-16 | Stop reason: SDUPTHER

## 2017-10-16 RX ORDER — METOLAZONE 2.5 MG/1
5 TABLET ORAL ONCE
Status: COMPLETED | OUTPATIENT
Start: 2017-10-16 | End: 2017-10-16

## 2017-10-16 RX ORDER — IPRATROPIUM BROMIDE AND ALBUTEROL SULFATE 2.5; .5 MG/3ML; MG/3ML
3 SOLUTION RESPIRATORY (INHALATION) ONCE
Status: COMPLETED | OUTPATIENT
Start: 2017-10-16 | End: 2017-10-16

## 2017-10-16 RX ADMIN — NITROGLYCERIN 0.5 INCH: 20 OINTMENT TOPICAL at 10:20

## 2017-10-16 RX ADMIN — SULFUR HEXAFLUORIDE 2 ML: KIT at 15:45

## 2017-10-16 RX ADMIN — CARVEDILOL 25 MG: 12.5 TABLET, FILM COATED ORAL at 16:32

## 2017-10-16 RX ADMIN — ASPIRIN 81 MG: 81 TABLET, COATED ORAL at 16:32

## 2017-10-16 RX ADMIN — SACUBITRIL AND VALSARTAN 1 TABLET: 24; 26 TABLET, FILM COATED ORAL at 20:45

## 2017-10-16 RX ADMIN — IPRATROPIUM BROMIDE AND ALBUTEROL SULFATE 3 ML: .5; 3 SOLUTION RESPIRATORY (INHALATION) at 15:56

## 2017-10-16 RX ADMIN — METOLAZONE 5 MG: 2.5 TABLET ORAL at 13:57

## 2017-10-16 RX ADMIN — SACUBITRIL AND VALSARTAN 1 TABLET: 24; 26 TABLET, FILM COATED ORAL at 16:32

## 2017-10-16 RX ADMIN — FUROSEMIDE 40 MG: 10 INJECTION, SOLUTION INTRAMUSCULAR; INTRAVENOUS at 10:17

## 2017-10-16 RX ADMIN — IPRATROPIUM BROMIDE AND ALBUTEROL SULFATE 3 ML: .5; 3 SOLUTION RESPIRATORY (INHALATION) at 20:11

## 2017-10-16 RX ADMIN — IPRATROPIUM BROMIDE AND ALBUTEROL SULFATE 3 ML: .5; 3 SOLUTION RESPIRATORY (INHALATION) at 09:44

## 2017-10-16 RX ADMIN — SPIRONOLACTONE 25 MG: 25 TABLET, FILM COATED ORAL at 16:32

## 2017-10-16 NOTE — PLAN OF CARE
Problem: Cardiac: Heart Failure (Adult)  Goal: Signs and Symptoms of Listed Potential Problems Will be Absent or Manageable (Cardiac: Heart Failure)  Outcome: Ongoing (interventions implemented as appropriate)    Problem: Activity Intolerance (Adult)  Goal: Identify Related Risk Factors and Signs and Symptoms  Outcome: Ongoing (interventions implemented as appropriate)

## 2017-10-16 NOTE — H&P
"Evan Gannon  [id number]  1990   LOS: 0 days   Patient Care Team:  INTERNIST: Brabara Mills MD  CARDIOLOGIST: Balbir Olsen MD, [facility]  ELECTROPHYSIOLOGIST: Nathanael Richmond DO, [facility], [facility]    Mr. Gannon is a 26 year old single  male from [place], [place], [facility]    Chief Complaint:  SOB    Problem List:  1. Acute on chronic systolic (congestive) heart failure/NICM  a. Echo, 2/7/2017: LVEF 20%.  b. Echo, 7/13/2017: LVEF 20%.  c. LHC, 7/13/2017: LVEF 10%; left ventricular end-diastolic pressure of 30.  d. July 2017 cardiac catheterization with normal coronary arteries.  e. EPS/Biotronik BiVICD implantation 8/15/17  f. [facility] 9/3/17-9/4/17 for CHF exacerbation   g. [facility] 9/5/17-9/8/17 for CHF exacerbation   h. [facility] 10/16/17 for CHF exacerbation   i. CCS class I  Chest discomfort/NYHA class III-IV CHF  2. Shortness of breath; 2L O2 NC PRN and @hs  3. Hypertension  4. Morbid obesity; BMI 61  5. Obstructive sleep apnea with CPAP  6. Personal history of noncompliance with medical treatment  7. Microcytic anemia   8. Plaque psoriasis  9. Surgical history  a. Tonsils and adenoids  b. Left heart catheterization July 2017  c. Biventricular ICD placement 8/15/17    No Known Allergies    (Not in a hospital admission)  Scheduled Meds:   Continuous Infusions:   PRN Meds:.•  sodium chloride       History of Present Illness:      26-year-old -American male presents to [facility] ED for CHF exacerbation.  This is his third [facility] admission for similar since September 2017.  He had a biventricular ICD placed 8/15/17.  He states that he has become more short of breath and having more orthopnea since Thursday.  He believes that he has gained 4 pounds since that time.  He denies any fevers, chills, but does admit to having some \"sweats\" and having a productive cough at first, but now is just having a cough.  He feels that his lower extremities are swollen.  He states that he has " "been compliant with all of his medications at home without missed doses and has been using his CPAP nightly.  He denies any chest pressure, palpitations, presyncope, syncope.  Occasionally he will have a twinge of pain where his ICD is, but it resolves in 1-2 seconds.  In the ED, he received 40 mg of Lasix ×1.    Cardiac risk factors: hypertension, male gender, obesity (BMI >= 30 kg/m2) and sedentary lifestyle.    Social History     Social History   • Marital status: Single     Spouse name: N/A   • Number of children: 0   • Years of education: N/A     Occupational History   • disabled      Social History Main Topics   • Smoking status: Never Smoker   • Smokeless tobacco: Never Used   • Alcohol use No   • Drug use: No   • Sexual activity: Defer     Other Topics Concern   • Not on file     Social History Narrative    Caffeine use: some soda.    Patient lives with parents         Family History   Problem Relation Age of Onset   • Diabetes Father    • Diabetes Paternal Grandmother    • Diabetes Maternal Grandfather        Review of Systems  Pertinent items are noted in HPI and problem list.     Objective:       Physical Exam  BP 92/80  Pulse 103  Temp 98.6 °F (37 °C) (Oral)   Resp 24  Ht 69\" (175.3 cm)  Wt (!) 416 lb (189 kg)  SpO2 94%  BMI 61.43 kg/m2  Last 2 weights    10/16/17  0843   Weight: (!) 416 lb (189 kg)     Body mass index is 61.43 kg/(m^2).    Intake/Output Summary (Last 24 hours) at 10/16/17 1300  Last data filed at 10/16/17 1113   Gross per 24 hour   Intake                0 ml   Output             1150 ml   Net            -1150 ml       General Appearance:  Alert, cooperative, patient appears distressed, appears older than stated age, morbidly obese   Head:  Normocephalic, without obvious abnormality, atraumatic   Eyes:  PERRL, conjunctiva/corneas clear, EOM's intact, fundi benign, both eyes   Throat: Lips, mucosa, and tongue normal; teeth and gums normal   Neck: Supple, symmetrical, trachea " midline, no adenopathy, thyroid: not enlarged, symmetric, no tenderness/mass/nodules, no carotid bruit or JVD   Lungs:   Diminished with mild expiratory wheezes to auscultation bilaterally, respirations labored, 2 L O2 NC   Heart:  Regular rate and rhythm, S1, S2 normal, no murmur, rub or gallop   Abdomen:   Soft, non-tender, no masses, no organomegaly, bowel sounds audible x4   Extremities: 2+ edema, normal range of motion   Pulses: 1+ and symmetric   Skin: Skin color, texture, turgor normal, psoriasis   Neurologic: Normal       Cardiographics  · EKG:10/16/17;Normal sinus rhythm  Possible Left atrial enlargement  Prolonged QT  Abnormal ECG    Imaging  · Chest x-ray:10/16/17;Heart is enlarged with slight prominence of the pulmonary  vascularity. No acute parenchymal disease    Lab Review     Results from last 7 days  Lab Units 10/16/17  0901   SODIUM mmol/L 137   POTASSIUM mmol/L 3.5   CHLORIDE mmol/L 98*   CO2 mmol/L 32.0*   BUN mg/dL 10   CREATININE mg/dL 1.20   GLUCOSE mg/dL 118*   CALCIUM mg/dL 9.0       Results from last 7 days  Lab Units 10/16/17  0901   WBC 10*3/mm3 4.42   HEMOGLOBIN g/dL 14.3   HEMATOCRIT % 43.9   PLATELETS 10*3/mm3 243                     Assessment:    Patient with nonischemic cardiomyopathy status post biventricular ICD placement August 2017 here for CHF exacerbation with elevated BNP of 708. Will defer increasing Entresto due to renal function but we will up-titrate Coreg to 25mg bid.            Plan:   1. Zaroxolyn 5 mg x once  2. IV lasix 40mg bid  3. Strict I/O, daily weights  4. Increase Coreg to 25mg bid  5. Duonebs q6 PRN  6. Echocardiogram  7. BMP, ECG in morning  8. Cardiac diet  9. Dr. Olsen to see in morning   t       Scribed for Louis Bailey MD by Victoria Puri, APRN. 10/16/2017  1:26 PM   The Patient states he is compliant with his medical therapy and his CPAP.  He states a couple days ago he began to cough and is coughing is got worse.  He admits that when  he does have heart failure he coughs.  He was recently here with a heart failure exacerbation and was treated with doxycycline.  He admits that he is thirsty all the time is been drinking more water.  Lungs are relatively clear and his neck has no JVP but he has significant edema.  This point time her to diurese the patient and if his creatinine goes up despite heart failure we may put him on low dose dobutamine therapy.  We will restart his Entresto may need to titrate that up as high as possible as his blood pressure will tolerate

## 2017-10-16 NOTE — ED PROVIDER NOTES
Subjective   Patient is a 26 y.o. male presenting with shortness of breath.   Shortness of Breath   Severity:  Moderate  Onset quality:  Gradual  Timing:  Constant  Progression:  Worsening  Chronicity:  Recurrent  Context: activity    Relieved by:  Rest, sitting up and position changes  Worsened by:  Exertion  Associated symptoms: cough    Associated symptoms: no abdominal pain, no chest pain and no fever    Associated symptoms comment:  LE edema      Review of Systems   Constitutional: Negative for fever.   Respiratory: Positive for cough and shortness of breath.    Cardiovascular: Negative for chest pain.   Gastrointestinal: Negative for abdominal pain.   All other systems reviewed and are negative.      Past Medical History:   Diagnosis Date   • CHF (congestive heart failure)    • CPAP (continuous positive airway pressure) dependence    • Hypertension    • Morbid obesity    • Myocardial infarction    • On home O2     2l at bedtime and prn   • Plaque psoriasis    • Pneumonia    • Psoriasis    • Sleep apnea    • Wears glasses        No Known Allergies    Past Surgical History:   Procedure Laterality Date   • ADENOIDECTOMY     • CARDIAC CATHETERIZATION N/A 7/13/2017    Procedure: Left Heart Cath;  Surgeon: Balbir Olsen MD;  Location:  [facility]ATH [facility];  Service:    • CARDIAC DEFIBRILLATOR PLACEMENT  08/2017   • CARDIAC ELECTROPHYSIOLOGY PROCEDURE N/A 8/15/2017    Procedure: VVI ICD Implant;  Surgeon: Nathanael Richmond DO;  Location:  [facility] EP INVASIVE LOCATION;  Service:    • INTERNAL CARDIAC DEFIBRILLATOR INSERTION Left 2017   • NOSE SURGERY     • TONSILLECTOMY         Family History   Problem Relation Age of Onset   • Diabetes Father    • Diabetes Paternal Grandmother    • Diabetes Maternal Grandfather        Social History     Social History   • Marital status: Single     Spouse name: N/A   • Number of children: 0   • Years of education: N/A     Occupational History   • disabled      Social History  Main Topics   • Smoking status: Never Smoker   • Smokeless tobacco: Never Used   • Alcohol use No   • Drug use: No   • Sexual activity: Defer     Other Topics Concern   • None     Social History Narrative    Caffeine use: some soda.    Patient lives with parents               Objective   Physical Exam   Constitutional: He is oriented to person, place, and time. He appears well-developed and well-nourished. He appears distressed.   HENT:   Head: Normocephalic and atraumatic.   Right Ear: External ear normal.   Left Ear: External ear normal.   Nose: Nose normal.   Mouth/Throat: Oropharynx is clear and moist.   Eyes: Conjunctivae and EOM are normal. Pupils are equal, round, and reactive to light.   Neck: Normal range of motion. Neck supple.   Cardiovascular: Normal rate, regular rhythm, normal heart sounds and intact distal pulses.    Pulmonary/Chest: Effort normal. He has decreased breath sounds.   Abdominal: Soft. Bowel sounds are normal.   Musculoskeletal: Normal range of motion. He exhibits edema. He exhibits no tenderness.   Neurological: He is alert and oriented to person, place, and time.   Skin: Skin is warm and dry.   Psychiatric: He has a normal mood and affect. His behavior is normal. Judgment normal.       Procedures         ED Course  ED Course   Comment By Time   CB Cards consulted via SONAM Jorgensen 10/16 1113   Awaiting cards consult SONAM Pitts 10/16 1304   Spoke with [facility] in cards. They are aware of the pt. SONAM Pitts 10/16 1311   Card report to nurse to be admitted to dr. Pretty Gonsalez, SONAM 10/16 8608                  [facility]    Final diagnoses:   Congestive heart failure, unspecified congestive heart failure chronicity, unspecified congestive heart failure type   Shortness of breath   Edema due to congestive heart failure            SONAM Pitts  10/16/17 5335

## 2017-10-17 PROBLEM — I50.9 CONGESTIVE HEART FAILURE: Status: ACTIVE | Noted: 2017-10-17

## 2017-10-17 LAB
ANION GAP SERPL CALCULATED.3IONS-SCNC: 13 MMOL/L (ref 3–11)
BH CV ECHO MEAS - AO ROOT AREA (BSA CORRECTED): 0.93
BH CV ECHO MEAS - AO ROOT AREA: 5.4 CM^2
BH CV ECHO MEAS - AO ROOT DIAM: 2.6 CM
BH CV ECHO MEAS - BSA(HAYCOCK): 3.2 M^2
BH CV ECHO MEAS - BSA: 2.8 M^2
BH CV ECHO MEAS - BZI_BMI: 61.7 KILOGRAMS/M^2
BH CV ECHO MEAS - BZI_METRIC_HEIGHT: 175.3 CM
BH CV ECHO MEAS - BZI_METRIC_WEIGHT: 189.6 KG
BH CV ECHO MEAS - EDV(CUBED): 415.7 ML
BH CV ECHO MEAS - EDV(TEICH): 295 ML
BH CV ECHO MEAS - EF(CUBED): 29.6 %
BH CV ECHO MEAS - EF(TEICH): 23.2 %
BH CV ECHO MEAS - ESV(CUBED): 292.8 ML
BH CV ECHO MEAS - ESV(TEICH): 226.7 ML
BH CV ECHO MEAS - FS: 11 %
BH CV ECHO MEAS - IVS/LVPW: 0.71
BH CV ECHO MEAS - IVSD: 0.99 CM
BH CV ECHO MEAS - LA DIMENSION: 6 CM
BH CV ECHO MEAS - LA/AO: 2.3
BH CV ECHO MEAS - LV MASS(C)D: 445.6 GRAMS
BH CV ECHO MEAS - LV MASS(C)DI: 157.7 GRAMS/M^2
BH CV ECHO MEAS - LVIDD: 7.5 CM
BH CV ECHO MEAS - LVIDS: 6.6 CM
BH CV ECHO MEAS - LVPWD: 1.4 CM
BH CV ECHO MEAS - RVDD: 3.3 CM
BH CV ECHO MEAS - SI(CUBED): 43.5 ML/M^2
BH CV ECHO MEAS - SI(TEICH): 24.2 ML/M^2
BH CV ECHO MEAS - SV(CUBED): 123 ML
BH CV ECHO MEAS - SV(TEICH): 68.4 ML
BUN BLD-MCNC: 12 MG/DL (ref 9–23)
BUN/CREAT SERPL: 12 (ref 7–25)
CALCIUM SPEC-SCNC: 8.8 MG/DL (ref 8.7–10.4)
CHLORIDE SERPL-SCNC: 96 MMOL/L (ref 99–109)
CO2 SERPL-SCNC: 26 MMOL/L (ref 20–31)
CREAT BLD-MCNC: 1 MG/DL (ref 0.6–1.3)
GFR SERPL CREATININE-BSD FRML MDRD: 111 ML/MIN/1.73
GLUCOSE BLD-MCNC: 123 MG/DL (ref 70–100)
LV EF 2D ECHO EST: 15 %
POTASSIUM BLD-SCNC: 3.9 MMOL/L (ref 3.5–5.5)
SODIUM BLD-SCNC: 135 MMOL/L (ref 132–146)

## 2017-10-17 PROCEDURE — 94799 UNLISTED PULMONARY SVC/PX: CPT

## 2017-10-17 PROCEDURE — 93010 ELECTROCARDIOGRAM REPORT: CPT | Performed by: INTERNAL MEDICINE

## 2017-10-17 PROCEDURE — 99232 SBSQ HOSP IP/OBS MODERATE 35: CPT | Performed by: INTERNAL MEDICINE

## 2017-10-17 PROCEDURE — 93005 ELECTROCARDIOGRAM TRACING: CPT | Performed by: NURSE PRACTITIONER

## 2017-10-17 PROCEDURE — 94660 CPAP INITIATION&MGMT: CPT

## 2017-10-17 PROCEDURE — 25010000002 FUROSEMIDE PER 20 MG: Performed by: NURSE PRACTITIONER

## 2017-10-17 PROCEDURE — 80048 BASIC METABOLIC PNL TOTAL CA: CPT | Performed by: NURSE PRACTITIONER

## 2017-10-17 PROCEDURE — 5A09357 ASSISTANCE WITH RESPIRATORY VENTILATION, LESS THAN 24 CONSECUTIVE HOURS, CONTINUOUS POSITIVE AIRWAY PRESSURE: ICD-10-PCS | Performed by: INTERNAL MEDICINE

## 2017-10-17 PROCEDURE — 94640 AIRWAY INHALATION TREATMENT: CPT

## 2017-10-17 PROCEDURE — 25010000002 ENOXAPARIN PER 10 MG: Performed by: INTERNAL MEDICINE

## 2017-10-17 RX ORDER — METOLAZONE 5 MG/1
5 TABLET ORAL ONCE
Status: COMPLETED | OUTPATIENT
Start: 2017-10-17 | End: 2017-10-17

## 2017-10-17 RX ADMIN — SPIRONOLACTONE 25 MG: 25 TABLET, FILM COATED ORAL at 09:49

## 2017-10-17 RX ADMIN — CARVEDILOL 25 MG: 12.5 TABLET, FILM COATED ORAL at 17:30

## 2017-10-17 RX ADMIN — METOLAZONE 5 MG: 5 TABLET ORAL at 09:51

## 2017-10-17 RX ADMIN — CARVEDILOL 25 MG: 12.5 TABLET, FILM COATED ORAL at 11:32

## 2017-10-17 RX ADMIN — FUROSEMIDE 40 MG: 10 INJECTION, SOLUTION INTRAMUSCULAR; INTRAVENOUS at 06:08

## 2017-10-17 RX ADMIN — IPRATROPIUM BROMIDE AND ALBUTEROL SULFATE 3 ML: .5; 3 SOLUTION RESPIRATORY (INHALATION) at 16:32

## 2017-10-17 RX ADMIN — IPRATROPIUM BROMIDE AND ALBUTEROL SULFATE 3 ML: .5; 3 SOLUTION RESPIRATORY (INHALATION) at 12:28

## 2017-10-17 RX ADMIN — IPRATROPIUM BROMIDE AND ALBUTEROL SULFATE 3 ML: .5; 3 SOLUTION RESPIRATORY (INHALATION) at 21:03

## 2017-10-17 RX ADMIN — IPRATROPIUM BROMIDE AND ALBUTEROL SULFATE 3 ML: .5; 3 SOLUTION RESPIRATORY (INHALATION) at 07:46

## 2017-10-17 RX ADMIN — SACUBITRIL AND VALSARTAN 1 TABLET: 24; 26 TABLET, FILM COATED ORAL at 21:10

## 2017-10-17 RX ADMIN — ENOXAPARIN SODIUM 40 MG: 40 INJECTION SUBCUTANEOUS at 17:30

## 2017-10-17 RX ADMIN — SACUBITRIL AND VALSARTAN 1 TABLET: 24; 26 TABLET, FILM COATED ORAL at 09:49

## 2017-10-17 RX ADMIN — ASPIRIN 81 MG: 81 TABLET, COATED ORAL at 09:49

## 2017-10-17 RX ADMIN — FUROSEMIDE 40 MG: 10 INJECTION, SOLUTION INTRAMUSCULAR; INTRAVENOUS at 17:30

## 2017-10-17 NOTE — PAYOR COMM NOTE
"Initial inpatient notification   Christy Mcpherson RN   Utilization Review  [phone]    Sivakumar Queen (26 y.o. Male)     Date of Birth Social Security Number Address Home Phone MRN    1990  [id number] [facility] [address] [phone] [id number]    Shinto Marital Status            Nondenominational Single       Admission Date Admission Type Admitting Provider Attending Provider Department, Room/Bed    10/16/17 Emergency Balbir Olsen MD Marano, Anthony, MD [facility] 4G, S452/1    Discharge Date Discharge Disposition Discharge Destination                      Attending Provider: Balbir Olsen MD     Allergies:  No Known Allergies    Isolation:  None   Infection:  None   Code Status:  Prior    Ht:  69\" (175.3 cm)   Wt:  409 lb 6.4 oz (186 kg)    Admission Cmt:  None   Principal Problem:  None                Active Insurance as of 10/16/2017     Primary Coverage     Payor Plan Insurance Group Employer/Plan Group    PASSPORT PASSPORT MEDICAID     Payor Plan Address Payor Plan Phone Number Effective From Effective To    [address] [phone] 10/1/2014     [place] [address]       Subscriber Name Subscriber Birth Date Member ID       SIVAKUMAR QUEEN 1990 [id number]                 Emergency Contacts      (Rel.) Home Phone Work Phone Mobile Phone    Aramis Queen (Father) [phone] -- [phone]    Kiah Austin (Significant Other) [phone] -- [phone]            Insurance Information                PASSPORT/PASSPORT Phone: [phone]    Subscriber: Sivakumar Queen Subscriber#: [id number]    Group#: [id number] Precert#:              History & Physical      Louis Bailey MD at 10/16/2017  1:00 PM            Sivakumar Queen  [id number]  1990   LOS: 0 days   Patient Care Team:  INTERNIST: Barbara Mills MD  CARDIOLOGIST: Balbir Olsen MD, FACC  ELECTROPHYSIOLOGIST: Nathanael Richmond DO, FACC, RS    Mr. Queen is a 26 year old " "single  male from [place], [place], disabled    Chief Complaint:  SOB    Problem List:  1. Acute on chronic systolic (congestive) heart failure/NICM  a. Echo, 2/7/2017: LVEF 20%.  b. Echo, 7/13/2017: LVEF 20%.  c. LHC, 7/13/2017: LVEF 10%; left ventricular end-diastolic pressure of 30.  d. July 2017 cardiac catheterization with normal coronary arteries.  e. EPS/Biotronik BiVICD implantation 8/15/17  f. [facility] 9/3/17-9/4/17 for CHF exacerbation   g. [facility] 9/5/17-9/8/17 for CHF exacerbation   h. [facility] 10/16/17 for CHF exacerbation   i. CCS class I  Chest discomfort/NYHA class III-IV CHF  2. Shortness of breath; 2L O2 NC PRN and @hs  3. Hypertension  4. Morbid obesity; BMI 61  5. Obstructive sleep apnea with CPAP  6. Personal history of noncompliance with medical treatment  7. Microcytic anemia   8. Plaque psoriasis  9. Surgical history  a. Tonsils and adenoids  b. Left heart catheterization July 2017  c. Biventricular ICD placement 8/15/17    No Known Allergies    (Not in a hospital admission)  Scheduled Meds:   Continuous Infusions:   PRN Meds:.•  sodium chloride       History of Present Illness:      26-year-old -American male presents to [facility] ED for CHF exacerbation.  This is his third [facility] admission for similar since September 2017.  He had a biventricular ICD placed 8/15/17.  He states that he has become more short of breath and having more orthopnea since Thursday.  He believes that he has gained 4 pounds since that time.  He denies any fevers, chills, but does admit to having some \"sweats\" and having a productive cough at first, but now is just having a cough.  He feels that his lower extremities are swollen.  He states that he has been compliant with all of his medications at home without missed doses and has been using his CPAP nightly.  He denies any chest pressure, palpitations, presyncope, syncope.  Occasionally he will have a twinge of pain where his ICD is, but it " "resolves in 1-2 seconds.  In the ED, he received 40 mg of Lasix ×1.    Cardiac risk factors: hypertension, male gender, obesity (BMI >= 30 kg/m2) and sedentary lifestyle.    Social History     Social History   • Marital status: Single     Spouse name: N/A   • Number of children: 0   • Years of education: N/A     Occupational History   • disabled      Social History Main Topics   • Smoking status: Never Smoker   • Smokeless tobacco: Never Used   • Alcohol use No   • Drug use: No   • Sexual activity: Defer     Other Topics Concern   • Not on file     Social History Narrative    Caffeine use: some soda.    Patient lives with parents         Family History   Problem Relation Age of Onset   • Diabetes Father    • Diabetes Paternal Grandmother    • Diabetes Maternal Grandfather        Review of Systems  Pertinent items are noted in HPI and problem list.     Objective:       Physical Exam  BP 92/80  Pulse 103  Temp 98.6 °F (37 °C) (Oral)   Resp 24  Ht 69\" (175.3 cm)  Wt (!) 416 lb (189 kg)  SpO2 94%  BMI 61.43 kg/m2  Last 2 weights    10/16/17  0843   Weight: (!) 416 lb (189 kg)     Body mass index is 61.43 kg/(m^2).    Intake/Output Summary (Last 24 hours) at 10/16/17 1300  Last data filed at 10/16/17 1113   Gross per 24 hour   Intake                0 ml   Output             1150 ml   Net            -1150 ml       General Appearance:  Alert, cooperative, patient appears distressed, appears older than stated age, morbidly obese   Head:  Normocephalic, without obvious abnormality, atraumatic   Eyes:  PERRL, conjunctiva/corneas clear, EOM's intact, fundi benign, both eyes   Throat: Lips, mucosa, and tongue normal; teeth and gums normal   Neck: Supple, symmetrical, trachea midline, no adenopathy, thyroid: not enlarged, symmetric, no tenderness/mass/nodules, no carotid bruit or JVD   Lungs:   Diminished with mild expiratory wheezes to auscultation bilaterally, respirations labored, 2 L O2 NC   Heart:  Regular rate and " rhythm, S1, S2 normal, no murmur, rub or gallop   Abdomen:   Soft, non-tender, no masses, no organomegaly, bowel sounds audible x4   Extremities: 2+ edema, normal range of motion   Pulses: 1+ and symmetric   Skin: Skin color, texture, turgor normal, psoriasis   Neurologic: Normal       Cardiographics  · EKG:10/16/17;Normal sinus rhythm  Possible Left atrial enlargement  Prolonged QT  Abnormal ECG    Imaging  · Chest x-ray:10/16/17;Heart is enlarged with slight prominence of the pulmonary  vascularity. No acute parenchymal disease    Lab Review     Results from last 7 days  Lab Units 10/16/17  0901   SODIUM mmol/L 137   POTASSIUM mmol/L 3.5   CHLORIDE mmol/L 98*   CO2 mmol/L 32.0*   BUN mg/dL 10   CREATININE mg/dL 1.20   GLUCOSE mg/dL 118*   CALCIUM mg/dL 9.0       Results from last 7 days  Lab Units 10/16/17  0901   WBC 10*3/mm3 4.42   HEMOGLOBIN g/dL 14.3   HEMATOCRIT % 43.9   PLATELETS 10*3/mm3 243                     Assessment:    Patient with nonischemic cardiomyopathy status post biventricular ICD placement August 2017 here for CHF exacerbation with elevated BNP of 708. Will defer increasing Entresto due to renal function but we will up-titrate Coreg to 25mg bid.            Plan:   1. Zaroxolyn 5 mg x once  2. IV lasix 40mg bid  3. Strict I/O, daily weights  4. Increase Coreg to 25mg bid  5. Duonebs q6 PRN  6. Echocardiogram  7. BMP, ECG in morning  8. Cardiac diet  9. Dr. Olsen to see in morning   t       Scribed for Louis Bailey MD by Victoria Puri, SONAM. 10/16/2017  1:26 PM   The Patient states he is compliant with his medical therapy and his CPAP.  He states a couple days ago he began to cough and is coughing is got worse.  He admits that when he does have heart failure he coughs.  He was recently here with a heart failure exacerbation and was treated with doxycycline.  He admits that he is thirsty all the time is been drinking more water.  Lungs are relatively clear and his neck has no  JVP but he has significant edema.  This point time her to diurese the patient and if his creatinine goes up despite heart failure we may put him on low dose dobutamine therapy.  We will restart his Entresto may need to titrate that up as high as possible as his blood pressure will tolerate       Electronically signed by Louis Bailey MD at 10/16/2017  1:59 PM           Emergency Department Notes      SONAM Pitts at 10/16/2017  8:49 AM     Attestation signed by Cirilo Altman MD at 10/16/2017  4:36 PM              For this patient encounter, I reviewed the NP or PA documentation, treatment plan, and medical decision making. Cirilo Altman MD 10/16/2017 4:36 PM                               Subjective   Patient is a 26 y.o. male presenting with shortness of breath.   Shortness of Breath   Severity:  Moderate  Onset quality:  Gradual  Timing:  Constant  Progression:  Worsening  Chronicity:  Recurrent  Context: activity    Relieved by:  Rest, sitting up and position changes  Worsened by:  Exertion  Associated symptoms: cough    Associated symptoms: no abdominal pain, no chest pain and no fever    Associated symptoms comment:  LE edema      Review of Systems   Constitutional: Negative for fever.   Respiratory: Positive for cough and shortness of breath.    Cardiovascular: Negative for chest pain.   Gastrointestinal: Negative for abdominal pain.   All other systems reviewed and are negative.      Past Medical History:   Diagnosis Date   • CHF (congestive heart failure)    • CPAP (continuous positive airway pressure) dependence    • Hypertension    • Morbid obesity    • Myocardial infarction    • On home O2     2l at bedtime and prn   • Plaque psoriasis    • Pneumonia    • Psoriasis    • Sleep apnea    • Wears glasses        No Known Allergies    Past Surgical History:   Procedure Laterality Date   • ADENOIDECTOMY     • CARDIAC CATHETERIZATION N/A 7/13/2017    Procedure: Left Heart Cath;  Surgeon: Balbir  MD Pretty;  Location:  [facility];  Service:    • CARDIAC DEFIBRILLATOR PLACEMENT  08/2017   • CARDIAC ELECTROPHYSIOLOGY PROCEDURE N/A 8/15/2017    Procedure: VVI ICD Implant;  Surgeon: Nathanael Richmond DO;  Location:  [facility] EP INVASIVE LOCATION;  Service:    • INTERNAL CARDIAC DEFIBRILLATOR INSERTION Left 2017   • NOSE SURGERY     • TONSILLECTOMY         Family History   Problem Relation Age of Onset   • Diabetes Father    • Diabetes Paternal Grandmother    • Diabetes Maternal Grandfather        Social History     Social History   • Marital status: Single     Spouse name: N/A   • Number of children: 0   • Years of education: N/A     Occupational History   • disabled      Social History Main Topics   • Smoking status: Never Smoker   • Smokeless tobacco: Never Used   • Alcohol use No   • Drug use: No   • Sexual activity: Defer     Other Topics Concern   • None     Social History Narrative    Caffeine use: some soda.    Patient lives with parents               Objective   Physical Exam   Constitutional: He is oriented to person, place, and time. He appears well-developed and well-nourished. He appears distressed.   HENT:   Head: Normocephalic and atraumatic.   Right Ear: External ear normal.   Left Ear: External ear normal.   Nose: Nose normal.   Mouth/Throat: Oropharynx is clear and moist.   Eyes: Conjunctivae and EOM are normal. Pupils are equal, round, and reactive to light.   Neck: Normal range of motion. Neck supple.   Cardiovascular: Normal rate, regular rhythm, normal heart sounds and intact distal pulses.    Pulmonary/Chest: Effort normal. He has decreased breath sounds.   Abdominal: Soft. Bowel sounds are normal.   Musculoskeletal: Normal range of motion. He exhibits edema. He exhibits no tenderness.   Neurological: He is alert and oriented to person, place, and time.   Skin: Skin is warm and dry.   Psychiatric: He has a normal mood and affect. His behavior is normal. Judgment normal.        Procedures        ED Course  ED Course   Comment By Time   [facility] Cards consulted via Diamond Gonsalez, APRN 10/16 1113   Awaiting cards consult Cristofer Gonsalez, APRN 10/16 1304   Spoke with [facility] in cards. They are aware of the pt. Cristofer Gonsalez, APRN 10/16 1311   Card report to nurse to be admitted to dr. Pretty Gonsalez, APRN 10/16 1355                  MDM    Final diagnoses:   Congestive heart failure, unspecified congestive heart failure chronicity, unspecified congestive heart failure type   Shortness of breath   Edema due to congestive heart failure            Cristofer Gonsalez, APRKARLOS  10/16/17 1356       Electronically signed by Cirilo Altman MD at 10/16/2017  4:36 PM        Vital Signs (last 24 hours)       10/16 0700  -  10/17 0659 10/17 0700  -  10/17 1011   Most Recent    Temp (°F) 98.1 -  98.6      97.6     97.6 (36.4)    Heart Rate 85 -  112    80 -  89     80    Resp 16 -  26    16 -  18     18    BP 92/80 -  127/80      142/77     142/77    SpO2 (%) 92 -  99      100     100          Intake & Output (last day)       10/16 0701 - 10/17 0700 10/17 0701 - 10/18 0700    Urine (mL/kg/hr) 2625 1500 (2.5)    Total Output 2625 1500    Net -2625 -1500              [facility] Medications (all)       Dose Frequency Start End    albuterol (PROVENTIL) nebulizer solution 0.5% 2.5 mg/0.5mL 2.5 mg Every 6 Hours PRN 10/16/2017     Sig - Route: Take 0.5 mL by nebulization Every 6 (Six) Hours As Needed for Wheezing. - Nebulization    aspirin EC tablet 81 mg 81 mg Daily 10/16/2017     Sig - Route: Take 1 tablet by mouth Daily. - Oral    carvedilol (COREG) tablet 25 mg 25 mg 2 Times Daily With Meals 10/16/2017     Sig - Route: Take 2 tablets by mouth 2 (Two) Times a Day With Meals. - Oral    furosemide (LASIX) injection 40 mg 40 mg Once 10/16/2017 10/16/2017    Sig - Route: Infuse 4 mL into a venous catheter 1 (One) Time. - Intravenous    furosemide (LASIX) injection 40 mg 40 mg Every 12 Hours 10/16/2017     Sig - Route:  Infuse 4 mL into a venous catheter Every 12 (Twelve) Hours. - Intravenous    ipratropium-albuterol (DUO-NEB) nebulizer solution 3 mL 3 mL Once 10/16/2017 10/16/2017    Sig - Route: Take 3 mL by nebulization 1 (One) Time. - Nebulization    ipratropium-albuterol (DUO-NEB) nebulizer solution 3 mL 3 mL 4 Times Daily - RT 10/16/2017     Sig - Route: Take 3 mL by nebulization 4 (Four) Times a Day. - Nebulization    metOLazone (ZAROXOLYN) tablet 5 mg 5 mg Once 10/16/2017 10/16/2017    Sig - Route: Take 2 tablets by mouth 1 (One) Time. - Oral    metOLazone (ZAROXOLYN) tablet 5 mg 5 mg Daily PRN 10/16/2017     Sig - Route: Take 1 tablet by mouth Daily As Needed (Edema or shortness of breath). - Oral    metOLazone (ZAROXOLYN) tablet 5 mg 5 mg Once 10/17/2017 10/17/2017    Sig - Route: Take 1 tablet by mouth 1 (One) Time. - Oral    nitroglycerin (NITROSTAT) ointment 0.5 inch 0.5 inch Once 10/16/2017 10/16/2017    Sig - Route: Apply 0.5 inches topically 1 (One) Time. - Topical    Pharmacy Consult - [facility]  Daily 10/17/2017     Sig - Route: Daily. - Does not apply    sacubitril-valsartan (ENTRESTO) 24-26 MG tablet 1 tablet 1 tablet Every 12 Hours Scheduled 10/16/2017     Sig - Route: Take 1 tablet by mouth Every 12 (Twelve) Hours. - Oral    sodium chloride 0.9 % flush 10 mL 10 mL As Needed 10/16/2017     Sig - Route: Infuse 10 mL into a venous catheter As Needed for Line Care. - Intravenous    Cosign for Ordering: Required by Cirilo Altman MD    spironolactone (ALDACTONE) tablet 25 mg 25 mg Daily 10/16/2017     Sig - Route: Take 1 tablet by mouth Daily. - Oral    Sulfur Hexafluoride Microsph 60.7-25 MG reconstituted suspension 2 mL 2 mL Once in Imaging 10/16/2017 10/16/2017    Sig - Route: Infuse 2 mL into a venous catheter Once. - Intravenous    albuterol (PROVENTIL HFA;VENTOLIN HFA) inhaler 2 puff (Discontinued) 2 puff Every 6 Hours PRN 10/16/2017 10/16/2017    Sig - Route: Inhale 2 puffs Every 6 (Six) Hours As Needed for  "Wheezing. - Inhalation    Reason for Discontinue: Duplicate order    carvedilol (COREG) tablet 12.5 mg (Discontinued) 12.5 mg Every 12 Hours Scheduled 10/16/2017 10/16/2017    Sig - Route: Take 1 tablet by mouth Every 12 (Twelve) Hours. - Oral    Reason for Discontinue: Duplicate order    nitroglycerin (NITROSTAT) ointment 0.5 inch (Discontinued) 0.5 inch Every 6 Hours Scheduled 10/16/2017 10/16/2017    Sig - Route: Apply 0.5 inches topically Every 6 (Six) Hours. - Topical    sacubitril-valsartan (ENTRESTO) 24-26 MG tablet 1 tablet (Discontinued) 1 tablet Every 12 Hours Scheduled 10/16/2017 10/16/2017    Sig - Route: Take 1 tablet by mouth Every 12 (Twelve) Hours. - Oral    Reason for Discontinue: Duplicate order             Physician Progress Notes (all)      SONAM Reynolds at 10/17/2017  9:15 AM  Version 2 of 2           [facility]   Inpatient Progress Note       LOS: 0 days   Patient Care Team:  Barbara Mills MD as PCP - General (Internal Medicine)  SONAM Oates as Nurse Practitioner (Cardiology)  Balbir Olsen MD as Consulting Physician (Cardiology)    Chief Complaint:  Follow-up for heart failure    Subjective     Interval History:   Feeling better but still thinks he has some extra fluid on board      Review of Systems:   Pertinent positives noted in history, exam, and assessment. Otherwise reviewed and negative.      Objective     Vitals:  Blood pressure 142/77, pulse 80, temperature 97.6 °F (36.4 °C), temperature source Axillary, resp. rate 18, height 69\" (175.3 cm), weight (!) 409 lb 6.4 oz (186 kg), SpO2 100 %.     Intake/Output Summary (Last 24 hours) at 10/17/17 0915  Last data filed at 10/17/17 0800   Gross per 24 hour   Intake                0 ml   Output             3425 ml   Net            -3425 ml     Physical Exam   Constitutional: He is oriented to person, place, and time. He appears well-developed.   Morbidly obese   Neck: No JVD present. " No tracheal deviation present.   Cardiovascular: Normal rate, regular rhythm and normal heart sounds.  Exam reveals no friction rub.    No murmur heard.  Pulmonary/Chest: Effort normal. No respiratory distress.   Decreased but clear   Abdominal: Soft. Bowel sounds are normal. There is no tenderness.   Musculoskeletal: He exhibits edema (1-2+ ble edema). He exhibits no deformity.   Neurological: He is alert and oriented to person, place, and time.   Skin: Skin is warm and dry.   Multiple psoriatic areas noted diffusely          Results Review:     I reviewed the patient's new clinical results.      Results from last 7 days  Lab Units 10/16/17  0901   WBC 10*3/mm3 4.42   HEMOGLOBIN g/dL 14.3   HEMATOCRIT % 43.9   PLATELETS 10*3/mm3 243       Results from last 7 days  Lab Units 10/17/17  0514 10/16/17  0901   SODIUM mmol/L 135 137   POTASSIUM mmol/L 3.9 3.5   CHLORIDE mmol/L 96* 98*   CO2 mmol/L 26.0 32.0*   BUN mg/dL 12 10   CREATININE mg/dL 1.00 1.20   CALCIUM mg/dL 8.8 9.0   BILIRUBIN mg/dL  --  0.7   ALK PHOS U/L  --  84   ALT (SGPT) U/L  --  14   AST (SGOT) U/L  --  18   GLUCOSE mg/dL 123* 118*       Results from last 7 days  Lab Units 10/17/17  0514   SODIUM mmol/L 135   POTASSIUM mmol/L 3.9   CHLORIDE mmol/L 96*   CO2 mmol/L 26.0   BUN mg/dL 12   CREATININE mg/dL 1.00   GLUCOSE mg/dL 123*   CALCIUM mg/dL 8.8           Lab Results  Lab Value Date/Time   TROPONINI 1.142 (C) 07/13/2017 0854                     Tele:      Assessment/Plan     Active Problems:    * No active hospital problems. *      1. Acute on chronic systolic congestive heart failure  - Has been referred to [facility] for transplant (has to lose 200 lbs to qualify)  - s/p BIVICD  - on Entresto, Coreg, aldactone, lasix.  - good diuresis with lasix and metolazone.  - Reported increased fluid intake.  2. Morbid obesity  3. Hypertension     Plan:  Continue diuresis. Daily weights. Continue to trend BP. Will give another dose of metolazone today as  "well.    SONAM Reynolds  10/17/17  9:15 AM    Dictated utilizing Dragon dictation       Electronically signed by SONAM Reynolds at 10/17/2017  9:45 AM      SONAM Reynolds at 10/17/2017  9:15 AM  Version 1 of 2           [facility]   Inpatient Progress Note       LOS: 0 days   Patient Care Team:  Barbara Mills MD as PCP - General (Internal Medicine)  SONAM Oates as Nurse Practitioner (Cardiology)  Balbir Olsen MD as Consulting Physician (Cardiology)    Chief Complaint:  Follow-up for heart failure    Subjective     Interval History:   Feeling better but still thinks he has some extra fluid on board      Review of Systems:   Pertinent positives noted in history, exam, and assessment. Otherwise reviewed and negative.      Objective     Vitals:  Blood pressure 142/77, pulse 80, temperature 97.6 °F (36.4 °C), temperature source Axillary, resp. rate 18, height 69\" (175.3 cm), weight (!) 409 lb 6.4 oz (186 kg), SpO2 100 %.     Intake/Output Summary (Last 24 hours) at 10/17/17 0915  Last data filed at 10/17/17 0800   Gross per 24 hour   Intake                0 ml   Output             3425 ml   Net            -3425 ml     Physical Exam   Constitutional: He is oriented to person, place, and time. He appears well-developed.   Morbidly obese   Neck: No JVD present. No tracheal deviation present.   Cardiovascular: Normal rate, regular rhythm and normal heart sounds.  Exam reveals no friction rub.    No murmur heard.  Pulmonary/Chest: Effort normal. No respiratory distress.   Decreased but clear   Abdominal: Soft. Bowel sounds are normal. There is no tenderness.   Musculoskeletal: He exhibits edema (1-2+ ble edema). He exhibits no deformity.   Neurological: He is alert and oriented to person, place, and time.   Skin: Skin is warm and dry.   Multiple psoriatic areas noted diffusely          Results Review:     I reviewed the patient's new clinical " results.      Results from last 7 days  Lab Units 10/16/17  0901   WBC 10*3/mm3 4.42   HEMOGLOBIN g/dL 14.3   HEMATOCRIT % 43.9   PLATELETS 10*3/mm3 243       Results from last 7 days  Lab Units 10/17/17  0514 10/16/17  0901   SODIUM mmol/L 135 137   POTASSIUM mmol/L 3.9 3.5   CHLORIDE mmol/L 96* 98*   CO2 mmol/L 26.0 32.0*   BUN mg/dL 12 10   CREATININE mg/dL 1.00 1.20   CALCIUM mg/dL 8.8 9.0   BILIRUBIN mg/dL  --  0.7   ALK PHOS U/L  --  84   ALT (SGPT) U/L  --  14   AST (SGOT) U/L  --  18   GLUCOSE mg/dL 123* 118*       Results from last 7 days  Lab Units 10/17/17  0514   SODIUM mmol/L 135   POTASSIUM mmol/L 3.9   CHLORIDE mmol/L 96*   CO2 mmol/L 26.0   BUN mg/dL 12   CREATININE mg/dL 1.00   GLUCOSE mg/dL 123*   CALCIUM mg/dL 8.8           Lab Results  Lab Value Date/Time   TROPONINI 1.142 (C) 07/13/2017 0854                     Tele:      Assessment/Plan     Active Problems:    * No active hospital problems. *      4. Acute on chronic systolic congestive heart failure  - Has been referred to  for transplant (has to lose 200 lbs to qualify)  - s/p BIVICD  - on Entresto, Coreg, aldactone, lasix.  - good diuresis with lasix and metolazone.  5. Morbid obesity  6. Hypertension     Plan:  Continue diuresis. Daily weights. Continue to trend BP. Will give another dose of metolazone today as well.    SONAM Reynolds  10/17/17  9:15 AM    Dictated utilizing Dragon dictation       Electronically signed by SONAM Reynolds at 10/17/2017  9:45 AM        Consult Notes (all)     No notes of this type exist for this encounter.

## 2017-10-17 NOTE — PLAN OF CARE
Problem: Cardiac: Heart Failure (Adult)  Goal: Signs and Symptoms of Listed Potential Problems Will be Absent or Manageable (Cardiac: Heart Failure)  Outcome: Ongoing (interventions implemented as appropriate)    Problem: Activity Intolerance (Adult)  Goal: Identify Related Risk Factors and Signs and Symptoms  Outcome: Ongoing (interventions implemented as appropriate)    Problem: Patient Care Overview (Adult)  Goal: Plan of Care Review  Outcome: Ongoing (interventions implemented as appropriate)    10/17/17 0551   Coping/Psychosocial Response Interventions   Plan Of Care Reviewed With patient   Patient Care Overview   Progress no change   Outcome Evaluation   Outcome Summary/Follow up Plan patient c/o cough and has peripherial edema. Tolerating iv lasix.

## 2017-10-17 NOTE — PROGRESS NOTES
Discharge Planning Assessment  [facility]     Patient Name: Evan Gannon  MRN: [id number]  Today's Date: 10/17/2017    Admit Date: 10/16/2017          Discharge Needs Assessment       10/17/17 1043    Living Environment    Lives With parent(s)    Transportation Available car;family or friend will provide    Living Environment    Provides Primary Care For no one, unable/limited ability to care for self    Quality Of Family Relationships supportive    Able to Return to Prior Living Arrangements yes    Discharge Needs Assessment    Concerns To Be Addressed denies needs/concerns at this time;no discharge needs identified    Readmission Within The Last 30 Days no previous admission in last 30 days    Anticipated Changes Related to Illness none    Equipment Currently Used at Home bipap/ cpap;oxygen    Equipment Needed After Discharge none            Discharge Plan       10/17/17 1045    Case Management/Social Work Plan    Plan HOME     Patient/Family In Agreement With Plan yes    Additional Comments Met with pt at bedside.  He resides with his parents in [facility]  Relatively independent with ADLs.  No current HH.  Home C-pap and O2 provided per [facility].  Pt relates that he has given his previous hospital bed to his grandmother and does not feel that he needs one at present.  Pt is interested in completing cardiac rehab upon discharge.  Spoke with Lina at [facility] cardiac rehab.  She related that to be eligible/appropriate for cardiac rehab, pt must complete 6 weeks of treatment with the heart failure clinic with optimal treatment without a hospital readmission during that 6 week period.  Discussed this with pt.  He verbalized understanding.  CM will cont to follow for DCP.        Discharge Placement     No information found                Demographic Summary       10/17/17 1042    Referral Information    Admission Type inpatient    Referral Source admission list    Reason For Consult discharge planning     Record Reviewed history and physical;medical record    Contact Information    Permission Granted to Share Information With             Functional Status       10/17/17 1042    Functional Status Prior    Ambulation 0-->independent    Transferring 0-->independent    Toileting 0-->independent    Bathing 0-->independent    Dressing 0-->independent    Eating 0-->independent    Communication 0-->understands/communicates without difficulty    Swallowing 0-->swallows foods/liquids without difficulty    IADL    Medications independent    Meal Preparation independent    Housekeeping independent    Laundry independent    Shopping independent    Oral Care independent    Activity Tolerance    Usual Activity Tolerance moderate            Psychosocial     None            Abuse/Neglect     None            Legal     None            Substance Abuse     None            Patient Forms     None          Re [place]

## 2017-10-17 NOTE — PROGRESS NOTES
"  [facility] at [facility]   Inpatient Progress Note       LOS: 0 days   Patient Care Team:  Barbara Mills MD as PCP - General (Internal Medicine)  SONAM Oates as Nurse Practitioner (Cardiology)  Balbir Olsen MD as Consulting Physician (Cardiology)    Chief Complaint:  Follow-up for heart failure    Subjective     Interval History:   Feeling better but still thinks he has some extra fluid on board  Paced and resting with BiPAP.  He is improving.  Notes he is drinking lots of water at home which is likely the cause of his heart failure recurrent    Review of Systems:   Pertinent positives noted in history, exam, and assessment. Otherwise reviewed and negative.      Objective     Vitals:  Blood pressure 142/77, pulse 80, temperature 97.6 °F (36.4 °C), temperature source Axillary, resp. rate 16, height 69\" (175.3 cm), weight (!) 409 lb 6.4 oz (186 kg), SpO2 100 %.     Intake/Output Summary (Last 24 hours) at 10/17/17 1306  Last data filed at 10/17/17 0900   Gross per 24 hour   Intake              400 ml   Output             2300 ml   Net            -1900 ml     Physical Exam   Constitutional: He is oriented to person, place, and time. He appears well-developed.   Morbidly obese   Neck: No JVD present. No tracheal deviation present.   Cardiovascular: Normal rate, regular rhythm and normal heart sounds.  Exam reveals no friction rub.    No murmur heard.  Pulmonary/Chest: Effort normal. No respiratory distress.   Decreased but clear   Abdominal: Soft. Bowel sounds are normal. There is no tenderness.   Musculoskeletal: He exhibits edema (2+ ble edema). He exhibits no deformity.   Neurological: He is alert and oriented to person, place, and time.   Skin: Skin is warm and dry.   Multiple psoriatic areas noted diffusely          Results Review:     I reviewed the patient's new clinical results.      Results from last 7 days  Lab Units 10/16/17  0901   WBC 10*3/mm3 4.42   HEMOGLOBIN g/dL " 14.3   HEMATOCRIT % 43.9   PLATELETS 10*3/mm3 243       Results from last 7 days  Lab Units 10/17/17  0514 10/16/17  0901   SODIUM mmol/L 135 137   POTASSIUM mmol/L 3.9 3.5   CHLORIDE mmol/L 96* 98*   CO2 mmol/L 26.0 32.0*   BUN mg/dL 12 10   CREATININE mg/dL 1.00 1.20   CALCIUM mg/dL 8.8 9.0   BILIRUBIN mg/dL  --  0.7   ALK PHOS U/L  --  84   ALT (SGPT) U/L  --  14   AST (SGOT) U/L  --  18   GLUCOSE mg/dL 123* 118*       Results from last 7 days  Lab Units 10/17/17  0514   SODIUM mmol/L 135   POTASSIUM mmol/L 3.9   CHLORIDE mmol/L 96*   CO2 mmol/L 26.0   BUN mg/dL 12   CREATININE mg/dL 1.00   GLUCOSE mg/dL 123*   CALCIUM mg/dL 8.8           Lab Results  Lab Value Date/Time   TROPONINI 1.142 (C) 07/13/2017 0854                     Tele:      Assessment/Plan     Active Problems:    Congestive heart failure      1. Acute on chronic systolic congestive heart failure  - Has been referred to [facility] for transplant (has to lose 200 lbs to qualify)  - s/p BIVICD  - on Entresto, Coreg, aldactone, lasix.  - good diuresis with lasix and metolazone.  - Reported increased fluid intake.  2. Morbid obesity  3. Hypertension     Plan:  Continue diuresis. Daily weights. Continue to trend BP. Will give another dose of metolazone today as well.  Continue maximal therapy.  Discussed decreasing by mouth fluid intake    Balbir Olsen MD  10/17/17  1:06 PM    Dictated utilizing Dragon dictation

## 2017-10-18 PROCEDURE — 94640 AIRWAY INHALATION TREATMENT: CPT

## 2017-10-18 PROCEDURE — 25010000002 FUROSEMIDE PER 20 MG: Performed by: NURSE PRACTITIONER

## 2017-10-18 PROCEDURE — 94660 CPAP INITIATION&MGMT: CPT

## 2017-10-18 PROCEDURE — 94799 UNLISTED PULMONARY SVC/PX: CPT

## 2017-10-18 PROCEDURE — 25010000002 ENOXAPARIN PER 10 MG: Performed by: INTERNAL MEDICINE

## 2017-10-18 PROCEDURE — 94760 N-INVAS EAR/PLS OXIMETRY 1: CPT

## 2017-10-18 RX ORDER — BENZONATATE 100 MG/1
100 CAPSULE ORAL 2 TIMES DAILY PRN
Status: DISCONTINUED | OUTPATIENT
Start: 2017-10-18 | End: 2017-10-19 | Stop reason: HOSPADM

## 2017-10-18 RX ORDER — METOLAZONE 5 MG/1
5 TABLET ORAL ONCE
Status: COMPLETED | OUTPATIENT
Start: 2017-10-18 | End: 2017-10-18

## 2017-10-18 RX ADMIN — SPIRONOLACTONE 25 MG: 25 TABLET, FILM COATED ORAL at 08:44

## 2017-10-18 RX ADMIN — BENZONATATE 100 MG: 100 CAPSULE ORAL at 23:00

## 2017-10-18 RX ADMIN — IPRATROPIUM BROMIDE AND ALBUTEROL SULFATE 3 ML: .5; 3 SOLUTION RESPIRATORY (INHALATION) at 08:18

## 2017-10-18 RX ADMIN — IPRATROPIUM BROMIDE AND ALBUTEROL SULFATE 3 ML: .5; 3 SOLUTION RESPIRATORY (INHALATION) at 19:51

## 2017-10-18 RX ADMIN — CARVEDILOL 25 MG: 12.5 TABLET, FILM COATED ORAL at 17:10

## 2017-10-18 RX ADMIN — ASPIRIN 81 MG: 81 TABLET, COATED ORAL at 08:44

## 2017-10-18 RX ADMIN — CARVEDILOL 25 MG: 12.5 TABLET, FILM COATED ORAL at 08:44

## 2017-10-18 RX ADMIN — SACUBITRIL AND VALSARTAN 1 TABLET: 24; 26 TABLET, FILM COATED ORAL at 20:38

## 2017-10-18 RX ADMIN — FUROSEMIDE 40 MG: 10 INJECTION, SOLUTION INTRAMUSCULAR; INTRAVENOUS at 17:10

## 2017-10-18 RX ADMIN — IPRATROPIUM BROMIDE AND ALBUTEROL SULFATE 3 ML: .5; 3 SOLUTION RESPIRATORY (INHALATION) at 16:30

## 2017-10-18 RX ADMIN — IPRATROPIUM BROMIDE AND ALBUTEROL SULFATE 3 ML: .5; 3 SOLUTION RESPIRATORY (INHALATION) at 13:04

## 2017-10-18 RX ADMIN — SACUBITRIL AND VALSARTAN 1 TABLET: 24; 26 TABLET, FILM COATED ORAL at 08:44

## 2017-10-18 RX ADMIN — FUROSEMIDE 40 MG: 10 INJECTION, SOLUTION INTRAMUSCULAR; INTRAVENOUS at 06:22

## 2017-10-18 RX ADMIN — METOLAZONE 5 MG: 5 TABLET ORAL at 11:57

## 2017-10-18 RX ADMIN — ENOXAPARIN SODIUM 40 MG: 40 INJECTION SUBCUTANEOUS at 17:10

## 2017-10-18 NOTE — PLAN OF CARE
Problem: Cardiac: Heart Failure (Adult)  Goal: Signs and Symptoms of Listed Potential Problems Will be Absent or Manageable (Cardiac: Heart Failure)  Outcome: Ongoing (interventions implemented as appropriate)    10/16/17 1620 10/17/17 0551   Cardiac: Heart Failure   Problems Assessed (Heart Failure) all --    Problems Present (Heart Failure) --  sleep-disordered breathing;decreased quality of life;cardiac pump dysfunction         Problem: Activity Intolerance (Adult)  Goal: Identify Related Risk Factors and Signs and Symptoms  Outcome: Ongoing (interventions implemented as appropriate)    10/16/17 1620   Activity Intolerance   Activity Intolerance: Related Risk Factors functional decline;generalized weakness   Signs and Symptoms (Activity Intolerance) dyspnea/shortness of breath         Problem: Patient Care Overview (Adult)  Goal: Plan of Care Review  Outcome: Ongoing (interventions implemented as appropriate)    10/18/17 1602   Coping/Psychosocial Response Interventions   Plan Of Care Reviewed With patient   Patient Care Overview   Progress no change   Outcome Evaluation   Outcome Summary/Follow up Plan Pt. still has peripheral edema and complains of SOA with activity.

## 2017-10-18 NOTE — PROGRESS NOTES
Nutrition Services    Patient Name:  Evan Gannon  YOB: 1990  MRN: [id number]  Admit Date:  10/16/2017    Comments: RD notes MST inclusion of need for education. RD notes pt has received nutrition education on multiple occasions from in pt RDN (7/2017, 9/2017).  RD to follow per protocol, please consult if further education desired/warrented.     Electronically signed by:  Alicia Nesbitt RDN, LD  10/18/17 10:01 AM

## 2017-10-18 NOTE — PLAN OF CARE
Problem: Cardiac: Heart Failure (Adult)  Goal: Signs and Symptoms of Listed Potential Problems Will be Absent or Manageable (Cardiac: Heart Failure)  Outcome: Ongoing (interventions implemented as appropriate)    Problem: Activity Intolerance (Adult)  Goal: Identify Related Risk Factors and Signs and Symptoms  Outcome: Ongoing (interventions implemented as appropriate)    Problem: Patient Care Overview (Adult)  Goal: Plan of Care Review  Outcome: Ongoing (interventions implemented as appropriate)    10/17/17 0551 10/18/17 0215 10/18/17 0334   Coping/Psychosocial Response Interventions   Plan Of Care Reviewed With --  patient --    Patient Care Overview   Progress no change --  --    Outcome Evaluation   Outcome Summary/Follow up Plan --  --  Patient continues to recieve iv lasix for diuresis. Patient continues to have peripherial edema.

## 2017-10-18 NOTE — PROGRESS NOTES
"  [facility] at [facility]   Inpatient Progress Note       LOS: 1 day   Patient Care Team:  Barbara Mills MD as PCP - General (Internal Medicine)  SONAM Oates as Nurse Practitioner (Cardiology)  Balbir Olsen MD as Consulting Physician (Cardiology)    Chief Complaint:  Follow-up for heart failure    Subjective     Interval History:   Feeling better today.  Slept well last night.  Thinks his leg edema has improved.  No cough, No SOA.  Drinking diet Mt. Dew at time of exam.  Clinically appears to be improving.      Review of Systems:   Pertinent positives noted in history, exam, and assessment. Otherwise reviewed and negative.      Objective     Vitals:  Blood pressure 110/71, pulse 86, temperature 97.5 °F (36.4 °C), temperature source Axillary, resp. rate 18, height 69\" (175.3 cm), weight (!) 409 lb 6.4 oz (186 kg), SpO2 100 %.     Intake/Output Summary (Last 24 hours) at 10/18/17 0805  Last data filed at 10/18/17 0720   Gross per 24 hour   Intake              750 ml   Output             2425 ml   Net            -1675 ml     Physical Exam   Constitutional: He is oriented to person, place, and time. He appears well-developed.   Morbidly obese   Neck: No JVD present. No tracheal deviation present.   Cardiovascular: Normal rate, regular rhythm and normal heart sounds.  Exam reveals no friction rub.    No murmur heard.  Pulmonary/Chest: Effort normal. No respiratory distress.   Decreased but clear   Abdominal: Soft. Bowel sounds are normal. There is no tenderness.   Musculoskeletal: He exhibits edema (2+ ble edema). He exhibits no deformity.   Neurological: He is alert and oriented to person, place, and time.   Skin: Skin is warm and dry.   Multiple psoriatic areas noted diffusely          Results Review:     I reviewed the patient's new clinical results.      Results from last 7 days  Lab Units 10/16/17  0901   WBC 10*3/mm3 4.42   HEMOGLOBIN g/dL 14.3   HEMATOCRIT % 43.9   PLATELETS " 10*3/mm3 243       Results from last 7 days  Lab Units 10/17/17  0514 10/16/17  0901   SODIUM mmol/L 135 137   POTASSIUM mmol/L 3.9 3.5   CHLORIDE mmol/L 96* 98*   CO2 mmol/L 26.0 32.0*   BUN mg/dL 12 10   CREATININE mg/dL 1.00 1.20   CALCIUM mg/dL 8.8 9.0   BILIRUBIN mg/dL  --  0.7   ALK PHOS U/L  --  84   ALT (SGPT) U/L  --  14   AST (SGOT) U/L  --  18   GLUCOSE mg/dL 123* 118*       Results from last 7 days  Lab Units 10/17/17  0514   SODIUM mmol/L 135   POTASSIUM mmol/L 3.9   CHLORIDE mmol/L 96*   CO2 mmol/L 26.0   BUN mg/dL 12   CREATININE mg/dL 1.00   GLUCOSE mg/dL 123*   CALCIUM mg/dL 8.8           Lab Results  Lab Value Date/Time   TROPONINI 1.142 (C) 07/13/2017 0854                     Tele:      Assessment/Plan     Active Problems:    Congestive heart failure      1. Acute on chronic systolic congestive heart failure  - Has been referred to [facility] for [facility] (has to lose 200 lbs to qualify)  - s/p BIVICD  - on Entresto, Coreg, aldactone, lasix.  - good diuresis with lasix and metolazone.  - Reported increased fluid intake.  2. Morbid obesity  3. Essential Hypertension     Plan:  -  Continue diuresis. Will give another dose of Metolazone today, 10/18. Check am labs if still here.  -  Daily weights. Strict I/Os.  -  Continue to trend BP. Continue maximal therapy.  -  Discussed decreasing by mouth fluid intake    Hailey Roberson PA-C  10/18/17  8:29 AM

## 2017-10-19 VITALS
SYSTOLIC BLOOD PRESSURE: 113 MMHG | HEIGHT: 69 IN | DIASTOLIC BLOOD PRESSURE: 60 MMHG | OXYGEN SATURATION: 94 % | HEART RATE: 91 BPM | RESPIRATION RATE: 18 BRPM | TEMPERATURE: 98.2 F | WEIGHT: 315 LBS | BODY MASS INDEX: 46.65 KG/M2

## 2017-10-19 LAB
ANION GAP SERPL CALCULATED.3IONS-SCNC: 8 MMOL/L (ref 3–11)
BNP SERPL-MCNC: 180 PG/ML (ref 0–100)
BUN BLD-MCNC: 18 MG/DL (ref 9–23)
BUN/CREAT SERPL: 20 (ref 7–25)
CALCIUM SPEC-SCNC: 9.7 MG/DL (ref 8.7–10.4)
CHLORIDE SERPL-SCNC: 93 MMOL/L (ref 99–109)
CO2 SERPL-SCNC: 33 MMOL/L (ref 20–31)
CREAT BLD-MCNC: 0.9 MG/DL (ref 0.6–1.3)
GFR SERPL CREATININE-BSD FRML MDRD: 126 ML/MIN/1.73
GLUCOSE BLD-MCNC: 103 MG/DL (ref 70–100)
POTASSIUM BLD-SCNC: 3.7 MMOL/L (ref 3.5–5.5)
SODIUM BLD-SCNC: 134 MMOL/L (ref 132–146)

## 2017-10-19 PROCEDURE — 94799 UNLISTED PULMONARY SVC/PX: CPT

## 2017-10-19 PROCEDURE — 25010000002 FUROSEMIDE PER 20 MG: Performed by: NURSE PRACTITIONER

## 2017-10-19 PROCEDURE — 80048 BASIC METABOLIC PNL TOTAL CA: CPT | Performed by: PHYSICIAN ASSISTANT

## 2017-10-19 PROCEDURE — 99238 HOSP IP/OBS DSCHRG MGMT 30/<: CPT | Performed by: INTERNAL MEDICINE

## 2017-10-19 PROCEDURE — 94660 CPAP INITIATION&MGMT: CPT

## 2017-10-19 PROCEDURE — 83880 ASSAY OF NATRIURETIC PEPTIDE: CPT | Performed by: PHYSICIAN ASSISTANT

## 2017-10-19 PROCEDURE — 94640 AIRWAY INHALATION TREATMENT: CPT

## 2017-10-19 RX ORDER — CARVEDILOL 25 MG/1
25 TABLET ORAL 2 TIMES DAILY WITH MEALS
Qty: 60 TABLET | Refills: 11 | Status: SHIPPED | OUTPATIENT
Start: 2017-10-19 | End: 2018-02-07 | Stop reason: SDUPTHER

## 2017-10-19 RX ADMIN — SACUBITRIL AND VALSARTAN 1 TABLET: 24; 26 TABLET, FILM COATED ORAL at 08:53

## 2017-10-19 RX ADMIN — IPRATROPIUM BROMIDE AND ALBUTEROL SULFATE 3 ML: .5; 3 SOLUTION RESPIRATORY (INHALATION) at 06:42

## 2017-10-19 RX ADMIN — IPRATROPIUM BROMIDE AND ALBUTEROL SULFATE 3 ML: .5; 3 SOLUTION RESPIRATORY (INHALATION) at 10:56

## 2017-10-19 RX ADMIN — ASPIRIN 81 MG: 81 TABLET, COATED ORAL at 08:53

## 2017-10-19 RX ADMIN — CARVEDILOL 25 MG: 12.5 TABLET, FILM COATED ORAL at 08:53

## 2017-10-19 RX ADMIN — SPIRONOLACTONE 25 MG: 25 TABLET, FILM COATED ORAL at 08:53

## 2017-10-19 RX ADMIN — IPRATROPIUM BROMIDE AND ALBUTEROL SULFATE 3 ML: .5; 3 SOLUTION RESPIRATORY (INHALATION) at 14:49

## 2017-10-19 RX ADMIN — FUROSEMIDE 40 MG: 10 INJECTION, SOLUTION INTRAMUSCULAR; INTRAVENOUS at 06:02

## 2017-10-19 NOTE — PROGRESS NOTES
"  [facility] at [facility]   Inpatient Progress Note       LOS: 2 days   Patient Care Team:  Barbara Mills MD as PCP - General (Internal Medicine)  SONAM Oates as Nurse Practitioner (Cardiology)  Balbir Olsen MD as Consulting Physician (Cardiology)    Chief Complaint:  Follow-up for heart failure    Subjective     Interval History:   Feeling better. Wants to go to rehab somewhere else since he will have to wait at least 6 weeks to start here.  Ready to go home.    Review of Systems:   Pertinent positives noted in history, exam, and assessment. Otherwise reviewed and negative.      Objective     Vitals:  Blood pressure 98/64, pulse 79, temperature 98.2 °F (36.8 °C), temperature source Oral, resp. rate 18, height 69\" (175.3 cm), weight (!) 409 lb 6.4 oz (186 kg), SpO2 97 %.     Intake/Output Summary (Last 24 hours) at 10/19/17 0852  Last data filed at 10/19/17 0781   Gross per 24 hour   Intake                0 ml   Output             3775 ml   Net            -3775 ml     Physical Exam   Constitutional: He is oriented to person, place, and time. He appears well-developed.   Morbidly obese   Neck: No JVD present. No tracheal deviation present.   Cardiovascular: Normal rate, regular rhythm and normal heart sounds.  Exam reveals no friction rub.    No murmur heard.  Pulmonary/Chest: Effort normal. No respiratory distress.   Decreased but clear   Abdominal: Soft. Bowel sounds are normal. There is no tenderness.   Musculoskeletal: He exhibits edema (1+ ble edema ). He exhibits no deformity.   Neurological: He is alert and oriented to person, place, and time.   Skin: Skin is warm and dry.   Multiple psoriatic areas noted diffusely     The patient and agree with the above     Results Review:     I reviewed the patient's new clinical results.      Results from last 7 days  Lab Units 10/16/17  0901   WBC 10*3/mm3 4.42   HEMOGLOBIN g/dL 14.3   HEMATOCRIT % 43.9   PLATELETS 10*3/mm3 243 "       Results from last 7 days  Lab Units 10/17/17  0514 10/16/17  0901   SODIUM mmol/L 135 137   POTASSIUM mmol/L 3.9 3.5   CHLORIDE mmol/L 96* 98*   CO2 mmol/L 26.0 32.0*   BUN mg/dL 12 10   CREATININE mg/dL 1.00 1.20   CALCIUM mg/dL 8.8 9.0   BILIRUBIN mg/dL  --  0.7   ALK PHOS U/L  --  84   ALT (SGPT) U/L  --  14   AST (SGOT) U/L  --  18   GLUCOSE mg/dL 123* 118*       Results from last 7 days  Lab Units 10/17/17  0514   SODIUM mmol/L 135   POTASSIUM mmol/L 3.9   CHLORIDE mmol/L 96*   CO2 mmol/L 26.0   BUN mg/dL 12   CREATININE mg/dL 1.00   GLUCOSE mg/dL 123*   CALCIUM mg/dL 8.8           Lab Results  Lab Value Date/Time   TROPONINI 1.142 (C) 07/13/2017 0854                     Tele:  SR    Assessment/Plan     Active Problems:    Congestive heart failure      1. Acute on chronic systolic congestive heart failure  - Has been referred to [facility] for [facility] (has to lose 200 lbs to qualify)  - s/p BIVICD  - on Entresto, Coreg, aldactone, lasix.  - good diuresis with lasix and metolazone.  - Reported increased fluid intake.  2. Morbid obesity  3. Hypertension     Plan:  Will look at other rehab options for patient. Home today vs. Tomorrow.    SONAM Reynolds  10/19/17  8:25 AM    Dictated utilizing Dragon dictation  I, Balbir Olsen have reviewed the note in full and agree with all aspects of the above including physical exam, assessment, labs and plan with changes made accordingly.     Balbir Olsen MD  10/19/17  9:04 AM

## 2017-10-19 NOTE — DISCHARGE SUMMARY
"Date of Discharge:  10/19/2017  Date of Admit: 10/16/2017    Barbara Mills MD      Discharge Diagnosis:  1. Acute on chronic systolic congestive heart failure  - Has been referred to  for transplant (has to lose 200 lbs to qualify)  - s/p BIVICD  - on Entresto, Coreg, aldactone, lasix.  - good diuresis with lasix and metolazone.  - Reported increased fluid intake.  2. Morbid obesity  3. Hypertension     Hospital Course:   On 10/16/17 patient was admitted for systolic congestive heart failure exacerbation.  Patient admitted to drinking \"lots\" of fluid recently.  His weight increase.  He had complaints of orthopnea as well as worsened edema.  He was admitted and underwent IV diuresis.  He remained in the hospital until 10/19/17 at which time it was felt that he had reached maximum benefit from his stay in the hospital and was ready for discharge home.      Discharge Physical Exam:  /60  Pulse 91  Temp 98.2 °F (36.8 °C) (Oral)   Resp 18  Ht 69\" (175.3 cm)  Wt (!) 409 lb 6.4 oz (186 kg)  SpO2 94%  BMI 60.46 kg/m2    Physical Exam  Please see progress note dated from day of discharge.    Discharge Medications   Evan Gannon   Home Medication Instructions BENI:[id number]    Printed on:10/19/17 1146   Medication Information                      albuterol (PROVENTIL HFA;VENTOLIN HFA) 108 (90 Base) MCG/ACT inhaler  Inhale 2 puffs Every 6 (Six) Hours As Needed for Wheezing.             aspirin 81 MG EC tablet  Take 1 tablet by mouth Daily.             carvedilol (COREG) 25 MG tablet  Take 1 tablet by mouth 2 (Two) Times a Day With Meals.             metOLazone (ZAROXOLYN) 5 MG tablet  Take 1 tablet by mouth Daily As Needed (Edema or shortness of breath).             sacubitril-valsartan (ENTRESTO) 24-26 MG tablet  Take 1 tablet by mouth Every 12 (Twelve) Hours.             spironolactone (ALDACTONE) 25 MG tablet  Take 1 tablet by mouth Daily.             torsemide (DEMADEX) 20 MG tablet  Take " 3 tablets by mouth Daily.                 Discharge Diet:  cardiac low-sodium    Activity at Discharge:  as tolerated    Discharge disposition: home    Condition on Discharge: stable    Follow-up Appointments  Future Appointments  Date Time Provider Department Center   10/24/2017 2:30 PM Barbara Mills MD [facility] None   10/25/2017 3:30 PM MD JOSE Bronson [facility] None   11/20/2017 11:15 AM Nathanael Richmond DO [facility] None   11/20/2017 11:45 AM MD JOSE Nicholas [facility] None   1/10/2018 2:00 PM SONAM Oates [facility]     Additional Instructions for the Follow-ups that You Need to Schedule     Discharge Follow-up with Specialty    As directed    Specialty:  tulio   Follow Up:  1 Month   Has the patient’s follow-up appointment been scheduled and documented in the discharge navigator?:  Yes, documented in the appointment section                Order Current Status    BNP In process    Basic Metabolic Panel In process           SONAM Reynolds  10/19/17  11:46 AM        EMR Dragon/Transcription disclaimer:  Much of this encounter note is an electronic transcription/translation of spoken language to printed text. Electronic translation of spoken language may permit erroneous, or at times, nonsensical words or phrases to be inadvertently transcribed. Although I have reviewed the note for such errors, some may still exist.

## 2017-10-19 NOTE — PLAN OF CARE
Problem: Cardiac: Heart Failure (Adult)  Goal: Signs and Symptoms of Listed Potential Problems Will be Absent or Manageable (Cardiac: Heart Failure)  Outcome: Ongoing (interventions implemented as appropriate)    Problem: Activity Intolerance (Adult)  Goal: Identify Related Risk Factors and Signs and Symptoms  Outcome: Ongoing (interventions implemented as appropriate)    Problem: Patient Care Overview (Adult)  Goal: Plan of Care Review  Outcome: Ongoing (interventions implemented as appropriate)    10/18/17 2214 10/19/17 3282   Coping/Psychosocial Response Interventions   Plan Of Care Reviewed With patient --    Patient Care Overview   Progress --  no change   Outcome Evaluation   Outcome Summary/Follow up Plan --  Patient continues to have peripheral edema and complains of shortness of breath and cough.

## 2017-10-20 ENCOUNTER — TRANSITIONAL CARE MANAGEMENT TELEPHONE ENCOUNTER (OUTPATIENT)
Dept: INTERNAL MEDICINE | Facility: CLINIC | Age: 27
End: 2017-10-20

## 2017-10-20 NOTE — PAYOR COMM NOTE
"Sivakumar Queen (26 y.o. Male)     Date of Birth Social Security Number Address Home Phone MRN    1990  [id number] [facility] [address] [phone] [id number]    Episcopal Marital Status          Sabianism Single       Admission Date Admission Type Admitting Provider Attending Provider Department, Room/Bed    10/16/17 Emergency Balbir Olsen MD  [facility] 4G, S452/1    Discharge Date Discharge Disposition Discharge Destination        10/19/2017 Home or Self Care             Attending Provider: (none)    Allergies:  No Known Allergies    Isolation:  None   Infection:  None   Code Status:  Prior    Ht:  69\" (175.3 cm)   Wt:  409 lb 6.4 oz (186 kg)    Admission Cmt:  None   Principal Problem:  None                Active Insurance as of 10/16/2017     Primary Coverage     Payor Plan Insurance Group Employer/Plan Group    PASSPORT PAS[employer]ORT MEDICAID     Payor Plan Address Payor Plan Phone Number Effective From Effective To    [address] [phone] 10/1/2014     [place] [address]       Subscriber Name Subscriber Birth Date Member ID       SIVAKUMAR QUEEN 1990 [id number]                 Emergency Contacts      (Rel.) Home Phone Work Phone Mobile Phone    Aramis Queen (Father) [phone] -- [phone]    Kiah Austin (Significant Other) [phone] -- [phone]               Discharge Summary      SONAM Reynolds at 10/19/2017  9:42 AM          Date of Discharge:  10/19/2017  Date of Admit: 10/16/2017    Barbara Mills MD      Discharge Diagnosis:  1. Acute on chronic systolic congestive heart failure  - Has been referred to [facility] for transplant (has to lose 200 lbs to qualify)  - s/p BIVICD  - on Entresto, Coreg, aldactone, lasix.  - good diuresis with lasix and metolazone.  - Reported increased fluid intake.  2. Morbid obesity  3. Hypertension     Hospital Course:   On 10/16/17 patient was admitted for systolic congestive heart failure " "exacerbation.  Patient admitted to drinking \"lots\" of fluid recently.  His weight increase.  He had complaints of orthopnea as well as worsened edema.  He was admitted and underwent IV diuresis.  He remained in the hospital until 10/19/17 at which time it was felt that he had reached maximum benefit from his stay in the hospital and was ready for discharge home.      Discharge Physical Exam:  /60  Pulse 91  Temp 98.2 °F (36.8 °C) (Oral)   Resp 18  Ht 69\" (175.3 cm)  Wt (!) 409 lb 6.4 oz (186 kg)  SpO2 94%  BMI 60.46 kg/m2    Physical Exam  Please see progress note dated from day of discharge.    Discharge Medications   Gannon, Evan BLAKE   Home Medication Instructions BENI:[id number]    Printed on:10/19/17 1144   Medication Information                      albuterol (PROVENTIL HFA;VENTOLIN HFA) 108 (90 Base) MCG/ACT inhaler  Inhale 2 puffs Every 6 (Six) Hours As Needed for Wheezing.             aspirin 81 MG EC tablet  Take 1 tablet by mouth Daily.             carvedilol (COREG) 25 MG tablet  Take 1 tablet by mouth 2 (Two) Times a Day With Meals.             metOLazone (ZAROXOLYN) 5 MG tablet  Take 1 tablet by mouth Daily As Needed (Edema or shortness of breath).             sacubitril-valsartan (ENTRESTO) 24-26 MG tablet  Take 1 tablet by mouth Every 12 (Twelve) Hours.             spironolactone (ALDACTONE) 25 MG tablet  Take 1 tablet by mouth Daily.             torsemide (DEMADEX) 20 MG tablet  Take 3 tablets by mouth Daily.                 Discharge Diet:  cardiac low-sodium    Activity at Discharge:  as tolerated    Discharge disposition: home    Condition on Discharge: stable    Follow-up Appointments  Future Appointments  Date Time Provider Department Center   10/24/2017 2:30 PM MD JOSE Blankenship [facility] None   10/25/2017 3:30 PM MD JOSE Bronson [facility] None   11/20/2017 11:15 AM Nathanael Richmond DO [facility] None   11/20/2017 11:45 AM Balbir Olsen MD [facility] None "   1/10/2018 2:00 PM SONAM Oates [facility]     Additional Instructions for the Follow-ups that You Need to Schedule     Discharge Follow-up with Specialty    As directed    Specialty:  tulio   Follow Up:  1 Month   Has the patient’s follow-up appointment been scheduled and documented in the discharge navigator?:  Yes, documented in the appointment section                Order Current Status    BNP In process    Basic Metabolic Panel In process           SONAM Reynolds  10/19/17  11:46 AM        EMR Dragon/Transcription disclaimer:  Much of this encounter note is an electronic transcription/translation of spoken language to printed text. Electronic translation of spoken language may permit erroneous, or at times, nonsensical words or phrases to be inadvertently transcribed. Although I have reviewed the note for such errors, some may still exist.       Electronically signed by SONAM Reynolds at 10/19/2017 11:46 AM

## 2017-11-02 ENCOUNTER — TELEPHONE (OUTPATIENT)
Dept: CARDIOLOGY | Facility: CLINIC | Age: 27
End: 2017-11-02

## 2017-11-02 DIAGNOSIS — I50.23 ACUTE ON CHRONIC SYSTOLIC HEART FAILURE (HCC): ICD-10-CM

## 2017-11-02 DIAGNOSIS — I42.8 NICM (NONISCHEMIC CARDIOMYOPATHY) (HCC): Primary | ICD-10-CM

## 2017-11-02 NOTE — TELEPHONE ENCOUNTER
Spoke with patient to let him know he has been referred to cardiac rehab at . His records, referral order, and demographics have been faxed to their clinic at [phone]. Their phone number is [phone].    Mr. Gannon orientation start date is 11/22/2017 at 11 which was first available. Notified patient of this and told him I am mailing him a referral letter with contact information to their clinic. He verbalized understanding.

## 2017-11-07 ENCOUNTER — TELEPHONE (OUTPATIENT)
Dept: INTERNAL MEDICINE | Facility: CLINIC | Age: 27
End: 2017-11-07

## 2017-11-07 DIAGNOSIS — Z12.83 SKIN CANCER SCREENING: Primary | ICD-10-CM

## 2017-11-09 NOTE — TELEPHONE ENCOUNTER
Patient has been notified that until his PCP is changed on his Passport card, I will not be able to complete a referral.

## 2017-11-14 ENCOUNTER — TELEPHONE (OUTPATIENT)
Dept: INTERNAL MEDICINE | Facility: CLINIC | Age: 27
End: 2017-11-14

## 2017-12-12 ENCOUNTER — HOSPITAL ENCOUNTER (EMERGENCY)
Facility: HOSPITAL | Age: 27
Discharge: HOME | End: 2017-12-13
Attending: EMERGENCY MEDICINE | Admitting: EMERGENCY MEDICINE

## 2017-12-12 DIAGNOSIS — I50.22 CHRONIC SYSTOLIC CONGESTIVE HEART FAILURE (HCC): ICD-10-CM

## 2017-12-12 DIAGNOSIS — R07.9 CHEST PAIN, UNSPECIFIED TYPE: Primary | ICD-10-CM

## 2017-12-12 DIAGNOSIS — R06.00 DYSPNEA, UNSPECIFIED TYPE: ICD-10-CM

## 2017-12-12 LAB
ALBUMIN SERPL-MCNC: 4.1 G/DL (ref 3.2–4.8)
ALBUMIN/GLOB SERPL: 1.1 G/DL (ref 1.5–2.5)
ALP SERPL-CCNC: 83 U/L (ref 25–100)
ALT SERPL W P-5'-P-CCNC: 21 U/L (ref 7–40)
ANION GAP SERPL CALCULATED.3IONS-SCNC: 7 MMOL/L (ref 3–11)
AST SERPL-CCNC: 22 U/L (ref 0–33)
BASOPHILS # BLD AUTO: 0.02 10*3/MM3 (ref 0–0.2)
BASOPHILS NFR BLD AUTO: 0.4 % (ref 0–1)
BILIRUB SERPL-MCNC: 0.8 MG/DL (ref 0.3–1.2)
BNP SERPL-MCNC: 344 PG/ML (ref 0–100)
BUN BLD-MCNC: 11 MG/DL (ref 9–23)
BUN/CREAT SERPL: 12.2 (ref 7–25)
CALCIUM SPEC-SCNC: 9.1 MG/DL (ref 8.7–10.4)
CHLORIDE SERPL-SCNC: 101 MMOL/L (ref 99–109)
CO2 SERPL-SCNC: 29 MMOL/L (ref 20–31)
CREAT BLD-MCNC: 0.9 MG/DL (ref 0.6–1.3)
D DIMER PPP FEU-MCNC: 0.52 MG/L (FEU) (ref 0–0.5)
DEPRECATED RDW RBC AUTO: 49 FL (ref 37–54)
EOSINOPHIL # BLD AUTO: 0.04 10*3/MM3 (ref 0–0.3)
EOSINOPHIL NFR BLD AUTO: 0.9 % (ref 0–3)
ERYTHROCYTE [DISTWIDTH] IN BLOOD BY AUTOMATED COUNT: 17.2 % (ref 11.3–14.5)
GFR SERPL CREATININE-BSD FRML MDRD: 125 ML/MIN/1.73
GLOBULIN UR ELPH-MCNC: 3.7 GM/DL
GLUCOSE BLD-MCNC: 97 MG/DL (ref 70–100)
HCT VFR BLD AUTO: 45 % (ref 38.9–50.9)
HGB BLD-MCNC: 14.7 G/DL (ref 13.1–17.5)
IMM GRANULOCYTES # BLD: 0.02 10*3/MM3 (ref 0–0.03)
IMM GRANULOCYTES NFR BLD: 0.4 % (ref 0–0.6)
INR PPP: 1.14
LIPASE SERPL-CCNC: 26 U/L (ref 6–51)
LYMPHOCYTES # BLD AUTO: 1.29 10*3/MM3 (ref 0.6–4.8)
LYMPHOCYTES NFR BLD AUTO: 27.7 % (ref 24–44)
MCH RBC QN AUTO: 25.6 PG (ref 27–31)
MCHC RBC AUTO-ENTMCNC: 32.7 G/DL (ref 32–36)
MCV RBC AUTO: 78.3 FL (ref 80–99)
MONOCYTES # BLD AUTO: 0.52 10*3/MM3 (ref 0–1)
MONOCYTES NFR BLD AUTO: 11.2 % (ref 0–12)
NEUTROPHILS # BLD AUTO: 2.77 10*3/MM3 (ref 1.5–8.3)
NEUTROPHILS NFR BLD AUTO: 59.4 % (ref 41–71)
PLATELET # BLD AUTO: 287 10*3/MM3 (ref 150–450)
PMV BLD AUTO: 10.3 FL (ref 6–12)
POTASSIUM BLD-SCNC: 3.9 MMOL/L (ref 3.5–5.5)
PROT SERPL-MCNC: 7.8 G/DL (ref 5.7–8.2)
PROTHROMBIN TIME: 12.5 SECONDS (ref 9.6–11.5)
RBC # BLD AUTO: 5.75 10*6/MM3 (ref 4.2–5.76)
SODIUM BLD-SCNC: 137 MMOL/L (ref 132–146)
TROPONIN I SERPL-MCNC: 0.08 NG/ML (ref 0–0.07)
WBC NRBC COR # BLD: 4.66 10*3/MM3 (ref 3.5–10.8)

## 2017-12-12 PROCEDURE — 80053 COMPREHEN METABOLIC PANEL: CPT | Performed by: EMERGENCY MEDICINE

## 2017-12-12 PROCEDURE — 93005 ELECTROCARDIOGRAM TRACING: CPT

## 2017-12-12 PROCEDURE — 83880 ASSAY OF NATRIURETIC PEPTIDE: CPT | Performed by: EMERGENCY MEDICINE

## 2017-12-12 PROCEDURE — 84484 ASSAY OF TROPONIN QUANT: CPT

## 2017-12-12 PROCEDURE — 83690 ASSAY OF LIPASE: CPT | Performed by: EMERGENCY MEDICINE

## 2017-12-12 PROCEDURE — 99283 EMERGENCY DEPT VISIT LOW MDM: CPT

## 2017-12-12 PROCEDURE — 85610 PROTHROMBIN TIME: CPT | Performed by: EMERGENCY MEDICINE

## 2017-12-12 PROCEDURE — 85379 FIBRIN DEGRADATION QUANT: CPT | Performed by: EMERGENCY MEDICINE

## 2017-12-12 PROCEDURE — 85025 COMPLETE CBC W/AUTO DIFF WBC: CPT | Performed by: EMERGENCY MEDICINE

## 2017-12-12 RX ORDER — ASPIRIN 325 MG
325 TABLET ORAL ONCE
Status: COMPLETED | OUTPATIENT
Start: 2017-12-12 | End: 2017-12-12

## 2017-12-12 RX ORDER — SODIUM CHLORIDE 0.9 % (FLUSH) 0.9 %
10 SYRINGE (ML) INJECTION AS NEEDED
Status: DISCONTINUED | OUTPATIENT
Start: 2017-12-12 | End: 2017-12-13 | Stop reason: HOSPADM

## 2017-12-12 RX ADMIN — ASPIRIN 325 MG ORAL TABLET 325 MG: 325 PILL ORAL at 23:07

## 2017-12-13 ENCOUNTER — APPOINTMENT (OUTPATIENT)
Dept: CT IMAGING | Facility: HOSPITAL | Age: 27
End: 2017-12-13

## 2017-12-13 VITALS
RESPIRATION RATE: 16 BRPM | SYSTOLIC BLOOD PRESSURE: 107 MMHG | TEMPERATURE: 98.5 F | HEIGHT: 69 IN | BODY MASS INDEX: 46.65 KG/M2 | WEIGHT: 315 LBS | HEART RATE: 95 BPM | DIASTOLIC BLOOD PRESSURE: 65 MMHG | OXYGEN SATURATION: 96 %

## 2017-12-13 LAB — TROPONIN I SERPL-MCNC: 0.07 NG/ML (ref 0–0.07)

## 2017-12-13 PROCEDURE — 93005 ELECTROCARDIOGRAM TRACING: CPT | Performed by: EMERGENCY MEDICINE

## 2017-12-13 PROCEDURE — 0 IOPAMIDOL PER 1 ML: Performed by: EMERGENCY MEDICINE

## 2017-12-13 PROCEDURE — 84484 ASSAY OF TROPONIN QUANT: CPT

## 2017-12-13 PROCEDURE — 71275 CT ANGIOGRAPHY CHEST: CPT

## 2017-12-13 RX ADMIN — IOPAMIDOL 60 ML: 755 INJECTION, SOLUTION INTRAVENOUS at 00:35

## 2017-12-13 NOTE — ED PROVIDER NOTES
Subjective   HPI Comments: 27-year-old male with extensive and complicated past medical history presents complaining of chest pain and shortness of breath.  Of note, the patient has a history of CHF with an ejection fraction of 10% and is currently on the cardiac transplant list at .  He states that for the past 3 days, he has been experiencing intermittent chest pain and shortness of breath.  He is unsure as to what may be triggering his symptoms.  He denies any fevers.  The pain is currently 6/10 in severity with no aggravating or alleviating factors.  No recent travel or surgery.     Patient is a 27 y.o. male presenting with chest pain.   History provided by:  Patient  Chest Pain   Pain location:  L chest  Pain radiates to:  Does not radiate  Pain severity:  Moderate  Onset quality:  Gradual  Duration:  3 days  Timing:  Intermittent  Progression:  Worsening  Relieved by:  None tried  Worsened by:  Nothing  Ineffective treatments:  None tried  Associated symptoms: shortness of breath    Associated symptoms: no cough and no fever    Risk factors: hypertension, male sex and obesity    Risk factors comment:  CHF      Review of Systems   Constitutional: Negative for fever.   Respiratory: Positive for shortness of breath. Negative for cough.    Cardiovascular: Positive for chest pain.   All other systems reviewed and are negative.      Past Medical History:   Diagnosis Date   • CHF (congestive heart failure)    • CPAP (continuous positive airway pressure) dependence    • Hypertension    • Morbid obesity    • Myocardial infarction    • On home O2     2l at bedtime and prn   • Plaque psoriasis    • Pneumonia    • Psoriasis    • Sleep apnea    • Wears glasses        No Known Allergies    Past Surgical History:   Procedure Laterality Date   • ADENOIDECTOMY     • CARDIAC CATHETERIZATION N/A 7/13/2017    Procedure: Left Heart Cath;  Surgeon: Balbir Olsen MD;  Location: [facility] CATH INVASIVE LOCATION;  Service:    •  CARDIAC DEFIBRILLATOR PLACEMENT  08/2017   • CARDIAC ELECTROPHYSIOLOGY PROCEDURE N/A 8/15/2017    Procedure: VVI ICD Implant;  Surgeon: Nathanael Richmond DO;  Location: [facility] INVASIVE LOCATION;  Service:    • INTERNAL CARDIAC DEFIBRILLATOR INSERTION Left 2017   • NOSE SURGERY     • TONSILLECTOMY         Family History   Problem Relation Age of Onset   • Diabetes Father    • Diabetes Paternal Grandmother    • Diabetes Maternal Grandfather        Social History     Social History   • Marital status: Single     Spouse name: N/A   • Number of children: 0   • Years of education: N/A     Occupational History   • disabled      Social History Main Topics   • Smoking status: Never Smoker   • Smokeless tobacco: Never Used   • Alcohol use No   • Drug use: No   • Sexual activity: Defer     Other Topics Concern   • Not on file     Social History Narrative    Caffeine use: some soda.    Patient lives with parents             Objective   Physical Exam   Constitutional: He is oriented to person, place, and time. He appears well-developed and well-nourished. No distress.   Chronically ill-appearing obese male in no acute distress   HENT:   Head: Normocephalic and atraumatic.   Mouth/Throat: Oropharynx is clear and moist.   No MM lesions   Neck: Normal range of motion. No JVD present.   Cardiovascular: Normal rate, regular rhythm and normal heart sounds.  Exam reveals no gallop and no friction rub.    No murmur heard.  Pulmonary/Chest: Effort normal and breath sounds normal. No respiratory distress. He has no wheezes. He has no rales.   Abdominal: Soft. Bowel sounds are normal. He exhibits no distension and no mass. There is no tenderness. There is no guarding.   Musculoskeletal: Normal range of motion. He exhibits no edema.   Neurological: He is alert and oriented to person, place, and time.   Skin: Skin is warm and dry. No rash noted. He is not diaphoretic. No erythema. No pallor.   Psychiatric: He has a normal mood and affect.  Judgment and thought content normal.   Nursing note and vitals reviewed.      Procedures         ED Course  ED Course   Comment By Time   27-year-old male with extensive and complicated past medical history presents complaining of intermittent chest pain and shortness of breath for the past 3 days.  On arrival to the ED, patient nontoxic appearing with benign exam and reassuring vital signs.  Lungs clear to auscultation bilaterally.  Initial EKG normal sinus rhythm with heart rate of 91 and no ST segments suggestive of or concerning for ischemia.  We will obtain labs and reassess after medications. Moo Jesus MD 12/12 2248   Troponin mildly elevated at 0.08.  D-dimer positive as well.  We will obtain chest CTA to rule out pulmonary embolism.  Aspirin given. Moo Jesus MD 12/12 2341   Chest CTA negative. Moo Jesus MD 12/13 0110   Repeat EKG unchanged.  Repeat troponin downtrending.  I discussed the patient's case with Dr. Garcia who agreed the patient was appropriate for prompt 72 hour follow-up as neither him nor I felt that the patient's current symptoms represented ACS, PE, or dissection.  He does not appear volume overloaded at this time.  Normal work of breathing.  He will follow-up with Dr. Olsen within 72 hours.  Agreeable with plan and given appropriate return precautions. Moo Jesus MD 12/13 0202       Recent Results (from the past 24 hour(s))   Comprehensive Metabolic Panel    Collection Time: 12/12/17 10:52 PM   Result Value Ref Range    Glucose 97 70 - 100 mg/dL    BUN 11 9 - 23 mg/dL    Creatinine 0.90 0.60 - 1.30 mg/dL    Sodium 137 132 - 146 mmol/L    Potassium 3.9 3.5 - 5.5 mmol/L    Chloride 101 99 - 109 mmol/L    CO2 29.0 20.0 - 31.0 mmol/L    Calcium 9.1 8.7 - 10.4 mg/dL    Total Protein 7.8 5.7 - 8.2 g/dL    Albumin 4.10 3.20 - 4.80 g/dL    ALT (SGPT) 21 7 - 40 U/L    AST (SGOT) 22 0 - 33 U/L    Alkaline Phosphatase 83 25 - 100 U/L    Total Bilirubin 0.8 0.3 -  1.2 mg/dL    eGFR  African Amer 125 >60 mL/min/1.73    Globulin 3.7 gm/dL    A/G Ratio 1.1 (L) 1.5 - 2.5 g/dL    BUN/Creatinine Ratio 12.2 7.0 - 25.0    Anion Gap 7.0 3.0 - 11.0 mmol/L   Protime-INR    Collection Time: 12/12/17 10:52 PM   Result Value Ref Range    Protime 12.5 (H) 9.6 - 11.5 Seconds    INR 1.14    Lipase    Collection Time: 12/12/17 10:52 PM   Result Value Ref Range    Lipase 26 6 - 51 U/L   BNP    Collection Time: 12/12/17 10:52 PM   Result Value Ref Range    .0 (H) 0.0 - 100.0 pg/mL   D-dimer, Quantitative    Collection Time: 12/12/17 10:52 PM   Result Value Ref Range    D-Dimer, Quantitative 0.52 (H) 0.00 - 0.50 mg/L (FEU)   CBC Auto Differential    Collection Time: 12/12/17 10:52 PM   Result Value Ref Range    WBC 4.66 3.50 - 10.80 10*3/mm3    RBC 5.75 4.20 - 5.76 10*6/mm3    Hemoglobin 14.7 13.1 - 17.5 g/dL    Hematocrit 45.0 38.9 - 50.9 %    MCV 78.3 (L) 80.0 - 99.0 fL    MCH 25.6 (L) 27.0 - 31.0 pg    MCHC 32.7 32.0 - 36.0 g/dL    RDW 17.2 (H) 11.3 - 14.5 %    RDW-SD 49.0 37.0 - 54.0 fl    MPV 10.3 6.0 - 12.0 fL    Platelets 287 150 - 450 10*3/mm3    Neutrophil % 59.4 41.0 - 71.0 %    Lymphocyte % 27.7 24.0 - 44.0 %    Monocyte % 11.2 0.0 - 12.0 %    Eosinophil % 0.9 0.0 - 3.0 %    Basophil % 0.4 0.0 - 1.0 %    Immature Grans % 0.4 0.0 - 0.6 %    Neutrophils, Absolute 2.77 1.50 - 8.30 10*3/mm3    Lymphocytes, Absolute 1.29 0.60 - 4.80 10*3/mm3    Monocytes, Absolute 0.52 0.00 - 1.00 10*3/mm3    Eosinophils, Absolute 0.04 0.00 - 0.30 10*3/mm3    Basophils, Absolute 0.02 0.00 - 0.20 10*3/mm3    Immature Grans, Absolute 0.02 0.00 - 0.03 10*3/mm3   POC Troponin, Rapid    Collection Time: 12/12/17 10:56 PM   Result Value Ref Range    Troponin I 0.08 (H) 0.00 - 0.07 ng/mL     Note: In addition to lab results from this visit, the labs listed above may include labs taken at another facility or during a different encounter within the last 24 hours. Please correlate lab times with ED admission  "and discharge times for further clarification of the services performed during this visit.    CT Angiogram Chest With Contrast    (Results Pending)     Vitals:    12/12/17 2208   BP: 123/78   BP Location: Left arm   Patient Position: Sitting   Pulse: 99   Resp: 28   Temp: 98.5 °F (36.9 °C)   TempSrc: Oral   SpO2: 94%   Weight: (!) 187 kg (412 lb)   Height: 175.3 cm (69\")     Medications   sodium chloride 0.9 % flush 10 mL (not administered)   aspirin tablet 325 mg (325 mg Oral Given 12/12/17 2307)     ECG/EMG Results (last 24 hours)     Procedure Component Value Units Date/Time    ECG 12 Lead [[id number]] Collected:  12/12/17 2216     Updated:  12/12/17 2215                  Recent Results (from the past 24 hour(s))   Comprehensive Metabolic Panel    Collection Time: 12/12/17 10:52 PM   Result Value Ref Range    Glucose 97 70 - 100 mg/dL    BUN 11 9 - 23 mg/dL    Creatinine 0.90 0.60 - 1.30 mg/dL    Sodium 137 132 - 146 mmol/L    Potassium 3.9 3.5 - 5.5 mmol/L    Chloride 101 99 - 109 mmol/L    CO2 29.0 20.0 - 31.0 mmol/L    Calcium 9.1 8.7 - 10.4 mg/dL    Total Protein 7.8 5.7 - 8.2 g/dL    Albumin 4.10 3.20 - 4.80 g/dL    ALT (SGPT) 21 7 - 40 U/L    AST (SGOT) 22 0 - 33 U/L    Alkaline Phosphatase 83 25 - 100 U/L    Total Bilirubin 0.8 0.3 - 1.2 mg/dL    eGFR  African Amer 125 >60 mL/min/1.73    Globulin 3.7 gm/dL    A/G Ratio 1.1 (L) 1.5 - 2.5 g/dL    BUN/Creatinine Ratio 12.2 7.0 - 25.0    Anion Gap 7.0 3.0 - 11.0 mmol/L   Protime-INR    Collection Time: 12/12/17 10:52 PM   Result Value Ref Range    Protime 12.5 (H) 9.6 - 11.5 Seconds    INR 1.14    Lipase    Collection Time: 12/12/17 10:52 PM   Result Value Ref Range    Lipase 26 6 - 51 U/L   BNP    Collection Time: 12/12/17 10:52 PM   Result Value Ref Range    .0 (H) 0.0 - 100.0 pg/mL   D-dimer, Quantitative    Collection Time: 12/12/17 10:52 PM   Result Value Ref Range    D-Dimer, Quantitative 0.52 (H) 0.00 - 0.50 mg/L (FEU)   CBC Auto Differential    " Collection Time: 12/12/17 10:52 PM   Result Value Ref Range    WBC 4.66 3.50 - 10.80 10*3/mm3    RBC 5.75 4.20 - 5.76 10*6/mm3    Hemoglobin 14.7 13.1 - 17.5 g/dL    Hematocrit 45.0 38.9 - 50.9 %    MCV 78.3 (L) 80.0 - 99.0 fL    MCH 25.6 (L) 27.0 - 31.0 pg    MCHC 32.7 32.0 - 36.0 g/dL    RDW 17.2 (H) 11.3 - 14.5 %    RDW-SD 49.0 37.0 - 54.0 fl    MPV 10.3 6.0 - 12.0 fL    Platelets 287 150 - 450 10*3/mm3    Neutrophil % 59.4 41.0 - 71.0 %    Lymphocyte % 27.7 24.0 - 44.0 %    Monocyte % 11.2 0.0 - 12.0 %    Eosinophil % 0.9 0.0 - 3.0 %    Basophil % 0.4 0.0 - 1.0 %    Immature Grans % 0.4 0.0 - 0.6 %    Neutrophils, Absolute 2.77 1.50 - 8.30 10*3/mm3    Lymphocytes, Absolute 1.29 0.60 - 4.80 10*3/mm3    Monocytes, Absolute 0.52 0.00 - 1.00 10*3/mm3    Eosinophils, Absolute 0.04 0.00 - 0.30 10*3/mm3    Basophils, Absolute 0.02 0.00 - 0.20 10*3/mm3    Immature Grans, Absolute 0.02 0.00 - 0.03 10*3/mm3   POC Troponin, Rapid    Collection Time: 12/12/17 10:56 PM   Result Value Ref Range    Troponin I 0.08 (H) 0.00 - 0.07 ng/mL   POC Troponin, Rapid    Collection Time: 12/13/17  1:35 AM   Result Value Ref Range    Troponin I 0.07 0.00 - 0.07 ng/mL     Note: In addition to lab results from this visit, the labs listed above may include labs taken at another facility or during a different encounter within the last 24 hours. Please correlate lab times with ED admission and discharge times for further clarification of the services performed during this visit.    CT Angiogram Chest With Contrast   Final Result   1.  No evidence of pulmonary embolism.   2.  Cardiomegaly.      THIS DOCUMENT HAS BEEN ELECTRONICALLY SIGNED BY JEAN JOHNSON JR. MD        Vitals:    12/12/17 2208 12/13/17 0040 12/13/17 0100 12/13/17 0111   BP: 123/78 100/57 107/65    BP Location: Left arm      Patient Position: Sitting      Pulse: 99 97  95   Resp: 28 16     Temp: 98.5 °F (36.9 °C)      TempSrc: Oral      SpO2: 94%  96%    Weight: (!) 187 kg (412  "lb)      Height: 175.3 cm (69\")        Medications   sodium chloride 0.9 % flush 10 mL (not administered)   aspirin tablet 325 mg (325 mg Oral Given 12/12/17 2307)   iopamidol (ISOVUE-370) 76 % injection 100 mL (60 mL Intravenous Given 12/13/17 0035)     ECG/EMG Results (last 24 hours)     Procedure Component Value Units Date/Time    ECG 12 Lead [[id number]] Collected:  12/12/17 2216     Updated:  12/12/17 2215    ECG 12 Lead [[id number]] Collected:  12/13/17 0130     Updated:  12/13/17 0132            MDM    Final diagnoses:   Chest pain, unspecified type   Chronic systolic congestive heart failure   Dyspnea, unspecified type       Documentation assistance provided by josé De Luna.  Information recorded by the scribe was done at my direction and has been verified and validated by me.     Cal De Luna  12/12/17 2243       Cal De Luna  12/12/17 2244       Cal De Luna  12/12/17 2354       Cal De Luna  12/13/17 0202       Moo Jesus MD  12/13/17 0202    "

## 2017-12-13 NOTE — DISCHARGE INSTRUCTIONS
Follow up with your cardiologist, Dr. Marcelino, within 72 hours.    Return to the ED for any new or worsening symptoms.

## 2017-12-20 ENCOUNTER — TELEPHONE (OUTPATIENT)
Dept: CARDIOLOGY | Facility: HOSPITAL | Age: 27
End: 2017-12-20

## 2017-12-20 DIAGNOSIS — M79.672 PAIN IN LEFT FOOT: ICD-10-CM

## 2017-12-20 DIAGNOSIS — I50.22 CHRONIC SYSTOLIC HEART FAILURE (HCC): ICD-10-CM

## 2017-12-20 DIAGNOSIS — R60.0 LOCALIZED EDEMA: Primary | ICD-10-CM

## 2017-12-20 NOTE — TELEPHONE ENCOUNTER
Attempted to return patient's call regarding unilateral swelling in lower extremity. Left voicemail.

## 2017-12-20 NOTE — TELEPHONE ENCOUNTER
----- Message from Re Mast MA sent at 12/20/2017  3:21 PM EST -----  Contact: [phone]  Pt is returning your call regarding swelling in foot. He can be reached at [phone]    Patient notes swelling in his left foot and notes that the foot is very painful. Patient notes that his right foot is slightly swollen but is not painful. He notes mild pedal edema. Patient to have labs drawn today. Further treatment plan pending results of labs today.

## 2017-12-21 ENCOUNTER — APPOINTMENT (OUTPATIENT)
Dept: GENERAL RADIOLOGY | Facility: HOSPITAL | Age: 27
End: 2017-12-21

## 2017-12-21 ENCOUNTER — TELEPHONE (OUTPATIENT)
Dept: INTERNAL MEDICINE | Facility: CLINIC | Age: 27
End: 2017-12-21

## 2017-12-21 ENCOUNTER — HOSPITAL ENCOUNTER (EMERGENCY)
Facility: HOSPITAL | Age: 27
Discharge: HOME | End: 2017-12-21
Attending: EMERGENCY MEDICINE | Admitting: EMERGENCY MEDICINE

## 2017-12-21 VITALS
DIASTOLIC BLOOD PRESSURE: 75 MMHG | SYSTOLIC BLOOD PRESSURE: 120 MMHG | HEART RATE: 90 BPM | RESPIRATION RATE: 18 BRPM | BODY MASS INDEX: 46.65 KG/M2 | WEIGHT: 315 LBS | TEMPERATURE: 98.7 F | OXYGEN SATURATION: 97 % | HEIGHT: 69 IN

## 2017-12-21 DIAGNOSIS — M77.50 TENDONITIS OF FOOT: Primary | ICD-10-CM

## 2017-12-21 PROCEDURE — 99284 EMERGENCY DEPT VISIT MOD MDM: CPT

## 2017-12-21 PROCEDURE — 73630 X-RAY EXAM OF FOOT: CPT

## 2017-12-21 RX ORDER — INDOMETHACIN 75 MG/1
75 CAPSULE, EXTENDED RELEASE ORAL ONCE
Status: COMPLETED | OUTPATIENT
Start: 2017-12-21 | End: 2017-12-21

## 2017-12-21 RX ORDER — INDOMETHACIN 25 MG/1
25 CAPSULE ORAL 3 TIMES DAILY PRN
Qty: 20 CAPSULE | Refills: 0 | Status: ON HOLD | OUTPATIENT
Start: 2017-12-21 | End: 2018-02-19

## 2017-12-21 RX ADMIN — INDOMETHACIN 75 MG: 75 CAPSULE, EXTENDED RELEASE ORAL at 19:56

## 2017-12-22 DIAGNOSIS — M25.579 ACUTE ANKLE PAIN, UNSPECIFIED LATERALITY: Primary | ICD-10-CM

## 2017-12-22 NOTE — DISCHARGE INSTRUCTIONS
Limit weight bearing for three days.  Medications as directed.  IF symptoms persist, follow up with the orthopedic office of Dr. Khan.  Thank you and Brenda Calderon

## 2017-12-22 NOTE — ED PROVIDER NOTES
Subjective   HPI Comments: Patient presents with complaint of pain to the left foot onset about 4 days without hx of injury.  Pain located to the dorsal and medial portion of the foot only.  No neurosensory complaints.     Patient is a 27 y.o. male presenting with lower extremity pain.   History provided by:  Patient  Lower Extremity Issue   Location:  Foot  Time since incident:  4 days  Injury: no    Foot location:  L foot  Pain details:     Quality:  Aching    Radiates to:  Does not radiate    Severity:  Mild    Onset quality:  Gradual    Duration:  4 days    Timing:  Constant    Progression:  Worsening  Chronicity:  New  Dislocation: no    Prior injury to area:  No  Relieved by:  Nothing  Worsened by:  Nothing  Associated symptoms: no back pain and no fever    Risk factors: obesity        Review of Systems   Constitutional: Negative.  Negative for fever.   HENT: Negative for sore throat.    Eyes: Negative.    Respiratory: Negative for cough, shortness of breath and stridor.    Cardiovascular: Negative.    Gastrointestinal: Negative.  Negative for diarrhea, nausea and vomiting.   Endocrine: Negative.    Genitourinary: Negative.  Negative for dysuria.   Musculoskeletal: Negative.  Negative for back pain.        Left foot pain     Skin: Negative.  Negative for pallor and rash.   Allergic/Immunologic: Negative.    Neurological: Negative.  Negative for syncope and headaches.   Hematological: Negative.    Psychiatric/Behavioral: Negative.  Negative for agitation.   All other systems reviewed and are negative.      Past Medical History:   Diagnosis Date   • CHF (congestive heart failure)    • CPAP (continuous positive airway pressure) dependence    • Hypertension    • Morbid obesity    • Myocardial infarction    • On home O2     2l at bedtime and prn   • Plaque psoriasis    • Pneumonia    • Psoriasis    • Sleep apnea    • Wears glasses        No Known Allergies    Past Surgical History:   Procedure Laterality Date   •  ADENOIDECTOMY     • CARDIAC CATHETERIZATION N/A 7/13/2017    Procedure: Left Heart Cath;  Surgeon: Balbir Olsen MD;  Location:  [facility];  Service:    • CARDIAC DEFIBRILLATOR PLACEMENT  08/2017   • CARDIAC ELECTROPHYSIOLOGY PROCEDURE N/A 8/15/2017    Procedure: VVI ICD Implant;  Surgeon: Nathanael Richmond DO;  Location:  [facility];  Service:    • INTERNAL CARDIAC DEFIBRILLATOR INSERTION Left 2017   • NOSE SURGERY     • TONSILLECTOMY         Family History   Problem Relation Age of Onset   • Diabetes Father    • Diabetes Paternal Grandmother    • Diabetes Maternal Grandfather        Social History     Social History   • Marital status: Single     Spouse name: N/A   • Number of children: 0   • Years of education: N/A     Occupational History   • disabled      Social History Main Topics   • Smoking status: Never Smoker   • Smokeless tobacco: Never Used   • Alcohol use No   • Drug use: No   • Sexual activity: Defer     Other Topics Concern   • None     Social History Narrative    Caffeine use: some soda.    Patient lives with parents             Objective   Physical Exam   Constitutional: He is oriented to person, place, and time. He appears well-developed and well-nourished.   Obese      HENT:   Head: Normocephalic and atraumatic.   Eyes: Pupils are equal, round, and reactive to light.   Neck: Normal range of motion. No JVD present.   Cardiovascular: Normal rate and regular rhythm.    Pulmonary/Chest: Effort normal and breath sounds normal. No respiratory distress. He has no wheezes. He has no rales.   Musculoskeletal: Normal range of motion. He exhibits tenderness. He exhibits no edema or deformity.   LLE:  No pedal edema.  There is an area of 3cm by 3cm tender with local sts at the dorsal/medial portion of the foot.  NV and motor exams are negative.  nowarmth or redness.     Neurological: He is alert and oriented to person, place, and time. He exhibits normal muscle tone.  "Coordination normal.   Skin: Skin is warm and dry. No rash noted. He is not diaphoretic. No erythema. No pallor.   Psychiatric: He has a normal mood and affect. His behavior is normal.   Nursing note and vitals reviewed.      Procedures         ED Course  ED Course     No results found for this or any previous visit (from the past 24 hour(s)).  Note: In addition to lab results from this visit, the labs listed above may include labs taken at another facility or during a different encounter within the last 24 hours. Please correlate lab times with ED admission and discharge times for further clarification of the services performed during this visit.    XR Foot 3+ View Left   Final Result   No acute abnormalities visualized.      THIS DOCUMENT HAS BEEN ELECTRONICALLY SIGNED BY DARLENE IGNACIO MD        Vitals:    12/21/17 1514 12/21/17 2047   BP: 130/80 120/75   BP Location: Left arm    Patient Position: Sitting    Pulse: 107 90   Resp: 20 18   Temp: 98.7 °F (37.1 °C)    TempSrc: Oral    SpO2: 92% 97%   Weight: (!) 189 kg (417 lb)    Height: 175.3 cm (69\")      Medications   indomethacin SR (INDOCIN SR) CR capsule 75 mg (75 mg Oral Given 12/21/17 1956)     ECG/EMG Results (last 24 hours)     ** No results found for the last 24 hours. **                          [facility]    Final diagnoses:   Tendonitis of foot       Documentation assistance provided by josé De Luna.  Information recorded by the josé was done at my direction and has been verified and validated by me.     Cal De Luna  12/21/17 2020       SONAM Azul  12/26/17 0034    "

## 2017-12-29 NOTE — TELEPHONE ENCOUNTER
Spoke to [facility] and they advised pt was scheduled for 1/11/18 and they had received his [employer] referral.

## 2018-01-02 ENCOUNTER — TELEPHONE (OUTPATIENT)
Dept: INTERNAL MEDICINE | Facility: CLINIC | Age: 28
End: 2018-01-02

## 2018-01-02 DIAGNOSIS — G47.8 UNREFRESHED BY SLEEP: Primary | ICD-10-CM

## 2018-01-02 NOTE — TELEPHONE ENCOUNTER
Patient is needing a referral for a sleep study. Patient would like to go to [facility] to have it done. Please give patient a call back

## 2018-02-06 ENCOUNTER — OFFICE VISIT (OUTPATIENT)
Dept: CARDIOLOGY | Facility: HOSPITAL | Age: 28
End: 2018-02-06

## 2018-02-06 VITALS
OXYGEN SATURATION: 89 % | WEIGHT: 315 LBS | TEMPERATURE: 96.1 F | SYSTOLIC BLOOD PRESSURE: 131 MMHG | HEART RATE: 119 BPM | RESPIRATION RATE: 24 BRPM | HEIGHT: 69 IN | DIASTOLIC BLOOD PRESSURE: 101 MMHG | BODY MASS INDEX: 46.65 KG/M2

## 2018-02-06 DIAGNOSIS — I42.0 NONISCHEMIC CONGESTIVE CARDIOMYOPATHY (HCC): ICD-10-CM

## 2018-02-06 DIAGNOSIS — Z91.199 PERSONAL HISTORY OF NONCOMPLIANCE WITH MEDICAL TREATMENT: ICD-10-CM

## 2018-02-06 DIAGNOSIS — G47.33 OBSTRUCTIVE SLEEP APNEA: ICD-10-CM

## 2018-02-06 DIAGNOSIS — I50.23 ACUTE ON CHRONIC SYSTOLIC HEART FAILURE (HCC): Primary | ICD-10-CM

## 2018-02-06 DIAGNOSIS — I10 ESSENTIAL HYPERTENSION: ICD-10-CM

## 2018-02-06 LAB
ANION GAP SERPL CALCULATED.3IONS-SCNC: 6 MMOL/L (ref 3–11)
BNP SERPL-MCNC: 362 PG/ML (ref 0–100)
BUN BLD-MCNC: 7 MG/DL (ref 9–23)
BUN/CREAT SERPL: 8.8 (ref 7–25)
CALCIUM SPEC-SCNC: 9 MG/DL (ref 8.7–10.4)
CHLORIDE SERPL-SCNC: 103 MMOL/L (ref 99–109)
CO2 SERPL-SCNC: 27 MMOL/L (ref 20–31)
CREAT BLD-MCNC: 0.8 MG/DL (ref 0.6–1.3)
GFR SERPL CREATININE-BSD FRML MDRD: 143 ML/MIN/1.73
GLUCOSE BLD-MCNC: 106 MG/DL (ref 70–100)
POTASSIUM BLD-SCNC: 3.8 MMOL/L (ref 3.5–5.5)
SODIUM BLD-SCNC: 136 MMOL/L (ref 132–146)

## 2018-02-06 PROCEDURE — 99215 OFFICE O/P EST HI 40 MIN: CPT | Performed by: NURSE PRACTITIONER

## 2018-02-06 PROCEDURE — 83880 ASSAY OF NATRIURETIC PEPTIDE: CPT | Performed by: NURSE PRACTITIONER

## 2018-02-06 PROCEDURE — 80048 BASIC METABOLIC PNL TOTAL CA: CPT | Performed by: NURSE PRACTITIONER

## 2018-02-06 NOTE — PROGRESS NOTES
Encounter Date:02/06/2018      Patient ID: Evan Gannon is a 27 y.o. male.        Subjective:     Chief Complaint: Follow-up (c/o Shortness of air, Swelling, cought and wheezing)     History of Present Illness patient presents to the office today as an add-on for ongoing evaluation of his NICM. Patient has long history of noncompliance and has not been seen in the HF clinic since October 2017. Patient did not show for his appts with Dr Olsen and Dr Richmond in November. He notes that he did participate in cardiac rehab at [facility] but only went for 2 visits. He reports that his CPAP was taken back by the company 7-8 months ago due to noncompliance. Denies chest pain.Patient notes that he has been taking his medications but upon medication reconciliation, patient has not picked up prescriptions since November 2017.  He notes worsening dyspnea, abdominal fullness, fatigue and pedal edema..   Patient Active Problem List   Diagnosis   • Shortness of breath   • Hypertension   • Morbid obesity   • Obstructive sleep apnea   • Nonischemic congestive cardiomyopathy   • Personal history of noncompliance with medical treatment   • Acute on chronic systolic (congestive) heart failure   • Microcytic anemia   • Chest pain on breathing   • CHF (congestive heart failure), NYHA class IV   • Dilated cardiomyopathy   • Chronic systolic congestive heart failure   • Plaque psoriasis   • Congestive heart failure       Past Surgical History:   Procedure Laterality Date   • ADENOIDECTOMY     • CARDIAC CATHETERIZATION N/A 7/13/2017    Procedure: Left Heart Cath;  Surgeon: Balbir Olsen MD;  Location:  [facility];  Service:    • CARDIAC DEFIBRILLATOR PLACEMENT  08/2017   • CARDIAC ELECTROPHYSIOLOGY PROCEDURE N/A 8/15/2017    Procedure: VVI ICD Implant;  Surgeon: Nathanael Richmond DO;  Location:  [facility];  Service:    • INTERNAL CARDIAC DEFIBRILLATOR INSERTION Left 2017   • NOSE SURGERY     • OTHER SURGICAL  HISTORY      ultra light therapy   • TONSILLECTOMY         No Known Allergies      Current Outpatient Prescriptions:   •  albuterol (PROVENTIL HFA;VENTOLIN HFA) 108 (90 Base) MCG/ACT inhaler, Inhale 2 puffs Every 6 (Six) Hours As Needed for Wheezing., Disp: 1 inhaler, Rfl: 11  •  aspirin 81 MG EC tablet, Take 1 tablet by mouth Daily., Disp: 365 tablet, Rfl: 0  •  carvedilol (COREG) 25 MG tablet, Take 1 tablet by mouth 2 (Two) Times a Day With Meals., Disp: 60 tablet, Rfl: 11  •  indomethacin (INDOCIN) 25 MG capsule, Take 1 capsule by mouth 3 (Three) Times a Day As Needed for Mild Pain ., Disp: 20 capsule, Rfl: 0  •  metOLazone (ZAROXOLYN) 5 MG tablet, Take 1 tablet by mouth Daily As Needed (Edema or shortness of breath)., Disp: 30 tablet, Rfl: 6  •  sacubitril-valsartan (ENTRESTO) 24-26 MG tablet, Take 1 tablet by mouth Every 12 (Twelve) Hours., Disp: 60 tablet, Rfl: 11  •  spironolactone (ALDACTONE) 25 MG tablet, Take 1 tablet by mouth Daily., Disp: 30 tablet, Rfl: 11  •  torsemide (DEMADEX) 20 MG tablet, Take 3 tablets by mouth Daily., Disp: 90 tablet, Rfl: 11    The following portions of the chart were reviewed and updated as appropriate: Allergies, current medications, past family history, social history, past medical history.     Review of Systems   Constitution: Positive for weakness, malaise/fatigue and weight gain. Negative for chills, decreased appetite, diaphoresis, fever, night sweats and weight loss.   HENT: Negative for congestion, hearing loss, hoarse voice and nosebleeds.    Eyes: Positive for blurred vision. Negative for visual disturbance and visual halos.        Mucus in Right eye   Cardiovascular: Positive for dyspnea on exertion, leg swelling and orthopnea. Negative for chest pain, claudication, cyanosis, near-syncope, palpitations, paroxysmal nocturnal dyspnea and syncope.   Respiratory: Positive for cough, shortness of breath, snoring and wheezing. Negative for hemoptysis, sleep disturbances  "due to breathing and sputum production.    Endocrine: Positive for polydipsia.   Hematologic/Lymphatic: Negative for bleeding problem. Does not bruise/bleed easily.   Skin: Positive for itching. Negative for dry skin and rash.   Musculoskeletal: Negative for arthritis, falls, joint pain, joint swelling and myalgias.   Gastrointestinal: Negative for bloating, abdominal pain, constipation, diarrhea, flatus, heartburn, hematemesis, hematochezia, melena, nausea and vomiting.   Genitourinary: Negative for dysuria, frequency, hematuria, nocturia and urgency.   Neurological: Positive for excessive daytime sleepiness. Negative for dizziness, headaches, light-headedness and loss of balance.   Psychiatric/Behavioral: Negative for depression. The patient has insomnia. The patient is not nervous/anxious.            Objective:     Vitals:    02/06/18 1218 02/06/18 1221 02/06/18 1222   BP: 128/88 119/96 (!) 131/101   BP Location: Right arm Left arm Left arm   Patient Position: Sitting Sitting Standing   Cuff Size: Adult     Pulse: 105 107 119   Resp: 24     Temp: 96.1 °F (35.6 °C)     TempSrc: Temporal Artery      SpO2: (!) 89%     Weight: (!) 186 kg (410 lb)     Height: 175.3 cm (69\")           Physical Exam   Constitutional: He is oriented to person, place, and time. He appears well-developed and well-nourished. He is active and cooperative. No distress.   HENT:   Head: Normocephalic and atraumatic.   Mouth/Throat: Oropharynx is clear and moist.   Eyes: Conjunctivae and EOM are normal. Pupils are equal, round, and reactive to light.   Neck: Normal range of motion. Neck supple. No JVD present. No tracheal deviation present. No thyromegaly present.   Cardiovascular: Normal rate, regular rhythm, normal heart sounds and intact distal pulses.    Pulmonary/Chest: He is in respiratory distress (mild). He has rales.   Abdominal: Soft. Bowel sounds are normal. He exhibits no distension. There is no tenderness.   Musculoskeletal: Normal " range of motion. He exhibits edema (3+ pitting edema noted bilateral LEs).   Neurological: He is alert and oriented to person, place, and time.   Skin: Skin is warm, dry and intact.   Psoriatic lesions noted bilateral arms and legs   Psychiatric: He has a normal mood and affect. His behavior is normal.   Nursing note and vitals reviewed.      Lab and Diagnostic Review:      Results for orders placed or performed in visit on 02/06/18   Basic Metabolic Panel   Result Value Ref Range    Glucose 106 (H) 70 - 100 mg/dL    BUN 7 (L) 9 - 23 mg/dL    Creatinine 0.80 0.60 - 1.30 mg/dL    Sodium 136 132 - 146 mmol/L    Potassium 3.8 3.5 - 5.5 mmol/L    Chloride 103 99 - 109 mmol/L    CO2 27.0 20.0 - 31.0 mmol/L    Calcium 9.0 8.7 - 10.4 mg/dL    eGFR  African Amer 143 >60 mL/min/1.73    BUN/Creatinine Ratio 8.8 7.0 - 25.0    Anion Gap 6.0 3.0 - 11.0 mmol/L   BNP   Result Value Ref Range    .0 (H) 0.0 - 100.0 pg/mL      Device interrogation per [facility] rep showed normal function    Assessment and Plan:         1. Acute on chronic systolic heart failure  IV diuresis today in office. Patient received bumex 2 mg NDC [id number]  today through a  butterfly  in the right AC over slow IV push. During IV diuresis, vitals were monitored and stable. Please see IV diuresis record for those vitals. Patient voided 1475 ml in the office prior to discharge from the  office. butterfly was d/c'd and area was free of erythema, ecchymosis, or drainage.  Patient was  instructed to record urinary output for the next 24 hours. Patient will receive a follow up call from the HF center in 24 hours to evaluate urinary output and reassess signs and symptoms.   - Basic Metabolic Panel  - BNP    2. Nonischemic congestive cardiomyopathy    - carvedilol (COREG) 25 MG tablet; Take 1 tablet by mouth 2 (Two) Times a Day With Meals.  Dispense: 60 tablet; Refill: 11  - spironolactone (ALDACTONE) 25 MG tablet; Take 1 tablet by mouth Daily.  Dispense:  30 tablet; Refill: 11  - aspirin 81 MG EC tablet; Take 1 tablet by mouth Daily.  Dispense: 365 tablet; Refill: 0  Heart failure education today including signs and symptoms, the role of the heart failure center, daily weights, low sodium diet (less than 1500 mg per day), and daily physical activity. Reviewed HF Zones with patient and family.  Patient to continue current medications as previously ordered.   3. Essential hypertension  Elevated today due to noncompliance with medications  HTN Education provided today including signs and symptoms, medication management, daily blood pressure monitoring. Patient encouraged to call the [facility] with any abnormal readings.  . Obstructive sleep apnea  Noncompliance with cpap  Scheduled to see Dr Shrestha in sleep medicine March 5. Personal history of noncompliance with medical treatment  Long discussion with the patient today discussing importance of taking medications as prescribed and keeping scheduled follow up appt    It has been a pleasure to participate in the care of this patient.  Patient was instructed to call the [facility] with any questions, concerns, or worsening symptoms.  * Please note that portions of this note were completed with a voice recognition program. Efforts were made to edit the dictation but occasionally words are transcribed.

## 2018-02-07 RX ORDER — TORSEMIDE 20 MG/1
60 TABLET ORAL DAILY
Qty: 90 TABLET | Refills: 11 | Status: ON HOLD | OUTPATIENT
Start: 2018-02-07 | End: 2018-02-14

## 2018-02-07 RX ORDER — ASPIRIN 81 MG/1
81 TABLET ORAL DAILY
Qty: 365 TABLET | Refills: 0 | Status: SHIPPED | OUTPATIENT
Start: 2018-02-07 | End: 2018-03-15 | Stop reason: SDUPTHER

## 2018-02-07 RX ORDER — CARVEDILOL 25 MG/1
25 TABLET ORAL 2 TIMES DAILY WITH MEALS
Qty: 60 TABLET | Refills: 11 | Status: ON HOLD | OUTPATIENT
Start: 2018-02-07 | End: 2018-02-14

## 2018-02-07 RX ORDER — SPIRONOLACTONE 25 MG/1
25 TABLET ORAL DAILY
Qty: 30 TABLET | Refills: 11 | Status: ON HOLD | OUTPATIENT
Start: 2018-02-07 | End: 2018-02-14

## 2018-02-07 RX ORDER — METOLAZONE 5 MG/1
5 TABLET ORAL DAILY PRN
Qty: 30 TABLET | Refills: 6 | Status: ON HOLD | OUTPATIENT
Start: 2018-02-07 | End: 2018-02-14

## 2018-02-07 RX ORDER — ALBUTEROL SULFATE 90 UG/1
2 AEROSOL, METERED RESPIRATORY (INHALATION) EVERY 6 HOURS PRN
Qty: 1 INHALER | Refills: 11 | Status: SHIPPED | OUTPATIENT
Start: 2018-02-07 | End: 2018-02-21 | Stop reason: SDUPTHER

## 2018-02-09 ENCOUNTER — TELEPHONE (OUTPATIENT)
Dept: CARDIOLOGY | Facility: HOSPITAL | Age: 28
End: 2018-02-09

## 2018-02-09 NOTE — TELEPHONE ENCOUNTER
Multiple attempts to call patient to assess s/s of fluid overload after IV diuresis. Unable to reach patient and unable to leave voicemail.

## 2018-02-11 ENCOUNTER — HOSPITAL ENCOUNTER (INPATIENT)
Facility: HOSPITAL | Age: 28
LOS: 3 days | Discharge: HOME | End: 2018-02-14
Attending: EMERGENCY MEDICINE | Admitting: FAMILY MEDICINE

## 2018-02-11 ENCOUNTER — APPOINTMENT (OUTPATIENT)
Dept: CT IMAGING | Facility: HOSPITAL | Age: 28
End: 2018-02-11

## 2018-02-11 ENCOUNTER — APPOINTMENT (OUTPATIENT)
Dept: GENERAL RADIOLOGY | Facility: HOSPITAL | Age: 28
End: 2018-02-11

## 2018-02-11 DIAGNOSIS — I42.0 NONISCHEMIC CONGESTIVE CARDIOMYOPATHY (HCC): ICD-10-CM

## 2018-02-11 DIAGNOSIS — I50.9 ACUTE ON CHRONIC CONGESTIVE HEART FAILURE, UNSPECIFIED CONGESTIVE HEART FAILURE TYPE: Primary | ICD-10-CM

## 2018-02-11 PROBLEM — D50.9 MICROCYTIC ANEMIA: Status: RESOLVED | Noted: 2017-02-28 | Resolved: 2018-02-11

## 2018-02-11 PROBLEM — R31.0 GROSS HEMATURIA: Status: ACTIVE | Noted: 2018-02-11

## 2018-02-11 LAB
ALBUMIN SERPL-MCNC: 3.8 G/DL (ref 3.2–4.8)
ALBUMIN/GLOB SERPL: 1 G/DL (ref 1.5–2.5)
ALP SERPL-CCNC: 82 U/L (ref 25–100)
ALT SERPL W P-5'-P-CCNC: 15 U/L (ref 7–40)
ANION GAP SERPL CALCULATED.3IONS-SCNC: 7 MMOL/L (ref 3–11)
AST SERPL-CCNC: 19 U/L (ref 0–33)
BACTERIA UR QL AUTO: ABNORMAL /HPF
BASOPHILS # BLD AUTO: 0.02 10*3/MM3 (ref 0–0.2)
BASOPHILS NFR BLD AUTO: 0.4 % (ref 0–1)
BILIRUB SERPL-MCNC: 1.5 MG/DL (ref 0.3–1.2)
BILIRUB UR QL STRIP: NEGATIVE
BNP SERPL-MCNC: 669 PG/ML (ref 0–100)
BUN BLD-MCNC: 11 MG/DL (ref 9–23)
BUN/CREAT SERPL: 12.2 (ref 7–25)
CALCIUM SPEC-SCNC: 9.3 MG/DL (ref 8.7–10.4)
CHLORIDE SERPL-SCNC: 106 MMOL/L (ref 99–109)
CLARITY UR: ABNORMAL
CO2 SERPL-SCNC: 25 MMOL/L (ref 20–31)
COLOR UR: ABNORMAL
CREAT BLD-MCNC: 0.9 MG/DL (ref 0.6–1.3)
D DIMER PPP FEU-MCNC: 1.05 MG/L (FEU) (ref 0–0.5)
D-LACTATE SERPL-SCNC: 1.3 MMOL/L (ref 0.5–2)
DEPRECATED RDW RBC AUTO: 43.1 FL (ref 37–54)
EOSINOPHIL # BLD AUTO: 0.06 10*3/MM3 (ref 0–0.3)
EOSINOPHIL NFR BLD AUTO: 1.1 % (ref 0–3)
ERYTHROCYTE [DISTWIDTH] IN BLOOD BY AUTOMATED COUNT: 14.9 % (ref 11.3–14.5)
FLUAV AG NPH QL: NEGATIVE
FLUBV AG NPH QL IA: NEGATIVE
GFR SERPL CREATININE-BSD FRML MDRD: 125 ML/MIN/1.73
GLOBULIN UR ELPH-MCNC: 3.9 GM/DL
GLUCOSE BLD-MCNC: 106 MG/DL (ref 70–100)
GLUCOSE UR STRIP-MCNC: NEGATIVE MG/DL
HCT VFR BLD AUTO: 42.4 % (ref 38.9–50.9)
HGB BLD-MCNC: 13.9 G/DL (ref 13.1–17.5)
HGB UR QL STRIP.AUTO: ABNORMAL
HOLD SPECIMEN: NORMAL
HOLD SPECIMEN: NORMAL
HYALINE CASTS UR QL AUTO: ABNORMAL /LPF
IMM GRANULOCYTES # BLD: 0.01 10*3/MM3 (ref 0–0.03)
IMM GRANULOCYTES NFR BLD: 0.2 % (ref 0–0.6)
KETONES UR QL STRIP: NEGATIVE
LEUKOCYTE ESTERASE UR QL STRIP.AUTO: ABNORMAL
LYMPHOCYTES # BLD AUTO: 1 10*3/MM3 (ref 0.6–4.8)
LYMPHOCYTES NFR BLD AUTO: 17.8 % (ref 24–44)
MCH RBC QN AUTO: 25.9 PG (ref 27–31)
MCHC RBC AUTO-ENTMCNC: 32.8 G/DL (ref 32–36)
MCV RBC AUTO: 79.1 FL (ref 80–99)
MONOCYTES # BLD AUTO: 0.34 10*3/MM3 (ref 0–1)
MONOCYTES NFR BLD AUTO: 6 % (ref 0–12)
NEUTROPHILS # BLD AUTO: 4.19 10*3/MM3 (ref 1.5–8.3)
NEUTROPHILS NFR BLD AUTO: 74.5 % (ref 41–71)
NITRITE UR QL STRIP: NEGATIVE
PH UR STRIP.AUTO: 7 [PH] (ref 5–8)
PLATELET # BLD AUTO: 290 10*3/MM3 (ref 150–450)
PMV BLD AUTO: 10.5 FL (ref 6–12)
POTASSIUM BLD-SCNC: 3.6 MMOL/L (ref 3.5–5.5)
PROT SERPL-MCNC: 7.7 G/DL (ref 5.7–8.2)
PROT UR QL STRIP: NEGATIVE
RBC # BLD AUTO: 5.36 10*6/MM3 (ref 4.2–5.76)
RBC # UR: ABNORMAL /HPF
REF LAB TEST METHOD: ABNORMAL
SODIUM BLD-SCNC: 138 MMOL/L (ref 132–146)
SP GR UR STRIP: 1.03 (ref 1–1.03)
SQUAMOUS #/AREA URNS HPF: ABNORMAL /HPF
TROPONIN I SERPL-MCNC: 0.1 NG/ML (ref 0–0.07)
TROPONIN I SERPL-MCNC: 0.11 NG/ML (ref 0–0.07)
UROBILINOGEN UR QL STRIP: ABNORMAL
WBC NRBC COR # BLD: 5.62 10*3/MM3 (ref 3.5–10.8)
WBC UR QL AUTO: ABNORMAL /HPF
WHOLE BLOOD HOLD SPECIMEN: NORMAL
WHOLE BLOOD HOLD SPECIMEN: NORMAL

## 2018-02-11 PROCEDURE — 83735 ASSAY OF MAGNESIUM: CPT | Performed by: FAMILY MEDICINE

## 2018-02-11 PROCEDURE — 93005 ELECTROCARDIOGRAM TRACING: CPT | Performed by: EMERGENCY MEDICINE

## 2018-02-11 PROCEDURE — 74176 CT ABD & PELVIS W/O CONTRAST: CPT

## 2018-02-11 PROCEDURE — 93005 ELECTROCARDIOGRAM TRACING: CPT

## 2018-02-11 PROCEDURE — 5A09457 ASSISTANCE WITH RESPIRATORY VENTILATION, 24-96 CONSECUTIVE HOURS, CONTINUOUS POSITIVE AIRWAY PRESSURE: ICD-10-PCS | Performed by: FAMILY MEDICINE

## 2018-02-11 PROCEDURE — 99223 1ST HOSP IP/OBS HIGH 75: CPT | Performed by: FAMILY MEDICINE

## 2018-02-11 PROCEDURE — 85379 FIBRIN DEGRADATION QUANT: CPT | Performed by: EMERGENCY MEDICINE

## 2018-02-11 PROCEDURE — 84484 ASSAY OF TROPONIN QUANT: CPT

## 2018-02-11 PROCEDURE — 87804 INFLUENZA ASSAY W/OPTIC: CPT | Performed by: EMERGENCY MEDICINE

## 2018-02-11 PROCEDURE — 87040 BLOOD CULTURE FOR BACTERIA: CPT | Performed by: EMERGENCY MEDICINE

## 2018-02-11 PROCEDURE — 87086 URINE CULTURE/COLONY COUNT: CPT | Performed by: EMERGENCY MEDICINE

## 2018-02-11 PROCEDURE — 85025 COMPLETE CBC W/AUTO DIFF WBC: CPT | Performed by: EMERGENCY MEDICINE

## 2018-02-11 PROCEDURE — 83605 ASSAY OF LACTIC ACID: CPT | Performed by: EMERGENCY MEDICINE

## 2018-02-11 PROCEDURE — 83880 ASSAY OF NATRIURETIC PEPTIDE: CPT | Performed by: EMERGENCY MEDICINE

## 2018-02-11 PROCEDURE — 71275 CT ANGIOGRAPHY CHEST: CPT

## 2018-02-11 PROCEDURE — 80053 COMPREHEN METABOLIC PANEL: CPT | Performed by: EMERGENCY MEDICINE

## 2018-02-11 PROCEDURE — 81001 URINALYSIS AUTO W/SCOPE: CPT | Performed by: EMERGENCY MEDICINE

## 2018-02-11 PROCEDURE — 71045 X-RAY EXAM CHEST 1 VIEW: CPT

## 2018-02-11 PROCEDURE — 99285 EMERGENCY DEPT VISIT HI MDM: CPT

## 2018-02-11 PROCEDURE — 0 IOPAMIDOL PER 1 ML: Performed by: EMERGENCY MEDICINE

## 2018-02-11 RX ORDER — ASPIRIN 325 MG
325 TABLET ORAL ONCE
Status: COMPLETED | OUTPATIENT
Start: 2018-02-11 | End: 2018-02-11

## 2018-02-11 RX ORDER — SPIRONOLACTONE 25 MG/1
25 TABLET ORAL DAILY
Status: DISCONTINUED | OUTPATIENT
Start: 2018-02-12 | End: 2018-02-13

## 2018-02-11 RX ORDER — MAGNESIUM SULFATE HEPTAHYDRATE 40 MG/ML
2 INJECTION, SOLUTION INTRAVENOUS AS NEEDED
Status: DISCONTINUED | OUTPATIENT
Start: 2018-02-11 | End: 2018-02-14 | Stop reason: HOSPADM

## 2018-02-11 RX ORDER — SODIUM CHLORIDE 0.9 % (FLUSH) 0.9 %
1-10 SYRINGE (ML) INJECTION AS NEEDED
Status: DISCONTINUED | OUTPATIENT
Start: 2018-02-11 | End: 2018-02-14 | Stop reason: HOSPADM

## 2018-02-11 RX ORDER — HEPARIN SODIUM 5000 [USP'U]/ML
5000 INJECTION, SOLUTION INTRAVENOUS; SUBCUTANEOUS EVERY 8 HOURS SCHEDULED
Status: DISCONTINUED | OUTPATIENT
Start: 2018-02-12 | End: 2018-02-14 | Stop reason: HOSPADM

## 2018-02-11 RX ORDER — SODIUM CHLORIDE 0.9 % (FLUSH) 0.9 %
10 SYRINGE (ML) INJECTION AS NEEDED
Status: DISCONTINUED | OUTPATIENT
Start: 2018-02-11 | End: 2018-02-14 | Stop reason: HOSPADM

## 2018-02-11 RX ORDER — MAGNESIUM SULFATE HEPTAHYDRATE 40 MG/ML
4 INJECTION, SOLUTION INTRAVENOUS AS NEEDED
Status: DISCONTINUED | OUTPATIENT
Start: 2018-02-11 | End: 2018-02-14 | Stop reason: HOSPADM

## 2018-02-11 RX ORDER — ASPIRIN 81 MG/1
81 TABLET ORAL DAILY
Status: DISCONTINUED | OUTPATIENT
Start: 2018-02-12 | End: 2018-02-14 | Stop reason: HOSPADM

## 2018-02-11 RX ORDER — POTASSIUM CHLORIDE 1.5 G/1.77G
40 POWDER, FOR SOLUTION ORAL AS NEEDED
Status: DISCONTINUED | OUTPATIENT
Start: 2018-02-11 | End: 2018-02-14 | Stop reason: HOSPADM

## 2018-02-11 RX ORDER — BUMETANIDE 0.25 MG/ML
1 INJECTION INTRAMUSCULAR; INTRAVENOUS EVERY 12 HOURS
Status: COMPLETED | OUTPATIENT
Start: 2018-02-12 | End: 2018-02-13

## 2018-02-11 RX ORDER — NITROGLYCERIN 0.4 MG/1
0.4 TABLET SUBLINGUAL
Status: DISCONTINUED | OUTPATIENT
Start: 2018-02-11 | End: 2018-02-14 | Stop reason: HOSPADM

## 2018-02-11 RX ORDER — BUMETANIDE 0.25 MG/ML
1 INJECTION INTRAMUSCULAR; INTRAVENOUS ONCE
Status: COMPLETED | OUTPATIENT
Start: 2018-02-11 | End: 2018-02-11

## 2018-02-11 RX ORDER — POTASSIUM CHLORIDE 750 MG/1
40 CAPSULE, EXTENDED RELEASE ORAL AS NEEDED
Status: DISCONTINUED | OUTPATIENT
Start: 2018-02-11 | End: 2018-02-14 | Stop reason: HOSPADM

## 2018-02-11 RX ORDER — CARVEDILOL 12.5 MG/1
25 TABLET ORAL 2 TIMES DAILY WITH MEALS
Status: DISCONTINUED | OUTPATIENT
Start: 2018-02-12 | End: 2018-02-14 | Stop reason: HOSPADM

## 2018-02-11 RX ADMIN — Medication 10 ML: at 19:50

## 2018-02-11 RX ADMIN — IOPAMIDOL 80 ML: 755 INJECTION, SOLUTION INTRAVENOUS at 21:08

## 2018-02-11 RX ADMIN — Medication 10 ML: at 21:57

## 2018-02-11 RX ADMIN — BUMETANIDE 1 MG: 0.25 INJECTION INTRAMUSCULAR; INTRAVENOUS at 21:56

## 2018-02-11 RX ADMIN — NITROGLYCERIN 0.4 MG: 0.4 TABLET SUBLINGUAL at 21:39

## 2018-02-11 RX ADMIN — ASPIRIN 325 MG ORAL TABLET 325 MG: 325 PILL ORAL at 20:25

## 2018-02-12 ENCOUNTER — TRANSCRIBE ORDERS (OUTPATIENT)
Dept: CARDIAC REHAB | Facility: HOSPITAL | Age: 28
End: 2018-02-12

## 2018-02-12 DIAGNOSIS — I50.22 CHRONIC SYSTOLIC CHF (CONGESTIVE HEART FAILURE) (HCC): Primary | ICD-10-CM

## 2018-02-12 PROBLEM — J96.01 ACUTE HYPOXEMIC RESPIRATORY FAILURE: Status: ACTIVE | Noted: 2018-02-12

## 2018-02-12 PROBLEM — R31.9 PAINLESS HEMATURIA: Status: ACTIVE | Noted: 2018-02-12

## 2018-02-12 LAB
ANION GAP SERPL CALCULATED.3IONS-SCNC: 8 MMOL/L (ref 3–11)
BUN BLD-MCNC: 11 MG/DL (ref 9–23)
BUN/CREAT SERPL: 12.2 (ref 7–25)
CALCIUM SPEC-SCNC: 8.5 MG/DL (ref 8.7–10.4)
CHLORIDE SERPL-SCNC: 104 MMOL/L (ref 99–109)
CO2 SERPL-SCNC: 25 MMOL/L (ref 20–31)
CREAT BLD-MCNC: 0.9 MG/DL (ref 0.6–1.3)
DEPRECATED RDW RBC AUTO: 45.1 FL (ref 37–54)
ERYTHROCYTE [DISTWIDTH] IN BLOOD BY AUTOMATED COUNT: 15.3 % (ref 11.3–14.5)
GFR SERPL CREATININE-BSD FRML MDRD: 125 ML/MIN/1.73
GLUCOSE BLD-MCNC: 104 MG/DL (ref 70–100)
HCT VFR BLD AUTO: 40.5 % (ref 38.9–50.9)
HGB BLD-MCNC: 12.9 G/DL (ref 13.1–17.5)
MAGNESIUM SERPL-MCNC: 1.8 MG/DL (ref 1.3–2.7)
MAGNESIUM SERPL-MCNC: 1.9 MG/DL (ref 1.3–2.7)
MCH RBC QN AUTO: 25.6 PG (ref 27–31)
MCHC RBC AUTO-ENTMCNC: 31.9 G/DL (ref 32–36)
MCV RBC AUTO: 80.4 FL (ref 80–99)
PLATELET # BLD AUTO: 263 10*3/MM3 (ref 150–450)
PMV BLD AUTO: 11 FL (ref 6–12)
POTASSIUM BLD-SCNC: 3.8 MMOL/L (ref 3.5–5.5)
RBC # BLD AUTO: 5.04 10*6/MM3 (ref 4.2–5.76)
SODIUM BLD-SCNC: 137 MMOL/L (ref 132–146)
WBC NRBC COR # BLD: 5.15 10*3/MM3 (ref 3.5–10.8)

## 2018-02-12 PROCEDURE — 94799 UNLISTED PULMONARY SVC/PX: CPT

## 2018-02-12 PROCEDURE — 83735 ASSAY OF MAGNESIUM: CPT | Performed by: FAMILY MEDICINE

## 2018-02-12 PROCEDURE — 25010000002 HEPARIN (PORCINE) PER 1000 UNITS: Performed by: FAMILY MEDICINE

## 2018-02-12 PROCEDURE — 94660 CPAP INITIATION&MGMT: CPT

## 2018-02-12 PROCEDURE — 85027 COMPLETE CBC AUTOMATED: CPT | Performed by: FAMILY MEDICINE

## 2018-02-12 PROCEDURE — 99222 1ST HOSP IP/OBS MODERATE 55: CPT | Performed by: INTERNAL MEDICINE

## 2018-02-12 PROCEDURE — 99232 SBSQ HOSP IP/OBS MODERATE 35: CPT | Performed by: INTERNAL MEDICINE

## 2018-02-12 PROCEDURE — 80048 BASIC METABOLIC PNL TOTAL CA: CPT | Performed by: FAMILY MEDICINE

## 2018-02-12 RX ADMIN — BUMETANIDE 1 MG: 0.25 INJECTION INTRAMUSCULAR; INTRAVENOUS at 11:11

## 2018-02-12 RX ADMIN — SPIRONOLACTONE 25 MG: 25 TABLET, FILM COATED ORAL at 08:45

## 2018-02-12 RX ADMIN — CARVEDILOL 25 MG: 12.5 TABLET, FILM COATED ORAL at 08:45

## 2018-02-12 RX ADMIN — HEPARIN SODIUM 5000 UNITS: 5000 INJECTION, SOLUTION INTRAVENOUS; SUBCUTANEOUS at 05:29

## 2018-02-12 RX ADMIN — ASPIRIN 81 MG: 81 TABLET, COATED ORAL at 08:45

## 2018-02-12 RX ADMIN — SACUBITRIL AND VALSARTAN 1 TABLET: 24; 26 TABLET, FILM COATED ORAL at 08:46

## 2018-02-12 RX ADMIN — SACUBITRIL AND VALSARTAN 1 TABLET: 49; 51 TABLET, FILM COATED ORAL at 21:52

## 2018-02-12 RX ADMIN — BUMETANIDE 1 MG: 0.25 INJECTION INTRAMUSCULAR; INTRAVENOUS at 22:13

## 2018-02-12 RX ADMIN — HEPARIN SODIUM 5000 UNITS: 5000 INJECTION, SOLUTION INTRAVENOUS; SUBCUTANEOUS at 15:03

## 2018-02-12 RX ADMIN — CARVEDILOL 25 MG: 12.5 TABLET, FILM COATED ORAL at 17:15

## 2018-02-12 NOTE — H&P
[facility] Medicine Services  HISTORY AND PHYSICAL    Patient Name: Evan Gannon  : 1990  MRN: [id number]  Primary Care Physician: Barbara Mills MD    Subjective   Subjective     Chief Complaint:  Dyspnea    HPI:  Evan Gannon is a 27 y.o. male with NICM with EF about 15% measured 2017 presents for worsening SOA for about a month.  He states that he was doing remarkably well after his hospitalization in October until the weather got cold.  He has not filled his medications since November due to financial concerns.  Over the last month, he has become more dyspneic and has had more dependent edema than usual.  He is marked orthopnea and decreased UOP.  He reports that he had no UOP today until 6 PM at which time he noted a bloody discharge at the end of his urine stream.  He just now reports hematuria with some discomfort.  He also has sleep apnea, but his CPAP was taken from him for nonuse.  He went to the heart failure clinic on  where he received 1 mg bumex and he produced 1400 mL of urine.  He states that he did not have symptomatic relief from this diuresis and that he has not yet picked up his medications from the pharmacy that Ms. Almeida called in.    Review of Systems   Constitutional: Positive for activity change.   HENT: Negative.    Eyes: Negative.    Respiratory: Positive for cough.    Cardiovascular: Positive for leg swelling.   Gastrointestinal: Negative.    Endocrine: Negative.    Genitourinary: Positive for hematuria.   Musculoskeletal: Negative.    Skin:        Plaque psoriasis   Allergic/Immunologic: Negative.    Neurological: Negative.    Hematological: Negative.    Psychiatric/Behavioral: Negative.         Otherwise 10-system ROS reviewed and is negative except as mentioned in the HPI.    Personal History     Past Medical History:   Diagnosis Date   • CHF (congestive heart failure)    • CPAP (continuous positive airway pressure)  dependence    • Hypertension    • Morbid obesity    • Myocardial infarction    • On home O2     2l at bedtime and prn   • Plaque psoriasis    • Pneumonia    • Psoriasis    • Sleep apnea    • Wears glasses        Past Surgical History:   Procedure Laterality Date   • ADENOIDECTOMY     • CARDIAC CATHETERIZATION N/A 7/13/2017    Procedure: Left Heart Cath;  Surgeon: Balbir Olsen MD;  Location:  [facility];  Service:    • CARDIAC DEFIBRILLATOR PLACEMENT  08/2017   • CARDIAC ELECTROPHYSIOLOGY PROCEDURE N/A 8/15/2017    Procedure: VVI ICD Implant;  Surgeon: Nathanael Richmond DO;  Location:  [facility] INVASIVE [facility];  Service:    • INTERNAL CARDIAC DEFIBRILLATOR INSERTION Left 2017   • NOSE SURGERY     • OTHER SURGICAL HISTORY      ultra light therapy   • TONSILLECTOMY         Family History: family history includes Diabetes in his father, maternal grandfather, and paternal grandmother.     Social History:  reports that he has never smoked. He has never used smokeless tobacco. He reports that he does not drink alcohol or use illicit drugs.  Social History     Social History Narrative    Caffeine use: some soda.    Patient lives with parents           Medications:    (Not in a hospital admission)    No Known Allergies    Objective   Objective     Vital Signs:   Temp:  [98.4 °F (36.9 °C)] 98.4 °F (36.9 °C)  Heart Rate:  [113-124] 120  Resp:  [30-36] 32  BP: (112-145)/(78-96) 122/84        Physical Exam   Constitutional: Mildly dyspneic on O2, awake, alert, obese  Eyes: PERRLA, sclerae anicteric, no conjunctival injection  HENT: NCAT, mucous membranes moist  Neck: Supple, no thyromegaly, no lymphadenopathy, trachea midline  Respiratory: Clear to auscultation bilaterally, nonlabored respirations   Cardiovascular: Regular, palpable pedal pulses bilaterally  Gastrointestinal: Positive bowel sounds, soft, obese  Musculoskeletal: 2+ BLE, no clubbing or cyanosis to extremities  Psychiatric: Appropriate affect,  cooperative  Neurologic: Oriented x 3, strength symmetric in all extremities, Cranial Nerves grossly intact to confrontation, speech clear  Skin: Plaque psoriasis trunk, limbs X4   exam witnessed by male RN - bloody small clot at meatus.  No trauma.    Results Reviewed:  I have personally reviewed current lab, radiology, and data and agree.      Results from last 7 days  Lab Units 02/11/18 1951   WBC 10*3/mm3 5.62   HEMOGLOBIN g/dL 13.9   HEMATOCRIT % 42.4   PLATELETS 10*3/mm3 290       Results from last 7 days  Lab Units 02/11/18 1951   SODIUM mmol/L 138   POTASSIUM mmol/L 3.6   CHLORIDE mmol/L 106   CO2 mmol/L 25.0   BUN mg/dL 11   CREATININE mg/dL 0.90   GLUCOSE mg/dL 106*   CALCIUM mg/dL 9.3   ALT (SGPT) U/L 15   AST (SGOT) U/L 19     Estimated Creatinine Clearance: 197 mL/min (by C-G formula based on Cr of 0.9).  Brief Urine Lab Results  (Last result in the past 365 days)      Color   Clarity   Blood   Leuk Est   Nitrite   Protein   CREAT   Urine HCG        02/11/18 2219 Dark Yellow(A) Cloudy(A) Large (3+)(A) Small (1+)(A) Negative Negative             BNP   Date Value Ref Range Status   02/11/2018 669.0 (H) 0.0 - 100.0 pg/mL Final     Comment:     Results may be falsely decreased if patient taking Biotin.     No results found for: PHART  Imaging Results (last 24 hours)     Procedure Component Value Units Date/Time    XR Chest 1 View [[id number]] Collected:  02/11/18 1944     Updated:  02/11/18 2114    Narrative:       EXAM:  XR Chest, 1 View    CLINICAL HISTORY:  27 years old, male; Signs and symptoms; Shortness of breath; Prior surgery;   Surgery date: 6+ months; Surgery type: Pacemaker; Additional info: SOB    TECHNIQUE:  Frontal view of the chest.    COMPARISON:  CR - XR CHEST 1 VW 2017-10-16 08:55    FINDINGS:  Lungs:  Mild bibasilar groundglass opacities, right greater than left, likely   areas of atelectasis and/or minimal pneumonitis. Minimal hydrostatic pulmonary   edema possible.  Pleural space:   Normal.  No pneumothorax.  Heart:  Cardiac silhouette is enlarged, compatible with cardiomegaly and/or   pericardial fluid.  Mediastinum:  Normal.  Bones/joints:  Normal.  Tubes, lines and devices:  Left subclavian transvenous pacemaker/AICD.      Impression:         1.  Mild bibasilar groundglass opacities, right greater than left, likely areas   of atelectasis and/or minimal pneumonitis. Minimal hydrostatic pulmonary edema   possible.    2.  Incidental/non-acute findings are described above.    THIS DOCUMENT HAS BEEN ELECTRONICALLY SIGNED BY WILBER ENCARNACION MD    CT Angiogram Chest With Contrast [[id number]] Collected:  02/11/18 2035     Updated:  02/11/18 2126    Narrative:       EXAM:  CT Angiography Chest With Intravenous Contrast    CLINICAL HISTORY:  27 years old, male; Signs and symptoms; Shortness of breath; Additional info:   SOB and elevated d dimer pt states h/o chf    TECHNIQUE:  Axial computed tomographic angiography images of the chest with intravenous   contrast using pulmonary embolism protocol.  All CT scans at this facility use   one or more dose reduction techniques, viz.: automated exposure control; ma/kV   adjustment per patient size (including targeted exams where dose is matched to   indication; i.e. head); or iterative reconstruction technique.  MIP reconstructed images were created and reviewed.    CONTRAST:  80 mL of [id number] administered intravenously.    COMPARISON:  CT ANGIOGRAM CHEST W CONTRAST 2017-12-13 00:26    FINDINGS:  Pulmonary arteries:  No pulmonary embolism.  Aorta:  No acute findings.  Lungs:  Minimal bilateral perihilar groundglass opacities with peribronchial   cuffing, suggesting hydrostatic pulmonary edema.  Pleural space:  Normal.  No significant effusion.  No pneumothorax.  Heart:  Moderate four-chamber cardiac enlargement.  Bones/joints:  No acute fracture.  No dislocation.  Soft tissues:  Normal.  Lymph nodes:  Normal.  Gallbladder and bile ducts:  Cholelithiasis,  without CT evidence of acute   cholecystitis.  Tubes, lines and devices:  Left subclavian transvenous pacemaker in place.      Impression:         1.  No pulmonary embolism.    2.  Minimal bilateral perihilar groundglass opacities with peribronchial   cuffing, suggesting hydrostatic pulmonary edema.    3.  Incidental/non-acute findings are described above.    THIS DOCUMENT HAS BEEN ELECTRONICALLY SIGNED BY WILBER ENCARNACION MD        Results for orders placed during the hospital encounter of 10/16/17   Adult Transthoracic Echo Limited W/ Cont if Necessary Per Protocol    Narrative · Left ventricular systolic function is severely decreased. Estimated EF =   15%.  · The left ventricular cavity is severely dilated.  · Right ventricular cavity is mildly dilated.  · Mildly reduced right ventricular systolic function noted.          Assessment/Plan   Assessment / Plan     Hospital Problem List     Hypertension    Morbid obesity    Obstructive sleep apnea    Nonischemic congestive cardiomyopathy    Personal history of noncompliance with medical treatment    Acute on chronic systolic (congestive) heart failure    Plaque psoriasis    Gross hematuria    Acute on chronic congestive heart failure        Assessment & Plan:  Mr. Gannon is a 27 year old  gentleman presenting with acute exacerbation of chronic systolic heart failure due to NICM and hematuria    Acute exacerbation of heart failure:  --Received Bumex 1mg in ER and is actively having UOP  --Ordered 1 mg bumex BID tomorrow only - will need to adjust based on results  --Will continue coreg, entresto and aldactone 25 mg per home meds (has not been taking)  --Cardio consultation given the severity of this patient's disease  --Electrolyte monitoring and replacement  --HF navigator  --Tele  --O2    Hematuria:  Micro pending, but UA positive for blood  --CT abdomen/pelvis to check for stone, other pathology  --Monitor micro and culture to see in antibiotic is  necessary  --H/H stable    COLLIN: CPAP    Medical noncompliance:  --CM  --May also need help with continuous home O2    DVT prophylaxis: LMWH    CODE STATUS:  Prior FULL    INPATIENT status due to the need for care which can only be reasonably provided in an hospital setting such as aggressive/expedited ancillary services and consultation services, the necessity for IV medications, close physician monitoring.  In such, I feel patient’s risk for adverse outcomes and need for care warrant INPATIENT evaluation and predict the patient’s care encounter to likely last beyond 2 midnights.      Jodi Betancourt MD  02/11/18   11:00 PM

## 2018-02-12 NOTE — PROGRESS NOTES
Pt. Referred for Phase II Cardiac Rehab. Staff discussed benefits of exercise, program protocol, and educational material provided. Teach back verified.  Patient scheduled for orientation at [facility] on Tuesday, March 27th at 1300.

## 2018-02-12 NOTE — H&P
Cardiology Consult/H&P     Evan Gannon  1990  [phone]      02/12/18    DATE OF ADMISSION: 2/11/2018  [address]    Barbara Mills MD  [address] / [facility] [address]    Chief Complaint   Patient presents with   • Shortness of Breath     Patient Active Problem List   Diagnosis   • Shortness of breath   • Hypertension   • Morbid obesity   • Obstructive sleep apnea   • Nonischemic congestive cardiomyopathy   • Personal history of noncompliance with medical treatment   • Acute on chronic systolic (congestive) heart failure   • Chest pain on breathing   • CHF (congestive heart failure), NYHA class IV   • Dilated cardiomyopathy   • Chronic systolic congestive heart failure   • Plaque psoriasis   • Congestive heart failure   • Gross hematuria   • Acute on chronic congestive heart failure       History of Present Illness:   Patient is a 27 year old AA male with past medical history notable for NICM with EF 15% s/p ICD implant 8/15/17, hypertension, morbid obesity, COLLIN off CPAP, and history of noncompliance who presented to [facility] emergency room yesterday with c/o worsening shortness of breath x 1 month.  He has not had his medications refilled since November due to financial concerns and states he ran out about a week ago.  He also did not show up to his scheduled cardiology appointments in November.  He was seen by SONAM Cabrera in the heart failure clinic last week for which he was given extra dose of IV bumex with good output.  He reports noncompliance with CPAP for his sleep apnea and has subsequently had his mask taken back by the company but is scheduled to see Dr. Shrestha in March.  Case management has been consulted to see the patient and reports that he has prescriptions available for  for $18.  He denies any c/o CP, dizziness, LH, syncope or ICD shocks.  States breathing has improved some since he got admitted yesterday.      No  Known Allergies    Prior to Admission Medications     Prescriptions Last Dose Informant Patient Reported? Taking?    albuterol (PROVENTIL HFA;VENTOLIN HFA) 108 (90 Base) MCG/ACT inhaler   No No    Inhale 2 puffs Every 6 (Six) Hours As Needed for Wheezing.    aspirin 81 MG EC tablet   No No    Take 1 tablet by mouth Daily.    carvedilol (COREG) 25 MG tablet   No No    Take 1 tablet by mouth 2 (Two) Times a Day With Meals.    indomethacin (INDOCIN) 25 MG capsule   No No    Take 1 capsule by mouth 3 (Three) Times a Day As Needed for Mild Pain .    metOLazone (ZAROXOLYN) 5 MG tablet   No No    Take 1 tablet by mouth Daily As Needed (Edema or shortness of breath).    sacubitril-valsartan (ENTRESTO) 24-26 MG tablet   No No    Take 1 tablet by mouth Every 12 (Twelve) Hours.    spironolactone (ALDACTONE) 25 MG tablet   No No    Take 1 tablet by mouth Daily.    torsemide (DEMADEX) 20 MG tablet   No No    Take 3 tablets by mouth Daily.            Current Facility-Administered Medications:   •  aspirin EC tablet 81 mg, 81 mg, Oral, Daily, Jodi Betancourt MD, 81 mg at 02/12/18 0845  •  bumetanide (BUMEX) injection 1 mg, 1 mg, Intravenous, Q12H, Jodi Betancourt MD, 1 mg at 02/12/18 1111  •  carvedilol (COREG) tablet 25 mg, 25 mg, Oral, BID With Meals, Jodi Betancourt MD, 25 mg at 02/12/18 0845  •  heparin (porcine) 5000 UNIT/ML injection 5,000 Units, 5,000 Units, Subcutaneous, Q8H, Jodi Betancourt MD, 5,000 Units at 02/12/18 0529  •  Magnesium Sulfate 2 gram Bolus, followed by 8 gram infusion (total Mg dose 10 grams)- Mg less than or equal to 1mg/dL, 2 g, Intravenous, PRN **OR** Magnesium Sulfate 6 gram Infusion (2 gm x 3) -Mg 1.1 -1.5 mg/dL, 2 g, Intravenous, PRN **OR** magnesium sulfate 4 gram infusion- Mg 1.6-1.9 mg/dL, 4 g, Intravenous, PRN, Jodi Betancourt MD  •  nitroglycerin (NITROSTAT) SL tablet 0.4 mg, 0.4 mg, Sublingual, Q5 Min PRN, Moo Jesus MD, 0.4 mg at 02/11/18 2132  •  Pharmacy Consult - [place], , Does  not apply, Daily, Breanna Baum, [facility], located within [facility]  •  potassium chloride (MICRO-K) CR capsule 40 mEq, 40 mEq, Oral, PRN **OR** potassium chloride (KLOR-CON) packet 40 mEq, 40 mEq, Oral, PRN **OR** potassium chloride 20 mEq in 250 mL IVPB, 20 mEq, Intravenous, Q2H PRN, Jodi Betancourt MD  •  sacubitril-valsartan (ENTRESTO) 24-26 MG tablet 1 tablet, 1 tablet, Oral, Q12H, Jodi Betancourt MD, 1 tablet at 02/12/18 0846  •  sodium chloride 0.9 % flush 1-10 mL, 1-10 mL, Intravenous, PRN, Jodi Betancourt MD  •  sodium chloride 0.9 % flush 10 mL, 10 mL, Intravenous, PRN, Moo Jesus MD, 10 mL at 02/11/18 2157  •  Insert peripheral IV, , , Once **AND** sodium chloride 0.9 % flush 10 mL, 10 mL, Intravenous, PRN, Moo Jesus MD, 10 mL at 02/11/18 1950  •  spironolactone (ALDACTONE) tablet 25 mg, 25 mg, Oral, Daily, Jodi Betancourt MD, 25 mg at 02/12/18 0845    Social History     Social History   • Marital status: Single     Spouse name: N/A   • Number of children: 0   • Years of education: N/A     Occupational History   • disabled      Social History Main Topics   • Smoking status: Never Smoker   • Smokeless tobacco: Never Used   • Alcohol use No   • Drug use: No   • Sexual activity: Defer     Other Topics Concern   • None     Social History Narrative    Caffeine use: some soda.    Patient lives with parents           Family History   Problem Relation Age of Onset   • Diabetes Father    • Diabetes Paternal Grandmother    • Diabetes Maternal Grandfather        REVIEW OF SYSTEMS:   CONST:  No weight loss, fever, chills, weakness or fatigue.   HEENT:  No visual loss, blurred vision, double vision, yellow sclerae.                   No hearing loss, congestion, sore throat.   SKIN:      No rashes, urticaria, ulcers, sores.     RESP:     No shortness of breath, hemoptysis, cough, sputum.   GI:           No anorexia, nausea, vomiting, diarrhea. No abdominal pain, melena.   :         No burning on urination, hematuria or  increased frequency.  ENDO:    No diaphoresis, cold or heat intolerance. No polyuria or polydipsia.   NEURO:  No headache, dizziness, syncope, paralysis, ataxia, or parasthesias.                  No change in bowel or bladder control. No history of CVA/TIA  MUSC:    No muscle, back pain, joint pain or stiffness.   HEME:    No anemia, bleeding, bruising. No history of DVT/PE.  PSYCH:  No history of depression, anxiety    Vitals:    02/11/18 2339 02/12/18 0100 02/12/18 0505 02/12/18 0845   BP: 126/95  140/91 137/89   BP Location: Left arm  Left arm    Patient Position: Lying  Lying    Pulse: 113 112 98 100   Resp: 22  20 21   Temp:   97 °F (36.1 °C) 97.5 °F (36.4 °C)   TempSrc:   Oral Oral   SpO2:  98% 96% 96%   Weight: (!) 186 kg (409 lb 9.6 oz)      Height:             Vital Sign Min/Max for last 24 hours  Temp  Min: 97 °F (36.1 °C)  Max: 98.4 °F (36.9 °C)   BP  Min: 112/78  Max: 145/91   Pulse  Min: 98  Max: 124   Resp  Min: 20  Max: 36   SpO2  Min: 86 %  Max: 98 %   Flow (L/min)  Min: 2  Max: 2      Intake/Output Summary (Last 24 hours) at 02/12/18 1320  Last data filed at 02/12/18 0900   Gross per 24 hour   Intake              240 ml   Output             1250 ml   Net            -1010 ml             Physical Exam:  GEN: Obese gentleman, no acute distress  HEENT: Normocephalic, Atraumatic, PERRLA, moist mucous membranes  NECK: supple, NO JVD, no thyromegaly, no lymphadenopathy  CARD: Diminished heart sounds, S1S2, RRR no murmur, gallop, rub  LUNGS: Clear to auscultation, normal respiratory effort on CPAP mask  ABDOMEN: Obese, soft, nontender, normal bowel sounds  EXTREMITIES:No gross deformities,  No clubbing, cyanosis, 2+ edema  SKIN: Plaque psoriasis noted, skin warm, dry  NEURO: No focal deficits  PSYCHIATRIC: Normal affect and mood      Data:     Results from last 7 days  Lab Units 02/12/18  0443 02/11/18  1951   WBC 10*3/mm3 5.15 5.62   HEMOGLOBIN g/dL 12.9* 13.9   HEMATOCRIT % 40.5 42.4   PLATELETS 10*3/mm3  263 290       Results from last 7 days  Lab Units 02/12/18  0443 02/11/18  1951 02/06/18  1303   SODIUM mmol/L 137 138 136   POTASSIUM mmol/L 3.8 3.6 3.8   CHLORIDE mmol/L 104 106 103   CO2 mmol/L 25.0 25.0 27.0   BUN mg/dL 11 11 7*   CREATININE mg/dL 0.90 0.90 0.80   GLUCOSE mg/dL 104* 106* 106*                                    Intake/Output Summary (Last 24 hours) at 02/12/18 1320  Last data filed at 02/12/18 0900   Gross per 24 hour   Intake              240 ml   Output             1250 ml   Net            -1010 ml       Chest X-Ray:  Imaging Results (last 24 hours)     Procedure Component Value Units Date/Time    XR Chest 1 View [[id number]] Collected:  02/11/18 1944     Updated:  02/11/18 2114    Narrative:       EXAM:  XR Chest, 1 View    CLINICAL HISTORY:  27 years old, male; Signs and symptoms; Shortness of breath; Prior surgery;   Surgery date: 6+ months; Surgery type: Pacemaker; Additional info: SOB    TECHNIQUE:  Frontal view of the chest.    COMPARISON:  CR - XR CHEST 1 VW 2017-10-16 08:55    FINDINGS:  Lungs:  Mild bibasilar groundglass opacities, right greater than left, likely   areas of atelectasis and/or minimal pneumonitis. Minimal hydrostatic pulmonary   edema possible.  Pleural space:  Normal.  No pneumothorax.  Heart:  Cardiac silhouette is enlarged, compatible with cardiomegaly and/or   pericardial fluid.  Mediastinum:  Normal.  Bones/joints:  Normal.  Tubes, lines and devices:  Left subclavian transvenous pacemaker/AICD.      Impression:         1.  Mild bibasilar groundglass opacities, right greater than left, likely areas   of atelectasis and/or minimal pneumonitis. Minimal hydrostatic pulmonary edema   possible.    2.  Incidental/non-acute findings are described above.    THIS DOCUMENT HAS BEEN ELECTRONICALLY SIGNED BY WILBER ENCARNACION MD    CT Angiogram Chest With Contrast [[id number]] Collected:  02/11/18 2035     Updated:  02/11/18 2126    Narrative:       EXAM:  CT Angiography Chest With  Intravenous Contrast    CLINICAL HISTORY:  27 years old, male; Signs and symptoms; Shortness of breath; Additional info:   SOB and elevated d dimer pt states h/o chf    TECHNIQUE:  Axial computed tomographic angiography images of the chest with intravenous   contrast using pulmonary embolism protocol.  All CT scans at this facility use   one or more dose reduction techniques, viz.: automated exposure control; ma/kV   adjustment per patient size (including targeted exams where dose is matched to   indication; i.e. head); or iterative reconstruction technique.  MIP reconstructed images were created and reviewed.    CONTRAST:  80 mL of [id number] administered intravenously.    COMPARISON:  CT ANGIOGRAM CHEST W CONTRAST 2017-12-13 00:26    FINDINGS:  Pulmonary arteries:  No pulmonary embolism.  Aorta:  No acute findings.  Lungs:  Minimal bilateral perihilar groundglass opacities with peribronchial   cuffing, suggesting hydrostatic pulmonary edema.  Pleural space:  Normal.  No significant effusion.  No pneumothorax.  Heart:  Moderate four-chamber cardiac enlargement.  Bones/joints:  No acute fracture.  No dislocation.  Soft tissues:  Normal.  Lymph nodes:  Normal.  Gallbladder and bile ducts:  Cholelithiasis, without CT evidence of acute   cholecystitis.  Tubes, lines and devices:  Left subclavian transvenous pacemaker in place.      Impression:         1.  No pulmonary embolism.    2.  Minimal bilateral perihilar groundglass opacities with peribronchial   cuffing, suggesting hydrostatic pulmonary edema.    3.  Incidental/non-acute findings are described above.    THIS DOCUMENT HAS BEEN ELECTRONICALLY SIGNED BY WILBER ENCARNACION MD    CT Abdomen Pelvis Without Contrast [[id number]] Collected:  02/11/18 2250     Updated:  02/11/18 6612    Narrative:       EXAM:  CT Abdomen and Pelvis Without Intravenous Contrast    CLINICAL HISTORY:  27 years old, male; Heart failure, unspecified; Signs and symptoms; Other:    Hematuria    TECHNIQUE:  Axial computed tomography images of the abdomen and pelvis without intravenous   contrast.  All CT scans at this facility use one or more dose reduction   techniques, viz.: automated exposure control; ma/kV adjustment per patient size   (including targeted exams where dose is matched to indication; i.e. head); or   iterative reconstruction technique.  Coronal reformatted images were created and reviewed.    COMPARISON:  CT ABDOMEN PELVIS WO CONTRAST 2017-05-02 18:34    FINDINGS:  Lower thorax:  Partially visualized pacemaker lead within the right cardiac   chambers.    ABDOMEN:  Liver:  Hepatomegaly.  Gallbladder and bile ducts:  Cholelithiasis, without CT evidence of acute   cholecystitis.  Pancreas:  Normal.  Spleen:  Normal.  Adrenals:  Normal.  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  Stomach and bowel:  Normal.  Appendix:  Appendix is normal.    PELVIS:  Bladder:  Minimal urinary bladder wall thickening, without adjacent associated   inflammatory stranding, possibly secondary to partially decompressed state,   although mild cystitis possible.  Reproductive:  Normal as visualized.    ABDOMEN and PELVIS:  Intraperitoneal space:  Normal.  No free air.  No significant fluid collection.  Bones/joints:  No acute fracture.  No dislocation.  Soft tissues:  Small bilateral fat-containing inguinal hernias, without acute   complications.  Vasculature:  Phleboliths within the pelvis.  No abdominal aortic aneurysm.  Lymph nodes:  Normal.      Impression:         1.  No definite acute abdominal or pelvic abnormality.    2.  Minimal urinary bladder wall thickening, without adjacent associated   inflammatory stranding, possibly secondary to partially decompressed state,   although mild cystitis possible.    3.  Incidental/non-acute findings are described above.    THIS DOCUMENT HAS BEEN ELECTRONICALLY SIGNED BY WILBER ENCARNACION MD          Telemetry: NSR, HR  "  EKG 2/11/18: ST,     Assessment and Plan:   1. Acute on chronic systolic heart failure/NICM:  - Worsening systolic congestive heart failure in setting of noncompliance with home medications for the past week  - EF 15% 10/2017, s/p ICD implant per Dr. Richmond, has been referred to  for transplant but needs to lose more weight in order to qualify  - Home medications restarted including coreg, Entresto, spironolactone  - Given dose of Bumex 1 mg IV last night with good response, net negative 1L, dose repeated today    2. Hypertension:  - Stable at this time, restart home meds as stated above    3. COLLIN:  - Non-compliant, scheduled to see Dr. Shrestha in March 4. Medical non-compliance:  - Discussed importance of cardiac medications  - Case management following, all 5 prescriptions available for  after discharge for $18    Continue IV diuresis, he seems to responding well.  We need to resume his previous \"at home\" medicines.  Compliance discussed with the patient.  Scribed for Balbir Olsen MD by Allegra Hsieh, APRN. 2/12/2018  1:20 PM      I, Balbir Olsen MD, personally performed the services described in this documentation as scribed by the above individual in my presence, and it is both accurate and complete        "

## 2018-02-12 NOTE — PROGRESS NOTES
[facility] Medicine Services  PROGRESS NOTE    Patient Name: Evan Gannon  : 1990  MRN: [id number]    Date of Admission: 2018  Length of Stay: 1  Primary Care Physician: Barbara Mills MD    Subjective   Subjective     CC:  chf exacerbation    HPI:  Clear urine, no fever, no dysuria, no flank pain. Dyspnea improved.    Review of Systems  No headache, no rash, no dysuria, no flank pain, no chest pain    Otherwise ROS is negative except as mentioned in the HPI.    Objective   Objective     Vital Signs:   Temp:  [97 °F (36.1 °C)-98.4 °F (36.9 °C)] 97.5 °F (36.4 °C)  Heart Rate:  [] 100  Resp:  [20-36] 21  BP: (112-145)/(58-96) 137/89        Physical Exam:  Alert ox3, nontoxic appearing, obese  Ncat, oroph clear  rrr  Faint bibasilar insp crackles, no wheezing, normal effort at rest  abd soft, nontender  2+LE edema w/ chronic venous changes  Face symmetric, speech clear, equal   Normal affect    Results Reviewed:  I have personally reviewed current lab, radiology, and data and agree.      Results from last 7 days  Lab Units 18   WBC 10*3/mm3 5.15 5.62   HEMOGLOBIN g/dL 12.9* 13.9   HEMATOCRIT % 40.5 42.4   PLATELETS 10*3/mm3 263 290       Results from last 7 days  Lab Units 18  0443 18  1303   SODIUM mmol/L 137 138 136   POTASSIUM mmol/L 3.8 3.6 3.8   CHLORIDE mmol/L 104 106 103   CO2 mmol/L 25.0 25.0 27.0   BUN mg/dL 11 11 7*   CREATININE mg/dL 0.90 0.90 0.80   GLUCOSE mg/dL 104* 106* 106*   CALCIUM mg/dL 8.5* 9.3 9.0   ALT (SGPT) U/L  --  15  --    AST (SGOT) U/L  --  19  --      Estimated Creatinine Clearance: 207.6 mL/min (by C-G formula based on Cr of 0.9).  BNP   Date Value Ref Range Status   2018 669.0 (H) 0.0 - 100.0 pg/mL Final     Comment:     Results may be falsely decreased if patient taking Biotin.     No results found for: PHART    Microbiology Results Abnormal     Procedure  Component Value - Date/Time    Blood Culture - Blood, [[id number]]  (Normal) Collected:  02/11/18 2000    Lab Status:  Preliminary result Specimen:  Blood from Arm, Right Updated:  02/12/18 0816     Blood Culture No growth at less than 24 hours    Blood Culture - Blood, [[id number]]  (Normal) Collected:  02/11/18 1951    Lab Status:  Preliminary result Specimen:  Blood from Arm, Left Updated:  02/12/18 0816     Blood Culture No growth at less than 24 hours    Influenza Antigen, Rapid - Swab, Nasopharynx [[id number]]  (Normal) Collected:  02/11/18 1953    Lab Status:  Final result Specimen:  Swab from Nasopharynx Updated:  02/11/18 2013     Influenza A Ag, EIA Negative     Influenza B Ag, EIA Negative          Imaging Results (last 24 hours)     Procedure Component Value Units Date/Time    XR Chest 1 View [[id number]] Collected:  02/11/18 1944     Updated:  02/11/18 2114    Narrative:       EXAM:  XR Chest, 1 View    CLINICAL HISTORY:  27 years old, male; Signs and symptoms; Shortness of breath; Prior surgery;   Surgery date: 6+ months; Surgery type: Pacemaker; Additional info: SOB    TECHNIQUE:  Frontal view of the chest.    COMPARISON:  CR - XR CHEST 1 VW 2017-10-16 08:55    FINDINGS:  Lungs:  Mild bibasilar groundglass opacities, right greater than left, likely   areas of atelectasis and/or minimal pneumonitis. Minimal hydrostatic pulmonary   edema possible.  Pleural space:  Normal.  No pneumothorax.  Heart:  Cardiac silhouette is enlarged, compatible with cardiomegaly and/or   pericardial fluid.  Mediastinum:  Normal.  Bones/joints:  Normal.  Tubes, lines and devices:  Left subclavian transvenous pacemaker/AICD.      Impression:         1.  Mild bibasilar groundglass opacities, right greater than left, likely areas   of atelectasis and/or minimal pneumonitis. Minimal hydrostatic pulmonary edema   possible.    2.  Incidental/non-acute findings are described above.    THIS DOCUMENT HAS BEEN ELECTRONICALLY SIGNED BY  WILBER ENCARNACION MD    CT Angiogram Chest With Contrast [[id number]] Collected:  02/11/18 2035     Updated:  02/11/18 2126    Narrative:       EXAM:  CT Angiography Chest With Intravenous Contrast    CLINICAL HISTORY:  27 years old, male; Signs and symptoms; Shortness of breath; Additional info:   SOB and elevated d dimer pt states h/o chf    TECHNIQUE:  Axial computed tomographic angiography images of the chest with intravenous   contrast using pulmonary embolism protocol.  All CT scans at this facility use   one or more dose reduction techniques, viz.: automated exposure control; ma/kV   adjustment per patient size (including targeted exams where dose is matched to   indication; i.e. head); or iterative reconstruction technique.  MIP reconstructed images were created and reviewed.    CONTRAST:  80 mL of [id number] administered intravenously.    COMPARISON:  CT ANGIOGRAM CHEST W CONTRAST 2017-12-13 00:26    FINDINGS:  Pulmonary arteries:  No pulmonary embolism.  Aorta:  No acute findings.  Lungs:  Minimal bilateral perihilar groundglass opacities with peribronchial   cuffing, suggesting hydrostatic pulmonary edema.  Pleural space:  Normal.  No significant effusion.  No pneumothorax.  Heart:  Moderate four-chamber cardiac enlargement.  Bones/joints:  No acute fracture.  No dislocation.  Soft tissues:  Normal.  Lymph nodes:  Normal.  Gallbladder and bile ducts:  Cholelithiasis, without CT evidence of acute   cholecystitis.  Tubes, lines and devices:  Left subclavian transvenous pacemaker in place.      Impression:         1.  No pulmonary embolism.    2.  Minimal bilateral perihilar groundglass opacities with peribronchial   cuffing, suggesting hydrostatic pulmonary edema.    3.  Incidental/non-acute findings are described above.    THIS DOCUMENT HAS BEEN ELECTRONICALLY SIGNED BY WILBER ENCARNACION MD    CT Abdomen Pelvis Without Contrast [[id number]] Collected:  02/11/18 2250     Updated:  02/11/18 2333    Narrative:        EXAM:  CT Abdomen and Pelvis Without Intravenous Contrast    CLINICAL HISTORY:  27 years old, male; Heart failure, unspecified; Signs and symptoms; Other:   Hematuria    TECHNIQUE:  Axial computed tomography images of the abdomen and pelvis without intravenous   contrast.  All CT scans at this facility use one or more dose reduction   techniques, viz.: automated exposure control; ma/kV adjustment per patient size   (including targeted exams where dose is matched to indication; i.e. head); or   iterative reconstruction technique.  Coronal reformatted images were created and reviewed.    COMPARISON:  CT ABDOMEN PELVIS WO CONTRAST 2017-05-02 18:34    FINDINGS:  Lower thorax:  Partially visualized pacemaker lead within the right cardiac   chambers.    ABDOMEN:  Liver:  Hepatomegaly.  Gallbladder and bile ducts:  Cholelithiasis, without CT evidence of acute   cholecystitis.  Pancreas:  Normal.  Spleen:  Normal.  Adrenals:  Normal.  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  Stomach and bowel:  Normal.  Appendix:  Appendix is normal.    PELVIS:  Bladder:  Minimal urinary bladder wall thickening, without adjacent associated   inflammatory stranding, possibly secondary to partially decompressed state,   although mild cystitis possible.  Reproductive:  Normal as visualized.    ABDOMEN and PELVIS:  Intraperitoneal space:  Normal.  No free air.  No significant fluid collection.  Bones/joints:  No acute fracture.  No dislocation.  Soft tissues:  Small bilateral fat-containing inguinal hernias, without acute   complications.  Vasculature:  Phleboliths within the pelvis.  No abdominal aortic aneurysm.  Lymph nodes:  Normal.      Impression:         1.  No definite acute abdominal or pelvic abnormality.    2.  Minimal urinary bladder wall thickening, without adjacent associated   inflammatory stranding, possibly secondary to partially decompressed state,   although mild cystitis  possible.    3.  Incidental/non-acute findings are described above.    THIS DOCUMENT HAS BEEN ELECTRONICALLY SIGNED BY WILBER ENCARNACION MD        Results for orders placed during the hospital encounter of 10/16/17   Adult Transthoracic Echo Limited W/ Cont if Necessary Per Protocol    Narrative · Left ventricular systolic function is severely decreased. Estimated EF =   15%.  · The left ventricular cavity is severely dilated.  · Right ventricular cavity is mildly dilated.  · Mildly reduced right ventricular systolic function noted.          I have reviewed the medications.    Assessment/Plan   Assessment / Plan     Hospital Problem List     * (Principal)Acute on chronic systolic heart failure    Nonischemic congestive cardiomyopathy    Acute hypoxemic respiratory failure    Hypertension    Morbid obesity    Obstructive sleep apnea    Personal history of noncompliance with medical treatment    Plaque psoriasis    Gross hematuria             Brief Hospital Course to date:  Evan Gannon is a 27 y.o. male w/ nicm who ran out of his cardiac medicines, presented w/ dyspnea, hypoxia. Received bumex/loop diuretics with improvement. Also had gross hematuria which has since resolved.       Assessment & Plan:  -further diuresis, cardiac meds per cardiology  -continue night cpap/oxygen  -cbc, bmp in a.m.  -wean oxygen as tolerates  -if recurrent hematuria recommend outpatient urology consultation (single episode, resolved)    DVT Prophylaxis:  Sq heparin tid    CODE STATUS: Full Code    Disposition: I expect the patient to be discharged home 1-2 days    Scott Vazquez MD  02/12/18  2:04 PM

## 2018-02-12 NOTE — PLAN OF CARE
Problem: Patient Care Overview (Adult)  Goal: Plan of Care Review  Outcome: Ongoing (interventions implemented as appropriate)   02/12/18 2910   Coping/Psychosocial Response Interventions   Plan Of Care Reviewed With patient   Patient Care Overview   Progress no change   Outcome Evaluation   Outcome Summary/Follow up Plan pt alert and cooperative. sat in chair. continue to diuresis and monitor       Problem: Skin Integrity Impairment, Risk/Actual (Adult)  Goal: Identify Related Risk Factors and Signs and Symptoms  Outcome: Ongoing (interventions implemented as appropriate)    Goal: Skin Integrity/Wound Healing  Outcome: Ongoing (interventions implemented as appropriate)

## 2018-02-12 NOTE — PROGRESS NOTES
Discharge Planning Assessment  [facility]     Patient Name: Evan Gannon  MRN: [id number]  Today's Date: 2/12/2018    Admit Date: 2/11/2018          Discharge Needs Assessment       02/12/18 1000    Living Environment    Lives With parent(s)    Living Arrangements house    Home Accessibility bath not on first floor;bed and bath on same level;stairs to enter home;stairs within home;tub/shower is not walk in    Number of Stairs to Enter Home 1    Stair Railings at Home none    Type of Financial/Environmental Concern none    Transportation Available car;family or friend will provide    Living Environment    Provides Primary Care For no one    Primary Care Provided By parent(s)    Quality Of Family Relationships supportive    Able to Return to Prior Living Arrangements yes    Discharge Needs Assessment    Concerns To Be Addressed no discharge needs identified    Readmission Within The Last 30 Days no previous admission in last 30 days    Anticipated Changes Related to Illness none    Equipment Currently Used at Home oxygen    Equipment Needed After Discharge none    Discharge Disposition home healthcare service    Discharge Planning Comments [facility] [phone]            Discharge Plan       02/12/18 1016    Case Management/Social Work Plan    Plan IDP    Patient/Family In Agreement With Plan yes    Additional Comments CM met w pt who lives w her parents and will have transportation home w them or w friends. Pt has O2 that he uses PRN through [employer]. No other DME used. Pt does not have HH, or PT/OT. Pt is due for a sleep study next week w the consultation already made.  This is for him to get CPAP. Pt goal is to return home w parents and go to sleep study next week. No needs at this time. CM will continue to follow and assist.        Discharge Placement     No information found                Demographic Summary       02/12/18 0940    Referral Information    Admission Type inpatient    Arrived From admitted as an  inpatient    Referral Source admission list;nursing;physician    Reason For Consult discharge planning    Record Reviewed history and physical    Contact Information    Permission Granted to Share Information With     Primary Care Physician Information    Name KEON CRUZ            Functional Status       02/12/18 0958    Functional Status Prior    Ambulation 0-->independent    Transferring 0-->independent    Toileting 0-->independent    Bathing 0-->independent    Dressing 0-->independent    Eating 0-->independent    Communication 0-->understands/communicates without difficulty    Swallowing 0-->swallows foods/liquids without difficulty    IADL    Medications independent    Meal Preparation independent    Housekeeping independent    Laundry independent    Shopping independent    Oral Care independent    Activity Tolerance    Usual Activity Tolerance moderate    Employment/Financial    Financial Concerns unable to afford medications    Employment/Finance Comments pt has insurance w [employer] and passport. He stated he is not able to pay for his meds now, but he only has copays at this time. SW will be notified.            Psychosocial     None            Abuse/Neglect     None            Legal     None            Substance Abuse     None            Patient Forms     None          Humera Krishna RN

## 2018-02-12 NOTE — PLAN OF CARE
Problem: Patient Care Overview (Adult)  Goal: Plan of Care Review  Outcome: Ongoing (interventions implemented as appropriate)   02/12/18 0339   Coping/Psychosocial Response Interventions   Plan Of Care Reviewed With patient   Patient Care Overview   Progress no change   Outcome Evaluation   Outcome Summary/Follow up Plan patient rested comfortably throughout night. Patient is alert and oriented. Patient is on 2L O2 nasal canula. Patient's vital signs remain stable. Will continue to monitor.      Goal: Adult Individualization and Mutuality  Outcome: Ongoing (interventions implemented as appropriate)    Goal: Discharge Needs Assessment  Outcome: Ongoing (interventions implemented as appropriate)      Problem: Skin Integrity Impairment, Risk/Actual (Adult)  Goal: Identify Related Risk Factors and Signs and Symptoms  Outcome: Ongoing (interventions implemented as appropriate)    Goal: Skin Integrity/Wound Healing  Outcome: Ongoing (interventions implemented as appropriate)

## 2018-02-12 NOTE — CONSULTS
"Adult Nutrition  Assessment/PES    Patient Name:  Evan Gannon  YOB: 1990  MRN: [id number]  Admit Date:  2/11/2018    Assessment Date:  2/12/2018          Reason for Assessment       02/12/18 1350    Reason for Assessment    Reason For Assessment/Visit nurse/nurse practitioner consult    Identified At Risk By Screening Criteria MST SCORE 2+;unintentional loss of 10 lbs or more in the past 2 mos    Time Spent (min) 20    Cardiac HTN;CHF;Cardiomyopathy;MI   A/C CHF    Pulmonary/Critical Care Obstructive sleep apnea;CPAP    Other diagnosis gross hematuria, plaque psoriasis              Nutrition/Diet History       02/12/18 1351    Nutrition/Diet History    Food Preferences Likes gingerale with lunch and dinner    Reported/Observed By Patient    Other Patient reports recently losing weight as of 2 weeks ago but appears to have gained weight from fluid. Willing to drink Premier Protein shakes twice daily to help him meet protein needs.            Anthropometrics       02/12/18 1352    Anthropometrics    Height 175.3 cm (69\")    Weight (!)  186 kg (409 lb 9.6 oz)   standing weight per nsg documentation (2/11)    Ideal Body Weight (IBW)    Ideal Body Weight (IBW), Male (kg) 73.69    % Ideal Body Weight 252.67    Usual Body Weight (UBW)    Usual Body Weight 172 kg (379 lb)   2 weeks ago    % Usual Body Weight 108.07    Weight Loss --   no, weight gain d/t fluid    Body Mass Index (BMI)    BMI (kg/m2) 60.61            Labs/Tests/Procedures/Meds       02/12/18 1353    Labs/Tests/Procedures/Meds    Labs/Tests Review Reviewed;BNP    Medication Review Reviewed, pertinent              Estimated/Assessed Needs       02/12/18 1400    Estimated/Assessed Protein Needs    Weight Used for Protein Calculation 186 kg (409 lb 13.4 oz)    Protein (gm/kg) 0.8   0.8-1.0 g protein/kg actual wt = 148-185 g protein; 2.5 g protein/kg IBW = 182g protein    0.8 Gm Protein (gm) 148.72    Estimated Protein Range 167 g protein " daily            Nutrition Prescription Ordered       02/12/18 1354    Nutrition Prescription PO    Current PO Diet Regular    Fluid Consistency Thin    Common Modifiers Cardiac            Evaluation of Received Nutrient/Fluid Intake       02/12/18 1354    PO Evaluation    Number of Meals 1    % PO Intake 100            Problem/Interventions:        Problem 1       02/12/18 1354    Nutrition Diagnoses Problem 1    Problem 1 Increased Nutrient Needs    Macronutrient Protein    Etiology (related to) MNT for Treatment/Condition    Signs/Symptoms (evidenced by) BMI    BMI Greater than 40                    Intervention Goal       02/12/18 1357    Intervention Goal    General Nutrition support treatment;Meet nutritional needs for age/condition    PO Establish PO;Meet estimated needs    Weight Appropriate weight loss            Nutrition Intervention       02/12/18 1357    Nutrition Intervention    RD/Tech Action Care plan reviewd;Follow Tx progress;Recommend/ordered;Interview for preference;Menu provided    Recommended/Ordered Supplement            Nutrition Prescription       02/12/18 1353    Nutrition Prescription PO    PO Prescription Begin/change supplement    Supplement Other (comment)   Premier Protein shake (chocolate/strawberry)    Supplement Frequency 2 times a day   breakfast + lunch    New PO Prescription Ordered? Yes            Education/Evaluation       02/12/18 8761    Monitor/Evaluation    Monitor Per protocol;PO intake;Supplement intake;Pertinent labs;Weight        Electronically signed by:  Leonila Mancuso RD  02/12/18 2:06 PM

## 2018-02-12 NOTE — PROGRESS NOTES
Continued Stay Note  [facility]     Patient Name: Evan Gannon  MRN: [id number]  Today's Date: 2/12/2018    Admit Date: 2/11/2018          Discharge Plan       02/12/18 1110    Case Management/Social Work Plan    Plan Social work spoke with Mr. Gannon and explained to him that the [facility] in [place] said that he had five prescriptions that were ready for  and the total is only $18.39 for all five medications and the pharmacy said that he has Extra Help through Medicare Part D that provides the very lowest copay that is available for a Medicare Part D plan.    Patient/Family In Agreement With Plan yes      02/12/18 1016    Case Management/Social Work Plan    Plan IDP    Patient/Family In Agreement With Plan yes    Additional Comments CM met w pt who lives w his parents and will have transportation home w them or w friends. Pt has O2 that he uses PRN through [employer]. No other DME used. Pt does not have HH, or PT/OT. Pt is due for a sleep study next week w the consultation already made.  This is for him to get CPAP. Pt goal is to return home w parents and go to sleep study next week. No needs at this time. CM will continue to follow and assist.              Discharge Codes     None            SAWYER Edmondson

## 2018-02-12 NOTE — PLAN OF CARE
Problem: Patient Care Overview (Adult)  Goal: Plan of Care Review  Outcome: Ongoing (interventions implemented as appropriate)   02/12/18 1110   Coping/Psychosocial Response Interventions   Plan Of Care Reviewed With patient   Patient Care Overview   Progress no change   Outcome Evaluation   Outcome Summary/Follow up Plan Patient consult for possible specialty bed and chronic psoriasis. Bariatric low air loss bed ordered via S. Moisturizing lotion prn to affected psoriatic areas. No other concerns noted at this time. WOCN will sign off-please notify for any concerns/questions.        Problem: Skin Integrity Impairment, Risk/Actual (Adult)  Goal: Identify Related Risk Factors and Signs and Symptoms  Outcome: Ongoing (interventions implemented as appropriate)   02/12/18 1110   Skin Integrity Impairment, Risk/Actual   Skin Integrity Impairment, Risk/Actual: Related Risk Factors edema;fluid/nutrition status;immobility;infection/disease process   Signs and Symptoms (Skin Integrity Impairment) plaques/scales  (psoriasis)     Goal: Skin Integrity/Wound Healing  Outcome: Ongoing (interventions implemented as appropriate)   02/12/18 1110   Skin Integrity Impairment, Risk/Actual (Adult)   Skin Integrity/Wound Healing making progress toward outcome

## 2018-02-12 NOTE — ED PROVIDER NOTES
Subjective   HPI Comments: 27-year-old male with a history of CHF and sleep apnea presents for evaluation of shortness of breath and hematuria.  He states that for the past 3 days, he has been experiencing increased shortness of breath compared to baseline.  No fevers.  No sick contacts.  No recent travel.  He is unsure as to what may have triggered his symptoms.  In addition to the increased dyspnea compared to baseline, he has also been experiencing myalgias.  Today, he noticed some blood in his urine, prompting his visit to the ED today.  He denies any chest pain.  He denies any palpitations.    Patient is a 27 y.o. male presenting with shortness of breath.   History provided by:  Patient  Shortness of Breath   Severity:  Moderate  Onset quality:  Unable to specify  Duration:  3 days  Timing:  Constant  Progression:  Worsening  Chronicity:  New  Relieved by:  Nothing  Worsened by:  Nothing  Ineffective treatments:  None tried  Associated symptoms: cough    Associated symptoms: no abdominal pain, no chest pain, no fever, no headaches, no neck pain, no sore throat and no vomiting    Cough:     Duration:  1 week    Timing:  Intermittent    Progression:  Unchanged    Chronicity:  New  Risk factors: obesity        Review of Systems   Constitutional: Negative for chills and fever.   HENT: Negative for congestion, rhinorrhea, sore throat and trouble swallowing.    Respiratory: Positive for cough and shortness of breath.    Cardiovascular: Positive for leg swelling. Negative for chest pain.   Gastrointestinal: Negative for abdominal pain, diarrhea, nausea and vomiting.   Genitourinary: Positive for decreased urine volume and hematuria. Negative for difficulty urinating, dysuria, frequency and urgency.   Musculoskeletal: Negative for back pain and neck pain.   Neurological: Negative for dizziness, weakness, numbness and headaches.   Psychiatric/Behavioral: Negative for confusion.   All other systems reviewed and are  negative.      Past Medical History:   Diagnosis Date   • CHF (congestive heart failure)    • CPAP (continuous positive airway pressure) dependence    • Hypertension    • Morbid obesity    • Myocardial infarction    • On home O2     2l at bedtime and prn   • Plaque psoriasis    • Pneumonia    • Psoriasis    • Sleep apnea    • Wears glasses        No Known Allergies    Past Surgical History:   Procedure Laterality Date   • ADENOIDECTOMY     • CARDIAC CATHETERIZATION N/A 7/13/2017    Procedure: Left Heart Cath;  Surgeon: Balbir Olsen MD;  Location:  [facility];  Service:    • CARDIAC DEFIBRILLATOR PLACEMENT  08/2017   • CARDIAC ELECTROPHYSIOLOGY PROCEDURE N/A 8/15/2017    Procedure: VVI ICD Implant;  Surgeon: Nathanael Richmond DO;  Location:  [facility];  Service:    • INTERNAL CARDIAC DEFIBRILLATOR INSERTION Left 2017   • NOSE SURGERY     • OTHER SURGICAL HISTORY      ultra light therapy   • TONSILLECTOMY         Family History   Problem Relation Age of Onset   • Diabetes Father    • Diabetes Paternal Grandmother    • Diabetes Maternal Grandfather        Social History     Social History   • Marital status: Single     Spouse name: N/A   • Number of children: 0   • Years of education: N/A     Occupational History   • disabled      Social History Main Topics   • Smoking status: Never Smoker   • Smokeless tobacco: Never Used   • Alcohol use No   • Drug use: No   • Sexual activity: Defer     Other Topics Concern   • Not on file     Social History Narrative    Caffeine use: some soda.    Patient lives with parents             Objective   Physical Exam   Constitutional: He is oriented to person, place, and time. He appears well-developed and well-nourished. No distress.   Chronically ill-appearing obese male in no acute distress   HENT:   Head: Normocephalic and atraumatic.   Mouth/Throat: Oropharynx is clear and moist.   No mucous membrane lesions   Neck: No JVD present.    Cardiovascular: Regular rhythm and normal heart sounds.  Exam reveals no gallop and no friction rub.    No murmur heard.  Tachycardic   Pulmonary/Chest: Effort normal and breath sounds normal. No respiratory distress. He has no wheezes. He has no rales.   Abdominal: Soft. Bowel sounds are normal. He exhibits no distension and no mass. There is no tenderness. There is no guarding.   Musculoskeletal: Normal range of motion. He exhibits edema.   Mild, symmetrical 1+ edema noted to bilateral lower extremities   Neurological: He is alert and oriented to person, place, and time.   Skin: Skin is warm and dry. Rash noted. He is not diaphoretic. No erythema. No pallor.   Multiple plaques noted to all 4 extremities and upper back   Psychiatric: He has a normal mood and affect. Judgment and thought content normal.   Nursing note and vitals reviewed.      Procedures         ED Course  ED Course   Comment By Time   27-year-old male with a history of CHF and sleep apnea presents complaining of increased dyspnea compared to baseline over the past 3 days, myalgias, and hematuria today.  On arrival to the ED, patient nontoxic appearing but tachycardic, tachypnea, and hypoxic.  Supplemental oxygen given.  Initial EKG revealed sinus tachycardia with a heart rate of 121 and no ST segments suggestive of or concerning for ischemia.  We will obtain labs, influenza, and chest x-ray and reassess after initial interventions. Moo Jesus MD 02/11 1948   Labs remarkable for mildly elevated troponin.  Aspirin given. Moo Jesus MD 02/11 2019   Labs remarkable for BNP greater than 600 and elevated d-dimer.  We will obtain a CTA and reassess. Moo Jesus MD 02/11 2054   Dr. Jesus pagejenn Betancourt for admission. Flor Vaca 02/11 2130   Chest CTA suggestive of CHF exacerbation negative for PE.  Upon reevaluation, patient improved.  Nitroglycerin and Bumex given for symptom relief.  Given the patient's clinical  "presentation and oxygen requirement, we will admit for further evaluation and treatment.  The patient is hemodynamically stable at this time and aware/agreeable with plan.  Discussed the case with Dr. Betancourt. Moo Jesus MD 02/11 2131   Dr. Jesus discussed the case in detail with the hospitalist Dr. eBtancourt who will admit. Flor Vaca 02/11 2142     No results found for this or any previous visit (from the past 24 hour(s)).  Note: In addition to lab results from this visit, the labs listed above may include labs taken at another facility or during a different encounter within the last 24 hours. Please correlate lab times with ED admission and discharge times for further clarification of the services performed during this visit.    No orders to display     Vitals:    02/11/18 1931 02/11/18 1938 02/11/18 1940 02/11/18 1941   BP:    145/91   BP Location:    Left arm   Patient Position:    Lying   Pulse: (!) 124      Resp:  (!) 36     Temp:   98.4 °F (36.9 °C)    TempSrc:   Oral    SpO2: (!) 86%      Weight: (!) 172 kg (379 lb)      Height: 175.3 cm (69\")        Medications   sodium chloride 0.9 % flush 10 mL (not administered)     ECG/EMG Results (last 24 hours)     ** No results found for the last 24 hours. **                    Recent Results (from the past 24 hour(s))   Comprehensive Metabolic Panel    Collection Time: 02/11/18  7:51 PM   Result Value Ref Range    Glucose 106 (H) 70 - 100 mg/dL    BUN 11 9 - 23 mg/dL    Creatinine 0.90 0.60 - 1.30 mg/dL    Sodium 138 132 - 146 mmol/L    Potassium 3.6 3.5 - 5.5 mmol/L    Chloride 106 99 - 109 mmol/L    CO2 25.0 20.0 - 31.0 mmol/L    Calcium 9.3 8.7 - 10.4 mg/dL    Total Protein 7.7 5.7 - 8.2 g/dL    Albumin 3.80 3.20 - 4.80 g/dL    ALT (SGPT) 15 7 - 40 U/L    AST (SGOT) 19 0 - 33 U/L    Alkaline Phosphatase 82 25 - 100 U/L    Total Bilirubin 1.5 (H) 0.3 - 1.2 mg/dL    eGFR  African Amer 125 >60 mL/min/1.73    Globulin 3.9 gm/dL    A/G Ratio 1.0 (L) 1.5 - 2.5 " g/dL    BUN/Creatinine Ratio 12.2 7.0 - 25.0    Anion Gap 7.0 3.0 - 11.0 mmol/L   Lactic Acid, Plasma    Collection Time: 02/11/18  7:51 PM   Result Value Ref Range    Lactate 1.3 0.5 - 2.0 mmol/L   CBC Auto Differential    Collection Time: 02/11/18  7:51 PM   Result Value Ref Range    WBC 5.62 3.50 - 10.80 10*3/mm3    RBC 5.36 4.20 - 5.76 10*6/mm3    Hemoglobin 13.9 13.1 - 17.5 g/dL    Hematocrit 42.4 38.9 - 50.9 %    MCV 79.1 (L) 80.0 - 99.0 fL    MCH 25.9 (L) 27.0 - 31.0 pg    MCHC 32.8 32.0 - 36.0 g/dL    RDW 14.9 (H) 11.3 - 14.5 %    RDW-SD 43.1 37.0 - 54.0 fl    MPV 10.5 6.0 - 12.0 fL    Platelets 290 150 - 450 10*3/mm3    Neutrophil % 74.5 (H) 41.0 - 71.0 %    Lymphocyte % 17.8 (L) 24.0 - 44.0 %    Monocyte % 6.0 0.0 - 12.0 %    Eosinophil % 1.1 0.0 - 3.0 %    Basophil % 0.4 0.0 - 1.0 %    Immature Grans % 0.2 0.0 - 0.6 %    Neutrophils, Absolute 4.19 1.50 - 8.30 10*3/mm3    Lymphocytes, Absolute 1.00 0.60 - 4.80 10*3/mm3    Monocytes, Absolute 0.34 0.00 - 1.00 10*3/mm3    Eosinophils, Absolute 0.06 0.00 - 0.30 10*3/mm3    Basophils, Absolute 0.02 0.00 - 0.20 10*3/mm3    Immature Grans, Absolute 0.01 0.00 - 0.03 10*3/mm3   Light Blue Top    Collection Time: 02/11/18  7:51 PM   Result Value Ref Range    Extra Tube hold for add-on    Green Top (Gel)    Collection Time: 02/11/18  7:51 PM   Result Value Ref Range    Extra Tube Hold for add-ons.    Lavender Top    Collection Time: 02/11/18  7:51 PM   Result Value Ref Range    Extra Tube hold for add-on    Gold Top - SST    Collection Time: 02/11/18  7:51 PM   Result Value Ref Range    Extra Tube Hold for add-ons.    BNP    Collection Time: 02/11/18  7:51 PM   Result Value Ref Range    .0 (H) 0.0 - 100.0 pg/mL   D-dimer, Quantitative    Collection Time: 02/11/18  7:51 PM   Result Value Ref Range    D-Dimer, Quantitative 1.05 (H) 0.00 - 0.50 mg/L (FEU)   Influenza Antigen, Rapid - Swab, Nasopharynx    Collection Time: 02/11/18  7:53 PM   Result Value Ref  Range    Influenza A Ag, EIA Negative Negative    Influenza B Ag, EIA Negative Negative   POC Troponin, Rapid    Collection Time: 02/11/18  7:53 PM   Result Value Ref Range    Troponin I 0.10 (H) 0.00 - 0.07 ng/mL     Note: In addition to lab results from this visit, the labs listed above may include labs taken at another facility or during a different encounter within the last 24 hours. Please correlate lab times with ED admission and discharge times for further clarification of the services performed during this visit.    CT Angiogram Chest With Contrast   Final Result      1.  No pulmonary embolism.      2.  Minimal bilateral perihilar groundglass opacities with peribronchial    cuffing, suggesting hydrostatic pulmonary edema.      3.  Incidental/non-acute findings are described above.      THIS DOCUMENT HAS BEEN ELECTRONICALLY SIGNED BY WILBER ENCARNACION MD      XR Chest 1 View   Final Result      1.  Mild bibasilar groundglass opacities, right greater than left, likely areas    of atelectasis and/or minimal pneumonitis. Minimal hydrostatic pulmonary edema    possible.      2.  Incidental/non-acute findings are described above.      THIS DOCUMENT HAS BEEN ELECTRONICALLY SIGNED BY WILBER ENCARNACION MD        Vitals:    02/11/18 1952 02/11/18 2015 02/11/18 2030 02/11/18 2045   BP:  137/96 133/90 139/87   BP Location:       Patient Position:       Pulse: 118 117 117 117   Resp: (!) 30 (!) 30     Temp:       TempSrc:       SpO2: 94% 95% 93% 95%   Weight:       Height:         Medications   sodium chloride 0.9 % flush 10 mL (not administered)   sodium chloride 0.9 % flush 10 mL (10 mL Intravenous Given by Other 2/11/18 1950)   bumetanide (BUMEX) injection 1 mg (not administered)   nitroglycerin (NITROSTAT) SL tablet 0.4 mg (not administered)   aspirin tablet 325 mg (325 mg Oral Given 2/11/18 2025)   iopamidol (ISOVUE-370) 76 % injection 100 mL (80 mL Intravenous Given 2/11/18 2108)     ECG/EMG Results (last 24 hours)      Procedure Component Value Units Date/Time    ECG 12 Lead [[id number]] Collected:  02/11/18 1937     Updated:  02/11/18 1940            MDM    Final diagnoses:   Acute on chronic congestive heart failure, unspecified congestive heart failure type       Documentation assistance provided by josé Vaca.  Information recorded by the scribe was done at my direction and has been verified and validated by me.     Flor Vaca  02/11/18 1942       Moo Jesus MD  02/11/18 2132       Moo Jesus MD  02/11/18 2143

## 2018-02-13 LAB
ANION GAP SERPL CALCULATED.3IONS-SCNC: 6 MMOL/L (ref 3–11)
BUN BLD-MCNC: 11 MG/DL (ref 9–23)
BUN/CREAT SERPL: 12.2 (ref 7–25)
CALCIUM SPEC-SCNC: 9.1 MG/DL (ref 8.7–10.4)
CHLORIDE SERPL-SCNC: 103 MMOL/L (ref 99–109)
CO2 SERPL-SCNC: 26 MMOL/L (ref 20–31)
CREAT BLD-MCNC: 0.9 MG/DL (ref 0.6–1.3)
DEPRECATED RDW RBC AUTO: 44.4 FL (ref 37–54)
ERYTHROCYTE [DISTWIDTH] IN BLOOD BY AUTOMATED COUNT: 15.1 % (ref 11.3–14.5)
GFR SERPL CREATININE-BSD FRML MDRD: 125 ML/MIN/1.73
GLUCOSE BLD-MCNC: 137 MG/DL (ref 70–100)
HCT VFR BLD AUTO: 40.6 % (ref 38.9–50.9)
HGB BLD-MCNC: 12.9 G/DL (ref 13.1–17.5)
MAGNESIUM SERPL-MCNC: 1.7 MG/DL (ref 1.3–2.7)
MCH RBC QN AUTO: 25.4 PG (ref 27–31)
MCHC RBC AUTO-ENTMCNC: 31.8 G/DL (ref 32–36)
MCV RBC AUTO: 80.1 FL (ref 80–99)
PLATELET # BLD AUTO: 240 10*3/MM3 (ref 150–450)
PMV BLD AUTO: 10 FL (ref 6–12)
POTASSIUM BLD-SCNC: 3.6 MMOL/L (ref 3.5–5.5)
RBC # BLD AUTO: 5.07 10*6/MM3 (ref 4.2–5.76)
SODIUM BLD-SCNC: 135 MMOL/L (ref 132–146)
WBC NRBC COR # BLD: 4.63 10*3/MM3 (ref 3.5–10.8)

## 2018-02-13 PROCEDURE — 80048 BASIC METABOLIC PNL TOTAL CA: CPT | Performed by: NURSE PRACTITIONER

## 2018-02-13 PROCEDURE — 99231 SBSQ HOSP IP/OBS SF/LOW 25: CPT | Performed by: NURSE PRACTITIONER

## 2018-02-13 PROCEDURE — 83735 ASSAY OF MAGNESIUM: CPT | Performed by: FAMILY MEDICINE

## 2018-02-13 PROCEDURE — 99232 SBSQ HOSP IP/OBS MODERATE 35: CPT | Performed by: INTERNAL MEDICINE

## 2018-02-13 PROCEDURE — 85027 COMPLETE CBC AUTOMATED: CPT | Performed by: INTERNAL MEDICINE

## 2018-02-13 RX ORDER — METOLAZONE 5 MG/1
5 TABLET ORAL ONCE
Status: COMPLETED | OUTPATIENT
Start: 2018-02-13 | End: 2018-02-13

## 2018-02-13 RX ORDER — BUMETANIDE 0.25 MG/ML
2 INJECTION INTRAMUSCULAR; INTRAVENOUS ONCE
Status: COMPLETED | OUTPATIENT
Start: 2018-02-13 | End: 2018-02-13

## 2018-02-13 RX ORDER — SPIRONOLACTONE 25 MG/1
50 TABLET ORAL DAILY
Status: DISCONTINUED | OUTPATIENT
Start: 2018-02-14 | End: 2018-02-14 | Stop reason: HOSPADM

## 2018-02-13 RX ADMIN — CARVEDILOL 25 MG: 12.5 TABLET, FILM COATED ORAL at 08:37

## 2018-02-13 RX ADMIN — SACUBITRIL AND VALSARTAN 1 TABLET: 49; 51 TABLET, FILM COATED ORAL at 21:06

## 2018-02-13 RX ADMIN — METOLAZONE 5 MG: 5 TABLET ORAL at 15:50

## 2018-02-13 RX ADMIN — CARVEDILOL 25 MG: 12.5 TABLET, FILM COATED ORAL at 17:57

## 2018-02-13 RX ADMIN — SPIRONOLACTONE 25 MG: 25 TABLET, FILM COATED ORAL at 08:37

## 2018-02-13 RX ADMIN — BUMETANIDE 1 MG: 0.25 INJECTION INTRAMUSCULAR; INTRAVENOUS at 10:22

## 2018-02-13 RX ADMIN — ASPIRIN 81 MG: 81 TABLET, COATED ORAL at 08:37

## 2018-02-13 RX ADMIN — BUMETANIDE 2 MG: 0.25 INJECTION INTRAMUSCULAR; INTRAVENOUS at 17:56

## 2018-02-13 RX ADMIN — SACUBITRIL AND VALSARTAN 1 TABLET: 49; 51 TABLET, FILM COATED ORAL at 08:36

## 2018-02-13 NOTE — PROGRESS NOTES
[facility] Medicine Services  PROGRESS NOTE    Patient Name: Evan Gannon  : 1990  MRN: [id number]    Date of Admission: 2018  Length of Stay: 2  Primary Care Physician: Barbara Mills MD    Subjective   Subjective   CC:  chf exacerbation    HPI:  Patient sitting up in chair talking on phone in NAD. + BM yesterday.  States he has not seen cards today, but they told him that they were going to keep him for several days.  Discussed non-compliance issues and the importance of taking his medication.  Denies defibrillator going off yet.  Encouraged him to lose weight so he could have a transplant.    Review of Systems  No headache, no rash, no dysuria, no flank pain, no chest pain  Otherwise ROS is negative except as mentioned in the HPI.    Objective   Objective   Vital Signs:   Temp:  [97.6 °F (36.4 °C)-97.8 °F (36.6 °C)] 97.7 °F (36.5 °C)  Heart Rate:  [] 90  Resp:  [20] 20  BP: (100-127)/(65-83) 124/71  Physical Exam:  Alert ox3, nontoxic appearing,  Morbidly obese  NCAT  PERRLA, EOMI  Pink, MMM  RRR, no M/R/G  +1 DP pulses  Symmetrical non-labored breath sounds, CTA, decreased in bases bilat  Morbidly obese, abd soft, nontender  2+LE edema w/ chronic venous changes  very tight dry skin in LE bilaterally  Face symmetric, speech clear, equal   Pleasant, Normal affect    Results Reviewed:  I have personally reviewed current lab, radiology, and data and agree.    Results from last 7 days  Lab Units 18  0847 18   WBC 10*3/mm3 4.63 5.15 5.62   HEMOGLOBIN g/dL 12.9* 12.9* 13.9   HEMATOCRIT % 40.6 40.5 42.4   PLATELETS 10*3/mm3 240 263 290       Results from last 7 days  Lab Units 18  0847 18  0443 18   SODIUM mmol/L 135 137 138   POTASSIUM mmol/L 3.6 3.8 3.6   CHLORIDE mmol/L 103 104 106   CO2 mmol/L 26.0 25.0 25.0   BUN mg/dL 11 11 11   CREATININE mg/dL 0.90 0.90 0.90   GLUCOSE mg/dL 137* 104* 106*    CALCIUM mg/dL 9.1 8.5* 9.3   ALT (SGPT) U/L  --   --  15   AST (SGOT) U/L  --   --  19     Estimated Creatinine Clearance: 207.6 mL/min (by C-G formula based on Cr of 0.9).  BNP   Date Value Ref Range Status   02/11/2018 669.0 (H) 0.0 - 100.0 pg/mL Final     Comment:     Results may be falsely decreased if patient taking Biotin.     No results found for: PHART    Microbiology Results Abnormal     Procedure Component Value - Date/Time    Urine Culture - Urine, Urine, Clean Catch [[id number]]  (Normal) Collected:  02/11/18 2219    Lab Status:  Preliminary result Specimen:  Urine from Urine, Clean Catch Updated:  02/13/18 1024     Urine Culture Culture in progress    Blood Culture - Blood, [[id number]]  (Normal) Collected:  02/11/18 1951    Lab Status:  Preliminary result Specimen:  Blood from Arm, Left Updated:  02/12/18 2016     Blood Culture No growth at 24 hours    Blood Culture - Blood, [[id number]]  (Normal) Collected:  02/11/18 2000    Lab Status:  Preliminary result Specimen:  Blood from Arm, Right Updated:  02/12/18 2016     Blood Culture No growth at 24 hours    Influenza Antigen, Rapid - Swab, Nasopharynx [[id number]]  (Normal) Collected:  02/11/18 1953    Lab Status:  Final result Specimen:  Swab from Nasopharynx Updated:  02/11/18 2013     Influenza A Ag, EIA Negative     Influenza B Ag, EIA Negative        Imaging Results (last 24 hours)     ** No results found for the last 24 hours. **        Results for orders placed during the hospital encounter of 10/16/17   Adult Transthoracic Echo Limited W/ Cont if Necessary Per Protocol    Narrative · Left ventricular systolic function is severely decreased. Estimated EF =   15%.  · The left ventricular cavity is severely dilated.  · Right ventricular cavity is mildly dilated.  · Mildly reduced right ventricular systolic function noted.        I have reviewed the medications.    Assessment/Plan   Assessment / Plan     Hospital Problem List     * (Principal)Acute on  chronic systolic heart failure    Hypertension    Morbid obesity    Obstructive sleep apnea    Nonischemic congestive cardiomyopathy    Personal history of noncompliance with medical treatment    Plaque psoriasis    Gross hematuria    Acute hypoxemic respiratory failure        Brief Hospital Course to date:  Evan Gannon is a 27 y.o. male w/ nicm who ran out of his cardiac medicines, presented w/ dyspnea, hypoxia. Received bumex/loop diuretics with improvement. Also had gross hematuria which has since resolved.     Assessment & Plan:  -further diuresis, cardiac meds per cardiology  -continue night cpap/oxygen  -cbc, bmp in AM  -wean oxygen as tolerates  -if recurrent hematuria recommend outpatient urology consultation (single episode, resolved)    DVT Prophylaxis:  Sq heparin tid    CODE STATUS: Full Code    Disposition: I expect the patient to be discharged home 1-2 days    SONAM Murray  02/13/18  11:08 AM

## 2018-02-13 NOTE — NURSING NOTE
[facility]    Pt well known to [facility].  Pt with hx of NICM EF 15% (10/17/17), s/p ICD.  Hx of non-compliance.  Currently receiving IV bumex, coreg, enstresto, aldactone.    Discussed s/s HF worsening, importance of medication and bipap compliance.      Pt will be scheduled f/u 1 week post d/c [facility]

## 2018-02-13 NOTE — PLAN OF CARE
Problem: Patient Care Overview (Adult)  Goal: Plan of Care Review  Outcome: Ongoing (interventions implemented as appropriate)   02/13/18 7759   Coping/Psychosocial Response Interventions   Plan Of Care Reviewed With patient   Patient Care Overview   Progress improving   Outcome Evaluation   Outcome Summary/Follow up Plan pt slept on and off today. on/off cpp. continue to diures. will continue to monitor       Problem: Skin Integrity Impairment, Risk/Actual (Adult)  Goal: Identify Related Risk Factors and Signs and Symptoms  Outcome: Ongoing (interventions implemented as appropriate)    Goal: Skin Integrity/Wound Healing  Outcome: Ongoing (interventions implemented as appropriate)

## 2018-02-13 NOTE — PLAN OF CARE
Problem: Patient Care Overview (Adult)  Goal: Plan of Care Review  Outcome: Ongoing (interventions implemented as appropriate)   02/13/18 0440   Coping/Psychosocial Response Interventions   Plan Of Care Reviewed With patient   Patient Care Overview   Progress no change   Outcome Evaluation   Outcome Summary/Follow up Plan Patient rested comfortably throughout night. Patient is alert and oriented and is 2L O2 nasal canula. Patient's vital signs remain stable. Will continue to monitor.      Goal: Adult Individualization and Mutuality  Outcome: Ongoing (interventions implemented as appropriate)    Goal: Discharge Needs Assessment  Outcome: Ongoing (interventions implemented as appropriate)      Problem: Skin Integrity Impairment, Risk/Actual (Adult)  Goal: Identify Related Risk Factors and Signs and Symptoms  Outcome: Ongoing (interventions implemented as appropriate)    Goal: Skin Integrity/Wound Healing  Outcome: Ongoing (interventions implemented as appropriate)

## 2018-02-13 NOTE — PROGRESS NOTES
"  [facility]   Inpatient Progress Note       LOS: 2 days   Patient Care Team:  Barbara Mills MD as PCP - General (Internal Medicine)  SONAM Oates as Nurse Practitioner (Cardiology)  Balbir Olsen MD as Consulting Physician (Cardiology)    Chief Complaint:  Follow-up for acute on chronic systolic congestive heart failure    Subjective     Interval History:   Patient in bed on CPAP. Breathing is a little better but doesn't feel like he is urinating much. No chest pain. States that there was an issue with his insurance and that was why he could not obtain his medications.       Review of Systems:   Pertinent positives noted in history, exam, and assessment. Otherwise reviewed and negative.      Objective     Vitals:  Blood pressure 125/90, pulse 84, temperature 97.7 °F (36.5 °C), resp. rate 20, height 175.3 cm (69\"), weight (!) 186 kg (409 lb 9.6 oz), SpO2 98 %.     Intake/Output Summary (Last 24 hours) at 02/13/18 1213  Last data filed at 02/13/18 0836   Gross per 24 hour   Intake             1200 ml   Output             1000 ml   Net              200 ml     Physical Exam   Constitutional: He is oriented to person, place, and time. He appears well-developed and well-nourished.   Neck: No JVD present. No tracheal deviation present.   Cardiovascular: Normal rate, regular rhythm, normal heart sounds and intact distal pulses.  Exam reveals no friction rub.    No murmur heard.  Pulmonary/Chest: Effort normal. No respiratory distress.   Decreased bases   Abdominal: Soft. Bowel sounds are normal. There is no tenderness.   Musculoskeletal: He exhibits edema. He exhibits no deformity.   Neurological: He is alert and oriented to person, place, and time.     I have examined the patient and agree with the above     Results Review:     I reviewed the patient's new clinical results.      Results from last 7 days  Lab Units 02/13/18  0847   WBC 10*3/mm3 4.63   HEMOGLOBIN g/dL 12.9* "   HEMATOCRIT % 40.6   PLATELETS 10*3/mm3 240       Results from last 7 days  Lab Units 02/13/18  0847  02/11/18  1951   SODIUM mmol/L 135  < > 138   POTASSIUM mmol/L 3.6  < > 3.6   CHLORIDE mmol/L 103  < > 106   CO2 mmol/L 26.0  < > 25.0   BUN mg/dL 11  < > 11   CREATININE mg/dL 0.90  < > 0.90   CALCIUM mg/dL 9.1  < > 9.3   BILIRUBIN mg/dL  --   --  1.5*   ALK PHOS U/L  --   --  82   ALT (SGPT) U/L  --   --  15   AST (SGOT) U/L  --   --  19   GLUCOSE mg/dL 137*  < > 106*   < > = values in this interval not displayed.    Results from last 7 days  Lab Units 02/13/18  0847   SODIUM mmol/L 135   POTASSIUM mmol/L 3.6   CHLORIDE mmol/L 103   CO2 mmol/L 26.0   BUN mg/dL 11   CREATININE mg/dL 0.90   GLUCOSE mg/dL 137*   CALCIUM mg/dL 9.1           Lab Results  Lab Value Date/Time   TROPONINI 1.142 (C) 07/13/2017 0854                     Tele:      Assessment/Plan     Principal Problem:    Acute on chronic systolic heart failure  Active Problems:    Hypertension    Morbid obesity    Obstructive sleep apnea    Nonischemic congestive cardiomyopathy    Personal history of noncompliance with medical treatment    Plaque psoriasis    Gross hematuria    Acute hypoxemic respiratory failure      Assessment and Plan:   1. Acute on chronic systolic heart failure/NICM:  - Worsening systolic congestive heart failure in setting of noncompliance with home medications for the past week  - EF 15% 10/2017, s/p ICD implant per Dr. Richmond, has been referred to  for transplant but needs to lose more weight in order to qualify  - Home medications restarted including coreg, Entresto, spironolactone     2. Hypertension:  - Stable at this time, restart home meds as stated above     3. COLLIN:  - Non-compliant, scheduled to see Dr. Shrestha in March 4. Medical non-compliance:  - Discussed importance of cardiac medications  - Case management following, all 5 prescriptions available for  after discharge for $18    Plan:  Will give a dose of  metolazone 5 mg today to aid with diuresis. Continue IV Bumex for now.Volume status improved, but still not at baseline.  We'll continue aggressive diuresis.  Lower extremity wrap/compression stockings may help.    Marina Rolon, SONAM  02/13/18  12:13 PM  I, Balbir Olsen have reviewed the note in full and agree with all aspects of the above including physical exam, assessment, labs and plan with changes made accordingly.     Balbir Olsen MD  02/13/18  4:28 PM        Dictated utilizing Dragon dictation

## 2018-02-14 VITALS
HEIGHT: 69 IN | HEART RATE: 87 BPM | BODY MASS INDEX: 46.65 KG/M2 | WEIGHT: 315 LBS | RESPIRATION RATE: 22 BRPM | DIASTOLIC BLOOD PRESSURE: 65 MMHG | TEMPERATURE: 98.1 F | OXYGEN SATURATION: 97 % | SYSTOLIC BLOOD PRESSURE: 121 MMHG

## 2018-02-14 PROBLEM — R31.0 GROSS HEMATURIA: Status: RESOLVED | Noted: 2018-02-11 | Resolved: 2018-02-14

## 2018-02-14 PROBLEM — J96.01 ACUTE HYPOXEMIC RESPIRATORY FAILURE: Status: RESOLVED | Noted: 2018-02-12 | Resolved: 2018-02-14

## 2018-02-14 LAB
ANION GAP SERPL CALCULATED.3IONS-SCNC: 6 MMOL/L (ref 3–11)
BACTERIA SPEC AEROBE CULT: NORMAL
BACTERIA SPEC AEROBE CULT: NORMAL
BUN BLD-MCNC: 14 MG/DL (ref 9–23)
BUN/CREAT SERPL: 15.6 (ref 7–25)
CALCIUM SPEC-SCNC: 8.6 MG/DL (ref 8.7–10.4)
CHLORIDE SERPL-SCNC: 99 MMOL/L (ref 99–109)
CO2 SERPL-SCNC: 28 MMOL/L (ref 20–31)
CREAT BLD-MCNC: 0.9 MG/DL (ref 0.6–1.3)
DEPRECATED RDW RBC AUTO: 43.8 FL (ref 37–54)
ERYTHROCYTE [DISTWIDTH] IN BLOOD BY AUTOMATED COUNT: 15.1 % (ref 11.3–14.5)
GFR SERPL CREATININE-BSD FRML MDRD: 125 ML/MIN/1.73
GLUCOSE BLD-MCNC: 99 MG/DL (ref 70–100)
HCT VFR BLD AUTO: 39.8 % (ref 38.9–50.9)
HGB BLD-MCNC: 12.8 G/DL (ref 13.1–17.5)
MAGNESIUM SERPL-MCNC: 1.6 MG/DL (ref 1.3–2.7)
MCH RBC QN AUTO: 25.5 PG (ref 27–31)
MCHC RBC AUTO-ENTMCNC: 32.2 G/DL (ref 32–36)
MCV RBC AUTO: 79.4 FL (ref 80–99)
PLATELET # BLD AUTO: 258 10*3/MM3 (ref 150–450)
PMV BLD AUTO: 10.5 FL (ref 6–12)
POTASSIUM BLD-SCNC: 3.6 MMOL/L (ref 3.5–5.5)
RBC # BLD AUTO: 5.01 10*6/MM3 (ref 4.2–5.76)
SODIUM BLD-SCNC: 133 MMOL/L (ref 132–146)
WBC NRBC COR # BLD: 5.33 10*3/MM3 (ref 3.5–10.8)

## 2018-02-14 PROCEDURE — 94660 CPAP INITIATION&MGMT: CPT

## 2018-02-14 PROCEDURE — 85027 COMPLETE CBC AUTOMATED: CPT | Performed by: NURSE PRACTITIONER

## 2018-02-14 PROCEDURE — 80048 BASIC METABOLIC PNL TOTAL CA: CPT | Performed by: NURSE PRACTITIONER

## 2018-02-14 PROCEDURE — 94799 UNLISTED PULMONARY SVC/PX: CPT

## 2018-02-14 PROCEDURE — 83735 ASSAY OF MAGNESIUM: CPT | Performed by: FAMILY MEDICINE

## 2018-02-14 PROCEDURE — 99239 HOSP IP/OBS DSCHRG MGMT >30: CPT | Performed by: NURSE PRACTITIONER

## 2018-02-14 PROCEDURE — 99232 SBSQ HOSP IP/OBS MODERATE 35: CPT | Performed by: INTERNAL MEDICINE

## 2018-02-14 RX ORDER — METOLAZONE 5 MG/1
5 TABLET ORAL ONCE
Status: COMPLETED | OUTPATIENT
Start: 2018-02-14 | End: 2018-02-14

## 2018-02-14 RX ORDER — METOLAZONE 5 MG/1
5 TABLET ORAL 3 TIMES WEEKLY
Qty: 10 TABLET | Refills: 0 | Status: SHIPPED | OUTPATIENT
Start: 2018-02-14 | End: 2018-03-15 | Stop reason: SDUPTHER

## 2018-02-14 RX ORDER — CARVEDILOL 25 MG/1
25 TABLET ORAL 2 TIMES DAILY WITH MEALS
Qty: 60 TABLET | Refills: 0 | Status: SHIPPED | OUTPATIENT
Start: 2018-02-14 | End: 2018-03-15 | Stop reason: SDUPTHER

## 2018-02-14 RX ORDER — SPIRONOLACTONE 25 MG/1
25 TABLET ORAL DAILY
Qty: 30 TABLET | Refills: 0 | Status: SHIPPED | OUTPATIENT
Start: 2018-02-14 | End: 2018-03-15 | Stop reason: SDUPTHER

## 2018-02-14 RX ORDER — TORSEMIDE 20 MG/1
60 TABLET ORAL DAILY
Qty: 90 TABLET | Refills: 0 | Status: SHIPPED | OUTPATIENT
Start: 2018-02-14 | End: 2018-03-15 | Stop reason: SDUPTHER

## 2018-02-14 RX ORDER — BENZONATATE 100 MG/1
100 CAPSULE ORAL 3 TIMES DAILY PRN
Qty: 30 CAPSULE | Refills: 0 | Status: SHIPPED | OUTPATIENT
Start: 2018-02-14 | End: 2018-04-28 | Stop reason: DRUGHIGH

## 2018-02-14 RX ORDER — BENZONATATE 100 MG/1
100 CAPSULE ORAL 3 TIMES DAILY PRN
Status: DISCONTINUED | OUTPATIENT
Start: 2018-02-14 | End: 2018-02-14 | Stop reason: HOSPADM

## 2018-02-14 RX ADMIN — METOLAZONE 5 MG: 5 TABLET ORAL at 14:43

## 2018-02-14 RX ADMIN — SPIRONOLACTONE 50 MG: 25 TABLET, FILM COATED ORAL at 09:43

## 2018-02-14 RX ADMIN — CARVEDILOL 25 MG: 12.5 TABLET, FILM COATED ORAL at 09:43

## 2018-02-14 RX ADMIN — ASPIRIN 81 MG: 81 TABLET, COATED ORAL at 09:43

## 2018-02-14 RX ADMIN — POTASSIUM CHLORIDE 40 MEQ: 750 CAPSULE, EXTENDED RELEASE ORAL at 09:49

## 2018-02-14 RX ADMIN — SACUBITRIL AND VALSARTAN 1 TABLET: 49; 51 TABLET, FILM COATED ORAL at 09:43

## 2018-02-14 RX ADMIN — BENZONATATE 100 MG: 100 CAPSULE, LIQUID FILLED ORAL at 03:13

## 2018-02-14 RX ADMIN — POTASSIUM CHLORIDE 40 MEQ: 750 CAPSULE, EXTENDED RELEASE ORAL at 14:37

## 2018-02-14 RX ADMIN — CARVEDILOL 25 MG: 12.5 TABLET, FILM COATED ORAL at 17:36

## 2018-02-14 NOTE — PROGRESS NOTES
Continued Stay Note  [facility]     Patient Name: Evan Gannon  MRN: [id number]  Today's Date: 2/14/2018    Admit Date: 2/11/2018          Discharge Plan       02/14/18 1628    Case Management/Social Work Plan    Plan Home    Patient/Family In Agreement With Plan yes    Additional Comments Spoke with patient at [facility]. Plan at this time is to return home. His precsriptions will be available through [facility] retail pharmacy and will delivered to him at bedside. Patient stated concerns regarding not having a CPAP. FREIDA called Yadira Sims with the [facility], where patient stated he had a sleep study scheduled. Patient has an appt for a sleep study on 3/15 at 0900. Per Yadira he has been without a CPAP for approx 7 mo, insurance would no longer supply it r/t noncompliance. Patient will be placed on a [facility] wait list and if an earlier appt becomes available they will notify patient. CM has also provided patient with a cab voucher in the event he is not able to arrange transportation. No other needs identified at this time. CM will follow.               Discharge Codes     None        Expected Discharge Date and Time     Expected Discharge Date Expected Discharge Time    Feb 16, 2018             Mary Pierre RN

## 2018-02-14 NOTE — DISCHARGE SUMMARY
[facility] Medicine Services  DISCHARGE SUMMARY    Patient Name: Evan Gannon  : 1990  MRN: [id number]    Date of Admission: 2018  Date of Discharge:  2018  Primary Care Physician: Barbara Mills MD    Consults     Date and Time Order Name Status Description    2018 4032 Inpatient Consult to Cardiology          Hospital Course   Presenting Problem:   Acute on chronic congestive heart failure, unspecified congestive heart failure type [I50.9]    Active Hospital Problems (** Indicates Principal Problem)    Diagnosis Date Noted   • **Acute on chronic systolic heart failure [I50.23] 2017   • Plaque psoriasis [L40.0] 2017   • Nonischemic congestive cardiomyopathy [I42.9] 2017   • Personal history of noncompliance with medical treatment [Z91.19] 2017   • Hypertension [I10] 2017   • Morbid obesity [E66.01] 2017   • Obstructive sleep apnea [G47.33] 2017      Resolved Hospital Problems    Diagnosis Date Noted Date Resolved   • Acute hypoxemic respiratory failure [J96.01] 2018   • Gross hematuria [R31.0] 2018      Hospital Course:  Evan Gannon is a 27 y.o. male with an ICM with EF 15% measured as 2017 presented to [facility] ED on 18 for worsening SOA ×1 month.  Patient stated that he was doing remarkably well after his hospitalization in October until the weather got cold.  Patient has not filled this medication since November due to financial concerns/insurance issues.  Over the last month, he has became more dyspneic and had more dependent edema than usual.  He has marked orthopnea and decreased UP upon admission.  Patient also complained of bloody discharge at the end of his urine stream, but this occurred ×1 and he had no other hematuria.  Patient also has a history of sleep apnea and has a CPAP, but doesn't have it anymore because it was taken from him for nonuse.   Patient has a sleep study scheduled and he can get another CPAP at that time.  Patient was seen in the heart failure clinic on 2/6 when he received Bumex 1 mg and had 1400 mL urine output.  Patient did not have symptomatic relief from the Bumex, but he still did not get his medications from the pharmacy that Ms. Sujit SONAM had called in for him.  Patient was admitted to the hospital medicine service for further evaluation and treatment.    Cardiology, Dr. Olsen saw the patient gave him some extra medicine, but wants the patient to be discharged on his current home medications plus metolazone 3 times a week.  Patient is status follow-up with the transplant team at , but until he loses weight he is not a candidate.  Patient is to follow-up with his PCP and Dr. Olsen within 1 week.  Patient needs to continue with the heart failure clinic.  Patient's compliance was discussed with him by the APRN from the heart failure clinic, the cardiology staff, and myself.      Day of Discharge   HPI:   See 2/14/18 progress note    Review of Systems  Gen- No fevers, chills  CV- No chest pain, palpitations  Resp- No cough, dyspnea  GI- No N/V/D, abd pain  Otherwise ROS is negative except as mentioned in the HPI.    Vital Signs:   Temp:  [97.4 °F (36.3 °C)] 97.4 °F (36.3 °C)  Heart Rate:  [86-93] 87  Resp:  [22] 22  BP: (100-121)/(64-71) 121/65   Physical Exam:  See 2/14/18 progress note    Pertinent  and/or Most Recent Results     Results from last 7 days  Lab Units 02/14/18  0706 02/14/18  0705 02/13/18  0847 02/12/18  0443 02/11/18 1951   WBC 10*3/mm3  --  5.33 4.63 5.15 5.62   HEMOGLOBIN g/dL  --  12.8* 12.9* 12.9* 13.9   HEMATOCRIT %  --  39.8 40.6 40.5 42.4   PLATELETS 10*3/mm3  --  258 240 263 290   SODIUM mmol/L 133  --  135 137 138   POTASSIUM mmol/L 3.6  --  3.6 3.8 3.6   CHLORIDE mmol/L 99  --  103 104 106   CO2 mmol/L 28.0  --  26.0 25.0 25.0   BUN mg/dL 14  --  11 11 11   CREATININE mg/dL 0.90  --  0.90 0.90 0.90    GLUCOSE mg/dL 99  --  137* 104* 106*   CALCIUM mg/dL 8.6*  --  9.1 8.5* 9.3       Results from last 7 days  Lab Units 02/11/18 1951   BILIRUBIN mg/dL 1.5*   ALK PHOS U/L 82   ALT (SGPT) U/L 15   AST (SGOT) U/L 19           Invalid input(s): TG, LDLCALC, LDLREALC    Results from last 7 days  Lab Units 02/11/18 1951   BNP pg/mL 669.0*     Brief Urine Lab Results  (Last result in the past 365 days)      Color   Clarity   Blood   Leuk Est   Nitrite   Protein   CREAT   Urine HCG        02/11/18 2219 Dark Yellow(A) Cloudy(A) Large (3+)(A) Small (1+)(A) Negative Negative             Microbiology Results Abnormal     Procedure Component Value - Date/Time    Urine Culture - Urine, Urine, Clean Catch [[id number]] Collected:  02/11/18 2219    Lab Status:  Final result Specimen:  Urine from Urine, Clean Catch Updated:  02/14/18 0919     Urine Culture --      20,000-30,000 CFU/mL Normal Urogenital Veena    Blood Culture - Blood, [[id number]]  (Normal) Collected:  02/11/18 1951    Lab Status:  Preliminary result Specimen:  Blood from Arm, Left Updated:  02/13/18 2016     Blood Culture No growth at 2 days    Blood Culture - Blood, [[id number]]  (Normal) Collected:  02/11/18 2000    Lab Status:  Preliminary result Specimen:  Blood from Arm, Right Updated:  02/13/18 2016     Blood Culture No growth at 2 days    Influenza Antigen, Rapid - Swab, Nasopharynx [[id number]]  (Normal) Collected:  02/11/18 1953    Lab Status:  Final result Specimen:  Swab from Nasopharynx Updated:  02/11/18 2013     Influenza A Ag, EIA Negative     Influenza B Ag, EIA Negative        Imaging Results (all)     Procedure Component Value Units Date/Time    XR Chest 1 View [[id number]] Collected:  02/11/18 1944     Updated:  02/11/18 2114    Narrative:       EXAM:  XR Chest, 1 View    CLINICAL HISTORY:  27 years old, male; Signs and symptoms; Shortness of breath; Prior surgery;   Surgery date: 6+ months; Surgery type: Pacemaker; Additional info:  SOB    TECHNIQUE:  Frontal view of the chest.    COMPARISON:  CR - XR CHEST 1 VW 2017-10-16 08:55    FINDINGS:  Lungs:  Mild bibasilar groundglass opacities, right greater than left, likely   areas of atelectasis and/or minimal pneumonitis. Minimal hydrostatic pulmonary   edema possible.  Pleural space:  Normal.  No pneumothorax.  Heart:  Cardiac silhouette is enlarged, compatible with cardiomegaly and/or   pericardial fluid.  Mediastinum:  Normal.  Bones/joints:  Normal.  Tubes, lines and devices:  Left subclavian transvenous pacemaker/AICD.      Impression:         1.  Mild bibasilar groundglass opacities, right greater than left, likely areas   of atelectasis and/or minimal pneumonitis. Minimal hydrostatic pulmonary edema   possible.    2.  Incidental/non-acute findings are described above.    THIS DOCUMENT HAS BEEN ELECTRONICALLY SIGNED BY WILBER ENCARNACION MD    CT Angiogram Chest With Contrast [[id number]] Collected:  02/11/18 2035     Updated:  02/11/18 2126    Narrative:       EXAM:  CT Angiography Chest With Intravenous Contrast    CLINICAL HISTORY:  27 years old, male; Signs and symptoms; Shortness of breath; Additional info:   SOB and elevated d dimer pt states h/o chf    TECHNIQUE:  Axial computed tomographic angiography images of the chest with intravenous   contrast using pulmonary embolism protocol.  All CT scans at this facility use   one or more dose reduction techniques, viz.: automated exposure control; ma/kV   adjustment per patient size (including targeted exams where dose is matched to   indication; i.e. head); or iterative reconstruction technique.  MIP reconstructed images were created and reviewed.    CONTRAST:  80 mL of [id number] administered intravenously.    COMPARISON:  CT ANGIOGRAM CHEST W CONTRAST 2017-12-13 00:26    FINDINGS:  Pulmonary arteries:  No pulmonary embolism.  Aorta:  No acute findings.  Lungs:  Minimal bilateral perihilar groundglass opacities with peribronchial   cuffing,  suggesting hydrostatic pulmonary edema.  Pleural space:  Normal.  No significant effusion.  No pneumothorax.  Heart:  Moderate four-chamber cardiac enlargement.  Bones/joints:  No acute fracture.  No dislocation.  Soft tissues:  Normal.  Lymph nodes:  Normal.  Gallbladder and bile ducts:  Cholelithiasis, without CT evidence of acute   cholecystitis.  Tubes, lines and devices:  Left subclavian transvenous pacemaker in place.      Impression:         1.  No pulmonary embolism.    2.  Minimal bilateral perihilar groundglass opacities with peribronchial   cuffing, suggesting hydrostatic pulmonary edema.    3.  Incidental/non-acute findings are described above.    THIS DOCUMENT HAS BEEN ELECTRONICALLY SIGNED BY WILBER ENCARNACION MD    CT Abdomen Pelvis Without Contrast [[id number]] Collected:  02/11/18 2250     Updated:  02/11/18 2333    Narrative:       EXAM:  CT Abdomen and Pelvis Without Intravenous Contrast    CLINICAL HISTORY:  27 years old, male; Heart failure, unspecified; Signs and symptoms; Other:   Hematuria    TECHNIQUE:  Axial computed tomography images of the abdomen and pelvis without intravenous   contrast.  All CT scans at this facility use one or more dose reduction   techniques, viz.: automated exposure control; ma/kV adjustment per patient size   (including targeted exams where dose is matched to indication; i.e. head); or   iterative reconstruction technique.  Coronal reformatted images were created and reviewed.    COMPARISON:  CT ABDOMEN PELVIS WO CONTRAST 2017-05-02 18:34    FINDINGS:  Lower thorax:  Partially visualized pacemaker lead within the right cardiac   chambers.    ABDOMEN:  Liver:  Hepatomegaly.  Gallbladder and bile ducts:  Cholelithiasis, without CT evidence of acute   cholecystitis.  Pancreas:  Normal.  Spleen:  Normal.  Adrenals:  Normal.  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  Stomach and bowel:  Normal.  Appendix:  Appendix is  normal.    PELVIS:  Bladder:  Minimal urinary bladder wall thickening, without adjacent associated   inflammatory stranding, possibly secondary to partially decompressed state,   although mild cystitis possible.  Reproductive:  Normal as visualized.    ABDOMEN and PELVIS:  Intraperitoneal space:  Normal.  No free air.  No significant fluid collection.  Bones/joints:  No acute fracture.  No dislocation.  Soft tissues:  Small bilateral fat-containing inguinal hernias, without acute   complications.  Vasculature:  Phleboliths within the pelvis.  No abdominal aortic aneurysm.  Lymph nodes:  Normal.      Impression:         1.  No definite acute abdominal or pelvic abnormality.    2.  Minimal urinary bladder wall thickening, without adjacent associated   inflammatory stranding, possibly secondary to partially decompressed state,   although mild cystitis possible.    3.  Incidental/non-acute findings are described above.    THIS DOCUMENT HAS BEEN ELECTRONICALLY SIGNED BY WILBER ENCARNACION MD        Results for orders placed in visit on 02/06/18   SCANNED VASCULAR STUDIES     Results for orders placed in visit on 02/06/18   SCANNED VASCULAR STUDIES     Results for orders placed during the hospital encounter of 10/16/17   Adult Transthoracic Echo Limited W/ Cont if Necessary Per Protocol    Narrative · Left ventricular systolic function is severely decreased. Estimated EF =   15%.  · The left ventricular cavity is severely dilated.  · Right ventricular cavity is mildly dilated.  · Mildly reduced right ventricular systolic function noted.         Order Current Status    Blood Culture - Blood, Preliminary result    Blood Culture - Blood, Preliminary result        Discharge Details      Evan Gannon   Home Medication Instructions BENI:[id number]    Printed on:02/14/18 9404   Medication Information                      albuterol (PROVENTIL HFA;VENTOLIN HFA) 108 (90 Base) MCG/ACT inhaler  Inhale 2 puffs Every 6 (Six) Hours As  Needed for Wheezing.             aspirin 81 MG EC tablet  Take 1 tablet by mouth Daily.             benzonatate (TESSALON) 100 MG capsule  Take 1 capsule by mouth 3 (Three) Times a Day As Needed for Cough.             carvedilol (COREG) 25 MG tablet  Take 1 tablet by mouth 2 (Two) Times a Day With Meals.             indomethacin (INDOCIN) 25 MG capsule  Take 1 capsule by mouth 3 (Three) Times a Day As Needed for Mild Pain .             metOLazone (ZAROXOLYN) 5 MG tablet  Take 1 tablet by mouth 3 (Three) Times a Week.             sacubitril-valsartan (ENTRESTO) 49-51 MG tablet  Take 1 tablet by mouth Every 12 (Twelve) Hours.             spironolactone (ALDACTONE) 25 MG tablet  Take 1 tablet by mouth Daily.             torsemide (DEMADEX) 20 MG tablet  Take 3 tablets by mouth Daily.               Discharge Disposition:  Home or Self Care    Discharge Diet:  Diet Instructions     Diet: Regular, Cardiac; Thin       Discharge Diet:   Regular  Cardiac      Fluid Consistency:  Thin               Discharge Activity:   Activity Instructions     Activity as Tolerated                   Future Appointments  Date Time Provider Department Center   2/20/2018 1:15 PM SONAM Oates [facility]   3/15/2018 9:00 AM Cristofer Shrestha MD [facility] None   3/27/2018 1:00 PM ORIENTATION - [facility]     Additional Instructions for the Follow-ups that You Need to Schedule     Discharge Follow-up with PCP    As directed    Follow Up Details:  Barbara Mills MD; please schedule an appointment prior to patient's discharge           Discharge Follow-up with Specialty: Dr. Olsen; please schedule appointment prior to patient's discharge; 1 Week    As directed    Specialty:  Dr. Olsen; please schedule appointment prior to patient's discharge    Follow Up:  1 Week                   Time Spent on Discharge:  60 minutes    Electronically signed by SONAM Murray, 02/14/18, 4:47 PM.  Attending  Azul KAMINSKI supervised care of the patient on day of discharge with direct care provided by the advanced care provider (APC).    Electronically signed by Scott Vazquez MD, 02/14/18, 5:52 PM.

## 2018-02-14 NOTE — PLAN OF CARE
Problem: Patient Care Overview (Adult)  Goal: Plan of Care Review  Outcome: Ongoing (interventions implemented as appropriate)   02/14/18 0301   Coping/Psychosocial Response Interventions   Plan Of Care Reviewed With patient   Patient Care Overview   Progress progress toward functional goals as expected   Outcome Evaluation   Outcome Summary/Follow up Plan Patient rested on and off during night. Patient's vital signs remain stable. Patient is alert and oriented. Patient is on 2L O2 nasal canula and CPAP. Patient 's magnesium was 1.7 and patient had PRN replacement protocol ordered. Patient's IV did not work and new IV attemped once and then patient refused to have any other tries. Hospitalist PA notified of patient refusing new IV and unable to get IV mag replacement , no new orders. Patient reported cough, hospitalist PA notified. See orders.      Goal: Adult Individualization and Mutuality  Outcome: Ongoing (interventions implemented as appropriate)    Goal: Discharge Needs Assessment  Outcome: Ongoing (interventions implemented as appropriate)      Problem: Skin Integrity Impairment, Risk/Actual (Adult)  Goal: Identify Related Risk Factors and Signs and Symptoms  Outcome: Ongoing (interventions implemented as appropriate)    Goal: Skin Integrity/Wound Healing  Outcome: Ongoing (interventions implemented as appropriate)

## 2018-02-14 NOTE — PROGRESS NOTES
"  [facility]   Inpatient Progress Note       LOS: 3 days   Patient Care Team:  Barbara Mills MD as PCP - General (Internal Medicine)  SONAM Oates as Nurse Practitioner (Cardiology)  Balbir Olsen MD as Consulting Physician (Cardiology)    Chief Complaint:  Follow-up for acute on chronic systolic congestive heart failure    Subjective     Interval History:   Patient in bed on CPAP. Still feels volume overloaded. IV was lost in the last 24 hours and staff have been unable to get another access.       Review of Systems:   Pertinent positives noted in history, exam, and assessment. Otherwise reviewed and negative.      Objective     Vitals:  Blood pressure 100/71, pulse 91, temperature 97.4 °F (36.3 °C), resp. rate 22, height 175.3 cm (69\"), weight (!) 186 kg (409 lb 9.6 oz), SpO2 96 %.     Intake/Output Summary (Last 24 hours) at 02/14/18 1253  Last data filed at 02/14/18 1039   Gross per 24 hour   Intake             1320 ml   Output             2850 ml   Net            -1530 ml     Physical Exam   Constitutional: He is oriented to person, place, and time. He appears well-developed and well-nourished.   Neck: No JVD present. No tracheal deviation present.   Cardiovascular: Normal rate, regular rhythm, normal heart sounds and intact distal pulses.  Exam reveals no friction rub.    No murmur heard.  Pulmonary/Chest: Effort normal. No respiratory distress.   Decreased bases   Abdominal: Soft. Bowel sounds are normal. There is no tenderness.   Musculoskeletal: He exhibits edema. He exhibits no deformity.   Neurological: He is alert and oriented to person, place, and time.          Results Review:     I reviewed the patient's new clinical results.      Results from last 7 days  Lab Units 02/14/18  0705   WBC 10*3/mm3 5.33   HEMOGLOBIN g/dL 12.8*   HEMATOCRIT % 39.8   PLATELETS 10*3/mm3 258       Results from last 7 days  Lab Units 02/14/18  0706  02/11/18  1951   SODIUM " mmol/L 133  < > 138   POTASSIUM mmol/L 3.6  < > 3.6   CHLORIDE mmol/L 99  < > 106   CO2 mmol/L 28.0  < > 25.0   BUN mg/dL 14  < > 11   CREATININE mg/dL 0.90  < > 0.90   CALCIUM mg/dL 8.6*  < > 9.3   BILIRUBIN mg/dL  --   --  1.5*   ALK PHOS U/L  --   --  82   ALT (SGPT) U/L  --   --  15   AST (SGOT) U/L  --   --  19   GLUCOSE mg/dL 99  < > 106*   < > = values in this interval not displayed.    Results from last 7 days  Lab Units 02/14/18  0706   SODIUM mmol/L 133   POTASSIUM mmol/L 3.6   CHLORIDE mmol/L 99   CO2 mmol/L 28.0   BUN mg/dL 14   CREATININE mg/dL 0.90   GLUCOSE mg/dL 99   CALCIUM mg/dL 8.6*           Lab Results  Lab Value Date/Time   TROPONINI 1.142 (C) 07/13/2017 0854                     Tele:      Assessment/Plan     Principal Problem:    Acute on chronic systolic heart failure  Active Problems:    Hypertension    Morbid obesity    Obstructive sleep apnea    Nonischemic congestive cardiomyopathy    Personal history of noncompliance with medical treatment    Plaque psoriasis    Gross hematuria    Acute hypoxemic respiratory failure      Assessment and Plan:   1. Acute on chronic systolic heart failure/NICM:  - Worsening systolic congestive heart failure in setting of noncompliance with home medications for the past week  - EF 15% 10/2017, s/p ICD implant per Dr. Richmond, has been referred to  for transplant but needs to lose more weight in order to qualify  - Home medications restarted including coreg, Entresto, spironolactone  - On IV diuretic and given metolazone yesterday     2. Hypertension:  - Stable at this time, restart home meds as stated above     3. COLLIN:  - Non-compliant, scheduled to see Dr. Shrestha in March     4. Medical non-compliance:  - Discussed importance of cardiac medications  - Case management following, all 5 prescriptions available for  after discharge for $18    Plan:  Give another 5 mg of metolazone today.As patient has lost IV access Epic is okay for discharge home, on  his previous home medications, and simply taking metolazone 3 times weekly until seen in outpatient follow-up.    Marina Rolon, SONAM  02/14/18  12:53 PM    IBalbir have reviewed the note in full and agree with all aspects of the above including physical exam, assessment, labs and plan with changes made accordingly.     Balbir Olsen MD  02/14/18  3:59 PM          Dictated utilizing Dragon dictation

## 2018-02-14 NOTE — PROGRESS NOTES
[facility] Medicine Services  PROGRESS NOTE    Patient Name: Evan Gannon  : 1990  MRN: [id number]    Date of Admission: 2018  Length of Stay: 3  Primary Care Physician: Barbara Mills MD    Subjective   Subjective   CC:  chf exacerbation    HPI:  Patient sitting up in chair in NAD.  Patient states he ate breakfast, 97 mg.  Patient states that cardiology has not been in to evaluate him yet, but stated that he will stay through Thursday.  No adverse events overnight.  No family in the room.    Review of Systems  No headache, no rash, no dysuria, no flank pain, no chest pain  Otherwise ROS is negative except as mentioned in the HPI.    Objective   Objective   Vital Signs:   Temp:  [97.4 °F (36.3 °C)-98.1 °F (36.7 °C)] 97.4 °F (36.3 °C)  Heart Rate:  [84-93] 91  Resp:  [20-22] 22  BP: (100-125)/(64-90) 100/71  Physical Exam:  Alert ox3, nontoxic appearing,  Morbidly obese  NCAT  PERRLA, EOMI  Pink, MMM  RRR, no M/R/G  +1 DP pulses  Symmetrical non-labored breath sounds, CTA, decreased in bases bilat  Morbidly obese, abd soft, nontender  2+LE edema (improving) w/ chronic venous changes  Not as tight, but still very dry skin in LE bilaterally  Face symmetric, speech clear, equal   Pleasant, Normal affect    Results Reviewed:  I have personally reviewed current lab, radiology, and data and agree.    Results from last 7 days  Lab Units 18  0705 18  0847 18   WBC 10*3/mm3 5.33 4.63 5.15   HEMOGLOBIN g/dL 12.8* 12.9* 12.9*   HEMATOCRIT % 39.8 40.6 40.5   PLATELETS 10*3/mm3 258 240 263       Results from last 7 days  Lab Units 18  0706 18  0847 18  0443 18   SODIUM mmol/L 133 135 137 138   POTASSIUM mmol/L 3.6 3.6 3.8 3.6   CHLORIDE mmol/L 99 103 104 106   CO2 mmol/L 28.0 26.0 25.0 25.0   BUN mg/dL 14 11 11 11   CREATININE mg/dL 0.90 0.90 0.90 0.90   GLUCOSE mg/dL 99 137* 104* 106*   CALCIUM mg/dL 8.6* 9.1 8.5*  9.3   ALT (SGPT) U/L  --   --   --  15   AST (SGOT) U/L  --   --   --  19     Estimated Creatinine Clearance: 207.6 mL/min (by C-G formula based on Cr of 0.9).  BNP   Date Value Ref Range Status   02/11/2018 669.0 (H) 0.0 - 100.0 pg/mL Final     Comment:     Results may be falsely decreased if patient taking Biotin.     No results found for: PHART    Microbiology Results Abnormal     Procedure Component Value - Date/Time    Urine Culture - Urine, Urine, Clean Catch [[id number]] Collected:  02/11/18 2219    Lab Status:  Final result Specimen:  Urine from Urine, Clean Catch Updated:  02/14/18 0919     Urine Culture --      20,000-30,000 CFU/mL Normal Urogenital Veena    Blood Culture - Blood, [[id number]]  (Normal) Collected:  02/11/18 1951    Lab Status:  Preliminary result Specimen:  Blood from Arm, Left Updated:  02/13/18 2016     Blood Culture No growth at 2 days    Blood Culture - Blood, [[id number]]  (Normal) Collected:  02/11/18 2000    Lab Status:  Preliminary result Specimen:  Blood from Arm, Right Updated:  02/13/18 2016     Blood Culture No growth at 2 days    Influenza Antigen, Rapid - Swab, Nasopharynx [[id number]]  (Normal) Collected:  02/11/18 1953    Lab Status:  Final result Specimen:  Swab from Nasopharynx Updated:  02/11/18 2013     Influenza A Ag, EIA Negative     Influenza B Ag, EIA Negative        Imaging Results (last 24 hours)     ** No results found for the last 24 hours. **        Results for orders placed during the hospital encounter of 10/16/17   Adult Transthoracic Echo Limited W/ Cont if Necessary Per Protocol    Narrative · Left ventricular systolic function is severely decreased. Estimated EF =   15%.  · The left ventricular cavity is severely dilated.  · Right ventricular cavity is mildly dilated.  · Mildly reduced right ventricular systolic function noted.        I have reviewed the medications.    Assessment/Plan   Assessment / Plan     Hospital Problem List     * (Principal)Acute on  chronic systolic heart failure    Hypertension    Morbid obesity    Obstructive sleep apnea    Nonischemic congestive cardiomyopathy    Personal history of noncompliance with medical treatment    Plaque psoriasis    Gross hematuria    Acute hypoxemic respiratory failure        Brief Hospital Course to date:  Evan Gannon is a 27 y.o. male w/ nicm who ran out of his cardiac medicines, presented w/ dyspnea, hypoxia. Received bumex/loop diuretics with improvement. Also had gross hematuria which has since resolved.     Assessment & Plan:  -further diuresis, ordered metolazonecardiac meds per cardiology  -continue night cpap/oxygen  -WBC WNL, H/H 12.8/39.8; AM BMP WNL  -wean oxygen as tolerates  -if recurrent hematuria recommend outpatient urology consultation (single episode, resolved)  --Heart valve clinic to schedule appointment for one week after discharge; discussed compliance issues with patient    DVT Prophylaxis:  SQ heparin Q8H    CODE STATUS: Full Code    Disposition: I expect the patient to be discharged home 1-2 days when cardiology okays discharge    Debbi Ferris, APRN  02/14/18  10:48 AM

## 2018-02-14 NOTE — PROGRESS NOTES
Adult Nutrition  Assessment/PES    Patient Name:  Evan Gannon  YOB: 1990  MRN: [id number]  Admit Date:  2/11/2018    Assessment Date:  2/14/2018    Comments:            Reason for Assessment       02/14/18 0814    Reason for Assessment    Reason For Assessment/Visit follow up protocol    Time Spent (min) 5                          Nutrition Prescription Ordered       02/14/18 0814    Nutrition Prescription PO    Current PO Diet Regular    Supplement Other (comment)   PREMIER PROTEIN    Supplement Frequency 2 times a day    Common Modifiers Cardiac              Problem/Interventions:          Problem 2       02/14/18 0815    Nutrition Diagnoses Problem 2    Problem 2 Nutrition Appropriate for Condition at this Time    Signs/Symptoms (evidenced by) PO Intake    Percent (%) intake recorded 100 %    Over number of meals 3                        Education/Evaluation       02/14/18 0815    Monitor/Evaluation    Monitor Per protocol        Electronically signed by:  Cristina Stanford RD  02/14/18 8:15 AM

## 2018-02-15 ENCOUNTER — TRANSITIONAL CARE MANAGEMENT TELEPHONE ENCOUNTER (OUTPATIENT)
Dept: INTERNAL MEDICINE | Facility: CLINIC | Age: 28
End: 2018-02-15

## 2018-02-16 LAB
BACTERIA SPEC AEROBE CULT: NORMAL
BACTERIA SPEC AEROBE CULT: NORMAL

## 2018-02-18 ENCOUNTER — HOSPITAL ENCOUNTER (OUTPATIENT)
Facility: HOSPITAL | Age: 28
Setting detail: OBSERVATION
Discharge: HOME | End: 2018-02-20
Attending: EMERGENCY MEDICINE | Admitting: INTERNAL MEDICINE

## 2018-02-18 ENCOUNTER — APPOINTMENT (OUTPATIENT)
Dept: GENERAL RADIOLOGY | Facility: HOSPITAL | Age: 28
End: 2018-02-18

## 2018-02-18 DIAGNOSIS — R77.8 TROPONIN I ABOVE REFERENCE RANGE: ICD-10-CM

## 2018-02-18 DIAGNOSIS — I50.23 ACUTE ON CHRONIC SYSTOLIC CONGESTIVE HEART FAILURE (HCC): Primary | ICD-10-CM

## 2018-02-18 DIAGNOSIS — I50.23 ACUTE ON CHRONIC SYSTOLIC HEART FAILURE (HCC): ICD-10-CM

## 2018-02-18 DIAGNOSIS — I42.0 NONISCHEMIC CONGESTIVE CARDIOMYOPATHY (HCC): ICD-10-CM

## 2018-02-18 LAB
ALBUMIN SERPL-MCNC: 3.8 G/DL (ref 3.2–4.8)
ALBUMIN/GLOB SERPL: 1.1 G/DL (ref 1.5–2.5)
ALP SERPL-CCNC: 91 U/L (ref 25–100)
ALT SERPL W P-5'-P-CCNC: 10 U/L (ref 7–40)
ANION GAP SERPL CALCULATED.3IONS-SCNC: 6 MMOL/L (ref 3–11)
APTT PPP: 33.7 SECONDS (ref 24–31)
AST SERPL-CCNC: 15 U/L (ref 0–33)
BASOPHILS # BLD AUTO: 0.02 10*3/MM3 (ref 0–0.2)
BASOPHILS NFR BLD AUTO: 0.4 % (ref 0–1)
BILIRUB SERPL-MCNC: 0.7 MG/DL (ref 0.3–1.2)
BNP SERPL-MCNC: 164 PG/ML (ref 0–100)
BUN BLD-MCNC: 17 MG/DL (ref 9–23)
BUN/CREAT SERPL: 18.9 (ref 7–25)
CALCIUM SPEC-SCNC: 8.9 MG/DL (ref 8.7–10.4)
CHLORIDE SERPL-SCNC: 95 MMOL/L (ref 99–109)
CO2 SERPL-SCNC: 33 MMOL/L (ref 20–31)
CREAT BLD-MCNC: 0.9 MG/DL (ref 0.6–1.3)
DEPRECATED RDW RBC AUTO: 42.9 FL (ref 37–54)
EOSINOPHIL # BLD AUTO: 0.29 10*3/MM3 (ref 0–0.3)
EOSINOPHIL NFR BLD AUTO: 5.6 % (ref 0–3)
ERYTHROCYTE [DISTWIDTH] IN BLOOD BY AUTOMATED COUNT: 14.9 % (ref 11.3–14.5)
GFR SERPL CREATININE-BSD FRML MDRD: 125 ML/MIN/1.73
GLOBULIN UR ELPH-MCNC: 3.6 GM/DL
GLUCOSE BLD-MCNC: 118 MG/DL (ref 70–100)
HCT VFR BLD AUTO: 43.5 % (ref 38.9–50.9)
HGB BLD-MCNC: 14 G/DL (ref 13.1–17.5)
HOLD SPECIMEN: NORMAL
IMM GRANULOCYTES # BLD: 0.02 10*3/MM3 (ref 0–0.03)
IMM GRANULOCYTES NFR BLD: 0.4 % (ref 0–0.6)
LIPASE SERPL-CCNC: 22 U/L (ref 6–51)
LYMPHOCYTES # BLD AUTO: 1.02 10*3/MM3 (ref 0.6–4.8)
LYMPHOCYTES NFR BLD AUTO: 19.7 % (ref 24–44)
MCH RBC QN AUTO: 25.7 PG (ref 27–31)
MCHC RBC AUTO-ENTMCNC: 32.2 G/DL (ref 32–36)
MCV RBC AUTO: 79.8 FL (ref 80–99)
MONOCYTES # BLD AUTO: 0.76 10*3/MM3 (ref 0–1)
MONOCYTES NFR BLD AUTO: 14.7 % (ref 0–12)
NEUTROPHILS # BLD AUTO: 3.06 10*3/MM3 (ref 1.5–8.3)
NEUTROPHILS NFR BLD AUTO: 59.2 % (ref 41–71)
PLATELET # BLD AUTO: 295 10*3/MM3 (ref 150–450)
PMV BLD AUTO: 10 FL (ref 6–12)
POTASSIUM BLD-SCNC: 3.5 MMOL/L (ref 3.5–5.5)
PROT SERPL-MCNC: 7.4 G/DL (ref 5.7–8.2)
RBC # BLD AUTO: 5.45 10*6/MM3 (ref 4.2–5.76)
SODIUM BLD-SCNC: 134 MMOL/L (ref 132–146)
TROPONIN I SERPL-MCNC: 0.06 NG/ML
TROPONIN I SERPL-MCNC: 0.17 NG/ML
TROPONIN I SERPL-MCNC: 0.65 NG/ML
WBC NRBC COR # BLD: 5.17 10*3/MM3 (ref 3.5–10.8)
WHOLE BLOOD HOLD SPECIMEN: NORMAL

## 2018-02-18 PROCEDURE — 96366 THER/PROPH/DIAG IV INF ADDON: CPT

## 2018-02-18 PROCEDURE — 83690 ASSAY OF LIPASE: CPT | Performed by: EMERGENCY MEDICINE

## 2018-02-18 PROCEDURE — 25010000002 HEPARIN (PORCINE) PER 1000 UNITS

## 2018-02-18 PROCEDURE — G0378 HOSPITAL OBSERVATION PER HR: HCPCS

## 2018-02-18 PROCEDURE — 96376 TX/PRO/DX INJ SAME DRUG ADON: CPT

## 2018-02-18 PROCEDURE — 96365 THER/PROPH/DIAG IV INF INIT: CPT

## 2018-02-18 PROCEDURE — 99285 EMERGENCY DEPT VISIT HI MDM: CPT

## 2018-02-18 PROCEDURE — 96374 THER/PROPH/DIAG INJ IV PUSH: CPT

## 2018-02-18 PROCEDURE — 83880 ASSAY OF NATRIURETIC PEPTIDE: CPT | Performed by: EMERGENCY MEDICINE

## 2018-02-18 PROCEDURE — 25010000002 FUROSEMIDE PER 20 MG: Performed by: NURSE PRACTITIONER

## 2018-02-18 PROCEDURE — 85730 THROMBOPLASTIN TIME PARTIAL: CPT

## 2018-02-18 PROCEDURE — 94660 CPAP INITIATION&MGMT: CPT

## 2018-02-18 PROCEDURE — 94640 AIRWAY INHALATION TREATMENT: CPT

## 2018-02-18 PROCEDURE — 93005 ELECTROCARDIOGRAM TRACING: CPT

## 2018-02-18 PROCEDURE — 94760 N-INVAS EAR/PLS OXIMETRY 1: CPT

## 2018-02-18 PROCEDURE — 99220 PR INITIAL OBSERVATION CARE/DAY 70 MINUTES: CPT | Performed by: INTERNAL MEDICINE

## 2018-02-18 PROCEDURE — 96375 TX/PRO/DX INJ NEW DRUG ADDON: CPT

## 2018-02-18 PROCEDURE — 94799 UNLISTED PULMONARY SVC/PX: CPT

## 2018-02-18 PROCEDURE — 80053 COMPREHEN METABOLIC PANEL: CPT | Performed by: EMERGENCY MEDICINE

## 2018-02-18 PROCEDURE — 93005 ELECTROCARDIOGRAM TRACING: CPT | Performed by: EMERGENCY MEDICINE

## 2018-02-18 PROCEDURE — 85025 COMPLETE CBC W/AUTO DIFF WBC: CPT | Performed by: EMERGENCY MEDICINE

## 2018-02-18 PROCEDURE — 84484 ASSAY OF TROPONIN QUANT: CPT | Performed by: EMERGENCY MEDICINE

## 2018-02-18 PROCEDURE — 71045 X-RAY EXAM CHEST 1 VIEW: CPT

## 2018-02-18 RX ORDER — MAGNESIUM SULFATE HEPTAHYDRATE 40 MG/ML
2 INJECTION, SOLUTION INTRAVENOUS AS NEEDED
Status: DISCONTINUED | OUTPATIENT
Start: 2018-02-18 | End: 2018-02-20 | Stop reason: HOSPADM

## 2018-02-18 RX ORDER — ASPIRIN 81 MG/1
81 TABLET ORAL DAILY
Status: DISCONTINUED | OUTPATIENT
Start: 2018-02-18 | End: 2018-02-19

## 2018-02-18 RX ORDER — SODIUM CHLORIDE 0.9 % (FLUSH) 0.9 %
10 SYRINGE (ML) INJECTION AS NEEDED
Status: DISCONTINUED | OUTPATIENT
Start: 2018-02-18 | End: 2018-02-20 | Stop reason: HOSPADM

## 2018-02-18 RX ORDER — HEPARIN SODIUM 1000 [USP'U]/ML
4000 INJECTION, SOLUTION INTRAVENOUS; SUBCUTANEOUS ONCE
Status: COMPLETED | OUTPATIENT
Start: 2018-02-18 | End: 2018-02-18

## 2018-02-18 RX ORDER — IPRATROPIUM BROMIDE AND ALBUTEROL SULFATE 2.5; .5 MG/3ML; MG/3ML
3 SOLUTION RESPIRATORY (INHALATION) ONCE
Status: COMPLETED | OUTPATIENT
Start: 2018-02-18 | End: 2018-02-18

## 2018-02-18 RX ORDER — METOLAZONE 5 MG/1
5 TABLET ORAL 3 TIMES WEEKLY
Status: DISCONTINUED | OUTPATIENT
Start: 2018-02-19 | End: 2018-02-20 | Stop reason: HOSPADM

## 2018-02-18 RX ORDER — MAGNESIUM SULFATE HEPTAHYDRATE 40 MG/ML
4 INJECTION, SOLUTION INTRAVENOUS AS NEEDED
Status: DISCONTINUED | OUTPATIENT
Start: 2018-02-18 | End: 2018-02-20 | Stop reason: HOSPADM

## 2018-02-18 RX ORDER — HEPARIN SODIUM 10000 [USP'U]/100ML
12 INJECTION, SOLUTION INTRAVENOUS
Status: DISCONTINUED | OUTPATIENT
Start: 2018-02-18 | End: 2018-02-18

## 2018-02-18 RX ORDER — POTASSIUM CHLORIDE 750 MG/1
40 CAPSULE, EXTENDED RELEASE ORAL AS NEEDED
Status: DISCONTINUED | OUTPATIENT
Start: 2018-02-18 | End: 2018-02-20 | Stop reason: HOSPADM

## 2018-02-18 RX ORDER — IPRATROPIUM BROMIDE AND ALBUTEROL SULFATE 2.5; .5 MG/3ML; MG/3ML
3 SOLUTION RESPIRATORY (INHALATION)
Status: DISCONTINUED | OUTPATIENT
Start: 2018-02-18 | End: 2018-02-20 | Stop reason: HOSPADM

## 2018-02-18 RX ORDER — SPIRONOLACTONE 25 MG/1
25 TABLET ORAL DAILY
Status: DISCONTINUED | OUTPATIENT
Start: 2018-02-18 | End: 2018-02-20 | Stop reason: HOSPADM

## 2018-02-18 RX ORDER — BUMETANIDE 0.25 MG/ML
1 INJECTION INTRAMUSCULAR; INTRAVENOUS ONCE
Status: COMPLETED | OUTPATIENT
Start: 2018-02-18 | End: 2018-02-18

## 2018-02-18 RX ORDER — ASPIRIN 81 MG/1
324 TABLET, CHEWABLE ORAL ONCE
Status: COMPLETED | OUTPATIENT
Start: 2018-02-18 | End: 2018-02-18

## 2018-02-18 RX ORDER — CARVEDILOL 12.5 MG/1
25 TABLET ORAL 2 TIMES DAILY WITH MEALS
Status: DISCONTINUED | OUTPATIENT
Start: 2018-02-18 | End: 2018-02-20 | Stop reason: HOSPADM

## 2018-02-18 RX ORDER — FUROSEMIDE 10 MG/ML
40 INJECTION INTRAMUSCULAR; INTRAVENOUS EVERY 12 HOURS
Status: DISCONTINUED | OUTPATIENT
Start: 2018-02-18 | End: 2018-02-19

## 2018-02-18 RX ORDER — POTASSIUM CHLORIDE 1.5 G/1.77G
40 POWDER, FOR SOLUTION ORAL AS NEEDED
Status: DISCONTINUED | OUTPATIENT
Start: 2018-02-18 | End: 2018-02-20 | Stop reason: HOSPADM

## 2018-02-18 RX ORDER — BENZONATATE 100 MG/1
100 CAPSULE ORAL 3 TIMES DAILY PRN
Status: DISCONTINUED | OUTPATIENT
Start: 2018-02-18 | End: 2018-02-20 | Stop reason: HOSPADM

## 2018-02-18 RX ORDER — HEPARIN SODIUM 10000 [USP'U]/100ML
5.6 INJECTION, SOLUTION INTRAVENOUS
Status: DISCONTINUED | OUTPATIENT
Start: 2018-02-18 | End: 2018-02-18

## 2018-02-18 RX ADMIN — FUROSEMIDE 40 MG: 10 INJECTION, SOLUTION INTRAMUSCULAR; INTRAVENOUS at 17:26

## 2018-02-18 RX ADMIN — SACUBITRIL AND VALSARTAN 1 TABLET: 49; 51 TABLET, FILM COATED ORAL at 21:16

## 2018-02-18 RX ADMIN — IPRATROPIUM BROMIDE AND ALBUTEROL SULFATE 3 ML: .5; 3 SOLUTION RESPIRATORY (INHALATION) at 07:20

## 2018-02-18 RX ADMIN — IPRATROPIUM BROMIDE AND ALBUTEROL SULFATE 3 ML: .5; 3 SOLUTION RESPIRATORY (INHALATION) at 19:30

## 2018-02-18 RX ADMIN — HEPARIN SODIUM 5.6 UNITS/KG/HR: 10000 INJECTION, SOLUTION INTRAVENOUS at 17:26

## 2018-02-18 RX ADMIN — HEPARIN SODIUM 4000 UNITS: 1000 INJECTION, SOLUTION INTRAVENOUS; SUBCUTANEOUS at 17:26

## 2018-02-18 RX ADMIN — SPIRONOLACTONE 25 MG: 25 TABLET, FILM COATED ORAL at 17:27

## 2018-02-18 RX ADMIN — POTASSIUM CHLORIDE 40 MEQ: 750 CAPSULE, EXTENDED RELEASE ORAL at 21:16

## 2018-02-18 RX ADMIN — POTASSIUM CHLORIDE 40 MEQ: 750 CAPSULE, EXTENDED RELEASE ORAL at 17:27

## 2018-02-18 RX ADMIN — ASPIRIN 81 MG 324 MG: 81 TABLET ORAL at 06:59

## 2018-02-18 RX ADMIN — CARVEDILOL 25 MG: 12.5 TABLET, FILM COATED ORAL at 17:27

## 2018-02-18 RX ADMIN — BUMETANIDE 1 MG: 0.25 INJECTION, SOLUTION INTRAMUSCULAR; INTRAVENOUS at 09:11

## 2018-02-18 NOTE — H&P
Evan Gannon  [id number]  1990   LOS: 0 days   Patient Care Team:  INTERNIST: Barbara Mills MD   CARDIOLOGIST: Balbir Olsen MD, [facility]  ELECTROPHYSIOLOGIST: Nathanael Richmond DO    Mr. Gannon is a 27-year-old -American male from [place], [place]    Chief Complaint:  CHF    Problem List:  1. Acute on chronic systolic (congestive) heart failure/nonischemic cardiomyopathy  a. Echocardiogram 4/28/14: LVEF 0.20-0.25, mild MR, moderate TR   b. Echocardiogram 11/7/14: LVEF 0.20-0.25, all valves are structurally and functionally normal with no hemodynamically evident valve disease   c. Echocardiogram, 2/7/2017: LVEF 20%, RV mildly dilated, cardiac valves anatomically and functionally normal, LV moderately dilated  d. Echocardiogram, 7/13/2017: LVEF 20%., Moderately reduced RV systolic function noted, mild MR  e. [facility], 7/13/2017: LVEF 10%; left ventricular end-diastolic pressure of 30 with normal coronary arteries  f. [facility] hospitalization 9/5/17-9/8/17 for CHF exacerbation  g. [facility] 10/16/17-10/19/17 for CHF exacerbation and was referred to [facility] for transplant but has to lose 200 pounds to qualify  h. Echocardiogram 10/16/17: LVEF 0.15, LV severely dilated, RV mildly dilated, mildly reduced RV systolic function noted  i. Biotronik VVI ICD implantation 8/15/17 by Dr. Richmond, no DFTs done due to patient's issues with hypoxemia with even minimal sedation as well as acute on chronic CHF  j. [facility] 2/11/18-2/14/18 for CHF  k. [facility] 2/18/18 with CP/SOB, elevated troponins, acceptable ECG, CCS class I chest discomfort/NYHA class II dyspnea/CHF  2. Hypertension  3. Morbid obesity: BMI 60  4. Obstructive sleep apnea, noncompliant with CPAP  5. Personal history of noncompliance with medical treatment  6. Microcytic anemia   7. Severe psoriasis  8. [facility] ED 12/12/17 for chest pain with mildly elevated troponin 0.08, positive d-dimer, negative CTA of the chest, no evidence of ACS, PE, or dissection with discharge  "within 5 hours  9. Surgical history  a. Left heart catheterization  b. ICD implantation    No Known Allergies  Prescriptions Prior to Admission   Medication Sig Dispense Refill Last Dose   • albuterol (PROVENTIL HFA;VENTOLIN HFA) 108 (90 Base) MCG/ACT inhaler Inhale 2 puffs Every 6 (Six) Hours As Needed for Wheezing. 1 inhaler 11 2/17/2018   • aspirin 81 MG EC tablet Take 1 tablet by mouth Daily. 365 tablet 0 2/17/2018   • benzonatate (TESSALON) 100 MG capsule Take 1 capsule by mouth 3 (Three) Times a Day As Needed for Cough. 30 capsule 0    • carvedilol (COREG) 25 MG tablet Take 1 tablet by mouth 2 (Two) Times a Day With Meals. 60 tablet 0 2/17/2018   • metOLazone (ZAROXOLYN) 5 MG tablet Take 1 tablet by mouth 3 (Three) Times a Week. 10 tablet 0 2/17/2018   • sacubitril-valsartan (ENTRESTO) 49-51 MG tablet Take 1 tablet by mouth Every 12 (Twelve) Hours. 60 tablet 0 2/17/2018   • spironolactone (ALDACTONE) 25 MG tablet Take 1 tablet by mouth Daily. 30 tablet 0 2/17/2018   • torsemide (DEMADEX) 20 MG tablet Take 3 tablets by mouth Daily. 90 tablet 0 2/17/2018   • indomethacin (INDOCIN) 25 MG capsule Take 1 capsule by mouth 3 (Three) Times a Day As Needed for Mild Pain . 20 capsule 0         History of Present Illness:   This is a 27-year-old -American male who presents to [facility] with left-sided chest pain that woke him from his sleep this morning.  His chest tightness radiated to his left upper extremity with numbness and weakness in his left arm.  He states that when he sat up from his bed and ambulated to put cold water on his arm he experienced weakness and tremulousness activity in his lower extremities.  He drove himself to [facility] ED when he was bothered by his chest tightness and while in route he developed sharp left-sided chest pain.  He reports that he is not noncompliant with his medication that sometimes he \"does not pick them up in time.\"  He has been seen for multiple CHF exacerbations, heart valve " clinic visits, and visits with Dr. Olsen over the past year.  He had an ICD placed in October 2017.  He is currently on the heart transplant list at [facility] but has to lose 200 pounds in order to qualify.  He apparently has sleep apnea and used to have a CPAP mask, but it was taken from him because he was not using it.  He was last seen in the heart failure clinic 2/6/18 and he received 1 mg of Bumex and had 1400 mL of urine output.  There have been problems with compliancy in the past. Chest x-ray in the ED was stable and ECG in sinus rhythm unchanged from prior ECGs.  There has been elevation in his troponins from 0.064, 0.166, and 0.651.  His BNP is 164.  He received 1 mg of Bumex in the ED as well as an aspirin 324 mg.  He was given a neb treatment while in the ED. He is no longer having chest pain.  He feels that he has had more chills lately and that he might be getting sick.  Ever since he started using Tessalon Perles, he feels that they have not helped.  He continues to have a cough, intermittent sputum production, but denies any fevers.  Comfortable in room playing games on his phone.    Cardiac risk factors: diabetes mellitus, dyslipidemia, hypertension, male gender, obesity (BMI >= 30 kg/m2) and sedentary lifestyle.    Social History     Social History   • Marital status: Single     Spouse name: N/A   • Number of children: 0   • Years of education: N/A     Occupational History   • disabled      Social History Main Topics   • Smoking status: Never Smoker   • Smokeless tobacco: Never Used   • Alcohol use No   • Drug use: No   • Sexual activity: Defer     Other Topics Concern   • Not on file     Social History Narrative    Caffeine use: some soda.    Patient lives with parents         Family History   Problem Relation Age of Onset   • Diabetes Father    • Diabetes Paternal Grandmother    • Diabetes Maternal Grandfather        Review of Systems  Pertinent items are noted in HPI and problem  "list.     Objective:       Physical Exam  /56  Pulse 88  Temp 97.9 °F (36.6 °C) (Oral)   Resp 20  Ht 175.3 cm (69\")  Wt (!) 186 kg (410 lb)  SpO2 93%  BMI 60.55 kg/m2  Last 2 weights    18  0645   Weight: (!) 186 kg (410 lb)     Body mass index is 60.55 kg/(m^2).    Intake/Output Summary (Last 24 hours) at 18 1424  Last data filed at 18 1116   Gross per 24 hour   Intake                0 ml   Output             1100 ml   Net            -1100 ml       General Appearance:  Alert, cooperative, no distress, appears stated age, Morbidly obese    Head:  Normocephalic, without obvious abnormality, atraumatic   Neck: Supple, symmetrical, trachea midline, no adenopathy, thyroid: not enlarged, symmetric, no tenderness/mass/nodules, no carotid bruit or JVD   Lungs:   Diminished to auscultation bilaterally, respirations unlabored   Heart:  Regular rate and rhythm, S1, S2 normal, no murmur, rub or gallop   Abdomen:   Soft, non-tender, no masses, no organomegaly, bowel sounds audible x4   Extremities: 1+ edema, normal range of motion   Pulses: 2+ and symmetric   Skin: Skin color, texture, turgor normal, severe psoriatic plaques on trunk and extremities   Neurologic: Normal       Cardiographics:    · EK18:    Normal sinus rhythm  Biatrial enlargement  Cannot rule out Anterior infarct , age undetermined  Abnormal ECG  When compared with ECG of 2018 06:49, (Unconfirmed)  No significant change was found; REVIEWED.    Imaging:    · Chest x-ray:18:Suboptimal inspiratory effort limiting assessment of heart size which appears mildly prominent. Left pacemaker; REVIEWED.    Lab Review:     Results from last 7 days  Lab Units 18  0734 18  0706 18  0847   SODIUM mmol/L 134 133 135   POTASSIUM mmol/L 3.5 3.6 3.6   CHLORIDE mmol/L 95* 99 103   CO2 mmol/L 33.0* 28.0 26.0   BUN mg/dL 17 14 11   CREATININE mg/dL 0.90 0.90 0.90   GLUCOSE mg/dL 118* 99 137*   CALCIUM mg/dL 8.9 8.6* " "9.1       Results from last 7 days  Lab Units 02/18/18  0654 02/14/18  0705 02/13/18  0847   WBC 10*3/mm3 5.17 5.33 4.63   HEMOGLOBIN g/dL 14.0 12.8* 12.9*   HEMATOCRIT % 43.5 39.8 40.6   PLATELETS 10*3/mm3 295 258 240               Results from last 7 days  Lab Units 02/18/18  1123 02/18/18  0929 02/18/18  0734   TROPONIN I ng/mL 0.651* 0.166* 0.064*         Assessment:   Patient with nonischemic cardiomyopathy with ICD placement in August 2017.  He has had multiple CHF exacerbations and visits, and there has been issues with compliance in the past.  He had a  \"normal\" heart catheterization in July 2017 without findings of epicardial coronary artery disease.  He was last seen in the [facility] CHF clinic 2/6/18. His troponins are elevating but he is no longer having chest pain.  His BNP was elevated at 164. Do not feel this is ACS. The ED MD felt strongly he needed admission for further observation and  treatment.  No apparent illicit drug use or additional symptoms suggestive of embolic events.   Plan:     1. Heparin gtt  2. Trend troponins  3. Defer MPS/LHC at this time  4. Cardiac diet  5. Strict I/O, daily weights  6. Dr. Olsen to see in the morning  7. Admit for observation  8. IV lasix 40mg bid  9. DuoNebs PRN  10. Avoid NSAIDs in view of CHF  11. D dimer in morning  12. Continue home medications    Scribed for Kenn Alegria MD by Victoria Puri, APRN. 2/18/2018  3:01 PM    I, Kenn Alegria MD, [facility], personally performed the services described in this documentation as scribed by the above named individual in my presence, and it is both accurate and complete.   "

## 2018-02-18 NOTE — ED PROVIDER NOTES
"Subjective   HPI Comments: Evan Gannon is a 27 y.o.male with a history of CHF, HTN, morbid obesity, MI, PNA, sleep apnea, cardiac catheterization, internal cardiac defibrillator placement who presents to the ED with c/o left sided chest pain that awakened him from sleep this morning. He states his left-sided chest tightness radiated to his left upper extremity causing numbness and weakness in his left upper extremity. He sat up from his bed then ambulated to obtain cold water to place on his arm and has he did he experienced weakness and tremulous activity in his lower extremities. His left sided chest tightness and left upper extremity pain persisted prompting him to come into the ED by personal vehicle. En route to the ED, he experienced several episodes of sharp left sided chest pain. He reports that he is not noncompliant with his medication but reports that sometimes he does not \"pick them up in time.\" He reports that he has been taking his medication as prescribed since his d/c from his recent hospitalization because he states his physician \"gave him a year supply.\"       Mild diffuse rhinchi  plaqu psorsi on arms, trunk and legs      Patient is a 27 y.o. male presenting with chest pain.   History provided by:  Patient  Chest Pain   Pain location:  L chest  Pain quality: sharp and tightness    Pain radiates to:  L arm  Pain severity:  Mild  Onset quality:  Sudden  Duration:  1 hour  Timing:  Constant  Progression:  Worsening  Chronicity:  Recurrent  Context: at rest    Relieved by:  Nothing  Worsened by:  Nothing  Ineffective treatments: placing cold water on his chest and arm.  Associated symptoms: numbness and weakness    Risk factors: hypertension, male sex and obesity        Review of Systems   Cardiovascular: Positive for chest pain.   Neurological: Positive for weakness and numbness.   All other systems reviewed and are negative.      Past Medical History:   Diagnosis Date   • CHF (congestive heart " failure)    • CPAP (continuous positive airway pressure) dependence    • Hypertension    • Morbid obesity    • Myocardial infarction    • On home O2     2l at bedtime and prn   • Plaque psoriasis    • Pneumonia    • Psoriasis    • Sleep apnea    • Wears glasses        No Known Allergies    Past Surgical History:   Procedure Laterality Date   • ADENOIDECTOMY     • CARDIAC CATHETERIZATION N/A 7/13/2017    Procedure: Left Heart Cath;  Surgeon: Balbir Olsen MD;  Location:  [facility]AS[facility];  Service:    • CARDIAC DEFIBRILLATOR PLACEMENT  08/2017   • CARDIAC ELECTROPHYSIOLOGY PROCEDURE N/A 8/15/2017    Procedure: VVI ICD Implant;  Surgeon: Nathanael Richmond DO;  Location:  [facility] EP INVASIVE LOCATION;  Service:    • INTERNAL CARDIAC DEFIBRILLATOR INSERTION Left 2017   • NOSE SURGERY     • OTHER SURGICAL HISTORY      ultra light therapy   • TONSILLECTOMY         Family History   Problem Relation Age of Onset   • Diabetes Father    • Diabetes Paternal Grandmother    • Diabetes Maternal Grandfather        Social History     Social History   • Marital status: Single     Spouse name: N/A   • Number of children: 0   • Years of education: N/A     Occupational History   • disabled      Social History Main Topics   • Smoking status: Never Smoker   • Smokeless tobacco: Never Used   • Alcohol use No   • Drug use: No   • Sexual activity: Defer     Other Topics Concern   • None     Social History Narrative    Caffeine use: some soda.    Patient lives with parents             Objective   Physical Exam   Constitutional: He is oriented to person, place, and time. He appears well-developed and well-nourished. No distress.   Morbid obesity.    HENT:   Head: Normocephalic and atraumatic.   Right Ear: External ear normal.   Left Ear: External ear normal.   Nose: Nose normal.   Eyes: Conjunctivae are normal. No scleral icterus.   Neck: Normal range of motion. Neck supple.   Cardiovascular: Normal rate, regular rhythm and normal  heart sounds.  Exam reveals no friction rub.    No murmur heard.  Pulmonary/Chest: Effort normal. No respiratory distress. He has no wheezes. He has rhonchi. He has no rales.   Mild diffuse rhonchi.    Abdominal: Soft. Bowel sounds are normal. He exhibits no distension. There is no tenderness.   Musculoskeletal: Normal range of motion. He exhibits no edema or tenderness.   Neurological: He is alert and oriented to person, place, and time.   Skin: Skin is warm and dry. He is not diaphoretic.   Plaque psoriasis on the arms, trunk and legs.     Psychiatric: He has a normal mood and affect. His behavior is normal.   Nursing note and vitals reviewed.      Procedures         ED Course  ED Course   EKG SR, unchanged from priors.  CXR stable, no acute failure.  Labs reviewed.  Rising troponin and pt reports persistent dyspnea despite significant diuresis after Bumex.  Patient stable on serial rechecks.  Discussed exam findings, test results so far and concerns in detail at the bedside.  Discussed need for admission for further evaluation and treatment.  Discussed with Dr Alegria.                    MDM  Number of Diagnoses or Management Options  Acute on chronic systolic congestive heart failure:   Troponin I above reference range:      Amount and/or Complexity of Data Reviewed  Clinical lab tests: reviewed and ordered  Tests in the radiology section of CPT®: reviewed and ordered  Decide to obtain previous medical records or to obtain history from someone other than the patient: yes  Discuss the patient with other providers: yes  Independent visualization of images, tracings, or specimens: yes        Final diagnoses:   Acute on chronic systolic congestive heart failure   Troponin I above reference range       Documentation assistance provided by josé Sy.  Information recorded by the josé was done at my direction and has been verified and validated by me.     Marek Sy  02/18/18 0744       Kael Cortés  MD Yi  02/18/18 2825

## 2018-02-18 NOTE — PLAN OF CARE
Problem: Patient Care Overview (Adult)  Goal: Plan of Care Review  Outcome: Ongoing (interventions implemented as appropriate)   02/18/18 2261   Outcome Evaluation   Outcome Summary/Follow up Plan Patient arrived from ED per RN. No S/S of distress. LE edema. RA. No c/o SOA, or CP. VSS. Will continue to monitor.      Goal: Adult Individualization and Mutuality  Outcome: Ongoing (interventions implemented as appropriate)    Goal: Discharge Needs Assessment  Outcome: Ongoing (interventions implemented as appropriate)      Problem: Acute Coronary Syndrome (ACS) (Adult)  Goal: Signs and Symptoms of Listed Potential Problems Will be Absent or Manageable (Acute Coronary Syndrome)  Outcome: Ongoing (interventions implemented as appropriate)

## 2018-02-19 ENCOUNTER — APPOINTMENT (OUTPATIENT)
Dept: NUCLEAR MEDICINE | Facility: HOSPITAL | Age: 28
End: 2018-02-19

## 2018-02-19 LAB
APTT PPP: 36.6 SECONDS (ref 55–70)
APTT PPP: 42.3 SECONDS (ref 55–70)
APTT PPP: 74.1 SECONDS (ref 55–70)
D DIMER PPP FEU-MCNC: 1.36 MG/L (FEU) (ref 0–0.5)
POTASSIUM BLD-SCNC: 3.9 MMOL/L (ref 3.5–5.5)
TROPONIN I SERPL-MCNC: 1.68 NG/ML

## 2018-02-19 PROCEDURE — 0 TECHNETIUM ALBUMIN AGGREGATED: Performed by: INTERNAL MEDICINE

## 2018-02-19 PROCEDURE — 94640 AIRWAY INHALATION TREATMENT: CPT

## 2018-02-19 PROCEDURE — 94799 UNLISTED PULMONARY SVC/PX: CPT

## 2018-02-19 PROCEDURE — 25010000002 HEPARIN (PORCINE) PER 1000 UNITS

## 2018-02-19 PROCEDURE — 85730 THROMBOPLASTIN TIME PARTIAL: CPT

## 2018-02-19 PROCEDURE — 94660 CPAP INITIATION&MGMT: CPT

## 2018-02-19 PROCEDURE — G0378 HOSPITAL OBSERVATION PER HR: HCPCS

## 2018-02-19 PROCEDURE — A9558 XE133 XENON 10MCI: HCPCS | Performed by: INTERNAL MEDICINE

## 2018-02-19 PROCEDURE — 96372 THER/PROPH/DIAG INJ SC/IM: CPT

## 2018-02-19 PROCEDURE — 96376 TX/PRO/DX INJ SAME DRUG ADON: CPT

## 2018-02-19 PROCEDURE — 85379 FIBRIN DEGRADATION QUANT: CPT | Performed by: NURSE PRACTITIONER

## 2018-02-19 PROCEDURE — 25010000002 FUROSEMIDE PER 20 MG: Performed by: INTERNAL MEDICINE

## 2018-02-19 PROCEDURE — 0 XENON XE 133: Performed by: INTERNAL MEDICINE

## 2018-02-19 PROCEDURE — 84132 ASSAY OF SERUM POTASSIUM: CPT | Performed by: INTERNAL MEDICINE

## 2018-02-19 PROCEDURE — 25010000002 ENOXAPARIN PER 10 MG: Performed by: INTERNAL MEDICINE

## 2018-02-19 PROCEDURE — A9540 TC99M MAA: HCPCS | Performed by: INTERNAL MEDICINE

## 2018-02-19 PROCEDURE — 84484 ASSAY OF TROPONIN QUANT: CPT | Performed by: NURSE PRACTITIONER

## 2018-02-19 PROCEDURE — 78582 LUNG VENTILAT&PERFUS IMAGING: CPT

## 2018-02-19 PROCEDURE — 94760 N-INVAS EAR/PLS OXIMETRY 1: CPT

## 2018-02-19 PROCEDURE — 25010000002 FUROSEMIDE PER 20 MG: Performed by: NURSE PRACTITIONER

## 2018-02-19 PROCEDURE — 99225 PR SBSQ OBSERVATION CARE/DAY 25 MINUTES: CPT | Performed by: INTERNAL MEDICINE

## 2018-02-19 PROCEDURE — 96366 THER/PROPH/DIAG IV INF ADDON: CPT

## 2018-02-19 RX ORDER — FUROSEMIDE 10 MG/ML
80 INJECTION INTRAMUSCULAR; INTRAVENOUS EVERY 12 HOURS
Status: DISCONTINUED | OUTPATIENT
Start: 2018-02-19 | End: 2018-02-20 | Stop reason: HOSPADM

## 2018-02-19 RX ORDER — ACETAMINOPHEN 325 MG/1
650 TABLET ORAL EVERY 6 HOURS PRN
Status: DISCONTINUED | OUTPATIENT
Start: 2018-02-19 | End: 2018-02-20 | Stop reason: HOSPADM

## 2018-02-19 RX ORDER — HEPARIN SODIUM 1000 [USP'U]/ML
5000 INJECTION, SOLUTION INTRAVENOUS; SUBCUTANEOUS ONCE
Status: COMPLETED | OUTPATIENT
Start: 2018-02-19 | End: 2018-02-19

## 2018-02-19 RX ORDER — HEPARIN SODIUM 1000 [USP'U]/ML
10000 INJECTION, SOLUTION INTRAVENOUS; SUBCUTANEOUS ONCE
Status: COMPLETED | OUTPATIENT
Start: 2018-02-19 | End: 2018-02-19

## 2018-02-19 RX ADMIN — HEPARIN SODIUM 10000 UNITS: 1000 INJECTION, SOLUTION INTRAVENOUS; SUBCUTANEOUS at 01:02

## 2018-02-19 RX ADMIN — FUROSEMIDE 80 MG: 10 INJECTION, SOLUTION INTRAMUSCULAR; INTRAVENOUS at 21:46

## 2018-02-19 RX ADMIN — IPRATROPIUM BROMIDE AND ALBUTEROL SULFATE 3 ML: .5; 3 SOLUTION RESPIRATORY (INHALATION) at 15:58

## 2018-02-19 RX ADMIN — IPRATROPIUM BROMIDE AND ALBUTEROL SULFATE 3 ML: .5; 3 SOLUTION RESPIRATORY (INHALATION) at 12:18

## 2018-02-19 RX ADMIN — IPRATROPIUM BROMIDE AND ALBUTEROL SULFATE 3 ML: .5; 3 SOLUTION RESPIRATORY (INHALATION) at 08:55

## 2018-02-19 RX ADMIN — ENOXAPARIN SODIUM 40 MG: 40 INJECTION SUBCUTANEOUS at 21:46

## 2018-02-19 RX ADMIN — HEPARIN SODIUM 5000 UNITS: 1000 INJECTION, SOLUTION INTRAVENOUS; SUBCUTANEOUS at 10:46

## 2018-02-19 RX ADMIN — SACUBITRIL AND VALSARTAN 1 TABLET: 49; 51 TABLET, FILM COATED ORAL at 21:46

## 2018-02-19 RX ADMIN — SACUBITRIL AND VALSARTAN 1 TABLET: 49; 51 TABLET, FILM COATED ORAL at 10:50

## 2018-02-19 RX ADMIN — FUROSEMIDE 40 MG: 10 INJECTION, SOLUTION INTRAMUSCULAR; INTRAVENOUS at 05:56

## 2018-02-19 RX ADMIN — ASPIRIN 81 MG: 81 TABLET, COATED ORAL at 08:56

## 2018-02-19 RX ADMIN — XENON XE-133 11.15 MILLICURIE: 10 GAS RESPIRATORY (INHALATION) at 13:56

## 2018-02-19 RX ADMIN — ACETAMINOPHEN 650 MG: 325 TABLET, FILM COATED ORAL at 17:19

## 2018-02-19 RX ADMIN — CARVEDILOL 25 MG: 12.5 TABLET, FILM COATED ORAL at 10:49

## 2018-02-19 RX ADMIN — METOLAZONE 5 MG: 5 TABLET ORAL at 08:57

## 2018-02-19 RX ADMIN — SPIRONOLACTONE 25 MG: 25 TABLET, FILM COATED ORAL at 08:57

## 2018-02-19 RX ADMIN — IPRATROPIUM BROMIDE AND ALBUTEROL SULFATE 3 ML: .5; 3 SOLUTION RESPIRATORY (INHALATION) at 19:54

## 2018-02-19 RX ADMIN — HEPARIN SODIUM 13 UNITS/KG/HR: 10000 INJECTION, SOLUTION INTRAVENOUS at 12:05

## 2018-02-19 RX ADMIN — Medication 1 DOSE: at 14:21

## 2018-02-19 NOTE — PROGRESS NOTES
"  [facility] at [facility]   Inpatient Progress Note       LOS: 0 days   Patient Care Team:  Barbara Mills MD as PCP - General (Internal Medicine)  SONAM Oates as Nurse Practitioner (Cardiology)  Balbir Olsen MD as Consulting Physician (Cardiology)    Chief Complaint:  Follow-up for CHF and elevated troponin    Subjective     Interval History:   Patient sitting on side of bed. Primary complaint is that breakfast has not yet been served. States that his fluid had been coming off as an outpatient. Having some mild discomfort on the left shoulder. States that his legs have still been swollen.       Review of Systems:   Pertinent positives noted in history, exam, and assessment. Otherwise reviewed and negative.      Objective     Vitals:  Blood pressure 107/70, pulse 80, temperature 98.2 °F (36.8 °C), temperature source Oral, resp. rate 20, height 175.3 cm (69\"), weight (!) 180 kg (396 lb 8 oz), SpO2 95 %.     Intake/Output Summary (Last 24 hours) at 02/19/18 0826  Last data filed at 02/19/18 0750   Gross per 24 hour   Intake                0 ml   Output             3075 ml   Net            -3075 ml     Physical Exam   Constitutional: He is oriented to person, place, and time. He appears well-developed and well-nourished. No distress.   Neck: Normal range of motion. No JVD present. No tracheal deviation present.   Cardiovascular: Normal rate, regular rhythm, normal heart sounds and intact distal pulses.  Exam reveals no friction rub.    No murmur heard.  Pulmonary/Chest: Effort normal. No respiratory distress.   Decreased but clear   Abdominal: Soft. Bowel sounds are normal. There is no tenderness.   Musculoskeletal: He exhibits no edema or deformity.   Neurological: He is alert and oriented to person, place, and time.   Skin: Skin is warm and dry.          Results Review:     I reviewed the patient's new clinical results.      Results from last 7 days  Lab Units 02/18/18  0654 "   WBC 10*3/mm3 5.17   HEMOGLOBIN g/dL 14.0   HEMATOCRIT % 43.5   PLATELETS 10*3/mm3 295       Results from last 7 days  Lab Units 02/18/18  0734   SODIUM mmol/L 134   POTASSIUM mmol/L 3.5   CHLORIDE mmol/L 95*   CO2 mmol/L 33.0*   BUN mg/dL 17   CREATININE mg/dL 0.90   CALCIUM mg/dL 8.9   BILIRUBIN mg/dL 0.7   ALK PHOS U/L 91   ALT (SGPT) U/L 10   AST (SGOT) U/L 15   GLUCOSE mg/dL 118*       Results from last 7 days  Lab Units 02/18/18  0734   SODIUM mmol/L 134   POTASSIUM mmol/L 3.5   CHLORIDE mmol/L 95*   CO2 mmol/L 33.0*   BUN mg/dL 17   CREATININE mg/dL 0.90   GLUCOSE mg/dL 118*   CALCIUM mg/dL 8.9           Lab Results  Lab Value Date/Time   TROPONINI 1.678 (C) 02/19/2018 0542   TROPONINI 0.651 (H) 02/18/2018 1123   TROPONINI 0.166 (H) 02/18/2018 0929   TROPONINI 0.064 (H) 02/18/2018 0734   TROPONINI 1.142 (C) 07/13/2017 0854                     Tele:  SR    Assessment/Plan     Active Problems:    Acute on chronic congestive heart failure      1. Elevated troponin with chest pain  - recent CTA negative for PE last week.  - elevated d-dimer this admission  - normal coronaries in 7/2017  - still with LE edema  2. Chronic systolic congestive heart failure  - poor medical compliance  - chest x-ray stable  -followed with [facility]  3. Medical noncompliance  4. Morbid obesity  5. Sleep apnea  - no CPAP at home now due to noncompliance  6. Hypertension     Plan:Continue current treatment and close observation and monitoring    Marina Rolon, SONAM  02/19/18  8:26 AM    Dictated utilizing Dragon dictation

## 2018-02-19 NOTE — PROGRESS NOTES
Discharge Planning Assessment  [facility]     Patient Name: Evan Gannon  MRN: [id number]  Today's Date: 2/19/2018    Admit Date: 2/18/2018          Discharge Needs Assessment       02/19/18 1017    Living Environment    Lives With parent(s);sibling(s)    Living Arrangements house    Home Accessibility no concerns    Type of Financial/Environmental Concern none    Transportation Available car;family or friend will provide    Living Environment    Provides Primary Care For no one    Quality Of Family Relationships supportive    Able to Return to Prior Living Arrangements yes    Discharge Needs Assessment    Concerns To Be Addressed no discharge needs identified    Anticipated Changes Related to Illness none    Equipment Currently Used at Home oxygen    Equipment Needed After Discharge none            Discharge Plan       02/19/18 1018    Case Management/Social Work Plan    Plan Home    Patient/Family In Agreement With Plan yes    Additional Comments Spoke with pt at bedside. Pt is independent in ADL's and IADL's. Pt lives with his parents and brother in a home. Pt explained he has an appointment for a sleep study next month to get a CPAP. Pt had no discharge needs or concerns at this time and plans to discharge home when medically ready.     Final Note    Final Note SW is following        Discharge Placement     No information found        Expected Discharge Date and Time     Expected Discharge Date Expected Discharge Time    Feb 21, 2018               Demographic Summary       02/19/18 1011    Referral Information    Reason For Consult discharge planning    Primary Care Physician Information    Name Barbara Mcfadden            Functional Status       02/19/18 1012    Functional Status Prior    Ambulation 0-->independent    Transferring 0-->independent    Toileting 0-->independent    Bathing 0-->independent    Dressing 0-->independent    Eating 0-->independent    Communication 0-->understands/communicates  without difficulty    IADL    Medications independent    Meal Preparation independent    Housekeeping independent    Laundry independent    Shopping independent    Oral Care independent    Cognitive/Perceptual/Developmental    Current Mental Status/Cognitive Functioning no deficits noted    Developmental Stage (Eriksson's Stages of Development) Stage 6 (18-35 years/Young Adulthood) Intimacy vs. Isolation            Psychosocial     None            Abuse/Neglect     None            Legal     None            Substance Abuse     None            Patient Forms     None          SAWYER Macedo

## 2018-02-19 NOTE — PLAN OF CARE
Problem: Patient Care Overview (Adult)  Goal: Plan of Care Review  Outcome: Ongoing (interventions implemented as appropriate)   02/19/18 9625   Outcome Evaluation   Outcome Summary/Follow up Plan Heparin gtt currently infusing. VQ scan completed. No C/O CP. PRN tylenol ordered for back pain. VSS. No S/S of distress. Will continue to monitor.      Goal: Adult Individualization and Mutuality  Outcome: Ongoing (interventions implemented as appropriate)    Goal: Discharge Needs Assessment  Outcome: Ongoing (interventions implemented as appropriate)      Problem: Acute Coronary Syndrome (ACS) (Adult)  Goal: Signs and Symptoms of Listed Potential Problems Will be Absent or Manageable (Acute Coronary Syndrome)  Outcome: Ongoing (interventions implemented as appropriate)      Problem: Cardiac: Heart Failure (Adult)  Goal: Signs and Symptoms of Listed Potential Problems Will be Absent or Manageable (Cardiac: Heart Failure)  Outcome: Ongoing (interventions implemented as appropriate)

## 2018-02-19 NOTE — PROGRESS NOTES
"[facility]   Inpatient Progress Note   LOS: 0 days   Patient Care Team:   Barbara Mills MD as PCP - General (Internal Medicine)   SONAM Oates as Nurse Practitioner (Cardiology)   Balbir Olsen MD as Consulting Physician (Cardiology)   Chief Complaint: Follow-up for CHF and elevated troponin   Subjective   Interval History:   Patient sitting on side of bed. Primary complaint is that breakfast has not yet been served. States that his fluid had been coming off as an outpatient. Having some mild discomfort on the left shoulder. States that his legs have still been swollen.  Dyspnea improved though still persistent.  Edema not back to baseline yet.  Still not been able to his sleep apnea device.  Review of Systems:   Pertinent positives noted in history, exam, and assessment. Otherwise reviewed and negative.   Objective   Vitals:   Blood pressure 107/70, pulse 80, temperature 98.2 °F (36.8 °C), temperature source Oral, resp. rate 20, height 175.3 cm (69\"), weight (!) 180 kg (396 lb 8 oz), SpO2 95 %.     Intake/Output Summary (Last 24 hours) at 02/19/18 0826  Last data filed at 02/19/18 0750    Gross per 24 hour   Intake 0 ml   Output 3075 ml   Net -3075 ml     Physical Exam   Constitutional: He is oriented to person, place, and time. He appears well-developed and well-nourished. No distress.   Neck: No JVD present. No tracheal deviation present.   Cardiovascular: Normal rate, regular rhythm, normal heart sounds and intact distal pulses. Exam reveals no friction rub.   No murmur heard.   Pulmonary/Chest: Effort normal. No respiratory distress.   Decreased but clear   Abdominal: Soft. Bowel sounds are normal. There is no tenderness.   Musculoskeletal: He exhibits no edema or deformity.   Neurological: He is alert and oriented to person, place, and time.   Skin: Skin is warm and dry.     Results Review:   I reviewed the patient's new clinical results.     Results from last " 7 days   Lab Units 02/18/18  0654   WBC 10*3/mm3 5.17   HEMOGLOBIN g/dL 14.0   HEMATOCRIT % 43.5   PLATELETS 10*3/mm3 295       Results from last 7 days   Lab Units 02/18/18  0734   SODIUM mmol/L 134   POTASSIUM mmol/L 3.5   CHLORIDE mmol/L 95*   CO2 mmol/L 33.0*   BUN mg/dL 17   CREATININE mg/dL 0.90   CALCIUM mg/dL 8.9   BILIRUBIN mg/dL 0.7   ALK PHOS U/L 91   ALT (SGPT) U/L 10   AST (SGOT) U/L 15   GLUCOSE mg/dL 118*       Results from last 7 days   Lab Units 02/18/18  0734   SODIUM mmol/L 134   POTASSIUM mmol/L 3.5   CHLORIDE mmol/L 95*   CO2 mmol/L 33.0*   BUN mg/dL 17   CREATININE mg/dL 0.90   GLUCOSE mg/dL 118*   CALCIUM mg/dL 8.9         Lab Results   Lab Value Date/Time   TROPONINI 1.678 (C) 02/19/2018 0542   TROPONINI 0.651 (H) 02/18/2018 1123   TROPONINI 0.166 (H) 02/18/2018 0929   TROPONINI 0.064 (H) 02/18/2018 0734   TROPONINI 1.142 (C) 07/13/2017 0854           Tele:    Assessment/Plan   Active Problems:   Acute on chronic congestive heart failure     1. Elevated troponin with chest pain  - recent CTA negative for PE last week.   - elevated d-dimer this admission   - normal coronaries in 7/2017   - still with LE edema Negative VQ scan.  2. Chronic systolic congestive heart failure  - poor medical compliance   - chest x-ray stable   -followed with [facility]   3. Medical noncompliance  4. Morbid obesity  5. Sleep apnea  - no CPAP at home now due to noncompliance   6. Hypertension   Plan:   Patient has no evidence of ongoing ischemia or significant pulmonary embolism.  We will stop his heparin.  While he is here this evening and in the morning we will more aggressively diurese him with metolazone and IV Lasix.  Tentatively home in the morning.  Marina Rolon, APRN   02/19/18   8:26 AM   Dictated utilizing Dragon dictation   I, Balbir Olsen have reviewed the note in full and agree with all aspects of the above including physical exam, assessment, labs and plan with changes made  accordingly.     Balbir Olsen MD  02/19/18  5:40 PM

## 2018-02-20 VITALS
HEART RATE: 125 BPM | HEIGHT: 69 IN | TEMPERATURE: 97.4 F | OXYGEN SATURATION: 100 % | DIASTOLIC BLOOD PRESSURE: 77 MMHG | RESPIRATION RATE: 22 BRPM | SYSTOLIC BLOOD PRESSURE: 114 MMHG | BODY MASS INDEX: 46.65 KG/M2 | WEIGHT: 315 LBS

## 2018-02-20 LAB
ANION GAP SERPL CALCULATED.3IONS-SCNC: 4 MMOL/L (ref 3–11)
BUN BLD-MCNC: 16 MG/DL (ref 9–23)
BUN/CREAT SERPL: 16 (ref 7–25)
CALCIUM SPEC-SCNC: 9 MG/DL (ref 8.7–10.4)
CHLORIDE SERPL-SCNC: 98 MMOL/L (ref 99–109)
CO2 SERPL-SCNC: 31 MMOL/L (ref 20–31)
CREAT BLD-MCNC: 1 MG/DL (ref 0.6–1.3)
DEPRECATED RDW RBC AUTO: 42.7 FL (ref 37–54)
ERYTHROCYTE [DISTWIDTH] IN BLOOD BY AUTOMATED COUNT: 14.7 % (ref 11.3–14.5)
GFR SERPL CREATININE-BSD FRML MDRD: 110 ML/MIN/1.73
GLUCOSE BLD-MCNC: 110 MG/DL (ref 70–100)
HCT VFR BLD AUTO: 43.8 % (ref 38.9–50.9)
HGB BLD-MCNC: 14.4 G/DL (ref 13.1–17.5)
MAGNESIUM SERPL-MCNC: 1.9 MG/DL (ref 1.3–2.7)
MCH RBC QN AUTO: 26.3 PG (ref 27–31)
MCHC RBC AUTO-ENTMCNC: 32.9 G/DL (ref 32–36)
MCV RBC AUTO: 80.1 FL (ref 80–99)
PLATELET # BLD AUTO: 291 10*3/MM3 (ref 150–450)
PMV BLD AUTO: 10.6 FL (ref 6–12)
POTASSIUM BLD-SCNC: 4.1 MMOL/L (ref 3.5–5.5)
RBC # BLD AUTO: 5.47 10*6/MM3 (ref 4.2–5.76)
SODIUM BLD-SCNC: 133 MMOL/L (ref 132–146)
WBC NRBC COR # BLD: 4.74 10*3/MM3 (ref 3.5–10.8)

## 2018-02-20 PROCEDURE — 94799 UNLISTED PULMONARY SVC/PX: CPT

## 2018-02-20 PROCEDURE — 96376 TX/PRO/DX INJ SAME DRUG ADON: CPT

## 2018-02-20 PROCEDURE — 25010000002 FUROSEMIDE PER 20 MG: Performed by: INTERNAL MEDICINE

## 2018-02-20 PROCEDURE — G0378 HOSPITAL OBSERVATION PER HR: HCPCS

## 2018-02-20 PROCEDURE — 83735 ASSAY OF MAGNESIUM: CPT

## 2018-02-20 PROCEDURE — 96366 THER/PROPH/DIAG IV INF ADDON: CPT

## 2018-02-20 PROCEDURE — 96367 TX/PROPH/DG ADDL SEQ IV INF: CPT

## 2018-02-20 PROCEDURE — 80048 BASIC METABOLIC PNL TOTAL CA: CPT | Performed by: INTERNAL MEDICINE

## 2018-02-20 PROCEDURE — 94640 AIRWAY INHALATION TREATMENT: CPT

## 2018-02-20 PROCEDURE — 99217 PR OBSERVATION CARE DISCHARGE MANAGEMENT: CPT | Performed by: INTERNAL MEDICINE

## 2018-02-20 PROCEDURE — 94760 N-INVAS EAR/PLS OXIMETRY 1: CPT

## 2018-02-20 PROCEDURE — 85027 COMPLETE CBC AUTOMATED: CPT | Performed by: INTERNAL MEDICINE

## 2018-02-20 RX ORDER — MAGNESIUM SULFATE HEPTAHYDRATE 40 MG/ML
4 INJECTION, SOLUTION INTRAVENOUS ONCE
Status: COMPLETED | OUTPATIENT
Start: 2018-02-20 | End: 2018-02-20

## 2018-02-20 RX ADMIN — SPIRONOLACTONE 25 MG: 25 TABLET, FILM COATED ORAL at 08:58

## 2018-02-20 RX ADMIN — FUROSEMIDE 80 MG: 10 INJECTION, SOLUTION INTRAMUSCULAR; INTRAVENOUS at 18:10

## 2018-02-20 RX ADMIN — IPRATROPIUM BROMIDE AND ALBUTEROL SULFATE 3 ML: .5; 3 SOLUTION RESPIRATORY (INHALATION) at 16:10

## 2018-02-20 RX ADMIN — FUROSEMIDE 80 MG: 10 INJECTION, SOLUTION INTRAMUSCULAR; INTRAVENOUS at 06:50

## 2018-02-20 RX ADMIN — IPRATROPIUM BROMIDE AND ALBUTEROL SULFATE 3 ML: .5; 3 SOLUTION RESPIRATORY (INHALATION) at 12:15

## 2018-02-20 RX ADMIN — IPRATROPIUM BROMIDE AND ALBUTEROL SULFATE 3 ML: .5; 3 SOLUTION RESPIRATORY (INHALATION) at 08:42

## 2018-02-20 RX ADMIN — CARVEDILOL 25 MG: 12.5 TABLET, FILM COATED ORAL at 18:10

## 2018-02-20 RX ADMIN — CARVEDILOL 25 MG: 12.5 TABLET, FILM COATED ORAL at 08:58

## 2018-02-20 RX ADMIN — MAGNESIUM SULFATE HEPTAHYDRATE 4 G: 40 INJECTION, SOLUTION INTRAVENOUS at 15:09

## 2018-02-20 RX ADMIN — SACUBITRIL AND VALSARTAN 1 TABLET: 49; 51 TABLET, FILM COATED ORAL at 08:58

## 2018-02-20 NOTE — NURSING NOTE
Patient is scheduled for orientation at [facility] on 3/27/18.  Staff available if further consultation is needed.

## 2018-02-20 NOTE — PLAN OF CARE
Problem: Patient Care Overview (Adult)  Goal: Plan of Care Review  Outcome: Ongoing (interventions implemented as appropriate)   02/20/18 0538   Outcome Evaluation   Outcome Summary/Follow up Plan VSS. NSR on tele. BiPAP tolerated well overnight. UOP 1850 overnight after receiving 80mg of lasix. continue to monitor.   Coping/Psychosocial Response Interventions   Plan Of Care Reviewed With patient   Patient Care Overview   Progress progress toward functional goals as expected       Problem: Cardiac: Heart Failure (Adult)  Goal: Signs and Symptoms of Listed Potential Problems Will be Absent or Manageable (Cardiac: Heart Failure)  Outcome: Ongoing (interventions implemented as appropriate)   02/20/18 0538   Cardiac: Heart Failure   Problems Assessed (Heart Failure) all   Problems Present (Heart Failure) cardiac pump dysfunction;decreased quality of life;fluid/electrolyte imbalance;respiratory compromise;functional decline/self-care deficit

## 2018-02-20 NOTE — PROGRESS NOTES
"  [facility] at [facility]   Inpatient Progress Note       LOS: 0 days   Patient Care Team:  Barbara Mills MD as PCP - General (Internal Medicine)  SONAM Oates as Nurse Practitioner (Cardiology)  Balbir Olsen MD as Consulting Physician (Cardiology)    Chief Complaint:  Follow-up for CHF and elevated troponin    Subjective     Interval History:   Sleeping with his BiPAP and diuresed well overnight ready for discharge.  No dyspnea    Review of Systems:   Pertinent positives noted in history, exam, and assessment. Otherwise reviewed and negative.      Objective     Vitals:  Blood pressure 112/75, pulse 72, temperature 97.4 °F (36.3 °C), temperature source Axillary, resp. rate 24, height 175.3 cm (69\"), weight (!) 177 kg (389 lb 14.4 oz), SpO2 100 %.     Intake/Output Summary (Last 24 hours) at 02/20/18 1017  Last data filed at 02/20/18 0600   Gross per 24 hour   Intake              480 ml   Output             3900 ml   Net            -3420 ml     Physical Exam   Constitutional: He is oriented to person, place, and time. He appears well-developed and well-nourished. No distress.   Neck: No JVD present. No tracheal deviation present.   Cardiovascular: Normal rate, regular rhythm, normal heart sounds and intact distal pulses.  Exam reveals no friction rub.    No murmur heard.  Pulmonary/Chest: Effort normal. No respiratory distress.   Decreased but clear   Abdominal: Soft. Bowel sounds are normal. There is no tenderness.   Musculoskeletal: He exhibits 2+ ble edema, no deformity.   Neurological: He is alert and oriented to person, place, and time.   Skin: Skin is warm and dry.     I have examined and agree   Results Review:     I reviewed the patient's new clinical results.      Results from last 7 days  Lab Units 02/20/18  0554   WBC 10*3/mm3 4.74   HEMOGLOBIN g/dL 14.4   HEMATOCRIT % 43.8   PLATELETS 10*3/mm3 291       Results from last 7 days  Lab Units 02/20/18  0554  " 02/18/18  0734   SODIUM mmol/L 133  --  134   POTASSIUM mmol/L 4.1  < > 3.5   CHLORIDE mmol/L 98*  --  95*   CO2 mmol/L 31.0  --  33.0*   BUN mg/dL 16  --  17   CREATININE mg/dL 1.00  --  0.90   CALCIUM mg/dL 9.0  --  8.9   BILIRUBIN mg/dL  --   --  0.7   ALK PHOS U/L  --   --  91   ALT (SGPT) U/L  --   --  10   AST (SGOT) U/L  --   --  15   GLUCOSE mg/dL 110*  --  118*   < > = values in this interval not displayed.    Results from last 7 days  Lab Units 02/20/18  0554   SODIUM mmol/L 133   POTASSIUM mmol/L 4.1   CHLORIDE mmol/L 98*   CO2 mmol/L 31.0   BUN mg/dL 16   CREATININE mg/dL 1.00   GLUCOSE mg/dL 110*   CALCIUM mg/dL 9.0           Lab Results  Lab Value Date/Time   TROPONINI 1.678 (C) 02/19/2018 0542   TROPONINI 0.651 (H) 02/18/2018 1123   TROPONINI 0.166 (H) 02/18/2018 0929   TROPONINI 0.064 (H) 02/18/2018 0734   TROPONINI 1.142 (C) 07/13/2017 0854                     Tele:  SR    Assessment/Plan     Active Problems:    Acute on chronic congestive heart failure      1. Elevated troponin with chest pain  - recent CTA negative for PE last week.  - elevated d-dimer this admission  - normal coronaries in 7/2017  - still with LE edema  2. Chronic systolic congestive heart failureAnd nonischemic cardiomyopathy, possibly chronic myocarditis.  - poor medical compliance  - chest x-ray stable  -followed with [facility]  3. Medical noncompliance  4. Morbid obesity  5. Sleep apnea  - no CPAP at home now due to noncompliance  6. Hypertension     Plan:  Discharge home close outpatient/heart failure clinic follow-up for diuresis  Marina Rolon, APRN  02/20/18  10:17 AM  IBalbir have reviewed the note in full and agree with all aspects of the above including physical exam, assessment, labs and plan with changes made accordingly.     Balbir Olsen MD  02/20/18  3:29 PM        Dictated utilizing Dragon dictation

## 2018-02-20 NOTE — NURSING NOTE
Patient Name:  Evan Gannon  :  1990  DOS:  20    Hospital Problem List     Acute on chronic congestive heart failure          Consult has been received.  Medical records have been reviewed.  Patient is well known in the [facility].  Education provided.  Education time 10 minutes.  Patient to be scheduled follow up one week post discharge.    · Echo Results: 10/17 :Left ventricular systolic function is severely decreased. Estimated EF = 15%.  · The left ventricular cavity is severely dilated.  · Right ventricular cavity is mildly dilated.  · Mildly reduced right ventricular systolic function noted.      NYHA Class: III-IV    Heart Failure Quality Measures    ACE I, ARB, ARNI (if EF <40%) entresto    Evidence-based Beta Blocker (EF<40%) coreg    (Bisoprolol, Carvedilol, Metoprolol Succinate)      Aldosterone Antagonist (EF <40%)aldactone    Heart Failure Education    S/s fluid overload, when to call HF center   Compliance with medications  If EF < 35% ICd in place    Patient to follow up in one week in HF center

## 2018-02-21 ENCOUNTER — TELEPHONE (OUTPATIENT)
Dept: INTERNAL MEDICINE | Facility: CLINIC | Age: 28
End: 2018-02-21

## 2018-02-21 ENCOUNTER — TRANSITIONAL CARE MANAGEMENT TELEPHONE ENCOUNTER (OUTPATIENT)
Dept: INTERNAL MEDICINE | Facility: CLINIC | Age: 28
End: 2018-02-21

## 2018-02-21 RX ORDER — ALBUTEROL SULFATE 2.5 MG/3ML
2.5 SOLUTION RESPIRATORY (INHALATION) EVERY 4 HOURS PRN
Qty: 118 VIAL | Refills: 3 | Status: SHIPPED | OUTPATIENT
Start: 2018-02-21 | End: 2018-02-22 | Stop reason: SDUPTHER

## 2018-02-21 RX ORDER — ALBUTEROL SULFATE 90 UG/1
2 AEROSOL, METERED RESPIRATORY (INHALATION) EVERY 6 HOURS PRN
Qty: 1 INHALER | Refills: 11 | Status: SHIPPED | OUTPATIENT
Start: 2018-02-21 | End: 2018-02-28 | Stop reason: SDUPTHER

## 2018-02-21 NOTE — OUTREACH NOTE
[facility] call completed.  Please refer to TCM call flowsheet for call documentation.    *Please see pt phone msg in routing comments*

## 2018-02-22 ENCOUNTER — TELEPHONE (OUTPATIENT)
Dept: INTERNAL MEDICINE | Facility: CLINIC | Age: 28
End: 2018-02-22

## 2018-02-22 RX ORDER — ALBUTEROL SULFATE 2.5 MG/3ML
2.5 SOLUTION RESPIRATORY (INHALATION) EVERY 4 HOURS PRN
Qty: 180 VIAL | Refills: 3 | Status: SHIPPED | OUTPATIENT
Start: 2018-02-22 | End: 2018-02-22 | Stop reason: SDUPTHER

## 2018-02-22 RX ORDER — ALBUTEROL SULFATE 2.5 MG/3ML
2.5 SOLUTION RESPIRATORY (INHALATION) EVERY 4 HOURS PRN
Qty: 180 VIAL | Refills: 3 | Status: SHIPPED | OUTPATIENT
Start: 2018-02-22 | End: 2018-03-15 | Stop reason: SDUPTHER

## 2018-02-23 ENCOUNTER — OFFICE VISIT (OUTPATIENT)
Dept: CARDIOLOGY | Facility: HOSPITAL | Age: 28
End: 2018-02-23

## 2018-02-23 VITALS
OXYGEN SATURATION: 94 % | HEART RATE: 102 BPM | RESPIRATION RATE: 20 BRPM | WEIGHT: 315 LBS | DIASTOLIC BLOOD PRESSURE: 68 MMHG | SYSTOLIC BLOOD PRESSURE: 139 MMHG | TEMPERATURE: 97 F | HEIGHT: 69 IN | BODY MASS INDEX: 46.65 KG/M2

## 2018-02-23 DIAGNOSIS — I42.8 NICM (NONISCHEMIC CARDIOMYOPATHY) (HCC): ICD-10-CM

## 2018-02-23 DIAGNOSIS — I50.22 CHRONIC SYSTOLIC HEART FAILURE (HCC): Primary | ICD-10-CM

## 2018-02-23 DIAGNOSIS — I10 ESSENTIAL HYPERTENSION: ICD-10-CM

## 2018-02-23 DIAGNOSIS — G47.33 OSA (OBSTRUCTIVE SLEEP APNEA): ICD-10-CM

## 2018-02-23 PROCEDURE — 99214 OFFICE O/P EST MOD 30 MIN: CPT | Performed by: NURSE PRACTITIONER

## 2018-02-23 NOTE — PROGRESS NOTES
Encounter Date:02/23/2018      Patient ID: Evan Gannon is a 27 y.o. male.        Subjective:     Chief Complaint: Follow-up (s/p Hospitalization 2/18/18)     History of Present Illness patient presents to the hf center today for ongoing evaluation of his chronic systolic heart failure. He notes compliance with his medications.   He has 2 admissions to [facility] recently. He notes a dry cough that has not worsened. He has had a 27 lb weight loss since discharge from the hospital. He notes he is more compliant with his diet and he reports that he has stopped eating fast food. He does note occasional dizziness with sudden position changes.   Patient Active Problem List   Diagnosis   • Shortness of breath   • Hypertension   • Morbid obesity   • Obstructive sleep apnea   • Nonischemic congestive cardiomyopathy   • Personal history of noncompliance with medical treatment   • Acute on chronic systolic heart failure   • Chest pain on breathing   • CHF (congestive heart failure), NYHA class IV   • Dilated cardiomyopathy   • Chronic systolic congestive heart failure   • Plaque psoriasis   • Congestive heart failure   • Acute on chronic congestive heart failure   • Painless hematuria       Past Surgical History:   Procedure Laterality Date   • ADENOIDECTOMY     • CARDIAC CATHETERIZATION N/A 7/13/2017    Procedure: Left Heart Cath;  Surgeon: Balbir Olsen MD;  Location:  [facility];  Service:    • CARDIAC DEFIBRILLATOR PLACEMENT  08/2017   • CARDIAC ELECTROPHYSIOLOGY PROCEDURE N/A 8/15/2017    Procedure: VVI ICD Implant;  Surgeon: Nathanael Richmond DO;  Location:  [facility] EP INVASIVE LOCATION;  Service:    • INTERNAL CARDIAC DEFIBRILLATOR INSERTION Left 2017   • NOSE SURGERY     • OTHER SURGICAL HISTORY      ultra light therapy   • TONSILLECTOMY         No Known Allergies      Current Outpatient Prescriptions:   •  albuterol (PROVENTIL HFA;VENTOLIN HFA) 108 (90 Base) MCG/ACT inhaler, Inhale 2 puffs Every 6 (Six)  Hours As Needed for Wheezing., Disp: 1 inhaler, Rfl: 11  •  albuterol (PROVENTIL) (2.5 MG/3ML) 0.083% nebulizer solution, Take 2.5 mg by nebulization Every 4 (Four) Hours As Needed for Wheezing. R06.2, Disp: 180 vial, Rfl: 3  •  aspirin 81 MG EC tablet, Take 1 tablet by mouth Daily., Disp: 365 tablet, Rfl: 0  •  benzonatate (TESSALON) 100 MG capsule, Take 1 capsule by mouth 3 (Three) Times a Day As Needed for Cough., Disp: 30 capsule, Rfl: 0  •  carvedilol (COREG) 25 MG tablet, Take 1 tablet by mouth 2 (Two) Times a Day With Meals., Disp: 60 tablet, Rfl: 0  •  metOLazone (ZAROXOLYN) 5 MG tablet, Take 1 tablet by mouth 3 (Three) Times a Week., Disp: 10 tablet, Rfl: 0  •  sacubitril-valsartan (ENTRESTO) 49-51 MG tablet, Take 1 tablet by mouth Every 12 (Twelve) Hours., Disp: 60 tablet, Rfl: 0  •  spironolactone (ALDACTONE) 25 MG tablet, Take 1 tablet by mouth Daily., Disp: 30 tablet, Rfl: 0  •  torsemide (DEMADEX) 20 MG tablet, Take 3 tablets by mouth Daily., Disp: 90 tablet, Rfl: 0    The following portions of the chart were reviewed and updated as appropriate: Allergies, current medications, past family history, social history, past medical history.     Review of Systems   Constitution: Negative for chills, decreased appetite, diaphoresis, fever, weakness, malaise/fatigue, night sweats, weight gain and weight loss.   HENT: Positive for congestion. Negative for hearing loss, hoarse voice and nosebleeds.    Eyes: Negative for blurred vision, visual disturbance and visual halos.   Cardiovascular: Negative for chest pain, claudication, cyanosis, dyspnea on exertion, irregular heartbeat, leg swelling, near-syncope, orthopnea, palpitations, paroxysmal nocturnal dyspnea and syncope.   Respiratory: Positive for cough. Negative for hemoptysis, shortness of breath, sleep disturbances due to breathing, snoring, sputum production and wheezing.    Hematologic/Lymphatic: Negative for bleeding problem. Does not bruise/bleed easily.  "  Skin: Negative for dry skin, itching and rash.   Musculoskeletal: Negative for arthritis, falls, joint pain, joint swelling and myalgias.   Gastrointestinal: Negative for bloating, abdominal pain, constipation, diarrhea, flatus, heartburn, hematemesis, hematochezia, melena, nausea and vomiting.   Genitourinary: Negative for dysuria, frequency, hematuria, nocturia and urgency.   Neurological: Positive for dizziness and light-headedness. Negative for excessive daytime sleepiness, headaches and loss of balance.   Psychiatric/Behavioral: Negative for depression. The patient does not have insomnia and is not nervous/anxious.            Objective:     Vitals:    02/23/18 1252 02/23/18 1254 02/23/18 1255   BP: 98/61 100/74 139/68   BP Location: Right arm Left arm Left arm   Patient Position: Sitting Sitting Standing   Cuff Size: Large Adult     Pulse: 101 101 102   Resp: 20     Temp: 97 °F (36.1 °C)     TempSrc: Temporal Artery      SpO2: 94%     Weight: (!) 173 kg (382 lb)     Height: 175.3 cm (69\")           Physical Exam   Constitutional: He is oriented to person, place, and time. He appears well-developed and well-nourished. He is active and cooperative. No distress.   HENT:   Head: Normocephalic and atraumatic.   Mouth/Throat: Oropharynx is clear and moist.   Eyes: Conjunctivae and EOM are normal. Pupils are equal, round, and reactive to light.   Neck: Normal range of motion. Neck supple. No JVD present. No tracheal deviation present. No thyromegaly present.   Cardiovascular: Normal rate, regular rhythm, normal heart sounds and intact distal pulses.    Pulmonary/Chest: Effort normal and breath sounds normal.   Abdominal: Soft. Bowel sounds are normal. He exhibits no distension. There is no tenderness.   Musculoskeletal: Normal range of motion.   Neurological: He is alert and oriented to person, place, and time.   Skin: Skin is warm, dry and intact.   Psychiatric: He has a normal mood and affect. His behavior is " normal.   Nursing note and vitals reviewed.      Lab and Diagnostic Review:      Lab Results   Component Value Date    GLUCOSE 110 (H) 02/20/2018    CALCIUM 9.0 02/20/2018     02/20/2018    K 4.1 02/20/2018    CO2 31.0 02/20/2018    CL 98 (L) 02/20/2018    BUN 16 02/20/2018    CREATININE 1.00 02/20/2018    EGFRIFAFRI 110 02/20/2018    BCR 16.0 02/20/2018    ANIONGAP 4.0 02/20/2018     Lab Results   Component Value Date    WBC 4.74 02/20/2018    HGB 14.4 02/20/2018    HCT 43.8 02/20/2018    MCV 80.1 02/20/2018     02/20/2018       Assessment and Plan:         1. Chronic systolic heart failure  euvolemic today  Heart failure education today including signs and symptoms, the role of the heart failure center, daily weights, low sodium diet (less than 1500 mg per day), and daily physical activity. Reviewed HF Zones with patient and family.  Patient to continue current medications as previously ordered.   2. NICM (nonischemic cardiomyopathy)  Plan for evaluation at [facility] when patient loses one hundred lbs  3. Essential hypertension  Well controlled  HTN Education provided today including signs and symptoms, medication management, daily blood pressure monitoring. Patient encouraged to call the Heart and Valve center with any abnormal readings.     4. COLLIN (obstructive sleep apnea)  Upcoming appt with Dr Shrestha March 2017 for re-evaluation  CPAP taken due to noncompliance    It has been a pleasure to participate in the care of this patient.  Patient was instructed to call the [facility] Center with any questions, concerns, or worsening symptoms.      * Please note that portions of this note were completed with a voice recognition program. Efforts were made to edit the dictation but occasionally words are transcribed.

## 2018-02-28 ENCOUNTER — OFFICE VISIT (OUTPATIENT)
Dept: INTERNAL MEDICINE | Facility: CLINIC | Age: 28
End: 2018-02-28

## 2018-02-28 VITALS
TEMPERATURE: 97.8 F | HEIGHT: 69 IN | SYSTOLIC BLOOD PRESSURE: 130 MMHG | BODY MASS INDEX: 46.65 KG/M2 | WEIGHT: 315 LBS | DIASTOLIC BLOOD PRESSURE: 82 MMHG

## 2018-02-28 DIAGNOSIS — I50.23 ACUTE ON CHRONIC SYSTOLIC HEART FAILURE (HCC): Primary | ICD-10-CM

## 2018-02-28 DIAGNOSIS — I50.22 CHRONIC SYSTOLIC CONGESTIVE HEART FAILURE, NYHA CLASS 4 (HCC): ICD-10-CM

## 2018-02-28 DIAGNOSIS — R05.3 CHRONIC COUGH: ICD-10-CM

## 2018-02-28 DIAGNOSIS — G47.33 OBSTRUCTIVE SLEEP APNEA: ICD-10-CM

## 2018-02-28 DIAGNOSIS — I42.0 NONISCHEMIC CONGESTIVE CARDIOMYOPATHY (HCC): ICD-10-CM

## 2018-02-28 PROBLEM — R07.1 CHEST PAIN ON BREATHING: Status: RESOLVED | Noted: 2017-07-11 | Resolved: 2018-02-28

## 2018-02-28 PROBLEM — I50.9 ACUTE ON CHRONIC CONGESTIVE HEART FAILURE: Status: RESOLVED | Noted: 2018-02-11 | Resolved: 2018-02-28

## 2018-02-28 PROBLEM — I50.9 CONGESTIVE HEART FAILURE: Status: RESOLVED | Noted: 2017-10-17 | Resolved: 2018-02-28

## 2018-02-28 PROBLEM — R06.02 SHORTNESS OF BREATH: Status: RESOLVED | Noted: 2017-02-07 | Resolved: 2018-02-28

## 2018-02-28 PROCEDURE — 99495 TRANSJ CARE MGMT MOD F2F 14D: CPT | Performed by: INTERNAL MEDICINE

## 2018-02-28 RX ORDER — ALBUTEROL SULFATE 90 UG/1
2 AEROSOL, METERED RESPIRATORY (INHALATION) EVERY 6 HOURS PRN
Qty: 1 INHALER | Refills: 11 | Status: SHIPPED | OUTPATIENT
Start: 2018-02-28 | End: 2018-03-15 | Stop reason: SDUPTHER

## 2018-03-15 ENCOUNTER — CONSULT (OUTPATIENT)
Dept: SLEEP MEDICINE | Facility: HOSPITAL | Age: 28
End: 2018-03-15

## 2018-03-15 ENCOUNTER — OFFICE VISIT (OUTPATIENT)
Dept: CARDIOLOGY | Facility: HOSPITAL | Age: 28
End: 2018-03-15

## 2018-03-15 VITALS
DIASTOLIC BLOOD PRESSURE: 58 MMHG | HEIGHT: 69 IN | WEIGHT: 315 LBS | SYSTOLIC BLOOD PRESSURE: 91 MMHG | HEART RATE: 87 BPM | BODY MASS INDEX: 46.65 KG/M2 | OXYGEN SATURATION: 94 %

## 2018-03-15 VITALS
HEIGHT: 69 IN | TEMPERATURE: 97.1 F | OXYGEN SATURATION: 93 % | SYSTOLIC BLOOD PRESSURE: 101 MMHG | WEIGHT: 315 LBS | RESPIRATION RATE: 22 BRPM | DIASTOLIC BLOOD PRESSURE: 58 MMHG | BODY MASS INDEX: 46.65 KG/M2 | HEART RATE: 92 BPM

## 2018-03-15 DIAGNOSIS — I10 ESSENTIAL HYPERTENSION: ICD-10-CM

## 2018-03-15 DIAGNOSIS — G47.33 OBSTRUCTIVE SLEEP APNEA: Primary | ICD-10-CM

## 2018-03-15 DIAGNOSIS — I42.0 NONISCHEMIC CONGESTIVE CARDIOMYOPATHY (HCC): ICD-10-CM

## 2018-03-15 DIAGNOSIS — E66.01 MORBID OBESITY (HCC): ICD-10-CM

## 2018-03-15 DIAGNOSIS — I50.22 CHRONIC SYSTOLIC CONGESTIVE HEART FAILURE, NYHA CLASS 4 (HCC): ICD-10-CM

## 2018-03-15 DIAGNOSIS — G47.33 OBSTRUCTIVE SLEEP APNEA: ICD-10-CM

## 2018-03-15 DIAGNOSIS — I50.22 CHRONIC SYSTOLIC HEART FAILURE (HCC): Primary | ICD-10-CM

## 2018-03-15 PROCEDURE — 99214 OFFICE O/P EST MOD 30 MIN: CPT | Performed by: NURSE PRACTITIONER

## 2018-03-15 PROCEDURE — 99204 OFFICE O/P NEW MOD 45 MIN: CPT | Performed by: INTERNAL MEDICINE

## 2018-03-15 RX ORDER — ALBUTEROL SULFATE 90 UG/1
2 AEROSOL, METERED RESPIRATORY (INHALATION) EVERY 6 HOURS PRN
Qty: 1 INHALER | Refills: 11 | Status: ON HOLD | OUTPATIENT
Start: 2018-03-15 | End: 2019-06-19 | Stop reason: SDUPTHER

## 2018-03-15 RX ORDER — ALBUTEROL SULFATE 2.5 MG/3ML
2.5 SOLUTION RESPIRATORY (INHALATION) EVERY 4 HOURS PRN
Qty: 180 VIAL | Refills: 3 | Status: ON HOLD | OUTPATIENT
Start: 2018-03-15 | End: 2018-06-26

## 2018-03-15 RX ORDER — ASPIRIN 81 MG/1
81 TABLET ORAL DAILY
Qty: 365 TABLET | Refills: 0 | Status: SHIPPED | OUTPATIENT
Start: 2018-03-15 | End: 2019-03-29 | Stop reason: SDUPTHER

## 2018-03-15 RX ORDER — METOLAZONE 5 MG/1
5 TABLET ORAL 3 TIMES WEEKLY
Qty: 12 TABLET | Refills: 5 | Status: SHIPPED | OUTPATIENT
Start: 2018-03-16 | End: 2019-04-30

## 2018-03-15 RX ORDER — TORSEMIDE 20 MG/1
60 TABLET ORAL DAILY
Qty: 90 TABLET | Refills: 5 | Status: SHIPPED | OUTPATIENT
Start: 2018-03-15 | End: 2019-03-29 | Stop reason: SDUPTHER

## 2018-03-15 RX ORDER — SPIRONOLACTONE 25 MG/1
25 TABLET ORAL DAILY
Qty: 30 TABLET | Refills: 5 | Status: SHIPPED | OUTPATIENT
Start: 2018-03-15 | End: 2019-03-29 | Stop reason: SDUPTHER

## 2018-03-15 RX ORDER — CARVEDILOL 25 MG/1
25 TABLET ORAL 2 TIMES DAILY WITH MEALS
Qty: 60 TABLET | Refills: 5 | Status: SHIPPED | OUTPATIENT
Start: 2018-03-15 | End: 2019-03-29 | Stop reason: SDUPTHER

## 2018-03-15 NOTE — PATIENT INSTRUCTIONS
Hold entresto tonight only  Do not take metolazone tomorrow morning   Take blood pressure at least twice a day until Maarh calls you

## 2018-03-15 NOTE — PROGRESS NOTES
Encounter Date:03/15/2018      Patient ID: Evan Gannon is a 27 y.o. male.        Subjective:     Chief Complaint: Follow-up (c/o of Dizziness and low blood pressure)   SHF,NICM  History of Present Illness patient presents to the heart and valve center today for ongoing evaluation of his chronic systolic heart failure. He notes that he had been doing well from a cardiac standpoint but awoke this am with dizziness. He notes compliance with his medications. He also reports fatigue of new onset this am. He denies chest pain, dyspnea, pedal edema or abdominal fullness. He was seen earlier this am by Dr Shrestha in Sleep medicine and is scheduled for a sleep study on Monday evening.     Patient Active Problem List   Diagnosis   • Hypertension   • Morbid obesity   • Obstructive sleep apnea   • Nonischemic congestive cardiomyopathy   • Personal history of noncompliance with medical treatment   • Acute on chronic systolic heart failure   • CHF (congestive heart failure), NYHA class IV   • Plaque psoriasis   • Painless hematuria         Past Surgical History:   Procedure Laterality Date   • ADENOIDECTOMY     • CARDIAC CATHETERIZATION N/A 7/13/2017    Procedure: Left Heart Cath;  Surgeon: Balbir Olsen MD;  Location:  [facility];  Service:    • CARDIAC DEFIBRILLATOR PLACEMENT  08/2017   • CARDIAC ELECTROPHYSIOLOGY PROCEDURE N/A 8/15/2017    Procedure: VVI ICD Implant;  Surgeon: Nathanael Richmond DO;  Location:  [facility];  Service:    • INTERNAL CARDIAC DEFIBRILLATOR INSERTION Left 2017   • NOSE SURGERY     • OTHER SURGICAL HISTORY      ultra light therapy   • SINUS SURGERY     • TONSILLECTOMY         No Known Allergies      Current Outpatient Prescriptions:   •  albuterol (PROVENTIL HFA;VENTOLIN HFA) 108 (90 Base) MCG/ACT inhaler, Inhale 2 puffs Every 6 (Six) Hours As Needed for Wheezing., Disp: 1 inhaler, Rfl: 11  •  albuterol (PROVENTIL) (2.5 MG/3ML) 0.083% nebulizer solution, Take 2.5 mg  by nebulization Every 4 (Four) Hours As Needed for Wheezing. R06.2, Disp: 180 vial, Rfl: 3  •  aspirin 81 MG EC tablet, Take 1 tablet by mouth Daily., Disp: 365 tablet, Rfl: 0  •  benzonatate (TESSALON) 100 MG capsule, Take 1 capsule by mouth 3 (Three) Times a Day As Needed for Cough., Disp: 30 capsule, Rfl: 0  •  carvedilol (COREG) 25 MG tablet, Take 1 tablet by mouth 2 (Two) Times a Day With Meals., Disp: 60 tablet, Rfl: 5  •  [START ON 3/16/2018] metOLazone (ZAROXOLYN) 5 MG tablet, Take 1 tablet by mouth 3 (Three) Times a Week., Disp: 12 tablet, Rfl: 5  •  sacubitril-valsartan (ENTRESTO) 49-51 MG tablet, Take 1 tablet by mouth Every 12 (Twelve) Hours., Disp: 60 tablet, Rfl: 5  •  spironolactone (ALDACTONE) 25 MG tablet, Take 1 tablet by mouth Daily., Disp: 30 tablet, Rfl: 5  •  torsemide (DEMADEX) 20 MG tablet, Take 3 tablets by mouth Daily., Disp: 90 tablet, Rfl: 5    The following portions of the chart were reviewed and updated as appropriate: Allergies, current medications, past family history, social history, past medical history.     Review of Systems   Constitution: Positive for malaise/fatigue. Negative for chills, decreased appetite, diaphoresis, fever, weakness, night sweats, weight gain (fluctuating) and weight loss.   HENT: Negative for congestion, hearing loss, hoarse voice and nosebleeds.    Eyes: Negative for blurred vision, visual disturbance and visual halos.   Cardiovascular: Negative for chest pain, claudication, cyanosis, dyspnea on exertion, irregular heartbeat, leg swelling, near-syncope, orthopnea, palpitations, paroxysmal nocturnal dyspnea and syncope.   Respiratory: Positive for cough. Negative for hemoptysis, shortness of breath, sleep disturbances due to breathing, snoring, sputum production and wheezing.    Hematologic/Lymphatic: Negative for bleeding problem. Does not bruise/bleed easily.   Skin: Negative for dry skin, itching and rash.   Musculoskeletal: Negative for arthritis, joint  pain, joint swelling and myalgias.   Gastrointestinal: Negative for bloating, abdominal pain, constipation, diarrhea, flatus, heartburn, hematemesis, hematochezia, melena, nausea and vomiting.   Genitourinary: Negative for dysuria, frequency, hematuria, nocturia and urgency.   Neurological: Positive for excessive daytime sleepiness, dizziness, headaches and loss of balance. Negative for light-headedness.   Psychiatric/Behavioral: Negative for depression. The patient does not have insomnia and is not nervous/anxious.            Objective:     Vitals:    03/15/18 1014 03/15/18 1015 03/15/18 1032 03/15/18 1040   BP: 92/55 94/55 100/65 101/58   BP Location: Right arm Right arm Left arm Right arm   Patient Position: Sitting Standing Sitting Sitting   Cuff Size:       Pulse: 91 92     Resp:       Temp:       TempSrc:       SpO2:       Weight:       Height:             Physical Exam   Constitutional: He is oriented to person, place, and time. He appears well-developed and well-nourished. He is active and cooperative. No distress.   HENT:   Head: Normocephalic and atraumatic.   Mouth/Throat: Oropharynx is clear and moist.   Eyes: Conjunctivae and EOM are normal. Pupils are equal, round, and reactive to light.   Neck: Normal range of motion. Neck supple. No JVD present. No tracheal deviation present. No thyromegaly present.   Cardiovascular: Normal rate, regular rhythm, normal heart sounds and intact distal pulses.    Pulmonary/Chest: Effort normal and breath sounds normal.   Abdominal: Soft. Bowel sounds are normal. He exhibits no distension. There is no tenderness.   Musculoskeletal: Normal range of motion.   Neurological: He is alert and oriented to person, place, and time.   Skin: Skin is warm, dry and intact.   Psychiatric: He has a normal mood and affect. His behavior is normal.   Nursing note and vitals reviewed.      Lab and Diagnostic Review:      Lab Results   Component Value Date    GLUCOSE 110 (H) 02/20/2018     CALCIUM 9.0 02/20/2018     02/20/2018    K 4.1 02/20/2018    CO2 31.0 02/20/2018    CL 98 (L) 02/20/2018    BUN 16 02/20/2018    CREATININE 1.00 02/20/2018    EGFRIFAFRI 110 02/20/2018    BCR 16.0 02/20/2018    ANIONGAP 4.0 02/20/2018     Lab Results   Component Value Date    WBC 4.74 02/20/2018    HGB 14.4 02/20/2018    HCT 43.8 02/20/2018    MCV 80.1 02/20/2018     02/20/2018         Assessment and Plan:         1. Chronic systolic heart failure  euvolemic today   Heart failure education today including signs and symptoms, the role of the heart failure center, daily weights, low sodium diet (less than 1500 mg per day), and daily physical activity. Reviewed HF Zones with patient and family.  Patient to continue current medications as previously ordered.   2. Essential hypertension  Hypotensive today   Patient recently had entresto doubled to 49/51 bid  Hold entresto tonight  Increase po fluid intake today  Hold metolazone dose tomorrow  Patient to check blood pressure twice a day   Patient to rise slowly from a sitting position,avoid sudden movements.   3. Obstructive sleep apnea  Recently evaluated by Dr Shrestha with upcoming sleep study scheduled for next week    4. Nonischemic congestive cardiomyopathy  euvolemic  - carvedilol (COREG) 25 MG tablet; Take 1 tablet by mouth 2 (Two) Times a Day With Meals.  Dispense: 60 tablet; Refill: 5  - spironolactone (ALDACTONE) 25 MG tablet; Take 1 tablet by mouth Daily.  Dispense: 30 tablet; Refill: 5  - aspirin 81 MG EC tablet; Take 1 tablet by mouth Daily.  Dispense: 365 tablet; Refill: 0  Heart failure education today including signs and symptoms, the role of the heart failure center, daily weights, low sodium diet (less than 1500 mg per day), and daily physical activity. Reviewed HF Zones with patient and family.  Patient to continue current medications as previously ordered.     It has been a pleasure to participate in the care of this patient.  Patient was  instructed to call the [facility] Center with any questions, concerns, or worsening symptoms.  * Please note that portions of this note were completed with a voice recognition program. Efforts were made to edit the dictation but occasionally words are transcribed.

## 2018-03-16 ENCOUNTER — TELEPHONE (OUTPATIENT)
Dept: CARDIOLOGY | Facility: HOSPITAL | Age: 28
End: 2018-03-16

## 2018-03-16 NOTE — PROGRESS NOTES
Subjective   Evan Gannon is a 27 y.o. male is being seen for consultation today at the request of SONAM Oates  he is referred for the evaluation of snoring and obstructive sleep apnea.  His primary care physician is Dr. Mills    History of Present Illness  Patient says she's had snoring all of his life.  He's been noted to have apneas for many years.  He says he had a [facility] at age 15 [facility] and was diagnosed with sleep apnea.  He was on CPAP for some time but stopped it at least 3-4 years ago when his machine was not working properly.  He continues to have snoring.  He still has apneas.  He awakens with dyspnea.  He awakens with a headache 2 days per week.  He's been known to have nonischemic cardiomyopathy for the past 5 years most recent ejection fraction apparently was 15%.  He has history of hypertension.  He apparently is been prescribed home oxygen but is not using it.  He admits she will fall asleep if sitting quietly during the day.  He denies driving.    He has a history of loud snoring he snores in all positions.  He is awakened with a dry mouth.  He's been told he stops breathing at night.  He is awakened gasping.  He is awakened choking and coughing.  He has awakened with sore throat.  He denies ever breaking his nose but has trouble breathing through his nose both day and night.  He said he had nasal surgery at age 10.  He denies any reflux symptoms he denies hypnagogic hallucinations.  He is rarely noted episodes of sleep paralysis.  He denies kicking or jerking his legs.  He says he has chronic back pain.  He has lost 150 pounds in the past year.    He says usually goes to bed about 2 AM.  It takes him an hour to go to sleep.  He'll awaken twice during the night.  He thinks he gets about 5 hours of sleep.  He does not feel rested.  He will also naps about 2 hours each day.  He has a history of hypertension and is on medications.  He denies diabetes.  He does have a  history of congestive heart failure.  No Known Allergies       Current Outpatient Prescriptions:   •  albuterol (PROVENTIL HFA;VENTOLIN HFA) 108 (90 Base) MCG/ACT inhaler, Inhale 2 puffs Every 6 (Six) Hours As Needed for Wheezing., Disp: 1 inhaler, Rfl: 11  •  albuterol (PROVENTIL) (2.5 MG/3ML) 0.083% nebulizer solution, Take 2.5 mg by nebulization Every 4 (Four) Hours As Needed for Wheezing. R06.2, Disp: 180 vial, Rfl: 3  •  aspirin 81 MG EC tablet, Take 1 tablet by mouth Daily., Disp: 365 tablet, Rfl: 0  •  benzonatate (TESSALON) 100 MG capsule, Take 1 capsule by mouth 3 (Three) Times a Day As Needed for Cough., Disp: 30 capsule, Rfl: 0  •  carvedilol (COREG) 25 MG tablet, Take 1 tablet by mouth 2 (Two) Times a Day With Meals., Disp: 60 tablet, Rfl: 5  •  metOLazone (ZAROXOLYN) 5 MG tablet, Take 1 tablet by mouth 3 (Three) Times a Week., Disp: 12 tablet, Rfl: 5  •  sacubitril-valsartan (ENTRESTO) 49-51 MG tablet, Take 1 tablet by mouth Every 12 (Twelve) Hours., Disp: 60 tablet, Rfl: 5  •  spironolactone (ALDACTONE) 25 MG tablet, Take 1 tablet by mouth Daily., Disp: 30 tablet, Rfl: 5  •  torsemide (DEMADEX) 20 MG tablet, Take 3 tablets by mouth Daily., Disp: 90 tablet, Rfl: 5    Smoking status: Never Smoker                                                              Smokeless tobacco: Never Used                           History   Alcohol Use No       Caffeine: He has an occasional cup of decaf coffee.    Past Medical History:   Diagnosis Date   • CHF (congestive heart failure)    • CPAP (continuous positive airway pressure) dependence    • Hypertension    • Morbid obesity    • Myocardial infarction    • On home O2     2l at bedtime and prn   • Plaque psoriasis    • Pneumonia    • Psoriasis    • Sleep apnea    • Wears glasses        Past Surgical History:   Procedure Laterality Date   • ADENOIDECTOMY     • CARDIAC CATHETERIZATION N/A 7/13/2017    Procedure: Left Heart Cath;  Surgeon: Balbir Olsen MD;  Location:  "[facility];  Service:    • CARDIAC DEFIBRILLATOR PLACEMENT  08/2017   • CARDIAC ELECTROPHYSIOLOGY PROCEDURE N/A 8/15/2017    Procedure: VVI ICD Implant;  Surgeon: Nathanael Richmond DO;  Location:  [facility];  Service:    • INTERNAL CARDIAC DEFIBRILLATOR INSERTION Left 2017   • NOSE SURGERY     • OTHER SURGICAL HISTORY      ultra light therapy   • SINUS SURGERY     • TONSILLECTOMY         Family History   Problem Relation Age of Onset   • Diabetes Father    • Diabetes Paternal Grandmother    • Diabetes Maternal Grandfather    Family history is positive for hypertension stroke obesity and sleep apnea.    The following portions of the patient's history were reviewed and updated as appropriate: allergies, current medications, past family history, past medical history, past social history, past surgical history and problem list.    Review of Systems   Constitutional: Positive for fatigue and unexpected weight change.   HENT: Negative.    Eyes: Positive for visual disturbance.   Respiratory: Positive for cough and shortness of breath.    Cardiovascular: Positive for leg swelling.   Gastrointestinal: Negative.    Endocrine: Negative.    Genitourinary: Negative.    Musculoskeletal: Negative.    Skin: Positive for rash.   Allergic/Immunologic: Negative.    Neurological: Negative.    Hematological: Negative.    Psychiatric/Behavioral: Negative.     Seibert scores 11/24    Objective     BP 91/58   Pulse 87   Ht 175.3 cm (69\")   Wt (!) 178 kg (393 lb)   SpO2 94%   BMI 58.04 kg/m²      Physical Exam   Constitutional: He is oriented to person, place, and time. He appears well-developed and well-nourished.   He is morbidly obese.   HENT:   Head: Normocephalic and atraumatic.   His nasal airway narrowing and Mallampati class for anatomy.   Eyes: EOM are normal. Pupils are equal, round, and reactive to light.   Neck: Normal range of motion. Neck supple.   Cardiovascular: Normal rate, regular " rhythm and normal heart sounds.    Pulmonary/Chest: Effort normal and breath sounds normal.   Abdominal: Soft. Bowel sounds are normal.   Musculoskeletal: Normal range of motion. He exhibits edema.   Neurological: He is alert and oriented to person, place, and time.   Skin: Skin is warm and dry. Rash noted.   Psychiatric: He has a normal mood and affect. His behavior is normal.         Assessment/Plan   Evan was seen today for sleeping problem.    Diagnoses and all orders for this visit:    Obstructive sleep apnea  -     Polysomnography 4 or More Parameters; Future    Morbid obesity    Chronic systolic congestive heart failure, NYHA class 4  -     Polysomnography 4 or More Parameters; Future     patient has a history of obstructive sleep apnea and still has a history of snoring and observed apneas.  I think he gives an excellent story for obstructive sleep apnea we will plan to proceed to polysomnogram.  I've discussed possible therapies including CPAP, weight control, oral appliances, and surgery.  We've also discussed the possible consequences of long-term untreated obstructive sleep apnea.  Given his severe cardiomyopathy he may well be having some central events as well.  He would not be a candidate for ASV therapy.  He is encouraged to lose weight.  He is encouraged to avoid sedatives close to bedtime.  He is encouraged practice lateral position sleep.  We will see him then after his study.         Cristofer Shrestha MD [facility]  Sleep Medicine  Pulmonary and Critical Care Medicine

## 2018-03-20 ENCOUNTER — HOSPITAL ENCOUNTER (OUTPATIENT)
Dept: SLEEP MEDICINE | Facility: HOSPITAL | Age: 28
Discharge: HOME | End: 2018-03-20
Attending: INTERNAL MEDICINE | Admitting: INTERNAL MEDICINE

## 2018-03-20 ENCOUNTER — TELEPHONE (OUTPATIENT)
Dept: CARDIOLOGY | Facility: HOSPITAL | Age: 28
End: 2018-03-20

## 2018-03-20 VITALS
BODY MASS INDEX: 46.65 KG/M2 | HEIGHT: 69 IN | WEIGHT: 315 LBS | SYSTOLIC BLOOD PRESSURE: 111 MMHG | OXYGEN SATURATION: 93 % | HEART RATE: 50 BPM | DIASTOLIC BLOOD PRESSURE: 68 MMHG

## 2018-03-20 DIAGNOSIS — G47.33 OBSTRUCTIVE SLEEP APNEA: ICD-10-CM

## 2018-03-20 DIAGNOSIS — I50.22 CHRONIC SYSTOLIC CONGESTIVE HEART FAILURE, NYHA CLASS 4 (HCC): ICD-10-CM

## 2018-03-20 PROCEDURE — 95811 POLYSOM 6/>YRS CPAP 4/> PARM: CPT | Performed by: INTERNAL MEDICINE

## 2018-03-20 PROCEDURE — 95811 POLYSOM 6/>YRS CPAP 4/> PARM: CPT

## 2018-03-21 DIAGNOSIS — G47.33 SEVERE OBSTRUCTIVE SLEEP APNEA: Primary | ICD-10-CM

## 2018-03-22 NOTE — PROGRESS NOTES
Patient was agreeable to being set up on pap therapy and would like to use [employer] as his new DME company. Complete fax to ni 3/22/2018

## 2018-03-27 ENCOUNTER — TREATMENT (OUTPATIENT)
Dept: CARDIAC REHAB | Facility: HOSPITAL | Age: 28
End: 2018-03-27

## 2018-03-27 VITALS
SYSTOLIC BLOOD PRESSURE: 108 MMHG | DIASTOLIC BLOOD PRESSURE: 72 MMHG | HEIGHT: 69 IN | BODY MASS INDEX: 46.65 KG/M2 | WEIGHT: 315 LBS | OXYGEN SATURATION: 98 % | RESPIRATION RATE: 20 BRPM

## 2018-03-27 DIAGNOSIS — I50.22 CHRONIC SYSTOLIC HEART FAILURE (HCC): Primary | ICD-10-CM

## 2018-03-27 PROCEDURE — 93798 PHYS/QHP OP CAR RHAB W/ECG: CPT

## 2018-03-27 NOTE — PROGRESS NOTES
Cardiac Rehab Initial Assessment      Name: Evan Gannon  :1990 Allergies:Review of patient's allergies indicates no known allergies.   MRN: [id number] 27 y.o. Physician: Barbara Mills MD   Primary Diagnosis:    Diagnosis Plan   1. Chronic systolic heart failure      Event Date: 18 Specialist: Dr. Olsen   Secondary Diagnosis: N/A Risk Stratification:High Risk Note Author: Ester King     Cardiovascular History: History of Heart Failure     EXERCISE AT HOME  no  N/A  N/A    EF: 15%      Source: Echocardiogram          Ambulatory Status:Independent  Ambulatory Fall Risk Assessed on Initial Visit: yes 6 Minute Walk Pre- Cardiac Rehab:  Distance:1200ft      RPE:15  Max. HR: 144       SPO2:98    MET: N/A  MPH: N/A             RPD: N/A  Resting BP: 114/78 LA, 108/72 RA    Peak BP: 132/88  Recovery BP: 118/74  Comments: Pt had to rest for a brief time towards the end of his walk but was able to resume walking and complete the 6 MWT.      NUTRITION  Lipids:yes If yes, labs as follows;  Total: No components found for: CHOLESTEROL  HDL:   HDL Cholesterol   Date Value Ref Range Status   2017 24 (L) 40 - 60 mg/dL Final    Lipids continued:  LDL:  LDL Cholesterol    Date Value Ref Range Status   2017 68 0 - 130 mg/dL Final     Triglyceride: No components found for: TRIGLYCERIDE   Weight Management:                 Weight: 397.6 LB   Height: 69 IN                                   BMI: Body mass index is 58.72 kg/m².  Waist Circumference: N/A  inches   Alcohol Use: none Diabetes:No    Last HGBA1C with date if applicable:No components found for: A1C         SOCIAL HISTORY  Social History     Social History   • Marital status: Single   • Number of children: 0     Occupational History   • disabled      Social History Main Topics   • Smoking status: Never Smoker   • Smokeless tobacco: Never Used   • Alcohol use No   • Drug use: No   • Sexual activity: Defer     Other Topics Concern   •  Not on file     Social History Narrative    Caffeine use: some soda.    Patient lives with parents           Educational Level (choose one that applies) some college Learning Barriers:[employer], [employer]    Family Support:yes    Living Arrangement: lives with their family    Risk Factors: Obesity  Yes     Tobacco Adjunct: No  NEVER SMOKER      Comorbidities: N/A     PSYCHOSOCIAL  Clinical Depression: no    Stress: no     Assess presence or absence of depression using a valid screening tool: yes      PHYSICAL ASSESSMENT  Influenza vaccine: no  Pneumococcal vaccine: no          Angina: no    Describe angina scale of 0 - 4: 0 = none    Today are you having incisional pain? N/A. If, Yes, Scale: N/A    N/A    Today are you having any other pain? No. If, Yes, Scale: N/A    N/A Diagnosed with Hypertension:yes    Heart Sounds: S1, S2     Lung Sounds: normal air entry, lungs clear to auscultation         Assessment: WNL Orthopedic Problems: NONE    Are you being hurt, hit, or frightened by anyone at home or in your life? no    Are you being neglected by a caregiver? No Shoulder flexibility/Range of motion: Average     Recommended arm activity: Any    Chair sit and reach within: 0 inches   Leg flexibility: Average    Leg Strength/Balance/Five times sit to stand: 0 seconds.   N/A  Chose one: Average    Recommended stretching: Chair    Assessment: WNL    Family attends IA: yes Time of arrival: 1300  Time of departure: 1430     Patient Goals: Lose weight to work towards IBW by the end of the program.          3/27/2018  3:06 PM  Ester King

## 2018-03-28 ENCOUNTER — TELEPHONE (OUTPATIENT)
Dept: CARDIAC REHAB | Facility: HOSPITAL | Age: 28
End: 2018-03-28

## 2018-03-30 ENCOUNTER — TREATMENT (OUTPATIENT)
Dept: CARDIAC REHAB | Facility: HOSPITAL | Age: 28
End: 2018-03-30

## 2018-03-30 DIAGNOSIS — I50.22 CHRONIC SYSTOLIC HEART FAILURE (HCC): Primary | ICD-10-CM

## 2018-03-30 PROCEDURE — 93798 PHYS/QHP OP CAR RHAB W/ECG: CPT

## 2018-03-30 NOTE — PROGRESS NOTES
Attended Phase II Cardiac Rehab. No medication or health history changes reported. See [facility] for details.

## 2018-04-10 ENCOUNTER — HOSPITAL ENCOUNTER (EMERGENCY)
Facility: HOSPITAL | Age: 28
Discharge: HOME | End: 2018-04-10
Attending: EMERGENCY MEDICINE | Admitting: EMERGENCY MEDICINE

## 2018-04-10 ENCOUNTER — APPOINTMENT (OUTPATIENT)
Dept: GENERAL RADIOLOGY | Facility: HOSPITAL | Age: 28
End: 2018-04-10

## 2018-04-10 VITALS
BODY MASS INDEX: 46.65 KG/M2 | HEART RATE: 65 BPM | WEIGHT: 315 LBS | RESPIRATION RATE: 22 BRPM | TEMPERATURE: 98.4 F | OXYGEN SATURATION: 98 % | HEIGHT: 69 IN | SYSTOLIC BLOOD PRESSURE: 143 MMHG | DIASTOLIC BLOOD PRESSURE: 109 MMHG

## 2018-04-10 DIAGNOSIS — I50.9 ACUTE ON CHRONIC CONGESTIVE HEART FAILURE, UNSPECIFIED CONGESTIVE HEART FAILURE TYPE: Primary | ICD-10-CM

## 2018-04-10 DIAGNOSIS — R60.0 EDEMA OF BOTH LEGS: ICD-10-CM

## 2018-04-10 LAB
ALBUMIN SERPL-MCNC: 3.8 G/DL (ref 3.2–4.8)
ALBUMIN/GLOB SERPL: 1 G/DL (ref 1.5–2.5)
ALP SERPL-CCNC: 87 U/L (ref 25–100)
ALT SERPL W P-5'-P-CCNC: 29 U/L (ref 7–40)
ANION GAP SERPL CALCULATED.3IONS-SCNC: 4 MMOL/L (ref 3–11)
AST SERPL-CCNC: 25 U/L (ref 0–33)
BASOPHILS # BLD AUTO: 0.01 10*3/MM3 (ref 0–0.2)
BASOPHILS NFR BLD AUTO: 0.1 % (ref 0–1)
BILIRUB SERPL-MCNC: 0.7 MG/DL (ref 0.3–1.2)
BNP SERPL-MCNC: 544 PG/ML (ref 0–100)
BUN BLD-MCNC: 11 MG/DL (ref 9–23)
BUN/CREAT SERPL: 12.2 (ref 7–25)
CALCIUM SPEC-SCNC: 8.6 MG/DL (ref 8.7–10.4)
CHLORIDE SERPL-SCNC: 105 MMOL/L (ref 99–109)
CO2 SERPL-SCNC: 28 MMOL/L (ref 20–31)
CREAT BLD-MCNC: 0.9 MG/DL (ref 0.6–1.3)
DEPRECATED RDW RBC AUTO: 42.8 FL (ref 37–54)
EOSINOPHIL # BLD AUTO: 0.09 10*3/MM3 (ref 0–0.3)
EOSINOPHIL NFR BLD AUTO: 1.3 % (ref 0–3)
ERYTHROCYTE [DISTWIDTH] IN BLOOD BY AUTOMATED COUNT: 15.2 % (ref 11.3–14.5)
GFR SERPL CREATININE-BSD FRML MDRD: 125 ML/MIN/1.73
GLOBULIN UR ELPH-MCNC: 3.7 GM/DL
GLUCOSE BLD-MCNC: 108 MG/DL (ref 70–100)
HCT VFR BLD AUTO: 44.2 % (ref 38.9–50.9)
HGB BLD-MCNC: 14.9 G/DL (ref 13.1–17.5)
IMM GRANULOCYTES # BLD: 0 10*3/MM3 (ref 0–0.03)
IMM GRANULOCYTES NFR BLD: 0 % (ref 0–0.6)
INR PPP: 1.1 (ref 0.91–1.09)
LYMPHOCYTES # BLD AUTO: 1.46 10*3/MM3 (ref 0.6–4.8)
LYMPHOCYTES NFR BLD AUTO: 21.4 % (ref 24–44)
MCH RBC QN AUTO: 26 PG (ref 27–31)
MCHC RBC AUTO-ENTMCNC: 33.7 G/DL (ref 32–36)
MCV RBC AUTO: 77.3 FL (ref 80–99)
MONOCYTES # BLD AUTO: 0.7 10*3/MM3 (ref 0–1)
MONOCYTES NFR BLD AUTO: 10.2 % (ref 0–12)
NEUTROPHILS # BLD AUTO: 4.57 10*3/MM3 (ref 1.5–8.3)
NEUTROPHILS NFR BLD AUTO: 67 % (ref 41–71)
PLATELET # BLD AUTO: 268 10*3/MM3 (ref 150–450)
PMV BLD AUTO: 10.7 FL (ref 6–12)
POTASSIUM BLD-SCNC: 3.7 MMOL/L (ref 3.5–5.5)
PROT SERPL-MCNC: 7.5 G/DL (ref 5.7–8.2)
PROTHROMBIN TIME: 11.6 SECONDS (ref 9.6–11.5)
RBC # BLD AUTO: 5.72 10*6/MM3 (ref 4.2–5.76)
SODIUM BLD-SCNC: 137 MMOL/L (ref 132–146)
TROPONIN I SERPL-MCNC: 0.13 NG/ML (ref 0–0.07)
TROPONIN I SERPL-MCNC: 0.15 NG/ML (ref 0–0.07)
WBC NRBC COR # BLD: 6.83 10*3/MM3 (ref 3.5–10.8)

## 2018-04-10 PROCEDURE — 84484 ASSAY OF TROPONIN QUANT: CPT

## 2018-04-10 PROCEDURE — 93005 ELECTROCARDIOGRAM TRACING: CPT | Performed by: EMERGENCY MEDICINE

## 2018-04-10 PROCEDURE — 85025 COMPLETE CBC W/AUTO DIFF WBC: CPT | Performed by: PHYSICIAN ASSISTANT

## 2018-04-10 PROCEDURE — 71045 X-RAY EXAM CHEST 1 VIEW: CPT

## 2018-04-10 PROCEDURE — 85610 PROTHROMBIN TIME: CPT | Performed by: PHYSICIAN ASSISTANT

## 2018-04-10 PROCEDURE — 83880 ASSAY OF NATRIURETIC PEPTIDE: CPT | Performed by: PHYSICIAN ASSISTANT

## 2018-04-10 PROCEDURE — 96374 THER/PROPH/DIAG INJ IV PUSH: CPT

## 2018-04-10 PROCEDURE — 25010000002 FUROSEMIDE PER 20 MG: Performed by: PHYSICIAN ASSISTANT

## 2018-04-10 PROCEDURE — 80053 COMPREHEN METABOLIC PANEL: CPT | Performed by: PHYSICIAN ASSISTANT

## 2018-04-10 PROCEDURE — 99284 EMERGENCY DEPT VISIT MOD MDM: CPT

## 2018-04-10 RX ORDER — SODIUM CHLORIDE 0.9 % (FLUSH) 0.9 %
10 SYRINGE (ML) INJECTION AS NEEDED
Status: DISCONTINUED | OUTPATIENT
Start: 2018-04-10 | End: 2018-04-10 | Stop reason: HOSPADM

## 2018-04-10 RX ORDER — FUROSEMIDE 10 MG/ML
40 INJECTION INTRAMUSCULAR; INTRAVENOUS ONCE
Status: COMPLETED | OUTPATIENT
Start: 2018-04-10 | End: 2018-04-10

## 2018-04-10 RX ADMIN — FUROSEMIDE 40 MG: 10 INJECTION, SOLUTION INTRAMUSCULAR; INTRAVENOUS at 10:58

## 2018-04-10 NOTE — ED PROVIDER NOTES
Subjective   Pt is a 26 yo male presenting to ED with SOB and leg swelling. He noticed the swelling mainly in his feet yesterday. He has become more SOB with exertion. He has hx of CHF (EF 15% ), Nonischemic Cardiomyopathy, Defibrillator, Morbid Obesity, HTN, COLLIN (CPAP), Psoriasis and home O2. He states he has been compliant with his meds. He states he has not had home O2 in several months due to insurance issues. Over the past year he states he has lost over 100lbs but is unsure if he has gained weight in the last few days since the swelling has worsened. He has had a dry cough which he explains he has had since starting the CPAP. He denies fever, chills, syncope, dizziness, CP, N,V,D or abdominal pain.         History provided by:  Patient  Shortness of Breath   Severity:  Moderate  Duration:  1 day  Timing:  Constant  Chronicity:  Recurrent  Associated symptoms: cough (dry chronic)    Associated symptoms: no abdominal pain, no chest pain, no diaphoresis, no fever, no sputum production, no syncope, no vomiting and no wheezing        Review of Systems   Constitutional: Negative for diaphoresis and fever.   Respiratory: Positive for cough (dry chronic) and shortness of breath. Negative for sputum production and wheezing.    Cardiovascular: Positive for leg swelling. Negative for chest pain and syncope.   Gastrointestinal: Negative for abdominal pain and vomiting.   All other systems reviewed and are negative.      Past Medical History:   Diagnosis Date   • CHF (congestive heart failure)    • CPAP (continuous positive airway pressure) dependence    • Hypertension    • Morbid obesity    • Myocardial infarction    • On home O2     2l at bedtime and prn   • Plaque psoriasis    • Pneumonia    • Psoriasis    • Sleep apnea    • Wears glasses        No Known Allergies    Past Surgical History:   Procedure Laterality Date   • ADENOIDECTOMY     • CARDIAC CATHETERIZATION N/A 7/13/2017    Procedure: Left Heart Cath;   Surgeon: Balbir Olsen MD;  Location:  [facility] CATH [facility]ASIVE LOC[facility];  Service:    • CARDIAC DEFIBRILLATOR PLACEMENT  08/2017   • CARDIAC ELECTROPHYSIOLOGY PROCEDURE N/A 8/15/2017    Procedure: VVI ICD Implant;  Surgeon: Nathanael Richmond DO;  Location:  [facility] EP INVASIVE LOCATION;  Service:    • INTERNAL CARDIAC DEFIBRILLATOR INSERTION Left 2017   • NOSE SURGERY     • OTHER SURGICAL HISTORY      ultra light therapy   • SINUS SURGERY     • TONSILLECTOMY         Family History   Problem Relation Age of Onset   • Diabetes Father    • Diabetes Paternal Grandmother    • Diabetes Maternal Grandfather        Social History     Social History   • Marital status: Single   • Number of children: 0     Occupational History   • disabled      Social History Main Topics   • Smoking status: Never Smoker   • Smokeless tobacco: Never Used   • Alcohol use No   • Drug use: No   • Sexual activity: Defer     Other Topics Concern   • Not on file     Social History Narrative    Caffeine use: some soda.    Patient lives with parents               Objective   Physical Exam   Constitutional: He appears well-developed and well-nourished. No distress.   Morbid obesity   HENT:   Head: Atraumatic.   Eyes: Conjunctivae are normal.   Neck: Normal range of motion.   Cardiovascular: Normal rate and regular rhythm.  Exam reveals distant heart sounds.    Pulmonary/Chest: Effort normal. No respiratory distress. He has no wheezes.   Abdominal: Soft. There is no tenderness.   Musculoskeletal: Normal range of motion. He exhibits edema (non pitting).   Neurological: He is alert.   Skin: Skin is warm.   Psoriatic patches diffusely    Psychiatric: He has a normal mood and affect.   Nursing note and vitals reviewed.      Procedures         ED Course  ED Course      Re-examined patient several times in ED. Pt resting comfortably, no distress. Vitals stable. Discussed results and tx plan. Pt given one dose of Lasix 40mg IV in ED. Discussed compliance with home  meds and close f/u with [facility]. He is agreeable with plan. Order placed. He will return to ED if new or worse sx.     Reviewed old records.     Discussed patient with Dr. Richmond who is agreeable with ED course of care.     Recent Results (from the past 24 hour(s))   POC Troponin, Rapid    Collection Time: 04/10/18  9:27 AM   Result Value Ref Range    Troponin I 0.15 (H) 0.00 - 0.07 ng/mL   Comprehensive Metabolic Panel    Collection Time: 04/10/18  9:29 AM   Result Value Ref Range    Glucose 108 (H) 70 - 100 mg/dL    BUN 11 9 - 23 mg/dL    Creatinine 0.90 0.60 - 1.30 mg/dL    Sodium 137 132 - 146 mmol/L    Potassium 3.7 3.5 - 5.5 mmol/L    Chloride 105 99 - 109 mmol/L    CO2 28.0 20.0 - 31.0 mmol/L    Calcium 8.6 (L) 8.7 - 10.4 mg/dL    Total Protein 7.5 5.7 - 8.2 g/dL    Albumin 3.80 3.20 - 4.80 g/dL    ALT (SGPT) 29 7 - 40 U/L    AST (SGOT) 25 0 - 33 U/L    Alkaline Phosphatase 87 25 - 100 U/L    Total Bilirubin 0.7 0.3 - 1.2 mg/dL    eGFR  African Amer 125 >60 mL/min/1.73    Globulin 3.7 gm/dL    A/G Ratio 1.0 (L) 1.5 - 2.5 g/dL    BUN/Creatinine Ratio 12.2 7.0 - 25.0    Anion Gap 4.0 3.0 - 11.0 mmol/L   Protime-INR    Collection Time: 04/10/18  9:29 AM   Result Value Ref Range    Protime 11.6 (H) 9.6 - 11.5 Seconds    INR 1.10 (H) 0.91 - 1.09   BNP    Collection Time: 04/10/18  9:29 AM   Result Value Ref Range    .0 (H) 0.0 - 100.0 pg/mL   CBC Auto Differential    Collection Time: 04/10/18  9:29 AM   Result Value Ref Range    WBC 6.83 3.50 - 10.80 10*3/mm3    RBC 5.72 4.20 - 5.76 10*6/mm3    Hemoglobin 14.9 13.1 - 17.5 g/dL    Hematocrit 44.2 38.9 - 50.9 %    MCV 77.3 (L) 80.0 - 99.0 fL    MCH 26.0 (L) 27.0 - 31.0 pg    MCHC 33.7 32.0 - 36.0 g/dL    RDW 15.2 (H) 11.3 - 14.5 %    RDW-SD 42.8 37.0 - 54.0 fl    MPV 10.7 6.0 - 12.0 fL    Platelets 268 150 - 450 10*3/mm3    Neutrophil % 67.0 41.0 - 71.0 %    Lymphocyte % 21.4 (L) 24.0 - 44.0 %    Monocyte % 10.2 0.0 - 12.0 %    Eosinophil % 1.3  0.0 - 3.0 %    Basophil % 0.1 0.0 - 1.0 %    Immature Grans % 0.0 0.0 - 0.6 %    Neutrophils, Absolute 4.57 1.50 - 8.30 10*3/mm3    Lymphocytes, Absolute 1.46 0.60 - 4.80 10*3/mm3    Monocytes, Absolute 0.70 0.00 - 1.00 10*3/mm3    Eosinophils, Absolute 0.09 0.00 - 0.30 10*3/mm3    Basophils, Absolute 0.01 0.00 - 0.20 10*3/mm3    Immature Grans, Absolute 0.00 0.00 - 0.03 10*3/mm3   POC Troponin, Rapid    Collection Time: 04/10/18 11:33 AM   Result Value Ref Range    Troponin I 0.13 (H) 0.00 - 0.07 ng/mL     Note: In addition to lab results from this visit, the labs listed above may include labs taken at another facility or during a different encounter within the last 24 hours. Please correlate lab times with ED admission and discharge times for further clarification of the services performed during this visit.    XR Chest 1 View   Final Result   Taking all factors into account, compared to previous   studies of 02/18/2018, there has been no change. Cardiomegaly is stable   and the upper lobe venous distention is stable. There is no new   progressive or acute finding.       D:  04/10/2018   E:  04/10/2018       This report was finalized on 4/10/2018 11:13 AM by Dr. Chandu Machuca MD.            Vitals:    04/10/18 1030 04/10/18 1200 04/10/18 1230 04/10/18 1233   BP: 121/93   (!) 143/109   BP Location:       Patient Position:       Pulse: 94 110 96 65   Resp:       Temp:       TempSrc:       SpO2: 94% 99% 99% 98%   Weight:       Height:         Medications   furosemide (LASIX) injection 40 mg (40 mg Intravenous Given 4/10/18 1058)     ECG/EMG Results (last 24 hours)     Procedure Component Value Units Date/Time    ECG 12 Lead [[id number]] Collected:  04/10/18 0853     Updated:  04/10/18 0853                      [facility]    Final diagnoses:   Acute on chronic congestive heart failure, unspecified congestive heart failure type   Edema of both legs            CARLOS Coburn  04/10/18 5206

## 2018-04-27 ENCOUNTER — HOSPITAL ENCOUNTER (EMERGENCY)
Facility: HOSPITAL | Age: 28
Discharge: HOME | End: 2018-04-28
Attending: EMERGENCY MEDICINE | Admitting: EMERGENCY MEDICINE

## 2018-04-27 DIAGNOSIS — B97.89 VIRAL RESPIRATORY ILLNESS: Primary | ICD-10-CM

## 2018-04-27 DIAGNOSIS — Z86.69 HISTORY OF OBSTRUCTIVE SLEEP APNEA: ICD-10-CM

## 2018-04-27 DIAGNOSIS — L40.0 PLAQUE PSORIASIS: ICD-10-CM

## 2018-04-27 DIAGNOSIS — I10 ESSENTIAL HYPERTENSION: ICD-10-CM

## 2018-04-27 DIAGNOSIS — J98.8 VIRAL RESPIRATORY ILLNESS: Primary | ICD-10-CM

## 2018-04-27 DIAGNOSIS — R06.02 SHORTNESS OF BREATH: ICD-10-CM

## 2018-04-27 DIAGNOSIS — Z86.79 HISTORY OF CHF (CONGESTIVE HEART FAILURE): ICD-10-CM

## 2018-04-27 DIAGNOSIS — R05.9 COUGH: ICD-10-CM

## 2018-04-27 PROCEDURE — 99284 EMERGENCY DEPT VISIT MOD MDM: CPT

## 2018-04-28 ENCOUNTER — APPOINTMENT (OUTPATIENT)
Dept: GENERAL RADIOLOGY | Facility: HOSPITAL | Age: 28
End: 2018-04-28

## 2018-04-28 ENCOUNTER — APPOINTMENT (OUTPATIENT)
Dept: CT IMAGING | Facility: HOSPITAL | Age: 28
End: 2018-04-28

## 2018-04-28 VITALS
HEART RATE: 103 BPM | TEMPERATURE: 98.8 F | HEIGHT: 70 IN | OXYGEN SATURATION: 93 % | RESPIRATION RATE: 28 BRPM | WEIGHT: 315 LBS | SYSTOLIC BLOOD PRESSURE: 154 MMHG | BODY MASS INDEX: 45.1 KG/M2 | DIASTOLIC BLOOD PRESSURE: 88 MMHG

## 2018-04-28 LAB
ALBUMIN SERPL-MCNC: 3.8 G/DL (ref 3.2–4.8)
ALBUMIN/GLOB SERPL: 1.1 G/DL (ref 1.5–2.5)
ALP SERPL-CCNC: 70 U/L (ref 25–100)
ALT SERPL W P-5'-P-CCNC: 17 U/L (ref 7–40)
ANION GAP SERPL CALCULATED.3IONS-SCNC: 5 MMOL/L (ref 3–11)
AST SERPL-CCNC: 21 U/L (ref 0–33)
BASOPHILS # BLD AUTO: 0.01 10*3/MM3 (ref 0–0.2)
BASOPHILS NFR BLD AUTO: 0.2 % (ref 0–1)
BILIRUB SERPL-MCNC: 0.5 MG/DL (ref 0.3–1.2)
BNP SERPL-MCNC: 558 PG/ML (ref 0–100)
BUN BLD-MCNC: 8 MG/DL (ref 9–23)
BUN/CREAT SERPL: 8.9 (ref 7–25)
CALCIUM SPEC-SCNC: 8.6 MG/DL (ref 8.7–10.4)
CHLORIDE SERPL-SCNC: 103 MMOL/L (ref 99–109)
CO2 SERPL-SCNC: 31 MMOL/L (ref 20–31)
CREAT BLD-MCNC: 0.9 MG/DL (ref 0.6–1.3)
DEPRECATED RDW RBC AUTO: 44.5 FL (ref 37–54)
EOSINOPHIL # BLD AUTO: 0.1 10*3/MM3 (ref 0–0.3)
EOSINOPHIL NFR BLD AUTO: 1.9 % (ref 0–3)
ERYTHROCYTE [DISTWIDTH] IN BLOOD BY AUTOMATED COUNT: 15.6 % (ref 11.3–14.5)
GFR SERPL CREATININE-BSD FRML MDRD: 125 ML/MIN/1.73
GLOBULIN UR ELPH-MCNC: 3.4 GM/DL
GLUCOSE BLD-MCNC: 94 MG/DL (ref 70–100)
HCT VFR BLD AUTO: 39.7 % (ref 38.9–50.9)
HGB BLD-MCNC: 13 G/DL (ref 13.1–17.5)
IMM GRANULOCYTES # BLD: 0.01 10*3/MM3 (ref 0–0.03)
IMM GRANULOCYTES NFR BLD: 0.2 % (ref 0–0.6)
LYMPHOCYTES # BLD AUTO: 1.55 10*3/MM3 (ref 0.6–4.8)
LYMPHOCYTES NFR BLD AUTO: 30 % (ref 24–44)
MCH RBC QN AUTO: 25.8 PG (ref 27–31)
MCHC RBC AUTO-ENTMCNC: 32.7 G/DL (ref 32–36)
MCV RBC AUTO: 78.8 FL (ref 80–99)
MONOCYTES # BLD AUTO: 0.42 10*3/MM3 (ref 0–1)
MONOCYTES NFR BLD AUTO: 8.1 % (ref 0–12)
NEUTROPHILS # BLD AUTO: 3.09 10*3/MM3 (ref 1.5–8.3)
NEUTROPHILS NFR BLD AUTO: 59.8 % (ref 41–71)
PLATELET # BLD AUTO: 280 10*3/MM3 (ref 150–450)
PMV BLD AUTO: 11 FL (ref 6–12)
POTASSIUM BLD-SCNC: 3.6 MMOL/L (ref 3.5–5.5)
PROT SERPL-MCNC: 7.2 G/DL (ref 5.7–8.2)
RBC # BLD AUTO: 5.04 10*6/MM3 (ref 4.2–5.76)
SODIUM BLD-SCNC: 139 MMOL/L (ref 132–146)
TROPONIN I SERPL-MCNC: 0.08 NG/ML (ref 0–0.07)
WBC NRBC COR # BLD: 5.17 10*3/MM3 (ref 3.5–10.8)

## 2018-04-28 PROCEDURE — 80053 COMPREHEN METABOLIC PANEL: CPT | Performed by: PHYSICIAN ASSISTANT

## 2018-04-28 PROCEDURE — 85025 COMPLETE CBC W/AUTO DIFF WBC: CPT | Performed by: PHYSICIAN ASSISTANT

## 2018-04-28 PROCEDURE — 83880 ASSAY OF NATRIURETIC PEPTIDE: CPT | Performed by: PHYSICIAN ASSISTANT

## 2018-04-28 PROCEDURE — 71250 CT THORAX DX C-: CPT

## 2018-04-28 PROCEDURE — 71045 X-RAY EXAM CHEST 1 VIEW: CPT

## 2018-04-28 PROCEDURE — 93005 ELECTROCARDIOGRAM TRACING: CPT | Performed by: PHYSICIAN ASSISTANT

## 2018-04-28 PROCEDURE — 84484 ASSAY OF TROPONIN QUANT: CPT

## 2018-04-28 RX ORDER — FUROSEMIDE 10 MG/ML
40 INJECTION INTRAMUSCULAR; INTRAVENOUS ONCE
Status: DISCONTINUED | OUTPATIENT
Start: 2018-04-28 | End: 2018-04-28

## 2018-04-28 RX ORDER — BENZONATATE 200 MG/1
200 CAPSULE ORAL 3 TIMES DAILY PRN
Qty: 30 CAPSULE | Refills: 0 | Status: ON HOLD | OUTPATIENT
Start: 2018-04-28 | End: 2018-06-25

## 2018-04-28 RX ORDER — FUROSEMIDE 40 MG/1
40 TABLET ORAL ONCE
Status: COMPLETED | OUTPATIENT
Start: 2018-04-28 | End: 2018-04-28

## 2018-04-28 RX ADMIN — FUROSEMIDE 40 MG: 40 TABLET ORAL at 03:41

## 2018-04-28 RX ADMIN — HYDROCODONE POLISTIREX AND CHLORPHENIRAMINE POLISTIREX 5 ML: 10; 8 SUSPENSION, EXTENDED RELEASE ORAL at 04:41

## 2018-05-15 ENCOUNTER — TELEPHONE (OUTPATIENT)
Dept: CARDIOLOGY | Facility: CLINIC | Age: 28
End: 2018-05-15

## 2018-05-15 ENCOUNTER — CLINICAL SUPPORT NO REQUIREMENTS (OUTPATIENT)
Dept: CARDIOLOGY | Facility: CLINIC | Age: 28
End: 2018-05-15

## 2018-05-15 DIAGNOSIS — I42.0 NONISCHEMIC CONGESTIVE CARDIOMYOPATHY (HCC): ICD-10-CM

## 2018-05-15 DIAGNOSIS — I50.23 ACUTE ON CHRONIC SYSTOLIC HEART FAILURE (HCC): ICD-10-CM

## 2018-05-16 PROCEDURE — 93296 REM INTERROG EVL PM/IDS: CPT | Performed by: INTERNAL MEDICINE

## 2018-05-16 PROCEDURE — 93295 DEV INTERROG REMOTE 1/2/MLT: CPT | Performed by: INTERNAL MEDICINE

## 2018-05-30 ENCOUNTER — APPOINTMENT (OUTPATIENT)
Dept: CARDIAC REHAB | Facility: HOSPITAL | Age: 28
End: 2018-05-30

## 2018-06-01 ENCOUNTER — APPOINTMENT (OUTPATIENT)
Dept: CARDIAC REHAB | Facility: HOSPITAL | Age: 28
End: 2018-06-01

## 2018-06-04 ENCOUNTER — APPOINTMENT (OUTPATIENT)
Dept: CARDIAC REHAB | Facility: HOSPITAL | Age: 28
End: 2018-06-04

## 2018-06-06 ENCOUNTER — APPOINTMENT (OUTPATIENT)
Dept: CARDIAC REHAB | Facility: HOSPITAL | Age: 28
End: 2018-06-06

## 2018-06-08 ENCOUNTER — APPOINTMENT (OUTPATIENT)
Dept: CARDIAC REHAB | Facility: HOSPITAL | Age: 28
End: 2018-06-08

## 2018-06-11 ENCOUNTER — APPOINTMENT (OUTPATIENT)
Dept: CARDIAC REHAB | Facility: HOSPITAL | Age: 28
End: 2018-06-11

## 2018-06-13 ENCOUNTER — APPOINTMENT (OUTPATIENT)
Dept: CARDIAC REHAB | Facility: HOSPITAL | Age: 28
End: 2018-06-13

## 2018-06-15 ENCOUNTER — APPOINTMENT (OUTPATIENT)
Dept: CARDIAC REHAB | Facility: HOSPITAL | Age: 28
End: 2018-06-15

## 2018-06-24 ENCOUNTER — HOSPITAL ENCOUNTER (INPATIENT)
Facility: HOSPITAL | Age: 28
LOS: 3 days | Discharge: HOME | End: 2018-06-27
Attending: EMERGENCY MEDICINE | Admitting: INTERNAL MEDICINE

## 2018-06-24 ENCOUNTER — APPOINTMENT (OUTPATIENT)
Dept: GENERAL RADIOLOGY | Facility: HOSPITAL | Age: 28
End: 2018-06-24

## 2018-06-24 DIAGNOSIS — R60.1 ANASARCA: ICD-10-CM

## 2018-06-24 DIAGNOSIS — R09.02 HYPOXIA: Primary | ICD-10-CM

## 2018-06-24 DIAGNOSIS — I50.9 CONGESTIVE HEART FAILURE, UNSPECIFIED CONGESTIVE HEART FAILURE CHRONICITY, UNSPECIFIED CONGESTIVE HEART FAILURE TYPE: ICD-10-CM

## 2018-06-24 LAB
ALBUMIN SERPL-MCNC: 3.78 G/DL (ref 3.2–4.8)
ALBUMIN/GLOB SERPL: 1 G/DL (ref 1.5–2.5)
ALP SERPL-CCNC: 78 U/L (ref 25–100)
ALT SERPL W P-5'-P-CCNC: 16 U/L (ref 7–40)
ANION GAP SERPL CALCULATED.3IONS-SCNC: 8 MMOL/L (ref 3–11)
AST SERPL-CCNC: 18 U/L (ref 0–33)
BASOPHILS # BLD AUTO: 0.01 10*3/MM3 (ref 0–0.2)
BASOPHILS NFR BLD AUTO: 0.2 % (ref 0–1)
BILIRUB SERPL-MCNC: 1 MG/DL (ref 0.3–1.2)
BNP SERPL-MCNC: 447 PG/ML (ref 0–100)
BUN BLD-MCNC: 10 MG/DL (ref 9–23)
BUN/CREAT SERPL: 9.4 (ref 7–25)
CALCIUM SPEC-SCNC: 8.8 MG/DL (ref 8.7–10.4)
CHLORIDE SERPL-SCNC: 103 MMOL/L (ref 99–109)
CO2 SERPL-SCNC: 28 MMOL/L (ref 20–31)
CREAT BLD-MCNC: 1.06 MG/DL (ref 0.6–1.3)
DEPRECATED RDW RBC AUTO: 41.6 FL (ref 37–54)
EOSINOPHIL # BLD AUTO: 0.07 10*3/MM3 (ref 0–0.3)
EOSINOPHIL NFR BLD AUTO: 1.2 % (ref 0–3)
ERYTHROCYTE [DISTWIDTH] IN BLOOD BY AUTOMATED COUNT: 14.4 % (ref 11.3–14.5)
GFR SERPL CREATININE-BSD FRML MDRD: 103 ML/MIN/1.73
GLOBULIN UR ELPH-MCNC: 3.8 GM/DL
GLUCOSE BLD-MCNC: 113 MG/DL (ref 70–100)
HCT VFR BLD AUTO: 44 % (ref 38.9–50.9)
HGB BLD-MCNC: 14.6 G/DL (ref 13.1–17.5)
HOLD SPECIMEN: NORMAL
HOLD SPECIMEN: NORMAL
IMM GRANULOCYTES # BLD: 0.02 10*3/MM3 (ref 0–0.03)
IMM GRANULOCYTES NFR BLD: 0.4 % (ref 0–0.6)
LYMPHOCYTES # BLD AUTO: 1.33 10*3/MM3 (ref 0.6–4.8)
LYMPHOCYTES NFR BLD AUTO: 23.6 % (ref 24–44)
MCH RBC QN AUTO: 26.4 PG (ref 27–31)
MCHC RBC AUTO-ENTMCNC: 33.2 G/DL (ref 32–36)
MCV RBC AUTO: 79.4 FL (ref 80–99)
MONOCYTES # BLD AUTO: 0.61 10*3/MM3 (ref 0–1)
MONOCYTES NFR BLD AUTO: 10.8 % (ref 0–12)
NEUTROPHILS # BLD AUTO: 3.6 10*3/MM3 (ref 1.5–8.3)
NEUTROPHILS NFR BLD AUTO: 63.8 % (ref 41–71)
PLATELET # BLD AUTO: 246 10*3/MM3 (ref 150–450)
PMV BLD AUTO: 10.2 FL (ref 6–12)
POTASSIUM BLD-SCNC: 3.5 MMOL/L (ref 3.5–5.5)
PROT SERPL-MCNC: 7.6 G/DL (ref 5.7–8.2)
RBC # BLD AUTO: 5.54 10*6/MM3 (ref 4.2–5.76)
SODIUM BLD-SCNC: 139 MMOL/L (ref 132–146)
TROPONIN I SERPL-MCNC: 0.1 NG/ML (ref 0–0.07)
TROPONIN I SERPL-MCNC: 0.11 NG/ML (ref 0–0.07)
WBC NRBC COR # BLD: 5.64 10*3/MM3 (ref 3.5–10.8)
WHOLE BLOOD HOLD SPECIMEN: NORMAL
WHOLE BLOOD HOLD SPECIMEN: NORMAL

## 2018-06-24 PROCEDURE — 85025 COMPLETE CBC W/AUTO DIFF WBC: CPT

## 2018-06-24 PROCEDURE — 25010000002 FUROSEMIDE PER 20 MG: Performed by: NURSE PRACTITIONER

## 2018-06-24 PROCEDURE — 99285 EMERGENCY DEPT VISIT HI MDM: CPT

## 2018-06-24 PROCEDURE — 94640 AIRWAY INHALATION TREATMENT: CPT

## 2018-06-24 PROCEDURE — 93005 ELECTROCARDIOGRAM TRACING: CPT

## 2018-06-24 PROCEDURE — 93005 ELECTROCARDIOGRAM TRACING: CPT | Performed by: EMERGENCY MEDICINE

## 2018-06-24 PROCEDURE — 94760 N-INVAS EAR/PLS OXIMETRY 1: CPT

## 2018-06-24 PROCEDURE — 71045 X-RAY EXAM CHEST 1 VIEW: CPT

## 2018-06-24 PROCEDURE — 83880 ASSAY OF NATRIURETIC PEPTIDE: CPT | Performed by: EMERGENCY MEDICINE

## 2018-06-24 PROCEDURE — 84484 ASSAY OF TROPONIN QUANT: CPT

## 2018-06-24 PROCEDURE — 80053 COMPREHEN METABOLIC PANEL: CPT | Performed by: EMERGENCY MEDICINE

## 2018-06-24 RX ORDER — FUROSEMIDE 10 MG/ML
40 INJECTION INTRAMUSCULAR; INTRAVENOUS ONCE
Status: COMPLETED | OUTPATIENT
Start: 2018-06-24 | End: 2018-06-24

## 2018-06-24 RX ORDER — SODIUM CHLORIDE 0.9 % (FLUSH) 0.9 %
10 SYRINGE (ML) INJECTION AS NEEDED
Status: DISCONTINUED | OUTPATIENT
Start: 2018-06-24 | End: 2018-06-27 | Stop reason: HOSPADM

## 2018-06-24 RX ORDER — IPRATROPIUM BROMIDE AND ALBUTEROL SULFATE 2.5; .5 MG/3ML; MG/3ML
3 SOLUTION RESPIRATORY (INHALATION) ONCE
Status: COMPLETED | OUTPATIENT
Start: 2018-06-24 | End: 2018-06-24

## 2018-06-24 RX ADMIN — NITROGLYCERIN 1 INCH: 20 OINTMENT TOPICAL at 18:16

## 2018-06-24 RX ADMIN — FUROSEMIDE 40 MG: 10 INJECTION, SOLUTION INTRAMUSCULAR; INTRAVENOUS at 18:17

## 2018-06-24 RX ADMIN — IPRATROPIUM BROMIDE AND ALBUTEROL SULFATE 3 ML: 2.5; .5 SOLUTION RESPIRATORY (INHALATION) at 21:38

## 2018-06-25 ENCOUNTER — EPISODE CHANGES (OUTPATIENT)
Dept: CASE MANAGEMENT | Facility: OTHER | Age: 28
End: 2018-06-25

## 2018-06-25 LAB
ANION GAP SERPL CALCULATED.3IONS-SCNC: 6 MMOL/L (ref 3–11)
BUN BLD-MCNC: 12 MG/DL (ref 9–23)
BUN/CREAT SERPL: 12.8 (ref 7–25)
CALCIUM SPEC-SCNC: 8.4 MG/DL (ref 8.7–10.4)
CHLORIDE SERPL-SCNC: 104 MMOL/L (ref 99–109)
CO2 SERPL-SCNC: 29 MMOL/L (ref 20–31)
CREAT BLD-MCNC: 0.94 MG/DL (ref 0.6–1.3)
DEPRECATED RDW RBC AUTO: 42.2 FL (ref 37–54)
ERYTHROCYTE [DISTWIDTH] IN BLOOD BY AUTOMATED COUNT: 14.5 % (ref 11.3–14.5)
GFR SERPL CREATININE-BSD FRML MDRD: 119 ML/MIN/1.73
GLUCOSE BLD-MCNC: 106 MG/DL (ref 70–100)
HCT VFR BLD AUTO: 40.3 % (ref 38.9–50.9)
HGB BLD-MCNC: 13.2 G/DL (ref 13.1–17.5)
MCH RBC QN AUTO: 26.2 PG (ref 27–31)
MCHC RBC AUTO-ENTMCNC: 32.8 G/DL (ref 32–36)
MCV RBC AUTO: 80.1 FL (ref 80–99)
PLATELET # BLD AUTO: 238 10*3/MM3 (ref 150–450)
PMV BLD AUTO: 10.5 FL (ref 6–12)
POTASSIUM BLD-SCNC: 3.7 MMOL/L (ref 3.5–5.5)
RBC # BLD AUTO: 5.03 10*6/MM3 (ref 4.2–5.76)
SODIUM BLD-SCNC: 139 MMOL/L (ref 132–146)
WBC NRBC COR # BLD: 4.87 10*3/MM3 (ref 3.5–10.8)

## 2018-06-25 PROCEDURE — 99223 1ST HOSP IP/OBS HIGH 75: CPT | Performed by: HOSPITALIST

## 2018-06-25 PROCEDURE — 94799 UNLISTED PULMONARY SVC/PX: CPT

## 2018-06-25 PROCEDURE — 85027 COMPLETE CBC AUTOMATED: CPT | Performed by: NURSE PRACTITIONER

## 2018-06-25 PROCEDURE — 5A09357 ASSISTANCE WITH RESPIRATORY VENTILATION, LESS THAN 24 CONSECUTIVE HOURS, CONTINUOUS POSITIVE AIRWAY PRESSURE: ICD-10-PCS | Performed by: HOSPITALIST

## 2018-06-25 PROCEDURE — 80048 BASIC METABOLIC PNL TOTAL CA: CPT | Performed by: NURSE PRACTITIONER

## 2018-06-25 PROCEDURE — 99221 1ST HOSP IP/OBS SF/LOW 40: CPT | Performed by: NURSE PRACTITIONER

## 2018-06-25 PROCEDURE — 25010000002 FUROSEMIDE PER 20 MG: Performed by: HOSPITALIST

## 2018-06-25 PROCEDURE — 25010000002 HEPARIN (PORCINE) PER 1000 UNITS: Performed by: HOSPITALIST

## 2018-06-25 PROCEDURE — 94660 CPAP INITIATION&MGMT: CPT

## 2018-06-25 RX ORDER — FUROSEMIDE 10 MG/ML
40 INJECTION INTRAMUSCULAR; INTRAVENOUS EVERY 12 HOURS
Status: DISCONTINUED | OUTPATIENT
Start: 2018-06-25 | End: 2018-06-25

## 2018-06-25 RX ORDER — METOLAZONE 5 MG/1
5 TABLET ORAL DAILY
Status: DISCONTINUED | OUTPATIENT
Start: 2018-06-25 | End: 2018-06-26

## 2018-06-25 RX ORDER — TORSEMIDE 20 MG/1
60 TABLET ORAL DAILY
Status: DISCONTINUED | OUTPATIENT
Start: 2018-06-25 | End: 2018-06-25 | Stop reason: SDUPTHER

## 2018-06-25 RX ORDER — SPIRONOLACTONE 25 MG/1
25 TABLET ORAL DAILY
Status: DISCONTINUED | OUTPATIENT
Start: 2018-06-25 | End: 2018-06-27 | Stop reason: HOSPADM

## 2018-06-25 RX ORDER — METOLAZONE 5 MG/1
5 TABLET ORAL 3 TIMES WEEKLY
Status: DISCONTINUED | OUTPATIENT
Start: 2018-06-25 | End: 2018-06-25

## 2018-06-25 RX ORDER — ALBUTEROL SULFATE 2.5 MG/3ML
2.5 SOLUTION RESPIRATORY (INHALATION) EVERY 6 HOURS PRN
Status: DISCONTINUED | OUTPATIENT
Start: 2018-06-25 | End: 2018-06-27 | Stop reason: HOSPADM

## 2018-06-25 RX ORDER — ASPIRIN 81 MG/1
81 TABLET ORAL DAILY
Status: DISCONTINUED | OUTPATIENT
Start: 2018-06-25 | End: 2018-06-27 | Stop reason: HOSPADM

## 2018-06-25 RX ORDER — FUROSEMIDE 10 MG/ML
80 INJECTION INTRAMUSCULAR; INTRAVENOUS EVERY 12 HOURS SCHEDULED
Status: DISCONTINUED | OUTPATIENT
Start: 2018-06-25 | End: 2018-06-26

## 2018-06-25 RX ORDER — SODIUM CHLORIDE 0.9 % (FLUSH) 0.9 %
1-10 SYRINGE (ML) INJECTION AS NEEDED
Status: DISCONTINUED | OUTPATIENT
Start: 2018-06-25 | End: 2018-06-27 | Stop reason: HOSPADM

## 2018-06-25 RX ORDER — CARVEDILOL 12.5 MG/1
25 TABLET ORAL 2 TIMES DAILY WITH MEALS
Status: DISCONTINUED | OUTPATIENT
Start: 2018-06-25 | End: 2018-06-27 | Stop reason: HOSPADM

## 2018-06-25 RX ORDER — HEPARIN SODIUM 5000 [USP'U]/ML
5000 INJECTION, SOLUTION INTRAVENOUS; SUBCUTANEOUS EVERY 8 HOURS SCHEDULED
Status: DISCONTINUED | OUTPATIENT
Start: 2018-06-25 | End: 2018-06-27 | Stop reason: HOSPADM

## 2018-06-25 RX ADMIN — HEPARIN SODIUM 5000 UNITS: 5000 INJECTION, SOLUTION INTRAVENOUS; SUBCUTANEOUS at 21:20

## 2018-06-25 RX ADMIN — HEPARIN SODIUM 5000 UNITS: 5000 INJECTION, SOLUTION INTRAVENOUS; SUBCUTANEOUS at 13:24

## 2018-06-25 RX ADMIN — CARVEDILOL 25 MG: 12.5 TABLET, FILM COATED ORAL at 17:01

## 2018-06-25 RX ADMIN — SPIRONOLACTONE 25 MG: 25 TABLET, FILM COATED ORAL at 08:35

## 2018-06-25 RX ADMIN — NITROGLYCERIN 1 INCH: 20 OINTMENT TOPICAL at 06:56

## 2018-06-25 RX ADMIN — ASPIRIN 81 MG: 81 TABLET, COATED ORAL at 08:35

## 2018-06-25 RX ADMIN — METOLAZONE 5 MG: 5 TABLET ORAL at 08:34

## 2018-06-25 RX ADMIN — SACUBITRIL AND VALSARTAN 1 TABLET: 49; 51 TABLET, FILM COATED ORAL at 21:20

## 2018-06-25 RX ADMIN — HEPARIN SODIUM 5000 UNITS: 5000 INJECTION, SOLUTION INTRAVENOUS; SUBCUTANEOUS at 06:56

## 2018-06-25 RX ADMIN — FUROSEMIDE 80 MG: 10 INJECTION, SOLUTION INTRAMUSCULAR; INTRAVENOUS at 17:01

## 2018-06-25 RX ADMIN — SACUBITRIL AND VALSARTAN 1 TABLET: 49; 51 TABLET, FILM COATED ORAL at 08:35

## 2018-06-25 RX ADMIN — CARVEDILOL 25 MG: 12.5 TABLET, FILM COATED ORAL at 08:34

## 2018-06-25 RX ADMIN — FUROSEMIDE 80 MG: 10 INJECTION, SOLUTION INTRAMUSCULAR; INTRAVENOUS at 07:24

## 2018-06-26 ENCOUNTER — EPISODE CHANGES (OUTPATIENT)
Dept: CASE MANAGEMENT | Facility: OTHER | Age: 28
End: 2018-06-26

## 2018-06-26 PROCEDURE — 99232 SBSQ HOSP IP/OBS MODERATE 35: CPT | Performed by: INTERNAL MEDICINE

## 2018-06-26 PROCEDURE — 25010000002 FUROSEMIDE PER 20 MG: Performed by: INTERNAL MEDICINE

## 2018-06-26 PROCEDURE — 25010000002 FUROSEMIDE PER 20 MG: Performed by: HOSPITALIST

## 2018-06-26 PROCEDURE — 25010000002 HEPARIN (PORCINE) PER 1000 UNITS: Performed by: HOSPITALIST

## 2018-06-26 PROCEDURE — 94660 CPAP INITIATION&MGMT: CPT

## 2018-06-26 PROCEDURE — 94799 UNLISTED PULMONARY SVC/PX: CPT

## 2018-06-26 RX ORDER — FUROSEMIDE 10 MG/ML
40 INJECTION INTRAMUSCULAR; INTRAVENOUS EVERY 12 HOURS SCHEDULED
Status: DISCONTINUED | OUTPATIENT
Start: 2018-06-26 | End: 2018-06-27

## 2018-06-26 RX ADMIN — SACUBITRIL AND VALSARTAN 1 TABLET: 49; 51 TABLET, FILM COATED ORAL at 22:14

## 2018-06-26 RX ADMIN — ASPIRIN 81 MG: 81 TABLET, COATED ORAL at 09:11

## 2018-06-26 RX ADMIN — NITROGLYCERIN 1 INCH: 20 OINTMENT TOPICAL at 00:14

## 2018-06-26 RX ADMIN — HEPARIN SODIUM 5000 UNITS: 5000 INJECTION, SOLUTION INTRAVENOUS; SUBCUTANEOUS at 06:48

## 2018-06-26 RX ADMIN — HEPARIN SODIUM 5000 UNITS: 5000 INJECTION, SOLUTION INTRAVENOUS; SUBCUTANEOUS at 16:39

## 2018-06-26 RX ADMIN — FUROSEMIDE 80 MG: 10 INJECTION, SOLUTION INTRAMUSCULAR; INTRAVENOUS at 06:47

## 2018-06-26 RX ADMIN — SACUBITRIL AND VALSARTAN 1 TABLET: 49; 51 TABLET, FILM COATED ORAL at 09:12

## 2018-06-26 RX ADMIN — HEPARIN SODIUM 5000 UNITS: 5000 INJECTION, SOLUTION INTRAVENOUS; SUBCUTANEOUS at 22:13

## 2018-06-26 RX ADMIN — FUROSEMIDE 40 MG: 10 INJECTION, SOLUTION INTRAMUSCULAR; INTRAVENOUS at 17:54

## 2018-06-26 RX ADMIN — CARVEDILOL 25 MG: 12.5 TABLET, FILM COATED ORAL at 17:54

## 2018-06-27 ENCOUNTER — EPISODE CHANGES (OUTPATIENT)
Dept: CASE MANAGEMENT | Facility: OTHER | Age: 28
End: 2018-06-27

## 2018-06-27 VITALS
TEMPERATURE: 98.2 F | DIASTOLIC BLOOD PRESSURE: 80 MMHG | WEIGHT: 315 LBS | OXYGEN SATURATION: 99 % | HEART RATE: 90 BPM | RESPIRATION RATE: 16 BRPM | SYSTOLIC BLOOD PRESSURE: 99 MMHG | HEIGHT: 69 IN | BODY MASS INDEX: 46.65 KG/M2

## 2018-06-27 LAB
ANION GAP SERPL CALCULATED.3IONS-SCNC: 10 MMOL/L (ref 3–11)
BUN BLD-MCNC: 19 MG/DL (ref 9–23)
BUN/CREAT SERPL: 20 (ref 7–25)
CALCIUM SPEC-SCNC: 8.8 MG/DL (ref 8.7–10.4)
CHLORIDE SERPL-SCNC: 96 MMOL/L (ref 99–109)
CO2 SERPL-SCNC: 32 MMOL/L (ref 20–31)
CREAT BLD-MCNC: 0.95 MG/DL (ref 0.6–1.3)
GFR SERPL CREATININE-BSD FRML MDRD: 117 ML/MIN/1.73
GLUCOSE BLD-MCNC: 108 MG/DL (ref 70–100)
POTASSIUM BLD-SCNC: 3.6 MMOL/L (ref 3.5–5.5)
SODIUM BLD-SCNC: 138 MMOL/L (ref 132–146)

## 2018-06-27 PROCEDURE — 25010000002 FUROSEMIDE PER 20 MG: Performed by: INTERNAL MEDICINE

## 2018-06-27 PROCEDURE — 99238 HOSP IP/OBS DSCHRG MGMT 30/<: CPT | Performed by: INTERNAL MEDICINE

## 2018-06-27 PROCEDURE — 94660 CPAP INITIATION&MGMT: CPT

## 2018-06-27 PROCEDURE — 94799 UNLISTED PULMONARY SVC/PX: CPT

## 2018-06-27 PROCEDURE — 80048 BASIC METABOLIC PNL TOTAL CA: CPT | Performed by: INTERNAL MEDICINE

## 2018-06-27 PROCEDURE — 25010000002 HEPARIN (PORCINE) PER 1000 UNITS: Performed by: HOSPITALIST

## 2018-06-27 RX ORDER — TORSEMIDE 20 MG/1
20 TABLET ORAL 3 TIMES DAILY
Status: DISCONTINUED | OUTPATIENT
Start: 2018-06-27 | End: 2018-06-27 | Stop reason: HOSPADM

## 2018-06-27 RX ADMIN — SPIRONOLACTONE 25 MG: 25 TABLET, FILM COATED ORAL at 10:06

## 2018-06-27 RX ADMIN — HEPARIN SODIUM 5000 UNITS: 5000 INJECTION, SOLUTION INTRAVENOUS; SUBCUTANEOUS at 06:25

## 2018-06-27 RX ADMIN — ASPIRIN 81 MG: 81 TABLET, COATED ORAL at 10:05

## 2018-06-27 RX ADMIN — FUROSEMIDE 40 MG: 10 INJECTION, SOLUTION INTRAMUSCULAR; INTRAVENOUS at 06:25

## 2018-06-27 RX ADMIN — CARVEDILOL 25 MG: 12.5 TABLET, FILM COATED ORAL at 10:05

## 2018-06-27 RX ADMIN — SACUBITRIL AND VALSARTAN 1 TABLET: 49; 51 TABLET, FILM COATED ORAL at 10:06

## 2018-06-28 ENCOUNTER — TRANSITIONAL CARE MANAGEMENT TELEPHONE ENCOUNTER (OUTPATIENT)
Dept: INTERNAL MEDICINE | Facility: CLINIC | Age: 28
End: 2018-06-28

## 2018-06-28 ENCOUNTER — EPISODE CHANGES (OUTPATIENT)
Dept: CASE MANAGEMENT | Facility: OTHER | Age: 28
End: 2018-06-28

## 2018-06-29 NOTE — OUTREACH NOTE
The patient says he is doing well today and he feels better.  He denies any SOB, wheezing, or BLE edema.  He is agreeable to PCP appt and appt coordinated for 7/5/18 with Dr. Mills.  Confirmed heart & valve appt with patient.      The patient says he is supposed to be on Home O2 @ 2L continuous however he reports he is completly out of Home O2.  He says he has tanks but they are empty.  He'd like to know if PCP can arrange for continued home o2, reportedly uses [employer].  Please call patient at [phone] with PCP recommendations.  Thank you.

## 2018-07-03 ENCOUNTER — LAB (OUTPATIENT)
Dept: LAB | Facility: HOSPITAL | Age: 28
End: 2018-07-03

## 2018-07-03 ENCOUNTER — OFFICE VISIT (OUTPATIENT)
Dept: CARDIOLOGY | Facility: HOSPITAL | Age: 28
End: 2018-07-03

## 2018-07-03 VITALS
OXYGEN SATURATION: 90 % | SYSTOLIC BLOOD PRESSURE: 136 MMHG | HEART RATE: 74 BPM | HEIGHT: 70 IN | RESPIRATION RATE: 20 BRPM | WEIGHT: 315 LBS | BODY MASS INDEX: 45.1 KG/M2 | TEMPERATURE: 97 F | DIASTOLIC BLOOD PRESSURE: 7 MMHG

## 2018-07-03 DIAGNOSIS — I10 ESSENTIAL HYPERTENSION: ICD-10-CM

## 2018-07-03 DIAGNOSIS — I50.22 CHRONIC SYSTOLIC HEART FAILURE (HCC): ICD-10-CM

## 2018-07-03 DIAGNOSIS — I50.22 CHRONIC SYSTOLIC HEART FAILURE (HCC): Primary | ICD-10-CM

## 2018-07-03 DIAGNOSIS — E66.01 MORBID OBESITY WITH BMI OF 50.0-59.9, ADULT (HCC): ICD-10-CM

## 2018-07-03 DIAGNOSIS — Z91.199 PERSONAL HISTORY OF NONCOMPLIANCE WITH MEDICAL TREATMENT: ICD-10-CM

## 2018-07-03 LAB
ANION GAP SERPL CALCULATED.3IONS-SCNC: 11 MMOL/L (ref 3–11)
BASOPHILS # BLD AUTO: 0.01 10*3/MM3 (ref 0–0.2)
BASOPHILS NFR BLD AUTO: 0.2 % (ref 0–1)
BNP SERPL-MCNC: 249 PG/ML (ref 0–100)
BUN BLD-MCNC: 13 MG/DL (ref 9–23)
BUN/CREAT SERPL: 12.3 (ref 7–25)
CALCIUM SPEC-SCNC: 9.5 MG/DL (ref 8.7–10.4)
CHLORIDE SERPL-SCNC: 98 MMOL/L (ref 99–109)
CO2 SERPL-SCNC: 30 MMOL/L (ref 20–31)
CREAT BLD-MCNC: 1.06 MG/DL (ref 0.6–1.3)
DEPRECATED RDW RBC AUTO: 41.5 FL (ref 37–54)
EOSINOPHIL # BLD AUTO: 0.06 10*3/MM3 (ref 0–0.3)
EOSINOPHIL NFR BLD AUTO: 1.1 % (ref 0–3)
ERYTHROCYTE [DISTWIDTH] IN BLOOD BY AUTOMATED COUNT: 14.4 % (ref 11.3–14.5)
GFR SERPL CREATININE-BSD FRML MDRD: 103 ML/MIN/1.73
GLUCOSE BLD-MCNC: 104 MG/DL (ref 70–100)
HCT VFR BLD AUTO: 48 % (ref 38.9–50.9)
HGB BLD-MCNC: 15.7 G/DL (ref 13.1–17.5)
IMM GRANULOCYTES # BLD: 0 10*3/MM3 (ref 0–0.03)
IMM GRANULOCYTES NFR BLD: 0 % (ref 0–0.6)
LYMPHOCYTES # BLD AUTO: 1.26 10*3/MM3 (ref 0.6–4.8)
LYMPHOCYTES NFR BLD AUTO: 24.1 % (ref 24–44)
MCH RBC QN AUTO: 26.2 PG (ref 27–31)
MCHC RBC AUTO-ENTMCNC: 32.7 G/DL (ref 32–36)
MCV RBC AUTO: 80 FL (ref 80–99)
MONOCYTES # BLD AUTO: 0.62 10*3/MM3 (ref 0–1)
MONOCYTES NFR BLD AUTO: 11.9 % (ref 0–12)
NEUTROPHILS # BLD AUTO: 3.27 10*3/MM3 (ref 1.5–8.3)
NEUTROPHILS NFR BLD AUTO: 62.7 % (ref 41–71)
PLATELET # BLD AUTO: 310 10*3/MM3 (ref 150–450)
PMV BLD AUTO: 10.6 FL (ref 6–12)
POTASSIUM BLD-SCNC: 3.9 MMOL/L (ref 3.5–5.5)
RBC # BLD AUTO: 6 10*6/MM3 (ref 4.2–5.76)
SODIUM BLD-SCNC: 139 MMOL/L (ref 132–146)
WBC NRBC COR # BLD: 5.22 10*3/MM3 (ref 3.5–10.8)

## 2018-07-03 PROCEDURE — 80048 BASIC METABOLIC PNL TOTAL CA: CPT

## 2018-07-03 PROCEDURE — 99214 OFFICE O/P EST MOD 30 MIN: CPT | Performed by: NURSE PRACTITIONER

## 2018-07-03 PROCEDURE — 83880 ASSAY OF NATRIURETIC PEPTIDE: CPT

## 2018-07-03 PROCEDURE — 85025 COMPLETE CBC W/AUTO DIFF WBC: CPT

## 2018-07-03 PROCEDURE — 36415 COLL VENOUS BLD VENIPUNCTURE: CPT

## 2018-07-03 NOTE — PROGRESS NOTES
Encounter Date:07/03/2018      Patient ID: Evan Gannon is a 27 y.o. male.        Subjective:     Chief Complaint: Congestive Heart Failure     History of Present Illness patient presents to the office today for ongoing evaluation of his chronic systolic heart failure.  Patient was hospitalized at [facility] last week for heart failure exacerbation after noncompliance with his medications.  Patient had been lost to follow-up over the last few months.  He no showed his appointments with [facility] Doctor Pretty and the device clinic. He also stopped cardiac rehabilitation due to toe pain.  He notes that he is feeling well and his dyspnea has improved tremendously.  He reports he is wearing his oxygen at night and sometimes uses it during the day.  He denies chest pain, pedal edema, abdominal fullness, tachycardia or palpitations.    Patient Active Problem List   Diagnosis   • Hypertension   • Morbid obesity (CMS/HCC)   • Severe obstructive sleep apnea   • Nonischemic congestive cardiomyopathy (CMS/HCC)   • Personal history of noncompliance with medical treatment   • Acute on chronic systolic heart failure (CMS/HCC)   • CHF (congestive heart failure), NYHA class IV (CMS/HCC)   • Plaque psoriasis   • Painless hematuria   • CHF exacerbation (CMS/HCC)       Past Surgical History:   Procedure Laterality Date   • ADENOIDECTOMY     • CARDIAC CATHETERIZATION N/A 7/13/2017    Procedure: Left Heart Cath;  Surgeon: Balbir Olsen MD;  Location:  [facility] CATH INVASIVE LOCATION;  Service:    • CARDIAC DEFIBRILLATOR PLACEMENT  08/2017   • CARDIAC ELECTROPHYSIOLOGY PROCEDURE N/A 8/15/2017    Procedure: VVI ICD Implant;  Surgeon: Nathanael Richmond DO;  Location:  [facility] EP INVASIVE LOCATION;  Service:    • INTERNAL CARDIAC DEFIBRILLATOR INSERTION Left 2017   • NOSE SURGERY     • OTHER SURGICAL HISTORY      ultra light therapy   • SINUS SURGERY     • TONSILLECTOMY         No Known Allergies      Current  Outpatient Prescriptions:   •  albuterol (PROVENTIL HFA;VENTOLIN HFA) 108 (90 Base) MCG/ACT inhaler, Inhale 2 puffs Every 6 (Six) Hours As Needed for Wheezing., Disp: 1 inhaler, Rfl: 11  •  aspirin 81 MG EC tablet, Take 1 tablet by mouth Daily., Disp: 365 tablet, Rfl: 0  •  carvedilol (COREG) 25 MG tablet, Take 1 tablet by mouth 2 (Two) Times a Day With Meals., Disp: 60 tablet, Rfl: 5  •  metOLazone (ZAROXOLYN) 5 MG tablet, Take 1 tablet by mouth 3 (Three) Times a Week. (Patient taking differently: Take 5 mg by mouth 3 (Three) Times a Week. [facility]), Disp: 12 tablet, Rfl: 5  •  sacubitril-valsartan (ENTRESTO) 49-51 MG tablet, Take 1 tablet by mouth Every 12 (Twelve) Hours., Disp: 60 tablet, Rfl: 5  •  spironolactone (ALDACTONE) 25 MG tablet, Take 1 tablet by mouth Daily., Disp: 30 tablet, Rfl: 5  •  torsemide (DEMADEX) 20 MG tablet, Take 3 tablets by mouth Daily., Disp: 90 tablet, Rfl: 5    The following portions of the chart were reviewed and updated as appropriate: Allergies, current medications, past family history, social history, past medical history.     Review of Systems   Constitution: Positive for malaise/fatigue. Negative for chills, decreased appetite, diaphoresis, fever, weakness, night sweats, weight gain and weight loss.   HENT: Negative for congestion, hearing loss, hoarse voice and nosebleeds.    Eyes: Negative for blurred vision, visual disturbance and visual halos.   Cardiovascular: Positive for dyspnea on exertion and leg swelling (resolved). Negative for chest pain, claudication, cyanosis, irregular heartbeat, near-syncope, orthopnea, palpitations, paroxysmal nocturnal dyspnea and syncope.   Respiratory: Negative for cough, hemoptysis, shortness of breath, sleep disturbances due to breathing, snoring, sputum production and wheezing.    Hematologic/Lymphatic: Negative for bleeding problem. Does not bruise/bleed easily.   Skin: Negative for dry skin, itching and rash.   Musculoskeletal: Negative for  "arthritis, joint pain, joint swelling and myalgias.   Gastrointestinal: Negative for bloating, abdominal pain, constipation, diarrhea, flatus, heartburn, hematemesis, hematochezia, melena, nausea and vomiting.   Genitourinary: Negative for dysuria, frequency, hematuria, nocturia and urgency.   Neurological: Negative for excessive daytime sleepiness, dizziness, headaches, light-headedness and loss of balance.   Psychiatric/Behavioral: Negative for depression. The patient does not have insomnia and is not nervous/anxious.            Objective:     Vitals:    07/03/18 1148 07/03/18 1152 07/03/18 1153   BP: 127/72 136/70 136/70   BP Location: Left arm Right arm Right arm   Patient Position: Sitting Sitting Standing   Cuff Size: Adult Adult Adult   Pulse: 78 81 77   Resp: 20     Temp: 97 °F (36.1 °C)     TempSrc: Temporal Artery      SpO2: 90%     Weight: (!) 176 kg (387 lb)     Height: 177.8 cm (70\")           Physical Exam   Constitutional: He is oriented to person, place, and time. He appears well-developed and well-nourished. He is active and cooperative. No distress.   HENT:   Head: Normocephalic and atraumatic.   Mouth/Throat: Oropharynx is clear and moist.   Eyes: Conjunctivae and EOM are normal. Pupils are equal, round, and reactive to light.   Neck: Normal range of motion. Neck supple. No JVD present. No tracheal deviation present. No thyromegaly present.   Cardiovascular: Normal rate, regular rhythm, normal heart sounds and intact distal pulses.    Pulmonary/Chest: Effort normal and breath sounds normal.   Abdominal: Soft. Bowel sounds are normal. He exhibits no distension. There is no tenderness.   Musculoskeletal: Normal range of motion.   Neurological: He is alert and oriented to person, place, and time.   Skin: Skin is warm, dry and intact.   Multiple patches of psoriasis noted on body   Psychiatric: He has a normal mood and affect. His behavior is normal.   Nursing note and vitals reviewed.      Lab and " Diagnostic Review:   Lab Results   Component Value Date    GLUCOSE 104 (H) 07/03/2018    CALCIUM 9.5 07/03/2018     07/03/2018    K 3.9 07/03/2018    CO2 30.0 07/03/2018    CL 98 (L) 07/03/2018    BUN 13 07/03/2018    CREATININE 1.06 07/03/2018    EGFRIFAFRI 103 07/03/2018    BCR 12.3 07/03/2018    ANIONGAP 11.0 07/03/2018     Lab Results   Component Value Date    WBC 5.22 07/03/2018    HGB 15.7 07/03/2018    HCT 48.0 07/03/2018    MCV 80.0 07/03/2018     07/03/2018             Assessment and Plan:         1. Chronic systolic heart failure (CMS/HCC)  euvolemic  On GDMT  Will resume cardiac rehab in 6 weeks  - CBC & Differential; Future  - Basic Metabolic Panel; Future  - BNP; Future    2. Morbid obesity with BMI of 50.0-59.9, adult (CMS/HCC)    - Ambulatory Referral to Bariatric Surgery    3. Essential hypertension  Well controlled  HTN Education provided today including signs and symptoms, medication management, daily blood pressure monitoring. Patient encouraged to call the Heart and Valve center with any abnormal readings.   - CBC & Differential; Future  - Basic Metabolic Panel; Future    4. Personal history of noncompliance with medical treatment  Encouraged patient to take medications as directed and keep scheduled follow up appts.    Patient 's dermatologist, Jacek Dean PA-C is requesting cardiac clearance to start Cosentyx. Patient will be scheduled with Dr Olsen in the near future for evaluation.     It has been a pleasure to participate in the care of this patient.  Patient was instructed to call the [facility] Center with any questions, concerns, or worsening symptoms.    * Please note that portions of this note were completed with a voice recognition program. Efforts were made to edit the dictation but occasionally words are transcribed.

## 2018-07-05 ENCOUNTER — TRANSCRIBE ORDERS (OUTPATIENT)
Dept: CARDIAC REHAB | Facility: HOSPITAL | Age: 28
End: 2018-07-05

## 2018-07-05 DIAGNOSIS — I50.22 CHRONIC SYSTOLIC HEART FAILURE (HCC): Primary | ICD-10-CM

## 2018-07-12 ENCOUNTER — TELEPHONE (OUTPATIENT)
Dept: CARDIOLOGY | Facility: CLINIC | Age: 28
End: 2018-07-12

## 2018-07-16 ENCOUNTER — OFFICE VISIT (OUTPATIENT)
Dept: CARDIOLOGY | Facility: CLINIC | Age: 28
End: 2018-07-16

## 2018-07-16 VITALS
WEIGHT: 315 LBS | SYSTOLIC BLOOD PRESSURE: 94 MMHG | HEIGHT: 69 IN | HEART RATE: 76 BPM | DIASTOLIC BLOOD PRESSURE: 62 MMHG | BODY MASS INDEX: 46.65 KG/M2

## 2018-07-16 DIAGNOSIS — I42.0 NONISCHEMIC CONGESTIVE CARDIOMYOPATHY (HCC): Primary | ICD-10-CM

## 2018-07-16 DIAGNOSIS — E66.01 MORBID OBESITY (HCC): ICD-10-CM

## 2018-07-16 DIAGNOSIS — I50.22 CHRONIC SYSTOLIC CONGESTIVE HEART FAILURE (HCC): ICD-10-CM

## 2018-07-16 PROCEDURE — 93283 PRGRMG EVAL IMPLANTABLE DFB: CPT | Performed by: INTERNAL MEDICINE

## 2018-07-16 PROCEDURE — 99213 OFFICE O/P EST LOW 20 MIN: CPT | Performed by: INTERNAL MEDICINE

## 2018-07-16 NOTE — PROGRESS NOTES
Subjective:     Encounter Date:07/16/2018      Patient ID: Evan Gannon is a 27 y.o. male.    PCP: Barbara Mills MD    Chief Complaint: Cardiomyopathy; Congestive Heart Failure; and Hypertension    Problem List:  1. Acute on chronic systolic (congestive) heart failure/nonischemic cardiomyopathy  a. Echocardiogram 4/28/14: LVEF 0.20-0.25, mild MR, moderate TR   b. Echocardiogram 11/7/14: LVEF 0.20-0.25, all valves are structurally and functionally normal with no hemodynamically evident valve disease   c. Echocardiogram, 2/7/2017: LVEF 20%, RV mildly dilated, cardiac valves anatomically and functionally normal, LV moderately dilated  d. Echocardiogram, 7/13/2017: LVEF 20%., Moderately reduced RV systolic function noted, mild MR  e. [facility], 7/13/2017: LVEF 10%; left ventricular end-diastolic pressure of 30 with normal coronary arteries  f. [facility] hospitalization 9/5/17-9/8/17 for CHF exacerbation  g. [facility] 10/16/17-10/19/17 for CHF exacerbation and was referred to [facility] for transplant but has to lose 200 pounds to qualify  h. Echocardiogram 10/16/17: LVEF 0.15, LV severely dilated, RV mildly dilated, mildly reduced RV systolic function noted  i. Biotronik VVI ICD implantation 8/15/17 by Dr. Richmond, no DFTs done due to patient's issues with hypoxemia with even minimal sedation as well as acute on chronic CHF  j. [facility] 2/11/18-2/14/18 for CHF  k. [facility] 2/18/18 with CP/SOB, elevated troponins, acceptable ECG, CCS class I chest discomfort/NYHA class II dyspnea/CHF  2. Hypertension  3. Morbid obesity: BMI 60  4. Obstructive sleep apnea, noncompliant with CPAP  5. Personal history of noncompliance with medical treatment  6. Microcytic anemia   7. Severe psoriasis  8. [facility] ED 12/12/17 for chest pain with mildly elevated troponin 0.08, positive d-dimer, negative CTA of the chest, no evidence of ACS, PE, or dissection with discharge within 5 hours  9. Surgical history  a. Left heart catheterization  b. ICD  implantation    History of Present Illness  Patient here today for follow-up of non-ischemic cardiomyopathy and chronic systolic congestive heart failure. He was recently admitted to the hospital for acute exacerbation just last month. States that he had slow progressive build up of fluid. He has not been seeing [facility] for transplant as he was told to come back when he had met their weight requirement. He is currently considering bariatric surgery for weight loss. No chest pain. No other current cardiac complaints.  Patient is asking about cardiac clearance to start Cosentyx.    No Known Allergies      Current Outpatient Prescriptions:   •  albuterol (PROVENTIL HFA;VENTOLIN HFA) 108 (90 Base) MCG/ACT inhaler, Inhale 2 puffs Every 6 (Six) Hours As Needed for Wheezing., Disp: 1 inhaler, Rfl: 11  •  aspirin 81 MG EC tablet, Take 1 tablet by mouth Daily., Disp: 365 tablet, Rfl: 0  •  carvedilol (COREG) 25 MG tablet, Take 1 tablet by mouth 2 (Two) Times a Day With Meals., Disp: 60 tablet, Rfl: 5  •  metOLazone (ZAROXOLYN) 5 MG tablet, Take 1 tablet by mouth 3 (Three) Times a Week. (Patient taking differently: Take 5 mg by mouth 3 (Three) Times a Week. [facility]), Disp: 12 tablet, Rfl: 5  •  sacubitril-valsartan (ENTRESTO) 49-51 MG tablet, Take 1 tablet by mouth Every 12 (Twelve) Hours., Disp: 60 tablet, Rfl: 5  •  spironolactone (ALDACTONE) 25 MG tablet, Take 1 tablet by mouth Daily., Disp: 30 tablet, Rfl: 5  •  torsemide (DEMADEX) 20 MG tablet, Take 3 tablets by mouth Daily., Disp: 90 tablet, Rfl: 5    The following portions of the patient's history were reviewed and updated as appropriate: allergies, current medications, past family history, past medical history, past social history, past surgical history and problem list.    Review of Systems   Constitution: Negative.   Cardiovascular: Positive for dyspnea on exertion and leg swelling.   Respiratory: Negative.    Hematologic/Lymphatic: Negative for bleeding problem. Does not  "bruise/bleed easily.   Skin: Negative for rash.   Musculoskeletal: Negative for muscle weakness and myalgias.   Gastrointestinal: Negative for heartburn, nausea and vomiting.   Neurological: Negative.           Objective:     BP 94/62 (BP Location: Left arm, Patient Position: Sitting)   Pulse 76   Ht 175.3 cm (69\")   Wt (!) 176 kg (388 lb 12.8 oz)   BMI 57.42 kg/m²        Physical Exam   Constitutional: He is oriented to person, place, and time. He appears well-developed and well-nourished.   Morbidly obese   Neck: No JVD present. No tracheal deviation present.   Cardiovascular: Normal rate, regular rhythm and normal heart sounds.  Exam reveals no friction rub.    No murmur heard.  Pulmonary/Chest: Effort normal and breath sounds normal. No respiratory distress.   Abdominal: Soft. Bowel sounds are normal. There is no tenderness.   Musculoskeletal: He exhibits no edema or deformity.   Neurological: He is alert and oriented to person, place, and time.       Lab Review:    Lab Results   Component Value Date    GLUCOSE 104 (H) 07/03/2018    CALCIUM 9.5 07/03/2018     07/03/2018    K 3.9 07/03/2018    CO2 30.0 07/03/2018    CL 98 (L) 07/03/2018    BUN 13 07/03/2018    CREATININE 1.06 07/03/2018    EGFRIFAFRI 103 07/03/2018    BCR 12.3 07/03/2018    ANIONGAP 11.0 07/03/2018     Lab Results   Component Value Date    CHOL 101 07/14/2017    TRIG 97 07/14/2017    HDL 24 (L) 07/14/2017    AST 18 06/24/2018    ALT 16 06/24/2018         Procedures    DEVICE INTERROGATION:  Biotronik ICD with normal function.  None SVT episodes 1 VT.  Battery voltage of 3.12 V.  Charge time 9.4 seconds.  No changes made.            Assessment:   Evan was seen today for cardiomyopathy, congestive heart failure and hypertension.    Diagnoses and all orders for this visit:    Nonischemic congestive cardiomyopathy (CMS/HCC)    Chronic systolic congestive heart failure (CMS/HCC)    Morbid obesity (CMS/HCC)        Impression  1. Nonischemic " cardiomyopathy.  Currently not a candidate for transplant secondary to morbid obesity.  Recent hospitalization, currently now back to baseline.  2. Chronic systolic congestive heart failure.  Currently on diuretic therapy.  3. Morbid obesity, considering bariatric surgery.    Plan:  1. From cardiac standpoint may start Cosentyx.  2. Continue metolazone, Entresto, Aldactone, torsemide, and coreg. for chronic heart failure.  3. Encouraged patient to pursue possible bariatric surgery.  4. Revisit in 6 months with device check, or sooner as needed.    Scribed for Balbir Olsen MD by SONAM Reynolds. 7/16/2018  4:33 PM    I, Balbir Olsen MD, personally performed the services described in this documentation as scribed by the above individual in my presence, and it is both accurate and complete    Dictated with Tulio.

## 2018-07-17 ENCOUNTER — EPISODE CHANGES (OUTPATIENT)
Dept: CASE MANAGEMENT | Facility: OTHER | Age: 28
End: 2018-07-17

## 2018-07-25 ENCOUNTER — PATIENT OUTREACH (OUTPATIENT)
Dept: CASE MANAGEMENT | Facility: OTHER | Age: 28
End: 2018-07-25

## 2018-07-25 NOTE — OUTREACH NOTE
Care Management Plan 7/25/2018   Lifestyle Goals Routine follow-up with doctor(s)   Barriers Disease education   Self Management Medication Adherence   Annual Wellness Visit:  Patient Will Schedule   Care Gaps Addressed Other (See Comment)   Care Gaps Addressed Medicare Annual Wellness Visit   Specific Disease Process Teaching COPD;Heart Failure;Hypertension   Does patient have depression diagnosis? No   Advanced Directives: Not Interested At This Time   Ed Visits past 12 months: 3 or more   Hospitalizations past 12 months 3 or more     The main concerns and/or symptoms the patient would like to address are: Talked with patient. Patient lives with parents and brother; independent with ADL's; meal preparation and receives assistance with transportation. Patient states to be compliant with medications but states to have difficulty at times with transportation to medical appointments  due to family working.Patient does not have a drivers license.  Patient states to monitor blood pressure and daily weights. Reports no difficulty with chest pain; SOB; sleeping or appetite.     Education/instruction provided by Care Coordinator: Reviewed with patient benefits of compliance with medical appointments;  24/7 Nurse Line Telephone number; CA contact information; Advance Directives; Medicare Annual Wellness Visit; My Chart; transportation information ([employer]); education regarding HTN; CHF; healthy diet and Care Advising program. Patient verbalized understanding. Per patient permission. CA contacted [employer] and information received and relayed to patient. Patient can call [phone] to schedule a ride. Patient verbalized understanding. No further questions or concerns voiced at this time.     Follow Up Outreach Due: Follow up as needed.     Maria R Fuentes RN

## 2018-08-09 ENCOUNTER — APPOINTMENT (OUTPATIENT)
Dept: GENERAL RADIOLOGY | Facility: HOSPITAL | Age: 28
End: 2018-08-09

## 2018-08-09 ENCOUNTER — HOSPITAL ENCOUNTER (EMERGENCY)
Facility: HOSPITAL | Age: 28
Discharge: HOME | End: 2018-08-10
Attending: EMERGENCY MEDICINE | Admitting: EMERGENCY MEDICINE

## 2018-08-09 DIAGNOSIS — R06.09 DYSPNEA ON EXERTION: Primary | ICD-10-CM

## 2018-08-09 DIAGNOSIS — R04.0 EPISTAXIS: ICD-10-CM

## 2018-08-09 LAB
ALBUMIN SERPL-MCNC: 3.89 G/DL (ref 3.2–4.8)
ALBUMIN/GLOB SERPL: 1 G/DL (ref 1.5–2.5)
ALP SERPL-CCNC: 79 U/L (ref 25–100)
ALT SERPL W P-5'-P-CCNC: 21 U/L (ref 7–40)
ANION GAP SERPL CALCULATED.3IONS-SCNC: 17 MMOL/L (ref 3–11)
AST SERPL-CCNC: 24 U/L (ref 0–33)
BASOPHILS # BLD AUTO: 0.01 10*3/MM3 (ref 0–0.2)
BASOPHILS NFR BLD AUTO: 0.2 % (ref 0–1)
BILIRUB SERPL-MCNC: 0.5 MG/DL (ref 0.3–1.2)
BNP SERPL-MCNC: 243 PG/ML (ref 0–100)
BUN BLD-MCNC: 9 MG/DL (ref 9–23)
BUN/CREAT SERPL: 9.2 (ref 7–25)
CALCIUM SPEC-SCNC: 9.1 MG/DL (ref 8.7–10.4)
CHLORIDE SERPL-SCNC: 101 MMOL/L (ref 99–109)
CO2 SERPL-SCNC: 27 MMOL/L (ref 20–31)
CREAT BLD-MCNC: 0.98 MG/DL (ref 0.6–1.3)
DEPRECATED RDW RBC AUTO: 38.9 FL (ref 37–54)
EOSINOPHIL # BLD AUTO: 0.11 10*3/MM3 (ref 0–0.3)
EOSINOPHIL NFR BLD AUTO: 1.8 % (ref 0–3)
ERYTHROCYTE [DISTWIDTH] IN BLOOD BY AUTOMATED COUNT: 13.9 % (ref 11.3–14.5)
GFR SERPL CREATININE-BSD FRML MDRD: 113 ML/MIN/1.73
GLOBULIN UR ELPH-MCNC: 3.7 GM/DL
GLUCOSE BLD-MCNC: 112 MG/DL (ref 70–100)
HCT VFR BLD AUTO: 43.8 % (ref 38.9–50.9)
HGB BLD-MCNC: 14.6 G/DL (ref 13.1–17.5)
HOLD SPECIMEN: NORMAL
HOLD SPECIMEN: NORMAL
IMM GRANULOCYTES # BLD: 0.02 10*3/MM3 (ref 0–0.03)
IMM GRANULOCYTES NFR BLD: 0.3 % (ref 0–0.6)
LYMPHOCYTES # BLD AUTO: 1.52 10*3/MM3 (ref 0.6–4.8)
LYMPHOCYTES NFR BLD AUTO: 24.4 % (ref 24–44)
MCH RBC QN AUTO: 26 PG (ref 27–31)
MCHC RBC AUTO-ENTMCNC: 33.3 G/DL (ref 32–36)
MCV RBC AUTO: 77.9 FL (ref 80–99)
MONOCYTES # BLD AUTO: 0.41 10*3/MM3 (ref 0–1)
MONOCYTES NFR BLD AUTO: 6.6 % (ref 0–12)
NEUTROPHILS # BLD AUTO: 4.18 10*3/MM3 (ref 1.5–8.3)
NEUTROPHILS NFR BLD AUTO: 67 % (ref 41–71)
PLATELET # BLD AUTO: 226 10*3/MM3 (ref 150–450)
PMV BLD AUTO: 10.6 FL (ref 6–12)
POTASSIUM BLD-SCNC: 3.1 MMOL/L (ref 3.5–5.5)
PROT SERPL-MCNC: 7.6 G/DL (ref 5.7–8.2)
RBC # BLD AUTO: 5.62 10*6/MM3 (ref 4.2–5.76)
SODIUM BLD-SCNC: 145 MMOL/L (ref 132–146)
TROPONIN I SERPL-MCNC: 0.09 NG/ML (ref 0–0.07)
WBC NRBC COR # BLD: 6.23 10*3/MM3 (ref 3.5–10.8)
WHOLE BLOOD HOLD SPECIMEN: NORMAL
WHOLE BLOOD HOLD SPECIMEN: NORMAL

## 2018-08-09 PROCEDURE — 84484 ASSAY OF TROPONIN QUANT: CPT

## 2018-08-09 PROCEDURE — 93005 ELECTROCARDIOGRAM TRACING: CPT

## 2018-08-09 PROCEDURE — 80053 COMPREHEN METABOLIC PANEL: CPT | Performed by: EMERGENCY MEDICINE

## 2018-08-09 PROCEDURE — 93005 ELECTROCARDIOGRAM TRACING: CPT | Performed by: EMERGENCY MEDICINE

## 2018-08-09 PROCEDURE — 85025 COMPLETE CBC W/AUTO DIFF WBC: CPT | Performed by: EMERGENCY MEDICINE

## 2018-08-09 PROCEDURE — 99284 EMERGENCY DEPT VISIT MOD MDM: CPT

## 2018-08-09 PROCEDURE — 71045 X-RAY EXAM CHEST 1 VIEW: CPT

## 2018-08-09 PROCEDURE — 83880 ASSAY OF NATRIURETIC PEPTIDE: CPT | Performed by: EMERGENCY MEDICINE

## 2018-08-09 RX ORDER — SODIUM CHLORIDE 0.9 % (FLUSH) 0.9 %
10 SYRINGE (ML) INJECTION AS NEEDED
Status: DISCONTINUED | OUTPATIENT
Start: 2018-08-09 | End: 2018-08-10

## 2018-08-10 ENCOUNTER — EPISODE CHANGES (OUTPATIENT)
Dept: CASE MANAGEMENT | Facility: OTHER | Age: 28
End: 2018-08-10

## 2018-08-10 ENCOUNTER — PATIENT OUTREACH (OUTPATIENT)
Dept: CASE MANAGEMENT | Facility: OTHER | Age: 28
End: 2018-08-10

## 2018-08-10 VITALS
SYSTOLIC BLOOD PRESSURE: 113 MMHG | WEIGHT: 315 LBS | BODY MASS INDEX: 46.65 KG/M2 | OXYGEN SATURATION: 99 % | DIASTOLIC BLOOD PRESSURE: 88 MMHG | HEIGHT: 69 IN | HEART RATE: 90 BPM | TEMPERATURE: 98.1 F | RESPIRATION RATE: 22 BRPM

## 2018-08-10 NOTE — OUTREACH NOTE
Talked with patient. Discuss 8/9/18 ED visit regarding dyspnea. Patient states to be compliant with ED recommendations and symptoms have improved.[facility] and will contact [employer] for transportation.   Patient reports no SOB; chest pain;or  rapid heart beat. He does report swelling to lower extremities. Reviewed with patient [employer] phone number;  benefits of elevating legs; no added salt diet and daily weights.  Patient verbalized understanding and will contact [employer] today to schedule transportation. Patient states to have and be compliant with medications. No further questions or concerns voiced at this time.

## 2018-08-10 NOTE — ED PROVIDER NOTES
Subjective   Mr. Evan Gannon is a 27-year-old male presenting to the emergency department with complaints of shortness of breath beginning two days ago. The patient is morbidly obese and has a hx CHF for 5 years. He states that he has had many previous episodes of fluid backup in his lungs and that these symptoms are consistent with those previous instances. He also complains of a new symptom: blood clots produced via his nose when sneezing. He denies any recent facial pressure or illness. In addition, the patient states that he is generally more fatigued and slower over the last 48 hours. He had an echocardiogram on October 16th and his EF was recorded at 15%. His cardiologist is Dr. Olsen, and his PCP, Dr. Mills, referred him to the ED today. He is currently on Torsemide and 2L of oxygen at home at night, and denies currently taking immunosuppressants. He has a hx of MI, psoriasis, and HTN. There are no other acute complaints at this time.        History provided by:  Patient  Shortness of Breath   Severity:  Moderate  Onset quality:  Gradual  Duration:  2 days  Timing:  Constant  Progression:  Worsening  Chronicity:  Recurrent  Relieved by:  Nothing  Worsened by:  Nothing  Ineffective treatments:  Oxygen  Associated symptoms: cough and sputum production    Risk factors: obesity        Review of Systems   HENT: Negative for sinus pressure.    Respiratory: Positive for cough, sputum production and shortness of breath.    All other systems reviewed and are negative.      Past Medical History:   Diagnosis Date   • CHF (congestive heart failure) (CMS/HCC)    • CPAP (continuous positive airway pressure) dependence    • Hypertension    • Morbid obesity (CMS/HCC)    • Myocardial infarction    • On home O2     2l at bedtime and prn   • Plaque psoriasis    • Pneumonia    • Psoriasis    • Sleep apnea    • Wears glasses        No Known Allergies    Past Surgical History:   Procedure Laterality Date   • ADENOIDECTOMY      • CARDIAC CATHETERIZATION N/A 7/13/2017    Procedure: Left Heart Cath;  Surgeon: Balbir Olsen MD;  Location:  [facility] CATH INVASIVE LOCATION;  Service:    • CARDIAC DEFIBRILLATOR PLACEMENT  08/2017   • CARDIAC ELECTROPHYSIOLOGY PROCEDURE N/A 8/15/2017    Procedure: VVI ICD Implant;  Surgeon: Nathanael Richmond DO;  Location:  [facility] EP INVASIVE LOCATION;  Service:    • INTERNAL CARDIAC DEFIBRILLATOR INSERTION Left 2017   • NOSE SURGERY     • OTHER SURGICAL HISTORY      ultra light therapy   • SINUS SURGERY     • TONSILLECTOMY         Family History   Problem Relation Age of Onset   • Diabetes Father    • Diabetes Paternal Grandmother    • Diabetes Maternal Grandfather        Social History     Social History   • Marital status: Single   • Number of children: 0     Occupational History   • disabled      Social History Main Topics   • Smoking status: Never Smoker   • Smokeless tobacco: Never Used   • Alcohol use No   • Drug use: No   • Sexual activity: Defer     Other Topics Concern   • Not on file     Social History Narrative    Caffeine use: some soda.    Patient lives with parents             Objective   Physical Exam   Constitutional: He is oriented to person, place, and time. He appears well-developed and well-nourished. No distress.   Patient is a morbidly obese male.   HENT:   Head: Normocephalic and atraumatic.   Nose: Nose normal.   Posterior pharynx normal, no dried blood or active bleeding.   Eyes: Conjunctivae are normal. No scleral icterus.   Neck: Normal range of motion. Neck supple.   Cardiovascular: Normal rate and regular rhythm.    No murmur heard.  Heart sounds distant secondary to body habitus and CHF.   Pulmonary/Chest: Effort normal and breath sounds normal. No respiratory distress.   Breath sounds clear bilaterally. Patient is sitting on bed and appears to be in no respiratory distress at this time.   Musculoskeletal: Normal range of motion.   Neurological: He is alert and oriented to person,  place, and time.   Skin: Skin is warm and dry.   Psoriatic lesions diffusely over body.   Psychiatric: He has a normal mood and affect. His behavior is normal.   Nursing note and vitals reviewed.      Procedures         ED Course     Recent Results (from the past 24 hour(s))   Comprehensive Metabolic Panel    Collection Time: 08/09/18 10:32 PM   Result Value Ref Range    Glucose 112 (H) 70 - 100 mg/dL    BUN 9 9 - 23 mg/dL    Creatinine 0.98 0.60 - 1.30 mg/dL    Sodium 145 132 - 146 mmol/L    Potassium 3.1 (L) 3.5 - 5.5 mmol/L    Chloride 101 99 - 109 mmol/L    CO2 27.0 20.0 - 31.0 mmol/L    Calcium 9.1 8.7 - 10.4 mg/dL    Total Protein 7.6 5.7 - 8.2 g/dL    Albumin 3.89 3.20 - 4.80 g/dL    ALT (SGPT) 21 7 - 40 U/L    AST (SGOT) 24 0 - 33 U/L    Alkaline Phosphatase 79 25 - 100 U/L    Total Bilirubin 0.5 0.3 - 1.2 mg/dL    eGFR  African Amer 113 >60 mL/min/1.73    Globulin 3.7 gm/dL    A/G Ratio 1.0 (L) 1.5 - 2.5 g/dL    BUN/Creatinine Ratio 9.2 7.0 - 25.0    Anion Gap 17.0 (H) 3.0 - 11.0 mmol/L   BNP    Collection Time: 08/09/18 10:32 PM   Result Value Ref Range    .0 (H) 0.0 - 100.0 pg/mL   CBC Auto Differential    Collection Time: 08/09/18 10:32 PM   Result Value Ref Range    WBC 6.23 3.50 - 10.80 10*3/mm3    RBC 5.62 4.20 - 5.76 10*6/mm3    Hemoglobin 14.6 13.1 - 17.5 g/dL    Hematocrit 43.8 38.9 - 50.9 %    MCV 77.9 (L) 80.0 - 99.0 fL    MCH 26.0 (L) 27.0 - 31.0 pg    MCHC 33.3 32.0 - 36.0 g/dL    RDW 13.9 11.3 - 14.5 %    RDW-SD 38.9 37.0 - 54.0 fl    MPV 10.6 6.0 - 12.0 fL    Platelets 226 150 - 450 10*3/mm3    Neutrophil % 67.0 41.0 - 71.0 %    Lymphocyte % 24.4 24.0 - 44.0 %    Monocyte % 6.6 0.0 - 12.0 %    Eosinophil % 1.8 0.0 - 3.0 %    Basophil % 0.2 0.0 - 1.0 %    Immature Grans % 0.3 0.0 - 0.6 %    Neutrophils, Absolute 4.18 1.50 - 8.30 10*3/mm3    Lymphocytes, Absolute 1.52 0.60 - 4.80 10*3/mm3    Monocytes, Absolute 0.41 0.00 - 1.00 10*3/mm3    Eosinophils, Absolute 0.11 0.00 - 0.30 10*3/mm3  "   Basophils, Absolute 0.01 0.00 - 0.20 10*3/mm3    Immature Grans, Absolute 0.02 0.00 - 0.03 10*3/mm3   POC Troponin, Rapid    Collection Time: 08/09/18 10:38 PM   Result Value Ref Range    Troponin I 0.09 (H) 0.00 - 0.07 ng/mL     Note: In addition to lab results from this visit, the labs listed above may include labs taken at another facility or during a different encounter within the last 24 hours. Please correlate lab times with ED admission and discharge times for further clarification of the services performed during this visit.    XR Chest 1 View   Final Result     Left chest wall/AICD with moderate enlargement of the cardiac silhouette.  No    evidence of congestive failure.  The lungs are clear.      THIS DOCUMENT HAS BEEN ELECTRONICALLY SIGNED BY ERNIE WALTERS MD        Vitals:    08/09/18 2217 08/09/18 2250   BP: 118/68    Pulse:  90   Resp: 22    Temp:  98.1 °F (36.7 °C)   TempSrc:  Oral   SpO2: 97%    Weight: (!) 176 kg (387 lb)    Height: 175.3 cm (69\")      Medications   sodium chloride 0.9 % flush 10 mL (not administered)     ECG/EMG Results (last 24 hours)     Procedure Component Value Units Date/Time    ECG 12 Lead [[id number]] Collected:  08/09/18 2224     Updated:  08/09/18 2224                        MDM    Final diagnoses:   Dyspnea on exertion   Epistaxis       Documentation assistance provided by josé Stephenson.  Information recorded by the scribstacey was done at my direction and has been verified and validated by me.     Brandon Stephenson  08/09/18 2318       Zeb Riley DO  08/11/18 1414    "

## 2018-08-10 NOTE — ED PROVIDER NOTES
Subjective   Hx CHF for 5 years  Consistent SOA with past experience (attributed to fluid)  This began 2 days ago  Atorcimide? For fluid  Had blod clots come out of nose, spit up blood  Oct 16 EF 15%  Cardiac Dr. Olsen and PCP Dr. Mills? Referred to ED today  Denies recent facial pressure or sickness  Slightly slower, less ambulatory over last 48 hours  Not on any immunosuppresants    Mr. Evan Gannon is a 27-year-old male presenting to the emergency department with complaints of shortness of breath. The patient is morbidly obese and has a hx CHF for 5 years. He states that he has had many previous episodes of fluid backup in his lungs and that these symptoms are consistent with those previous instances. He also complains of a new symptom: blood clots produced via mouth and sinuses with coughing and sneezing. In addition, the patient states that he is generally more fatigued and slower over the He had an echocardiogram on October 16th and his EF was recorded at 15%. His cardiologist is Dr. Olsen, and his PCP, Dr. Mills, referred him to the ED today.         History provided by:  Patient  Shortness of Breath       Review of Systems   Respiratory: Positive for shortness of breath.        Past Medical History:   Diagnosis Date   • CHF (congestive heart failure) (CMS/HCC)    • CPAP (continuous positive airway pressure) dependence    • Hypertension    • Morbid obesity (CMS/HCC)    • Myocardial infarction    • On home O2     2l at bedtime and prn   • Plaque psoriasis    • Pneumonia    • Psoriasis    • Sleep apnea    • Wears glasses        No Known Allergies    Past Surgical History:   Procedure Laterality Date   • ADENOIDECTOMY     • CARDIAC CATHETERIZATION N/A 7/13/2017    Procedure: Left Heart Cath;  Surgeon: Balbir Olsen MD;  Location: [facility] CATH INVASIVE LOCATION;  Service:    • CARDIAC DEFIBRILLATOR PLACEMENT  08/2017   • CARDIAC ELECTROPHYSIOLOGY PROCEDURE N/A 8/15/2017    Procedure: VVI ICD  Implant;  Surgeon: Nathanael Richmond DO;  Location: [facility] INVASIVE LOC[facility];  Service:    • INTERNAL CARDIAC DEFIBRILLATOR INSERTION Left 2017   • NOSE SURGERY     • OTHER SURGICAL HISTORY      ultra light therapy   • SINUS SURGERY     • TONSILLECTOMY         Family History   Problem Relation Age of Onset   • Diabetes Father    • Diabetes Paternal Grandmother    • Diabetes Maternal Grandfather        Social History     Social History   • Marital status: Single   • Number of children: 0     Occupational History   • disabled      Social History Main Topics   • Smoking status: Never Smoker   • Smokeless tobacco: Never Used   • Alcohol use No   • Drug use: No   • Sexual activity: Defer     Other Topics Concern   • Not on file     Social History Narrative    Caffeine use: some soda.    Patient lives with parents             Objective   Physical Exam   Constitutional: He is oriented to person, place, and time. He appears well-developed and well-nourished. No distress.   Patient is a morbidly obese male.   HENT:   Head: Normocephalic and atraumatic.   Nose: Nose normal.   Eyes: Conjunctivae are normal. No scleral icterus.   Neck: Normal range of motion. Neck supple.   Musculoskeletal: Normal range of motion.   Neurological: He is alert and oriented to person, place, and time.   Psychiatric: He has a normal mood and affect. His behavior is normal.   Nursing note and vitals reviewed.      Procedures         ED Course     No results found for this or any previous visit (from the past 24 hour(s)).  Note: In addition to lab results from this visit, the labs listed above may include labs taken at another facility or during a different encounter within the last 24 hours. Please correlate lab times with ED admission and discharge times for further clarification of the services performed during this visit.    XR Chest 1 View    (Results Pending)     Vitals:    08/09/18 2217   BP: 118/68   Resp: 22   SpO2: 97%   Weight: (!) 176 kg  "(387 lb)   Height: 175.3 cm (69\")     Medications   sodium chloride 0.9 % flush 10 mL (not administered)     ECG/EMG Results (last 24 hours)     Procedure Component Value Units Date/Time    ECG 12 Lead [[id number]] Collected:  08/09/18 2224     Updated:  08/09/18 2224                        [facility]    Final diagnoses:   None       Documentation assistance provided by josé Stephenson.  Information recorded by the scribe was done at my direction and has been verified and validated by me.  "

## 2018-08-20 ENCOUNTER — CLINICAL SUPPORT NO REQUIREMENTS (OUTPATIENT)
Dept: CARDIOLOGY | Facility: CLINIC | Age: 28
End: 2018-08-20

## 2018-08-20 DIAGNOSIS — I42.0 NONISCHEMIC CONGESTIVE CARDIOMYOPATHY (HCC): ICD-10-CM

## 2018-08-20 DIAGNOSIS — I50.23 ACUTE ON CHRONIC SYSTOLIC CONGESTIVE HEART FAILURE, NYHA CLASS 4 (HCC): ICD-10-CM

## 2018-08-20 PROCEDURE — 93295 DEV INTERROG REMOTE 1/2/MLT: CPT | Performed by: INTERNAL MEDICINE

## 2018-08-20 PROCEDURE — 93296 REM INTERROG EVL PM/IDS: CPT | Performed by: INTERNAL MEDICINE

## 2018-08-29 ENCOUNTER — TELEPHONE (OUTPATIENT)
Dept: CARDIOLOGY | Facility: HOSPITAL | Age: 28
End: 2018-08-29

## 2018-08-29 ENCOUNTER — APPOINTMENT (OUTPATIENT)
Dept: GENERAL RADIOLOGY | Facility: HOSPITAL | Age: 28
End: 2018-08-29

## 2018-08-29 ENCOUNTER — HOSPITAL ENCOUNTER (EMERGENCY)
Facility: HOSPITAL | Age: 28
Discharge: HOME | End: 2018-08-29
Attending: EMERGENCY MEDICINE | Admitting: EMERGENCY MEDICINE

## 2018-08-29 VITALS
TEMPERATURE: 98.4 F | BODY MASS INDEX: 46.65 KG/M2 | OXYGEN SATURATION: 97 % | HEIGHT: 69 IN | WEIGHT: 315 LBS | DIASTOLIC BLOOD PRESSURE: 71 MMHG | RESPIRATION RATE: 20 BRPM | SYSTOLIC BLOOD PRESSURE: 109 MMHG | HEART RATE: 95 BPM

## 2018-08-29 DIAGNOSIS — M10.9 ACUTE GOUT OF MULTIPLE SITES, UNSPECIFIED CAUSE: Primary | ICD-10-CM

## 2018-08-29 DIAGNOSIS — E87.6 HYPOKALEMIA: ICD-10-CM

## 2018-08-29 LAB
ALBUMIN SERPL-MCNC: 4.24 G/DL (ref 3.2–4.8)
ALBUMIN/GLOB SERPL: 1.1 G/DL (ref 1.5–2.5)
ALP SERPL-CCNC: 88 U/L (ref 25–100)
ALT SERPL W P-5'-P-CCNC: 16 U/L (ref 7–40)
ANION GAP SERPL CALCULATED.3IONS-SCNC: 12 MMOL/L (ref 3–11)
AST SERPL-CCNC: 19 U/L (ref 0–33)
BASOPHILS # BLD AUTO: 0.02 10*3/MM3 (ref 0–0.2)
BASOPHILS NFR BLD AUTO: 0.3 % (ref 0–1)
BILIRUB SERPL-MCNC: 0.8 MG/DL (ref 0.3–1.2)
BUN BLD-MCNC: 16 MG/DL (ref 9–23)
BUN/CREAT SERPL: 16 (ref 7–25)
CALCIUM SPEC-SCNC: 9.3 MG/DL (ref 8.7–10.4)
CHLORIDE SERPL-SCNC: 94 MMOL/L (ref 99–109)
CO2 SERPL-SCNC: 32 MMOL/L (ref 20–31)
CREAT BLD-MCNC: 1 MG/DL (ref 0.6–1.3)
CRP SERPL-MCNC: 3.36 MG/DL (ref 0–1)
DEPRECATED RDW RBC AUTO: 41.6 FL (ref 37–54)
EOSINOPHIL # BLD AUTO: 0.04 10*3/MM3 (ref 0–0.3)
EOSINOPHIL NFR BLD AUTO: 0.5 % (ref 0–3)
ERYTHROCYTE [DISTWIDTH] IN BLOOD BY AUTOMATED COUNT: 14.4 % (ref 11.3–14.5)
ERYTHROCYTE [SEDIMENTATION RATE] IN BLOOD: 59 MM/HR (ref 0–15)
GFR SERPL CREATININE-BSD FRML MDRD: 110 ML/MIN/1.73
GLOBULIN UR ELPH-MCNC: 4 GM/DL
GLUCOSE BLD-MCNC: 90 MG/DL (ref 70–100)
HCT VFR BLD AUTO: 47.4 % (ref 38.9–50.9)
HGB BLD-MCNC: 15.7 G/DL (ref 13.1–17.5)
IMM GRANULOCYTES # BLD: 0.02 10*3/MM3 (ref 0–0.03)
IMM GRANULOCYTES NFR BLD: 0.3 % (ref 0–0.6)
LYMPHOCYTES # BLD AUTO: 1.72 10*3/MM3 (ref 0.6–4.8)
LYMPHOCYTES NFR BLD AUTO: 21.6 % (ref 24–44)
MCH RBC QN AUTO: 26.2 PG (ref 27–31)
MCHC RBC AUTO-ENTMCNC: 33.1 G/DL (ref 32–36)
MCV RBC AUTO: 79.1 FL (ref 80–99)
MONOCYTES # BLD AUTO: 0.7 10*3/MM3 (ref 0–1)
MONOCYTES NFR BLD AUTO: 8.8 % (ref 0–12)
NEUTROPHILS # BLD AUTO: 5.46 10*3/MM3 (ref 1.5–8.3)
NEUTROPHILS NFR BLD AUTO: 68.5 % (ref 41–71)
PLATELET # BLD AUTO: 261 10*3/MM3 (ref 150–450)
PMV BLD AUTO: 10.5 FL (ref 6–12)
POTASSIUM BLD-SCNC: 3.1 MMOL/L (ref 3.5–5.5)
PROT SERPL-MCNC: 8.2 G/DL (ref 5.7–8.2)
RBC # BLD AUTO: 5.99 10*6/MM3 (ref 4.2–5.76)
SODIUM BLD-SCNC: 138 MMOL/L (ref 132–146)
URATE SERPL-MCNC: 14.4 MG/DL (ref 3.7–9.2)
WBC NRBC COR # BLD: 7.96 10*3/MM3 (ref 3.5–10.8)

## 2018-08-29 PROCEDURE — 73610 X-RAY EXAM OF ANKLE: CPT

## 2018-08-29 PROCEDURE — 84550 ASSAY OF BLOOD/URIC ACID: CPT | Performed by: EMERGENCY MEDICINE

## 2018-08-29 PROCEDURE — 86140 C-REACTIVE PROTEIN: CPT | Performed by: EMERGENCY MEDICINE

## 2018-08-29 PROCEDURE — 85652 RBC SED RATE AUTOMATED: CPT | Performed by: EMERGENCY MEDICINE

## 2018-08-29 PROCEDURE — 99284 EMERGENCY DEPT VISIT MOD MDM: CPT

## 2018-08-29 PROCEDURE — 73630 X-RAY EXAM OF FOOT: CPT

## 2018-08-29 PROCEDURE — 80053 COMPREHEN METABOLIC PANEL: CPT | Performed by: EMERGENCY MEDICINE

## 2018-08-29 PROCEDURE — 85025 COMPLETE CBC W/AUTO DIFF WBC: CPT | Performed by: EMERGENCY MEDICINE

## 2018-08-29 RX ORDER — HYDROCODONE BITARTRATE AND ACETAMINOPHEN 5; 325 MG/1; MG/1
1 TABLET ORAL ONCE
Status: COMPLETED | OUTPATIENT
Start: 2018-08-29 | End: 2018-08-29

## 2018-08-29 RX ORDER — POTASSIUM CHLORIDE 750 MG/1
40 CAPSULE, EXTENDED RELEASE ORAL ONCE
Status: COMPLETED | OUTPATIENT
Start: 2018-08-29 | End: 2018-08-29

## 2018-08-29 RX ORDER — INDOMETHACIN 50 MG/1
50 CAPSULE ORAL
Qty: 30 CAPSULE | Refills: 0 | Status: SHIPPED | OUTPATIENT
Start: 2018-08-29 | End: 2018-09-10

## 2018-08-29 RX ADMIN — POTASSIUM CHLORIDE 40 MEQ: 750 CAPSULE, EXTENDED RELEASE ORAL at 20:39

## 2018-08-29 RX ADMIN — HYDROCODONE BITARTRATE AND ACETAMINOPHEN 1 TABLET: 5; 325 TABLET ORAL at 18:12

## 2018-08-29 NOTE — TELEPHONE ENCOUNTER
"        I called patient's phone number [phone] to follow up on his phone message, there was no answer, I left a voice Mail.  In regards to to \" the several messages\"  I have check with many people in the office and nobody had any messages from this patient.  Isabel Mckeon    Note: patient at the ED          ----- Message from Shobha Muniz sent at 8/29/2018  1:50 PM EDT -----  Regarding: Patient call  Contact: [phone]  Patient called says his feet/ ankles are swollen and he can't hardly walk. He states he has left several message and no one has called him back. I gave him a 8:45 appointment for tomorrow with Jignesh ( Marah villatoro) and told him someone would call him today.     "

## 2018-08-30 ENCOUNTER — EPISODE CHANGES (OUTPATIENT)
Dept: CASE MANAGEMENT | Facility: OTHER | Age: 28
End: 2018-08-30

## 2018-08-30 ENCOUNTER — OFFICE VISIT (OUTPATIENT)
Dept: CARDIOLOGY | Facility: HOSPITAL | Age: 28
End: 2018-08-30

## 2018-08-30 VITALS
TEMPERATURE: 97.9 F | RESPIRATION RATE: 24 BRPM | OXYGEN SATURATION: 99 % | BODY MASS INDEX: 56.09 KG/M2 | HEIGHT: 69 IN | SYSTOLIC BLOOD PRESSURE: 132 MMHG | HEART RATE: 49 BPM | DIASTOLIC BLOOD PRESSURE: 81 MMHG

## 2018-08-30 DIAGNOSIS — M10.9 ACUTE GOUT OF FOOT, UNSPECIFIED CAUSE, UNSPECIFIED LATERALITY: ICD-10-CM

## 2018-08-30 DIAGNOSIS — I50.22 CHRONIC SYSTOLIC HEART FAILURE (HCC): Primary | ICD-10-CM

## 2018-08-30 DIAGNOSIS — E66.01 MORBID OBESITY (HCC): ICD-10-CM

## 2018-08-30 DIAGNOSIS — I42.0 NONISCHEMIC CONGESTIVE CARDIOMYOPATHY (HCC): ICD-10-CM

## 2018-08-30 PROCEDURE — 99214 OFFICE O/P EST MOD 30 MIN: CPT | Performed by: NURSE PRACTITIONER

## 2018-08-30 RX ORDER — COLCHICINE 0.6 MG/1
0.6 CAPSULE ORAL DAILY
Qty: 4 CAPSULE | Refills: 0 | Status: SHIPPED | OUTPATIENT
Start: 2018-08-30 | End: 2018-09-10

## 2018-08-30 NOTE — PROGRESS NOTES
[facility]      Encounter Date:08/30/2018     Eavn Gannon  [address] [facility] [address]  [phone]    1990    Barbara Mills MD    Evan Gannon is a 27 y.o. male.      Subjective:     Chief Complaint:  Gout (pain wants pain meds)       HPI     27-year-old male with a history of nonischemic cardiomyopathy, chronic systolic heart failure.  Patient morbidly obese with BMI 56.  He has been evaluated by  for possible heart transplant or LVAD.  Was told he did not meet weight requirements and he is currently under consideration for bariatric surgery.  Recently given cardiac clearance by Dr. Olsen to start post Cosentyx for psoriasis.  Is been to the ER twice this month once for dyspnea and epistaxis, yesterday for acute gout flare.    Pt presents today c/o pain and requesting pain meds.  He was prescribed indomethacin which he has not taken.  Patient describes pain as burning feet and ankles.    She denies worsening dyspnea, worsening edema, chest pain, pressure, dizziness, palpitations    Patient Active Problem List    Diagnosis   • CHF exacerbation (CMS/HCC) [I50.9]   • Severe obstructive sleep apnea [G47.33]   • Painless hematuria [R31.9]     Overview Note:     Transient       • Plaque psoriasis [L40.0]   • CHF (congestive heart failure), NYHA class IV (CMS/HCC) [I50.9]   • Acute on chronic systolic heart failure (CMS/HCC) [I50.23]   • Nonischemic congestive cardiomyopathy (CMS/HCC) [I42.9]   • Personal history of noncompliance with medical treatment [Z91.19]   • Hypertension [I10]   • Morbid obesity (CMS/HCC) [E66.01]         Past Surgical History:   Procedure Laterality Date   • ADENOIDECTOMY     • CARDIAC CATHETERIZATION N/A 7/13/2017    Procedure: Left Heart Cath;  Surgeon: Balbir Olsen MD;  Location: [facility] INVASIVE LOCATION;  Service:    • CARDIAC DEFIBRILLATOR PLACEMENT  08/2017   • CARDIAC ELECTROPHYSIOLOGY PROCEDURE N/A 8/15/2017     Procedure: VVI ICD Implant;  Surgeon: Nathanael Richmond DO;  Location: [facility] INVASIVE LOCATION;  Service:    • INTERNAL CARDIAC DEFIBRILLATOR INSERTION Left 2017   • NOSE SURGERY     • OTHER SURGICAL HISTORY      ultra light therapy   • SINUS SURGERY     • TONSILLECTOMY         No Known Allergies      Current Outpatient Prescriptions:   •  albuterol (PROVENTIL HFA;VENTOLIN HFA) 108 (90 Base) MCG/ACT inhaler, Inhale 2 puffs Every 6 (Six) Hours As Needed for Wheezing., Disp: 1 inhaler, Rfl: 11  •  aspirin 81 MG EC tablet, Take 1 tablet by mouth Daily., Disp: 365 tablet, Rfl: 0  •  carvedilol (COREG) 25 MG tablet, Take 1 tablet by mouth 2 (Two) Times a Day With Meals., Disp: 60 tablet, Rfl: 5  •  colchicine 0.6 MG capsule capsule, Take 1 capsule by mouth Daily. Take 2 tabs first day, then 1 tab after, Disp: 4 capsule, Rfl: 0  •  indomethacin (INDOCIN) 50 MG capsule, Take 1 capsule by mouth 3 (Three) Times a Day With Meals., Disp: 30 capsule, Rfl: 0  •  metOLazone (ZAROXOLYN) 5 MG tablet, Take 1 tablet by mouth 3 (Three) Times a Week. (Patient taking differently: Take 5 mg by mouth 3 (Three) Times a Week. [facility]), Disp: 12 tablet, Rfl: 5  •  sacubitril-valsartan (ENTRESTO) 49-51 MG tablet, Take 1 tablet by mouth Every 12 (Twelve) Hours., Disp: 60 tablet, Rfl: 5  •  spironolactone (ALDACTONE) 25 MG tablet, Take 1 tablet by mouth Daily., Disp: 30 tablet, Rfl: 5  •  torsemide (DEMADEX) 20 MG tablet, Take 3 tablets by mouth Daily., Disp: 90 tablet, Rfl: 5  No current facility-administered medications for this visit.     The following portions of the patient's history were reviewed and updated as appropriate: allergies, current medications, past family history, past medical history, past social history, past surgical history and problem list.    Review of Systems   Constitution: Negative.   Cardiovascular: Positive for dyspnea on exertion and leg swelling.   Respiratory: Negative.    Hematologic/Lymphatic: Negative for  "bleeding problem. Does not bruise/bleed easily.   Skin: Negative for rash.   Musculoskeletal: Positive for gout (pain in feet and ankles.). Negative for muscle weakness and myalgias.   Gastrointestinal: Negative for heartburn, nausea and vomiting.   Neurological: Negative.        Objective:     Vitals:    08/30/18 0909   BP: 132/81   BP Location: Right arm   Patient Position: Sitting   Pulse: (!) 49   Resp: 24   Temp: 97.9 °F (36.6 °C)   TempSrc: Temporal Artery    SpO2: 99%   Height: 175.3 cm (69.02\")         Physical Exam   Constitutional: He is oriented to person, place, and time. He appears well-developed and well-nourished.   Morbidly obese   Neck: No JVD present. No tracheal deviation present.   Cardiovascular: Normal rate, regular rhythm and normal heart sounds.  Exam reveals no friction rub.    No murmur heard.  Pulmonary/Chest: Effort normal and breath sounds normal. No respiratory distress.   Abdominal: Soft. Bowel sounds are normal. There is no tenderness.   Musculoskeletal: He exhibits edema. He exhibits no deformity.   Neurological: He is alert and oriented to person, place, and time.       Lab and Diagnostic Review:  Admission on 08/29/2018, Discharged on 08/29/2018   Component Date Value Ref Range Status   • Glucose 08/29/2018 90  70 - 100 mg/dL Final   • BUN 08/29/2018 16  9 - 23 mg/dL Final   • Creatinine 08/29/2018 1.00  0.60 - 1.30 mg/dL Final   • Sodium 08/29/2018 138  132 - 146 mmol/L Final   • Potassium 08/29/2018 3.1* 3.5 - 5.5 mmol/L Final   • Chloride 08/29/2018 94* 99 - 109 mmol/L Final   • CO2 08/29/2018 32.0* 20.0 - 31.0 mmol/L Final   • Calcium 08/29/2018 9.3  8.7 - 10.4 mg/dL Final   • Total Protein 08/29/2018 8.2  5.7 - 8.2 g/dL Final   • Albumin 08/29/2018 4.24  3.20 - 4.80 g/dL Final   • ALT (SGPT) 08/29/2018 16  7 - 40 U/L Final   • AST (SGOT) 08/29/2018 19  0 - 33 U/L Final   • Alkaline Phosphatase 08/29/2018 88  25 - 100 U/L Final   • Total Bilirubin 08/29/2018 0.8  0.3 - 1.2 " mg/dL Final   • eGFR  African Amer 08/29/2018 110  >60 mL/min/1.73 Final   • Globulin 08/29/2018 4.0  gm/dL Final   • A/G Ratio 08/29/2018 1.1* 1.5 - 2.5 g/dL Final   • BUN/Creatinine Ratio 08/29/2018 16.0  7.0 - 25.0 Final   • Anion Gap 08/29/2018 12.0* 3.0 - 11.0 mmol/L Final   • Uric Acid 08/29/2018 14.4* 3.7 - 9.2 mg/dL Final    Falsely depressed results may occur on samples drawn from patients receiving N-Acetylcysteine (NAC) or Metamizole.   • Sed Rate 08/29/2018 59* 0 - 15 mm/hr Final   • C-Reactive Protein 08/29/2018 3.36* 0.00 - 1.00 mg/dL Final   • WBC 08/29/2018 7.96  3.50 - 10.80 10*3/mm3 Final   • RBC 08/29/2018 5.99* 4.20 - 5.76 10*6/mm3 Final   • Hemoglobin 08/29/2018 15.7  13.1 - 17.5 g/dL Final   • Hematocrit 08/29/2018 47.4  38.9 - 50.9 % Final   • MCV 08/29/2018 79.1* 80.0 - 99.0 fL Final   • MCH 08/29/2018 26.2* 27.0 - 31.0 pg Final   • MCHC 08/29/2018 33.1  32.0 - 36.0 g/dL Final   • RDW 08/29/2018 14.4  11.3 - 14.5 % Final   • RDW-SD 08/29/2018 41.6  37.0 - 54.0 fl Final   • MPV 08/29/2018 10.5  6.0 - 12.0 fL Final   • Platelets 08/29/2018 261  150 - 450 10*3/mm3 Final   • Neutrophil % 08/29/2018 68.5  41.0 - 71.0 % Final   • Lymphocyte % 08/29/2018 21.6* 24.0 - 44.0 % Final   • Monocyte % 08/29/2018 8.8  0.0 - 12.0 % Final   • Eosinophil % 08/29/2018 0.5  0.0 - 3.0 % Final   • Basophil % 08/29/2018 0.3  0.0 - 1.0 % Final   • Immature Grans % 08/29/2018 0.3  0.0 - 0.6 % Final   • Neutrophils, Absolute 08/29/2018 5.46  1.50 - 8.30 10*3/mm3 Final   • Lymphocytes, Absolute 08/29/2018 1.72  0.60 - 4.80 10*3/mm3 Final   • Monocytes, Absolute 08/29/2018 0.70  0.00 - 1.00 10*3/mm3 Final   • Eosinophils, Absolute 08/29/2018 0.04  0.00 - 0.30 10*3/mm3 Final   • Basophils, Absolute 08/29/2018 0.02  0.00 - 0.20 10*3/mm3 Final   • Immature Grans, Absolute 08/29/2018 0.02  0.00 - 0.03 10*3/mm3 Final       Assessment and Plan:         1. Chronic systolic heart failure (CMS/[facility])  Coreg, entresto,  aldactone  Torsemide, metolazone    2. Nonischemic congestive cardiomyopathy (CMS/HCC)      3. Morbid obesity (CMS/HCC)  Encouraged diet and exercise  Discussed Cardiac rehab, pt declines due to transportation issues.    4. Acute gout of foot, unspecified cause, unspecified laterality  Probable medication induced with need for chronic diuretics.  Pt will use cholchine for 3 days and indomethacin as prescribed  Follow-up with primary care provider in one week.  After acute flare May be candidate for allopurinol.  With use of anti-inflammatory medications monitor weight closely, review signs and symptoms heart failure worsening and when to call    F/u with Marah Almeida 3 months or sooner if needed.        *Please note that portions of this note were completed with a voice recognition program. Efforts were made to edit the dictations, but occasionally words are mistranscribed.

## 2018-09-10 ENCOUNTER — OFFICE VISIT (OUTPATIENT)
Dept: INTERNAL MEDICINE | Facility: CLINIC | Age: 28
End: 2018-09-10

## 2018-09-10 VITALS — WEIGHT: 315 LBS | BODY MASS INDEX: 46.65 KG/M2 | TEMPERATURE: 97.6 F | HEIGHT: 69 IN

## 2018-09-10 DIAGNOSIS — M10.9 ACUTE GOUT OF MULTIPLE SITES, UNSPECIFIED CAUSE: Primary | ICD-10-CM

## 2018-09-10 PROCEDURE — 99213 OFFICE O/P EST LOW 20 MIN: CPT | Performed by: INTERNAL MEDICINE

## 2018-09-10 RX ORDER — IBUPROFEN 800 MG/1
800 TABLET ORAL EVERY 8 HOURS PRN
Qty: 30 TABLET | Refills: 2 | Status: SHIPPED | OUTPATIENT
Start: 2018-09-10 | End: 2018-09-20

## 2018-09-10 RX ORDER — ALLOPURINOL 100 MG/1
100 TABLET ORAL DAILY
Qty: 30 TABLET | Refills: 5 | Status: SHIPPED | OUTPATIENT
Start: 2018-09-10 | End: 2020-06-22 | Stop reason: SDUPTHER

## 2018-09-10 NOTE — PROGRESS NOTES
"Subjective   Evan Gannon is a 27 y.o. male here for gout f/u. He had his first episode and went to ED, was given ibu 800mg TID which he took for 10 days and sx went away. At initial presentation he had pain in right great toe and left ankle. 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 Never had gout before. Both joints were swollen in ED. He drummed yesterday and now left ankle is aching again as it had before. Some swelling. He also missed his am dosing of all his meds yesterday so has had some swelling in legs from that.    PMH: reviewed  Meds: reviewed    Review of Systems   Constitutional: Negative.    HENT: Negative.    Musculoskeletal:        Ankle pain   Skin: Positive for rash.   Hematological: Negative.          Objective   Temp 97.6 °F (36.4 °C) (Temporal Artery )   Ht 175.3 cm (69\")   Wt (!) 180 kg (396 lb 12.8 oz)   BMI 58.60 kg/m²     Physical Exam   Constitutional: He is oriented to person, place, and time. He appears well-developed and well-nourished.   Pulmonary/Chest: Effort normal.   Neurological: He is alert and oriented to person, place, and time.   Skin: Skin is warm and dry.   Psychiatric: He has a normal mood and affect. His behavior is normal. Judgment and thought content normal.   Vitals reviewed.      Assessment/Plan   Evan was seen today for gout.    Diagnoses and all orders for this visit:    Acute gout of multiple sites, unspecified cause  -     ibuprofen (ADVIL,MOTRIN) 800 MG tablet; Take 1 tablet by mouth Every 8 (Eight) Hours As Needed for Mild Pain  for up to 10 days.  -     allopurinol (ZYLOPRIM) 100 MG tablet; Take 1 tablet by mouth Daily. Start after 10 days  -     Explained how gout is caused and how to prevent flares  -     Uric acid ~14, goal of 6.5 or less, will recheck level after on allo and see if dose needs to be increased           "

## 2018-10-27 ENCOUNTER — EPISODE CHANGES (OUTPATIENT)
Dept: CASE MANAGEMENT | Facility: OTHER | Age: 28
End: 2018-10-27

## 2018-11-12 ENCOUNTER — TRANSCRIBE ORDERS (OUTPATIENT)
Dept: ADMINISTRATIVE | Facility: HOSPITAL | Age: 28
End: 2018-11-12

## 2018-11-12 ENCOUNTER — TRANSCRIBE ORDERS (OUTPATIENT)
Dept: LAB | Facility: HOSPITAL | Age: 28
End: 2018-11-12

## 2018-11-12 ENCOUNTER — HOSPITAL ENCOUNTER (OUTPATIENT)
Dept: GENERAL RADIOLOGY | Facility: HOSPITAL | Age: 28
Discharge: HOME | End: 2018-11-12
Admitting: PHYSICIAN ASSISTANT

## 2018-11-12 ENCOUNTER — TELEPHONE (OUTPATIENT)
Dept: CARDIOLOGY | Facility: CLINIC | Age: 28
End: 2018-11-12

## 2018-11-12 ENCOUNTER — LAB (OUTPATIENT)
Dept: LAB | Facility: HOSPITAL | Age: 28
End: 2018-11-12

## 2018-11-12 DIAGNOSIS — L40.0 PSORIASIS VULGARIS: Primary | ICD-10-CM

## 2018-11-12 DIAGNOSIS — L40.0 PSORIASIS VULGARIS: ICD-10-CM

## 2018-11-12 DIAGNOSIS — Z79.899 HIGH RISK MEDICATION USE: ICD-10-CM

## 2018-11-12 PROCEDURE — 86480 TB TEST CELL IMMUN MEASURE: CPT

## 2018-11-12 PROCEDURE — 36415 COLL VENOUS BLD VENIPUNCTURE: CPT

## 2018-11-12 PROCEDURE — 71046 X-RAY EXAM CHEST 2 VIEWS: CPT

## 2018-11-12 NOTE — TELEPHONE ENCOUNTER
Called pt due to [employer] home monitor isn't reading.  Left message for pt to check connections.  Left my name and number.

## 2018-11-14 LAB
QUANTIFERON CRITERIA: NORMAL
QUANTIFERON MITOGEN VALUE: >10 IU/ML
QUANTIFERON NIL VALUE: 0.05 IU/ML
QUANTIFERON TB1 AG VALUE: 0.07 IU/ML
QUANTIFERON TB2 AG VALUE: 0.06 IU/ML
QUANTIFERON-TB GOLD PLUS: NEGATIVE

## 2018-11-20 ENCOUNTER — CLINICAL SUPPORT NO REQUIREMENTS (OUTPATIENT)
Dept: CARDIOLOGY | Facility: CLINIC | Age: 28
End: 2018-11-20

## 2018-11-20 DIAGNOSIS — I50.22 CHRONIC SYSTOLIC CONGESTIVE HEART FAILURE (HCC): Primary | ICD-10-CM

## 2018-11-21 DIAGNOSIS — E66.01 MORBID OBESITY WITH BMI OF 50.0-59.9, ADULT (HCC): Primary | ICD-10-CM

## 2019-01-01 ENCOUNTER — HOSPITAL ENCOUNTER (EMERGENCY)
Facility: HOSPITAL | Age: 29
Discharge: HOME | End: 2019-01-01
Attending: EMERGENCY MEDICINE | Admitting: EMERGENCY MEDICINE

## 2019-01-01 ENCOUNTER — APPOINTMENT (OUTPATIENT)
Dept: CT IMAGING | Facility: HOSPITAL | Age: 29
End: 2019-01-01

## 2019-01-01 ENCOUNTER — APPOINTMENT (OUTPATIENT)
Dept: GENERAL RADIOLOGY | Facility: HOSPITAL | Age: 29
End: 2019-01-01

## 2019-01-01 VITALS
DIASTOLIC BLOOD PRESSURE: 82 MMHG | WEIGHT: 315 LBS | SYSTOLIC BLOOD PRESSURE: 114 MMHG | TEMPERATURE: 97.5 F | BODY MASS INDEX: 46.65 KG/M2 | HEIGHT: 69 IN | OXYGEN SATURATION: 96 % | HEART RATE: 98 BPM | RESPIRATION RATE: 28 BRPM

## 2019-01-01 DIAGNOSIS — J20.9 ACUTE BRONCHITIS, UNSPECIFIED ORGANISM: Primary | ICD-10-CM

## 2019-01-01 DIAGNOSIS — R05.9 COUGH: ICD-10-CM

## 2019-01-01 LAB
ALBUMIN SERPL-MCNC: 4.2 G/DL (ref 3.2–4.8)
ALBUMIN/GLOB SERPL: 1.3 G/DL (ref 1.5–2.5)
ALP SERPL-CCNC: 87 U/L (ref 25–100)
ALT SERPL W P-5'-P-CCNC: 30 U/L (ref 7–40)
ANION GAP SERPL CALCULATED.3IONS-SCNC: 8 MMOL/L (ref 3–11)
AST SERPL-CCNC: 24 U/L (ref 0–33)
BASOPHILS # BLD AUTO: 0.01 10*3/MM3 (ref 0–0.2)
BASOPHILS NFR BLD AUTO: 0.1 % (ref 0–1)
BILIRUB SERPL-MCNC: 0.6 MG/DL (ref 0.3–1.2)
BNP SERPL-MCNC: 92 PG/ML (ref 0–100)
BUN BLD-MCNC: 8 MG/DL (ref 9–23)
BUN/CREAT SERPL: 8.7 (ref 7–25)
CALCIUM SPEC-SCNC: 9 MG/DL (ref 8.7–10.4)
CHLORIDE SERPL-SCNC: 101 MMOL/L (ref 99–109)
CO2 SERPL-SCNC: 29 MMOL/L (ref 20–31)
CREAT BLD-MCNC: 0.92 MG/DL (ref 0.6–1.3)
D DIMER PPP FEU-MCNC: 0.53 MG/L (FEU) (ref 0–0.5)
DEPRECATED RDW RBC AUTO: 38.9 FL (ref 37–54)
EOSINOPHIL # BLD AUTO: 0.15 10*3/MM3 (ref 0–0.3)
EOSINOPHIL NFR BLD AUTO: 2 % (ref 0–3)
ERYTHROCYTE [DISTWIDTH] IN BLOOD BY AUTOMATED COUNT: 13.2 % (ref 11.3–14.5)
FLUAV AG NPH QL: NEGATIVE
FLUBV AG NPH QL IA: NEGATIVE
GFR SERPL CREATININE-BSD FRML MDRD: 121 ML/MIN/1.73
GLOBULIN UR ELPH-MCNC: 3.2 GM/DL
GLUCOSE BLD-MCNC: 91 MG/DL (ref 70–100)
HCT VFR BLD AUTO: 44.3 % (ref 38.9–50.9)
HGB BLD-MCNC: 14.8 G/DL (ref 13.1–17.5)
HOLD SPECIMEN: NORMAL
HOLD SPECIMEN: NORMAL
IMM GRANULOCYTES # BLD AUTO: 0.02 10*3/MM3 (ref 0–0.03)
IMM GRANULOCYTES NFR BLD AUTO: 0.3 % (ref 0–0.6)
LYMPHOCYTES # BLD AUTO: 2.03 10*3/MM3 (ref 0.6–4.8)
LYMPHOCYTES NFR BLD AUTO: 27.4 % (ref 24–44)
MCH RBC QN AUTO: 27 PG (ref 27–31)
MCHC RBC AUTO-ENTMCNC: 33.4 G/DL (ref 32–36)
MCV RBC AUTO: 80.7 FL (ref 80–99)
MONOCYTES # BLD AUTO: 0.57 10*3/MM3 (ref 0–1)
MONOCYTES NFR BLD AUTO: 7.7 % (ref 0–12)
NEUTROPHILS # BLD AUTO: 4.65 10*3/MM3 (ref 1.5–8.3)
NEUTROPHILS NFR BLD AUTO: 62.8 % (ref 41–71)
PLATELET # BLD AUTO: 256 10*3/MM3 (ref 150–450)
PMV BLD AUTO: 10.5 FL (ref 6–12)
POTASSIUM BLD-SCNC: 3.4 MMOL/L (ref 3.5–5.5)
PROT SERPL-MCNC: 7.4 G/DL (ref 5.7–8.2)
RBC # BLD AUTO: 5.49 10*6/MM3 (ref 4.2–5.76)
SODIUM BLD-SCNC: 138 MMOL/L (ref 132–146)
TROPONIN I SERPL-MCNC: 0.05 NG/ML
TROPONIN I SERPL-MCNC: 0.06 NG/ML
WBC NRBC COR # BLD: 7.41 10*3/MM3 (ref 3.5–10.8)
WHOLE BLOOD HOLD SPECIMEN: NORMAL
WHOLE BLOOD HOLD SPECIMEN: NORMAL

## 2019-01-01 PROCEDURE — 71275 CT ANGIOGRAPHY CHEST: CPT

## 2019-01-01 PROCEDURE — 99284 EMERGENCY DEPT VISIT MOD MDM: CPT

## 2019-01-01 PROCEDURE — 80053 COMPREHEN METABOLIC PANEL: CPT

## 2019-01-01 PROCEDURE — 0 IOPAMIDOL PER 1 ML: Performed by: EMERGENCY MEDICINE

## 2019-01-01 PROCEDURE — 85379 FIBRIN DEGRADATION QUANT: CPT | Performed by: EMERGENCY MEDICINE

## 2019-01-01 PROCEDURE — 71045 X-RAY EXAM CHEST 1 VIEW: CPT

## 2019-01-01 PROCEDURE — 93005 ELECTROCARDIOGRAM TRACING: CPT | Performed by: EMERGENCY MEDICINE

## 2019-01-01 PROCEDURE — 87804 INFLUENZA ASSAY W/OPTIC: CPT | Performed by: EMERGENCY MEDICINE

## 2019-01-01 PROCEDURE — 84484 ASSAY OF TROPONIN QUANT: CPT | Performed by: EMERGENCY MEDICINE

## 2019-01-01 PROCEDURE — 83880 ASSAY OF NATRIURETIC PEPTIDE: CPT

## 2019-01-01 PROCEDURE — 85025 COMPLETE CBC W/AUTO DIFF WBC: CPT

## 2019-01-01 PROCEDURE — 93005 ELECTROCARDIOGRAM TRACING: CPT

## 2019-01-01 RX ORDER — BENZONATATE 200 MG/1
200 CAPSULE ORAL 3 TIMES DAILY PRN
Qty: 15 CAPSULE | Refills: 0 | Status: SHIPPED | OUTPATIENT
Start: 2019-01-01 | End: 2019-01-06

## 2019-01-01 RX ORDER — SODIUM CHLORIDE 0.9 % (FLUSH) 0.9 %
10 SYRINGE (ML) INJECTION AS NEEDED
Status: DISCONTINUED | OUTPATIENT
Start: 2019-01-01 | End: 2019-01-01 | Stop reason: HOSPADM

## 2019-01-01 RX ADMIN — IOPAMIDOL 115 ML: 755 INJECTION, SOLUTION INTRAVENOUS at 07:02

## 2019-01-01 NOTE — ED PROVIDER NOTES
Subjective   Mr. Evan Gannon is a 28 y.o. male who presents to the ED with c/o shortness of breath. He has a history of CHF. He reports 5 days ago this cough onset with associated difficulty breathing. He reports the difficulty breathing is worse when lying flat. He took diabetic robitussin but these symptoms worsened in severity 2 days ago. He also complains of abdominal pain and chest pain which are secondary to the cough. He notes he had 1 episode of spontaneous chest pain. He denies any known sick contacts. He denies history of PE/DVT and he is not on anticoagulants. There are no other acute symptoms at this time.        History provided by:  Patient  Shortness of Breath   Severity:  Moderate  Onset quality:  Sudden  Duration:  5 days  Timing:  Constant  Progression:  Worsening  Chronicity:  Recurrent  Relieved by:  Nothing  Worsened by:  Nothing  Ineffective treatments: Diabetic robitussin.  Associated symptoms: abdominal pain and chest pain    Risk factors: obesity        Review of Systems   Respiratory: Positive for shortness of breath.    Cardiovascular: Positive for chest pain.   Gastrointestinal: Positive for abdominal pain.   All other systems reviewed and are negative.      Past Medical History:   Diagnosis Date   • CHF (congestive heart failure) (CMS/HCC)    • CPAP (continuous positive airway pressure) dependence    • Hypertension    • Morbid obesity (CMS/HCC)    • Myocardial infarction (CMS/HCC)    • On home O2     2l at bedtime and prn   • Plaque psoriasis    • Pneumonia    • Psoriasis    • Sleep apnea    • Wears glasses        No Known Allergies    Past Surgical History:   Procedure Laterality Date   • ADENOIDECTOMY     • CARDIAC CATHETERIZATION N/A 7/13/2017    Procedure: Left Heart Cath;  Surgeon: Balbir Olsen MD;  Location: [facility] CATH INVASIVE LOCATION;  Service:    • CARDIAC DEFIBRILLATOR PLACEMENT  08/2017   • CARDIAC ELECTROPHYSIOLOGY PROCEDURE N/A 8/15/2017    Procedure: VVI ICD  Implant;  Surgeon: Nathanael Richmond DO;  Location: [facility] INVASIVE LOCATION;  Service:    • INTERNAL CARDIAC DEFIBRILLATOR INSERTION Left 2017   • NOSE SURGERY     • OTHER SURGICAL HISTORY      ultra light therapy   • SINUS SURGERY     • TONSILLECTOMY         Family History   Problem Relation Age of Onset   • Diabetes Father    • Diabetes Paternal Grandmother    • Diabetes Maternal Grandfather        Social History     Socioeconomic History   • Marital status: Single     Spouse name: Not on file   • Number of children: 0   • Years of education: Not on file   • Highest education level: Not on file   Occupational History   • Occupation: disabled   Tobacco Use   • Smoking status: Never Smoker   • Smokeless tobacco: Never Used   Substance and Sexual Activity   • Alcohol use: No   • Drug use: No   • Sexual activity: Defer   Social History Narrative    Caffeine use: some soda.    Patient lives with parents         Objective   Physical Exam   Constitutional: He is oriented to person, place, and time. He appears well-developed and well-nourished. No distress.   HENT:   Head: Normocephalic and atraumatic.   Nose: Nose normal.   Eyes: Conjunctivae are normal. No scleral icterus.   Neck: Normal range of motion. Neck supple.   Cardiovascular: Regular rhythm and normal heart sounds. Tachycardia present.   No murmur heard.  Pulmonary/Chest: Effort normal and breath sounds normal. No respiratory distress.   Abdominal: Soft. Bowel sounds are normal. There is no tenderness.   Musculoskeletal: Normal range of motion. He exhibits no edema.   Chronic scaly skin lesions over the back, chest, abdomen, and lower extremities, consistent with plaque psoriasis.   Neurological: He is alert and oriented to person, place, and time.   Skin: Skin is warm and dry.   Psychiatric: He has a normal mood and affect. His behavior is normal.   Nursing note and vitals reviewed.      Procedures         ED Course     Recent Results (from the past 24  hour(s))   Lavender Top    Collection Time: 01/01/19  2:00 AM   Result Value Ref Range    Extra Tube hold for add-on    CBC Auto Differential    Collection Time: 01/01/19  2:00 AM   Result Value Ref Range    WBC 7.41 3.50 - 10.80 10*3/mm3    RBC 5.49 4.20 - 5.76 10*6/mm3    Hemoglobin 14.8 13.1 - 17.5 g/dL    Hematocrit 44.3 38.9 - 50.9 %    MCV 80.7 80.0 - 99.0 fL    MCH 27.0 27.0 - 31.0 pg    MCHC 33.4 32.0 - 36.0 g/dL    RDW 13.2 11.3 - 14.5 %    RDW-SD 38.9 37.0 - 54.0 fl    MPV 10.5 6.0 - 12.0 fL    Platelets 256 150 - 450 10*3/mm3    Neutrophil % 62.8 41.0 - 71.0 %    Lymphocyte % 27.4 24.0 - 44.0 %    Monocyte % 7.7 0.0 - 12.0 %    Eosinophil % 2.0 0.0 - 3.0 %    Basophil % 0.1 0.0 - 1.0 %    Immature Grans % 0.3 0.0 - 0.6 %    Neutrophils, Absolute 4.65 1.50 - 8.30 10*3/mm3    Lymphocytes, Absolute 2.03 0.60 - 4.80 10*3/mm3    Monocytes, Absolute 0.57 0.00 - 1.00 10*3/mm3    Eosinophils, Absolute 0.15 0.00 - 0.30 10*3/mm3    Basophils, Absolute 0.01 0.00 - 0.20 10*3/mm3    Immature Grans, Absolute 0.02 0.00 - 0.03 10*3/mm3   Comprehensive Metabolic Panel    Collection Time: 01/01/19  2:03 AM   Result Value Ref Range    Glucose 91 70 - 100 mg/dL    BUN 8 (L) 9 - 23 mg/dL    Creatinine 0.92 0.60 - 1.30 mg/dL    Sodium 138 132 - 146 mmol/L    Potassium 3.4 (L) 3.5 - 5.5 mmol/L    Chloride 101 99 - 109 mmol/L    CO2 29.0 20.0 - 31.0 mmol/L    Calcium 9.0 8.7 - 10.4 mg/dL    Total Protein 7.4 5.7 - 8.2 g/dL    Albumin 4.20 3.20 - 4.80 g/dL    ALT (SGPT) 30 7 - 40 U/L    AST (SGOT) 24 0 - 33 U/L    Alkaline Phosphatase 87 25 - 100 U/L    Total Bilirubin 0.6 0.3 - 1.2 mg/dL    eGFR  African Amer 121 >60 mL/min/1.73    Globulin 3.2 gm/dL    A/G Ratio 1.3 (L) 1.5 - 2.5 g/dL    BUN/Creatinine Ratio 8.7 7.0 - 25.0    Anion Gap 8.0 3.0 - 11.0 mmol/L   BNP    Collection Time: 01/01/19  2:03 AM   Result Value Ref Range    BNP 92.0 0.0 - 100.0 pg/mL   Light Blue Top    Collection Time: 01/01/19  2:03 AM   Result Value Ref  Range    Extra Tube hold for add-on    Green Top (Gel)    Collection Time: 01/01/19  2:03 AM   Result Value Ref Range    Extra Tube Hold for add-ons.    Gold Top - SST    Collection Time: 01/01/19  2:03 AM   Result Value Ref Range    Extra Tube Hold for add-ons.    Troponin    Collection Time: 01/01/19  2:03 AM   Result Value Ref Range    Troponin I 0.063 (H) <=0.039 ng/mL   Troponin    Collection Time: 01/01/19  5:23 AM   Result Value Ref Range    Troponin I 0.051 (H) <=0.039 ng/mL   Influenza Antigen, Rapid - Swab, Nasopharynx    Collection Time: 01/01/19  5:42 AM   Result Value Ref Range    Influenza A Ag, EIA Negative Negative    Influenza B Ag, EIA Negative Negative   D-dimer, Quantitative    Collection Time: 01/01/19  6:10 AM   Result Value Ref Range    D-Dimer, Quantitative 0.53 (H) 0.00 - 0.50 mg/L (FEU)     Note: In addition to lab results from this visit, the labs listed above may include labs taken at another facility or during a different encounter within the last 24 hours. Please correlate lab times with ED admission and discharge times for further clarification of the services performed during this visit.    CT Angiogram Chest With Contrast   Final Result   1. No evidence of pulmonary embolism.    2. Minimal bilateral atelectasis.    3. Stable cardiomegaly.       THIS DOCUMENT HAS BEEN ELECTRONICALLY SIGNED BY JEAN JOHNSON JR. MD      XR Chest 1 View   Final Result   Clear lungs.       THIS DOCUMENT HAS BEEN ELECTRONICALLY SIGNED BY ANDREI WEBER MD        Vitals:    01/01/19 0510 01/01/19 0512 01/01/19 0734 01/01/19 0830   BP:       Pulse: 98  112 87   Resp:       Temp:       SpO2:  98% 92% 97%   Weight:       Height:         Medications   sodium chloride 0.9 % flush 10 mL (not administered)   iopamidol (ISOVUE-370) 76 % injection 150 mL (115 mL Intravenous Given 1/1/19 0702)     ECG/EMG Results (last 24 hours)     Procedure Component Value Units Date/Time    ECG 12 Lead [[id number]] Collected:  01/01/19  0156     Updated:  01/01/19 0156    ECG 12 Lead [[id number]] Collected:  01/01/19 0528     Updated:  01/01/19 0530                        MDM  Number of Diagnoses or Management Options  Acute bronchitis, unspecified organism: new and requires workup  Cough: new and requires workup  Diagnosis management comments: Presents with a complaint of cough, and shortness of breath for the last 5 days.    Patient denies any fever during this time.  Does have a known history of congestive heart failure.    Chest x-ray does not show any acute after maladies including no pulmonary edema and no signs of infiltrate.  Influenza screen is negative.    CT scan of the chest negative for pulmonary embolism or acute lung disease.    Patient has remained stable and well-appearing throughout ER course    Discharged home with Tessalon Perles to help with cough, and the advised to rest and drink appropriate fluids.  Advised to take Tylenol or ibuprofen as needed for pain secondary to cough, and any fever that develops.       Amount and/or Complexity of Data Reviewed  Clinical lab tests: ordered and reviewed  Tests in the radiology section of CPT®: ordered and reviewed  Decide to obtain previous medical records or to obtain history from someone other than the patient: yes  Obtain history from someone other than the patient: yes  Review and summarize past medical records: yes  Independent visualization of images, tracings, or specimens: yes    Patient Progress  Patient progress: stable      Final diagnoses:   Acute bronchitis, unspecified organism   Cough       Documentation assistance provided by josé De Luna.  Information recorded by the scribe was done at my direction and has been verified and validated by me.     Cal De Luna  01/01/19 0550       Cal De Luna  01/01/19 0555       Odalis Ronquillo MD  01/01/19 6535

## 2019-01-01 NOTE — DISCHARGE INSTRUCTIONS
Patient is advised to rest and drink appropriate amount of fluids.     Take ibuprofen or tylenol as needed for pain/fever.     Follow-up with PCP for recheck in 2-3 days.     Return to ER with further concerns.

## 2019-01-02 ENCOUNTER — EPISODE CHANGES (OUTPATIENT)
Dept: CASE MANAGEMENT | Facility: OTHER | Age: 29
End: 2019-01-02

## 2019-01-02 ENCOUNTER — PATIENT OUTREACH (OUTPATIENT)
Dept: CASE MANAGEMENT | Facility: OTHER | Age: 29
End: 2019-01-02

## 2019-01-02 NOTE — OUTREACH NOTE
Unable to reach patient/attempt x 1 for ED follow up phone call. Left voicemail with [facility] contact information.

## 2019-01-07 ENCOUNTER — PATIENT OUTREACH (OUTPATIENT)
Dept: CASE MANAGEMENT | Facility: OTHER | Age: 29
End: 2019-01-07

## 2019-01-07 NOTE — OUTREACH NOTE
Unable to reach patient / attempt x 2 for ED follow up. Left voicemail with [place] contact information.

## 2019-01-16 ENCOUNTER — PATIENT OUTREACH (OUTPATIENT)
Dept: CASE MANAGEMENT | Facility: OTHER | Age: 29
End: 2019-01-16

## 2019-01-16 NOTE — OUTREACH NOTE
Care Management Plan 1/16/2019   Lifestyle Goals Routine follow-up with doctor(s)   Barriers Disease education   Self Management -   Annual Wellness Visit:  Patient Will Schedule   Care Gaps Addressed Flu Shot   Care Gaps Addressed -   Specific Disease Process Teaching Heart Failure   Does patient have depression diagnosis? No   Advanced Directives: Not Interested At This Time   Ed Visits past 12 months: 3 or more   Hospitalizations past 12 months 1     The main concerns and/or symptoms the patient would like to address are: Talked with patient. Discussed with patient 1/1/19 ED visit regarding bronchitis. Patient states to be compliant with ED recommendations and states symptoms have improved.He reports no fever and does have episodes of productive cough of yellow phlegm Patient lives with family; independent with ADL's; meal preparation and receiving assistance with transportation.He states to be compliant with medications. He reports no difficulty with SOB; chest pain appetite or sleeping.      Education/instruction provided by Care Coordinator: Reviewed with patient Care Advising program. Patient verbalizes understanding.Patient has been in care advising program in past.  He states he is in process of obtaining a new PCP. [place] airport and requests call back.      Follow Up Outreach Due: Follow up as needed.     Maria R Fuentes RN

## 2019-01-21 RX ORDER — IBUPROFEN 800 MG/1
TABLET ORAL
Qty: 30 TABLET | Refills: 0 | OUTPATIENT
Start: 2019-01-21

## 2019-01-31 ENCOUNTER — APPOINTMENT (OUTPATIENT)
Dept: GENERAL RADIOLOGY | Facility: HOSPITAL | Age: 29
End: 2019-01-31

## 2019-01-31 ENCOUNTER — HOSPITAL ENCOUNTER (EMERGENCY)
Facility: HOSPITAL | Age: 29
Discharge: HOME | End: 2019-01-31
Attending: EMERGENCY MEDICINE | Admitting: NURSE PRACTITIONER

## 2019-01-31 VITALS
HEIGHT: 69 IN | BODY MASS INDEX: 46.65 KG/M2 | SYSTOLIC BLOOD PRESSURE: 135 MMHG | OXYGEN SATURATION: 92 % | DIASTOLIC BLOOD PRESSURE: 83 MMHG | HEART RATE: 103 BPM | RESPIRATION RATE: 20 BRPM | WEIGHT: 315 LBS | TEMPERATURE: 98.3 F

## 2019-01-31 DIAGNOSIS — J11.1 INFLUENZA: Primary | ICD-10-CM

## 2019-01-31 LAB
FLUAV AG NPH QL: POSITIVE
FLUBV AG NPH QL IA: NEGATIVE

## 2019-01-31 PROCEDURE — 94799 UNLISTED PULMONARY SVC/PX: CPT

## 2019-01-31 PROCEDURE — 99284 EMERGENCY DEPT VISIT MOD MDM: CPT

## 2019-01-31 PROCEDURE — 87804 INFLUENZA ASSAY W/OPTIC: CPT

## 2019-01-31 PROCEDURE — 71046 X-RAY EXAM CHEST 2 VIEWS: CPT

## 2019-01-31 PROCEDURE — 94640 AIRWAY INHALATION TREATMENT: CPT

## 2019-01-31 RX ORDER — ONDANSETRON 4 MG/1
4 TABLET, ORALLY DISINTEGRATING ORAL EVERY 8 HOURS PRN
Qty: 9 TABLET | Refills: 0 | Status: SHIPPED | OUTPATIENT
Start: 2019-01-31 | End: 2019-02-02

## 2019-01-31 RX ORDER — OSELTAMIVIR PHOSPHATE 75 MG/1
75 CAPSULE ORAL EVERY 12 HOURS
Qty: 10 CAPSULE | Refills: 0 | Status: SHIPPED | OUTPATIENT
Start: 2019-01-31 | End: 2019-04-30

## 2019-01-31 RX ORDER — IPRATROPIUM BROMIDE AND ALBUTEROL SULFATE 2.5; .5 MG/3ML; MG/3ML
3 SOLUTION RESPIRATORY (INHALATION) ONCE
Status: COMPLETED | OUTPATIENT
Start: 2019-01-31 | End: 2019-01-31

## 2019-01-31 RX ADMIN — IPRATROPIUM BROMIDE AND ALBUTEROL SULFATE 3 ML: 2.5; .5 SOLUTION RESPIRATORY (INHALATION) at 19:55

## 2019-02-01 ENCOUNTER — EPISODE CHANGES (OUTPATIENT)
Dept: CASE MANAGEMENT | Facility: OTHER | Age: 29
End: 2019-02-01

## 2019-02-01 ENCOUNTER — PATIENT OUTREACH (OUTPATIENT)
Dept: CASE MANAGEMENT | Facility: OTHER | Age: 29
End: 2019-02-01

## 2019-02-01 NOTE — ED PROVIDER NOTES
Subjective   Fever. Cough. SOA.    Hx of CHF, Pacer, CAD, HTN, Obesity.        Influenza   Presenting symptoms: cough and fever    Presenting symptoms: no diarrhea, no nausea, no shortness of breath, no sore throat and no vomiting    Severity:  Moderate  Onset quality:  Gradual  Duration:  3 days  Progression:  Unchanged  Chronicity:  New  Relieved by:  Rest  Worsened by:  Certain positions  Associated symptoms: no chills, no congestion and no neck stiffness    Risk factors: heart disease and sick contacts        Review of Systems   Constitutional: Positive for fever. Negative for chills and diaphoresis.   HENT: Negative for congestion and sore throat.    Respiratory: Positive for cough. Negative for choking, chest tightness, shortness of breath and wheezing.    Cardiovascular: Negative for chest pain and leg swelling.   Gastrointestinal: Negative for abdominal distention, abdominal pain, anal bleeding, blood in stool, constipation, diarrhea, nausea and vomiting.   Genitourinary: Negative for difficulty urinating, dysuria, flank pain, frequency, hematuria and urgency.   Musculoskeletal: Negative for neck stiffness.   All other systems reviewed and are negative.      Past Medical History:   Diagnosis Date   • CHF (congestive heart failure) (CMS/HCC)    • CPAP (continuous positive airway pressure) dependence    • Hypertension    • Morbid obesity (CMS/HCC)    • Myocardial infarction (CMS/HCC)    • On home O2     2l at bedtime and prn   • Plaque psoriasis    • Pneumonia    • Psoriasis    • Sleep apnea    • Wears glasses        No Known Allergies    Past Surgical History:   Procedure Laterality Date   • ADENOIDECTOMY     • CARDIAC CATHETERIZATION N/A 7/13/2017    Procedure: Left Heart Cath;  Surgeon: Balbir Olsen MD;  Location: [facility] INVASIVE LOCATION;  Service:    • CARDIAC DEFIBRILLATOR PLACEMENT  08/2017   • CARDIAC ELECTROPHYSIOLOGY PROCEDURE N/A 8/15/2017    Procedure: VVI ICD Implant;  Surgeon: Nathanael  DO Yi;  Location: [facility] INVASIVE LOCATION;  Service:    • INTERNAL CARDIAC DEFIBRILLATOR INSERTION Left 2017   • NOSE SURGERY     • OTHER SURGICAL HISTORY      ultra light therapy   • SINUS SURGERY     • TONSILLECTOMY         Family History   Problem Relation Age of Onset   • Diabetes Father    • Diabetes Paternal Grandmother    • Diabetes Maternal Grandfather        Social History     Socioeconomic History   • Marital status: Single     Spouse name: Not on file   • Number of children: 0   • Years of education: Not on file   • Highest education level: Not on file   Occupational History   • Occupation: disabled   Tobacco Use   • Smoking status: Never Smoker   • Smokeless tobacco: Never Used   Substance and Sexual Activity   • Alcohol use: No   • Drug use: No   • Sexual activity: Defer   Social History Narrative    Caffeine use: some soda.    Patient lives with parents           Objective   Physical Exam   Constitutional: He is oriented to person, place, and time. He appears well-developed and well-nourished.   HENT:   Head: Normocephalic and atraumatic.   Right Ear: External ear normal.   Left Ear: External ear normal.   Nose: Nose normal.   Mouth/Throat: Oropharynx is clear and moist.   Eyes: Conjunctivae and EOM are normal. Pupils are equal, round, and reactive to light.   Neck: Normal range of motion. Neck supple.   Cardiovascular: Normal rate, regular rhythm, normal heart sounds and intact distal pulses.   Pulmonary/Chest: Effort normal and breath sounds normal.   Abdominal: Soft. Bowel sounds are normal.   Musculoskeletal: Normal range of motion.   Neurological: He is alert and oriented to person, place, and time.   Skin: Skin is warm and dry.   Psychiatric: He has a normal mood and affect. His behavior is normal. Judgment normal.       Procedures           ED Course  ED Course as of Jan 31 2208   Thu Jan 31, 2019 2205 Not cw with overt CHF. Pos flu.    Pt thankful and agreeable with tx poc. Well  aware of the ss of worsening condition.    [JM]      ED Course User Index  [JM] Cristofer Gonsalez APRN          XR Chest 2 View   Final Result   Differential consideration for diffuse interstitial thickening includes a viral    upper respiratory infection given history. Also consider reactive airway    disease and/or cardiogenic pulmonary interstitial edema. No confluent pulmonary    consolidation.       THIS DOCUMENT HAS BEEN ELECTRONICALLY SIGNED BY HEAVEN ELLIS MD                [facility]      Final diagnoses:   Influenza            Cristofer Gonsalez APRN  01/31/19 [id number]

## 2019-02-01 NOTE — OUTREACH NOTE
Unable to reach patient / attempt x 1 for ED follow up. Left voicemail with CA contact information.

## 2019-02-02 ENCOUNTER — APPOINTMENT (OUTPATIENT)
Dept: CT IMAGING | Facility: HOSPITAL | Age: 29
End: 2019-02-02

## 2019-02-02 ENCOUNTER — HOSPITAL ENCOUNTER (EMERGENCY)
Facility: HOSPITAL | Age: 29
Discharge: HOME | End: 2019-02-02
Attending: EMERGENCY MEDICINE | Admitting: EMERGENCY MEDICINE

## 2019-02-02 ENCOUNTER — APPOINTMENT (OUTPATIENT)
Dept: GENERAL RADIOLOGY | Facility: HOSPITAL | Age: 29
End: 2019-02-02

## 2019-02-02 VITALS
OXYGEN SATURATION: 95 % | RESPIRATION RATE: 18 BRPM | TEMPERATURE: 97.8 F | BODY MASS INDEX: 46.65 KG/M2 | HEIGHT: 69 IN | WEIGHT: 315 LBS | DIASTOLIC BLOOD PRESSURE: 78 MMHG | HEART RATE: 68 BPM | SYSTOLIC BLOOD PRESSURE: 129 MMHG

## 2019-02-02 DIAGNOSIS — R51.9 FACIAL PAIN: ICD-10-CM

## 2019-02-02 DIAGNOSIS — R09.81 SINUS CONGESTION: Primary | ICD-10-CM

## 2019-02-02 PROCEDURE — 99283 EMERGENCY DEPT VISIT LOW MDM: CPT

## 2019-02-02 PROCEDURE — 71046 X-RAY EXAM CHEST 2 VIEWS: CPT

## 2019-02-02 PROCEDURE — 70486 CT MAXILLOFACIAL W/O DYE: CPT

## 2019-02-02 RX ORDER — AMOXICILLIN 500 MG/1
500 CAPSULE ORAL 3 TIMES DAILY
Qty: 21 CAPSULE | Refills: 0 | Status: SHIPPED | OUTPATIENT
Start: 2019-02-02 | End: 2019-04-30

## 2019-02-03 NOTE — ED PROVIDER NOTES
Subjective   Evan Gannon is a 28 y.o.male who presents to the emergency department with complaints of headaches. The patient states that the headache onset 1 hour ago and he describes it as sinus congestion. He notes that he is tender beneath the eyes and on his forehead. He visited the ED on 1/31 where he was diagnosed with influenza. He has only been taking ibuprofen 800mg BID for his symptoms. His symptoms include nasal congestion and rhinorrhea but he denies any ear pain. He notes some pain in his teeth. His medical history includes CHF and psoriasis. There are no other acute complaints at this time.           History provided by:  Patient  Headache   Pain location:  Frontal (Forehead and beneath eyes)  Onset quality:  Sudden  Duration:  1 hour  Timing:  Constant  Chronicity:  New  Relieved by:  Nothing  Ineffective treatments:  NSAIDs  Associated symptoms: congestion and sore throat    Associated symptoms: no ear pain, no fever, no nausea, no neck stiffness, no photophobia and no vomiting        Review of Systems   Constitutional: Negative for activity change, appetite change, chills and fever.   HENT: Positive for congestion, rhinorrhea and sore throat. Negative for ear pain.    Eyes: Negative for photophobia and visual disturbance.   Respiratory: Negative for shortness of breath and wheezing.    Cardiovascular: Negative for chest pain and leg swelling.   Gastrointestinal: Negative for nausea and vomiting.   Musculoskeletal: Negative for neck stiffness.   Neurological: Positive for headaches.   All other systems reviewed and are negative.      Past Medical History:   Diagnosis Date   • CHF (congestive heart failure) (CMS/HCC)    • CPAP (continuous positive airway pressure) dependence    • Hypertension    • Morbid obesity (CMS/HCC)    • Myocardial infarction (CMS/HCC)    • On home O2     2l at bedtime and prn   • Plaque psoriasis    • Pneumonia    • Psoriasis    • Sleep apnea    • Wears glasses        No  Known Allergies    Past Surgical History:   Procedure Laterality Date   • ADENOIDECTOMY     • CARDIAC CATHETERIZATION N/A 7/13/2017    Procedure: Left Heart Cath;  Surgeon: Balbir Olsen MD;  Location:  [facility] CATH INVASIVE LOCATION;  Service:    • CARDIAC DEFIBRILLATOR PLACEMENT  08/2017   • CARDIAC ELECTROPHYSIOLOGY PROCEDURE N/A 8/15/2017    Procedure: VVI ICD Implant;  Surgeon: Nathanael Rcihmond DO;  Location:  [facility] INVASIVE LOCATION;  Service:    • INTERNAL CARDIAC DEFIBRILLATOR INSERTION Left 2017   • NOSE SURGERY     • OTHER SURGICAL HISTORY      ultra light therapy   • SINUS SURGERY     • TONSILLECTOMY         Family History   Problem Relation Age of Onset   • Diabetes Father    • Diabetes Paternal Grandmother    • Diabetes Maternal Grandfather        Social History     Socioeconomic History   • Marital status: Single     Spouse name: Not on file   • Number of children: 0   • Years of education: Not on file   • Highest education level: Not on file   Occupational History   • Occupation: disabled   Tobacco Use   • Smoking status: Never Smoker   • Smokeless tobacco: Never Used   Substance and Sexual Activity   • Alcohol use: No   • Drug use: No   • Sexual activity: Defer   Social History Narrative    Caffeine use: some soda.    Patient lives with parents         Objective   Physical Exam   Constitutional: He is oriented to person, place, and time. He appears well-developed and well-nourished. No distress.   HENT:   Head: Normocephalic and atraumatic.   Nose: Nose normal.   Mouth/Throat: Oropharynx is clear and moist.   Tenderness to maxillary and frontal facial areas. Nasal turbinates are inflamed.   Eyes: Conjunctivae are normal. No scleral icterus.   Neck: Normal range of motion. Neck supple.   Cardiovascular: Normal rate, regular rhythm and normal heart sounds.   Pulmonary/Chest: Effort normal and breath sounds normal. No stridor. No respiratory distress.   Abdominal: Soft. There is no tenderness.  "  Musculoskeletal: Normal range of motion.   Neurological: He is alert and oriented to person, place, and time.   Skin: Skin is warm and dry.   Psychiatric: He has a normal mood and affect. His behavior is normal.   Nursing note and vitals reviewed.      Procedures         ED Course     No results found for this or any previous visit (from the past 24 hour(s)).  Note: In addition to lab results from this visit, the labs listed above may include labs taken at another facility or during a different encounter within the last 24 hours. Please correlate lab times with ED admission and discharge times for further clarification of the services performed during this visit.    CT Maxillofacial Without Contrast   Final Result   No acute findings.       THIS DOCUMENT HAS BEEN ELECTRONICALLY SIGNED BY ANDREI WEBER MD      XR Chest PA & Lateral   Final Result   Clear lungs.       THIS DOCUMENT HAS BEEN ELECTRONICALLY SIGNED BY ANDREI WEBER MD        Vitals:    02/02/19 1954   BP: 130/62   BP Location: Left arm   Patient Position: Sitting   Pulse: 76   Resp: 18   Temp: 97.9 °F (36.6 °C)   TempSrc: Oral   SpO2: 96%   Weight: (!) 181 kg (400 lb)   Height: 175.3 cm (69\")     Medications - No data to display  ECG/EMG Results (last 24 hours)     ** No results found for the last 24 hours. **        No orders to display                     MDM    Final diagnoses:   Sinus congestion   Facial pain       Documentation assistance provided by josé Kiser.  Information recorded by the josé was done at my direction and has been verified and validated by me.     Cristian Kiser  02/02/19 2019       Jerrell Goss PA-C  02/02/19 2255       Jerrell Goss PA-C  02/02/19 2255       Jerrell Goss PA-C  02/02/19 2259    "

## 2019-02-03 NOTE — DISCHARGE INSTRUCTIONS
Alternate Tylenol and ibuprofen every 3-4 hours to control fever and body aches.  Follow-up with your primary care provider in 2-3 days.  Return immediately if any change or worsening of symptoms.

## 2019-02-04 ENCOUNTER — PATIENT OUTREACH (OUTPATIENT)
Dept: CASE MANAGEMENT | Facility: OTHER | Age: 29
End: 2019-02-04

## 2019-02-04 NOTE — OUTREACH NOTE
Talked with patient. Discussed 1/31/19 and 2/2/19 ED visit regarding influenza and sinus congestion . Patient states to be compliant with ED recommendations of Amoxicillin (3 times per day) and states symptoms have improved.He reports no difficulty with fever; cough or muscle aches. He states to have a slight headache and no swelling to lower extremities. Patient states to be compliant with his regular medications and will schedule PCP appointment and reschedule cardiology appointment. He reports no difficulty with chest pain; SOB; appetite or sleeping. He now has access to Wheels for transportation. Reviewed with patient benefit of compliance with medications ;medical appointments; Advance Directives (declines); gaps in care; My Chart (Active); and EARLENE diet. Patient verbalized understanding. No further questions or concerns voiced at this time.

## 2019-02-04 NOTE — OUTREACH NOTE
Talked with patient. Discussed 1/28/19 ED visit regarding . Patient states to be compliant with ED recommendations and states symptoms have improved.

## 2019-02-20 ENCOUNTER — PATIENT OUTREACH (OUTPATIENT)
Dept: CASE MANAGEMENT | Facility: OTHER | Age: 29
End: 2019-02-20

## 2019-02-20 NOTE — OUTREACH NOTE
"Talked with patient.  Patient reports no difficulties with chest pain; SOB; appetite or sleeping. He states to have some \"fluid buildup\" and requests  appointment with SONAM Galindo (Cardiology) regarding CHF. CA assisted and appointment scheduled for 2/25/19 at 11:15 AM. Reviewed with patient education regarding benefit of having PCP; use of ED and offered assistance in obtaining a PCP. Patient verbalized understanding but declines obtaining PCP at at this time. No further questions or concerns voiced at this time.   "

## 2019-02-25 ENCOUNTER — DOCUMENTATION (OUTPATIENT)
Dept: CARDIOLOGY | Facility: HOSPITAL | Age: 29
End: 2019-02-25

## 2019-02-25 NOTE — PROGRESS NOTES
From Bariatric Surgery:     Comments (most recent listed first): Patient never returned the patient information packet, which we must review for insurance criteria to schedule. Please advise pt. and close referral. Route back to Ambulatory, MAILED NPP (New Patient Packet). Will schedule pending pt meets criteria (as set by their insurance). Please defer for 60 days., We mailed this pt a packet in july with no response.

## 2019-03-06 ENCOUNTER — PATIENT OUTREACH (OUTPATIENT)
Dept: CASE MANAGEMENT | Facility: OTHER | Age: 29
End: 2019-03-06

## 2019-03-13 ENCOUNTER — PATIENT OUTREACH (OUTPATIENT)
Dept: CASE MANAGEMENT | Facility: OTHER | Age: 29
End: 2019-03-13

## 2019-03-13 NOTE — OUTREACH NOTE
Unable to reach patient / attempt x 2  for Care Advising follow up. Left voicemail with CA contact information.

## 2019-03-19 ENCOUNTER — CLINICAL SUPPORT NO REQUIREMENTS (OUTPATIENT)
Dept: CARDIOLOGY | Facility: CLINIC | Age: 29
End: 2019-03-19

## 2019-03-19 DIAGNOSIS — I50.22 CHRONIC SYSTOLIC HEART FAILURE (HCC): Primary | ICD-10-CM

## 2019-03-19 PROCEDURE — 93296 REM INTERROG EVL PM/IDS: CPT | Performed by: INTERNAL MEDICINE

## 2019-03-19 PROCEDURE — 93295 DEV INTERROG REMOTE 1/2/MLT: CPT | Performed by: INTERNAL MEDICINE

## 2019-03-21 ENCOUNTER — PATIENT OUTREACH (OUTPATIENT)
Dept: CASE MANAGEMENT | Facility: OTHER | Age: 29
End: 2019-03-21

## 2019-03-21 NOTE — OUTREACH NOTE
Unable to reach patient / attempt x 3  for Care Advising follow up. Left voicemail with CA contact information.

## 2019-03-25 RX ORDER — TORSEMIDE 20 MG/1
TABLET ORAL
Qty: 90 TABLET | Refills: 5 | OUTPATIENT
Start: 2019-03-25

## 2019-03-25 NOTE — TELEPHONE ENCOUNTER
Refused refill for torsemide and notified pharmacy that patient needs an appointment.    Libia Anderson, PharmD

## 2019-03-29 DIAGNOSIS — I42.0 NONISCHEMIC CONGESTIVE CARDIOMYOPATHY (HCC): ICD-10-CM

## 2019-04-02 ENCOUNTER — OFFICE VISIT (OUTPATIENT)
Dept: CARDIOLOGY | Facility: HOSPITAL | Age: 29
End: 2019-04-02

## 2019-04-02 VITALS
BODY MASS INDEX: 46.65 KG/M2 | DIASTOLIC BLOOD PRESSURE: 89 MMHG | OXYGEN SATURATION: 88 % | WEIGHT: 315 LBS | RESPIRATION RATE: 14 BRPM | HEIGHT: 69 IN | HEART RATE: 102 BPM | SYSTOLIC BLOOD PRESSURE: 146 MMHG

## 2019-04-02 DIAGNOSIS — I42.0 NONISCHEMIC CONGESTIVE CARDIOMYOPATHY (HCC): Primary | ICD-10-CM

## 2019-04-02 DIAGNOSIS — I50.22 CHRONIC SYSTOLIC CONGESTIVE HEART FAILURE (HCC): ICD-10-CM

## 2019-04-02 DIAGNOSIS — E66.01 MORBID OBESITY (HCC): ICD-10-CM

## 2019-04-02 LAB
ALBUMIN SERPL-MCNC: 3.71 G/DL (ref 3.2–4.8)
ALBUMIN/GLOB SERPL: 1.3 G/DL (ref 1.5–2.5)
ALP SERPL-CCNC: 75 U/L (ref 25–100)
ALT SERPL W P-5'-P-CCNC: 36 U/L (ref 7–40)
ANION GAP SERPL CALCULATED.3IONS-SCNC: 10 MMOL/L (ref 3–11)
ARTICHOKE IGE QN: 82 MG/DL (ref 0–130)
AST SERPL-CCNC: 28 U/L (ref 0–33)
BILIRUB SERPL-MCNC: 0.8 MG/DL (ref 0.3–1.2)
BNP SERPL-MCNC: 375 PG/ML (ref 0–100)
BUN BLD-MCNC: 6 MG/DL (ref 9–23)
BUN/CREAT SERPL: 6.5 (ref 7–25)
CALCIUM SPEC-SCNC: 8.8 MG/DL (ref 8.7–10.4)
CHLORIDE SERPL-SCNC: 104 MMOL/L (ref 99–109)
CHOLEST SERPL-MCNC: 122 MG/DL (ref 0–200)
CO2 SERPL-SCNC: 25 MMOL/L (ref 20–31)
CREAT BLD-MCNC: 0.92 MG/DL (ref 0.6–1.3)
DEPRECATED RDW RBC AUTO: 41.4 FL (ref 37–54)
ERYTHROCYTE [DISTWIDTH] IN BLOOD BY AUTOMATED COUNT: 14.2 % (ref 11.3–14.5)
GFR SERPL CREATININE-BSD FRML MDRD: 121 ML/MIN/1.73
GLOBULIN UR ELPH-MCNC: 2.8 GM/DL
GLUCOSE BLD-MCNC: 89 MG/DL (ref 70–100)
HCT VFR BLD AUTO: 38.2 % (ref 38.9–50.9)
HDLC SERPL-MCNC: 35 MG/DL (ref 40–60)
HGB BLD-MCNC: 12.7 G/DL (ref 13.1–17.5)
MCH RBC QN AUTO: 26.7 PG (ref 27–31)
MCHC RBC AUTO-ENTMCNC: 33.2 G/DL (ref 32–36)
MCV RBC AUTO: 80.3 FL (ref 80–99)
PLATELET # BLD AUTO: 239 10*3/MM3 (ref 150–450)
PMV BLD AUTO: 11.4 FL (ref 6–12)
POTASSIUM BLD-SCNC: 3.9 MMOL/L (ref 3.5–5.5)
PROT SERPL-MCNC: 6.5 G/DL (ref 5.7–8.2)
RBC # BLD AUTO: 4.76 10*6/MM3 (ref 4.2–5.76)
SODIUM BLD-SCNC: 139 MMOL/L (ref 132–146)
TRIGL SERPL-MCNC: 64 MG/DL (ref 0–150)
TSH SERPL DL<=0.05 MIU/L-ACNC: 1.87 MIU/ML (ref 0.35–5.35)
WBC NRBC COR # BLD: 6.86 10*3/MM3 (ref 3.5–10.8)

## 2019-04-02 PROCEDURE — 83880 ASSAY OF NATRIURETIC PEPTIDE: CPT | Performed by: NURSE PRACTITIONER

## 2019-04-02 PROCEDURE — 85027 COMPLETE CBC AUTOMATED: CPT | Performed by: NURSE PRACTITIONER

## 2019-04-02 PROCEDURE — 80053 COMPREHEN METABOLIC PANEL: CPT | Performed by: NURSE PRACTITIONER

## 2019-04-02 PROCEDURE — 84443 ASSAY THYROID STIM HORMONE: CPT | Performed by: NURSE PRACTITIONER

## 2019-04-02 PROCEDURE — 99214 OFFICE O/P EST MOD 30 MIN: CPT | Performed by: NURSE PRACTITIONER

## 2019-04-02 PROCEDURE — 80061 LIPID PANEL: CPT | Performed by: NURSE PRACTITIONER

## 2019-04-02 RX ORDER — TORSEMIDE 20 MG/1
TABLET ORAL
Qty: 90 TABLET | Refills: 5 | Status: SHIPPED | OUTPATIENT
Start: 2019-04-02 | End: 2019-10-17 | Stop reason: SDUPTHER

## 2019-04-02 RX ORDER — FUROSEMIDE 10 MG/ML
80 INJECTION INTRAMUSCULAR; INTRAVENOUS ONCE
Status: DISCONTINUED | OUTPATIENT
Start: 2019-04-02 | End: 2019-04-30

## 2019-04-02 RX ORDER — SPIRONOLACTONE 25 MG/1
TABLET ORAL
Qty: 30 TABLET | Refills: 5 | Status: SHIPPED | OUTPATIENT
Start: 2019-04-02 | End: 2020-02-20 | Stop reason: DRUGHIGH

## 2019-04-02 RX ORDER — CARVEDILOL 25 MG/1
TABLET ORAL
Qty: 60 TABLET | Refills: 5 | Status: SHIPPED | OUTPATIENT
Start: 2019-04-02 | End: 2020-04-03 | Stop reason: SDUPTHER

## 2019-04-02 RX ORDER — ASPIRIN 81 MG/1
TABLET ORAL
Qty: 30 TABLET | Refills: 5 | Status: SHIPPED | OUTPATIENT
Start: 2019-04-02 | End: 2019-08-19 | Stop reason: SDUPTHER

## 2019-04-02 RX ORDER — SACUBITRIL AND VALSARTAN 49; 51 MG/1; MG/1
TABLET, FILM COATED ORAL
Qty: 60 TABLET | Refills: 5 | Status: SHIPPED | OUTPATIENT
Start: 2019-04-02 | End: 2020-04-03 | Stop reason: SDUPTHER

## 2019-04-02 NOTE — TELEPHONE ENCOUNTER
Patient seen today in office, 4/2/19; sent refills for Entresto, carvedilol, torsemide, spironolactone, and aspirin.    Libia Anderson, HubertD

## 2019-04-02 NOTE — PROGRESS NOTES
[facility]      Encounter Date:04/02/2019     Evan Gannon  [address] [facility] [address]  [unfilled]    1990    Barbara Mills MD    Evan Gannon is a 28 y.o. male.      Subjective:     Chief Complaint:  Follow-up (Medication refill, SOB, Chest pain)       HPI     28-year-old male with a history of nonischemic cardiomyopathy, chronic systolic heart failure.  Morbid obesity with BMI of 59.  Has been evaluated by [facility] for heart transplant or LVAD in the past.  Reports does not meet weight requirement and has been considered for bariatric surgery.      Patient presents today for follow-up and refill of cardiac medications.  Pt reports being without his meds for >1 week.  Increased despnea, edema, weight gain.  He is not following a NA restriction.  No recent hospitalizations for HF, but has been in the ED X3 for respiratory issues (flu, bronchitis, ect).      Patient Active Problem List    Diagnosis   • CHF exacerbation (CMS/HCC) [I50.9]   • Severe obstructive sleep apnea [G47.33]   • Painless hematuria [R31.9]   • Plaque psoriasis [L40.0]   • CHF (congestive heart failure), NYHA class IV (CMS/HCC) [I50.9]   • Acute on chronic systolic heart failure (CMS/HCC) [I50.23]   • Nonischemic congestive cardiomyopathy (CMS/HCC) [I42.0]   • Personal history of noncompliance with medical treatment [Z91.19]   • Hypertension [I10]   • Morbid obesity (CMS/HCC) [E66.01]         Past Surgical History:   Procedure Laterality Date   • ADENOIDECTOMY     • CARDIAC CATHETERIZATION N/A 7/13/2017    Procedure: Left Heart Cath;  Surgeon: Balbir Olsen MD;  Location:  [facility] CATH INVASIVE LOCATION;  Service:    • CARDIAC DEFIBRILLATOR PLACEMENT  08/2017   • CARDIAC ELECTROPHYSIOLOGY PROCEDURE N/A 8/15/2017    Procedure: VVI ICD Implant;  Surgeon: Nathanael Richmond DO;  Location:  [facility] INVASIVE LOCATION;  Service:    • INTERNAL CARDIAC DEFIBRILLATOR INSERTION Left 2017   •  NOSE SURGERY     • OTHER SURGICAL HISTORY      ultra light therapy   • SINUS SURGERY     • TONSILLECTOMY         No Known Allergies      Current Outpatient Medications:   •  albuterol (PROVENTIL HFA;VENTOLIN HFA) 108 (90 Base) MCG/ACT inhaler, Inhale 2 puffs Every 6 (Six) Hours As Needed for Wheezing., Disp: 1 inhaler, Rfl: 11  •  allopurinol (ZYLOPRIM) 100 MG tablet, Take 1 tablet by mouth Daily., Disp: 30 tablet, Rfl: 5  •  amoxicillin (AMOXIL) 500 MG capsule, Take 1 capsule by mouth 3 (Three) Times a Day., Disp: 21 capsule, Rfl: 0  •  aspirin 81 MG EC tablet, TAKE ONE TABLET BY MOUTH DAILY, Disp: 30 tablet, Rfl: 5  •  carvedilol (COREG) 25 MG tablet, TAKE ONE TABLET BY MOUTH TWICE A DAY WITH MEALS, Disp: 60 tablet, Rfl: 5  •  ENTRESTO 49-51 MG tablet, TAKE ONE TABLET BY MOUTH EVERY 12 HOURS, Disp: 60 tablet, Rfl: 5  •  metOLazone (ZAROXOLYN) 5 MG tablet, Take 1 tablet by mouth 3 (Three) Times a Week. (Patient taking differently: Take 5 mg by mouth 3 (Three) Times a Week. [facility]), Disp: 12 tablet, Rfl: 5  •  oseltamivir (TAMIFLU) 75 MG capsule, Take 1 capsule by mouth Every 12 (Twelve) Hours. (Patient taking differently: Take 75 mg by mouth Every 12 (Twelve) Hours. Pt did  Not fill this prescription), Disp: 10 capsule, Rfl: 0  •  spironolactone (ALDACTONE) 25 MG tablet, TAKE ONE TABLET BY MOUTH DAILY, Disp: 30 tablet, Rfl: 5  •  torsemide (DEMADEX) 20 MG tablet, TAKE THREE TABLETS BY MOUTH DAILY, Disp: 90 tablet, Rfl: 5    Current Facility-Administered Medications:   •  furosemide (LASIX) injection 80 mg, 80 mg, Intravenous, Once, Jignesh Cross APRN    The following portions of the patient's history were reviewed and updated today during office visit as appropriate: allergies, current medications, past family history, past medical history, past social history, past surgical history and problem list.    Review of Systems   Constitution: Negative.   Cardiovascular: Positive for dyspnea on exertion and leg swelling.  "  Respiratory: Negative.    Hematologic/Lymphatic: Negative for bleeding problem. Does not bruise/bleed easily.   Skin: Negative for rash.   Musculoskeletal: Negative for muscle weakness and myalgias.   Gastrointestinal: Negative for heartburn, nausea and vomiting.   Neurological: Negative.    All other systems reviewed and are negative.      Objective:     Vitals:    04/02/19 0943 04/02/19 0944   BP: 142/88 146/89   BP Location: Right arm Right arm   Patient Position: Sitting Standing   Cuff Size: Large Adult Adult   Pulse: 102 102   Resp: 14    SpO2: 91% (!) 88%   Weight: (!) 190 kg (419 lb)    Height: 175.3 cm (69.02\")          Physical Exam   Constitutional: He is oriented to person, place, and time. He appears well-developed and well-nourished.   Morbidly obese   Neck: No JVD present. No tracheal deviation present.   Cardiovascular: Normal rate, regular rhythm and normal heart sounds. Exam reveals no friction rub.   No murmur heard.  Pulmonary/Chest: Effort normal. No respiratory distress. He has decreased breath sounds. He has rales.   Abdominal: Soft. Bowel sounds are normal. There is no tenderness.   Musculoskeletal: He exhibits edema. He exhibits no deformity.   Neurological: He is alert and oriented to person, place, and time.       Lab and Diagnostic Review:  Lab Results   Component Value Date    WBC 7.41 01/01/2019    HGB 14.8 01/01/2019    HCT 44.3 01/01/2019    MCV 80.7 01/01/2019     01/01/2019     Lab Results   Component Value Date    GLUCOSE 91 01/01/2019    BUN 8 (L) 01/01/2019    CREATININE 0.92 01/01/2019    EGFRIFAFRI 121 01/01/2019    BCR 8.7 01/01/2019    K 3.4 (L) 01/01/2019    CO2 29.0 01/01/2019    CALCIUM 9.0 01/01/2019    ALBUMIN 4.20 01/01/2019    AST 24 01/01/2019    ALT 30 01/01/2019     Lab Results   Component Value Date    CHOL 101 07/14/2017    CHOL 109 07/12/2017     Lab Results   Component Value Date    TRIG 97 07/14/2017    TRIG 77 07/12/2017     Lab Results   Component " Value Date    HDL 24 (L) 07/14/2017    HDL 24 (L) 07/12/2017     Lab Results   Component Value Date    LDL 68 07/14/2017    LDL 80 07/12/2017     Lab Results   Component Value Date    URICACID 14.4 (H) 08/29/2018     No results found for: TSH    EKG 1/5/2019: Sinus tachycardia 104 bpm    [employer] home monitoring, ICD, 3/19/2019:  Single Chamber ICD Remote  Scheduled Transmission  Dx: chronic systolic CHF, obesity  Battery Voltage: 3.12  Chg Time: 9.4 secs for 40 j   0 % VVI 40  Episodes: several ATR episodes, longest 5 mins    IV diuresis today in office. Patient received Lasix 80 mg  today through a butterfly in the right AC over slow IV push. During IV diuresis, vitals were monitored and stable. Please see IV diuresis record for those vitals. Patient voided 550 ml in the office prior to discharge from the office.  Area was free of erythema, ecchymosis, or drainage.   Assessment and Plan:         1. Nonischemic congestive cardiomyopathy (CMS/HCC)  EF 15%,   ICD    2. Chronic systolic congestive heart failure (CMS/HCC)  NYHA III    - Comprehensive Metabolic Panel  - CBC (No Diff)  - BNP  - TSH    - furosemide (LASIX) injection 80 mg [id number] inclinic    Refill cardiac meds    3. Morbid obesity (CMS/HCC)    - Lipid Panel    F/u 4 weeks with Marah Murdock APRN or sooner if needed.   F/u sooner if s/s worsen or do not improved.  Take meds as prescribed.  Educated to not go without meds and to keep f/u visit.    *Please note that portions of this note were completed with a voice recognition program. Efforts were made to edit the dictations, but occasionally words are mistranscribed.

## 2019-04-12 ENCOUNTER — PATIENT OUTREACH (OUTPATIENT)
Dept: CASE MANAGEMENT | Facility: OTHER | Age: 29
End: 2019-04-12

## 2019-04-12 NOTE — OUTREACH NOTE
Talked with patient. Patient discusses recent cardiology appointment. He states to have all his medications and taking them as prescribed. He reports edema to lower extremities has improved and breathing is easier. He reports no difficulty with chest pain; SOB; appetite or sleeping. He lives with his parents; independent with ADL's and receiving assistance with transportation. Reviewed with patient education regarding CHF; medication adherence;  diet; daily weights; elevation of legs to decrease edema; compliance with physician follow up; MWV; and appointment with PCP. Patient verbalized understanding. No further questions or concerns voiced at this time.

## 2019-04-30 ENCOUNTER — OFFICE VISIT (OUTPATIENT)
Dept: CARDIOLOGY | Facility: HOSPITAL | Age: 29
End: 2019-04-30

## 2019-04-30 VITALS
SYSTOLIC BLOOD PRESSURE: 141 MMHG | HEART RATE: 92 BPM | TEMPERATURE: 97.1 F | DIASTOLIC BLOOD PRESSURE: 76 MMHG | HEIGHT: 69 IN | OXYGEN SATURATION: 97 % | RESPIRATION RATE: 18 BRPM | WEIGHT: 315 LBS | BODY MASS INDEX: 46.65 KG/M2

## 2019-04-30 DIAGNOSIS — I42.0 NONISCHEMIC CONGESTIVE CARDIOMYOPATHY (HCC): Primary | ICD-10-CM

## 2019-04-30 DIAGNOSIS — I10 ESSENTIAL HYPERTENSION: ICD-10-CM

## 2019-04-30 DIAGNOSIS — I50.22 CHRONIC SYSTOLIC CONGESTIVE HEART FAILURE (HCC): ICD-10-CM

## 2019-04-30 PROCEDURE — 99214 OFFICE O/P EST MOD 30 MIN: CPT | Performed by: NURSE PRACTITIONER

## 2019-04-30 NOTE — PROGRESS NOTES
Encounter Date:04/30/2019      Patient ID: Evan Gannon is a 28 y.o. male.        Subjective:     Chief Complaint: Follow-up   F  History of Present Illness presents to the office today for ongoing evaluation of his chronic systolic heart failure.  Patient was seen last in the office on 4/2/2019 and underwent IV diuresis.  He notes he has been doing well from a cardiac standpoint since then.  He has lost 10 pounds.  He reports he is taking his medications as prescribed and is willing to take part in his medical care.  He is under the care of dermatology for his plaque psoriasis and will begin treatment in the near future.  He does report mild chest pain and left upper chest that worsens when playing the drums or after eating spicy foods.  Chest pain is not related to activity as far as walking or climbing stairs.    Patient Active Problem List   Diagnosis   • Hypertension   • Morbid obesity (CMS/HCC)   • Severe obstructive sleep apnea   • Nonischemic congestive cardiomyopathy (CMS/HCC)   • Personal history of noncompliance with medical treatment   • Acute on chronic systolic heart failure (CMS/HCC)   • CHF (congestive heart failure), NYHA class IV (CMS/HCC)   • Plaque psoriasis   • Painless hematuria   • CHF exacerbation (CMS/HCC)       Past Surgical History:   Procedure Laterality Date   • ADENOIDECTOMY     • CARDIAC CATHETERIZATION N/A 7/13/2017    Procedure: Left Heart Cath;  Surgeon: Balbir Olsen MD;  Location:  [facility];  Service:    • CARDIAC DEFIBRILLATOR PLACEMENT  08/2017   • CARDIAC ELECTROPHYSIOLOGY PROCEDURE N/A 8/15/2017    Procedure: VVI ICD Implant;  Surgeon: Nathanael Richmond DO;  Location:  [facility] INVASIVE [facility];  Service:    • INTERNAL CARDIAC DEFIBRILLATOR INSERTION Left 2017   • NOSE SURGERY     • OTHER SURGICAL HISTORY      ultra light therapy   • SINUS SURGERY     • TONSILLECTOMY         No Known Allergies      Current Outpatient Medications:   •  albuterol  (PROVENTIL HFA;VENTOLIN HFA) 108 (90 Base) MCG/ACT inhaler, Inhale 2 puffs Every 6 (Six) Hours As Needed for Wheezing., Disp: 1 inhaler, Rfl: 11  •  allopurinol (ZYLOPRIM) 100 MG tablet, Take 1 tablet by mouth Daily., Disp: 30 tablet, Rfl: 5  •  aspirin 81 MG EC tablet, TAKE ONE TABLET BY MOUTH DAILY, Disp: 30 tablet, Rfl: 5  •  carvedilol (COREG) 25 MG tablet, TAKE ONE TABLET BY MOUTH TWICE A DAY WITH MEALS, Disp: 60 tablet, Rfl: 5  •  ENTRESTO 49-51 MG tablet, TAKE ONE TABLET BY MOUTH EVERY 12 HOURS, Disp: 60 tablet, Rfl: 5  •  spironolactone (ALDACTONE) 25 MG tablet, TAKE ONE TABLET BY MOUTH DAILY, Disp: 30 tablet, Rfl: 5  •  torsemide (DEMADEX) 20 MG tablet, TAKE THREE TABLETS BY MOUTH DAILY, Disp: 90 tablet, Rfl: 5  No current facility-administered medications for this visit.     The following portions of the chart were reviewed today and updated as appropriate: Allergies, current medications, past family history, social history, past medical history.     Review of Systems   Constitution: Positive for malaise/fatigue. Negative for chills, decreased appetite, diaphoresis, fever, weakness, night sweats, weight gain and weight loss.   HENT: Negative for congestion, hearing loss, hoarse voice and nosebleeds.    Eyes: Negative for blurred vision, visual disturbance and visual halos.   Cardiovascular: Positive for chest pain. Negative for claudication, cyanosis, dyspnea on exertion, irregular heartbeat, leg swelling, near-syncope, orthopnea, palpitations, paroxysmal nocturnal dyspnea and syncope.   Respiratory: Negative for cough, hemoptysis, shortness of breath, sleep disturbances due to breathing, snoring, sputum production and wheezing.    Hematologic/Lymphatic: Negative for bleeding problem. Does not bruise/bleed easily.   Skin: Negative for dry skin, itching and rash.   Musculoskeletal: Negative for arthritis, falls, joint pain, joint swelling and myalgias.   Gastrointestinal: Negative for bloating, abdominal  "pain, constipation, diarrhea, flatus, heartburn, hematemesis, hematochezia, melena, nausea and vomiting.   Genitourinary: Negative for dysuria, frequency, hematuria, nocturia and urgency.   Neurological: Negative for excessive daytime sleepiness, dizziness, headaches, light-headedness and loss of balance.   Psychiatric/Behavioral: Negative for depression. The patient does not have insomnia and is not nervous/anxious.            Objective:     Vitals:    04/30/19 1005 04/30/19 1008   BP: 125/70 141/76   BP Location: Right arm Right arm   Patient Position: Sitting Standing   Pulse: 86 92   Resp: 18    Temp: 97.1 °F (36.2 °C)    TempSrc: Temporal    SpO2: 97%    Weight: (!) 186 kg (409 lb 12.8 oz)    Height: 175.3 cm (69.02\")          Physical Exam   Constitutional: He is oriented to person, place, and time. He appears well-developed and well-nourished. He is active and cooperative. No distress.   Morbidly obese   HENT:   Head: Normocephalic and atraumatic.   Mouth/Throat: Oropharynx is clear and moist.   Eyes: Conjunctivae and EOM are normal. Pupils are equal, round, and reactive to light.   Neck: Normal range of motion. Neck supple. No JVD present. No tracheal deviation present. No thyromegaly present.   Cardiovascular: Normal rate, regular rhythm, normal heart sounds and intact distal pulses.   Pulmonary/Chest: Effort normal and breath sounds normal.   Slightly decreased breath sounds in bases    Abdominal: Soft. Bowel sounds are normal. He exhibits no distension. There is no tenderness.   Musculoskeletal: Normal range of motion.   Neurological: He is alert and oriented to person, place, and time.   Skin: Skin is warm, dry and intact.   Plague psoriasis noted bilateral arms, legs   Psychiatric: He has a normal mood and affect. His behavior is normal.   Nursing note and vitals reviewed.      Lab and Diagnostic Review:      Lab Results   Component Value Date    GLUCOSE 89 04/02/2019    CALCIUM 8.8 04/02/2019     " 04/02/2019    K 3.9 04/02/2019    CO2 25.0 04/02/2019     04/02/2019    BUN 6 (L) 04/02/2019    CREATININE 0.92 04/02/2019    EGFRIFAFRI 121 04/02/2019    BCR 6.5 (L) 04/02/2019    ANIONGAP 10.0 04/02/2019          Assessment and Plan:         1. Nonischemic congestive cardiomyopathy (CMS/HCC)    - Adult Transthoracic Echo Complete W/ Cont if Necessary Per Protocol; Future    2. Chronic systolic congestive heart failure (CMS/HCC)  euvolemic  Continue coreg, entresto, torsemide, aldactone   - Adult Transthoracic Echo Complete W/ Cont if Necessary Per Protocol; Future  Heart failure education today including signs and symptoms, the role of the heart failure center, daily weights, low sodium diet (less than 1500 mg per day), and daily physical activity. Reviewed HF Zones with patient and family.  Patient to continue current medications as previously ordered.   Recommended DASH DIET, one month of sample menus provided to patient  Will schedule patient to see Dr Pritchard in near future for device check.   3. Essential hypertension  Well controlled  HTN Education provided today including signs and symptoms, medication management, daily blood pressure monitoring. Patient encouraged to call the Heart and Valve center with any abnormal readings.     F/u 8 weeks    It has been a pleasure to participate in the care of this patient.  Patient was instructed to call the Heart and Valve Center with any questions, concerns, or worsening symptoms.    * Please note that portions of this note were completed with a voice recognition program. Efforts were made to edit the dictation but occasionally words are transcribed.

## 2019-05-01 ENCOUNTER — PATIENT OUTREACH (OUTPATIENT)
Dept: CASE MANAGEMENT | Facility: OTHER | Age: 29
End: 2019-05-01

## 2019-05-01 NOTE — OUTREACH NOTE
Talked with patient. Patient discusses recent cardiology appointment. He states to be compliant with medications and has had no difficulty with chest pain; SOB; appetite; sleeping or edema. Reviewed with patient benefit of PCP and follow ups. He verbalized understanding. No further questions or concerns voiced at this time.

## 2019-05-14 ENCOUNTER — PATIENT OUTREACH (OUTPATIENT)
Dept: CASE MANAGEMENT | Facility: OTHER | Age: 29
End: 2019-05-14

## 2019-05-14 ENCOUNTER — EPISODE CHANGES (OUTPATIENT)
Dept: CASE MANAGEMENT | Facility: OTHER | Age: 29
End: 2019-05-14

## 2019-05-14 NOTE — OUTREACH NOTE
Patient Outreach Note  Talked with patient. Patient discusses recent cardiology appointment. He states to be compliant with medications; use of CPAP; cardiology medical appointments. He has not been able to weigh but states to be following low salt diet. Reviewed with patient 24/7 Nurse Line Telephone number ; benefits of establishing care with PCP; Advance Directives (send information); and My Chart (active). Patient verbalized understanding. He states to be evaluating establishing with PCP and will be making a decision. Will continue to follow.     Maria R Fuentes RN    5/14/2019, 5:56 PM

## 2019-05-29 ENCOUNTER — PATIENT OUTREACH (OUTPATIENT)
Dept: CASE MANAGEMENT | Facility: OTHER | Age: 29
End: 2019-05-29

## 2019-05-29 ENCOUNTER — EPISODE CHANGES (OUTPATIENT)
Dept: CASE MANAGEMENT | Facility: OTHER | Age: 29
End: 2019-05-29

## 2019-05-29 NOTE — OUTREACH NOTE
Patient Outreach Note  Talked with patient. Patient states to be compliant with medications and use of CPAP. He reports seeing Dr. Jacek Dean (dermatologist)  and  self administering by epi pen injection treatment for psoriasis. He has dermatology follow up appointment 6/11/19. Patient reports no difficulties with chest pain; appetite or sleeping. He did have episodes of SOB this past week. Patient states to use air fryer for cooking and monitors salt intake. Patient exercises with playing drums.Reviewed with patient education regarding CHF; weight loss; diet; orthodontist appointment; compliance with medical appointments and  benefit of having PCP. Patient verbalized understanding. Phone number given regarding Primary Care Coordinators for PCP information. No further questions or concerns voiced at this time.     Maria R Fuentes RN    5/29/2019, 1:52 PM

## 2019-06-05 ENCOUNTER — TELEPHONE (OUTPATIENT)
Dept: CARDIOLOGY | Facility: CLINIC | Age: 29
End: 2019-06-05

## 2019-06-05 NOTE — TELEPHONE ENCOUNTER
Called and left message regarding his [employer] home monitor isn't reading.  Also that he needs a f/u appt.  He has no showed his last appt.

## 2019-06-17 ENCOUNTER — APPOINTMENT (OUTPATIENT)
Dept: GENERAL RADIOLOGY | Facility: HOSPITAL | Age: 29
End: 2019-06-17

## 2019-06-17 ENCOUNTER — PATIENT OUTREACH (OUTPATIENT)
Dept: CASE MANAGEMENT | Facility: OTHER | Age: 29
End: 2019-06-17

## 2019-06-17 ENCOUNTER — APPOINTMENT (OUTPATIENT)
Dept: CT IMAGING | Facility: HOSPITAL | Age: 29
End: 2019-06-17

## 2019-06-17 ENCOUNTER — HOSPITAL ENCOUNTER (OUTPATIENT)
Facility: HOSPITAL | Age: 29
Setting detail: OBSERVATION
LOS: 1 days | Discharge: HOME | End: 2019-06-19
Attending: EMERGENCY MEDICINE | Admitting: INTERNAL MEDICINE

## 2019-06-17 DIAGNOSIS — I50.33 ACUTE ON CHRONIC DIASTOLIC CONGESTIVE HEART FAILURE (HCC): ICD-10-CM

## 2019-06-17 DIAGNOSIS — J18.9 PNEUMONIA OF RIGHT LOWER LOBE DUE TO INFECTIOUS ORGANISM: Primary | ICD-10-CM

## 2019-06-17 LAB
ALBUMIN SERPL-MCNC: 3.7 G/DL (ref 3.5–5.2)
ALBUMIN/GLOB SERPL: 0.9 G/DL
ALP SERPL-CCNC: 75 U/L (ref 39–117)
ALT SERPL W P-5'-P-CCNC: 27 U/L (ref 1–41)
ANION GAP SERPL CALCULATED.3IONS-SCNC: 12 MMOL/L
AST SERPL-CCNC: 24 U/L (ref 1–40)
BASOPHILS # BLD AUTO: 0.03 10*3/MM3 (ref 0–0.2)
BASOPHILS NFR BLD AUTO: 0.3 % (ref 0–1.5)
BILIRUB SERPL-MCNC: 1.1 MG/DL (ref 0.2–1.2)
BUN BLD-MCNC: 11 MG/DL (ref 6–20)
BUN/CREAT SERPL: 10.8 (ref 7–25)
CALCIUM SPEC-SCNC: 9 MG/DL (ref 8.6–10.5)
CHLORIDE SERPL-SCNC: 95 MMOL/L (ref 98–107)
CO2 SERPL-SCNC: 27 MMOL/L (ref 22–29)
CREAT BLD-MCNC: 1.02 MG/DL (ref 0.76–1.27)
D-LACTATE SERPL-SCNC: 1.2 MMOL/L (ref 0.5–2)
DEPRECATED RDW RBC AUTO: 38.1 FL (ref 37–54)
EOSINOPHIL # BLD AUTO: 0.12 10*3/MM3 (ref 0–0.4)
EOSINOPHIL NFR BLD AUTO: 1.3 % (ref 0.3–6.2)
ERYTHROCYTE [DISTWIDTH] IN BLOOD BY AUTOMATED COUNT: 13.4 % (ref 12.3–15.4)
GFR SERPL CREATININE-BSD FRML MDRD: 107 ML/MIN/1.73
GLOBULIN UR ELPH-MCNC: 4.3 GM/DL
GLUCOSE BLD-MCNC: 115 MG/DL (ref 65–99)
HCT VFR BLD AUTO: 42.4 % (ref 37.5–51)
HGB BLD-MCNC: 13.7 G/DL (ref 13–17.7)
HOLD SPECIMEN: NORMAL
HOLD SPECIMEN: NORMAL
IMM GRANULOCYTES # BLD AUTO: 0.03 10*3/MM3 (ref 0–0.05)
IMM GRANULOCYTES NFR BLD AUTO: 0.3 % (ref 0–0.5)
LYMPHOCYTES # BLD AUTO: 1.46 10*3/MM3 (ref 0.7–3.1)
LYMPHOCYTES NFR BLD AUTO: 15.4 % (ref 19.6–45.3)
MCH RBC QN AUTO: 25.6 PG (ref 26.6–33)
MCHC RBC AUTO-ENTMCNC: 32.3 G/DL (ref 31.5–35.7)
MCV RBC AUTO: 79.1 FL (ref 79–97)
MONOCYTES # BLD AUTO: 1.32 10*3/MM3 (ref 0.1–0.9)
MONOCYTES NFR BLD AUTO: 13.9 % (ref 5–12)
NEUTROPHILS # BLD AUTO: 6.51 10*3/MM3 (ref 1.7–7)
NEUTROPHILS NFR BLD AUTO: 68.8 % (ref 42.7–76)
NRBC BLD AUTO-RTO: 0 /100 WBC (ref 0–0.2)
NT-PROBNP SERPL-MCNC: 1007 PG/ML (ref 5–450)
PLATELET # BLD AUTO: 240 10*3/MM3 (ref 140–450)
PMV BLD AUTO: 10.4 FL (ref 6–12)
POTASSIUM BLD-SCNC: 3.4 MMOL/L (ref 3.5–5.2)
PROT SERPL-MCNC: 8 G/DL (ref 6–8.5)
RBC # BLD AUTO: 5.36 10*6/MM3 (ref 4.14–5.8)
SODIUM BLD-SCNC: 134 MMOL/L (ref 136–145)
TROPONIN T SERPL-MCNC: <0.01 NG/ML (ref 0–0.03)
WBC NRBC COR # BLD: 9.47 10*3/MM3 (ref 3.4–10.8)
WHOLE BLOOD HOLD SPECIMEN: NORMAL
WHOLE BLOOD HOLD SPECIMEN: NORMAL

## 2019-06-17 PROCEDURE — 96375 TX/PRO/DX INJ NEW DRUG ADDON: CPT

## 2019-06-17 PROCEDURE — 96367 TX/PROPH/DG ADDL SEQ IV INF: CPT

## 2019-06-17 PROCEDURE — G0378 HOSPITAL OBSERVATION PER HR: HCPCS

## 2019-06-17 PROCEDURE — 85025 COMPLETE CBC W/AUTO DIFF WBC: CPT | Performed by: EMERGENCY MEDICINE

## 2019-06-17 PROCEDURE — 99220 PR INITIAL OBSERVATION CARE/DAY 70 MINUTES: CPT | Performed by: INTERNAL MEDICINE

## 2019-06-17 PROCEDURE — 25010000002 AZITHROMYCIN PER 500 MG: Performed by: EMERGENCY MEDICINE

## 2019-06-17 PROCEDURE — 80053 COMPREHEN METABOLIC PANEL: CPT | Performed by: EMERGENCY MEDICINE

## 2019-06-17 PROCEDURE — 96372 THER/PROPH/DIAG INJ SC/IM: CPT

## 2019-06-17 PROCEDURE — 94660 CPAP INITIATION&MGMT: CPT

## 2019-06-17 PROCEDURE — 96365 THER/PROPH/DIAG IV INF INIT: CPT

## 2019-06-17 PROCEDURE — 25010000002 FUROSEMIDE PER 20 MG: Performed by: EMERGENCY MEDICINE

## 2019-06-17 PROCEDURE — 25010000002 ENOXAPARIN PER 10 MG: Performed by: INTERNAL MEDICINE

## 2019-06-17 PROCEDURE — 83880 ASSAY OF NATRIURETIC PEPTIDE: CPT | Performed by: EMERGENCY MEDICINE

## 2019-06-17 PROCEDURE — 83605 ASSAY OF LACTIC ACID: CPT | Performed by: EMERGENCY MEDICINE

## 2019-06-17 PROCEDURE — 87040 BLOOD CULTURE FOR BACTERIA: CPT | Performed by: EMERGENCY MEDICINE

## 2019-06-17 PROCEDURE — 25010000002 CEFTRIAXONE PER 250 MG: Performed by: EMERGENCY MEDICINE

## 2019-06-17 PROCEDURE — 93005 ELECTROCARDIOGRAM TRACING: CPT | Performed by: EMERGENCY MEDICINE

## 2019-06-17 PROCEDURE — 99285 EMERGENCY DEPT VISIT HI MDM: CPT

## 2019-06-17 PROCEDURE — 93005 ELECTROCARDIOGRAM TRACING: CPT

## 2019-06-17 PROCEDURE — 0 IOPAMIDOL PER 1 ML: Performed by: EMERGENCY MEDICINE

## 2019-06-17 PROCEDURE — 87899 AGENT NOS ASSAY W/OPTIC: CPT | Performed by: INTERNAL MEDICINE

## 2019-06-17 PROCEDURE — 71045 X-RAY EXAM CHEST 1 VIEW: CPT

## 2019-06-17 PROCEDURE — 71275 CT ANGIOGRAPHY CHEST: CPT

## 2019-06-17 PROCEDURE — 73630 X-RAY EXAM OF FOOT: CPT

## 2019-06-17 PROCEDURE — 94799 UNLISTED PULMONARY SVC/PX: CPT

## 2019-06-17 PROCEDURE — 84484 ASSAY OF TROPONIN QUANT: CPT | Performed by: EMERGENCY MEDICINE

## 2019-06-17 RX ORDER — SPIRONOLACTONE 25 MG/1
25 TABLET ORAL DAILY
Status: DISCONTINUED | OUTPATIENT
Start: 2019-06-18 | End: 2019-06-19 | Stop reason: HOSPADM

## 2019-06-17 RX ORDER — ASPIRIN 81 MG/1
81 TABLET ORAL DAILY
Status: DISCONTINUED | OUTPATIENT
Start: 2019-06-17 | End: 2019-06-19 | Stop reason: HOSPADM

## 2019-06-17 RX ORDER — SODIUM CHLORIDE 0.9 % (FLUSH) 0.9 %
10 SYRINGE (ML) INJECTION AS NEEDED
Status: DISCONTINUED | OUTPATIENT
Start: 2019-06-17 | End: 2019-06-19 | Stop reason: HOSPADM

## 2019-06-17 RX ORDER — POTASSIUM CHLORIDE 1.5 G/1.77G
40 POWDER, FOR SOLUTION ORAL AS NEEDED
Status: DISCONTINUED | OUTPATIENT
Start: 2019-06-17 | End: 2019-06-19 | Stop reason: HOSPADM

## 2019-06-17 RX ORDER — CARVEDILOL 12.5 MG/1
25 TABLET ORAL 2 TIMES DAILY WITH MEALS
Status: DISCONTINUED | OUTPATIENT
Start: 2019-06-17 | End: 2019-06-19 | Stop reason: HOSPADM

## 2019-06-17 RX ORDER — POTASSIUM CHLORIDE 750 MG/1
40 CAPSULE, EXTENDED RELEASE ORAL AS NEEDED
Status: DISCONTINUED | OUTPATIENT
Start: 2019-06-17 | End: 2019-06-19 | Stop reason: HOSPADM

## 2019-06-17 RX ORDER — FUROSEMIDE 10 MG/ML
40 INJECTION INTRAMUSCULAR; INTRAVENOUS ONCE
Status: COMPLETED | OUTPATIENT
Start: 2019-06-17 | End: 2019-06-17

## 2019-06-17 RX ORDER — POTASSIUM CHLORIDE 7.45 MG/ML
10 INJECTION INTRAVENOUS
Status: DISCONTINUED | OUTPATIENT
Start: 2019-06-17 | End: 2019-06-19 | Stop reason: HOSPADM

## 2019-06-17 RX ORDER — SODIUM CHLORIDE 0.9 % (FLUSH) 0.9 %
3 SYRINGE (ML) INJECTION EVERY 12 HOURS SCHEDULED
Status: DISCONTINUED | OUTPATIENT
Start: 2019-06-17 | End: 2019-06-19 | Stop reason: HOSPADM

## 2019-06-17 RX ORDER — SODIUM CHLORIDE 0.9 % (FLUSH) 0.9 %
3-10 SYRINGE (ML) INJECTION AS NEEDED
Status: DISCONTINUED | OUTPATIENT
Start: 2019-06-17 | End: 2019-06-19 | Stop reason: HOSPADM

## 2019-06-17 RX ADMIN — FUROSEMIDE 40 MG: 10 INJECTION, SOLUTION INTRAMUSCULAR; INTRAVENOUS at 12:37

## 2019-06-17 RX ADMIN — CEFTRIAXONE SODIUM 2 G: 2 INJECTION, POWDER, FOR SOLUTION INTRAMUSCULAR; INTRAVENOUS at 16:40

## 2019-06-17 RX ADMIN — ENOXAPARIN SODIUM 30 MG: 30 INJECTION SUBCUTANEOUS at 20:14

## 2019-06-17 RX ADMIN — IOPAMIDOL 97 ML: 755 INJECTION, SOLUTION INTRAVENOUS at 15:31

## 2019-06-17 RX ADMIN — SACUBITRIL AND VALSARTAN 1 TABLET: 49; 51 TABLET, FILM COATED ORAL at 20:13

## 2019-06-17 RX ADMIN — AZITHROMYCIN MONOHYDRATE 500 MG: 500 INJECTION, POWDER, LYOPHILIZED, FOR SOLUTION INTRAVENOUS at 17:28

## 2019-06-17 RX ADMIN — ASPIRIN 81 MG: 81 TABLET, COATED ORAL at 20:13

## 2019-06-17 RX ADMIN — CARVEDILOL 25 MG: 12.5 TABLET, FILM COATED ORAL at 20:13

## 2019-06-17 RX ADMIN — SODIUM CHLORIDE, PRESERVATIVE FREE 3 ML: 5 INJECTION INTRAVENOUS at 20:14

## 2019-06-17 NOTE — H&P
"    [facility] Medicine Services  HISTORY AND PHYSICAL    Patient Name: Evan Gannon  : 1990  MRN: [id number]  Primary Care Physician: Barbara Mills MD  Date of admission: 2019      Subjective   Subjective     Chief Complaint:  soa     HPI:    Evan Gannon is a 28yoM with history of NICM, EF 15% followed by [facility] cardiology who presents from home today with shortness of air, productive cough. Patient reports that his symptoms have been ongoing for approximately 1 day. Notes chills last night but is not sure if he had a fever. Also notes increased hunger and feels like \"he has definitely gained weight\" but has not weighed himself. Does not know true dry weight. Recently was started on cosentyx by his dermatologist for psoriasis and was told possible s/e of this medication is that it could cause respiratory infections. Denies sick contacts. Denies chest pain or other complaints. Has been taking his medications with 100% compliance except for today due to being in ED. Patient placed a call to the heart and valve clinic however was unable to get in today and was told to present to ED. On workup, found on imaging to have right sided PNA with reactive adenopathy. Labs favorable aside from mild hyponatremia and hypokalemia. Patient to be admitted to hospitalist service for further management.      Review of Systems       Otherwise complete ROS reviewed and is negative except as mentioned in the HPI.    Personal History     Past Medical History:   Diagnosis Date   • CHF (congestive heart failure) (CMS/HCC)    • CPAP (continuous positive airway pressure) dependence    • Hypertension    • Morbid obesity (CMS/HCC)    • Myocardial infarction (CMS/HCC)    • On home O2     2l at bedtime and prn   • Plaque psoriasis    • Pneumonia    • Psoriasis    • Sleep apnea    • Wears glasses        Past Surgical History:   Procedure Laterality Date   • ADENOIDECTOMY     • CARDIAC " CATHETERIZATION N/A 7/13/2017    Procedure: Left Heart Cath;  Surgeon: Balbir Olsen MD;  Location:  [facility];  Service:    • CARDIAC DEFIBRILLATOR PLACEMENT  08/2017   • CARDIAC ELECTROPHYSIOLOGY PROCEDURE N/A 8/15/2017    Procedure: VVI ICD Implant;  Surgeon: Nathanael Richmond DO;  Location:  [facility];  Service:    • INTERNAL CARDIAC DEFIBRILLATOR INSERTION Left 2017   • NOSE SURGERY     • OTHER SURGICAL HISTORY      ultra light therapy   • SINUS SURGERY     • TONSILLECTOMY         Family History: family history includes Diabetes in his father, maternal grandfather, and paternal grandmother. Otherwise pertinent FHx was reviewed and unremarkable.     Social History:  reports that he has never smoked. He has never used smokeless tobacco. He reports that he does not drink alcohol or use drugs.  Social History     Social History Narrative    Caffeine use: 6 sodas weekly    Patient lives with parents       Medications:    Available home medication information reviewed.  Medications Prior to Admission   Medication Sig Dispense Refill Last Dose   • allopurinol (ZYLOPRIM) 100 MG tablet Take 1 tablet by mouth Daily. 30 tablet 5 Taking   • aspirin 81 MG EC tablet TAKE ONE TABLET BY MOUTH DAILY 30 tablet 5 Taking   • carvedilol (COREG) 25 MG tablet TAKE ONE TABLET BY MOUTH TWICE A DAY WITH MEALS 60 tablet 5 Taking   • ENTRESTO 49-51 MG tablet TAKE ONE TABLET BY MOUTH EVERY 12 HOURS 60 tablet 5 Taking   • spironolactone (ALDACTONE) 25 MG tablet TAKE ONE TABLET BY MOUTH DAILY 30 tablet 5 Taking   • torsemide (DEMADEX) 20 MG tablet TAKE THREE TABLETS BY MOUTH DAILY 90 tablet 5 Taking   • albuterol (PROVENTIL HFA;VENTOLIN HFA) 108 (90 Base) MCG/ACT inhaler Inhale 2 puffs Every 6 (Six) Hours As Needed for Wheezing. 1 inhaler 11 Taking       No Known Allergies    Objective   Objective     Vital Signs:   Temp:  [98 °F (36.7 °C)-99.7 °F (37.6 °C)] 98 °F (36.7 °C)  Heart Rate:  [119-129]  129  Resp:  [20-22] 20  BP: ()/(61-87) 107/62        Physical Exam   GEN- morbidly obese, resting in bed, labored breathing   HEENT- atraumatic, normocephlic, eomi  NECK- supple, trachea midline, no masses  RESP: labored effort, difficult to ausculate given body habitus, slightly diminished in RLL   CV: no murmurs, s1/s2, tachy on monitor in 130s   MSK: 1+ pitting edema noted of b/l LE  NEURO: alert, oriented, no focal deficits  SKIN: psoriatic areas present on chest, extensor surfaces   PSYCH: appropriate mood and affect       Results Reviewed:  I have personally reviewed current lab, radiology, and data and agree.    Results from last 7 days   Lab Units 06/17/19  1155   WBC 10*3/mm3 9.47   HEMOGLOBIN g/dL 13.7   HEMATOCRIT % 42.4   PLATELETS 10*3/mm3 240     Results from last 7 days   Lab Units 06/17/19  1238 06/17/19  1155   SODIUM mmol/L  --  134*   POTASSIUM mmol/L  --  3.4*   CHLORIDE mmol/L  --  95*   CO2 mmol/L  --  27.0   BUN mg/dL  --  11   CREATININE mg/dL  --  1.02   GLUCOSE mg/dL  --  115*   CALCIUM mg/dL  --  9.0   ALT (SGPT) U/L  --  27   AST (SGOT) U/L  --  24   TROPONIN T ng/mL  --  <0.010   PROBNP pg/mL  --  1,007.0*   LACTATE mmol/L 1.2  --      Estimated Creatinine Clearance: 180.1 mL/min (by C-G formula based on SCr of 1.02 mg/dL).  Brief Urine Lab Results     None        Imaging Results (last 24 hours)     Procedure Component Value Units Date/Time    CT Angiogram Chest With Contrast [[id number]] Collected:  06/17/19 1538     Updated:  06/17/19 1636    Narrative:       EXAMINATION: CT ANGIOGRAM CHEST W CONTRAST-06/17/2019:      INDICATION: Shortness of breath, tachycardia, evaluate for PE,  pneumonia, difficulty breathing.     TECHNIQUE: Multiple axial CT imaging was obtained of the chest following  the administration of intravenous contrast according to the CT angio  protocol. 2-D coronal reformatted images were submitted to further  facilitate diagnostic accuracy and treatment planning.      The radiation dose reduction device was turned on for each scan per the  ALARA (As Low as Reasonably Achievable) protocol.     COMPARISON: NONE.     FINDINGS: Consolidation identified within the right lower lobe with  adenopathy suggesting infiltrate. There is patchy groundglass opacity  centrally within the lung fields. No evidence of pulmonary embolism. No  pleural effusion. No pneumothorax. Degenerative changes seen within the  spine. The heart is enlarged. Lymph nodes prominent identified in the  right hilar region suggesting reactive adenopathy. There is no  mediastinal mass. The upper abdomen is unremarkable.       Impression:       Right lower lobe pneumonia with reactive adenopathy in the  right hilar region. Enlargement of the cardiac chambers with edema  throughout the lung fields bilaterally.     D:  06/17/2019  E:  06/17/2019             XR Foot 3+ View Left [[id number]] Collected:  06/17/19 1306     Updated:  06/17/19 1416    Narrative:       EXAMINATION: XR FOOT 3+ VW, LEFT-06/17/2019:      INDICATION: Lateral foot pain for 2 days.      COMPARISON: NONE.     FINDINGS: Imaging of the left foot reveal no fracture, dislocation or  soft tissue foreign body. There is diffuse soft tissue swelling  consistent with history of congestive heart failure. There are no acute  bony findings.           Impression:       There are no acute bony findings. D:  06/17/2019  E:  06/17/2019     This report was finalized on 6/17/2019 2:13 PM by Dr. Yung Waldron MD.       XR Chest 1 View [[id number]] Collected:  06/17/19 1205     Updated:  06/17/19 1300    Narrative:       EXAMINATION: XR CHEST 1 VW-      INDICATION: Shortness of air triage protocol.      COMPARISON: 02/02/2019.     FINDINGS: Portable chest reveals cardiac pacer leads in satisfactory  position. The heart is enlarged. Underlying chronic and emphysematous  change is seen in the lung fields bilaterally. No focal parenchymal  opacification is present.  No pleural  effusion or pneumothorax. The bony  structures are unremarkable. Pulmonary vascularity is within normal  limits.           Impression:       The heart is enlarged with no evidence of acute parenchymal  disease.     D:  06/17/2019  E:  06/17/2019              Results for orders placed during the hospital encounter of 10/16/17   Adult Transthoracic Echo Limited W/ Cont if Necessary Per Protocol    Narrative · Left ventricular systolic function is severely decreased. Estimated EF =   15%.  · The left ventricular cavity is severely dilated.  · Right ventricular cavity is mildly dilated.  · Mildly reduced right ventricular systolic function noted.          Assessment/Plan   Assessment / Plan     Active Hospital Problems    Diagnosis POA   • Pneumonia of right lower lobe due to infectious organism (CMS/HCC) [J18.1] Yes         Evan Gannon is a 28yoM with history of NICM, EF 15% followed by [facility] cardiology who presents from home today with shortness of air, productive cough, found on imaging to have right sided PNA with reactive adenopathy. Patient to be admitted to hospitalist service for further management.    Right Lower Lobe PNA with reactive adenopathy  ---continue rocephin/azithro  ---cultures sent, will follow, send legionella/strep antigen    NICM, EF 15%, acute exacerbation of chronic CHF  ---follows with cardiology at [facility], s/p ICD,  has also been evaluated in the past by [facility] for possible LVAD/transplant but was not a candidate due to BMI  ---could consider repeating ECHO- last in our system from 10/17 - EF 15% at that time  ---continue Bblocker, entresto, s/p 1 dose of lasix in ED, will hold further as borderline hypotensive  ---consult to cards for AM to assist with diuresis   ---strict I/Os, daily weights     Hyponatremia  ---mild at 134, monitor, BMP in AM    Hypokalemia  ---electrolyte replacement protocol, monitor     Morbid Obesity  ---complicates all aspects of care     Psoriasis  ---recently  started cosentyx per his dermatologist, on hold       DVT prophylaxis:  lovenox    CODE STATUS:    Code Status and Medical Interventions:   Ordered at: 06/17/19 1923     Level Of Support Discussed With:    Patient     Code Status:    CPR     Medical Interventions (Level of Support Prior to Arrest):    Full       Admission Status:  I believe this patient meets INPATIENT status due to the need for care which can only be reasonably provided in an hospital setting IV abx, cardiology consultation and continued IV diuresis.  In such, I feel patient’s risk for adverse outcomes and need for care warrant INPATIENT evaluation and predict the patient’s care encounter to likely last beyond 2 midnights.      Electronically signed by Roberta Lennon MD, 06/17/19, 7:27 PM.

## 2019-06-17 NOTE — OUTREACH NOTE
Care Coordination Note  Left voicemail for  [facility] ED Case Manager [phone]. Patient has history of CHF and COPD; has had 7 ED visits in the last year; not been seeing current PCP  and has discussed establishing care with new PCP. He attends Cardiology appointments with SONAM Cabrera. He lives with family and uses Wheels for transportation as needed.  Physician evaluation in progress. Transition visit available to patient if deemed appropriate upon ED discharge to home. CA will follow up with patient by telephonic outreach  upon ED discharge.     Maria R Fuentes RN    6/17/2019, 11:38 AM

## 2019-06-17 NOTE — ED PROVIDER NOTES
Subjective   Evan Gannon is a 28 y.o.male who presents to the emergency department with complaints of shortness of breath. Mr. aGnnon reports that his worsening shortness of breath has persisted for one week. His shortness of breath worsens when he walks and lays down flat. He mentions cough, chills, diarrhea, but denies fever. His cough began five days ago, and he also notes that he woke up this morning with  tenderness and swelling in his left foot. Mr. Gannon used at home oxygen all the time until it was taken away by insurance one month ago. He continues to use a CPAP machine every night. His medical history includes obesity, sleep apnea, congestive heart failure, hypertension, and plaque psoriasis. He takes Entresto, cosentyx, and coreg. There are no other acute complaints at this time.         History provided by:  Patient  Shortness of Breath   Severity:  Moderate  Onset quality:  Gradual  Duration:  1 week  Timing:  Constant  Progression:  Worsening  Chronicity:  Chronic  Worsened by:  Activity  Associated symptoms: cough    Associated symptoms: no fever        Review of Systems   Constitutional: Positive for chills. Negative for fever.   Respiratory: Positive for cough and shortness of breath.    Gastrointestinal: Positive for diarrhea.   All other systems reviewed and are negative.      Past Medical History:   Diagnosis Date   • CHF (congestive heart failure) (CMS/HCC)    • CPAP (continuous positive airway pressure) dependence    • Hypertension    • Morbid obesity (CMS/HCC)    • Myocardial infarction (CMS/HCC)    • On home O2     2l at bedtime and prn   • Plaque psoriasis    • Pneumonia    • Psoriasis    • Sleep apnea    • Wears glasses        No Known Allergies    Past Surgical History:   Procedure Laterality Date   • ADENOIDECTOMY     • CARDIAC CATHETERIZATION N/A 7/13/2017    Procedure: Left Heart Cath;  Surgeon: Balbir Olsen MD;  Location: [facility] CATH INVASIVE LOCATION;  Service:    • CARDIAC  DEFIBRILLATOR PLACEMENT  08/2017   • CARDIAC ELECTROPHYSIOLOGY PROCEDURE N/A 8/15/2017    Procedure: VVI ICD Implant;  Surgeon: Nathanael Richmond DO;  Location: [facility] INVASIVE LOCATION;  Service:    • INTERNAL CARDIAC DEFIBRILLATOR INSERTION Left 2017   • NOSE SURGERY     • OTHER SURGICAL HISTORY      ultra light therapy   • SINUS SURGERY     • TONSILLECTOMY         Family History   Problem Relation Age of Onset   • Diabetes Father    • Diabetes Paternal Grandmother    • Diabetes Maternal Grandfather        Social History     Socioeconomic History   • Marital status: Single     Spouse name: Not on file   • Number of children: 0   • Years of education: Not on file   • Highest education level: Not on file   Occupational History   • Occupation: disabled   Tobacco Use   • Smoking status: Never Smoker   • Smokeless tobacco: Never Used   Substance and Sexual Activity   • Alcohol use: No   • Drug use: No   • Sexual activity: Defer   Social History Narrative    Caffeine use: 6 sodas weekly    Patient lives with parents         Objective   Physical Exam   Constitutional: He is oriented to person, place, and time. He appears well-developed and well-nourished. No distress.   Obese. Frequent dry cough.   HENT:   Head: Normocephalic and atraumatic.   Nose: Nose normal.   Eyes: Conjunctivae are normal. No scleral icterus.   Neck: Normal range of motion. Neck supple.   Cardiovascular: Normal rate, regular rhythm and normal heart sounds.   Symmetric nonpitting edema of both ankles and feet.    Pulmonary/Chest: Effort normal and breath sounds normal. No respiratory distress.   Abdominal: Soft. There is no tenderness.   Musculoskeletal: Normal range of motion. He exhibits tenderness.   Mildly tender over lateral left foot.      Neurological: He is alert and oriented to person, place, and time.   Skin: Skin is warm and dry.   Psychiatric: He has a normal mood and affect. His behavior is normal.   Nursing note and vitals  reviewed.      Procedures         ED Course  ED Course as of Jun 18 1012   Mon Jun 17, 2019   1429 Dr. Altman reevaluated Mr. Gannon.   [BM]   1433 Mr. gannon has had about 300 cc urinary output and tells me he does not feel any better.  He remains tachycardic and continues to have a frequent cough.  Neither chest x-ray nor labs have shown a specific diagnosis although he tells me that he gets a cough like this whenever he has a flareup of his CHF.  I will obtain a CTA to evaluate for pneumonia, PE, and others.  Ultimately will seek admission after CT results  [DT]   1618 CT shows CHF but more importantly shows a pneumonia.  Will give IV antibiotics and due to his severe comorbidities will seek admission to the hospital  [DT]   1620 Dr. Altman paged the hospitalist.  [BM]   1620 Dr. Altman spoke with Mr. Gannon about diagnosis and plan for admission.  [BM]   1644 Dr. Altman spoke with Dr. Betancourt, hospitalist, who will admit the patient.  [BM]   1645 I discussed with Dr. Betancourt who will admit Mr. gannon to the hospital.  She agrees with current management  [DT]      ED Course User Index  [BM] Kenyetta Calvert  [DT] Cirilo Altman MD       Recent Results (from the past 24 hour(s))   Comprehensive Metabolic Panel    Collection Time: 06/17/19 11:55 AM   Result Value Ref Range    Glucose 115 (H) 65 - 99 mg/dL    BUN 11 6 - 20 mg/dL    Creatinine 1.02 0.76 - 1.27 mg/dL    Sodium 134 (L) 136 - 145 mmol/L    Potassium 3.4 (L) 3.5 - 5.2 mmol/L    Chloride 95 (L) 98 - 107 mmol/L    CO2 27.0 22.0 - 29.0 mmol/L    Calcium 9.0 8.6 - 10.5 mg/dL    Total Protein 8.0 6.0 - 8.5 g/dL    Albumin 3.70 3.50 - 5.20 g/dL    ALT (SGPT) 27 1 - 41 U/L    AST (SGOT) 24 1 - 40 U/L    Alkaline Phosphatase 75 39 - 117 U/L    Total Bilirubin 1.1 0.2 - 1.2 mg/dL    eGFR  African Amer 107 >60 mL/min/1.73    Globulin 4.3 gm/dL    A/G Ratio 0.9 g/dL    BUN/Creatinine Ratio 10.8 7.0 - 25.0    Anion Gap 12.0 mmol/L   BNP    Collection Time:  06/17/19 11:55 AM   Result Value Ref Range    proBNP 1,007.0 (H) 5.0 - 450.0 pg/mL   Troponin    Collection Time: 06/17/19 11:55 AM   Result Value Ref Range    Troponin T <0.010 0.000 - 0.030 ng/mL   Light Blue Top    Collection Time: 06/17/19 11:55 AM   Result Value Ref Range    Extra Tube hold for add-on    Green Top (Gel)    Collection Time: 06/17/19 11:55 AM   Result Value Ref Range    Extra Tube Hold for add-ons.    Lavender Top    Collection Time: 06/17/19 11:55 AM   Result Value Ref Range    Extra Tube hold for add-on    Gold Top - SST    Collection Time: 06/17/19 11:55 AM   Result Value Ref Range    Extra Tube Hold for add-ons.    CBC Auto Differential    Collection Time: 06/17/19 11:55 AM   Result Value Ref Range    WBC 9.47 3.40 - 10.80 10*3/mm3    RBC 5.36 4.14 - 5.80 10*6/mm3    Hemoglobin 13.7 13.0 - 17.7 g/dL    Hematocrit 42.4 37.5 - 51.0 %    MCV 79.1 79.0 - 97.0 fL    MCH 25.6 (L) 26.6 - 33.0 pg    MCHC 32.3 31.5 - 35.7 g/dL    RDW 13.4 12.3 - 15.4 %    RDW-SD 38.1 37.0 - 54.0 fl    MPV 10.4 6.0 - 12.0 fL    Platelets 240 140 - 450 10*3/mm3    Neutrophil % 68.8 42.7 - 76.0 %    Lymphocyte % 15.4 (L) 19.6 - 45.3 %    Monocyte % 13.9 (H) 5.0 - 12.0 %    Eosinophil % 1.3 0.3 - 6.2 %    Basophil % 0.3 0.0 - 1.5 %    Immature Grans % 0.3 0.0 - 0.5 %    Neutrophils, Absolute 6.51 1.70 - 7.00 10*3/mm3    Lymphocytes, Absolute 1.46 0.70 - 3.10 10*3/mm3    Monocytes, Absolute 1.32 (H) 0.10 - 0.90 10*3/mm3    Eosinophils, Absolute 0.12 0.00 - 0.40 10*3/mm3    Basophils, Absolute 0.03 0.00 - 0.20 10*3/mm3    Immature Grans, Absolute 0.03 0.00 - 0.05 10*3/mm3    nRBC 0.0 0.0 - 0.2 /100 WBC   Lactic Acid, Plasma    Collection Time: 06/17/19 12:38 PM   Result Value Ref Range    Lactate 1.2 0.5 - 2.0 mmol/L   Legionella Antigen, Urine - Urine, Urine, Clean Catch    Collection Time: 06/17/19  8:28 PM   Result Value Ref Range    LEGIONELLA ANTIGEN, URINE Negative Negative   S. Pneumo Ag Urine or CSF - Urine, Urine,  Clean Catch    Collection Time: 06/17/19  8:28 PM   Result Value Ref Range    Strep Pneumo Ag Negative Negative   Basic Metabolic Panel    Collection Time: 06/18/19  7:45 AM   Result Value Ref Range    Glucose 102 (H) 65 - 99 mg/dL    BUN 12 6 - 20 mg/dL    Creatinine 0.96 0.76 - 1.27 mg/dL    Sodium 134 (L) 136 - 145 mmol/L    Potassium 3.5 3.5 - 5.2 mmol/L    Chloride 94 (L) 98 - 107 mmol/L    CO2 29.0 22.0 - 29.0 mmol/L    Calcium 9.1 8.6 - 10.5 mg/dL    eGFR  African Amer 115 >60 mL/min/1.73    BUN/Creatinine Ratio 12.5 7.0 - 25.0    Anion Gap 11.0 mmol/L   CBC Auto Differential    Collection Time: 06/18/19  7:45 AM   Result Value Ref Range    WBC 7.15 3.40 - 10.80 10*3/mm3    RBC 4.97 4.14 - 5.80 10*6/mm3    Hemoglobin 12.8 (L) 13.0 - 17.7 g/dL    Hematocrit 40.1 37.5 - 51.0 %    MCV 80.7 79.0 - 97.0 fL    MCH 25.8 (L) 26.6 - 33.0 pg    MCHC 31.9 31.5 - 35.7 g/dL    RDW 13.3 12.3 - 15.4 %    RDW-SD 38.7 37.0 - 54.0 fl    MPV 10.7 6.0 - 12.0 fL    Platelets 204 140 - 450 10*3/mm3    Neutrophil % 62.3 42.7 - 76.0 %    Lymphocyte % 19.3 (L) 19.6 - 45.3 %    Monocyte % 16.1 (H) 5.0 - 12.0 %    Eosinophil % 1.7 0.3 - 6.2 %    Basophil % 0.3 0.0 - 1.5 %    Immature Grans % 0.3 0.0 - 0.5 %    Neutrophils, Absolute 4.46 1.70 - 7.00 10*3/mm3    Lymphocytes, Absolute 1.38 0.70 - 3.10 10*3/mm3    Monocytes, Absolute 1.15 (H) 0.10 - 0.90 10*3/mm3    Eosinophils, Absolute 0.12 0.00 - 0.40 10*3/mm3    Basophils, Absolute 0.02 0.00 - 0.20 10*3/mm3    Immature Grans, Absolute 0.02 0.00 - 0.05 10*3/mm3    nRBC 0.0 0.0 - 0.2 /100 WBC     Note: In addition to lab results from this visit, the labs listed above may include labs taken at another facility or during a different encounter within the last 24 hours. Please correlate lab times with ED admission and discharge times for further clarification of the services performed during this visit.    CT Angiogram Chest With Contrast   Final Result   Right lower lobe pneumonia with  reactive adenopathy in the   right hilar region. Enlargement of the cardiac chambers with edema   throughout the lung fields bilaterally.       D:  06/17/2019   E:  06/17/2019               This report was finalized on 6/18/2019 9:13 AM by Dr. Crystal Brunner MD.          XR Chest 1 View   Final Result   The heart is enlarged with no evidence of acute parenchymal   disease.       D:  06/17/2019   E:  06/17/2019       This report was finalized on 6/18/2019 9:12 AM by Dr. Crystal Brunner MD.          XR Foot 3+ View Left   Final Result   There are no acute bony findings. D:  06/17/2019   E:  06/17/2019       This report was finalized on 6/17/2019 2:13 PM by Dr. Yung Waldron MD.            Vitals:    06/17/19 2326 06/18/19 0100 06/18/19 0434 06/18/19 0749   BP:   101/61 127/78   BP Location:   Right arm Right arm   Patient Position:   Lying Lying   Pulse:   88 87   Resp:   18 18   Temp: (!) 101.8 °F (38.8 °C) 99 °F (37.2 °C) 98.9 °F (37.2 °C) 98.7 °F (37.1 °C)   TempSrc: Axillary Oral Oral Oral   SpO2:   95%    Weight:       Height:         Medications   sodium chloride 0.9 % flush 10 mL (not administered)   carvedilol (COREG) tablet 25 mg (25 mg Oral Given 6/18/19 0825)   sacubitril-valsartan (ENTRESTO) 49-51 MG tablet 1 tablet (1 tablet Oral Given 6/18/19 0825)   spironolactone (ALDACTONE) tablet 25 mg (25 mg Oral Given 6/18/19 0825)   aspirin EC tablet 81 mg (81 mg Oral Given 6/18/19 0825)   sodium chloride 0.9 % flush 3 mL (3 mL Intravenous Given 6/18/19 0825)   sodium chloride 0.9 % flush 3-10 mL (not administered)   azithromycin 500 MG/250 ML 0.9% NS IVPB (MBP) (not administered)   cefTRIAXone (ROCEPHIN) 1 g/100 mL 0.9% NS (MBP) (not administered)   potassium chloride (MICRO-K) CR capsule 40 mEq (not administered)     Or   potassium chloride (KLOR-CON) packet 40 mEq (not administered)     Or   potassium chloride 10 mEq in 100 mL IVPB (not administered)   acetaminophen (TYLENOL) tablet 650 mg (650 mg  Oral Given 6/18/19 0013)   enoxaparin (LOVENOX) syringe 40 mg (not administered)   Pharmacy Consult - MTM (not administered)   furosemide (LASIX) injection 40 mg (40 mg Intravenous Given 6/17/19 1237)   iopamidol (ISOVUE-370) 76 % injection 100 mL (97 mL Intravenous Given 6/17/19 1531)   azithromycin 500 MG/250 ML 0.9% NS IVPB (MBP) (0 mg Intravenous Stopped 6/17/19 1937)   cefTRIAXone (ROCEPHIN) 2 g/100 mL 0.9% NS VTB (TC) (0 g Intravenous Stopped 6/17/19 1710)     ECG/EMG Results (last 24 hours)     Procedure Component Value Units Date/Time    ECG 12 Lead [[id number]] Collected:  06/17/19 1135     Updated:  06/17/19 1231        ECG 12 Lead                           MDM  Number of Diagnoses or Management Options  Acute on chronic diastolic congestive heart failure (CMS/HCC): new and requires workup  Pneumonia of right lower lobe due to infectious organism (CMS/HCC): new and requires workup     Amount and/or Complexity of Data Reviewed  Clinical lab tests: reviewed and ordered  Tests in the radiology section of CPT®: ordered and reviewed  Review and summarize past medical records: yes  Discuss the patient with other providers: yes  Independent visualization of images, tracings, or specimens: yes    Patient Progress  Patient progress: improved      Final diagnoses:   Pneumonia of right lower lobe due to infectious organism (CMS/HCC)   Acute on chronic diastolic congestive heart failure (CMS/HCC)       Documentation assistance provided by josé Calvert.  Information recorded by the josé was done at my direction and has been verified and validated by me.     Kenyetta Calvert  06/17/19 1250       Cirilo Altman MD  06/18/19 1012

## 2019-06-17 NOTE — PLAN OF CARE
Problem: Patient Care Overview  Goal: Individualization and Mutuality  Outcome: Ongoing (interventions implemented as appropriate)   06/17/19 1847   Individualization   Patient Specific Goals (Include Timeframe) No c/o SOA, No falls or injury   Patient Specific Interventions Antibiotics, Resp tx, Call light within reach     Goal: Interprofessional Rounds/Family Conf  Outcome: Ongoing (interventions implemented as appropriate)   06/17/19 1847   Interdisciplinary Rounds/Family Conf   Participants patient;nursing       Problem: Pneumonia (Adult)  Goal: Signs and Symptoms of Listed Potential Problems Will be Absent, Minimized or Managed (Pneumonia)  Outcome: Ongoing (interventions implemented as appropriate)   06/17/19 1847   Goal/Outcome Evaluation   Problems Assessed (Pneumonia) all   Problems Present (Pneumonia) infection progression

## 2019-06-18 ENCOUNTER — EPISODE CHANGES (OUTPATIENT)
Dept: CASE MANAGEMENT | Facility: OTHER | Age: 29
End: 2019-06-18

## 2019-06-18 LAB
ANION GAP SERPL CALCULATED.3IONS-SCNC: 11 MMOL/L
BASOPHILS # BLD AUTO: 0.02 10*3/MM3 (ref 0–0.2)
BASOPHILS NFR BLD AUTO: 0.3 % (ref 0–1.5)
BUN BLD-MCNC: 12 MG/DL (ref 6–20)
BUN/CREAT SERPL: 12.5 (ref 7–25)
CALCIUM SPEC-SCNC: 9.1 MG/DL (ref 8.6–10.5)
CHLORIDE SERPL-SCNC: 94 MMOL/L (ref 98–107)
CO2 SERPL-SCNC: 29 MMOL/L (ref 22–29)
CREAT BLD-MCNC: 0.96 MG/DL (ref 0.76–1.27)
DEPRECATED RDW RBC AUTO: 38.7 FL (ref 37–54)
EOSINOPHIL # BLD AUTO: 0.12 10*3/MM3 (ref 0–0.4)
EOSINOPHIL NFR BLD AUTO: 1.7 % (ref 0.3–6.2)
ERYTHROCYTE [DISTWIDTH] IN BLOOD BY AUTOMATED COUNT: 13.3 % (ref 12.3–15.4)
GFR SERPL CREATININE-BSD FRML MDRD: 115 ML/MIN/1.73
GLUCOSE BLD-MCNC: 102 MG/DL (ref 65–99)
HCT VFR BLD AUTO: 40.1 % (ref 37.5–51)
HGB BLD-MCNC: 12.8 G/DL (ref 13–17.7)
IMM GRANULOCYTES # BLD AUTO: 0.02 10*3/MM3 (ref 0–0.05)
IMM GRANULOCYTES NFR BLD AUTO: 0.3 % (ref 0–0.5)
L PNEUMO1 AG UR QL IA: NEGATIVE
LYMPHOCYTES # BLD AUTO: 1.38 10*3/MM3 (ref 0.7–3.1)
LYMPHOCYTES NFR BLD AUTO: 19.3 % (ref 19.6–45.3)
MCH RBC QN AUTO: 25.8 PG (ref 26.6–33)
MCHC RBC AUTO-ENTMCNC: 31.9 G/DL (ref 31.5–35.7)
MCV RBC AUTO: 80.7 FL (ref 79–97)
MONOCYTES # BLD AUTO: 1.15 10*3/MM3 (ref 0.1–0.9)
MONOCYTES NFR BLD AUTO: 16.1 % (ref 5–12)
NEUTROPHILS # BLD AUTO: 4.46 10*3/MM3 (ref 1.7–7)
NEUTROPHILS NFR BLD AUTO: 62.3 % (ref 42.7–76)
NRBC BLD AUTO-RTO: 0 /100 WBC (ref 0–0.2)
PLATELET # BLD AUTO: 204 10*3/MM3 (ref 140–450)
PMV BLD AUTO: 10.7 FL (ref 6–12)
POTASSIUM BLD-SCNC: 3.5 MMOL/L (ref 3.5–5.2)
POTASSIUM BLD-SCNC: 3.8 MMOL/L (ref 3.5–5.2)
RBC # BLD AUTO: 4.97 10*6/MM3 (ref 4.14–5.8)
S PNEUM AG SPEC QL LA: NEGATIVE
SODIUM BLD-SCNC: 134 MMOL/L (ref 136–145)
WBC NRBC COR # BLD: 7.15 10*3/MM3 (ref 3.4–10.8)

## 2019-06-18 PROCEDURE — 85025 COMPLETE CBC W/AUTO DIFF WBC: CPT | Performed by: INTERNAL MEDICINE

## 2019-06-18 PROCEDURE — G0378 HOSPITAL OBSERVATION PER HR: HCPCS

## 2019-06-18 PROCEDURE — 80048 BASIC METABOLIC PNL TOTAL CA: CPT | Performed by: INTERNAL MEDICINE

## 2019-06-18 PROCEDURE — 25010000002 CEFTRIAXONE PER 250 MG: Performed by: INTERNAL MEDICINE

## 2019-06-18 PROCEDURE — 25010000002 ENOXAPARIN PER 10 MG

## 2019-06-18 PROCEDURE — 25010000002 ENOXAPARIN PER 10 MG: Performed by: INTERNAL MEDICINE

## 2019-06-18 PROCEDURE — 25010000002 FUROSEMIDE PER 20 MG: Performed by: NURSE PRACTITIONER

## 2019-06-18 PROCEDURE — 99225 PR SBSQ OBSERVATION CARE/DAY 25 MINUTES: CPT | Performed by: INTERNAL MEDICINE

## 2019-06-18 PROCEDURE — 99213 OFFICE O/P EST LOW 20 MIN: CPT | Performed by: INTERNAL MEDICINE

## 2019-06-18 PROCEDURE — 84132 ASSAY OF SERUM POTASSIUM: CPT | Performed by: INTERNAL MEDICINE

## 2019-06-18 PROCEDURE — 96376 TX/PRO/DX INJ SAME DRUG ADON: CPT

## 2019-06-18 PROCEDURE — 96366 THER/PROPH/DIAG IV INF ADDON: CPT

## 2019-06-18 PROCEDURE — 25010000002 AZITHROMYCIN PER 500 MG: Performed by: INTERNAL MEDICINE

## 2019-06-18 PROCEDURE — 96368 THER/DIAG CONCURRENT INF: CPT

## 2019-06-18 PROCEDURE — 96372 THER/PROPH/DIAG INJ SC/IM: CPT

## 2019-06-18 RX ORDER — ACETAMINOPHEN 325 MG/1
650 TABLET ORAL EVERY 6 HOURS PRN
Status: DISCONTINUED | OUTPATIENT
Start: 2019-06-18 | End: 2019-06-19 | Stop reason: HOSPADM

## 2019-06-18 RX ORDER — FUROSEMIDE 10 MG/ML
40 INJECTION INTRAMUSCULAR; INTRAVENOUS DAILY
Status: DISCONTINUED | OUTPATIENT
Start: 2019-06-18 | End: 2019-06-19 | Stop reason: HOSPADM

## 2019-06-18 RX ADMIN — SACUBITRIL AND VALSARTAN 1 TABLET: 49; 51 TABLET, FILM COATED ORAL at 20:25

## 2019-06-18 RX ADMIN — CARVEDILOL 25 MG: 12.5 TABLET, FILM COATED ORAL at 17:00

## 2019-06-18 RX ADMIN — SACUBITRIL AND VALSARTAN 1 TABLET: 49; 51 TABLET, FILM COATED ORAL at 08:25

## 2019-06-18 RX ADMIN — POTASSIUM CHLORIDE 40 MEQ: 750 CAPSULE, EXTENDED RELEASE ORAL at 11:49

## 2019-06-18 RX ADMIN — ACETAMINOPHEN 650 MG: 325 TABLET ORAL at 20:25

## 2019-06-18 RX ADMIN — CEFTRIAXONE SODIUM 1 G: 1 INJECTION, POWDER, FOR SOLUTION INTRAMUSCULAR; INTRAVENOUS at 16:58

## 2019-06-18 RX ADMIN — ENOXAPARIN SODIUM 30 MG: 30 INJECTION SUBCUTANEOUS at 08:25

## 2019-06-18 RX ADMIN — SPIRONOLACTONE 25 MG: 25 TABLET ORAL at 08:25

## 2019-06-18 RX ADMIN — ENOXAPARIN SODIUM 40 MG: 40 INJECTION SUBCUTANEOUS at 20:24

## 2019-06-18 RX ADMIN — AZITHROMYCIN MONOHYDRATE 500 MG: 500 INJECTION, POWDER, LYOPHILIZED, FOR SOLUTION INTRAVENOUS at 17:01

## 2019-06-18 RX ADMIN — ASPIRIN 81 MG: 81 TABLET, COATED ORAL at 08:25

## 2019-06-18 RX ADMIN — CARVEDILOL 25 MG: 12.5 TABLET, FILM COATED ORAL at 08:25

## 2019-06-18 RX ADMIN — ACETAMINOPHEN 650 MG: 325 TABLET ORAL at 00:13

## 2019-06-18 RX ADMIN — FUROSEMIDE 40 MG: 10 INJECTION, SOLUTION INTRAMUSCULAR; INTRAVENOUS at 11:49

## 2019-06-18 RX ADMIN — SODIUM CHLORIDE, PRESERVATIVE FREE 3 ML: 5 INJECTION INTRAVENOUS at 20:25

## 2019-06-18 RX ADMIN — SODIUM CHLORIDE, PRESERVATIVE FREE 3 ML: 5 INJECTION INTRAVENOUS at 08:25

## 2019-06-18 RX ADMIN — POTASSIUM CHLORIDE 40 MEQ: 750 CAPSULE, EXTENDED RELEASE ORAL at 16:58

## 2019-06-18 NOTE — PLAN OF CARE
Problem: Patient Care Overview  Goal: Plan of Care Review  Outcome: Ongoing (interventions implemented as appropriate)   06/18/19 3864   Coping/Psychosocial   Plan of Care Reviewed With patient   Plan of Care Review   Progress improving   OTHER   Outcome Summary Pt on 2L/Bipap while sleeping, VSS, pt given IV lasix, hopeful discharge soon.     Goal: Individualization and Mutuality  Outcome: Ongoing (interventions implemented as appropriate)   06/18/19 6709   Individualization   Patient Specific Interventions Call light within reach to make needs known       Problem: Pneumonia (Adult)  Goal: Signs and Symptoms of Listed Potential Problems Will be Absent, Minimized or Managed (Pneumonia)  Outcome: Ongoing (interventions implemented as appropriate)   06/18/19 2215   Goal/Outcome Evaluation   Problems Assessed (Pneumonia) all   Problems Present (Pneumonia) respiratory compromise

## 2019-06-18 NOTE — PROGRESS NOTES
[facility]  PROGRESS NOTE    Patient Name: Evan Gannon  : 1990  MRN: [id number]    Date of Admission: 2019  Length of Stay: 1  Primary Care Physician: Barbara Mills MD    Subjective   Subjective     CC:  Cough, shortness of breath    HPI:  Still coughing, but not very productive. Fever last night but none today    Review of Systems  Gen- No fevers, chills  CV- No chest pain, palpitations  Resp- + cough, + dyspnea  GI- No N/V/D, abd pain      Otherwise ROS is negative except as mentioned in the HPI.    Objective   Objective     Vital Signs:   Temp:  [98.3 °F (36.8 °C)-101.8 °F (38.8 °C)] 98.3 °F (36.8 °C)  Heart Rate:  [] 94  Resp:  [18-20] 18  BP: (101-127)/(53-80) 114/80  FiO2 (%):  [36 %] 36 %        Physical Exam:  Constitutional -non toxic, in bed, wearing cpap  HEENT-NCAT, mucous membranes moist  CV-RRR, S1 S2 normal, no m/r/g  Resp-grossly clear bilaterally  Abd-soft, non-tender, non-distended, normo active bowel sounds, morbidly obese  Ext-No lower extremity cyanosis, clubbing with 1+ LE edema bilaterally  Neuro-alert and oriented , speech clear, moves all extremities   Psych-normal affect   Skin- No rash on exposed UE or LE bilaterally      Results Reviewed:  I have personally reviewed current lab, radiology, and data and agree.    Results from last 7 days   Lab Units 19  0745 19  1155   WBC 10*3/mm3 7.15 9.47   HEMOGLOBIN g/dL 12.8* 13.7   HEMATOCRIT % 40.1 42.4   PLATELETS 10*3/mm3 204 240     Results from last 7 days   Lab Units 19  0745 19  1155   SODIUM mmol/L 134* 134*   POTASSIUM mmol/L 3.5 3.4*   CHLORIDE mmol/L 94* 95*   CO2 mmol/L 29.0 27.0   BUN mg/dL 12 11   CREATININE mg/dL 0.96 1.02   GLUCOSE mg/dL 102* 115*   CALCIUM mg/dL 9.1 9.0   ALT (SGPT) U/L  --  27   AST (SGOT) U/L  --  24   TROPONIN T ng/mL  --  <0.010   PROBNP pg/mL  --  1,007.0*     Estimated Creatinine Clearance: 191.3 mL/min  (by C-G formula based on SCr of 0.96 mg/dL).    Microbiology Results Abnormal     Procedure Component Value - Date/Time    Blood Culture - Blood, Arm, Left [[id number]] Collected:  06/17/19 1300    Lab Status:  Preliminary result Specimen:  Blood from Arm, Left Updated:  06/18/19 1315     Blood Culture No growth at 24 hours    Blood Culture - Blood, Hand, Left [[id number]] Collected:  06/17/19 1235    Lab Status:  Preliminary result Specimen:  Blood from Hand, Left Updated:  06/18/19 1315     Blood Culture No growth at 24 hours    S. Pneumo Ag Urine or CSF - Urine, Urine, Clean Catch [[id number]]  (Normal) Collected:  06/17/19 2028    Lab Status:  Final result Specimen:  Urine, Clean Catch Updated:  06/18/19 0243     Strep Pneumo Ag Negative    Legionella Antigen, Urine - Urine, Urine, Clean Catch [[id number]]  (Normal) Collected:  06/17/19 2028    Lab Status:  Final result Specimen:  Urine, Clean Catch Updated:  06/18/19 0241     LEGIONELLA ANTIGEN, URINE Negative          Imaging Results (last 24 hours)     Procedure Component Value Units Date/Time    CT Angiogram Chest With Contrast [[id number]] Collected:  06/17/19 1538     Updated:  06/18/19 0917    Narrative:       EXAMINATION: CT ANGIOGRAM CHEST W CONTRAST-06/17/2019:      INDICATION: Shortness of breath, tachycardia, evaluate for PE,  pneumonia, difficulty breathing.     TECHNIQUE: Multiple axial CT imaging was obtained of the chest following  the administration of intravenous contrast according to the CT angio  protocol. 2-D coronal reformatted images were submitted to further  facilitate diagnostic accuracy and treatment planning.     The radiation dose reduction device was turned on for each scan per the  ALARA (As Low as Reasonably Achievable) protocol.     COMPARISON: NONE.     FINDINGS: Consolidation identified within the right lower lobe with  adenopathy suggesting infiltrate. There is patchy groundglass opacity  centrally within the lung fields. No  evidence of pulmonary embolism. No  pleural effusion. No pneumothorax. Degenerative changes seen within the  spine. The heart is enlarged. Lymph nodes prominent identified in the  right hilar region suggesting reactive adenopathy. There is no  mediastinal mass. The upper abdomen is unremarkable.       Impression:       Right lower lobe pneumonia with reactive adenopathy in the  right hilar region. Enlargement of the cardiac chambers with edema  throughout the lung fields bilaterally.     D:  06/17/2019  E:  06/17/2019           This report was finalized on 6/18/2019 9:13 AM by Dr. Crystal Brunner MD.       XR Chest 1 View [[id number]] Collected:  06/17/19 1205     Updated:  06/18/19 0915    Narrative:       EXAMINATION: XR CHEST 1 VW-      INDICATION: Shortness of air triage protocol.      COMPARISON: 02/02/2019.     FINDINGS: Portable chest reveals cardiac pacer leads in satisfactory  position. The heart is enlarged. Underlying chronic and emphysematous  change is seen in the lung fields bilaterally. No focal parenchymal  opacification is present.  No pleural effusion or pneumothorax. The bony  structures are unremarkable. Pulmonary vascularity is within normal  limits.           Impression:       The heart is enlarged with no evidence of acute parenchymal  disease.     D:  06/17/2019  E:  06/17/2019     This report was finalized on 6/18/2019 9:12 AM by Dr. Crystal Brunner MD.             Results for orders placed during the hospital encounter of 10/16/17   Adult Transthoracic Echo Limited W/ Cont if Necessary Per Protocol    Narrative · Left ventricular systolic function is severely decreased. Estimated EF =   15%.  · The left ventricular cavity is severely dilated.  · Right ventricular cavity is mildly dilated.  · Mildly reduced right ventricular systolic function noted.          I have reviewed the medications:  Scheduled Meds:  aspirin 81 mg Oral Daily   azithromycin 500 mg Intravenous Q24H   carvedilol  25 mg Oral BID With Meals   ceftriaxone 1 g Intravenous Q24H   enoxaparin 40 mg Subcutaneous Q12H   furosemide 40 mg Intravenous Daily   pharmacy consult - MTM  Does not apply Daily   sacubitril-valsartan 1 tablet Oral Q12H   sodium chloride 3 mL Intravenous Q12H   spironolactone 25 mg Oral Daily     Continuous Infusions:   PRN Meds:.•  acetaminophen  •  potassium chloride **OR** potassium chloride **OR** potassium chloride  •  sodium chloride  •  sodium chloride      Assessment/Plan   Assessment / Plan     Active Hospital Problems    Diagnosis POA   • Pneumonia of right lower lobe due to infectious organism (CMS/HCC) [J18.1] Yes   • CHF exacerbation (CMS/HCC) [I50.9] Yes   • Plaque psoriasis [L40.0] Yes   • Acute on chronic systolic heart failure (CMS/HCC) [I50.23] Yes   • Nonischemic congestive cardiomyopathy (CMS/HCC) [I42.0] Yes   • Hypertension [I10] Yes   • Morbid obesity (CMS/HCC) [E66.01] Yes          Brief Hospital Course to date:  Evan Gannon is a 28 y.o. male with nonischemic cardiomyopathy (EF 15%), morbid obesity and HTN presents with shortness of air, dyspnea on exertion, weight gain and cough.      Pneumonia  - Right lower lobe infiltrate  - recently initiated cosentyx for psoriasis increases risk of infection  - continue empiric antibiotics (rocephin and azithro), nebs, and follow cultures  - CXR in am    Chronic systolic heart failure / NICM with ICD  - IV diuresis today  - [facility]    COLLIN  - CPAP    Morbid Obesity  - BMI 57  - discussed weight loss strategies with patient    Psoriasis  - hold cosentyx for now.    Hyponatremia  - mild, monitor, check bmp am      DVT Prophylaxis:  lovenox    Disposition: I expect the patient to be discharged home in 1-2 days.    CODE STATUS:   Code Status and Medical Interventions:   Ordered at: 06/17/19 1923     Level Of Support Discussed With:    Patient     Code Status:    CPR     Medical Interventions (Level  of Support Prior to Arrest):    Full         Electronically signed by Calixto Adrian MD, 06/18/19, 6:19 PM.

## 2019-06-18 NOTE — PLAN OF CARE
Problem: Patient Care Overview  Goal: Plan of Care Review  Outcome: Ongoing (interventions implemented as appropriate)   06/18/19 0322   Coping/Psychosocial   Plan of Care Reviewed With patient   Plan of Care Review   Progress no change   OTHER   Outcome Summary VSS on room air/ Bipap while sleeping; pt spiked a fever at midnight and was given tylenol, which brought down fever; provider notified of positive sepsis screen       Problem: Pneumonia (Adult)  Goal: Signs and Symptoms of Listed Potential Problems Will be Absent, Minimized or Managed (Pneumonia)  Outcome: Ongoing (interventions implemented as appropriate)   06/18/19 0322   Goal/Outcome Evaluation   Problems Assessed (Pneumonia) all   Problems Present (Pneumonia) respiratory compromise;infection progression

## 2019-06-18 NOTE — PROGRESS NOTES
Discharge Planning Assessment  [facility]     Patient Name: Evan Gannon  MRN: [id number]  Today's Date: 6/18/2019    Admit Date: 6/17/2019    Discharge Needs Assessment     Row Name 06/18/19 1046       Living Environment    Lives With  parent(s);sibling(s)    Name(s) of Who Lives With Patient  Parents: Shelly and Stephania Gannon,  his brother Johnny    Current Living Arrangements  home/apartment/condo    Primary Care Provided by  self    Provides Primary Care For  no one    Family Caregiver if Needed  parent(s)    Quality of Family Relationships  supportive    Able to Return to Prior Arrangements  yes       Resource/Environmental Concerns    Resource/Environmental Concerns  none    Transportation Concerns  car, none       Transition Planning    Patient/Family Anticipates Transition to  home with family    Patient/Family Anticipated Services at Transition  none    Transportation Anticipated  family or friend will provide       Discharge Needs Assessment    Readmission Within the Last 30 Days  no previous admission in last 30 days    Concerns to be Addressed  no discharge needs identified;basic needs    Equipment Currently Used at Home  bipap/cpap CPAP through [facility]    Anticipated Changes Related to Illness  none    Equipment Needed After Discharge  nebulizer;oxygen possible nebulizer or oxygen        Discharge Plan     Row Name 06/18/19 1048       Plan    Plan  Home with family    Patient/Family in Agreement with Plan  yes    Plan Comments  I spoke with patient this am who is a very pleasant 28 year old man. He will have family assistance as needed.  will follow for discharge planning needs.       Final Discharge Disposition Code  01 - home or self-care        Destination      No service coordination in this encounter.      Durable Medical Equipment      No service coordination in this encounter.      Dialysis/Infusion      No service coordination in this encounter.      Home Medical Care      No  service coordination in this encounter.      Therapy      No service coordination in this encounter.      Community Resources      No service coordination in this encounter.          Demographic Summary     Row Name 06/18/19 1044       General Information    Admission Type  observation    Referral Source  admission list    Preferred Language  English    General Information Comments  PCP is Barbara Mcfadden       Contact Information    Permission Granted to Share Info With      Contact Information Comments  [phone]        Functional Status     Row Name 06/18/19 1045       Functional Status    Usual Activity Tolerance  fair    Current Activity Tolerance  fair       Functional Status, IADL    Medications  independent    Meal Preparation  independent    Housekeeping  independent    Laundry  independent    Shopping  independent       Employment/    Employment/ Comments  Is disabled.  Has Medicare and Passport        Psychosocial    No documentation.       Abuse/Neglect    No documentation.       Legal    No documentation.       Substance Abuse    No documentation.       Patient Forms    No documentation.           Roberta Goldberg RN

## 2019-06-18 NOTE — CONSULTS
[facility] at [facility]   Consult Note    Referring Provider: Dr. Lennon    Reason for Consultation: chronic CHF    Patient Care Team:  Barbara Mills MD as PCP - General (Internal Medicine)  Marah Almeida APRN as PCP - Claims Attributed  Marah Almeida APRN as Nurse Practitioner (Cardiology)  Balbir Olsen MD as Consulting Physician (Cardiology)  Maria R Fuentes, RN as Care Coordinator ([facility])       Problem List:  1. Acute on chronic systolic (congestive) heart failure/nonischemic cardiomyopathy  a. Echocardiogram 4/28/14: LVEF 0.20-0.25, mild MR, moderate TR   b. Echocardiogram 11/7/14: LVEF 0.20-0.25, all valves are structurally and functionally normal with no hemodynamically evident valve disease   c. Echocardiogram, 2/7/2017: LVEF 20%, RV mildly dilated, cardiac valves anatomically and functionally normal, LV moderately dilated  d. Echocardiogram, 7/13/2017: LVEF 20%., Moderately reduced RV systolic function noted, mild MR  e. [facility], 7/13/2017: LVEF 10%; left ventricular end-diastolic pressure of 30 with normal coronary arteries  f. [facility] hospitalization 9/5/17-9/8/17 for CHF exacerbation  g. [facility] 10/16/17-10/19/17 for CHF exacerbation and was referred to [facility] for transplant but has to lose 200 pounds to qualify  h. Echocardiogram 10/16/17: LVEF 0.15, LV severely dilated, RV mildly dilated, mildly reduced RV systolic function noted  i. [employer]otronik VVI ICD implantation 8/15/17 by Dr. Richmond, no DFTs done due to patient's issues with hypoxemia with even minimal sedation as well as acute on chronic CHF  j. [facility] 2/11/18-2/14/18 for CHF  k. [facility] 2/18/18 with CP/SOB, elevated troponins, acceptable ECG, CCS class I chest discomfort/NYHA class II dyspnea/CHF  2. Hypertension  3. Morbid obesity: BMI 60  4. Obstructive sleep apnea, noncompliant with CPAP  5. Personal history of noncompliance with medical treatment  6. Microcytic anemia   7. Severe psoriasis  8. [facility] ED 12/12/17 for  chest pain with mildly elevated troponin 0.08, positive d-dimer, negative CTA of the chest, no evidence of ACS, PE, or dissection with discharge within 5 hours  9. Surgical history  a. Left heart catheterization  b. ICD implantation  c. Adenoidectomy  d. Nasal surgery  e. Tonsillectomy       No Known Allergies        Current Facility-Administered Medications:   •  acetaminophen (TYLENOL) tablet 650 mg, 650 mg, Oral, Q6H PRN, Kiah Stanford, APRN, 650 mg at 06/18/19 0013  •  aspirin EC tablet 81 mg, 81 mg, Oral, Daily, Roberta Lennon MD, 81 mg at 06/18/19 0825  •  azithromycin 500 MG/250 ML 0.9% NS IVPB (MBP), 500 mg, Intravenous, Q24H, Roberta Lennon MD  •  carvedilol (COREG) tablet 25 mg, 25 mg, Oral, BID With Meals, Jodi Betancourt MD, 25 mg at 06/18/19 0825  •  cefTRIAXone (ROCEPHIN) 1 g/100 mL 0.9% NS (MBP), 1 g, Intravenous, Q24H, Roberta Lennon MD  •  enoxaparin (LOVENOX) syringe 40 mg, 40 mg, Subcutaneous, Q12H, Tricia Wesley [facility]  •  Pharmacy Consult - [facility], , Does not apply, Daily, Tricia Wesley [facility]  •  potassium chloride (MICRO-K) CR capsule 40 mEq, 40 mEq, Oral, PRN **OR** potassium chloride (KLOR-CON) packet 40 mEq, 40 mEq, Oral, PRN **OR** potassium chloride 10 mEq in 100 mL IVPB, 10 mEq, Intravenous, Q1H PRN, Roberta Lennon MD  •  sacubitril-valsartan (ENTRESTO) 49-51 MG tablet 1 tablet, 1 tablet, Oral, Q12H, Jodi Betancourt MD, 1 tablet at 06/18/19 0825  •  sodium chloride 0.9 % flush 10 mL, 10 mL, Intravenous, PRN, Cirilo Altman MD  •  sodium chloride 0.9 % flush 3 mL, 3 mL, Intravenous, Q12H, Roberta Lennon MD, 3 mL at 06/18/19 0825  •  sodium chloride 0.9 % flush 3-10 mL, 3-10 mL, Intravenous, PRN, Roberta Lennon MD  •  spironolactone (ALDACTONE) tablet 25 mg, 25 mg, Oral, Daily, Jodi Betancourt MD, 25 mg at 06/18/19 0825         Medications Prior to Admission   Medication Sig Dispense Refill Last Dose   • allopurinol (ZYLOPRIM) 100 MG tablet Take 1 tablet by mouth  Daily. 30 tablet 5 Taking   • aspirin 81 MG EC tablet TAKE ONE TABLET BY MOUTH DAILY 30 tablet 5 Taking   • carvedilol (COREG) 25 MG tablet TAKE ONE TABLET BY MOUTH TWICE A DAY WITH MEALS 60 tablet 5 Taking   • ENTRESTO 49-51 MG tablet TAKE ONE TABLET BY MOUTH EVERY 12 HOURS 60 tablet 5 Taking   • spironolactone (ALDACTONE) 25 MG tablet TAKE ONE TABLET BY MOUTH DAILY 30 tablet 5 Taking   • torsemide (DEMADEX) 20 MG tablet TAKE THREE TABLETS BY MOUTH DAILY 90 tablet 5 Taking   • albuterol (PROVENTIL HFA;VENTOLIN HFA) 108 (90 Base) MCG/ACT inhaler Inhale 2 puffs Every 6 (Six) Hours As Needed for Wheezing. 1 inhaler 11 Taking         Subjective .   History of present illness:    Patient is a 28-year-old -American male who is well-known to our practice.  He has long-standing history of nonischemic cardiomyopathy with previous ICD implant.  Since last being seen in the office he is predominantly followed up with the heart and valve center.  Notes overall from heart failure standpoint he has been at his baseline.  Within the last week or so he became increasingly short of breath.  He contacted his primary care physician who instructed him to increase his diuretic therapy.  Patient notes that he did this for a few days but did not note any significant improvement.  He then notes that the other day whenever he was playing drums he became quite fatigued and short of breath.  He eventually presented to the hospital for further evaluation.  Chest x-ray initially did not show any acute findings.  CT of the chest was performed which suggested right lower lobe pneumonia as well as some diffuse pulmonary edema as well as reactive lymphadenopathy.  Patient spiked a temperature last evening greater than 101.  Currently just feels fatigued.  Has not noted any increased lower extremity edema.  No reported syncope, near syncope, or palpitations.  Has not had any ICD shocks.  Was due to have an echocardiogram last month but  "missed this appointment due to \"being sick\".      Social History     Socioeconomic History   • Marital status: Single     Spouse name: Not on file   • Number of children: 0   • Years of education: Not on file   • Highest education level: Not on file   Occupational History   • Occupation: disabled   Tobacco Use   • Smoking status: Never Smoker   • Smokeless tobacco: Never Used   Substance and Sexual Activity   • Alcohol use: No   • Drug use: No   • Sexual activity: Defer   Social History Narrative    Caffeine use: 6 sodas weekly    Patient lives with parents     Family History   Problem Relation Age of Onset   • Diabetes Father    • Diabetes Paternal Grandmother    • Diabetes Maternal Grandfather          Review of Systems:  Review of Systems   Constitution: Positive for chills, fever and malaise/fatigue. Negative for weakness.   HENT: Negative for nosebleeds.    Eyes: Negative for redness and visual disturbance.   Cardiovascular: Negative for orthopnea, palpitations and paroxysmal nocturnal dyspnea.   Respiratory: Positive for cough, shortness of breath and sputum production. Negative for snoring and wheezing.    Hematologic/Lymphatic: Negative for bleeding problem.   Skin: Negative for flushing, itching and rash.   Musculoskeletal: Negative for falls, joint pain and muscle cramps.   Gastrointestinal: Negative for abdominal pain, diarrhea, heartburn, nausea and vomiting.   Genitourinary: Negative for hematuria.   Neurological: Positive for excessive daytime sleepiness. Negative for dizziness, headaches and tremors.   Psychiatric/Behavioral: Negative for substance abuse. The patient is not nervous/anxious.               Objective   Vitals:  Blood pressure 127/78, pulse 87, temperature 98.7 °F (37.1 °C), temperature source Oral, resp. rate 18, height 177.8 cm (70\"), weight (!) 181 kg (400 lb), SpO2 95 %.     Intake/Output Summary (Last 24 hours) at 6/18/2019 1048  Last data filed at 6/18/2019 1010  Gross per 24 hour "   Intake 100 ml   Output 580 ml   Net -480 ml       Physical Exam   Constitutional: He is oriented to person, place, and time. He appears well-developed and well-nourished. No distress.   Morbidly obese   HENT:   Head: Normocephalic and atraumatic.   Eyes: Right eye exhibits no discharge. Left eye exhibits no discharge.   Neck: No JVD present. No tracheal deviation present.   Cardiovascular: Normal rate, regular rhythm and normal heart sounds. Exam reveals no friction rub.   No murmur heard.  Pulmonary/Chest: Effort normal. No respiratory distress.   Decreased throughout   Abdominal: Soft. Bowel sounds are normal. There is no tenderness.   Musculoskeletal: He exhibits no edema or deformity.   Neurological: He is alert and oriented to person, place, and time.   Skin: Skin is warm and dry.            Results Review:  I reviewed the patient's new clinical results.  Results from last 7 days   Lab Units 06/18/19  0745   WBC 10*3/mm3 7.15   HEMOGLOBIN g/dL 12.8*   HEMATOCRIT % 40.1   PLATELETS 10*3/mm3 204     Results from last 7 days   Lab Units 06/18/19  0745 06/17/19  1155   SODIUM mmol/L 134* 134*   POTASSIUM mmol/L 3.5 3.4*   CHLORIDE mmol/L 94* 95*   CO2 mmol/L 29.0 27.0   BUN mg/dL 12 11   CREATININE mg/dL 0.96 1.02   CALCIUM mg/dL 9.1 9.0   BILIRUBIN mg/dL  --  1.1   ALK PHOS U/L  --  75   ALT (SGPT) U/L  --  27   AST (SGOT) U/L  --  24   GLUCOSE mg/dL 102* 115*     Results from last 7 days   Lab Units 06/18/19  0745   SODIUM mmol/L 134*   POTASSIUM mmol/L 3.5   CHLORIDE mmol/L 94*   CO2 mmol/L 29.0   BUN mg/dL 12   CREATININE mg/dL 0.96   GLUCOSE mg/dL 102*   CALCIUM mg/dL 9.1         Lab Results   Lab Value Date/Time    TROPONINI 0.051 (H) 01/01/2019 0523    TROPONINI 0.063 (H) 01/01/2019 0203    TROPONINI 1.678 (C) 02/19/2018 0542    TROPONINI 0.651 (H) 02/18/2018 1123    TROPONINI 0.166 (H) 02/18/2018 0929    TROPONINI 0.064 (H) 02/18/2018 0734    TROPONINI 1.142 (C) 07/13/2017 0854             Results from  last 7 days   Lab Units 06/17/19  1155   PROBNP pg/mL 1,007.0*           Tele:  SR    EKG: ST no acute changes      Assessment/Plan     1. RLL pneumonia, per attending service  2. Chronic systolic congestive heart failure  3. NICM, with previous ICD implant  4. COLLIN  5. Morbid obesity, considering bariatric surgery      Plan:    1. Will diurese today with IV lasix. Overall not far off from his baseline.  2. Likely transition to PO tomorrow  3. Will follow along.      SONAM Reynolds obtained past medical, family history, social history, review of systems and functioned as a scribe for the remainder of the dictation for Dr. Perkins.  Ij luis md, personally performed the services described in this documentation as scribed by the above named individual in my presence, and it is both accurate and complete.  6/18/2019  1:43 PM      Dictated utilizing Dragon dictation

## 2019-06-19 ENCOUNTER — APPOINTMENT (OUTPATIENT)
Dept: GENERAL RADIOLOGY | Facility: HOSPITAL | Age: 29
End: 2019-06-19

## 2019-06-19 ENCOUNTER — APPOINTMENT (OUTPATIENT)
Dept: CARDIOLOGY | Facility: HOSPITAL | Age: 29
End: 2019-06-19

## 2019-06-19 VITALS
BODY MASS INDEX: 45.1 KG/M2 | TEMPERATURE: 98.5 F | HEART RATE: 88 BPM | DIASTOLIC BLOOD PRESSURE: 63 MMHG | SYSTOLIC BLOOD PRESSURE: 133 MMHG | OXYGEN SATURATION: 96 % | HEIGHT: 70 IN | RESPIRATION RATE: 18 BRPM | WEIGHT: 315 LBS

## 2019-06-19 PROBLEM — I50.9 CHF EXACERBATION: Status: RESOLVED | Noted: 2018-06-24 | Resolved: 2019-06-19

## 2019-06-19 PROBLEM — I50.23 ACUTE ON CHRONIC SYSTOLIC HEART FAILURE (HCC): Status: RESOLVED | Noted: 2017-02-28 | Resolved: 2019-06-19

## 2019-06-19 PROCEDURE — 99217 PR OBSERVATION CARE DISCHARGE MANAGEMENT: CPT | Performed by: INTERNAL MEDICINE

## 2019-06-19 PROCEDURE — G0378 HOSPITAL OBSERVATION PER HR: HCPCS

## 2019-06-19 PROCEDURE — 25010000002 ENOXAPARIN PER 10 MG

## 2019-06-19 PROCEDURE — 96372 THER/PROPH/DIAG INJ SC/IM: CPT

## 2019-06-19 PROCEDURE — 25010000002 FUROSEMIDE PER 20 MG: Performed by: NURSE PRACTITIONER

## 2019-06-19 PROCEDURE — 96376 TX/PRO/DX INJ SAME DRUG ADON: CPT

## 2019-06-19 PROCEDURE — 71045 X-RAY EXAM CHEST 1 VIEW: CPT

## 2019-06-19 PROCEDURE — 99213 OFFICE O/P EST LOW 20 MIN: CPT | Performed by: INTERNAL MEDICINE

## 2019-06-19 RX ORDER — AZITHROMYCIN 250 MG/1
250 TABLET, FILM COATED ORAL DAILY
Qty: 3 TABLET | Refills: 0 | Status: SHIPPED | OUTPATIENT
Start: 2019-06-19 | End: 2019-06-24

## 2019-06-19 RX ORDER — ALBUTEROL SULFATE 90 UG/1
2 AEROSOL, METERED RESPIRATORY (INHALATION) EVERY 6 HOURS PRN
Qty: 18 G | Refills: 1 | Status: SHIPPED | OUTPATIENT
Start: 2019-06-19 | End: 2019-12-03

## 2019-06-19 RX ORDER — AMOXICILLIN AND CLAVULANATE POTASSIUM 875; 125 MG/1; MG/1
1 TABLET, FILM COATED ORAL 2 TIMES DAILY
Qty: 14 TABLET | Refills: 0 | Status: SHIPPED | OUTPATIENT
Start: 2019-06-19 | End: 2019-06-24

## 2019-06-19 RX ADMIN — CARVEDILOL 25 MG: 12.5 TABLET, FILM COATED ORAL at 09:47

## 2019-06-19 RX ADMIN — FUROSEMIDE 40 MG: 10 INJECTION, SOLUTION INTRAMUSCULAR; INTRAVENOUS at 09:47

## 2019-06-19 RX ADMIN — ASPIRIN 81 MG: 81 TABLET, COATED ORAL at 09:48

## 2019-06-19 RX ADMIN — SPIRONOLACTONE 25 MG: 25 TABLET ORAL at 09:48

## 2019-06-19 RX ADMIN — SODIUM CHLORIDE, PRESERVATIVE FREE 3 ML: 5 INJECTION INTRAVENOUS at 09:46

## 2019-06-19 RX ADMIN — ENOXAPARIN SODIUM 40 MG: 40 INJECTION SUBCUTANEOUS at 09:46

## 2019-06-19 RX ADMIN — SACUBITRIL AND VALSARTAN 1 TABLET: 49; 51 TABLET, FILM COATED ORAL at 09:47

## 2019-06-19 NOTE — PROGRESS NOTES
"  [facility] at [facility]   Inpatient Progress Note       LOS: 1 day   Patient Care Team:  Barbara Mills MD as PCP - General (Internal Medicine)  Marah Almeida APRN as PCP - Claims Attributed  Marah Almeida APRN as Nurse Practitioner (Cardiology)  Balbir Olsen MD as Consulting Physician (Cardiology)  Maria R Fuentes, RN as Care Coordinator (Population Health)    Chief Complaint:  Chronic systolic CHF    Subjective     Interval History:     Patient feels that his dyspnea is improved, and his edema is at baseline.    Review of Systems:   Pertinent positives noted in history, exam, and assessment. Otherwise reviewed and negative.      Objective     Vitals:  Blood pressure 139/75, pulse 91, temperature 98.2 °F (36.8 °C), temperature source Oral, resp. rate 18, height 177.8 cm (70\"), weight (!) 181 kg (400 lb), SpO2 (!) 87 %.     Intake/Output Summary (Last 24 hours) at 6/19/2019 0940  Last data filed at 6/19/2019 0459  Gross per 24 hour   Intake 420 ml   Output 780 ml   Net -360 ml     Physical Exam   Constitutional: He is oriented to person, place, and time. He appears well-developed and well-nourished.   HENT:   Mouth/Throat: Oropharynx is clear and moist.   Neck: No JVD present. Carotid bruit is not present. No thyromegaly present.   Cardiovascular: Regular rhythm, S1 normal, S2 normal, normal heart sounds and intact distal pulses. Exam reveals no gallop, no S3 and no S4.   No murmur heard.  Pulses:       Carotid pulses are 2+ on the right side, and 2+ on the left side.       Radial pulses are 2+ on the right side, and 2+ on the left side.   Pulmonary/Chest: Breath sounds normal.   Abdominal: Soft. Bowel sounds are normal. He exhibits no mass. There is no tenderness.   Musculoskeletal: He exhibits edema.   Neurological: He is alert and oriented to person, place, and time.   Skin: Skin is warm and dry. No rash noted.          Results Review:     I reviewed the patient's new " clinical results.    Results from last 7 days   Lab Units 06/18/19  0745   WBC 10*3/mm3 7.15   HEMOGLOBIN g/dL 12.8*   HEMATOCRIT % 40.1   PLATELETS 10*3/mm3 204     Results from last 7 days   Lab Units 06/18/19 2045 06/18/19  0745 06/17/19  1155   SODIUM mmol/L  --  134* 134*   POTASSIUM mmol/L 3.8 3.5 3.4*   CHLORIDE mmol/L  --  94* 95*   CO2 mmol/L  --  29.0 27.0   BUN mg/dL  --  12 11   CREATININE mg/dL  --  0.96 1.02   CALCIUM mg/dL  --  9.1 9.0   BILIRUBIN mg/dL  --   --  1.1   ALK PHOS U/L  --   --  75   ALT (SGPT) U/L  --   --  27   AST (SGOT) U/L  --   --  24   GLUCOSE mg/dL  --  102* 115*     Results from last 7 days   Lab Units 06/18/19 2045 06/18/19  0745   SODIUM mmol/L  --  134*   POTASSIUM mmol/L 3.8 3.5   CHLORIDE mmol/L  --  94*   CO2 mmol/L  --  29.0   BUN mg/dL  --  12   CREATININE mg/dL  --  0.96   GLUCOSE mg/dL  --  102*   CALCIUM mg/dL  --  9.1         Lab Results   Lab Value Date/Time    TROPONINI 0.051 (H) 01/01/2019 0523    TROPONINI 0.063 (H) 01/01/2019 0203    TROPONINI 1.678 (C) 02/19/2018 0542    TROPONINI 0.651 (H) 02/18/2018 1123    TROPONINI 0.166 (H) 02/18/2018 0929    TROPONINI 0.064 (H) 02/18/2018 0734    TROPONINI 1.142 (C) 07/13/2017 0854             Results from last 7 days   Lab Units 06/17/19  1155   PROBNP pg/mL 1,007.0*         Tele:  SR    Assessment/Plan       Hypertension    Morbid obesity (CMS/HCC)    Nonischemic congestive cardiomyopathy (CMS/HCC)    Acute on chronic systolic heart failure (CMS/HCC)    Plaque psoriasis    CHF exacerbation (CMS/HCC)    Pneumonia of right lower lobe due to infectious organism (CMS/HCC)      1. RLL pneumonia, per attending service  2. Chronic systolic congestive heart failure  3. NICM, with previous ICD implant  4. COLLIN  5. Morbid obesity, considering bariatric surgery      Plan:  Continue gentle diuresis, chronically elevated troponins due to known nonischemic cardiomyopathy, pneumonia, hypoxia and morbid obesity/pickwickian syndrome.   Transition to p.o. diuretics as he is near his baseline.  Electronically signed by SONAM Reynolds, 06/19/19, 10:08 AM.  Balbir Olsen MD    .    Dictated utilizing Dragon dictation

## 2019-06-19 NOTE — PROGRESS NOTES
Continued Stay Note   [facility]     Patient Name: Evan Gannon  MRN: [id number]  Today's Date: 6/19/2019    Admit Date: 6/17/2019    Discharge Plan     Row Name 06/19/19 1118       Plan    Plan  Plan is home with family at discharge    Patient/Family in Agreement with Plan  yes    Plan Comments  Met with patient in the room.  Reports feeling better today.  Following for D/C needs    Final Discharge Disposition Code  01 - home or self-care        Discharge Codes    No documentation.             Sofia Celaya RN

## 2019-06-19 NOTE — NURSING NOTE
[facility]    Patient Name:  Evan Gannon  :  1990  DOS:  19    Active Hospital Problems    Diagnosis   • Pneumonia of right lower lobe due to infectious organism (CMS/HCC)   • CHF exacerbation (CMS/HCC)   • Plaque psoriasis   • Acute on chronic systolic heart failure (CMS/HCC)   • Nonischemic congestive cardiomyopathy (CMS/HCC)   • Hypertension   • Morbid obesity (CMS/HCC)       Pt with hx of chronic SHF, s/p ICD, HTN, morgid obesity, COPD.  Pt presented with PNA an Acute SHF.   Medical records have been reviewed. Education provided.    Patient to be scheduled follow up 3-5 days post discharge.    Echo Results:   repeat echo ordered in April.  Pt did not completed     10/16/17  · Left ventricular systolic function is severely decreased. Estimated EF = 15%.  · The left ventricular cavity is severely dilated.  · Right ventricular cavity is mildly dilated.  · Mildly reduced right ventricular systolic function noted.  NYHA Class:    Heart Failure Quality Measures    ACE I, ARB, ARNI (if EF <40%)     Yes  enstresto    Evidence-based Beta Blocker (EF<40%)    (Bisoprolol, Carvedilol, Metoprolol Succinate)  Yes  coreg    Aldosterone Antagonist (EF <40%)  Yes  spironolactone    Heart Failure Education    Risk factors, Medications management and adherance, Low Na diet, Signs / symptoms, Daily weight monitoring, Weight management, Importance of keeping follow up office visits and Role of [facility] and when to call    If EF < 35%  Pt has ICD

## 2019-06-19 NOTE — DISCHARGE SUMMARY
[facility]  DISCHARGE SUMMARY    Patient Name: Evan Gannon  : 1990  MRN: [id number]    Date of Admission: 2019  Date of Discharge:  19  Primary Care Physician: Barbara Mills MD    Consults     Date and Time Order Name Status Description    2019 Inpatient Cardiology Consult Completed           Hospital Course     Presenting Problem:   Pneumonia of right lower lobe due to infectious organism (CMS/HCC) [J18.1]  Pneumonia of right lower lobe due to infectious organism (CMS/HCC) [J18.1]    Active Hospital Problems    Diagnosis  POA   • Pneumonia of right lower lobe due to infectious organism (CMS/HCC) [J18.1]  Yes   • Plaque psoriasis [L40.0]  Yes   • Nonischemic congestive cardiomyopathy (CMS/HCC) [I42.0]  Yes   • Hypertension [I10]  Yes   • Morbid obesity (CMS/HCC) [E66.01]  Yes      Resolved Hospital Problems    Diagnosis Date Resolved POA   • CHF exacerbation (CMS/HCC) [I50.9] 2019 Yes   • Acute on chronic systolic heart failure (CMS/HCC) [I50.23] 2019 Yes          Hospital Course:  Evan Gannon is a 28 y.o. male with nonischemic cardiomyopathy (EF 15%), morbid obesity and HTN presents with shortness of air, dyspnea on exertion, weight gain and cough.       Pneumonia  - Right lower lobe infiltrate noted on CT chest at admission  - recently initiated cosentyx for psoriasis increases risk of infection  - placed on empiric rocephin and azithromycin during admissions - cultures no growth to date, CXR shows unchanged infiltrate and patient afebrile > 24 hrs prior to discharge.    - Augmentin and azithromycin outpatient  - will provide a refill on his albuterol inhaler (he tells me he is out)  - followup with PCP Dr Mills early next week (Discussed clinical course with Dr Mills prior to discharge to discharge to ensure continuity of care     Chronic systolic heart failure / NICM with ICD  - IV diuresis while  hospitalized, resume home diuretics and heart failure medications at discharge  - follow closely outpatient with the [facility]     COLLIN  - CPAP     Morbid Obesity  - BMI 57  - discussed weight loss strategies with patient     Psoriasis  - hold cosentyx for now - pending on clinical course patient may need to discuss alternative treatment with his dermatologist, discussed with patient.     Hyponatremia  - mild, asymptomatic       Discharge Follow Up Recommendations for labs/diagnostics:      Day of Discharge     HPI:   Feels better. No dyspnea with activity such as walking to the bathroom. Still some cough, occasional yellow sputum. No fevers. Agreeable with discharge plan.    Review of Systems  Gen- No fevers, chills  CV- No chest pain, palpitations  Resp- as above  GI- No N/V/D, abd pain    Otherwise ROS is negative except as mentioned in the HPI.    Vital Signs:   Temp:  [98.2 °F (36.8 °C)-98.5 °F (36.9 °C)] 98.5 °F (36.9 °C)  Heart Rate:  [] 88  Resp:  [18-20] 18  BP: (114-149)/(59-86) 133/63     Physical Exam:  Constitutional -no acute distress, non toxic, in bed  HEENT-NCAT, mucous membranes moist  CV-RRR, S1 S2 normal, no m/r/g  Resp-diminished bilaterally, no wheezes, rhonchi or rales  Abd-soft, non-tender, non-distended, normo active bowel sounds, morbidly obese  Ext-No lower extremity cyanosis, clubbing or edema bilaterally  Neuro-alert and oriented x 3, speech clear, moves all extremities   Psych-normal affect   Skin- No rash on exposed UE or LE bilaterally      Pertinent  and/or Most Recent Results     Results from last 7 days   Lab Units 06/18/19  2045 06/18/19  0745 06/17/19  1155   WBC 10*3/mm3  --  7.15 9.47   HEMOGLOBIN g/dL  --  12.8* 13.7   HEMATOCRIT %  --  40.1 42.4   PLATELETS 10*3/mm3  --  204 240   SODIUM mmol/L  --  134* 134*   POTASSIUM mmol/L 3.8 3.5 3.4*   CHLORIDE mmol/L  --  94* 95*   CO2 mmol/L  --  29.0 27.0   BUN mg/dL  --  12 11   CREATININE mg/dL  --  0.96 1.02    GLUCOSE mg/dL  --  102* 115*   CALCIUM mg/dL  --  9.1 9.0     Results from last 7 days   Lab Units 06/17/19  1155   BILIRUBIN mg/dL 1.1   ALK PHOS U/L 75   ALT (SGPT) U/L 27   AST (SGOT) U/L 24           Invalid input(s): TG, LDLCALC, LDLREALC  Results from last 7 days   Lab Units 06/17/19  1238 06/17/19  1155   PROBNP pg/mL  --  1,007.0*   TROPONIN T ng/mL  --  <0.010   LACTATE mmol/L 1.2  --        Brief Urine Lab Results     None          Microbiology Results Abnormal     Procedure Component Value - Date/Time    Blood Culture - Blood, Arm, Left [[id number]] Collected:  06/17/19 1300    Lab Status:  Preliminary result Specimen:  Blood from Arm, Left Updated:  06/18/19 1315     Blood Culture No growth at 24 hours    Blood Culture - Blood, Hand, Left [[id number]] Collected:  06/17/19 1235    Lab Status:  Preliminary result Specimen:  Blood from Hand, Left Updated:  06/18/19 1315     Blood Culture No growth at 24 hours    S. Pneumo Ag Urine or CSF - Urine, Urine, Clean Catch [[id number]]  (Normal) Collected:  06/17/19 2028    Lab Status:  Final result Specimen:  Urine, Clean Catch Updated:  06/18/19 0243     Strep Pneumo Ag Negative    Legionella Antigen, Urine - Urine, Urine, Clean Catch [[id number]]  (Normal) Collected:  06/17/19 2028    Lab Status:  Final result Specimen:  Urine, Clean Catch Updated:  06/18/19 0241     LEGIONELLA ANTIGEN, URINE Negative          Imaging Results (all)     Procedure Component Value Units Date/Time    XR Chest 1 View [[id number]] Collected:  06/19/19 0905     Updated:  06/19/19 1013    Narrative:       EXAMINATION: XR CHEST 1 VW-06/19/2019:      INDICATION: Pneumonia; J18.1-Lobar pneumonia, unspecified organism;  I50.33-Acute on chronic diastolic (congestive) heart failure.      COMPARISON: 06/17/2019.     FINDINGS: Left sided single-lead ICD is again noted. The heart shadow is  moderate-to-markedly enlarged. Mild patchy interstitial changes in the  right midlung are stable,  nonspecific. No new pulmonary parenchymal  disease, effusion, or pneumothorax is seen.           Impression:       Stable mild right midlung pulmonary interstitial disease.  Cardiomegaly without evidence of overt congestive failure. No new chest  disease is seen.     D:  06/19/2019  E:  06/19/2019             CT Angiogram Chest With Contrast [[id number]] Collected:  06/17/19 1538     Updated:  06/18/19 0917    Narrative:       EXAMINATION: CT ANGIOGRAM CHEST W CONTRAST-06/17/2019:      INDICATION: Shortness of breath, tachycardia, evaluate for PE,  pneumonia, difficulty breathing.     TECHNIQUE: Multiple axial CT imaging was obtained of the chest following  the administration of intravenous contrast according to the CT angio  protocol. 2-D coronal reformatted images were submitted to further  facilitate diagnostic accuracy and treatment planning.     The radiation dose reduction device was turned on for each scan per the  ALARA (As Low as Reasonably Achievable) protocol.     COMPARISON: NONE.     FINDINGS: Consolidation identified within the right lower lobe with  adenopathy suggesting infiltrate. There is patchy groundglass opacity  centrally within the lung fields. No evidence of pulmonary embolism. No  pleural effusion. No pneumothorax. Degenerative changes seen within the  spine. The heart is enlarged. Lymph nodes prominent identified in the  right hilar region suggesting reactive adenopathy. There is no  mediastinal mass. The upper abdomen is unremarkable.       Impression:       Right lower lobe pneumonia with reactive adenopathy in the  right hilar region. Enlargement of the cardiac chambers with edema  throughout the lung fields bilaterally.     D:  06/17/2019  E:  06/17/2019           This report was finalized on 6/18/2019 9:13 AM by Dr. Crystal Brunner MD.       XR Chest 1 View [[id number]] Collected:  06/17/19 1205     Updated:  06/18/19 0915    Narrative:       EXAMINATION: XR CHEST 1 VW-       INDICATION: Shortness of air triage protocol.      COMPARISON: 02/02/2019.     FINDINGS: Portable chest reveals cardiac pacer leads in satisfactory  position. The heart is enlarged. Underlying chronic and emphysematous  change is seen in the lung fields bilaterally. No focal parenchymal  opacification is present.  No pleural effusion or pneumothorax. The bony  structures are unremarkable. Pulmonary vascularity is within normal  limits.           Impression:       The heart is enlarged with no evidence of acute parenchymal  disease.     D:  06/17/2019  E:  06/17/2019     This report was finalized on 6/18/2019 9:12 AM by Dr. Crystal Brunner MD.       XR Foot 3+ View Left [[id number]] Collected:  06/17/19 1306     Updated:  06/17/19 1416    Narrative:       EXAMINATION: XR FOOT 3+ VW, LEFT-06/17/2019:      INDICATION: Lateral foot pain for 2 days.      COMPARISON: NONE.     FINDINGS: Imaging of the left foot reveal no fracture, dislocation or  soft tissue foreign body. There is diffuse soft tissue swelling  consistent with history of congestive heart failure. There are no acute  bony findings.           Impression:       There are no acute bony findings. D:  06/17/2019  E:  06/17/2019     This report was finalized on 6/17/2019 2:13 PM by Dr. Yung Waldron MD.             Results for orders placed in visit on 04/02/19   SCANNED VASCULAR STUDIES       Results for orders placed in visit on 04/02/19   SCANNED VASCULAR STUDIES       Results for orders placed during the hospital encounter of 10/16/17   Adult Transthoracic Echo Limited W/ Cont if Necessary Per Protocol    Narrative · Left ventricular systolic function is severely decreased. Estimated EF =   15%.  · The left ventricular cavity is severely dilated.  · Right ventricular cavity is mildly dilated.  · Mildly reduced right ventricular systolic function noted.           Order Current Status    Blood Culture - Blood, Arm, Left Preliminary result    Blood Culture -  Blood, Hand, Left Preliminary result        Discharge Details        Discharge Medications      New Medications      Instructions Start Date   amoxicillin-clavulanate 875-125 MG per tablet  Commonly known as:  AUGMENTIN   1 tablet, Oral, 2 Times Daily      azithromycin 250 MG tablet  Commonly known as:  ZITHROMAX   250 mg, Oral, Daily         Continue These Medications      Instructions Start Date   albuterol sulfate  (90 Base) MCG/ACT inhaler  Commonly known as:  PROVENTIL HFA;VENTOLIN HFA;PROAIR HFA   2 puffs, Inhalation, Every 6 Hours PRN      allopurinol 100 MG tablet  Commonly known as:  ZYLOPRIM   100 mg, Oral, Daily      aspirin 81 MG EC tablet   TAKE ONE TABLET BY MOUTH DAILY      carvedilol 25 MG tablet  Commonly known as:  COREG   TAKE ONE TABLET BY MOUTH TWICE A DAY WITH MEALS      ENTRESTO 49-51 MG tablet  Generic drug:  sacubitril-valsartan   TAKE ONE TABLET BY MOUTH EVERY 12 HOURS      spironolactone 25 MG tablet  Commonly known as:  ALDACTONE   TAKE ONE TABLET BY MOUTH DAILY      torsemide 20 MG tablet  Commonly known as:  DEMADEX   TAKE THREE TABLETS BY MOUTH DAILY             No Known Allergies      Discharge Disposition:  Home or Self Care    Discharge Diet:  Diet Order   Procedures   • Diet Regular; Cardiac, Consistent Carbohydrate         Discharge Activity:         CODE STATUS:    Code Status and Medical Interventions:   Ordered at: 06/17/19 1923     Level Of Support Discussed With:    Patient     Code Status:    CPR     Medical Interventions (Level of Support Prior to Arrest):    Full         Future Appointments   Date Time Provider Department Center   6/21/2019 10:15 AM Marah Almeida APRN [facility]   6/25/2019 12:00 AM [facility] None       Additional Instructions for the Follow-ups that You Need to Schedule     Discharge Follow-up with PCP   As directed       Currently Documented PCP:    Barbara Mills MD    PCP Phone Number:     [phone]     Follow Up Details:  follow up PCP early next week with chest x-ray               Time Spent on Discharge:  45 minutes    Electronically signed by Calixto Adrian MD, 06/19/19, 12:29 PM.

## 2019-06-19 NOTE — PLAN OF CARE
Problem: Patient Care Overview  Goal: Plan of Care Review  Outcome: Ongoing (interventions implemented as appropriate)   06/19/19 7332   Coping/Psychosocial   Plan of Care Reviewed With patient   Plan of Care Review   Progress improving   OTHER   Outcome Summary Pt denies pain at this time. Currently on 2L O2 with bipap at night. Will continue to monitor.

## 2019-06-20 ENCOUNTER — TRANSITIONAL CARE MANAGEMENT TELEPHONE ENCOUNTER (OUTPATIENT)
Dept: FAMILY MEDICINE CLINIC | Facility: CLINIC | Age: 29
End: 2019-06-20

## 2019-06-20 ENCOUNTER — READMISSION MANAGEMENT (OUTPATIENT)
Dept: CALL CENTER | Facility: HOSPITAL | Age: 29
End: 2019-06-20

## 2019-06-20 DIAGNOSIS — M10.9 ACUTE GOUT OF MULTIPLE SITES, UNSPECIFIED CAUSE: ICD-10-CM

## 2019-06-20 RX ORDER — ALLOPURINOL 100 MG/1
TABLET ORAL
Qty: 30 TABLET | Refills: 2 | OUTPATIENT
Start: 2019-06-20

## 2019-06-20 NOTE — OUTREACH NOTE
[facility] call completed and has a new pcp/licha appt with Gilbert GONZALEZ 6/24/19. See TCM call flowsheet for details.    Pt states feeling much better. He reports still some cough, occasionally prod, but breathing better. No sob, pain, f/c, n/v. He has filled and started his dc rxs. He denies any questions or needs and confirms new pcp appt.

## 2019-06-20 NOTE — OUTREACH NOTE
Prep Survey      Responses   Facility patient discharged from?  [facility]   Is patient eligible?  Yes   Discharge diagnosis  Pneumonia   CHF exacerbation    Does the patient have one of the following disease processes/diagnoses(primary or secondary)?  CHF   Does the patient have Home health ordered?  No   Is there a DME ordered?  No   Prep survey completed?  Yes          Shana Ghosh RN

## 2019-06-20 NOTE — PROGRESS NOTES
Encounter Date:06/21/2019      Patient ID: Evan Gannon is a 28 y.o. male.        Subjective:     Chief Complaint: Follow-up   f  History of Present Illness presents to the office today for systolic heart failure.  Patient was recently hospitalized to [facility] for pneumonia.  He was discharged home on Augmentin and Zithromax.  Notes he is feeling great and denies chest pain, dyspnea, presyncope, orthopnea, PND or pedal edema.  He notes compliance with his medications.  Patient reports he is going to begin an exercise program.  He reports he is not following a low-sodium diet or the DASH diet as had been recommended.  Patient Active Problem List   Diagnosis   • Hypertension   • Morbid obesity (CMS/HCC)   • Severe obstructive sleep apnea   • Nonischemic congestive cardiomyopathy (CMS/HCC)   • Personal history of noncompliance with medical treatment   • CHF (congestive heart failure), NYHA class IV (CMS/HCC)   • Plaque psoriasis   • Painless hematuria   • Pneumonia of right lower lobe due to infectious organism (CMS/HCC)       Past Surgical History:   Procedure Laterality Date   • ADENOIDECTOMY     • CARDIAC CATHETERIZATION N/A 7/13/2017    Procedure: Left Heart Cath;  Surgeon: Balbir Olsen MD;  Location:  [facility];  Service:    • CARDIAC DEFIBRILLATOR PLACEMENT  08/2017   • CARDIAC ELECTROPHYSIOLOGY PROCEDURE N/A 8/15/2017    Procedure: VVI ICD Implant;  Surgeon: Nathanael Richmond DO;  Location:  [facility] INV[facility];  Service:    • INTERNAL CARDIAC DEFIBRILLATOR INSERTION Left 2017   • NOSE SURGERY     • OTHER SURGICAL HISTORY      ultra light therapy   • SINUS SURGERY     • TONSILLECTOMY         No Known Allergies      Current Outpatient Medications:   •  albuterol sulfate  (90 Base) MCG/ACT inhaler, Inhale 2 puffs Every 6 (Six) Hours As Needed for Wheezing., Disp: 18 g, Rfl: 1  •  allopurinol (ZYLOPRIM) 100 MG tablet, Take 1 tablet by mouth Daily., Disp: 30  tablet, Rfl: 5  •  amoxicillin-clavulanate (AUGMENTIN) 875-125 MG per tablet, Take 1 tablet by mouth 2 (Two) Times a Day., Disp: 14 tablet, Rfl: 0  •  aspirin 81 MG EC tablet, TAKE ONE TABLET BY MOUTH DAILY, Disp: 30 tablet, Rfl: 5  •  azithromycin (ZITHROMAX) 250 MG tablet, Take 1 tablet by mouth Daily for 3 days, Disp: 3 tablet, Rfl: 0  •  carvedilol (COREG) 25 MG tablet, TAKE ONE TABLET BY MOUTH TWICE A DAY WITH MEALS, Disp: 60 tablet, Rfl: 5  •  ENTRESTO 49-51 MG tablet, TAKE ONE TABLET BY MOUTH EVERY 12 HOURS, Disp: 60 tablet, Rfl: 5  •  spironolactone (ALDACTONE) 25 MG tablet, TAKE ONE TABLET BY MOUTH DAILY, Disp: 30 tablet, Rfl: 5  •  torsemide (DEMADEX) 20 MG tablet, TAKE THREE TABLETS BY MOUTH DAILY, Disp: 90 tablet, Rfl: 5    The following portions of the chart were reviewed today and updated as appropriate: Allergies, current medications, past family history, social history, past medical history.     Review of Systems   Constitution: Negative for chills, decreased appetite, diaphoresis, fever, weakness, malaise/fatigue, night sweats, weight gain and weight loss.   HENT: Negative for congestion, hearing loss, hoarse voice and nosebleeds.    Eyes: Negative for blurred vision, visual disturbance and visual halos.   Cardiovascular: Negative for chest pain, claudication, cyanosis, dyspnea on exertion, irregular heartbeat, leg swelling, near-syncope, orthopnea, palpitations, paroxysmal nocturnal dyspnea and syncope.   Respiratory: Negative for cough, hemoptysis, shortness of breath, sleep disturbances due to breathing, snoring, sputum production and wheezing.    Hematologic/Lymphatic: Negative for bleeding problem. Does not bruise/bleed easily.   Skin: Negative for dry skin, itching and rash.   Musculoskeletal: Negative for arthritis, joint pain, joint swelling and myalgias.   Gastrointestinal: Negative for bloating, abdominal pain, constipation, diarrhea, flatus, heartburn, hematemesis, hematochezia, melena,  "nausea and vomiting.   Genitourinary: Negative for dysuria, frequency, hematuria, nocturia and urgency.   Neurological: Negative for excessive daytime sleepiness, dizziness, headaches, light-headedness and loss of balance.   Psychiatric/Behavioral: Negative for depression. The patient does not have insomnia and is not nervous/anxious.            Objective:     Vitals:    06/21/19 1022   BP: 105/65   BP Location: Right arm   Patient Position: Sitting   Cuff Size: Large Adult   Pulse: 85   Resp: 20   Temp: 97.7 °F (36.5 °C)   TempSrc: Temporal   SpO2: 93%   Weight: (!) 188 kg (414 lb)   Height: 177.8 cm (70\")         Physical Exam   Constitutional: He is oriented to person, place, and time. He appears well-developed and well-nourished. He is active and cooperative. No distress.   Morbidly obese   HENT:   Head: Normocephalic and atraumatic.   Mouth/Throat: Oropharynx is clear and moist.   Eyes: Conjunctivae and EOM are normal. Pupils are equal, round, and reactive to light.   Neck: Normal range of motion. Neck supple. No JVD present. No tracheal deviation present. No thyromegaly present.   Cardiovascular: Normal rate, regular rhythm, normal heart sounds and intact distal pulses.   Pulmonary/Chest: Effort normal and breath sounds normal.   Abdominal: Soft. Bowel sounds are normal. He exhibits no distension. There is no tenderness.   Musculoskeletal: Normal range of motion.   Neurological: He is alert and oriented to person, place, and time.   Skin: Skin is warm, dry and intact.   Psychiatric: He has a normal mood and affect. His behavior is normal.   Nursing note and vitals reviewed.      Lab and Diagnostic Review:      Lab Results   Component Value Date    GLUCOSE 102 (H) 06/18/2019    CALCIUM 9.1 06/18/2019     (L) 06/18/2019    K 3.8 06/18/2019    CO2 29.0 06/18/2019    CL 94 (L) 06/18/2019    BUN 12 06/18/2019    CREATININE 0.96 06/18/2019    EGFRIFAFRI 115 06/18/2019    BCR 12.5 06/18/2019    ANIONGAP 11.0 " 06/18/2019         Assessment and Plan:         1. Chronic systolic congestive heart failure (CMS/HCC)  Euvolemic  Continue carvedilol, Entresto, Aldactone, torsemide  Long discussion with patient about beginning DASH diet and daily regular exercise  Patient has no showed multiple appointments with Dr. Olsen; message  for patient to follow-up in his office  Echo in near future  2. Nonischemic congestive cardiomyopathy (CMS/HCC)  Continue Aldactone, torsemide, Coreg, Entresto  Heart failure education today including signs and symptoms, the role of the heart failure center, daily weights, low sodium diet (less than 1500 mg per day), and daily physical activity. Reviewed HF Zones with patient and family.  Patient to continue current medications as previously ordered.   3. Essential hypertension  under good control  HTN Education provided today including signs and symptoms, medication management, daily blood pressure monitoring. Patient encouraged to call the [facility] center with any abnormal readings.   4. COLLIN (obstructive sleep apnea)  Compliant with CPAP therapy    It has been a pleasure to participate in the care of this patient.  Patient was instructed to call the [facility] with any questions, concerns, or worsening symptoms.        * Please note that portions of this note were completed with a voice recognition program. Efforts were made to edit the dictation but occasionally words are transcribed.

## 2019-06-21 ENCOUNTER — EPISODE CHANGES (OUTPATIENT)
Dept: CASE MANAGEMENT | Facility: OTHER | Age: 29
End: 2019-06-21

## 2019-06-21 ENCOUNTER — READMISSION MANAGEMENT (OUTPATIENT)
Dept: CALL CENTER | Facility: HOSPITAL | Age: 29
End: 2019-06-21

## 2019-06-21 ENCOUNTER — OFFICE VISIT (OUTPATIENT)
Dept: CARDIOLOGY | Facility: HOSPITAL | Age: 29
End: 2019-06-21

## 2019-06-21 VITALS
HEIGHT: 70 IN | WEIGHT: 315 LBS | SYSTOLIC BLOOD PRESSURE: 105 MMHG | RESPIRATION RATE: 20 BRPM | TEMPERATURE: 97.7 F | HEART RATE: 85 BPM | OXYGEN SATURATION: 93 % | DIASTOLIC BLOOD PRESSURE: 65 MMHG | BODY MASS INDEX: 45.1 KG/M2

## 2019-06-21 DIAGNOSIS — I50.22 CHRONIC SYSTOLIC CONGESTIVE HEART FAILURE (HCC): Primary | ICD-10-CM

## 2019-06-21 DIAGNOSIS — I42.0 NONISCHEMIC CONGESTIVE CARDIOMYOPATHY (HCC): ICD-10-CM

## 2019-06-21 DIAGNOSIS — G47.33 OSA (OBSTRUCTIVE SLEEP APNEA): ICD-10-CM

## 2019-06-21 DIAGNOSIS — I10 ESSENTIAL HYPERTENSION: ICD-10-CM

## 2019-06-21 PROCEDURE — 99214 OFFICE O/P EST MOD 30 MIN: CPT | Performed by: NURSE PRACTITIONER

## 2019-06-21 NOTE — OUTREACH NOTE
CHF Week 1 Survey      Responses   Facility patient discharged from?  [facility]   Does the patient have one of the following disease processes/diagnoses(primary or secondary)?  CHF   Is there a successful TCM telephone encounter documented?  Yes          Douglas Cannon RN

## 2019-06-22 LAB
BACTERIA SPEC AEROBE CULT: NORMAL
BACTERIA SPEC AEROBE CULT: NORMAL

## 2019-06-24 ENCOUNTER — OFFICE VISIT (OUTPATIENT)
Dept: FAMILY MEDICINE CLINIC | Facility: CLINIC | Age: 29
End: 2019-06-24

## 2019-06-24 VITALS
SYSTOLIC BLOOD PRESSURE: 122 MMHG | HEIGHT: 70 IN | OXYGEN SATURATION: 96 % | DIASTOLIC BLOOD PRESSURE: 80 MMHG | HEART RATE: 78 BPM | BODY MASS INDEX: 59.4 KG/M2

## 2019-06-24 DIAGNOSIS — E66.01 MORBID OBESITY (HCC): ICD-10-CM

## 2019-06-24 DIAGNOSIS — G47.33 SEVERE OBSTRUCTIVE SLEEP APNEA: ICD-10-CM

## 2019-06-24 DIAGNOSIS — J18.9 PNEUMONIA OF RIGHT LOWER LOBE DUE TO INFECTIOUS ORGANISM: Primary | ICD-10-CM

## 2019-06-24 DIAGNOSIS — I50.22 CHRONIC SYSTOLIC HEART FAILURE (HCC): ICD-10-CM

## 2019-06-24 DIAGNOSIS — L40.0 PLAQUE PSORIASIS: ICD-10-CM

## 2019-06-24 PROCEDURE — 99203 OFFICE O/P NEW LOW 30 MIN: CPT | Performed by: PHYSICIAN ASSISTANT

## 2019-06-24 NOTE — PROGRESS NOTES
"Chief Complaint   Patient presents with   • Establish Care   • Pneumonia     [facility] ED FOLLOWUP       HPI     Evan Gannon is a pleasant 28 y.o. male with PMH of hypertension, chronic systolic heart failure s/p PPM, psoriasis, and obstructive sleep apnea who is here to establish care as a new patient and for follow-up of recent hospital admission for RLL pneumonia. Previously under the care of Dr. Barbara Mills. He was admitted to [facility] from 6/17-6/19 and quickly improved with IV antibiotics and was discharged on azithromycin and augmentin for 3 days. We reviewed his discharge summary and laboratory results. He reports he fills \"good.\" Denies cough, fever, chills, change in soa, wheezing, or chest pain. It has been a few years since his last physical. He follows with the heart and valve clinic and also with Dr. Olsen. He states he has a pacemaker check tomorrow. He began Cosentyx about 1 month ago but has been off of this of late. His psoriasis is managed by dermatology.     Past Medical History:   Diagnosis Date   • CHF (congestive heart failure) (CMS/[facility])    • CPAP (continuous positive airway pressure) dependence    • Hypertension    • Morbid obesity (CMS/[facility])    • Myocardial infarction (CMS/HCC)    • On home O2     2l at bedtime and prn   • Plaque psoriasis    • Pneumonia    • Psoriasis    • Sleep apnea    • Wears glasses        Past Surgical History:   Procedure Laterality Date   • ADENOIDECTOMY     • CARDIAC CATHETERIZATION N/A 7/13/2017    Procedure: Left Heart Cath;  Surgeon: Balbir Olsen MD;  Location:  [facility] CATH INVASIVE LOCATION;  Service:    • CARDIAC DEFIBRILLATOR PLACEMENT  08/2017   • CARDIAC ELECTROPHYSIOLOGY PROCEDURE N/A 8/15/2017    Procedure: VVI ICD Implant;  Surgeon: Nathanael Richmond DO;  Location:  [facility] EP INVASIVE LOCATION;  Service:    • INTERNAL CARDIAC DEFIBRILLATOR INSERTION Left 2017   • NOSE SURGERY     • OTHER SURGICAL HISTORY      ultra light therapy   • SINUS SURGERY     • " TONSILLECTOMY         Family History   Problem Relation Age of Onset   • Diabetes Father    • Diabetes Paternal Grandmother    • Diabetes Maternal Grandfather        Social History     Socioeconomic History   • Marital status: Single     Spouse name: Not on file   • Number of children: 0   • Years of education: Not on file   • Highest education level: Not on file   Occupational History   • Occupation: disabled   Tobacco Use   • Smoking status: Never Smoker   • Smokeless tobacco: Never Used   Substance and Sexual Activity   • Alcohol use: No   • Drug use: No   • Sexual activity: Defer   Social History Narrative    Caffeine use: 6 sodas weekly    Patient lives with parents       No Known Allergies    ROS    Review of Systems   Constitutional: Negative for chills and fever.   Respiratory: Positive for shortness of breath. Negative for cough and wheezing.    Cardiovascular: Negative for chest pain.       Vitals:    06/24/19 1408   BP: 122/80   Pulse: 78   SpO2: 96%         Current Outpatient Medications:   •  albuterol sulfate  (90 Base) MCG/ACT inhaler, Inhale 2 puffs Every 6 (Six) Hours As Needed for Wheezing., Disp: 18 g, Rfl: 1  •  allopurinol (ZYLOPRIM) 100 MG tablet, Take 1 tablet by mouth Daily., Disp: 30 tablet, Rfl: 5  •  aspirin 81 MG EC tablet, TAKE ONE TABLET BY MOUTH DAILY, Disp: 30 tablet, Rfl: 5  •  carvedilol (COREG) 25 MG tablet, TAKE ONE TABLET BY MOUTH TWICE A DAY WITH MEALS, Disp: 60 tablet, Rfl: 5  •  ENTRESTO 49-51 MG tablet, TAKE ONE TABLET BY MOUTH EVERY 12 HOURS, Disp: 60 tablet, Rfl: 5  •  spironolactone (ALDACTONE) 25 MG tablet, TAKE ONE TABLET BY MOUTH DAILY, Disp: 30 tablet, Rfl: 5  •  torsemide (DEMADEX) 20 MG tablet, TAKE THREE TABLETS BY MOUTH DAILY, Disp: 90 tablet, Rfl: 5    PE    Physical Exam   Constitutional: He appears well-developed and well-nourished. He does not appear ill. No distress. He is morbidly obese.  HENT:   Head: Normocephalic.   Cardiovascular: Normal rate,  regular rhythm and normal heart sounds.   No murmur heard.  Pulmonary/Chest: Effort normal and breath sounds normal. He has no wheezes. He has no rhonchi. He has no rales.   Neurological: He is alert.   Skin:   Skin to BLEs rough, thickened, dry. No pitting edema   Psychiatric: He has a normal mood and affect. His behavior is normal.       Results    Results for orders placed or performed during the hospital encounter of 06/17/19   Blood Culture - Blood, Arm, Left   Result Value Ref Range    Blood Culture No growth at 5 days    Blood Culture - Blood, Hand, Left   Result Value Ref Range    Blood Culture No growth at 5 days    Legionella Antigen, Urine - Urine, Urine, Clean Catch   Result Value Ref Range    LEGIONELLA ANTIGEN, URINE Negative Negative   S. Pneumo Ag Urine or CSF - Urine, Urine, Clean Catch   Result Value Ref Range    Strep Pneumo Ag Negative Negative   Comprehensive Metabolic Panel   Result Value Ref Range    Glucose 115 (H) 65 - 99 mg/dL    BUN 11 6 - 20 mg/dL    Creatinine 1.02 0.76 - 1.27 mg/dL    Sodium 134 (L) 136 - 145 mmol/L    Potassium 3.4 (L) 3.5 - 5.2 mmol/L    Chloride 95 (L) 98 - 107 mmol/L    CO2 27.0 22.0 - 29.0 mmol/L    Calcium 9.0 8.6 - 10.5 mg/dL    Total Protein 8.0 6.0 - 8.5 g/dL    Albumin 3.70 3.50 - 5.20 g/dL    ALT (SGPT) 27 1 - 41 U/L    AST (SGOT) 24 1 - 40 U/L    Alkaline Phosphatase 75 39 - 117 U/L    Total Bilirubin 1.1 0.2 - 1.2 mg/dL    eGFR  African Amer 107 >60 mL/min/1.73    Globulin 4.3 gm/dL    A/G Ratio 0.9 g/dL    BUN/Creatinine Ratio 10.8 7.0 - 25.0    Anion Gap 12.0 mmol/L   BNP   Result Value Ref Range    proBNP 1,007.0 (H) 5.0 - 450.0 pg/mL   Troponin   Result Value Ref Range    Troponin T <0.010 0.000 - 0.030 ng/mL   CBC Auto Differential   Result Value Ref Range    WBC 9.47 3.40 - 10.80 10*3/mm3    RBC 5.36 4.14 - 5.80 10*6/mm3    Hemoglobin 13.7 13.0 - 17.7 g/dL    Hematocrit 42.4 37.5 - 51.0 %    MCV 79.1 79.0 - 97.0 fL    MCH 25.6 (L) 26.6 - 33.0 pg     MCHC 32.3 31.5 - 35.7 g/dL    RDW 13.4 12.3 - 15.4 %    RDW-SD 38.1 37.0 - 54.0 fl    MPV 10.4 6.0 - 12.0 fL    Platelets 240 140 - 450 10*3/mm3    Neutrophil % 68.8 42.7 - 76.0 %    Lymphocyte % 15.4 (L) 19.6 - 45.3 %    Monocyte % 13.9 (H) 5.0 - 12.0 %    Eosinophil % 1.3 0.3 - 6.2 %    Basophil % 0.3 0.0 - 1.5 %    Immature Grans % 0.3 0.0 - 0.5 %    Neutrophils, Absolute 6.51 1.70 - 7.00 10*3/mm3    Lymphocytes, Absolute 1.46 0.70 - 3.10 10*3/mm3    Monocytes, Absolute 1.32 (H) 0.10 - 0.90 10*3/mm3    Eosinophils, Absolute 0.12 0.00 - 0.40 10*3/mm3    Basophils, Absolute 0.03 0.00 - 0.20 10*3/mm3    Immature Grans, Absolute 0.03 0.00 - 0.05 10*3/mm3    nRBC 0.0 0.0 - 0.2 /100 WBC   Lactic Acid, Plasma   Result Value Ref Range    Lactate 1.2 0.5 - 2.0 mmol/L   Basic Metabolic Panel   Result Value Ref Range    Glucose 102 (H) 65 - 99 mg/dL    BUN 12 6 - 20 mg/dL    Creatinine 0.96 0.76 - 1.27 mg/dL    Sodium 134 (L) 136 - 145 mmol/L    Potassium 3.5 3.5 - 5.2 mmol/L    Chloride 94 (L) 98 - 107 mmol/L    CO2 29.0 22.0 - 29.0 mmol/L    Calcium 9.1 8.6 - 10.5 mg/dL    eGFR  African Amer 115 >60 mL/min/1.73    BUN/Creatinine Ratio 12.5 7.0 - 25.0    Anion Gap 11.0 mmol/L   CBC Auto Differential   Result Value Ref Range    WBC 7.15 3.40 - 10.80 10*3/mm3    RBC 4.97 4.14 - 5.80 10*6/mm3    Hemoglobin 12.8 (L) 13.0 - 17.7 g/dL    Hematocrit 40.1 37.5 - 51.0 %    MCV 80.7 79.0 - 97.0 fL    MCH 25.8 (L) 26.6 - 33.0 pg    MCHC 31.9 31.5 - 35.7 g/dL    RDW 13.3 12.3 - 15.4 %    RDW-SD 38.7 37.0 - 54.0 fl    MPV 10.7 6.0 - 12.0 fL    Platelets 204 140 - 450 10*3/mm3    Neutrophil % 62.3 42.7 - 76.0 %    Lymphocyte % 19.3 (L) 19.6 - 45.3 %    Monocyte % 16.1 (H) 5.0 - 12.0 %    Eosinophil % 1.7 0.3 - 6.2 %    Basophil % 0.3 0.0 - 1.5 %    Immature Grans % 0.3 0.0 - 0.5 %    Neutrophils, Absolute 4.46 1.70 - 7.00 10*3/mm3    Lymphocytes, Absolute 1.38 0.70 - 3.10 10*3/mm3    Monocytes, Absolute 1.15 (H) 0.10 - 0.90 10*3/mm3     Eosinophils, Absolute 0.12 0.00 - 0.40 10*3/mm3    Basophils, Absolute 0.02 0.00 - 0.20 10*3/mm3    Immature Grans, Absolute 0.02 0.00 - 0.05 10*3/mm3    nRBC 0.0 0.0 - 0.2 /100 WBC   Potassium   Result Value Ref Range    Potassium 3.8 3.5 - 5.2 mmol/L   Light Blue Top   Result Value Ref Range    Extra Tube hold for add-on    Green Top (Gel)   Result Value Ref Range    Extra Tube Hold for add-ons.    Lavender Top   Result Value Ref Range    Extra Tube hold for add-on    Gold Top - SST   Result Value Ref Range    Extra Tube Hold for add-ons.        A/P    Problem List Items Addressed This Visit        Respiratory    Severe obstructive sleep apnea  -Uses CPAP      Pneumonia of right lower lobe due to infectious organism (CMS/HCC) - Primary  -Clinically improved  -It is unclear if the patient has received prevnar. He states a pneumonia vaccine was mentioned at an ER visit about than 5 years ago but this predates the electronic record. No documentation of a pneumonia vaccine. Will recommend on follow-up   -Repeat chest x-ray 1 month   -Follow-up for physical in 6 weeks    Relevant Orders    XR Chest PA & Lateral       Digestive    Morbid obesity (CMS/HCC)  -He expresses motivation to lose weight       Musculoskeletal and Integument    Plaque psoriasis  -Had been improving on Cosentyx with multiple other failed therapies. Encouraged he follow-up to review options with his dermatologist      Other Visit Diagnoses     Chronic systolic heart failure (CMS/HCC)      -severe, LVEF 15%  -Encouraged patient keep planned cardiology appointments          Plan of care reviewed with patient at the conclusion of today's visit. Education was provided regarding diagnosis, management and any prescribed or recommended OTC medications.  Patient verbalizes understanding of and agreement with management plan.        CARLOS Ogden

## 2019-06-27 ENCOUNTER — READMISSION MANAGEMENT (OUTPATIENT)
Dept: CALL CENTER | Facility: HOSPITAL | Age: 29
End: 2019-06-27

## 2019-06-27 NOTE — OUTREACH NOTE
CHF Week 2 Survey      Responses   Facility patient discharged from?  [facility]   Does the patient have one of the following disease processes/diagnoses(primary or secondary)?  CHF   Week 2 attempt successful?  Yes   Call start time  0852   Call end time  0857   Discharge diagnosis  Pneumonia   CHF exacerbation    Is patient permission given to speak with other caregiver?  No   Meds reviewed with patient/caregiver?  Yes   Does the patient have all medications ordered at discharge?  Yes   Is the patient taking all medications as directed (includes completed medication regime)?  Yes   Does the patient have a primary care provider?   Yes   Does the patient have an appointment with their PCP within 7 days of discharge?  Yes   Has the patient kept scheduled appointments due by today?  Yes   Comments  Saw PCP last week   Has home health visited the patient within 72 hours of discharge?  N/A   Psychosocial issues?  No   Did the patient receive a copy of their discharge instructions?  Yes   Nursing interventions  Reviewed instructions with patient   What is the patient's perception of their health status since discharge?  Improving   Is the patient weighing daily?  Yes   Does the patient have scales?  Yes   Daily weight interventions  Education provided on importance of daily weight   Is the patient able to teach back Heart Failure diet management?  Yes [Watches salt intake]   Is the patient able to teach back Heart Failure Zones?  Yes [Green]   Is the patient able to teach back signs and symptoms of worsening condition? (i.e. weight gain, shortness of air, etc.)  Yes   If the patient is a current smoker, are they able to teach back resources for cessation?  -- [Non smoker]   Is the patient/caregiver able to teach back the hierarchy of who to call/visit for symptoms/problems? PCP, Specialist, Home health nurse, Urgent Care, ED, 911  Yes   CHF Week 2 call completed?  Yes          Smitha Nolan RN

## 2019-07-01 ENCOUNTER — EPISODE CHANGES (OUTPATIENT)
Dept: CASE MANAGEMENT | Facility: OTHER | Age: 29
End: 2019-07-01

## 2019-08-01 ENCOUNTER — PATIENT OUTREACH (OUTPATIENT)
Dept: CASE MANAGEMENT | Facility: OTHER | Age: 29
End: 2019-08-01

## 2019-08-01 NOTE — OUTREACH NOTE
Patient Outreach Note  Talked with patient. Patient states he is compliant with medications. He reports no difficulty with chest pain; SOB; appetite or sleeping. He states psoriasis has improved. He states to have been unable to keep pacemaker check appointment and has been out of town. He states he will reschedule appointment. He requests CA to call him back tomorrow at anytime. Reviewed with patient benefit of good health; compliance with medications;  and medical appointments. He verbalized understanding. No further questions voiced at this time. Will continue to follow.     Maria R Fuentes RN    8/1/2019, 1:02 PM

## 2019-08-15 ENCOUNTER — OFFICE VISIT (OUTPATIENT)
Dept: SLEEP MEDICINE | Facility: HOSPITAL | Age: 29
End: 2019-08-15

## 2019-08-15 VITALS
WEIGHT: 315 LBS | TEMPERATURE: 98 F | HEIGHT: 70 IN | BODY MASS INDEX: 45.1 KG/M2 | HEART RATE: 51 BPM | DIASTOLIC BLOOD PRESSURE: 74 MMHG | OXYGEN SATURATION: 93 % | RESPIRATION RATE: 20 BRPM | SYSTOLIC BLOOD PRESSURE: 128 MMHG

## 2019-08-15 DIAGNOSIS — G47.33 OSA (OBSTRUCTIVE SLEEP APNEA): Primary | ICD-10-CM

## 2019-08-15 DIAGNOSIS — E66.01 MORBID OBESITY (HCC): ICD-10-CM

## 2019-08-15 PROCEDURE — 99212 OFFICE O/P EST SF 10 MIN: CPT | Performed by: NURSE PRACTITIONER

## 2019-08-15 NOTE — PROGRESS NOTES
Chief Complaint:   Chief Complaint   Patient presents with   • Follow-up       HPI:    Evan Gannon is a 28 y.o. male here for follow-up of sleep apnea.  Patient was last seen 3/15/2018 by Dr. Cristofer Shrestha.  Patient has a history of snoring and apneas.  He has been diagnosed with sleep apnea since age 15.  His machine quit working 4 to 5 years ago and was again having symptoms.  He had a sleep study with test 3/20/2018 that showed severe obstructive sleep apnea.  Patient states that this time he is doing well.  Patient is sleeping 6 hours nightly and feels refreshed upon awakening.  Patient has an Acushnet score of 0/24.  Patient's only complaint today is dry mouth.  Patient has not yet adjusted humidity settings or tried over-the-counter medication.  Patient will try this when he gets home.    Patient wishes to continue with CPAP use.    Current medications are:   Current Outpatient Medications:   •  albuterol sulfate  (90 Base) MCG/ACT inhaler, Inhale 2 puffs Every 6 (Six) Hours As Needed for Wheezing., Disp: 18 g, Rfl: 1  •  allopurinol (ZYLOPRIM) 100 MG tablet, Take 1 tablet by mouth Daily., Disp: 30 tablet, Rfl: 5  •  aspirin 81 MG EC tablet, TAKE ONE TABLET BY MOUTH DAILY, Disp: 30 tablet, Rfl: 5  •  carvedilol (COREG) 25 MG tablet, TAKE ONE TABLET BY MOUTH TWICE A DAY WITH MEALS, Disp: 60 tablet, Rfl: 5  •  ENTRESTO 49-51 MG tablet, TAKE ONE TABLET BY MOUTH EVERY 12 HOURS, Disp: 60 tablet, Rfl: 5  •  spironolactone (ALDACTONE) 25 MG tablet, TAKE ONE TABLET BY MOUTH DAILY, Disp: 30 tablet, Rfl: 5  •  torsemide (DEMADEX) 20 MG tablet, TAKE THREE TABLETS BY MOUTH DAILY, Disp: 90 tablet, Rfl: 5.      The patient's relevant past medical, surgical, family and social history were reviewed and updated in Epic as appropriate.       Review of Systems   Eyes: Positive for visual disturbance.   Respiratory: Positive for apnea, cough and shortness of breath.    Cardiovascular: Positive for leg swelling.    Skin: Positive for rash.   Psychiatric/Behavioral: Positive for sleep disturbance.   All other systems reviewed and are negative.        Objective:    Physical Exam   Constitutional: He is oriented to person, place, and time. He appears well-developed and well-nourished.   HENT:   Head: Normocephalic and atraumatic.   Mouth/Throat: Oropharynx is clear and moist.   Class 4 airway   Eyes: Conjunctivae are normal.   Neck: Neck supple. No thyromegaly present.   Cardiovascular: Normal rate and regular rhythm.   Pulmonary/Chest: Effort normal and breath sounds normal.   Lymphadenopathy:     He has no cervical adenopathy.   Neurological: He is alert and oriented to person, place, and time.   Skin: Skin is warm and dry.   Psychiatric: He has a normal mood and affect. His behavior is normal. Judgment and thought content normal.   Nursing note and vitals reviewed.    88/90 days of use.  Greater than 4-hour use 95.6.  9% pressure 16.3.  AHI of 1.4 download reviewed with patient.    ASSESSMENT/PLAN    Evan was seen today for follow-up.    Diagnoses and all orders for this visit:    COLLIN (obstructive sleep apnea)  -     PAP Therapy    Morbid obesity (CMS/[facility])            1. Counseled patient regarding multimodal approach with healthy nutrition, healthy sleep, regular physical activity, social activities, counseling, and medications. Encouraged to practice lateral  sleep position. Avoid alcohol and sedatives close to bedtime.  2. Refill supplies.  Return to clinic 1 year or sooner if symptoms warrant.    I have reviewed the results of my evaluation and impression and discussed my recommendations in detail with the patient.      Signed by  SONAM Victor    August 15, 2019      CC: Gilbert Da Silva PA          No ref. provider found

## 2019-08-19 DIAGNOSIS — I42.0 NONISCHEMIC CONGESTIVE CARDIOMYOPATHY (HCC): ICD-10-CM

## 2019-08-20 RX ORDER — ASPIRIN 81 MG/1
TABLET ORAL
Qty: 90 TABLET | Refills: 1 | Status: SHIPPED | OUTPATIENT
Start: 2019-08-20 | End: 2020-04-03 | Stop reason: SDUPTHER

## 2019-09-17 ENCOUNTER — LAB (OUTPATIENT)
Dept: LAB | Facility: HOSPITAL | Age: 29
End: 2019-09-17

## 2019-09-17 ENCOUNTER — OFFICE VISIT (OUTPATIENT)
Dept: CARDIOLOGY | Facility: HOSPITAL | Age: 29
End: 2019-09-17

## 2019-09-17 VITALS
HEIGHT: 70 IN | WEIGHT: 315 LBS | DIASTOLIC BLOOD PRESSURE: 91 MMHG | HEART RATE: 104 BPM | RESPIRATION RATE: 20 BRPM | OXYGEN SATURATION: 97 % | BODY MASS INDEX: 45.1 KG/M2 | SYSTOLIC BLOOD PRESSURE: 155 MMHG | TEMPERATURE: 97 F

## 2019-09-17 DIAGNOSIS — I50.22 CHRONIC SYSTOLIC HEART FAILURE (HCC): ICD-10-CM

## 2019-09-17 DIAGNOSIS — I10 ESSENTIAL HYPERTENSION: ICD-10-CM

## 2019-09-17 DIAGNOSIS — I42.0 NONISCHEMIC CONGESTIVE CARDIOMYOPATHY (HCC): Primary | ICD-10-CM

## 2019-09-17 DIAGNOSIS — G47.33 OSA (OBSTRUCTIVE SLEEP APNEA): ICD-10-CM

## 2019-09-17 DIAGNOSIS — E66.01 MORBID OBESITY WITH BMI OF 50.0-59.9, ADULT (HCC): ICD-10-CM

## 2019-09-17 DIAGNOSIS — I42.0 NONISCHEMIC CONGESTIVE CARDIOMYOPATHY (HCC): ICD-10-CM

## 2019-09-17 LAB
ANION GAP SERPL CALCULATED.3IONS-SCNC: 11.3 MMOL/L (ref 5–15)
BUN BLD-MCNC: 7 MG/DL (ref 6–20)
BUN/CREAT SERPL: 8.6 (ref 7–25)
CALCIUM SPEC-SCNC: 9.1 MG/DL (ref 8.6–10.5)
CHLORIDE SERPL-SCNC: 101 MMOL/L (ref 98–107)
CO2 SERPL-SCNC: 27.7 MMOL/L (ref 22–29)
CREAT BLD-MCNC: 0.81 MG/DL (ref 0.76–1.27)
GFR SERPL CREATININE-BSD FRML MDRD: 140 ML/MIN/1.73
GLUCOSE BLD-MCNC: 92 MG/DL (ref 65–99)
NT-PROBNP SERPL-MCNC: 407.1 PG/ML (ref 5–450)
POTASSIUM BLD-SCNC: 3.8 MMOL/L (ref 3.5–5.2)
SODIUM BLD-SCNC: 140 MMOL/L (ref 136–145)

## 2019-09-17 PROCEDURE — 83880 ASSAY OF NATRIURETIC PEPTIDE: CPT

## 2019-09-17 PROCEDURE — 80048 BASIC METABOLIC PNL TOTAL CA: CPT

## 2019-09-17 PROCEDURE — 36415 COLL VENOUS BLD VENIPUNCTURE: CPT

## 2019-09-17 PROCEDURE — 99214 OFFICE O/P EST MOD 30 MIN: CPT | Performed by: NURSE PRACTITIONER

## 2019-09-17 RX ORDER — METOLAZONE 5 MG/1
5 TABLET ORAL DAILY
Qty: 10 TABLET | Refills: 0 | Status: SHIPPED | OUTPATIENT
Start: 2019-09-17 | End: 2019-10-17

## 2019-09-17 NOTE — PROGRESS NOTES
[facility]  [facility]      Encounter Date:09/17/2019     Evan Gannon  [address] [facility] [address]  [unfilled]    1990    Gilbert Da Silva PA    Evan Gannon is a 28 y.o. male.      Subjective:     Chief Complaint:  Shortness of Breath and Edema       HPI     28-year-old male with a history of morbid obesity, BMI greater than 50, severe obstructive sleep apnea, nonischemic cardiomyopathy, systolic heart failure, history of noncompliance.  Patient presents today with complaints of 6 to 7 pound weight gain in 1 week.  Dyspnea, edema.  Patient states he is been compliant with taking torsemide 20 mg daily, Entresto twice a day, spironolactone.  Patient has not taken extra diuretics this week.  Echocardiogram 10/17/2017 with EF 15%.  Repeat echocardiogram have been ordered, not completed.  Patient has a [employer] single-chamber ICD.        Patient Active Problem List    Diagnosis Date Noted   • Pneumonia of right lower lobe due to infectious organism (CMS/[facility]) 06/17/2019   • Severe obstructive sleep apnea 03/21/2018   • Painless hematuria 02/12/2018     Note Last Updated: 2/12/2018     Transient       • Plaque psoriasis 09/21/2017   • CHF (congestive heart failure), NYHA class IV (CMS/[facility]) 07/11/2017   • Nonischemic congestive cardiomyopathy (CMS/[facility]) 02/09/2017   • Personal history of noncompliance with medical treatment 02/09/2017   • Hypertension 02/07/2017   • Morbid obesity (CMS/HCC) 02/07/2017         Past Surgical History:   Procedure Laterality Date   • ADENOIDECTOMY     • CARDIAC CATHETERIZATION N/A 7/13/2017    Procedure: Left Heart Cath;  Surgeon: Balbir Olsen MD;  Location:  [facility] CATH INVASIVE LOCATION;  Service:    • CARDIAC DEFIBRILLATOR PLACEMENT  08/2017   • CARDIAC ELECTROPHYSIOLOGY PROCEDURE N/A 8/15/2017    Procedure: VVI ICD Implant;  Surgeon: Nathanael Richmond DO;  Location:  [facility] EP INVASIVE LOCATION;  Service:    • INTERNAL CARDIAC DEFIBRILLATOR  "INSERTION Left 2017   • NOSE SURGERY     • OTHER SURGICAL HISTORY      ultra light therapy   • SINUS SURGERY     • TONSILLECTOMY         No Known Allergies      Current Outpatient Medications:   •  allopurinol (ZYLOPRIM) 100 MG tablet, Take 1 tablet by mouth Daily., Disp: 30 tablet, Rfl: 5  •  ASPIRIN ADULT LOW STRENGTH 81 MG EC tablet, TAKE ONE TABLET BY MOUTH DAILY, Disp: 90 tablet, Rfl: 1  •  carvedilol (COREG) 25 MG tablet, TAKE ONE TABLET BY MOUTH TWICE A DAY WITH MEALS, Disp: 60 tablet, Rfl: 5  •  ENTRESTO 49-51 MG tablet, TAKE ONE TABLET BY MOUTH EVERY 12 HOURS, Disp: 60 tablet, Rfl: 5  •  Secukinumab (COSENTYX, 300 MG DOSE, SC), Inject 300 mg under the skin into the appropriate area as directed., Disp: , Rfl:   •  spironolactone (ALDACTONE) 25 MG tablet, TAKE ONE TABLET BY MOUTH DAILY, Disp: 30 tablet, Rfl: 5  •  torsemide (DEMADEX) 20 MG tablet, TAKE THREE TABLETS BY MOUTH DAILY, Disp: 90 tablet, Rfl: 5  •  albuterol sulfate  (90 Base) MCG/ACT inhaler, Inhale 2 puffs Every 6 (Six) Hours As Needed for Wheezing., Disp: 18 g, Rfl: 1  •  metOLazone (ZAROXOLYN) 5 MG tablet, Take 1 tablet by mouth Daily., Disp: 10 tablet, Rfl: 0    The following portions of the patient's history were reviewed and updated today during office visit as appropriate: allergies, current medications, past family history, past medical history, past social history, past surgical history and problem list.    Review of Systems   Constitution: Positive for malaise/fatigue and weight gain.   Cardiovascular: Positive for dyspnea on exertion and leg swelling.   All other systems reviewed and are negative.      Objective:     Vitals:    09/17/19 1441   BP: 155/91   BP Location: Left arm   Patient Position: Sitting   Cuff Size: Large Adult   Pulse: 104   Resp: 20   Temp: 97 °F (36.1 °C)   TempSrc: Temporal   SpO2: 97%   Weight: (!) 191 kg (422 lb 2 oz)   Height: 177.8 cm (70\")         Physical Exam   Constitutional: He is oriented to " person, place, and time. He is active and cooperative. No distress.   Morbidly obese   HENT:   Mouth/Throat: Oropharynx is clear and moist.   Eyes: EOM are normal. Pupils are equal, round, and reactive to light. No scleral icterus.   Neck: No JVD present.   Cardiovascular: Normal rate, regular rhythm, normal heart sounds and intact distal pulses.   Pulmonary/Chest: Effort normal. He has rales (bases, decreased).   Abdominal: Soft. Bowel sounds are normal. He exhibits no distension. There is no tenderness.   Musculoskeletal: Normal range of motion. He exhibits edema.   Neurological: He is alert and oriented to person, place, and time.   Skin: Skin is warm, dry and intact.   Psychiatric: He has a normal mood and affect. His behavior is normal.   Nursing note and vitals reviewed.      Lab and Diagnostic Review:  Lab Results   Component Value Date    WBC 7.15 06/18/2019    HGB 12.8 (L) 06/18/2019    HCT 40.1 06/18/2019    MCV 80.7 06/18/2019     06/18/2019     Lab Results   Component Value Date    GLUCOSE 102 (H) 06/18/2019    CALCIUM 9.1 06/18/2019     (L) 06/18/2019    K 3.8 06/18/2019    CO2 29.0 06/18/2019    CL 94 (L) 06/18/2019    BUN 12 06/18/2019    CREATININE 0.96 06/18/2019    EGFRIFAFRI 115 06/18/2019    BCR 12.5 06/18/2019    ANIONGAP 11.0 06/18/2019       Assessment and Plan:         1. Nonischemic congestive cardiomyopathy (CMS/HCC)    - Basic Metabolic Panel; Future  - proBNP; Future    Metolazone 5 mg x3 days and PRN  - metOLazone (ZAROXOLYN) 5 MG tablet; Take 1 tablet by mouth Daily.  Dispense: 10 tablet; Refill: 0    2. Chronic systolic heart failure (CMS/HCC)    - Basic Metabolic Panel; Future  - proBNP; Future  - metOLazone (ZAROXOLYN) 5 MG tablet; Take 1 tablet by mouth Daily.  Dispense: 10 tablet; Refill: 0    3. Essential hypertension  Elevated today    4. Morbid obesity with BMI of 50.0-59.9, adult (CMS/HCC)  Diet and exercise modifications for weight loss    5. COLLIN (obstructive  sleep apnea)  Cpap      Fu 1 week          *Please note that portions of this note were completed with a voice recognition program. Efforts were made to edit the dictations, but occasionally words are mistranscribed.

## 2019-10-13 ENCOUNTER — APPOINTMENT (OUTPATIENT)
Dept: GENERAL RADIOLOGY | Facility: HOSPITAL | Age: 29
End: 2019-10-13

## 2019-10-13 ENCOUNTER — HOSPITAL ENCOUNTER (EMERGENCY)
Facility: HOSPITAL | Age: 29
Discharge: HOME | End: 2019-10-14
Attending: EMERGENCY MEDICINE | Admitting: EMERGENCY MEDICINE

## 2019-10-13 DIAGNOSIS — R60.0 PEDAL EDEMA: Primary | ICD-10-CM

## 2019-10-13 DIAGNOSIS — Z86.79 HISTORY OF CHF (CONGESTIVE HEART FAILURE): ICD-10-CM

## 2019-10-13 DIAGNOSIS — Z87.2 HISTORY OF PSORIATIC ARTHRITIS: ICD-10-CM

## 2019-10-13 DIAGNOSIS — Z86.79 HISTORY OF HYPERTENSION: ICD-10-CM

## 2019-10-13 DIAGNOSIS — G47.33 OSA ON CPAP: ICD-10-CM

## 2019-10-13 DIAGNOSIS — Z99.89 OSA ON CPAP: ICD-10-CM

## 2019-10-13 DIAGNOSIS — M25.522 LEFT ELBOW PAIN: ICD-10-CM

## 2019-10-13 DIAGNOSIS — Z95.810 HISTORY OF CARDIAC DEFIBRILLATOR PLACEMENT: ICD-10-CM

## 2019-10-13 DIAGNOSIS — E87.6 HYPOKALEMIA: ICD-10-CM

## 2019-10-13 LAB
ALBUMIN SERPL-MCNC: 3.9 G/DL (ref 3.5–5.2)
ALBUMIN/GLOB SERPL: 0.8 G/DL
ALP SERPL-CCNC: 86 U/L (ref 39–117)
ALT SERPL W P-5'-P-CCNC: 17 U/L (ref 1–41)
ANION GAP SERPL CALCULATED.3IONS-SCNC: 10 MMOL/L (ref 5–15)
AST SERPL-CCNC: 21 U/L (ref 1–40)
BASOPHILS # BLD AUTO: 0.03 10*3/MM3 (ref 0–0.2)
BASOPHILS NFR BLD AUTO: 0.4 % (ref 0–1.5)
BILIRUB SERPL-MCNC: 0.8 MG/DL (ref 0.2–1.2)
BUN BLD-MCNC: 10 MG/DL (ref 6–20)
BUN/CREAT SERPL: 9.3 (ref 7–25)
CALCIUM SPEC-SCNC: 8.9 MG/DL (ref 8.6–10.5)
CHLORIDE SERPL-SCNC: 97 MMOL/L (ref 98–107)
CO2 SERPL-SCNC: 31 MMOL/L (ref 22–29)
CREAT BLD-MCNC: 1.07 MG/DL (ref 0.76–1.27)
CRP SERPL-MCNC: 2.38 MG/DL (ref 0–0.5)
DEPRECATED RDW RBC AUTO: 39.8 FL (ref 37–54)
EOSINOPHIL # BLD AUTO: 0.04 10*3/MM3 (ref 0–0.4)
EOSINOPHIL NFR BLD AUTO: 0.5 % (ref 0.3–6.2)
ERYTHROCYTE [DISTWIDTH] IN BLOOD BY AUTOMATED COUNT: 13.8 % (ref 12.3–15.4)
ERYTHROCYTE [SEDIMENTATION RATE] IN BLOOD: 43 MM/HR (ref 0–15)
GFR SERPL CREATININE-BSD FRML MDRD: 101 ML/MIN/1.73
GLOBULIN UR ELPH-MCNC: 4.6 GM/DL
GLUCOSE BLD-MCNC: 107 MG/DL (ref 65–99)
HCT VFR BLD AUTO: 46.1 % (ref 37.5–51)
HGB BLD-MCNC: 15.2 G/DL (ref 13–17.7)
HOLD SPECIMEN: NORMAL
HOLD SPECIMEN: NORMAL
IMM GRANULOCYTES # BLD AUTO: 0.02 10*3/MM3 (ref 0–0.05)
IMM GRANULOCYTES NFR BLD AUTO: 0.2 % (ref 0–0.5)
LYMPHOCYTES # BLD AUTO: 2.13 10*3/MM3 (ref 0.7–3.1)
LYMPHOCYTES NFR BLD AUTO: 25.5 % (ref 19.6–45.3)
MCH RBC QN AUTO: 26.4 PG (ref 26.6–33)
MCHC RBC AUTO-ENTMCNC: 33 G/DL (ref 31.5–35.7)
MCV RBC AUTO: 80 FL (ref 79–97)
MONOCYTES # BLD AUTO: 0.8 10*3/MM3 (ref 0.1–0.9)
MONOCYTES NFR BLD AUTO: 9.6 % (ref 5–12)
NEUTROPHILS # BLD AUTO: 5.32 10*3/MM3 (ref 1.7–7)
NEUTROPHILS NFR BLD AUTO: 63.8 % (ref 42.7–76)
NRBC BLD AUTO-RTO: 0 /100 WBC (ref 0–0.2)
NT-PROBNP SERPL-MCNC: 942.4 PG/ML (ref 5–450)
PLATELET # BLD AUTO: 261 10*3/MM3 (ref 140–450)
PMV BLD AUTO: 10.6 FL (ref 6–12)
POTASSIUM BLD-SCNC: 3.3 MMOL/L (ref 3.5–5.2)
PROT SERPL-MCNC: 8.5 G/DL (ref 6–8.5)
RBC # BLD AUTO: 5.76 10*6/MM3 (ref 4.14–5.8)
SODIUM BLD-SCNC: 138 MMOL/L (ref 136–145)
TROPONIN T SERPL-MCNC: 0.01 NG/ML (ref 0–0.03)
TROPONIN T SERPL-MCNC: <0.01 NG/ML (ref 0–0.03)
WBC NRBC COR # BLD: 8.34 10*3/MM3 (ref 3.4–10.8)
WHOLE BLOOD HOLD SPECIMEN: NORMAL
WHOLE BLOOD HOLD SPECIMEN: NORMAL

## 2019-10-13 PROCEDURE — 99284 EMERGENCY DEPT VISIT MOD MDM: CPT

## 2019-10-13 PROCEDURE — 83880 ASSAY OF NATRIURETIC PEPTIDE: CPT | Performed by: EMERGENCY MEDICINE

## 2019-10-13 PROCEDURE — 86140 C-REACTIVE PROTEIN: CPT | Performed by: PHYSICIAN ASSISTANT

## 2019-10-13 PROCEDURE — 84484 ASSAY OF TROPONIN QUANT: CPT | Performed by: EMERGENCY MEDICINE

## 2019-10-13 PROCEDURE — 80053 COMPREHEN METABOLIC PANEL: CPT | Performed by: EMERGENCY MEDICINE

## 2019-10-13 PROCEDURE — 85025 COMPLETE CBC W/AUTO DIFF WBC: CPT | Performed by: EMERGENCY MEDICINE

## 2019-10-13 PROCEDURE — 93005 ELECTROCARDIOGRAM TRACING: CPT

## 2019-10-13 PROCEDURE — 85652 RBC SED RATE AUTOMATED: CPT | Performed by: PHYSICIAN ASSISTANT

## 2019-10-13 PROCEDURE — 93005 ELECTROCARDIOGRAM TRACING: CPT | Performed by: EMERGENCY MEDICINE

## 2019-10-13 PROCEDURE — 73070 X-RAY EXAM OF ELBOW: CPT

## 2019-10-13 PROCEDURE — 71045 X-RAY EXAM CHEST 1 VIEW: CPT

## 2019-10-13 PROCEDURE — 25010000002 FUROSEMIDE PER 20 MG: Performed by: PHYSICIAN ASSISTANT

## 2019-10-13 PROCEDURE — 96374 THER/PROPH/DIAG INJ IV PUSH: CPT

## 2019-10-13 RX ORDER — POTASSIUM CHLORIDE 750 MG/1
40 CAPSULE, EXTENDED RELEASE ORAL ONCE
Status: COMPLETED | OUTPATIENT
Start: 2019-10-13 | End: 2019-10-13

## 2019-10-13 RX ORDER — SODIUM CHLORIDE 0.9 % (FLUSH) 0.9 %
10 SYRINGE (ML) INJECTION AS NEEDED
Status: DISCONTINUED | OUTPATIENT
Start: 2019-10-13 | End: 2019-10-14 | Stop reason: HOSPADM

## 2019-10-13 RX ORDER — FUROSEMIDE 10 MG/ML
40 INJECTION INTRAMUSCULAR; INTRAVENOUS ONCE
Status: COMPLETED | OUTPATIENT
Start: 2019-10-13 | End: 2019-10-13

## 2019-10-13 RX ADMIN — FUROSEMIDE 40 MG: 10 INJECTION, SOLUTION INTRAMUSCULAR; INTRAVENOUS at 22:09

## 2019-10-13 RX ADMIN — POTASSIUM CHLORIDE 40 MEQ: 750 CAPSULE, EXTENDED RELEASE ORAL at 22:09

## 2019-10-14 ENCOUNTER — PATIENT OUTREACH (OUTPATIENT)
Dept: CASE MANAGEMENT | Facility: OTHER | Age: 29
End: 2019-10-14

## 2019-10-14 ENCOUNTER — EPISODE CHANGES (OUTPATIENT)
Dept: CASE MANAGEMENT | Facility: OTHER | Age: 29
End: 2019-10-14

## 2019-10-14 VITALS
OXYGEN SATURATION: 96 % | RESPIRATION RATE: 22 BRPM | DIASTOLIC BLOOD PRESSURE: 75 MMHG | HEIGHT: 69 IN | TEMPERATURE: 98 F | HEART RATE: 104 BPM | WEIGHT: 315 LBS | BODY MASS INDEX: 46.65 KG/M2 | SYSTOLIC BLOOD PRESSURE: 128 MMHG

## 2019-10-14 PROCEDURE — 25010000002 KETOROLAC TROMETHAMINE PER 15 MG: Performed by: PHYSICIAN ASSISTANT

## 2019-10-14 PROCEDURE — 96375 TX/PRO/DX INJ NEW DRUG ADDON: CPT

## 2019-10-14 RX ORDER — KETOROLAC TROMETHAMINE 15 MG/ML
15 INJECTION, SOLUTION INTRAMUSCULAR; INTRAVENOUS ONCE
Status: COMPLETED | OUTPATIENT
Start: 2019-10-14 | End: 2019-10-14

## 2019-10-14 RX ADMIN — KETOROLAC TROMETHAMINE 15 MG: 15 INJECTION, SOLUTION INTRAMUSCULAR; INTRAVENOUS at 00:06

## 2019-10-14 NOTE — OUTREACH NOTE
Care Coordination Note  Talked with Marisabel/ PCP office regarding scheduling of Medicare Annual Wellness Visit. She verbalized understanding and will do reminder.     Maria R Fuentes RN  Community Care Coordinator    10/14/2019, 12:43 PM

## 2019-10-14 NOTE — OUTREACH NOTE
Patient Outreach Note  Talked with patient. Discussed 10/13/19 ED visit regarding pedal edema ;SOB and pain to left elbow. Patient states to be compliant with ED recommendations and states symptoms of pedal edema have improved. He continues to have pain to left elbow and has PCP appointment tomorrow for recommendations.He will begin Ibuprofen as ED recommended. He reports no difficulty with chest pain; SOB; appetite or sleeping. He states to be compliant with medications;CPAP; healthy diet; monitoring of weight and blood pressure.  He reports to weigh 379 lbs with a loss of 56 lbs. He states to have episodes of edema to lower extremities in the morning. Patient lives with family receiving assistance with transportation from family. Reviewed with patient ED recommendations; questions for PCP;benfit and compliance with physician appointments;  24/7 Nurse Line Telephone number; CA contact information;  gaps in care (flu vaccine/ addressed/will discuss with PCP); and MWV.  Patient verbalized understanding. CA commended and encouraged patient on weight loss. No further questions or concerns voiced at this time.     Maria R Fuentes RN  Community Care Coordinator    10/14/2019, 12:35 PM

## 2019-10-14 NOTE — DISCHARGE INSTRUCTIONS
Cardiac work-up was within normal limits during this ER evaluation.  There was no evidence of CHF exacerbation on chest x-ray.  Patient did have some increased edema to the lower extremities.  He was given a dose of IV Lasix.  He needs to continue with his Demadex, metolazone, and Entresto as directed.  We will refer him to the heart failure clinic here at [facility].  His potassium was slightly low at 3.3.  We gave oral supplementation here in the ER and gave information on potassium content in foods.  There was no evidence of abnormality on left elbow x-ray.  There was no joint effusion or sign of cellulitis.  Patient may take ibuprofen every 6 hours as needed for pain/inflammation.  Recommend close PCP follow-up for recheck on left elbow pain.  Continue with all other current medical management.  Return to the ER if worsening symptoms.

## 2019-10-14 NOTE — ED PROVIDER NOTES
"Hal Gannon is a 28 y.o. male presenting to the emergency department complaining of increased swelling in his lower legs and also pain above his left elbow. He reports that he had planned to come in tomorrow to get \"the fluid drained\", but developed the elbow pain today, prompting ED presentation. 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 He was suddenly unable to lift or bend his elbow secondary to pain. He denies any known injury to the area or any redness or warmth surrounding the joint. He has history of psoriatic arthritis. Of note, he has a history of CHF and is on Entresto, Carvedilol, Metolazone, and Demadex, followed by Marah Beckett at the heart and valve clinic. His last LISA was in October 2017, at which time his EF was 15%. Currently, he notes some associated shortness of breath and some intermittent stinging pains in the left side of his chest, but denies any abdominal pain or nausea. He also has a history of plaque psoriasis, managed with Cosentyx injections every 4 weeks. He denies history of chronic kidney disease. He uses a CPAP machine for obstructive sleep apnea and he had a defibrillator placed in 8/2017. There are no other acute complaints at this time.        History provided by:  Patient  Pain   Location:  Above left elbow  Severity:  Moderate  Onset quality:  Sudden  Duration: a few hours.  Timing:  Constant  Progression:  Unchanged  Chronicity:  New  Context:  Pain onset shortly after playing the drums  Relieved by:  None tried  Worsened by:  Nothing  Ineffective treatments:  None tried  Associated symptoms: chest pain (intermittent stinging sensation) and shortness of breath    Associated symptoms: no abdominal pain and no nausea        Review of Systems   Respiratory: Positive for shortness of breath.    Cardiovascular: Positive for chest pain (intermittent stinging sensation) and leg swelling. Negative for palpitations.        History of " CHF.  Last LISA in 2017 showed EF of 15%.  Defibrillator in place.   Gastrointestinal: Negative for abdominal pain and nausea.   Musculoskeletal: Positive for arthralgias.        Pain above his left elbow.  History of psoriatic arthritis.   Skin: Negative for color change.   All other systems reviewed and are negative.      Past Medical History:   Diagnosis Date   • CHF (congestive heart failure) (CMS/HCC)    • CPAP (continuous positive airway pressure) dependence    • Hypertension    • Morbid obesity (CMS/HCC)    • Myocardial infarction (CMS/HCC)    • On home O2     2l at bedtime and prn   • Plaque psoriasis    • Pneumonia    • Psoriasis    • Sleep apnea    • Wears glasses        No Known Allergies    Past Surgical History:   Procedure Laterality Date   • ADENOIDECTOMY     • CARDIAC CATHETERIZATION N/A 7/13/2017    Procedure: Left Heart Cath;  Surgeon: Balbir Olsen MD;  Location:  [facility] CATH INVASIVE LOCATION;  Service:    • CARDIAC DEFIBRILLATOR PLACEMENT  08/2017   • CARDIAC ELECTROPHYSIOLOGY PROCEDURE N/A 8/15/2017    Procedure: VVI ICD Implant;  Surgeon: Nathanael Richmond DO;  Location:  [facility] EP INVASIVE LOCATION;  Service:    • INTERNAL CARDIAC DEFIBRILLATOR INSERTION Left 2017   • NOSE SURGERY     • OTHER SURGICAL HISTORY      ultra light therapy   • SINUS SURGERY     • TONSILLECTOMY         Family History   Problem Relation Age of Onset   • Diabetes Father    • Diabetes Paternal Grandmother    • Diabetes Maternal Grandfather        Social History     Socioeconomic History   • Marital status: Single     Spouse name: Not on file   • Number of children: 0   • Years of education: Not on file   • Highest education level: Not on file   Occupational History   • Occupation: disabled   Tobacco Use   • Smoking status: Never Smoker   • Smokeless tobacco: Never Used   Substance and Sexual Activity   • Alcohol use: No   • Drug use: No   • Sexual activity: Defer   Social History Narrative    Caffeine use: 6 sodas weekly     Patient lives with parents         Objective   Physical Exam   Constitutional: He is oriented to person, place, and time. He appears well-developed and well-nourished.   HENT:   Head: Normocephalic and atraumatic.   Eyes: Conjunctivae are normal. No scleral icterus.   Neck: Normal range of motion. Neck supple.   Cardiovascular: Regular rhythm and normal heart sounds. Tachycardia present.   No murmur heard.  Mild tachycardia. 1+ non-pitting edema. Pedal edema extends to knee, but without tightness in thighs.   Pulmonary/Chest: Effort normal. No tachypnea. No respiratory distress. He has decreased breath sounds. He has no wheezes. He has no rhonchi. He has no rales.   Decreased breath sounds at bases.   Abdominal: Soft. Bowel sounds are normal. There is no tenderness.   Musculoskeletal: Normal range of motion. He exhibits edema.   Point tenderness above the left elbow. No redness, warmth, or joint effusion noted. No tenderness to the left olecranon or epicondyle.   Neurological: He is alert and oriented to person, place, and time.   Skin: Skin is warm and dry. No erythema.   Extremely dry, thickened skin to both legs.   Psychiatric: He has a normal mood and affect. His behavior is normal.   Nursing note and vitals reviewed.      Procedures         ED Course  ED Course as of Oct 14 0013   Mon Oct 14, 2019   0000 EKG shows sinus tachycardia.  There is no evidence of acute ST-T wave changes consistent with ischemia.  CBC was within normal limits.  Chemistries revealed mild hypokalemia with a potassium level of 3.3.  Otherwise chemistries were within normal limits.  Initial troponin was less than 0.010 and 2-hour troponin was 0.013.  BNP is 942.  Chest x-ray shows cardiomegaly but no evidence of overt failure.  X-rays of the left elbow reveal no acute bony abnormality.  Sed rate is 43 and CRP is 2.38.  Discussed the case with Dr. Ying.  He also saw and evaluated the patient. He recommended Toradol injection for left  elbow pain.  There is no evidence of joint swelling or effusion.  Patient may take ibuprofen as needed.  He has urinated quite a bit with dose of IV Lasix.  He needs to continue with Demadex and metolazone as directed.  We will refer him closely to the CHF clinic for recheck.  Continue with all other current medical management.  Return to the ER if any worsening symptoms.  [FC]   0002 Antonio Ying MD  I saw and evaluated this patient in the emergency department.  He appears in no acute distress.  He is using his phone and appears well.  He is tender in the posterior left elbow region above the joint line.  I can appreciate no joint effusion.  No synovial inflammation noted.  No evidence of cellulitis or abscess.  [MS]      ED Course User Index  [FC] Aixa Rodriguez PA-C  [MS] Antonio Ying MD     Recent Results (from the past 24 hour(s))   Comprehensive Metabolic Panel    Collection Time: 10/13/19  8:31 PM   Result Value Ref Range    Glucose 107 (H) 65 - 99 mg/dL    BUN 10 6 - 20 mg/dL    Creatinine 1.07 0.76 - 1.27 mg/dL    Sodium 138 136 - 145 mmol/L    Potassium 3.3 (L) 3.5 - 5.2 mmol/L    Chloride 97 (L) 98 - 107 mmol/L    CO2 31.0 (H) 22.0 - 29.0 mmol/L    Calcium 8.9 8.6 - 10.5 mg/dL    Total Protein 8.5 6.0 - 8.5 g/dL    Albumin 3.90 3.50 - 5.20 g/dL    ALT (SGPT) 17 1 - 41 U/L    AST (SGOT) 21 1 - 40 U/L    Alkaline Phosphatase 86 39 - 117 U/L    Total Bilirubin 0.8 0.2 - 1.2 mg/dL    eGFR  African Amer 101 >60 mL/min/1.73    Globulin 4.6 gm/dL    A/G Ratio 0.8 g/dL    BUN/Creatinine Ratio 9.3 7.0 - 25.0    Anion Gap 10.0 5.0 - 15.0 mmol/L   BNP    Collection Time: 10/13/19  8:31 PM   Result Value Ref Range    proBNP 942.4 (H) 5.0 - 450.0 pg/mL   Troponin    Collection Time: 10/13/19  8:31 PM   Result Value Ref Range    Troponin T <0.010 0.000 - 0.030 ng/mL   Light Blue Top    Collection Time: 10/13/19  8:31 PM   Result Value Ref Range    Extra Tube hold for add-on    Green Top (Gel)    Collection Time:  10/13/19  8:31 PM   Result Value Ref Range    Extra Tube Hold for add-ons.    Lavender Top    Collection Time: 10/13/19  8:31 PM   Result Value Ref Range    Extra Tube hold for add-on    Gold Top - SST    Collection Time: 10/13/19  8:31 PM   Result Value Ref Range    Extra Tube Hold for add-ons.    CBC Auto Differential    Collection Time: 10/13/19  8:31 PM   Result Value Ref Range    WBC 8.34 3.40 - 10.80 10*3/mm3    RBC 5.76 4.14 - 5.80 10*6/mm3    Hemoglobin 15.2 13.0 - 17.7 g/dL    Hematocrit 46.1 37.5 - 51.0 %    MCV 80.0 79.0 - 97.0 fL    MCH 26.4 (L) 26.6 - 33.0 pg    MCHC 33.0 31.5 - 35.7 g/dL    RDW 13.8 12.3 - 15.4 %    RDW-SD 39.8 37.0 - 54.0 fl    MPV 10.6 6.0 - 12.0 fL    Platelets 261 140 - 450 10*3/mm3    Neutrophil % 63.8 42.7 - 76.0 %    Lymphocyte % 25.5 19.6 - 45.3 %    Monocyte % 9.6 5.0 - 12.0 %    Eosinophil % 0.5 0.3 - 6.2 %    Basophil % 0.4 0.0 - 1.5 %    Immature Grans % 0.2 0.0 - 0.5 %    Neutrophils, Absolute 5.32 1.70 - 7.00 10*3/mm3    Lymphocytes, Absolute 2.13 0.70 - 3.10 10*3/mm3    Monocytes, Absolute 0.80 0.10 - 0.90 10*3/mm3    Eosinophils, Absolute 0.04 0.00 - 0.40 10*3/mm3    Basophils, Absolute 0.03 0.00 - 0.20 10*3/mm3    Immature Grans, Absolute 0.02 0.00 - 0.05 10*3/mm3    nRBC 0.0 0.0 - 0.2 /100 WBC   Sedimentation Rate    Collection Time: 10/13/19  8:31 PM   Result Value Ref Range    Sed Rate 43 (H) 0 - 15 mm/hr   C-reactive Protein    Collection Time: 10/13/19  8:31 PM   Result Value Ref Range    C-Reactive Protein 2.38 (H) 0.00 - 0.50 mg/dL   Troponin    Collection Time: 10/13/19 10:57 PM   Result Value Ref Range    Troponin T 0.013 0.000 - 0.030 ng/mL     Note: In addition to lab results from this visit, the labs listed above may include labs taken at another facility or during a different encounter within the last 24 hours. Please correlate lab times with ED admission and discharge times for further clarification of the services performed during this visit.    XR Chest  "1 View   Final Result   Cardiomegaly. No significant infiltrates. Modest improvement from prior study.      Signer Name: Chandu Cadena MD    Signed: 10/13/2019 10:03 PM    Work[facility]: [email or web address]     [facility]      XR Elbow 2 View Left   Final Result   Negative left elbow.      Signer Name: Yung Tan MD    Signed: 10/13/2019 9:56 PM    Workstation Name: [email or web address]     [facility]        Vitals:    10/13/19 2020 10/13/19 2040 10/13/19 2201   BP: 168/96 (!) 121/106 125/71   Pulse: 119 110 109   Resp: 28 24 22   Temp: 98 °F (36.7 °C)     TempSrc: Oral     SpO2: 90% 95% 96%   Weight: (!) 181 kg (400 lb)     Height: 175.3 cm (69\")       Medications   sodium chloride 0.9 % flush 10 mL (not administered)   potassium chloride (MICRO-K) CR capsule 40 mEq (40 mEq Oral Given 10/13/19 2209)   furosemide (LASIX) injection 40 mg (40 mg Intravenous Given 10/13/19 2209)   ketorolac (TORADOL) injection 15 mg (15 mg Intravenous Given 10/14/19 0006)     ECG/EMG Results (last 24 hours)     Procedure Component Value Units Date/Time    ECG 12 Lead [[id number]] Collected:  10/13/19 2028     Updated:  10/13/19 2028        ECG 12 Lead         ECG 12 Lead                       HEART Score (for prediction of 6-week risk of major adverse cardiac event) reviewed and/or performed as part of the patient evaluation and treatment planning process.  The result associated with this review/performance is: 2           MDM    Final diagnoses:   Pedal edema   Left elbow pain   History of CHF (congestive heart failure)   History of psoriatic arthritis   COLLIN on CPAP   Hypokalemia   History of cardiac defibrillator placement   History of hypertension       Documentation assistance provided by josé Nicholson.  Information recorded by the scribe was done at my direction and has been verified and validated by me.     Arlen Nicholson  10/13/19 2212       Aixa Rodriguez, " JAVON  10/14/19 0013

## 2019-10-17 ENCOUNTER — OFFICE VISIT (OUTPATIENT)
Dept: CARDIOLOGY | Facility: HOSPITAL | Age: 29
End: 2019-10-17

## 2019-10-17 VITALS
WEIGHT: 315 LBS | OXYGEN SATURATION: 90 % | SYSTOLIC BLOOD PRESSURE: 154 MMHG | TEMPERATURE: 98.2 F | RESPIRATION RATE: 16 BRPM | BODY MASS INDEX: 46.65 KG/M2 | DIASTOLIC BLOOD PRESSURE: 90 MMHG | HEIGHT: 69 IN | HEART RATE: 116 BPM

## 2019-10-17 DIAGNOSIS — G47.33 OSA (OBSTRUCTIVE SLEEP APNEA): ICD-10-CM

## 2019-10-17 DIAGNOSIS — E66.01 MORBID OBESITY (HCC): ICD-10-CM

## 2019-10-17 DIAGNOSIS — I10 ESSENTIAL HYPERTENSION: ICD-10-CM

## 2019-10-17 DIAGNOSIS — I50.22 CHRONIC SYSTOLIC HEART FAILURE (HCC): Primary | ICD-10-CM

## 2019-10-17 PROCEDURE — 99214 OFFICE O/P EST MOD 30 MIN: CPT | Performed by: NURSE PRACTITIONER

## 2019-10-17 RX ORDER — TORSEMIDE 20 MG/1
60 TABLET ORAL DAILY
Qty: 90 TABLET | Refills: 5 | Status: SHIPPED | OUTPATIENT
Start: 2019-10-17 | End: 2020-04-03 | Stop reason: SDUPTHER

## 2019-10-17 NOTE — PROGRESS NOTES
[facility]      Encounter Date:10/17/2019     Evan Gannon  [address] [facility] [address]  [unfilled]    1990    Gilbert Da Silva PA    Evan Gannon is a 28 y.o. male.      Subjective:     Chief Complaint:  Congestive Heart Failure and Follow-up       HPI 28-year-old male presents the office today for ongoing evaluation of his chronic systolic heart failure.  He presented to [facility] ED on 10/13/2019 with complaints of pedal edema worsening dyspnea.  He was given IV Lasix.  He does note improvement in his symptoms.  He reports fatigue, dyspnea on exertion and mild orthopnea.  He reports he did not take his fluid pills this morning because he did not have a refill on them.  He notes he is pursuing bariatric weight loss surgery.  He has lost 22 pounds in the last month.  He denies chest pain, palpitations, tachycardia, presyncope, syncope, PND.      Patient Active Problem List   Diagnosis   • Hypertension   • Morbid obesity (CMS/HCC)   • Severe obstructive sleep apnea   • Nonischemic congestive cardiomyopathy (CMS/HCC)   • Personal history of noncompliance with medical treatment   • CHF (congestive heart failure), NYHA class IV (CMS/HCC)   • Plaque psoriasis   • Painless hematuria   • Pneumonia of right lower lobe due to infectious organism (CMS/HCC)       Past Medical History:   Diagnosis Date   • CHF (congestive heart failure) (CMS/HCC)    • CPAP (continuous positive airway pressure) dependence    • Hypertension    • Morbid obesity (CMS/HCC)    • Myocardial infarction (CMS/HCC)    • On home O2     2l at bedtime and prn   • Plaque psoriasis    • Pneumonia    • Psoriasis    • Sleep apnea    • Wears glasses        Past Surgical History:   Procedure Laterality Date   • ADENOIDECTOMY     • CARDIAC CATHETERIZATION N/A 7/13/2017    Procedure: Left Heart Cath;  Surgeon: Balbir Olsen MD;  Location: [facility] CATH INVASIVE LOCATION;  Service:    • CARDIAC  DEFIBRILLATOR PLACEMENT  08/2017   • CARDIAC ELECTROPHYSIOLOGY PROCEDURE N/A 8/15/2017    Procedure: VVI ICD Implant;  Surgeon: Nathanael Richmond DO;  Location: [facility] INVASIVE LOCATION;  Service:    • INTERNAL CARDIAC DEFIBRILLATOR INSERTION Left 2017   • NOSE SURGERY     • OTHER SURGICAL HISTORY      ultra light therapy   • SINUS SURGERY     • TONSILLECTOMY         Family History   Problem Relation Age of Onset   • Diabetes Father    • Diabetes Paternal Grandmother    • Diabetes Maternal Grandfather        Social History     Socioeconomic History   • Marital status: Single     Spouse name: Not on file   • Number of children: 0   • Years of education: Not on file   • Highest education level: Not on file   Occupational History   • Occupation: disabled   Tobacco Use   • Smoking status: Never Smoker   • Smokeless tobacco: Never Used   Substance and Sexual Activity   • Alcohol use: No   • Drug use: No   • Sexual activity: Defer   Social History Narrative    Caffeine use: 6 sodas weekly    Patient lives with parents       No Known Allergies      Current Outpatient Medications:   •  albuterol sulfate  (90 Base) MCG/ACT inhaler, Inhale 2 puffs Every 6 (Six) Hours As Needed for Wheezing., Disp: 18 g, Rfl: 1  •  allopurinol (ZYLOPRIM) 100 MG tablet, Take 1 tablet by mouth Daily., Disp: 30 tablet, Rfl: 5  •  ASPIRIN ADULT LOW STRENGTH 81 MG EC tablet, TAKE ONE TABLET BY MOUTH DAILY, Disp: 90 tablet, Rfl: 1  •  carvedilol (COREG) 25 MG tablet, TAKE ONE TABLET BY MOUTH TWICE A DAY WITH MEALS, Disp: 60 tablet, Rfl: 5  •  ENTRESTO 49-51 MG tablet, TAKE ONE TABLET BY MOUTH EVERY 12 HOURS, Disp: 60 tablet, Rfl: 5  •  Secukinumab (COSENTYX, 300 MG DOSE, SC), Inject 300 mg under the skin into the appropriate area as directed., Disp: , Rfl:   •  spironolactone (ALDACTONE) 25 MG tablet, TAKE ONE TABLET BY MOUTH DAILY, Disp: 30 tablet, Rfl: 5  •  torsemide (DEMADEX) 20 MG tablet, Take 3 tablets by mouth Daily., Disp: 90 tablet,  Rfl: 5    The following portions of the patient's history were reviewed today and updated as appropriate: allergies, current medications, past family history, past medical history, past social history, past surgical history and problem list     Review of Systems   Constitution: Positive for malaise/fatigue. Negative for chills, decreased appetite, diaphoresis, fever, weakness, night sweats, weight gain and weight loss.   HENT: Negative for congestion, hearing loss, hoarse voice and nosebleeds.    Eyes: Negative for blurred vision, visual disturbance and visual halos.   Cardiovascular: Positive for dyspnea on exertion, leg swelling (improved) and orthopnea (mild). Negative for chest pain, claudication, cyanosis, irregular heartbeat, near-syncope, palpitations, paroxysmal nocturnal dyspnea and syncope.   Respiratory: Positive for snoring (compliant with cpap). Negative for cough, hemoptysis, shortness of breath, sleep disturbances due to breathing, sputum production and wheezing.    Hematologic/Lymphatic: Negative for bleeding problem. Does not bruise/bleed easily.   Skin: Negative for dry skin, itching and rash.   Musculoskeletal: Negative for arthritis, falls, joint pain, joint swelling and myalgias.   Gastrointestinal: Negative for bloating, abdominal pain, constipation, diarrhea, flatus, heartburn, hematemesis, hematochezia, melena, nausea and vomiting.   Genitourinary: Negative for dysuria, frequency, hematuria, nocturia and urgency.   Neurological: Positive for excessive daytime sleepiness and headaches. Negative for dizziness, light-headedness and loss of balance.   Psychiatric/Behavioral: Negative for depression. The patient does not have insomnia and is not nervous/anxious.        Objective:     Vitals:    10/17/19 1125 10/17/19 1142   BP: (!) 144/103 154/90   BP Location: Left arm Left arm   Patient Position: Sitting Sitting   Cuff Size: Adult    Pulse: 116    Resp: 16    Temp: 98.2 °F (36.8 °C)    TempSrc:  "Temporal    SpO2: 90%    Weight: (!) 181 kg (400 lb)    Height: 175.3 cm (69\")      Down 22 lbs since mid September   Physical Exam   Constitutional: He is oriented to person, place, and time. He appears well-developed and well-nourished. He is active and cooperative. No distress.   HENT:   Head: Normocephalic and atraumatic.   Mouth/Throat: Oropharynx is clear and moist.   Eyes: Conjunctivae and EOM are normal. Pupils are equal, round, and reactive to light.   Neck: Normal range of motion. Neck supple. No JVD present. No tracheal deviation present. No thyromegaly present.   Cardiovascular: Normal rate, regular rhythm, normal heart sounds and intact distal pulses.   Pulmonary/Chest: Effort normal and breath sounds normal.   diminished in bases   Abdominal: Soft. Bowel sounds are normal. He exhibits no distension. There is no tenderness.   Musculoskeletal: Normal range of motion. He exhibits edema (trace pedal edema BLEs).   Neurological: He is alert and oriented to person, place, and time.   Skin: Skin is warm, dry and intact.   Psychiatric: He has a normal mood and affect. His behavior is normal.   Nursing note and vitals reviewed.      Lab and Diagnostic Review:  Results for orders placed or performed during the hospital encounter of 10/13/19   Comprehensive Metabolic Panel   Result Value Ref Range    Glucose 107 (H) 65 - 99 mg/dL    BUN 10 6 - 20 mg/dL    Creatinine 1.07 0.76 - 1.27 mg/dL    Sodium 138 136 - 145 mmol/L    Potassium 3.3 (L) 3.5 - 5.2 mmol/L    Chloride 97 (L) 98 - 107 mmol/L    CO2 31.0 (H) 22.0 - 29.0 mmol/L    Calcium 8.9 8.6 - 10.5 mg/dL    Total Protein 8.5 6.0 - 8.5 g/dL    Albumin 3.90 3.50 - 5.20 g/dL    ALT (SGPT) 17 1 - 41 U/L    AST (SGOT) 21 1 - 40 U/L    Alkaline Phosphatase 86 39 - 117 U/L    Total Bilirubin 0.8 0.2 - 1.2 mg/dL    eGFR  African Amer 101 >60 mL/min/1.73    Globulin 4.6 gm/dL    A/G Ratio 0.8 g/dL    BUN/Creatinine Ratio 9.3 7.0 - 25.0    Anion Gap 10.0 5.0 - 15.0 " mmol/L   BNP   Result Value Ref Range    proBNP 942.4 (H) 5.0 - 450.0 pg/mL   Troponin   Result Value Ref Range    Troponin T <0.010 0.000 - 0.030 ng/mL   CBC Auto Differential   Result Value Ref Range    WBC 8.34 3.40 - 10.80 10*3/mm3    RBC 5.76 4.14 - 5.80 10*6/mm3    Hemoglobin 15.2 13.0 - 17.7 g/dL    Hematocrit 46.1 37.5 - 51.0 %    MCV 80.0 79.0 - 97.0 fL    MCH 26.4 (L) 26.6 - 33.0 pg    MCHC 33.0 31.5 - 35.7 g/dL    RDW 13.8 12.3 - 15.4 %    RDW-SD 39.8 37.0 - 54.0 fl    MPV 10.6 6.0 - 12.0 fL    Platelets 261 140 - 450 10*3/mm3    Neutrophil % 63.8 42.7 - 76.0 %    Lymphocyte % 25.5 19.6 - 45.3 %    Monocyte % 9.6 5.0 - 12.0 %    Eosinophil % 0.5 0.3 - 6.2 %    Basophil % 0.4 0.0 - 1.5 %    Immature Grans % 0.2 0.0 - 0.5 %    Neutrophils, Absolute 5.32 1.70 - 7.00 10*3/mm3    Lymphocytes, Absolute 2.13 0.70 - 3.10 10*3/mm3    Monocytes, Absolute 0.80 0.10 - 0.90 10*3/mm3    Eosinophils, Absolute 0.04 0.00 - 0.40 10*3/mm3    Basophils, Absolute 0.03 0.00 - 0.20 10*3/mm3    Immature Grans, Absolute 0.02 0.00 - 0.05 10*3/mm3    nRBC 0.0 0.0 - 0.2 /100 WBC   Sedimentation Rate   Result Value Ref Range    Sed Rate 43 (H) 0 - 15 mm/hr   C-reactive Protein   Result Value Ref Range    C-Reactive Protein 2.38 (H) 0.00 - 0.50 mg/dL   Troponin   Result Value Ref Range    Troponin T 0.013 0.000 - 0.030 ng/mL       Assessment and Plan:   1. Chronic systolic heart failure (CMS/HCC)  Patient to take one extra dose of torsemide today only  Had IV lasix on 10/13 in ED  Continue coreg, entresto  Heart failure education today including signs and symptoms, the role of the heart failure center, daily weights, low sodium diet (less than 1500 mg per day), and daily physical activity. Reviewed HF Zones with patient and family.  Patient to continue current medications as previously ordered.     2. Essential hypertension   continue coreg, entresto  HTN Education provided today including signs and symptoms, medication management,  daily blood pressure monitoring. Patient encouraged to call the Heart and Valve center with any abnormal readings.     3. COLLIN (obstructive sleep apnea)  Compliant with cpap    4. Morbid obesity (CMS/[facility])  Plan for gastric bypass, etc surgery           It has been a pleasure to participate in the care of this patient.  Patient was instructed to call the [facility] with any questions, concerns, or worsening symptoms.    *Please note that portions of this note were completed with a voice recognition program. Efforts were made to edit the dictations, but occasionally words are mistranscribed.

## 2019-10-20 ENCOUNTER — APPOINTMENT (OUTPATIENT)
Dept: GENERAL RADIOLOGY | Facility: HOSPITAL | Age: 29
End: 2019-10-20

## 2019-10-20 ENCOUNTER — HOSPITAL ENCOUNTER (EMERGENCY)
Facility: HOSPITAL | Age: 29
Discharge: HOME | End: 2019-10-20
Attending: EMERGENCY MEDICINE | Admitting: EMERGENCY MEDICINE

## 2019-10-20 VITALS
HEIGHT: 69 IN | WEIGHT: 315 LBS | DIASTOLIC BLOOD PRESSURE: 78 MMHG | OXYGEN SATURATION: 98 % | BODY MASS INDEX: 46.65 KG/M2 | HEART RATE: 120 BPM | TEMPERATURE: 97.5 F | SYSTOLIC BLOOD PRESSURE: 105 MMHG | RESPIRATION RATE: 20 BRPM

## 2019-10-20 DIAGNOSIS — Z99.89 OSA ON CPAP: ICD-10-CM

## 2019-10-20 DIAGNOSIS — E87.6 HYPOKALEMIA: ICD-10-CM

## 2019-10-20 DIAGNOSIS — G47.33 OSA ON CPAP: ICD-10-CM

## 2019-10-20 DIAGNOSIS — I50.9 ACUTE CONGESTIVE HEART FAILURE, UNSPECIFIED HEART FAILURE TYPE (HCC): Primary | ICD-10-CM

## 2019-10-20 DIAGNOSIS — R09.02 HYPOXIA: ICD-10-CM

## 2019-10-20 LAB
ALBUMIN SERPL-MCNC: 3.7 G/DL (ref 3.5–5.2)
ALBUMIN/GLOB SERPL: 0.8 G/DL
ALP SERPL-CCNC: 84 U/L (ref 39–117)
ALT SERPL W P-5'-P-CCNC: 15 U/L (ref 1–41)
ANION GAP SERPL CALCULATED.3IONS-SCNC: 9 MMOL/L (ref 5–15)
AST SERPL-CCNC: 16 U/L (ref 1–40)
ATMOSPHERIC PRESS: ABNORMAL MM[HG]
BASE EXCESS BLDV CALC-SCNC: 5 MMOL/L (ref -2–2)
BASOPHILS # BLD AUTO: 0.03 10*3/MM3 (ref 0–0.2)
BASOPHILS NFR BLD AUTO: 0.5 % (ref 0–1.5)
BDY SITE: ABNORMAL
BILIRUB SERPL-MCNC: 0.7 MG/DL (ref 0.2–1.2)
BODY TEMPERATURE: 37 C
BUN BLD-MCNC: 10 MG/DL (ref 6–20)
BUN/CREAT SERPL: 11.4 (ref 7–25)
CALCIUM SPEC-SCNC: 9.1 MG/DL (ref 8.6–10.5)
CHLORIDE SERPL-SCNC: 97 MMOL/L (ref 98–107)
CO2 BLDA-SCNC: 31.2 MMOL/L (ref 22–33)
CO2 SERPL-SCNC: 31 MMOL/L (ref 22–29)
COHGB MFR BLD: 1.6 %
CREAT BLD-MCNC: 0.88 MG/DL (ref 0.76–1.27)
D DIMER PPP FEU-MCNC: 0.48 MCGFEU/ML (ref 0–0.56)
DEPRECATED RDW RBC AUTO: 40.3 FL (ref 37–54)
EOSINOPHIL # BLD AUTO: 0.06 10*3/MM3 (ref 0–0.4)
EOSINOPHIL NFR BLD AUTO: 1 % (ref 0.3–6.2)
ERYTHROCYTE [DISTWIDTH] IN BLOOD BY AUTOMATED COUNT: 14 % (ref 12.3–15.4)
GFR SERPL CREATININE-BSD FRML MDRD: 127 ML/MIN/1.73
GLOBULIN UR ELPH-MCNC: 4.5 GM/DL
GLUCOSE BLD-MCNC: 135 MG/DL (ref 65–99)
HCO3 BLDV-SCNC: 29.9 MMOL/L (ref 22–28)
HCT VFR BLD AUTO: 44 % (ref 37.5–51)
HGB BLD-MCNC: 14.3 G/DL (ref 13–17.7)
HGB BLDA-MCNC: 14.6 G/DL (ref 13.5–17.5)
HOLD SPECIMEN: NORMAL
HOLD SPECIMEN: NORMAL
HOROWITZ INDEX BLD+IHG-RTO: 31 %
IMM GRANULOCYTES # BLD AUTO: 0.01 10*3/MM3 (ref 0–0.05)
IMM GRANULOCYTES NFR BLD AUTO: 0.2 % (ref 0–0.5)
LYMPHOCYTES # BLD AUTO: 1.53 10*3/MM3 (ref 0.7–3.1)
LYMPHOCYTES NFR BLD AUTO: 24.5 % (ref 19.6–45.3)
Lab: ABNORMAL
MCH RBC QN AUTO: 26.3 PG (ref 26.6–33)
MCHC RBC AUTO-ENTMCNC: 32.5 G/DL (ref 31.5–35.7)
MCV RBC AUTO: 80.9 FL (ref 79–97)
METHGB BLD QL: 0.8 %
MODALITY: ABNORMAL
MONOCYTES # BLD AUTO: 0.48 10*3/MM3 (ref 0.1–0.9)
MONOCYTES NFR BLD AUTO: 7.7 % (ref 5–12)
NEUTROPHILS # BLD AUTO: 4.13 10*3/MM3 (ref 1.7–7)
NEUTROPHILS NFR BLD AUTO: 66.1 % (ref 42.7–76)
NOTE: ABNORMAL
NOTIFIED BY: ABNORMAL
NOTIFIED WHO: ABNORMAL
NRBC BLD AUTO-RTO: 0 /100 WBC (ref 0–0.2)
NT-PROBNP SERPL-MCNC: 1315 PG/ML (ref 5–450)
OXYHGB MFR BLDV: 89.8 %
PCO2 BLDV: 43.9 MM HG (ref 41–51)
PH BLDV: 7.44 PH UNITS
PLATELET # BLD AUTO: 285 10*3/MM3 (ref 140–450)
PMV BLD AUTO: 11 FL (ref 6–12)
PO2 BLDV: 61.9 MM HG (ref 27–53)
POTASSIUM BLD-SCNC: 3.4 MMOL/L (ref 3.5–5.2)
PROT SERPL-MCNC: 8.2 G/DL (ref 6–8.5)
RBC # BLD AUTO: 5.44 10*6/MM3 (ref 4.14–5.8)
SODIUM BLD-SCNC: 137 MMOL/L (ref 136–145)
TROPONIN T SERPL-MCNC: <0.01 NG/ML (ref 0–0.03)
VENTILATOR MODE: ABNORMAL
WBC NRBC COR # BLD: 6.24 10*3/MM3 (ref 3.4–10.8)
WHOLE BLOOD HOLD SPECIMEN: NORMAL
WHOLE BLOOD HOLD SPECIMEN: NORMAL

## 2019-10-20 PROCEDURE — 25010000002 FUROSEMIDE PER 20 MG: Performed by: EMERGENCY MEDICINE

## 2019-10-20 PROCEDURE — 85025 COMPLETE CBC W/AUTO DIFF WBC: CPT | Performed by: EMERGENCY MEDICINE

## 2019-10-20 PROCEDURE — 71045 X-RAY EXAM CHEST 1 VIEW: CPT

## 2019-10-20 PROCEDURE — 80053 COMPREHEN METABOLIC PANEL: CPT | Performed by: EMERGENCY MEDICINE

## 2019-10-20 PROCEDURE — 83880 ASSAY OF NATRIURETIC PEPTIDE: CPT | Performed by: EMERGENCY MEDICINE

## 2019-10-20 PROCEDURE — 99284 EMERGENCY DEPT VISIT MOD MDM: CPT

## 2019-10-20 PROCEDURE — 82805 BLOOD GASES W/O2 SATURATION: CPT

## 2019-10-20 PROCEDURE — 93005 ELECTROCARDIOGRAM TRACING: CPT | Performed by: EMERGENCY MEDICINE

## 2019-10-20 PROCEDURE — 85379 FIBRIN DEGRADATION QUANT: CPT | Performed by: EMERGENCY MEDICINE

## 2019-10-20 PROCEDURE — 84484 ASSAY OF TROPONIN QUANT: CPT | Performed by: EMERGENCY MEDICINE

## 2019-10-20 PROCEDURE — 96374 THER/PROPH/DIAG INJ IV PUSH: CPT

## 2019-10-20 PROCEDURE — 82820 HEMOGLOBIN-OXYGEN AFFINITY: CPT

## 2019-10-20 RX ORDER — SODIUM CHLORIDE 0.9 % (FLUSH) 0.9 %
10 SYRINGE (ML) INJECTION AS NEEDED
Status: DISCONTINUED | OUTPATIENT
Start: 2019-10-20 | End: 2019-10-20 | Stop reason: HOSPADM

## 2019-10-20 RX ORDER — FUROSEMIDE 10 MG/ML
40 INJECTION INTRAMUSCULAR; INTRAVENOUS ONCE
Status: COMPLETED | OUTPATIENT
Start: 2019-10-20 | End: 2019-10-20

## 2019-10-20 RX ORDER — POTASSIUM CHLORIDE 750 MG/1
20 CAPSULE, EXTENDED RELEASE ORAL ONCE
Status: COMPLETED | OUTPATIENT
Start: 2019-10-20 | End: 2019-10-20

## 2019-10-20 RX ADMIN — POTASSIUM CHLORIDE 20 MEQ: 750 CAPSULE, EXTENDED RELEASE ORAL at 10:40

## 2019-10-20 RX ADMIN — FUROSEMIDE 40 MG: 10 INJECTION, SOLUTION INTRAMUSCULAR; INTRAVENOUS at 08:58

## 2019-10-20 NOTE — ED PROVIDER NOTES
[facility] EMERGENCY DEPARTMENT    eMERGENCY dEPARTMENT eNCOUnter      Pt Name: Evan Gannon  MRN: [id number]  YOB: 1990  Date of evaluation: 10/20/2019  Provider: Nolberto Oliver DO    CHIEF COMPLAINT       Chief Complaint   Patient presents with   • Shortness of Breath         HISTORY OF PRESENT ILLNESS  (Location/Symptom, Timing/Onset, Context/Setting, Quality, Duration, Modifying Factors, Severity.)   Mr. Evan Gannon is a 28 y.o. male who presents to the ED with c/o shortness of breath. He reports 3 days ago he visited a CHF clinic who advised him to take 4 doses of Torsemide instead of 3 to treat increased leg swelling, just for that day. When he went to sleep that night he experienced increased shortness of breath that has persisted since onset. He also complains of chills and an occasional productive cough. He denies chest pain, urinary frequency, fever, nausea, and vomiting. He wear a CPAP machine every night. He is typically on oxygen at home but does not currently have any due to insurance issues. He has a pacemaker and defibrillator. He denies a history of PE/DVT. His most recent echo was in 2017. He did not take Torsemide this morning. His cardiologist is Dr. Olsen. There are no other acute complaints at this time.    Nursing notes were reviewed.    REVIEW OF SYSTEMS    (2-9 systems for level 4, 10 or more for level 5)   ROS:  General:  No fevers, + chills, no weakness  Cardiovascular:  No chest pain, no palpitations, + leg swelling  Respiratory:  + shortness of breath, + cough (occasionally productive), no wheezing  Gastrointestinal:  No pain, no nausea, no vomiting, no diarrhea  Musculoskeletal:  No muscle pain, no joint pain  Skin:  No rash, no easy bruising  Neurologic:  No speech problems, no headache, no extremity numbness, no extremity tingling, no extremity weakness  Psychiatric:  No anxiety  Genitourinary:  No dysuria, no hematuria, no  frequency    Except as noted above the remainder of the review of systems was reviewed and negative.       PAST MEDICAL HISTORY     Past Medical History:   Diagnosis Date   • CHF (congestive heart failure) (CMS/HCC)    • CPAP (continuous positive airway pressure) dependence    • Hypertension    • Morbid obesity (CMS/HCC)    • Myocardial infarction (CMS/HCC)    • On home O2     2l at bedtime and prn   • Plaque psoriasis    • Pneumonia    • Psoriasis    • Sleep apnea    • Wears glasses          SURGICAL HISTORY       Past Surgical History:   Procedure Laterality Date   • ADENOIDECTOMY     • CARDIAC CATHETERIZATION N/A 7/13/2017    Procedure: Left Heart Cath;  Surgeon: Balbir Olsen MD;  Location:  [facility];  Service:    • CARDIAC DEFIBRILLATOR PLACEMENT  08/2017   • CARDIAC ELECTROPHYSIOLOGY PROCEDURE N/A 8/15/2017    Procedure: VVI ICD Implant;  Surgeon: Nathanael Richmond DO;  Location:  [facility];  Service:    • INTERNAL CARDIAC DEFIBRILLATOR INSERTION Left 2017   • NOSE SURGERY     • OTHER SURGICAL HISTORY      ultra light therapy   • SINUS SURGERY     • TONSILLECTOMY           CURRENT MEDICATIONS       Current Facility-Administered Medications:   •  potassium chloride (MICRO-K) CR capsule 20 mEq, 20 mEq, Oral, Once, Nolberto Oliver DO  •  sodium chloride 0.9 % flush 10 mL, 10 mL, Intravenous, PRN, Nolberto Oliver DO    Current Outpatient Medications:   •  albuterol sulfate  (90 Base) MCG/ACT inhaler, Inhale 2 puffs Every 6 (Six) Hours As Needed for Wheezing., Disp: 18 g, Rfl: 1  •  allopurinol (ZYLOPRIM) 100 MG tablet, Take 1 tablet by mouth Daily., Disp: 30 tablet, Rfl: 5  •  ASPIRIN ADULT LOW STRENGTH 81 MG EC tablet, TAKE ONE TABLET BY MOUTH DAILY, Disp: 90 tablet, Rfl: 1  •  carvedilol (COREG) 25 MG tablet, TAKE ONE TABLET BY MOUTH TWICE A DAY WITH MEALS, Disp: 60 tablet, Rfl: 5  •  ENTRESTO 49-51 MG tablet, TAKE ONE TABLET BY MOUTH EVERY 12 HOURS,  Disp: 60 tablet, Rfl: 5  •  Secukinumab (COSENTYX, 300 MG DOSE, SC), Inject 300 mg under the skin into the appropriate area as directed., Disp: , Rfl:   •  spironolactone (ALDACTONE) 25 MG tablet, TAKE ONE TABLET BY MOUTH DAILY, Disp: 30 tablet, Rfl: 5  •  torsemide (DEMADEX) 20 MG tablet, Take 3 tablets by mouth Daily., Disp: 90 tablet, Rfl: 5    ALLERGIES     Patient has no known allergies.    FAMILY HISTORY       Family History   Problem Relation Age of Onset   • Diabetes Father    • Diabetes Paternal Grandmother    • Diabetes Maternal Grandfather           SOCIAL HISTORY       Social History     Socioeconomic History   • Marital status: Single     Spouse name: Not on file   • Number of children: 0   • Years of education: Not on file   • Highest education level: Not on file   Occupational History   • Occupation: disabled   Tobacco Use   • Smoking status: Never Smoker   • Smokeless tobacco: Never Used   Substance and Sexual Activity   • Alcohol use: No   • Drug use: No   • Sexual activity: Defer   Social History Narrative    Caffeine use: 6 sodas weekly    Patient lives with parents         PHYSICAL EXAM    (up to 7 for level 4, 8 or more for level 5)     Vitals:    10/20/19 0845 10/20/19 0900 10/20/19 0930 10/20/19 1000   BP: 122/75 115/68 127/90 127/69   BP Location: Right arm      Patient Position: Lying      Pulse:  (!) 122 (!) 128 116   Resp:       Temp:       TempSrc:       SpO2:  97% 96% 95%   Weight:       Height:           Physical Exam  General :Patient is awake, alert, oriented, in no acute distress, nontoxic appearing. Morbidly obese.  HEENT: Pupils are equally round and reactive to light, EOMI, conjunctivae clear, sclerae white, there is no injection no icterus.  Oral mucosa is moist, no exudate. Uvula is midline  Neck: Neck is supple, full range of motion, trachea midline  Cardiac: Tachycardic rate, rhythm, no murmurs, rubs, or gallops  Lungs: Decreased breath sounds bilaterally, there is no  wheezing, rhonchi, or rales. There is no use of accessory muscles.  Chest wall: There is no tenderness to palpation over the chest wall or over ribs  Abdomen: Abdomen is obese, soft, nontender, nondistended. There is no firm or pulsatile masses, no rebound rigidity or guarding.   Musculoskeletal: 5 out of 5 strength in all 4 extremities.  No focal muscle deficits are appreciated. There is 1+ peripheral edema bilaterally.  Neuro: Motor intact, sensory intact, level of consciousness is normal, cerebellar function is normal, reflexes are grossly normal.   Dermatology: Skin is warm and dry. Psoriatic plaques present diffusely throughout the body.  Psych: Mentation is grossly normal, cognition is grossly normal. Affect is appropriate.      DIAGNOSTIC RESULTS     EKG: All EKG's are interpreted by the Emergency Department Physician who either signs or Co-signs this chart in the absence of a cardiologist.    ECG 12 Lead   Final Result   Test Reason : SOA Protocol   Blood Pressure : **/** mmHG   Vent. Rate : 121 BPM     Atrial Rate : 121 BPM      P-R Int : 158 ms          QRS Dur : 108 ms       QT Int : 334 ms       P-R-T Axes : 061 076 055 degrees      QTc Int : 474 ms      Sinus tachycardia with occasional premature ventricular complexes   Otherwise normal ECG   When compared with ECG of 13-OCT-2019 22:54,   no acute significant c hanges noted   Confirmed by MONIE MENDIETA MD ([id number]) on 10/20/2019 8:35:58 AM      Referred By:  EDMD           Confirmed By:MONIE MENDIETA MD          RADIOLOGY:   Non-plain film images such as CT, Ultrasound and MRI are read by the radiologist. Plain radiographic images are visualized and preliminarily interpreted by the emergency physician with the below findings:      [] Radiologist's Report Reviewed:  XR Chest 1 View   Preliminary Result   Stable chest. No acute cardiopulmonary disease.                    ED BEDSIDE ULTRASOUND:   Performed by ED Physician - none    LABS:    I have reviewed  and interpreted all of the currently available lab results from this visit (if applicable):  Results for orders placed or performed during the hospital encounter of 10/20/19   Comprehensive Metabolic Panel   Result Value Ref Range    Glucose 135 (H) 65 - 99 mg/dL    BUN 10 6 - 20 mg/dL    Creatinine 0.88 0.76 - 1.27 mg/dL    Sodium 137 136 - 145 mmol/L    Potassium 3.4 (L) 3.5 - 5.2 mmol/L    Chloride 97 (L) 98 - 107 mmol/L    CO2 31.0 (H) 22.0 - 29.0 mmol/L    Calcium 9.1 8.6 - 10.5 mg/dL    Total Protein 8.2 6.0 - 8.5 g/dL    Albumin 3.70 3.50 - 5.20 g/dL    ALT (SGPT) 15 1 - 41 U/L    AST (SGOT) 16 1 - 40 U/L    Alkaline Phosphatase 84 39 - 117 U/L    Total Bilirubin 0.7 0.2 - 1.2 mg/dL    eGFR  African Amer 127 >60 mL/min/1.73    Globulin 4.5 gm/dL    A/G Ratio 0.8 g/dL    BUN/Creatinine Ratio 11.4 7.0 - 25.0    Anion Gap 9.0 5.0 - 15.0 mmol/L   BNP   Result Value Ref Range    proBNP 1,315.0 (H) 5.0 - 450.0 pg/mL   Troponin   Result Value Ref Range    Troponin T <0.010 0.000 - 0.030 ng/mL   CBC Auto Differential   Result Value Ref Range    WBC 6.24 3.40 - 10.80 10*3/mm3    RBC 5.44 4.14 - 5.80 10*6/mm3    Hemoglobin 14.3 13.0 - 17.7 g/dL    Hematocrit 44.0 37.5 - 51.0 %    MCV 80.9 79.0 - 97.0 fL    MCH 26.3 (L) 26.6 - 33.0 pg    MCHC 32.5 31.5 - 35.7 g/dL    RDW 14.0 12.3 - 15.4 %    RDW-SD 40.3 37.0 - 54.0 fl    MPV 11.0 6.0 - 12.0 fL    Platelets 285 140 - 450 10*3/mm3    Neutrophil % 66.1 42.7 - 76.0 %    Lymphocyte % 24.5 19.6 - 45.3 %    Monocyte % 7.7 5.0 - 12.0 %    Eosinophil % 1.0 0.3 - 6.2 %    Basophil % 0.5 0.0 - 1.5 %    Immature Grans % 0.2 0.0 - 0.5 %    Neutrophils, Absolute 4.13 1.70 - 7.00 10*3/mm3    Lymphocytes, Absolute 1.53 0.70 - 3.10 10*3/mm3    Monocytes, Absolute 0.48 0.10 - 0.90 10*3/mm3    Eosinophils, Absolute 0.06 0.00 - 0.40 10*3/mm3    Basophils, Absolute 0.03 0.00 - 0.20 10*3/mm3    Immature Grans, Absolute 0.01 0.00 - 0.05 10*3/mm3    nRBC 0.0 0.0 - 0.2 /100 WBC   D-dimer,  Quantitative   Result Value Ref Range    D-Dimer, Quantitative 0.48 0.00 - 0.56 MCGFEU/mL   Blood Gas, Venous With Co-Ox   Result Value Ref Range    Site Nurse/Dr Draw     pH, Venous 7.441 pH Units    pCO2, Venous 43.9 41.0 - 51.0 mm Hg    pO2, Venous 61.9 (H) 27.0 - 53.0 mm Hg    HCO3, Venous 29.9 (H) 22.0 - 28.0 mmol/L    Base Excess, Venous 5.0 (H) -2.0 - 2.0 mmol/L    Hemoglobin, Blood Gas 14.6 13.5 - 17.5 g/dL    Oxyhemoglobin Venous 89.8 %    Methemoglobin Venous 0.8 %    Carboxyhemoglobin Venous 1.6 %    CO2 Content 31.2 22 - 33 mmol/L    Temperature 37.0 C    Barometric Pressure for Blood Gas      Modality Nasal Cannula     FIO2 31 %    Ventilator Mode       Note      Notified Who ROGELIO MENDIETA MD     Notified By [id number]     Notified Time 10/20/2019 09:08    Light Blue Top   Result Value Ref Range    Extra Tube hold for add-on    Green Top (Gel)   Result Value Ref Range    Extra Tube Hold for add-ons.    Lavender Top   Result Value Ref Range    Extra Tube hold for add-on    Gold Top - SST   Result Value Ref Range    Extra Tube Hold for add-ons.         All other labs were within normal range or not returned as of this dictation.      EMERGENCY DEPARTMENT COURSE and DIFFERENTIAL DIAGNOSIS/MDM:   Vitals:    Vitals:    10/20/19 0845 10/20/19 0900 10/20/19 0930 10/20/19 1000   BP: 122/75 115/68 127/90 127/69   BP Location: Right arm      Patient Position: Lying      Pulse:  (!) 122 (!) 128 116   Resp:       Temp:       TempSrc:       SpO2:  97% 96% 95%   Weight:       Height:           ED Course as of Oct 20 1033   Sun Oct 20, 2019   0959 Case discussed with cardiology, Dr. Perkins, appreciate his input.  He is aware of the patient, he has been in and out of the hospital a few times given his chronic congestive heart failure.  He states he should qualify for home oxygen given his hypoxia and underlying heart failure.  He recommends giving IV Lasix in the ED, replenishing oral potassium, he will have someone from the  clinic follow up with the patient tomorrow to make sure he gets his home oxygen.  Recommends using his CPAP which he has at home intermittently in the meantime.    [AP]      ED Course User Index  [AP] Nolberto Oliver, DO   !    Patient with a history of chronic heart failure, follows with Dr. Olsen, cardiology.  Symptoms appear to be worsening.  Was seen in the hospital a week ago, followed up with heart failure clinic, did take an extra dose of his diuretic for 1 day but his symptoms still persist.  He is also not taking his home oxygen as he has had issues with his insurance.  On arrival his oxygen saturation 86%, mild increased work of breathing, placed on 2 L nasal cannula which he responded well.  He has been compliant with medications although he did not take his diuretic this morning.  Blood pressure 122/75.  Patient tachycardic 123, appears on his prior EKGs he has a long-standing tachycardia, associated with his heart failure.  Last ejection fraction approximately 15% from 2017 echo.  Patient was placed on nasal cannula oxygenation, IV, O2, x-rays were obtained, patient was given a dose of IV diuresis.  Venous blood gas pH 7.44, PCO2 44, PO2 of 62.  Blood work as above.  Patient does have continued heart failure, does not appear to be in overt heart failure at this time.  Case was discussed with cardiologist as discussed above, patient will continue with his home therapies, use his CPAP during the day over the next couple days, they will reach out to him in the morning to make sure he gets his oxygen at home.  Patient is agreeable to this plan, he is diuresing without issues currently at the bedside.  Patient is comfortable with his current plan of care, would prefer to be discharged home with close follow-up with his cardiologist.  I discussed with him if he has any worsening symptoms, increased work of breathing or any other concerns he should return to the emergency department for reevaluation.   Patient understands return precautions discussed. The patient will follow-up with their PCP in 1-2 days for reevaluation.  If the patient or family members have any further concerns or patient has any worsening symptoms they will return to the ED for reevaluation.      MEDICATIONS ADMINISTERED IN ED:  Medications   sodium chloride 0.9 % flush 10 mL (not administered)   potassium chloride (MICRO-K) CR capsule 20 mEq (not administered)   furosemide (LASIX) injection 40 mg (40 mg Intravenous Given 10/20/19 0858)       PROCEDURES:  Procedures    CRITICAL CARE TIME    Total Critical Care time was 0 minutes, excluding separately reportable procedures.   There was a high probability of clinically significant/life threatening deterioration in the patient's condition which required my urgent intervention.      FINAL IMPRESSION      1. Acute congestive heart failure, unspecified heart failure type (CMS/HCC)    2. Hypoxia    3. Hypokalemia    4. COLLIN on CPAP          DISPOSITION/PLAN     ED Disposition     ED Disposition Condition Comment    Discharge Stable           PATIENT REFERRED TO:  Balbir Olsen MD  [address] [facility]  [address]  [place] [address]  [phone]    Schedule an appointment as soon as possible for a visit in 1 day      Gilbert Da Silva PA  [address] [facility] [address]  [phone]    In 2 days      [facility] Emergency Department  [address] [facility] [facility] [id number] [phone]    If symptoms worsen    [facility] - HEART & V[facility]  [address] [facility] [address]  [facility] [id number] [phone]          DISCHARGE MEDICATIONS:     Medication List      CONTINUE taking these medications    allopurinol 100 MG tablet  Commonly known as:  ZYLOPRIM  Take 1 tablet by mouth Daily.     ASPIRIN ADULT LOW STRENGTH 81 MG EC tablet  Generic drug:  aspirin  TAKE ONE TABLET BY MOUTH DAILY     carvedilol 25 MG tablet  Commonly known as:   COREG  TAKE ONE TABLET BY MOUTH TWICE A DAY WITH MEALS     COSENTYX (300 MG DOSE) SC     ENTRESTO 49-51 MG tablet  Generic drug:  sacubitril-valsartan  TAKE ONE TABLET BY MOUTH EVERY 12 HOURS     spironolactone 25 MG tablet  Commonly known as:  ALDACTONE  TAKE ONE TABLET BY MOUTH DAILY     torsemide 20 MG tablet  Commonly known as:  DEMADEX  Take 3 tablets by mouth Daily.     VENTOLIN  (90 Base) MCG/ACT inhaler  Generic drug:  albuterol sulfate HFA  Inhale 2 puffs Every 6 (Six) Hours As Needed for Wheezing.            Documentation assistance provided by Lina Noel acting as scribe for Dr. Nolberto Oliver.     The scribe's documentation has been prepared under my direction and personally reviewed by me in its entirety.  I confirm that the note above accurately reflects all work, treatment, procedures, and medical decision making performed by me.      Comment: Please note this report has been produced using speech recognition software.      Dr. Nolberto Oliver  Attending Emergency Physician                 Lina Noel  10/20/19 0903       Nolberto Oliver DO  10/20/19 1034

## 2019-10-20 NOTE — DISCHARGE INSTRUCTIONS
Make sure you continue to take your diuretics as previously prescribed.  Continue to use your CPAP machine at home over the next couple days.  I will heart specialty clinic will reach out to you tomorrow morning to make sure you get your PRN oxygen through the insurance.  If you have any worsening symptoms in the meantime, please return back to the hospital/emergency department for reevaluation.

## 2019-10-21 ENCOUNTER — PATIENT OUTREACH (OUTPATIENT)
Dept: CASE MANAGEMENT | Facility: OTHER | Age: 29
End: 2019-10-21

## 2019-10-21 NOTE — OUTREACH NOTE
Patient Outreach Note  Talked with patient. Discussed 10/20/19 ED visit regarding SOB and pain to toe . Patient states to be compliant with ED recommendations and states symptoms have improved. He reports SOB; and edema to lower extremities has improved. He has contacted physicians offices for appointments with Dr. Olsen;  PCP and waiting for return phone calls. [facility]. Patient states to be compliant with medications (will contact pharmacy today for refills). He will be contacting [employer] for transportation for appointments. Per patient request [facility] assisted with 3 way phone call to [facility] regarding assistance with insurance for O2 per ED recommendations. Left voicemail for Sulaiman [facility]E at [facility].  Patient reports no difficulty with chest pain; appetite or sleeping. He states pain to elbow has improved. Reviewed with patient ED recommendations;elevation of extremities; compliance with medications; appointments and diet;  24/7 Nurse Line Telephone number and [facility] contact information. Patient verbalized understanding. No further questions or concerns voiced at this time.     Maria R Fuentes RN  Community Care Coordinator    10/21/2019, 11:24 AM

## 2019-10-22 DIAGNOSIS — M10.9 ACUTE GOUT OF MULTIPLE SITES, UNSPECIFIED CAUSE: ICD-10-CM

## 2019-10-22 RX ORDER — ALLOPURINOL 100 MG/1
TABLET ORAL
Qty: 30 TABLET | Refills: 0 | OUTPATIENT
Start: 2019-10-22

## 2019-10-23 ENCOUNTER — OFFICE VISIT (OUTPATIENT)
Dept: CARDIOLOGY | Facility: HOSPITAL | Age: 29
End: 2019-10-23

## 2019-10-23 VITALS
TEMPERATURE: 97.7 F | SYSTOLIC BLOOD PRESSURE: 116 MMHG | OXYGEN SATURATION: 93 % | BODY MASS INDEX: 46.65 KG/M2 | DIASTOLIC BLOOD PRESSURE: 69 MMHG | WEIGHT: 315 LBS | RESPIRATION RATE: 20 BRPM | HEART RATE: 102 BPM | HEIGHT: 69 IN

## 2019-10-23 DIAGNOSIS — G47.33 OSA (OBSTRUCTIVE SLEEP APNEA): ICD-10-CM

## 2019-10-23 DIAGNOSIS — I10 ESSENTIAL HYPERTENSION: ICD-10-CM

## 2019-10-23 DIAGNOSIS — I50.23 ACUTE ON CHRONIC SYSTOLIC HEART FAILURE (HCC): Primary | ICD-10-CM

## 2019-10-23 PROCEDURE — 99214 OFFICE O/P EST MOD 30 MIN: CPT | Performed by: NURSE PRACTITIONER

## 2019-10-23 NOTE — PROGRESS NOTES
[facility]  [facility]      Encounter Date:10/23/2019     Evan Gannon  [address] [facility] [address]  [unfilled]    1990    Gilbert Da Silva PA    Evan Gannon is a 28 y.o. male.      Subjective:     Chief Complaint:  Congestive Heart Failure and Follow-up       HPI patient presents to the office today for ongoing evaluation of his chronic systolic heart failure. He was seen last in the clinic one week ago and then presented to [facility] ED on 10/23/19. He was given IV lasix 40 mg x 1 dose with minimal improvement in his symptoms. He notes that he is taking 4 tablets of torsemide daily with little relief in his symptoms of dyspnea, abdominal fullness, pedal edema. He is inquiring about oxygen for daily use. Denies chest pain. Notes compliance with his medications. Notes orthopnea for the last 2 nights. 19 Lb WEIGHT GAIN in the last week.      Patient Active Problem List   Diagnosis   • Hypertension   • Morbid obesity (CMS/HCC)   • Severe obstructive sleep apnea   • Nonischemic congestive cardiomyopathy (CMS/HCC)   • Personal history of noncompliance with medical treatment   • CHF (congestive heart failure), NYHA class IV (CMS/HCC)   • Plaque psoriasis   • Painless hematuria   • Pneumonia of right lower lobe due to infectious organism (CMS/HCC)       Past Medical History:   Diagnosis Date   • CHF (congestive heart failure) (CMS/HCC)    • CPAP (continuous positive airway pressure) dependence    • Hypertension    • Morbid obesity (CMS/HCC)    • Myocardial infarction (CMS/HCC)    • On home O2     2l at bedtime and prn   • Plaque psoriasis    • Pneumonia    • Psoriasis    • Sleep apnea    • Wears glasses        Past Surgical History:   Procedure Laterality Date   • ADENOIDECTOMY     • CARDIAC CATHETERIZATION N/A 7/13/2017    Procedure: Left Heart Cath;  Surgeon: Balbir Olsen MD;  Location: [facility] CATH INVASIVE LOCATION;  Service:    • CARDIAC DEFIBRILLATOR PLACEMENT  08/2017   •  CARDIAC ELECTROPHYSIOLOGY PROCEDURE N/A 8/15/2017    Procedure: VVI ICD Implant;  Surgeon: Nathanael Richmond DO;  Location: [facility] INVASIVE LOCATION;  Service:    • INTERNAL CARDIAC DEFIBRILLATOR INSERTION Left 2017   • NOSE SURGERY     • OTHER SURGICAL HISTORY      ultra light therapy   • SINUS SURGERY     • TONSILLECTOMY         Family History   Problem Relation Age of Onset   • Diabetes Father    • Diabetes Paternal Grandmother    • Diabetes Maternal Grandfather        Social History     Socioeconomic History   • Marital status: Single     Spouse name: Not on file   • Number of children: 0   • Years of education: Not on file   • Highest education level: Not on file   Occupational History   • Occupation: disabled   Tobacco Use   • Smoking status: Never Smoker   • Smokeless tobacco: Never Used   Substance and Sexual Activity   • Alcohol use: No   • Drug use: No   • Sexual activity: Defer   Social History Narrative    Caffeine use: 6 sodas weekly    Patient lives with parents       No Known Allergies      Current Outpatient Medications:   •  albuterol sulfate  (90 Base) MCG/ACT inhaler, Inhale 2 puffs Every 6 (Six) Hours As Needed for Wheezing., Disp: 18 g, Rfl: 1  •  allopurinol (ZYLOPRIM) 100 MG tablet, Take 1 tablet by mouth Daily., Disp: 30 tablet, Rfl: 5  •  ASPIRIN ADULT LOW STRENGTH 81 MG EC tablet, TAKE ONE TABLET BY MOUTH DAILY, Disp: 90 tablet, Rfl: 1  •  carvedilol (COREG) 25 MG tablet, TAKE ONE TABLET BY MOUTH TWICE A DAY WITH MEALS, Disp: 60 tablet, Rfl: 5  •  ENTRESTO 49-51 MG tablet, TAKE ONE TABLET BY MOUTH EVERY 12 HOURS, Disp: 60 tablet, Rfl: 5  •  Secukinumab (COSENTYX, 300 MG DOSE, SC), Inject 300 mg under the skin into the appropriate area as directed., Disp: , Rfl:   •  spironolactone (ALDACTONE) 25 MG tablet, TAKE ONE TABLET BY MOUTH DAILY, Disp: 30 tablet, Rfl: 5  •  torsemide (DEMADEX) 20 MG tablet, Take 3 tablets by mouth Daily., Disp: 90 tablet, Rfl: 5    The following portions of  the patient's history were reviewed today and updated as appropriate: allergies, current medications, past family history, past medical history, past social history, past surgical history and problem list     Review of Systems   Constitution: Positive for weakness and malaise/fatigue. Negative for chills, decreased appetite, diaphoresis, fever, night sweats, weight gain and weight loss.   HENT: Positive for congestion. Negative for hearing loss, hoarse voice and nosebleeds.    Eyes: Negative for blurred vision, visual disturbance and visual halos.   Cardiovascular: Positive for dyspnea on exertion, leg swelling, orthopnea and paroxysmal nocturnal dyspnea. Negative for chest pain, claudication, cyanosis, irregular heartbeat, near-syncope, palpitations and syncope.   Respiratory: Positive for cough and shortness of breath. Negative for hemoptysis, sleep disturbances due to breathing, snoring, sputum production and wheezing.    Endocrine: Positive for polydipsia.   Hematologic/Lymphatic: Negative for bleeding problem. Does not bruise/bleed easily.   Skin: Negative for dry skin, itching and rash.   Musculoskeletal: Positive for joint pain. Negative for arthritis, falls, joint swelling and myalgias.   Gastrointestinal: Positive for bloating. Negative for abdominal pain, constipation, diarrhea, flatus, heartburn, hematemesis, hematochezia, melena, nausea and vomiting.   Genitourinary: Negative for dysuria, frequency, hematuria, nocturia and urgency.   Neurological: Negative for excessive daytime sleepiness, dizziness, headaches, light-headedness and loss of balance.   Psychiatric/Behavioral: Negative for depression. The patient does not have insomnia and is not nervous/anxious.        Objective:     Vitals:    10/23/19 1047   BP: 116/69   BP Location: Left arm   Patient Position: Sitting   Cuff Size: Adult   Pulse: 102   Resp: 20   Temp: 97.7 °F (36.5 °C)   TempSrc: Temporal   SpO2: 93%   Weight: (!) 190 kg (419 lb)  "  Height: 175.3 cm (69\")       Physical Exam   Constitutional: He is oriented to person, place, and time. He appears well-developed and well-nourished. He is active and cooperative. No distress.   HENT:   Head: Normocephalic and atraumatic.   Mouth/Throat: Oropharynx is clear and moist.   Eyes: Conjunctivae and EOM are normal. Pupils are equal, round, and reactive to light.   Neck: Normal range of motion. Neck supple. No JVD present. No tracheal deviation present. No thyromegaly present.   Cardiovascular: Normal rate, regular rhythm, normal heart sounds and intact distal pulses.   Pulmonary/Chest: Effort normal. He has rales.   diminished in bases   Abdominal: Bowel sounds are normal. He exhibits distension. There is no tenderness.   Musculoskeletal: Normal range of motion. He exhibits edema (2+ pitting edema).   Neurological: He is alert and oriented to person, place, and time.   Skin: Skin is warm, dry and intact.   Psychiatric: He has a normal mood and affect. His behavior is normal.   Nursing note and vitals reviewed.      Lab and Diagnostic Review:  Results for orders placed or performed during the hospital encounter of 10/20/19   Comprehensive Metabolic Panel   Result Value Ref Range    Glucose 135 (H) 65 - 99 mg/dL    BUN 10 6 - 20 mg/dL    Creatinine 0.88 0.76 - 1.27 mg/dL    Sodium 137 136 - 145 mmol/L    Potassium 3.4 (L) 3.5 - 5.2 mmol/L    Chloride 97 (L) 98 - 107 mmol/L    CO2 31.0 (H) 22.0 - 29.0 mmol/L    Calcium 9.1 8.6 - 10.5 mg/dL    Total Protein 8.2 6.0 - 8.5 g/dL    Albumin 3.70 3.50 - 5.20 g/dL    ALT (SGPT) 15 1 - 41 U/L    AST (SGOT) 16 1 - 40 U/L    Alkaline Phosphatase 84 39 - 117 U/L    Total Bilirubin 0.7 0.2 - 1.2 mg/dL    eGFR  African Amer 127 >60 mL/min/1.73    Globulin 4.5 gm/dL    A/G Ratio 0.8 g/dL    BUN/Creatinine Ratio 11.4 7.0 - 25.0    Anion Gap 9.0 5.0 - 15.0 mmol/L   BNP   Result Value Ref Range    proBNP 1,315.0 (H) 5.0 - 450.0 pg/mL   Troponin   Result Value Ref Range    " Troponin T <0.010 0.000 - 0.030 ng/mL   CBC Auto Differential   Result Value Ref Range    WBC 6.24 3.40 - 10.80 10*3/mm3    RBC 5.44 4.14 - 5.80 10*6/mm3    Hemoglobin 14.3 13.0 - 17.7 g/dL    Hematocrit 44.0 37.5 - 51.0 %    MCV 80.9 79.0 - 97.0 fL    MCH 26.3 (L) 26.6 - 33.0 pg    MCHC 32.5 31.5 - 35.7 g/dL    RDW 14.0 12.3 - 15.4 %    RDW-SD 40.3 37.0 - 54.0 fl    MPV 11.0 6.0 - 12.0 fL    Platelets 285 140 - 450 10*3/mm3    Neutrophil % 66.1 42.7 - 76.0 %    Lymphocyte % 24.5 19.6 - 45.3 %    Monocyte % 7.7 5.0 - 12.0 %    Eosinophil % 1.0 0.3 - 6.2 %    Basophil % 0.5 0.0 - 1.5 %    Immature Grans % 0.2 0.0 - 0.5 %    Neutrophils, Absolute 4.13 1.70 - 7.00 10*3/mm3    Lymphocytes, Absolute 1.53 0.70 - 3.10 10*3/mm3    Monocytes, Absolute 0.48 0.10 - 0.90 10*3/mm3    Eosinophils, Absolute 0.06 0.00 - 0.40 10*3/mm3    Basophils, Absolute 0.03 0.00 - 0.20 10*3/mm3    Immature Grans, Absolute 0.01 0.00 - 0.05 10*3/mm3    nRBC 0.0 0.0 - 0.2 /100 WBC   D-dimer, Quantitative   Result Value Ref Range    D-Dimer, Quantitative 0.48 0.00 - 0.56 MCGFEU/mL   Blood Gas, Venous With Co-Ox   Result Value Ref Range    Site Nurse/Dr Draw     pH, Venous 7.441 pH Units    pCO2, Venous 43.9 41.0 - 51.0 mm Hg    pO2, Venous 61.9 (H) 27.0 - 53.0 mm Hg    HCO3, Venous 29.9 (H) 22.0 - 28.0 mmol/L    Base Excess, Venous 5.0 (H) -2.0 - 2.0 mmol/L    Hemoglobin, Blood Gas 14.6 13.5 - 17.5 g/dL    Oxyhemoglobin Venous 89.8 %    Methemoglobin Venous 0.8 %    Carboxyhemoglobin Venous 1.6 %    CO2 Content 31.2 22 - 33 mmol/L    Temperature 37.0 C    Barometric Pressure for Blood Gas      Modality Nasal Cannula     FIO2 31 %    Ventilator Mode       Note      Notified Leandra MENDIETA MD     Notified By [id number]     Notified Time 10/20/2019 09:08    Light Blue Top   Result Value Ref Range    Extra Tube hold for add-on    Green Top (Gel)   Result Value Ref Range    Extra Tube Hold for add-ons.    Lavender Top   Result Value Ref Range    Extra Tube  hold for add-on    Gold Top - SST   Result Value Ref Range    Extra Tube Hold for add-ons.      EKG: Sinus tachycardia with occasional premature ventricular complexes  Otherwise normal ECG  When compared with ECG of 13-OCT-2019 22:54,  no acute significant c hanges noted  Confirmed by MONIE MENDIETA MD ([id number]) on 10/20/2019 8:35:58 AM    6-minute walk test  Pretest vitals blood pressure 116/69, heart rate 102, respiratory rate 20, O2 sat 93% on room air  Pretest dyspnea score 2 out of 10  Pre-fatigue score 2 out of 10  Patient ambulated 400 feet (2.43 minutes before O2 sat dropped)  Posttest vitals blood pressure 117/72, heart rate 139, respiratory rate 22, O2 sat 84% on room air with exertion  2 L nasal cannula applied, O2 sat 2 L with exertion 98%  O2 sat on 2 L at rest 99%  Post dyspnea score 10 out of 10  Post fatigue score 10 out of 10  Assessment and Plan:   1. Acute on chronic systolic heart failure (CMS/HCC)  IV diuresis today in office. Patient received 2 mg bumex (NDC 6629-5442-82) today through a butterfly in the left hand  over slow IV push. During IV diuresis, vitals were monitored and stable. Please see IV diuresis record for those vitals. Patient voided 275 ml in the office prior to discharge from the office. IV was d/c'd and area was free of erythema, ecchymosis, or drainage.  Patient was  instructed to record urinary output for the next 24 hours. Patient will receive a follow up call from the HF center in 24 hours to evaluate urinary output and reassess signs and symptoms.     2. Essential hypertension  Well controlled  HTN Education provided today including signs and symptoms, medication management, daily blood pressure monitoring. Patient encouraged to call the Heart and Valve center with any abnormal readings.     3. COLLIN (obstructive sleep apnea)  cpap therapy    Patient is asking that a new script be faxed to [facility] for his oxygen needs          It has been a pleasure to participate in the  care of this patient.  Patient was instructed to call the [facility] with any questions, concerns, or worsening symptoms.    *Please note that portions of this note were completed with a voice recognition program. Efforts were made to edit the dictations, but occasionally words are mistranscribed.

## 2019-10-28 ENCOUNTER — TELEPHONE (OUTPATIENT)
Dept: CARDIOLOGY | Facility: HOSPITAL | Age: 29
End: 2019-10-28

## 2019-10-28 NOTE — TELEPHONE ENCOUNTER
Patient called and states that [facility] has not received his prescription for oxygen. Patient states that he spoke to Marli the other day and that the prescription needs to be re-faxed.

## 2019-10-29 ENCOUNTER — PATIENT OUTREACH (OUTPATIENT)
Dept: CASE MANAGEMENT | Facility: OTHER | Age: 29
End: 2019-10-29

## 2019-10-29 NOTE — OUTREACH NOTE
Care Coordination Assessment    Documented/Reviewed By:  Maria R Fuentes RN Date/time:  10/29/2019  4:37 PM   Assessment completed with:  patient  Enrolled in care management program:  Yes  Living arrangement:  family members  Support system:  family  Type of residence:  private residence  Home care services:  No  Equipment used at home:  oxygen/respiratory treatment (Comment: CPAP)  Communication device:  No  Bed or wheelchair confined:  No  Inadequate nutrition:  No  Medication adherence problem:  No  Experiencing side effects from current medications:  No  History of fall(s) in last 6 months:  No  Difficulty keeping appointments:   (Comment: Patient has missed appointments at times. )

## 2019-10-29 NOTE — OUTREACH NOTE
Care Plan Note      Responses   Lifestyle Goals  Routine follow-up with doctor(s)   Barriers  Disease education   Self Management  Medication Adherence, Other (See Comment), Use oxygen [Use of CPAP,  compliance with medications and  physician appointments]   Annual Wellness Visit:   Patient Refuses at This Time [Adddressed but declines]   AWV Materials  Send Materials   Care Gaps Addressed  Flu Shot   Flu Shot Status  Refused [Addressed but declines]   Does patient have depression diagnosis?  No   Advanced Directives:  Not Interested At This Time   Medication Adherence  Medications understood        The main concerns and/or symptoms the patient would like to address are: Talked with patient. Patient lives with family;independent with ADL's; meal preparation; receiving assistance with transportation from family or Wheels. Patient ambulates without assistive device.  . Patient had appointment with [facility] on 10/23/19;and will contact Dr. Olsen for follow up appointment. He states to be compliant with medications and reports no difficulty with chest pain; SOB; appetite or sleeping. He states edema to lower extremities has improved. He states to be waiting for contact regarding bariatric surgery.     Education/instruction provided by Care Coordinator: Reviewed with patient 24/7 Nurse Line Telephone number; CA contact information; Advance Directives;  My Chart; gaps in care; and MWV. Patient verbalized understanding.Patient declines CA assistance with scheduling physician appointments.  No further questions or concerns voiced at this time.     Follow Up Outreach Due: Follow up as needed.     Maria R Fuentes RN  Community Care Coordinator    10/29/2019, 4:41 PM

## 2019-11-19 ENCOUNTER — CLINICAL SUPPORT NO REQUIREMENTS (OUTPATIENT)
Dept: CARDIOLOGY | Facility: CLINIC | Age: 29
End: 2019-11-19

## 2019-11-19 DIAGNOSIS — I42.0 NONISCHEMIC CONGESTIVE CARDIOMYOPATHY (HCC): ICD-10-CM

## 2019-11-19 DIAGNOSIS — I50.23 ACUTE ON CHRONIC SYSTOLIC CONGESTIVE HEART FAILURE, NYHA CLASS 4 (HCC): ICD-10-CM

## 2019-11-19 PROCEDURE — 93295 DEV INTERROG REMOTE 1/2/MLT: CPT | Performed by: INTERNAL MEDICINE

## 2019-11-19 PROCEDURE — 93296 REM INTERROG EVL PM/IDS: CPT | Performed by: INTERNAL MEDICINE

## 2019-11-21 DIAGNOSIS — M10.9 ACUTE GOUT OF MULTIPLE SITES, UNSPECIFIED CAUSE: ICD-10-CM

## 2019-11-21 RX ORDER — ALLOPURINOL 100 MG/1
TABLET ORAL
Qty: 30 TABLET | Refills: 0 | OUTPATIENT
Start: 2019-11-21

## 2019-11-26 ENCOUNTER — TELEPHONE (OUTPATIENT)
Dept: CARDIOLOGY | Facility: HOSPITAL | Age: 29
End: 2019-11-26

## 2019-11-26 NOTE — TELEPHONE ENCOUNTER
Patient and states he needs an order for a portable oxygen concentrator.  He states the oxygen tanks run out  And it takes about 2 days before he gets them refill and they said he needs an order to get the portable concentrator.

## 2019-12-03 ENCOUNTER — OFFICE VISIT (OUTPATIENT)
Dept: BARIATRICS/WEIGHT MGMT | Facility: CLINIC | Age: 29
End: 2019-12-03

## 2019-12-03 ENCOUNTER — OFFICE VISIT (OUTPATIENT)
Dept: PSYCHIATRY | Facility: CLINIC | Age: 29
End: 2019-12-03

## 2019-12-03 ENCOUNTER — DOCUMENTATION (OUTPATIENT)
Dept: BARIATRICS/WEIGHT MGMT | Facility: CLINIC | Age: 29
End: 2019-12-03

## 2019-12-03 VITALS
HEIGHT: 69 IN | TEMPERATURE: 97.4 F | SYSTOLIC BLOOD PRESSURE: 124 MMHG | OXYGEN SATURATION: 99 % | BODY MASS INDEX: 46.65 KG/M2 | WEIGHT: 315 LBS | DIASTOLIC BLOOD PRESSURE: 72 MMHG | HEART RATE: 109 BPM | RESPIRATION RATE: 18 BRPM

## 2019-12-03 DIAGNOSIS — E66.01 MORBID OBESITY WITH BMI OF 60.0-69.9, ADULT (HCC): ICD-10-CM

## 2019-12-03 DIAGNOSIS — R20.2 PARESTHESIA: ICD-10-CM

## 2019-12-03 DIAGNOSIS — F41.8 ANXIETY ABOUT HEALTH: Primary | ICD-10-CM

## 2019-12-03 DIAGNOSIS — G47.33 SEVERE OBSTRUCTIVE SLEEP APNEA: ICD-10-CM

## 2019-12-03 DIAGNOSIS — M10.9 GOUT, UNSPECIFIED CAUSE, UNSPECIFIED CHRONICITY, UNSPECIFIED SITE: ICD-10-CM

## 2019-12-03 DIAGNOSIS — I10 ESSENTIAL HYPERTENSION: Primary | ICD-10-CM

## 2019-12-03 DIAGNOSIS — R53.83 FATIGUE, UNSPECIFIED TYPE: ICD-10-CM

## 2019-12-03 DIAGNOSIS — L40.9 PSORIASIS: ICD-10-CM

## 2019-12-03 DIAGNOSIS — I50.9 CONGESTIVE HEART FAILURE, UNSPECIFIED HF CHRONICITY, UNSPECIFIED HEART FAILURE TYPE (HCC): ICD-10-CM

## 2019-12-03 DIAGNOSIS — R73.09 ELEVATED GLUCOSE: ICD-10-CM

## 2019-12-03 DIAGNOSIS — R10.13 DYSPEPSIA: ICD-10-CM

## 2019-12-03 DIAGNOSIS — E66.01 MORBID OBESITY (HCC): ICD-10-CM

## 2019-12-03 DIAGNOSIS — I42.0 NONISCHEMIC CONGESTIVE CARDIOMYOPATHY (HCC): ICD-10-CM

## 2019-12-03 DIAGNOSIS — Z99.81 ON HOME O2: ICD-10-CM

## 2019-12-03 PROCEDURE — 99215 OFFICE O/P EST HI 40 MIN: CPT | Performed by: PHYSICIAN ASSISTANT

## 2019-12-03 PROCEDURE — 90791 PSYCH DIAGNOSTIC EVALUATION: CPT | Performed by: PSYCHOLOGIST

## 2019-12-03 NOTE — PROGRESS NOTES
[facility] BARIATRIC SURGERY  [address]  [facility] [id number] [phone]      Patient  Name:  Evan Gannon  :  1990        Chief Complaint:  weight gain; unable to maintain weight loss    History of Present Illness:  Evan Gannon is a 29 y.o. male who presents today for evaluation, education and consultation regarding weight loss surgery. The patient is interested in sleeve gastrectomy with Dr. Whittington.     Evan has been overweight his entire life. Has been dieting/ attempting weightloss since a teenager.     Previous diet attempts include: weight watchers, eating right.  The most weight Evan lost was 150lb pounds but  was only able to maintain that weight loss for 3 months.  His maximum lifetime weight is 548 pounds.    As above, patient has been overweight for many years, with numerous failed dietary/weight loss attempts.  He now has obesity related comorbidities and as such has decided to pursue weight loss surgery.Pursuing LSG to improve cardiac health with h/o CHF.  Currently not a transplant candidate with high BMI. Knows several people s/p LSG.     CV: CHF diagnosed at 21yo, thought to be related to undiagnosed congenital defect- per patient. hospitalized every 3 weeks during the winter due to PNA.  ICD in place. Although pleased has not been hospitalized yet this winter.  Has significantf fluid retention despite diuretics. Chart review notes HTN and MI, patient denies, says he almost had MI, had heart cath, no stents.  Followed by cardiologist- Dr. Lewis, although says has not seen them in quite some time and needs appt, unknown last ECHO. LOV encouraged bariatric surgery, currently not transplant candidate dur to morbid obesity.  Also followed by [facility], manages oxygen requirement.  On ASA daily.     Pulm: significant SOA/ dyspnea, related to CHF with fluid overload. Supposed to be on 24/7 home O2, managed by  "cardiology. Currently only using prn due to running out of oxygen. Denies seeing pulm, although chart review shows followed by [facility] for COLL[facility].     GI: Denies reflux/ heartburn, nausea, vomiting, abd pain, dypshagia, BM issues. No h/o EGD, h pylori or HH.  GB present. Tolerates all foods well.     Psoriasis on cosentyx managed by dermatology- hasn't started this yet but plans to. Gout on allopurinol, denies flares.     Of note, chart lists personal history of medical noncompliance in history. Multiple no show appts with Dr. Olsen.     All other past medical, surgical, social and family history have been obtained and discussed as pertinent to bariatric surgery as below.     Past Medical History:   Diagnosis Date   • CHF (congestive heart failure) (CMS/[facility])     diagnosed at 23yo, thought to be related to undiagnosed congenital defect- per patient. hospitalized every 3 weeks during the winter due to PNA.  Has significantf fluid retention despite diurectics   • Gout     on allopurinol, controls symptoms   • Hypertension     patient denies   • Morbid obesity (CMS/HCC)    • Morbid obesity with BMI of 60.0-69.9, adult (CMS/HCC)    • Myocardial infarction (CMS/HCC)     \"almost\" per patient, had heart cath, no stents, has defibrillator   • On home O2     supposed to be 24/7, ran out of O2 and has only been using prn. manged  by cardiology   • COLLIN on CPAP     compliant   • Paresthesia     hands/ arms/ feet, suspected due to poor circulation reportedly   • Plaque psoriasis    • Pneumonia    • Psoriasis    • Sleep apnea    • Wears glasses      Past Surgical History:   Procedure Laterality Date   • CARDIAC CATHETERIZATION N/A 7/13/2017    Procedure: Left Heart Cath;  Surgeon: Balbir Olsen MD;  Location:  [facility] CATH INVASIVE LOCATION;  Service:    • CARDIAC DEFIBRILLATOR PLACEMENT  08/2017   • CARDIAC ELECTROPHYSIOLOGY PROCEDURE N/A 8/15/2017    Procedure: VVI ICD Implant;  Surgeon: Nathanael Richmond DO;  Location: " [facility] INVASIVE LOCATION;  Service:    • OTHER SURGICAL HISTORY      ultra light therapy   • SINUS SURGERY      cartilage from ear inserted in nasal cavity   • TONSILLECTOMY AND ADENOIDECTOMY  2000       No Known Allergies    Current Outpatient Medications:   •  allopurinol (ZYLOPRIM) 100 MG tablet, Take 1 tablet by mouth Daily., Disp: 30 tablet, Rfl: 5  •  ASPIRIN ADULT LOW STRENGTH 81 MG EC tablet, TAKE ONE TABLET BY MOUTH DAILY, Disp: 90 tablet, Rfl: 1  •  carvedilol (COREG) 25 MG tablet, TAKE ONE TABLET BY MOUTH TWICE A DAY WITH MEALS, Disp: 60 tablet, Rfl: 5  •  ENTRESTO 49-51 MG tablet, TAKE ONE TABLET BY MOUTH EVERY 12 HOURS, Disp: 60 tablet, Rfl: 5  •  spironolactone (ALDACTONE) 25 MG tablet, TAKE ONE TABLET BY MOUTH DAILY, Disp: 30 tablet, Rfl: 5  •  torsemide (DEMADEX) 20 MG tablet, Take 3 tablets by mouth Daily., Disp: 90 tablet, Rfl: 5  •  Secukinumab (COSENTYX, 300 MG DOSE, SC), Inject 300 mg under the skin into the appropriate area as directed., Disp: , Rfl:     Social History     Socioeconomic History   • Marital status: Single     Spouse name: Not on file   • Number of children: 0   • Years of education: Not on file   • Highest education level: Not on file   Occupational History   • Occupation: disabled   Tobacco Use   • Smoking status: Never Smoker   • Smokeless tobacco: Never Used   Substance and Sexual Activity   • Alcohol use: No   • Drug use: No   • Sexual activity: Defer   Social History Narrative    Lives in [facility] with parents and brother.  Disabled from CHF, given pass to work- hasn't gotten hired yet. .             Caffeine use: 6 sodas weekly    Patient lives with parents     Family History   Problem Relation Age of Onset   • Diabetes Father    • Diabetes Paternal Grandmother    • Diabetes Maternal Grandfather        Review of Systems:  Constitutional:  Reports fatigue, weight gain and denies fevers, chills.  HEENT:  denies headache, ear pain or loss of hearing, blurred or  double vision, nasal discharge or sore throat.  Cardiovascular:  Reports HTN, HLD, CAD, hx heart disease, hx MI, edema and denies Atrial Fib, chest pain, hx DVT.  Respiratory:  Reports dyspnea on exertion, shortness of breath , cough , wheezing, sleep apnea and denies asthma, hx PE.  Gastrointestinal:  Reports none and denies dysphagia, heartburn, nausea, vomiting, abdominal pain, IBS, diarrhea, constipation, melena, blood in stool, gallbladder issues, liver disease, hx pancreatitis.  Genitourinary:  Reports none and denies history of  frequent UTI, incontinence, hematuria, dysuria, polyuria, polydipsia, renal insufficiency, renal failure.    Musculoskeletal:  Reports none and denies joint pain, fibromyalgia, arthritis and autoimmune disease.  Neurological:  Reports numbness /tingling, dizziness and denies headaches, migraines, confusion, seizure, stroke.  Psychiatric:  Reports none and denies anxiety, depression, bipolar disorder, hx suicide attempt, hx self injury, eating disorder.  Endocrine:  Reports gout and denies glucose intolerance, diabetes, thyroid disease.  Hematologic:  Reports none and denies anemia, bleeding disorder, hx blood transfusion.  Skin:  Reports none and denies rashes, hx MRSA.      Physical Exam:  Vital Signs:  Weight: (!) 193 kg (424 lb 8 oz)   Body mass index is 63.61 kg/m².  Temp: 97.4 °F (36.3 °C)   Heart Rate: 109   BP: 124/72     Physical Exam   Constitutional: He is oriented to person, place, and time. He appears well-developed and well-nourished.   HENT:   Head: Normocephalic.   Mouth/Throat: Oropharynx is clear and moist.   Eyes: EOM are normal.   Neck: Normal range of motion. Neck supple. No thyromegaly present.   Cardiovascular: Normal rate, regular rhythm and normal heart sounds.   Pulmonary/Chest: Effort normal. No respiratory distress. He has no wheezes.   Diminished lung sounds diffuse   Abdominal: Soft. Bowel sounds are normal. He exhibits no distension. There is no  tenderness.   Musculoskeletal: Normal range of motion.   Neurological: He is alert and oriented to person, place, and time.   Skin: Skin is warm and dry.   Psoriasis rash patches over abdomen  LE with thickened, dry skin, plaque like rash bilateral to knees   Psychiatric: He has a normal mood and affect. His behavior is normal. Judgment and thought content normal.   Vitals reviewed.      Patient Active Problem List   Diagnosis   • Hypertension   • Morbid obesity (CMS/HCC)   • Severe obstructive sleep apnea   • Nonischemic congestive cardiomyopathy (CMS/HCC)   • Personal history of noncompliance with medical treatment   • CHF (congestive heart failure), NYHA class IV (CMS/HCC)   • Plaque psoriasis   • Painless hematuria   • Pneumonia of right lower lobe due to infectious organism (CMS/HCC)   • CHF (congestive heart failure) (CMS/HCC)   • Myocardial infarction (CMS/HCC)   • COLLIN on CPAP   • On home O2   • Sleep apnea   • Pneumonia   • Psoriasis   • Wears glasses   • Gout   • Morbid obesity with BMI of 60.0-69.9, adult (CMS/HCC)   • Paresthesia       Assessment:    Evan Gannon is a 29 y.o. year old male with medically complicated obesity pursuing sleeve gastrectomy.    Weight loss surgery is deemed medically necessary given the following obesity related comorbidities including sleep apnea, hypertension, dyslipidemia and edema with current Weight: (!) 193 kg (424 lb 8 oz) and Body mass index is 63.61 kg/m²..    Plan:  The consultation plan and program requirements were reviewed with the patient.  The patient has been advised that a letter of medical support must be obtained from his primary care physician or referring provider. A psychological evaluation will be arranged.  A nutritional evaluation will be performed.  The patient was advised to start a high protein and low carbohydrate diet.  Necessary lifestyle modifications were discussed.  Instructions on how to access SHREE was given to the patient.  SHREE is an  internet based educational video that explains the surgical procedure chosen and answers basic questions regarding that procedure.     Preoperative testing will include: CBC, CMP, Lipids, TSH, HgA1C, H.Pylori, PFTs,  CXR and EKG     Additional preop clearances required prior to surgery: Cardiac.  Insurance will require pulm clearance.     The patient has been educated on expected postoperative lifestyle changes, including commitment to high protein diet, vitamin regimen, and exercise program.  They are aware that support groups are encouraged for optimal weight loss results. Patient understands that bariatric surgery is not cosmetic surgery but rather a tool to help make a lifelong commitment to lifestyle changes including diet, exercise, behavior modifications, and healthy habits. The procedure was discussed with the patient and all questions were answered. The importance of avoiding ASA/ NSAIDS/ steroids/ tobacco/ hormones/ immunomodulators perioperatively was discussed.       Jodi Howard PA-C    MDM: New problem w/ further workup planned.  Labs, imaging, additional testing planned, old records (op notes) obtained /reviewed, all to be discussed further w/ surgeon.  HIGH complexity.

## 2019-12-03 NOTE — PROGRESS NOTES
PROGRESS NOTE    Data:  Evan Gannon is a 29 y.o. male who met with the undersigned for a scheduled individual outpatient therapy session from 8:00 - 8:40am.      Clinical Maneuvering/Intervention:      The pt talked about struggling with obesity for several years. Despite trying different weight loss plans and diets, the pt reported being unsuccessful in losing weight. A psychological evaluation was conducted in order to assess past and current level of functioning. Areas assessed included, but were not limited to: perception of social support, perception of ability to face and deal with challenges in life (positive functioning), anxiety symptoms, depressive symptoms, perspective on beliefs/belief system, coping skills for stress, intelligence level, addiction issues, etc. Therapeutic rapport was established. Interventions conducted today were geared towards assessing the pt's readiness for weight loss surgery and identifying and psychological contraindications for undergoing such a major life change. Social support was deemed very strong (specific to weight loss surgery/weight loss in this manner and in a general sense): father, brother, and many friends. Current psychological struggles were deemed low, but included anxiety about health; he at times fears not waking up due to having congestive heart failure. He stated that his heart is working at only 10%. Coping skills for distress and related to undergoing a major life change such as weight loss surgery/weight loss were deemed strong and included seeing his life as purposeful/meaningful, using humor often, not letting little things get to him, and believing in himself that he will be successful with weight loss surgery. The pt endorsed having characteristics of readiness to improve quality of life through the major life changes inherent in the journey of weight loss surgery. He talked about doing this because he wants to live; he wants to do what he can to  help his heart function better and improve his quality of life in general.    Mental Status Exam  Hygiene:  good  Dress: normal  Attitude:  cooperative and proactive  Motor Activity: normal  Speech: normal  Mood: level  Affect:  congruent  Thought Processes: normal  Thought Content:  normal  Suicidal Thoughts:  not endorsed  Homicidal Thoughts:  not endorsed  Crisis Safety Plan: not needed   Hallucinations:  none      Patient's Support Network Includes:  family, friends      Progress toward goal: there is evidence to suggest that he is taking measures to improve the quality of his life including seeking weight loss surgery.      Functional Status: moderate to high      Prognosis: good    Assessment      The pt presented to be a fairly highly functioning individual despite having anxiety about health (including fears that if he does not lose weight soon, his congestive heart failure will worsen). He seems to have made a conscious decision to lose weight and to have weight loss surgery.     From a psychological standpoint, the pt presents as a good candidate for bariatric surgery.  He is motivated for the surgery, has showed readiness for the lifestyle change in terms of planning to soon adjust his eating habits, and seems to have appropriate expectations of how to prepare and how to live after surgery in order to lose weight successfully.      Plan      In order to diminish symptoms of anxiety about health, the pt is to follow up with his bariatric surgeon in order to receive weight loss surgery as soon as feasible/appropriate and based on success with compliance to adhering to the proper diet.      Estela Knowles, PhD, LP

## 2019-12-03 NOTE — PROGRESS NOTES
"Weight Loss Surgery  Presurgical Nutrition Assessment     Evan Gannon  12/03/2019  [id number]  [id number]  1990  male    Surgery desired: Sleeve Gastrectomy    Ht 174 cm (68.5\"); Wt 193 kg (424.5 #); BMI 63.6  Past Medical History:   Diagnosis Date   • CHF (congestive heart failure) (CMS/HCC)     diagnosed at 23yo, thought to be related to undiagnosed congenital defect- per patient. hospitalized every 3 weeks during the winter due to PNA.  Has significantf fluid retention despite diurectics   • Gout     on allopurinol, controls symptoms   • Hypertension     patient denies   • ICD (implantable cardioverter-defibrillator) in place    • Morbid obesity (CMS/HCC)    • Morbid obesity with BMI of 60.0-69.9, adult (CMS/HCC)    • Myocardial infarction (CMS/HCC)     \"almost\" per patient, had heart cath, no stents, has defibrillator   • On home O2     supposed to be 24/7, ran out of O2 and has only been using prn. manged  by cardiology   • COLLIN on CPAP     compliant   • Paresthesia     hands/ arms/ feet, suspected due to poor circulation reportedly   • Plaque psoriasis    • Pneumonia    • Psoriasis    • Sleep apnea    • Wears glasses      Past Surgical History:   Procedure Laterality Date   • CARDIAC CATHETERIZATION N/A 7/13/2017    Procedure: Left Heart Cath;  Surgeon: Balbir Olsen MD;  Location:  [facility] CATH INVASIVE LOCATION;  Service:    • CARDIAC DEFIBRILLATOR PLACEMENT  08/2017   • CARDIAC ELECTROPHYSIOLOGY PROCEDURE N/A 8/15/2017    Procedure: VVI ICD Implant;  Surgeon: Nathanael Richmond DO;  Location:  [facility] INVASIVE LOCATION;  Service:    • OTHER SURGICAL HISTORY      ultra light therapy   • SINUS SURGERY      cartilage from ear inserted in nasal cavity   • TONSILLECTOMY AND ADENOIDECTOMY  2000     No Known Allergies    Current Outpatient Medications:   •  allopurinol (ZYLOPRIM) 100 MG tablet, Take 1 tablet by mouth Daily., Disp: 30 tablet, Rfl: 5  •  ASPIRIN ADULT LOW STRENGTH 81 MG EC tablet, TAKE ONE " "TABLET BY MOUTH DAILY, Disp: 90 tablet, Rfl: 1  •  carvedilol (COREG) 25 MG tablet, TAKE ONE TABLET BY MOUTH TWICE A DAY WITH MEALS, Disp: 60 tablet, Rfl: 5  •  ENTRESTO 49-51 MG tablet, TAKE ONE TABLET BY MOUTH EVERY 12 HOURS, Disp: 60 tablet, Rfl: 5  •  Secukinumab (COSENTYX, 300 MG DOSE, SC), Inject 300 mg under the skin into the appropriate area as directed., Disp: , Rfl:   •  spironolactone (ALDACTONE) 25 MG tablet, TAKE ONE TABLET BY MOUTH DAILY, Disp: 30 tablet, Rfl: 5  •  torsemide (DEMADEX) 20 MG tablet, Take 3 tablets by mouth Daily., Disp: 90 tablet, Rfl: 5      Nutrition Assessment    Estimated energy needs:  2885 kcal    Estimated calories for weight loss:  1800 kcal    IBW (Pounds):  165 #        Excess body weight (Pounds):  260 #       Nutrition Recall  24 Hour recall: (B) (L) (D) -  Reviewed and discussed with patient.  Pt drinks no coffee or tea, sweet or otherwise.  He drinks two-2 liter bottles of reg ginger ale per wk + occ fountain soda.  Eats no brkfast.  Lunch @ 1:30 = a 6-inch turkey & ham sub from [employer] on white bun \"loaded /c meat\"- estimated 5 oz ea turkey & ham.  Snack @ 2 pm = 2 honeybuns, a reg sz funyons, 1.38 Munchies sandwich crackers & an XXXL 64 oz fountain Coke.  Dinner in evening = 1 frozen pot pie. Diet marginal in protein (at ~85 g was < adequate for wt loss & not distributed throughout the day). Excess intake of convenience foods.  Pt to focus on eating less processed food & on eating adeq protein for wt mgt in 3 meals + 1-2 snacks qd.       Exercise  never      Education    Provided information packet re:  Sleeve Gastrectomy  1. Reviewed guidelines for higher protein, limited carbohydrate diet to promote weight loss.  Encouraged patient to incorporate these principles of healthy eating from now until approximately 2 weeks prior to bariatric surgery date, when an even lower carbohydrate “liver-shrinking” regimen will be followed. (Information sheet re pre-op diet " given).  Explained that after recovery from surgery this diet will again be followed to ensure further loss and for weight maintenance.    2. Encouraged patient to choose an acceptable protein supplement powder or shake for post-surgery liquid diet.  Provided product guidelines and examples.    3. Explained importance of goal setting to help in changing eating behaviors that are not conducive to weight loss.  Targeted several on a worksheet which also included spaces for patient to work on issues specific to them.  4. Provided follow-up options for support, including contact information for dietitians here, if desired.  Web-based support information and apps for smart phones and computers given.  Noted that monthly support group is offered at this clinic, and that support is associated with successful weight loss.    Recommend that team proceed with surgery and follow per protocol.      Nutrition Goals   Dietary Guidelines per information packet as described above  Protein goal:  grams per day   Carbohydrate goal:  100-140 grams per day  Eliminate soda, sweet tea, etc.     Exercise Goals  Continue current exercise routine   Add 15-30 minutes of activity per day as tolerated      Maya Phillips, DANA  12/03/2019  4:52 PM

## 2019-12-12 ENCOUNTER — OFFICE VISIT (OUTPATIENT)
Dept: PULMONOLOGY | Facility: CLINIC | Age: 29
End: 2019-12-12

## 2019-12-12 VITALS
HEART RATE: 95 BPM | HEIGHT: 69 IN | WEIGHT: 315 LBS | OXYGEN SATURATION: 98 % | DIASTOLIC BLOOD PRESSURE: 64 MMHG | SYSTOLIC BLOOD PRESSURE: 110 MMHG | TEMPERATURE: 97.6 F | BODY MASS INDEX: 46.65 KG/M2

## 2019-12-12 DIAGNOSIS — Z01.818 PREOPERATIVE CLEARANCE: Primary | ICD-10-CM

## 2019-12-12 DIAGNOSIS — Z99.89 OSA ON CPAP: ICD-10-CM

## 2019-12-12 DIAGNOSIS — G47.33 SEVERE OBSTRUCTIVE SLEEP APNEA: ICD-10-CM

## 2019-12-12 DIAGNOSIS — E66.01 MORBID OBESITY WITH BMI OF 60.0-69.9, ADULT (HCC): ICD-10-CM

## 2019-12-12 DIAGNOSIS — I42.0 NONISCHEMIC CONGESTIVE CARDIOMYOPATHY (HCC): ICD-10-CM

## 2019-12-12 DIAGNOSIS — G47.33 OSA ON CPAP: ICD-10-CM

## 2019-12-12 PROCEDURE — 99214 OFFICE O/P EST MOD 30 MIN: CPT | Performed by: NURSE PRACTITIONER

## 2019-12-12 PROCEDURE — 94375 RESPIRATORY FLOW VOLUME LOOP: CPT | Performed by: NURSE PRACTITIONER

## 2019-12-12 PROCEDURE — 94729 DIFFUSING CAPACITY: CPT | Performed by: NURSE PRACTITIONER

## 2019-12-12 PROCEDURE — 94726 PLETHYSMOGRAPHY LUNG VOLUMES: CPT | Performed by: NURSE PRACTITIONER

## 2019-12-12 NOTE — PROGRESS NOTES
[facility] Pulmonary Evaluation    CHIEF COMPLAINT    Preoperative evaluation for bariatric surgery    Refered by:  No ref. provider found        HISTORY OF PRESENT ILLNESS    Evan Gannon is a 29 y.o.male here today for preoperative pulmonary evaluation for bariatric surgery.    He does have chronic respiratory failure on 2 L nasal cannula it is multifactorial.    He has obstructive sleep apnea and has been seen by Dr. Shrestha in the sleep clinic.  He had severe obstructive sleep apnea on his sleep study in March 2018 with an AHI of 57 as well as some desaturatio  He is tolerating his CPAP.  He recently followed up with Ms. Sierra at the sleep clinic, with an appropriate download.    He also follows up with cardiology Dr. Olsen for nonischemic cardiomyopathy.  He continues to follow-up at the heart valve center.  He is on furosemide as well as Coreg and Entresto.  He states that cardiology is the one that sent him over to bariatrics for evaluation for surgery.    In June he was hospitalized for a right lower lobe pneumonia felt likely related to initiating Cosentyx for his psoriasis.  His culture showed no growth and his chest x-ray showed a right lower lobe infiltrate.  He completed a course of Augmentin and azithromycin as an outpatient.    He has since recovered from that pneumonia without any cough or congestion.    He denies any recurrent bronchitis episodes, wheezing, shortness of breath or chronic cough.  He has no history of asthma.  No significant occupational exposures.  No smoking history.    Patient Active Problem List   Diagnosis   • Hypertension   • Morbid obesity (CMS/HCC)   • Severe obstructive sleep apnea   • Nonischemic congestive cardiomyopathy (CMS/HCC)   • Personal history of noncompliance with medical treatment   • CHF (congestive heart failure), NYHA class IV (CMS/HCC)   • Plaque psoriasis   • Painless hematuria   • Pneumonia of right lower lobe due to infectious organism (CMS/HCC)   • CHF  (congestive heart failure) (CMS/HCC)   • Myocardial infarction (CMS/[facility])   • COLLIN on CPAP   • On home O2   • Sleep apnea   • Pneumonia   • Psoriasis   • Wears glasses   • Gout   • Morbid obesity with BMI of 60.0-69.9, adult (CMS/HCC)   • Paresthesia       No Known Allergies    Current Outpatient Medications:   •  allopurinol (ZYLOPRIM) 100 MG tablet, Take 1 tablet by mouth Daily., Disp: 30 tablet, Rfl: 5  •  ASPIRIN ADULT LOW STRENGTH 81 MG EC tablet, TAKE ONE TABLET BY MOUTH DAILY, Disp: 90 tablet, Rfl: 1  •  carvedilol (COREG) 25 MG tablet, TAKE ONE TABLET BY MOUTH TWICE A DAY WITH MEALS, Disp: 60 tablet, Rfl: 5  •  ENTRESTO 49-51 MG tablet, TAKE ONE TABLET BY MOUTH EVERY 12 HOURS, Disp: 60 tablet, Rfl: 5  •  Secukinumab (COSENTYX, 300 MG DOSE, SC), Inject 300 mg under the skin into the appropriate area as directed., Disp: , Rfl:   •  spironolactone (ALDACTONE) 25 MG tablet, TAKE ONE TABLET BY MOUTH DAILY, Disp: 30 tablet, Rfl: 5  •  torsemide (DEMADEX) 20 MG tablet, Take 3 tablets by mouth Daily., Disp: 90 tablet, Rfl: 5    MEDICATION LIST AND ALLERGIES REVIEWED.    Social History     Tobacco Use   • Smoking status: Never Smoker   • Smokeless tobacco: Never Used   Substance Use Topics   • Alcohol use: No   • Drug use: No       FAMILY AND SOCIAL HISTORY REVIEWED AND UPDATED IN EPIC    Review of Systems   Constitutional: Positive for fatigue. Negative for chills, fever and unexpected weight change.   HENT: Negative for congestion, nosebleeds, postnasal drip, rhinorrhea, sinus pressure and trouble swallowing.    Respiratory: Positive for shortness of breath. Negative for cough, chest tightness and wheezing.    Cardiovascular: Positive for leg swelling. Negative for chest pain.   Gastrointestinal: Negative for abdominal pain, constipation, diarrhea, nausea and vomiting.   Genitourinary: Negative for dysuria, frequency, hematuria and urgency.   Musculoskeletal: Negative for myalgias.   Neurological: Positive for  "dizziness. Negative for weakness, numbness and headaches.   All other systems reviewed and are negative.  .    /64 (BP Location: Left arm, Patient Position: Sitting, Cuff Size: Adult)   Pulse 95   Temp 97.6 °F (36.4 °C)   Ht 174 cm (68.5\")   Wt (!) 191 kg (420 lb)   SpO2 98% Comment: 2liters at rest  BMI 62.93 kg/m²   Immunization History   Administered Date(s) Administered   • Flu Vaccine Quad PF >36MO 09/06/2017   • Pneumococcal Polysaccharide (PPSV23) 09/06/2017       Physical Exam   Constitutional: He is oriented to person, place, and time. He appears well-developed and well-nourished.   HENT:   Head: Normocephalic and atraumatic.   Eyes: Pupils are equal, round, and reactive to light. EOM are normal.   Neck: Normal range of motion. Neck supple.   Cardiovascular: Normal rate and regular rhythm.   No murmur heard.  Pulmonary/Chest: Effort normal and breath sounds normal. No respiratory distress. He has no wheezes. He has no rales.   Abdominal: Soft. Bowel sounds are normal. He exhibits no distension.   Musculoskeletal: Normal range of motion. He exhibits no edema.   Neurological: He is alert and oriented to person, place, and time.   Skin: Skin is warm and dry. No erythema.   Psychiatric: He has a normal mood and affect. His behavior is normal.   Vitals reviewed.      RESULTS    Lab Results   Component Value Date    WBC 6.24 10/20/2019    HGB 14.3 10/20/2019    HCT 44.0 10/20/2019    MCV 80.9 10/20/2019     10/20/2019     Lab Results   Component Value Date    GLUCOSE 135 (H) 10/20/2019    CALCIUM 9.1 10/20/2019     10/20/2019    K 3.4 (L) 10/20/2019    CO2 31.0 (H) 10/20/2019    CL 97 (L) 10/20/2019    BUN 10 10/20/2019    CREATININE 0.88 10/20/2019    EGFRIFAFRI 127 10/20/2019    BCR 11.4 10/20/2019    ANIONGAP 9.0 10/20/2019       PFTs done in the office today, and read by me:  Restrictive airway disease with an FVC of 2.09, 48% predicted and total lung capacity of 3.39 L, " 50%.      PA/LAT CXR done in the office today, and reviewed by me:  Awaiting final MD review      PROBLEM LIST    Problem List Items Addressed This Visit        Cardiovascular and Mediastinum    Nonischemic congestive cardiomyopathy (CMS/HCC)       Respiratory    Severe obstructive sleep apnea    COLLIN on CPAP    Overview     compliant            Digestive    Morbid obesity with BMI of 60.0-69.9, adult (CMS/HCC)      Other Visit Diagnoses     Preoperative clearance    -  Primary    Relevant Orders    XR Chest PA & Lateral            DISCUSSION    He is here today to discuss preoperative pulmonary evaluation.  He would be at high risk secondary to multiple comorbidities.    With his significant underlying cardiomyopathy he will continue to follow-up with cardiology for surgical clearance as well.    We went over his PFTs today he does have quite a bit of restrictive lung disease, most likely secondary to his cardiomyopathy and pulmonary edema as well as morbid obesity.  He has no bronchospasms or other pulmonary symptoms.    His recent pneumonia was felt likely related to his Cosentyx.  And he has had no further episodes since then.  No cough or sputum production.    We did discuss the importance of using his CPAP at night for his severe obstructive sleep apnea and follow-up with the sleep clinic as needed.    We discussed the risks and benefits of surgery as well as postoperative complications.  I recommended he of course uses incentive spirometer as well as cough and deep breathing exercises post surgery.  He should take his CPAP machine with him to the hospital.    Follow-up here at the pulmonary clinic as needed.        Mariam Alva, APRN  12/12/20192:40 PM  Electronically signed     Please note that portions of this note were completed with a voice recognition program. Efforts were made to edit the dictations, but occasionally words are mistranscribed.      CC: Gilbert Da Silva, PA

## 2019-12-26 ENCOUNTER — HOSPITAL ENCOUNTER (EMERGENCY)
Facility: HOSPITAL | Age: 29
Discharge: HOME | End: 2019-12-27
Attending: EMERGENCY MEDICINE | Admitting: EMERGENCY MEDICINE

## 2019-12-26 ENCOUNTER — APPOINTMENT (OUTPATIENT)
Dept: GENERAL RADIOLOGY | Facility: HOSPITAL | Age: 29
End: 2019-12-26

## 2019-12-26 DIAGNOSIS — I50.9 CONGESTIVE HEART FAILURE, UNSPECIFIED HF CHRONICITY, UNSPECIFIED HEART FAILURE TYPE (HCC): ICD-10-CM

## 2019-12-26 DIAGNOSIS — R06.02 SHORTNESS OF BREATH: Primary | ICD-10-CM

## 2019-12-26 LAB
ALBUMIN SERPL-MCNC: 4 G/DL (ref 3.5–5.2)
ALBUMIN/GLOB SERPL: 1 G/DL
ALP SERPL-CCNC: 75 U/L (ref 39–117)
ALT SERPL W P-5'-P-CCNC: 16 U/L (ref 1–41)
ANION GAP SERPL CALCULATED.3IONS-SCNC: 11 MMOL/L (ref 5–15)
AST SERPL-CCNC: 17 U/L (ref 1–40)
BASOPHILS # BLD AUTO: 0.02 10*3/MM3 (ref 0–0.2)
BASOPHILS NFR BLD AUTO: 0.4 % (ref 0–1.5)
BILIRUB SERPL-MCNC: 0.5 MG/DL (ref 0.2–1.2)
BUN BLD-MCNC: 7 MG/DL (ref 6–20)
BUN/CREAT SERPL: 7.4 (ref 7–25)
CALCIUM SPEC-SCNC: 9.2 MG/DL (ref 8.6–10.5)
CHLORIDE SERPL-SCNC: 97 MMOL/L (ref 98–107)
CO2 SERPL-SCNC: 28 MMOL/L (ref 22–29)
CREAT BLD-MCNC: 0.94 MG/DL (ref 0.76–1.27)
DEPRECATED RDW RBC AUTO: 40.7 FL (ref 37–54)
EOSINOPHIL # BLD AUTO: 0.09 10*3/MM3 (ref 0–0.4)
EOSINOPHIL NFR BLD AUTO: 1.9 % (ref 0.3–6.2)
ERYTHROCYTE [DISTWIDTH] IN BLOOD BY AUTOMATED COUNT: 13.9 % (ref 12.3–15.4)
GFR SERPL CREATININE-BSD FRML MDRD: 117 ML/MIN/1.73
GLOBULIN UR ELPH-MCNC: 4.2 GM/DL
GLUCOSE BLD-MCNC: 105 MG/DL (ref 65–99)
HCT VFR BLD AUTO: 47.4 % (ref 37.5–51)
HGB BLD-MCNC: 15.2 G/DL (ref 13–17.7)
HOLD SPECIMEN: NORMAL
HOLD SPECIMEN: NORMAL
IMM GRANULOCYTES # BLD AUTO: 0 10*3/MM3 (ref 0–0.05)
IMM GRANULOCYTES NFR BLD AUTO: 0 % (ref 0–0.5)
LYMPHOCYTES # BLD AUTO: 1.71 10*3/MM3 (ref 0.7–3.1)
LYMPHOCYTES NFR BLD AUTO: 36.8 % (ref 19.6–45.3)
MCH RBC QN AUTO: 25.9 PG (ref 26.6–33)
MCHC RBC AUTO-ENTMCNC: 32.1 G/DL (ref 31.5–35.7)
MCV RBC AUTO: 80.6 FL (ref 79–97)
MONOCYTES # BLD AUTO: 0.5 10*3/MM3 (ref 0.1–0.9)
MONOCYTES NFR BLD AUTO: 10.8 % (ref 5–12)
NEUTROPHILS # BLD AUTO: 2.33 10*3/MM3 (ref 1.7–7)
NEUTROPHILS NFR BLD AUTO: 50.1 % (ref 42.7–76)
NRBC BLD AUTO-RTO: 0 /100 WBC (ref 0–0.2)
NT-PROBNP SERPL-MCNC: 620.2 PG/ML (ref 5–450)
PLATELET # BLD AUTO: 254 10*3/MM3 (ref 140–450)
PMV BLD AUTO: 10.7 FL (ref 6–12)
POTASSIUM BLD-SCNC: 3.5 MMOL/L (ref 3.5–5.2)
PROT SERPL-MCNC: 8.2 G/DL (ref 6–8.5)
RBC # BLD AUTO: 5.88 10*6/MM3 (ref 4.14–5.8)
SODIUM BLD-SCNC: 136 MMOL/L (ref 136–145)
TROPONIN T SERPL-MCNC: <0.01 NG/ML (ref 0–0.03)
WBC NRBC COR # BLD: 4.65 10*3/MM3 (ref 3.4–10.8)
WHOLE BLOOD HOLD SPECIMEN: NORMAL
WHOLE BLOOD HOLD SPECIMEN: NORMAL

## 2019-12-26 PROCEDURE — 83880 ASSAY OF NATRIURETIC PEPTIDE: CPT | Performed by: EMERGENCY MEDICINE

## 2019-12-26 PROCEDURE — 99284 EMERGENCY DEPT VISIT MOD MDM: CPT

## 2019-12-26 PROCEDURE — 84484 ASSAY OF TROPONIN QUANT: CPT | Performed by: EMERGENCY MEDICINE

## 2019-12-26 PROCEDURE — 93005 ELECTROCARDIOGRAM TRACING: CPT | Performed by: EMERGENCY MEDICINE

## 2019-12-26 PROCEDURE — 85025 COMPLETE CBC W/AUTO DIFF WBC: CPT | Performed by: EMERGENCY MEDICINE

## 2019-12-26 PROCEDURE — 80053 COMPREHEN METABOLIC PANEL: CPT | Performed by: EMERGENCY MEDICINE

## 2019-12-26 PROCEDURE — 93005 ELECTROCARDIOGRAM TRACING: CPT

## 2019-12-26 PROCEDURE — 71045 X-RAY EXAM CHEST 1 VIEW: CPT

## 2019-12-26 RX ORDER — SODIUM CHLORIDE 0.9 % (FLUSH) 0.9 %
10 SYRINGE (ML) INJECTION AS NEEDED
Status: DISCONTINUED | OUTPATIENT
Start: 2019-12-26 | End: 2019-12-27 | Stop reason: HOSPADM

## 2019-12-27 ENCOUNTER — PATIENT OUTREACH (OUTPATIENT)
Dept: CASE MANAGEMENT | Facility: OTHER | Age: 29
End: 2019-12-27

## 2019-12-27 ENCOUNTER — EPISODE CHANGES (OUTPATIENT)
Dept: CASE MANAGEMENT | Facility: OTHER | Age: 29
End: 2019-12-27

## 2019-12-27 VITALS
WEIGHT: 315 LBS | RESPIRATION RATE: 18 BRPM | HEART RATE: 98 BPM | OXYGEN SATURATION: 93 % | TEMPERATURE: 97.9 F | DIASTOLIC BLOOD PRESSURE: 95 MMHG | SYSTOLIC BLOOD PRESSURE: 143 MMHG | BODY MASS INDEX: 45.1 KG/M2 | HEIGHT: 70 IN

## 2019-12-27 RX ORDER — METOLAZONE 5 MG/1
5 TABLET ORAL DAILY
Qty: 10 TABLET | Refills: 0 | Status: SHIPPED | OUTPATIENT
Start: 2019-12-27 | End: 2020-04-17 | Stop reason: SDUPTHER

## 2019-12-27 NOTE — OUTREACH NOTE
Patient Outreach Note    Called patient in follow up to ED visit 12-26-19 with shortness of breath, chest pain.  Explained role of Ambulatory  and contact information given.  Discussed ED discharge recommendations; importance of close PCP follow up, as well as [facility] follow up.  Patient said he is doing a little better today; denies having SOA or chest pain today; is wearing oxygen at all times and using CPAP at night.  He voiced understanding and compliance with ED medication instructions.  Patient asked for [facility] assistance in making PCP f/u appt.  A 3-way call was made with patient to the PCP office, spoke with Marlen, and informed them of ED visit; received an appt for patient for Friday, 1-3-20, at 2:30pm.  Patient voiced understanding and agreement to go.  Reviewed next appt with Cardio., Dr. Olsen, 1-6-20; patient voiced understanding and agreement.  States he lives with his family and has support as needed from them. No other questions or concerns voiced at this time.     Eileen Tucker RN  Ambulatory     12/27/2019, 2:36 PM

## 2020-01-02 ENCOUNTER — PATIENT OUTREACH (OUTPATIENT)
Dept: CASE MANAGEMENT | Facility: OTHER | Age: 30
End: 2020-01-02

## 2020-01-02 NOTE — OUTREACH NOTE
Patient Outreach Note  Talked with patient. Discussed 12/26/19 ED visit regarding SOB. Patient states to be compliant with ED recommendations; taking Metolazone as directed and SOB has improved. He states to not be voiding as he feels he should be. He has PCP appointment tomorrow and appointment with Dr. Olsen (Cardiologist) on 1/6/20 and will discuss recommendations regarding removal of fluid. Transportation to be provided by family.  He reports to have episodes of SOB and  no difficulty with chest pain; appetite or sleeping and improvement with psoriasis.  He states to be compliant with medications; use of O2 ; and CPAP. Reviewed with patient upcoming appointments; questions for physicians; benefit of compliance with appointments; bariatric surgery (states will be in summer);  Advance Directives (declines): My Chart(actve); gaps in care(addressed); MWV (declines).  Patient verbalized understanding. No further questions or concerns voiced at this time.   Maria R Fuentes RN  Ambulatory     1/2/2020, 1:33 PM

## 2020-01-03 ENCOUNTER — APPOINTMENT (OUTPATIENT)
Dept: LAB | Facility: HOSPITAL | Age: 30
End: 2020-01-03

## 2020-01-03 ENCOUNTER — OFFICE VISIT (OUTPATIENT)
Dept: FAMILY MEDICINE CLINIC | Facility: CLINIC | Age: 30
End: 2020-01-03

## 2020-01-03 VITALS
HEIGHT: 70 IN | DIASTOLIC BLOOD PRESSURE: 88 MMHG | HEART RATE: 98 BPM | SYSTOLIC BLOOD PRESSURE: 110 MMHG | WEIGHT: 315 LBS | BODY MASS INDEX: 45.1 KG/M2 | OXYGEN SATURATION: 99 %

## 2020-01-03 DIAGNOSIS — Z87.09 HISTORY OF CHRONIC RESPIRATORY FAILURE: ICD-10-CM

## 2020-01-03 DIAGNOSIS — Z01.818 PREOPERATIVE TESTING: ICD-10-CM

## 2020-01-03 DIAGNOSIS — G47.33 OBSTRUCTIVE SLEEP APNEA (ADULT) (PEDIATRIC): ICD-10-CM

## 2020-01-03 DIAGNOSIS — E66.01 MORBID OBESITY (HCC): ICD-10-CM

## 2020-01-03 DIAGNOSIS — I42.0 NONISCHEMIC CONGESTIVE CARDIOMYOPATHY (HCC): Primary | ICD-10-CM

## 2020-01-03 LAB
ALBUMIN SERPL-MCNC: 3.8 G/DL (ref 3.5–5.2)
ALBUMIN/GLOB SERPL: 0.9 G/DL
ALP SERPL-CCNC: 77 U/L (ref 39–117)
ALT SERPL W P-5'-P-CCNC: 14 U/L (ref 1–41)
ANION GAP SERPL CALCULATED.3IONS-SCNC: 10.4 MMOL/L (ref 5–15)
AST SERPL-CCNC: 18 U/L (ref 1–40)
BASOPHILS # BLD AUTO: 0.02 10*3/MM3 (ref 0–0.2)
BASOPHILS NFR BLD AUTO: 0.5 % (ref 0–1.5)
BILIRUB SERPL-MCNC: 0.6 MG/DL (ref 0.2–1.2)
BUN BLD-MCNC: 8 MG/DL (ref 6–20)
BUN/CREAT SERPL: 8 (ref 7–25)
CALCIUM SPEC-SCNC: 9.1 MG/DL (ref 8.6–10.5)
CHLORIDE SERPL-SCNC: 97 MMOL/L (ref 98–107)
CHOLEST SERPL-MCNC: 142 MG/DL (ref 0–200)
CO2 SERPL-SCNC: 29.6 MMOL/L (ref 22–29)
CREAT BLD-MCNC: 1 MG/DL (ref 0.76–1.27)
DEPRECATED RDW RBC AUTO: 40.2 FL (ref 37–54)
EOSINOPHIL # BLD AUTO: 0.06 10*3/MM3 (ref 0–0.4)
EOSINOPHIL NFR BLD AUTO: 1.4 % (ref 0.3–6.2)
ERYTHROCYTE [DISTWIDTH] IN BLOOD BY AUTOMATED COUNT: 14.2 % (ref 12.3–15.4)
GFR SERPL CREATININE-BSD FRML MDRD: 109 ML/MIN/1.73
GLOBULIN UR ELPH-MCNC: 4.2 GM/DL
GLUCOSE BLD-MCNC: 100 MG/DL (ref 65–99)
HBA1C MFR BLD: 6.2 % (ref 4.8–5.6)
HCT VFR BLD AUTO: 45.4 % (ref 37.5–51)
HDLC SERPL-MCNC: 39 MG/DL (ref 40–60)
HGB BLD-MCNC: 14.6 G/DL (ref 13–17.7)
IMM GRANULOCYTES # BLD AUTO: 0.01 10*3/MM3 (ref 0–0.05)
IMM GRANULOCYTES NFR BLD AUTO: 0.2 % (ref 0–0.5)
LDLC SERPL CALC-MCNC: 84 MG/DL (ref 0–100)
LDLC/HDLC SERPL: 2.15 {RATIO}
LYMPHOCYTES # BLD AUTO: 1.19 10*3/MM3 (ref 0.7–3.1)
LYMPHOCYTES NFR BLD AUTO: 28.3 % (ref 19.6–45.3)
MCH RBC QN AUTO: 25.7 PG (ref 26.6–33)
MCHC RBC AUTO-ENTMCNC: 32.2 G/DL (ref 31.5–35.7)
MCV RBC AUTO: 79.8 FL (ref 79–97)
MONOCYTES # BLD AUTO: 0.48 10*3/MM3 (ref 0.1–0.9)
MONOCYTES NFR BLD AUTO: 11.4 % (ref 5–12)
NEUTROPHILS # BLD AUTO: 2.44 10*3/MM3 (ref 1.7–7)
NEUTROPHILS NFR BLD AUTO: 58.2 % (ref 42.7–76)
NRBC BLD AUTO-RTO: 0 /100 WBC (ref 0–0.2)
PLATELET # BLD AUTO: 269 10*3/MM3 (ref 140–450)
PMV BLD AUTO: 11.6 FL (ref 6–12)
POTASSIUM BLD-SCNC: 3.9 MMOL/L (ref 3.5–5.2)
PROT SERPL-MCNC: 8 G/DL (ref 6–8.5)
RBC # BLD AUTO: 5.69 10*6/MM3 (ref 4.14–5.8)
SODIUM BLD-SCNC: 137 MMOL/L (ref 136–145)
TRIGL SERPL-MCNC: 96 MG/DL (ref 0–150)
TSH SERPL DL<=0.05 MIU/L-ACNC: 1.32 UIU/ML (ref 0.27–4.2)
VLDLC SERPL-MCNC: 19.2 MG/DL (ref 5–40)
WBC NRBC COR # BLD: 4.2 10*3/MM3 (ref 3.4–10.8)

## 2020-01-03 PROCEDURE — 99214 OFFICE O/P EST MOD 30 MIN: CPT | Performed by: PHYSICIAN ASSISTANT

## 2020-01-03 PROCEDURE — 84443 ASSAY THYROID STIM HORMONE: CPT | Performed by: PHYSICIAN ASSISTANT

## 2020-01-03 PROCEDURE — 80061 LIPID PANEL: CPT | Performed by: PHYSICIAN ASSISTANT

## 2020-01-03 PROCEDURE — 83036 HEMOGLOBIN GLYCOSYLATED A1C: CPT | Performed by: PHYSICIAN ASSISTANT

## 2020-01-03 PROCEDURE — 83013 H PYLORI (C-13) BREATH: CPT | Performed by: PHYSICIAN ASSISTANT

## 2020-01-03 PROCEDURE — 80053 COMPREHEN METABOLIC PANEL: CPT | Performed by: PHYSICIAN ASSISTANT

## 2020-01-03 PROCEDURE — 85025 COMPLETE CBC W/AUTO DIFF WBC: CPT | Performed by: PHYSICIAN ASSISTANT

## 2020-01-03 PROCEDURE — 36415 COLL VENOUS BLD VENIPUNCTURE: CPT | Performed by: PHYSICIAN ASSISTANT

## 2020-01-03 NOTE — PROGRESS NOTES
Chief Complaint   Patient presents with   • Transitional Care Management     hospital follow up 12/26-12/27 SOB and chest pain feels pretty good today       HPI     Evan Gannon is a pleasant 29 y.o. male with PMH significant for chronic respiratory failure on home oxygen, systolic heart failure s/p PPM, severe obstructive sleep apnea, and morbid obesity who is here for ER follow-up. He went to [facility] ER on 12/26/19 for increased shortness of breath. CXR showed mild to moderate cardiomegaly and BNP was lower than previous in October. Torsemide was held and he was started on metolazone 5 mg daily which he is taking now. He states the metolazone worked for the first day but he feels he needs to lose more fluid. His shortness of breath is some improved and swelling is decreased since he was in the ER. He denies chest pain or increased swelling. He sleeps in a hospital bed with the head of the bed raised to about 45 degrees which he has done for a long time. He is using a CPAP. He does not check his weights at home. He did not get on the scale in the ER but his weight is stable compared to 1 month ago. He has seen bariatric surgery and is planning to proceed pending preop cardiology clearance. He has been following a high protein and low carbohydrate diet. He is not yet currently exercising due to wanting to check in with his cardiologist first. He sees Dr. Olsen on 2/6.     Past Medical History:   Diagnosis Date   • CHF (congestive heart failure) (CMS/HCC)     diagnosed at 21yo, thought to be related to undiagnosed congenital defect- per patient. hospitalized every 3 weeks during the winter due to PNA.  Has significantf fluid retention despite diurectics   • Gout     on allopurinol, controls symptoms   • Hypertension     patient denies   • ICD (implantable cardioverter-defibrillator) in place    • Morbid obesity (CMS/HCC)    • Morbid obesity with BMI of 60.0-69.9, adult (CMS/HCC)    • Myocardial infarction (CMS/HCC)   "   \"almost\" per patient, had heart cath, no stents, has defibrillator   • On home O2     supposed to be 24/7, ran out of O2 and has only been using prn. manged  by cardiology   • COLLIN on CPAP     compliant   • Paresthesia     hands/ arms/ feet, suspected due to poor circulation reportedly   • Plaque psoriasis    • Pneumonia    • Psoriasis    • Sleep apnea    • Wears glasses        Past Surgical History:   Procedure Laterality Date   • CARDIAC CATHETERIZATION N/A 7/13/2017    Procedure: Left Heart Cath;  Surgeon: Balbir Olsen MD;  Location:  [facility];  Service:    • CARDIAC DEFIBRILLATOR PLACEMENT  08/2017   • CARDIAC ELECTROPHYSIOLOGY PROCEDURE N/A 8/15/2017    Procedure: VVI ICD Implant;  Surgeon: Nathanael Richmond DO;  Location:  [facility] INVASIVE LOCATION;  Service:    • OTHER SURGICAL HISTORY      ultra light therapy   • SINUS SURGERY      cartilage from ear inserted in nasal cavity   • TONSILLECTOMY AND ADENOIDECTOMY  2000       Family History   Problem Relation Age of Onset   • Diabetes Father    • Diabetes Paternal Grandmother    • Diabetes Maternal Grandfather        Social History     Socioeconomic History   • Marital status: Single     Spouse name: Not on file   • Number of children: 0   • Years of education: Not on file   • Highest education level: Not on file   Occupational History   • Occupation: disabled   Tobacco Use   • Smoking status: Never Smoker   • Smokeless tobacco: Never Used   Substance and Sexual Activity   • Alcohol use: No   • Drug use: No   • Sexual activity: Defer   Social History Narrative    Lives in [place] with parents and brother.  Disabled from CHF, given pass to work- hasn't gotten hired yet. .             Caffeine use: 6 sodas weekly    Patient lives with parents       No Known Allergies    ROS    Review of Systems   Constitutional: Negative for chills and fever.   HENT: Negative for congestion.    Respiratory: Positive for shortness of breath. " Negative for cough.    Cardiovascular: Negative for chest pain.       Vitals:    01/03/20 1417   BP: 110/88   Pulse: 98   SpO2: 99%     Body mass index is 60.84 kg/m².      Current Outpatient Medications:   •  allopurinol (ZYLOPRIM) 100 MG tablet, Take 1 tablet by mouth Daily., Disp: 30 tablet, Rfl: 5  •  ASPIRIN ADULT LOW STRENGTH 81 MG EC tablet, TAKE ONE TABLET BY MOUTH DAILY, Disp: 90 tablet, Rfl: 1  •  carvedilol (COREG) 25 MG tablet, TAKE ONE TABLET BY MOUTH TWICE A DAY WITH MEALS, Disp: 60 tablet, Rfl: 5  •  ENTRESTO 49-51 MG tablet, TAKE ONE TABLET BY MOUTH EVERY 12 HOURS, Disp: 60 tablet, Rfl: 5  •  metOLazone (ZAROXOLYN) 5 MG tablet, Take 1 tablet by mouth Daily., Disp: 10 tablet, Rfl: 0  •  Secukinumab (COSENTYX, 300 MG DOSE, SC), Inject 300 mg under the skin into the appropriate area as directed., Disp: , Rfl:   •  spironolactone (ALDACTONE) 25 MG tablet, TAKE ONE TABLET BY MOUTH DAILY, Disp: 30 tablet, Rfl: 5  •  torsemide (DEMADEX) 20 MG tablet, Take 3 tablets by mouth Daily., Disp: 90 tablet, Rfl: 5    PE    Physical Exam   Constitutional: He appears well-developed and well-nourished. No distress.   Wearing 02 nasal canula He is morbidly obese.  HENT:   Head: Normocephalic and atraumatic.   Eyes: Conjunctivae and EOM are normal.   Neck: Normal range of motion.   Cardiovascular: Normal rate, regular rhythm and normal heart sounds.   No murmur heard.  Pulmonary/Chest: Effort normal and breath sounds normal.   Musculoskeletal:   Bilateral nonpitting leg edema   Neurological: He is alert. Gait normal.   Skin:   Dry, thickened skin lower extremies   Psychiatric: He has a normal mood and affect. His speech is normal and behavior is normal.   Vitals reviewed.      Results    A/P    Problem List Items Addressed This Visit          Cardiovascular and Mediastinum    Nonischemic congestive cardiomyopathy (CMS/HCC) - Primary  -Continue metolazone 5 mg qd until he sees cardiology  -Keep planned cardiology  follow-up on 2/6  -On coreg, entresto, and spironolactone       Digestive    Morbid obesity (CMS/[facility])  -Pending clearance for bariatric surgery  -Forms for bariatric surgery completed today   -Continue prescribed diet and exercise as approved by cardiology  -Follow-up on weight progress in 1 month      Other Visit Diagnoses     Obstructive sleep apnea (adult) (pediatric)      -Continue CPAP      History of chronic respiratory failure      -Continue home oxygen      Preoperative testing        Relevant Orders    CBC & Differential     TSH     Lipid Panel     Hemoglobin A1c     Comprehensive Metabolic Panel     H. Pylori Breath Test - Breath, Lung    CBC Auto Differential           Plan of care reviewed with patient at the conclusion of today's visit. Education was provided regarding diagnosis, management and any prescribed or recommended OTC medications.  Patient verbalizes understanding of and agreement with management plan.        CARLOS Ogden

## 2020-01-05 LAB — UREA BREATH TEST QL: NEGATIVE

## 2020-01-06 ENCOUNTER — OFFICE VISIT (OUTPATIENT)
Dept: CARDIOLOGY | Facility: CLINIC | Age: 30
End: 2020-01-06

## 2020-01-06 ENCOUNTER — OFFICE VISIT (OUTPATIENT)
Dept: CARDIOLOGY | Facility: HOSPITAL | Age: 30
End: 2020-01-06

## 2020-01-06 VITALS
HEART RATE: 93 BPM | SYSTOLIC BLOOD PRESSURE: 97 MMHG | RESPIRATION RATE: 24 BRPM | TEMPERATURE: 96.8 F | BODY MASS INDEX: 46.65 KG/M2 | WEIGHT: 315 LBS | OXYGEN SATURATION: 95 % | HEIGHT: 69 IN | DIASTOLIC BLOOD PRESSURE: 73 MMHG

## 2020-01-06 VITALS
BODY MASS INDEX: 46.65 KG/M2 | SYSTOLIC BLOOD PRESSURE: 128 MMHG | HEART RATE: 113 BPM | DIASTOLIC BLOOD PRESSURE: 82 MMHG | WEIGHT: 315 LBS | OXYGEN SATURATION: 90 % | HEIGHT: 69 IN

## 2020-01-06 DIAGNOSIS — I10 ESSENTIAL HYPERTENSION: ICD-10-CM

## 2020-01-06 DIAGNOSIS — I42.0 NONISCHEMIC CONGESTIVE CARDIOMYOPATHY (HCC): ICD-10-CM

## 2020-01-06 DIAGNOSIS — I50.23 ACUTE ON CHRONIC SYSTOLIC CONGESTIVE HEART FAILURE, NYHA CLASS 4 (HCC): Primary | ICD-10-CM

## 2020-01-06 LAB
ANION GAP SERPL CALCULATED.3IONS-SCNC: 11.4 MMOL/L (ref 5–15)
BUN BLD-MCNC: 9 MG/DL (ref 6–20)
BUN/CREAT SERPL: 10.1 (ref 7–25)
CALCIUM SPEC-SCNC: 9.1 MG/DL (ref 8.6–10.5)
CHLORIDE SERPL-SCNC: 96 MMOL/L (ref 98–107)
CO2 SERPL-SCNC: 28.6 MMOL/L (ref 22–29)
CREAT BLD-MCNC: 0.89 MG/DL (ref 0.76–1.27)
GFR SERPL CREATININE-BSD FRML MDRD: 124 ML/MIN/1.73
GLUCOSE BLD-MCNC: 98 MG/DL (ref 65–99)
POTASSIUM BLD-SCNC: 3.4 MMOL/L (ref 3.5–5.2)
SODIUM BLD-SCNC: 136 MMOL/L (ref 136–145)

## 2020-01-06 PROCEDURE — 99214 OFFICE O/P EST MOD 30 MIN: CPT | Performed by: NURSE PRACTITIONER

## 2020-01-06 PROCEDURE — 99214 OFFICE O/P EST MOD 30 MIN: CPT | Performed by: INTERNAL MEDICINE

## 2020-01-06 PROCEDURE — 80048 BASIC METABOLIC PNL TOTAL CA: CPT | Performed by: NURSE PRACTITIONER

## 2020-01-06 PROCEDURE — 93282 PRGRMG EVAL IMPLANTABLE DFB: CPT | Performed by: INTERNAL MEDICINE

## 2020-01-06 NOTE — PROGRESS NOTES
Subjective:     Encounter Date:01/06/2020      Patient ID: Evan Gannon is a 29 y.o. male.    Chief Complaint: Surgery Clearance      PROBLEM LIST:    1. Acute on chronic systolic (congestive) heart failure/nonischemic cardiomyopathy  a. Echocardiogram 4/28/14: LVEF 0.20-0.25, mild MR, moderate TR   b. Echocardiogram 11/7/14: LVEF 0.20-0.25, all valves are structurally and functionally normal with no hemodynamically evident valve disease   c. Echocardiogram, 2/7/2017: LVEF 20%, RV mildly dilated, cardiac valves anatomically and functionally normal, LV moderately dilated  d. Echocardiogram, 7/13/2017: LVEF 20%., Moderately reduced RV systolic function noted, mild MR  e. [facility], 7/13/2017: LVEF 10%; left ventricular end-diastolic pressure of 30 with normal coronary arteries  f. [facility] hospitalization 9/5/17-9/8/17 for CHF exacerbation  g. [facility] 10/16/17-10/19/17 for CHF exacerbation and was referred to [facility] for transplant but has to lose 200 pounds to qualify  h. Echocardiogram 10/16/17: LVEF 0.15, LV severely dilated, RV mildly dilated, mildly reduced RV systolic function noted  i. [employer] VVI ICD implantation 8/15/17 by Dr. Richmond, no DFTs done due to patient's issues with hypoxemia with even minimal sedation as well as acute on chronic CHF  j. [facility] 2/11/18-2/14/18 for CHF  2. Hypertension  3. Morbid obesity: BMI 60  4. Obstructive sleep apnea, noncompliant with CPAP  5. Personal history of noncompliance with medical treatment  6. Microcytic anemia   7. Severe psoriasis  8. [facility] ED 12/12/17 for chest pain with mildly elevated troponin 0.08, positive d-dimer, negative CTA of the chest, no evidence of ACS, PE, or dissection with discharge within 5 hours  9. Surgical history  a. Left heart catheterization  b. ICD implantation    History of Present Illness  Evan Gannon returns today for anoverdue six month follow up with device check and a history of congestive heart failure and  hypertesion. He presents to clinic  today with a breathing machine. In the next six month he will be undergoing bariatric surgery. Since last visit he is still experiencing fatigue and reports low oxygen levels and dyspnea upon exerteion.This has been happening for two months. He has also been experiencing chest pain and edema in his chest. He had to DC his metolazone because it was not decreasing his edema. Pt was recently admitted to ED because he believed he was having a heart attack, but was told he was normal. He admits he has not been physically active due to his breathing and edema.  Denies any exertional orthopnea, PND, or palpitations.    No Known Allergies      Current Outpatient Medications:   •  allopurinol (ZYLOPRIM) 100 MG tablet, Take 1 tablet by mouth Daily., Disp: 30 tablet, Rfl: 5  •  ASPIRIN ADULT LOW STRENGTH 81 MG EC tablet, TAKE ONE TABLET BY MOUTH DAILY, Disp: 90 tablet, Rfl: 1  •  carvedilol (COREG) 25 MG tablet, TAKE ONE TABLET BY MOUTH TWICE A DAY WITH MEALS, Disp: 60 tablet, Rfl: 5  •  ENTRESTO 49-51 MG tablet, TAKE ONE TABLET BY MOUTH EVERY 12 HOURS, Disp: 60 tablet, Rfl: 5  •  metOLazone (ZAROXOLYN) 5 MG tablet, Take 1 tablet by mouth Daily., Disp: 10 tablet, Rfl: 0  •  Secukinumab (COSENTYX, 300 MG DOSE, SC), Inject 300 mg under the skin into the appropriate area as directed., Disp: , Rfl:   •  spironolactone (ALDACTONE) 25 MG tablet, TAKE ONE TABLET BY MOUTH DAILY, Disp: 30 tablet, Rfl: 5  •  torsemide (DEMADEX) 20 MG tablet, Take 3 tablets by mouth Daily., Disp: 90 tablet, Rfl: 5    The following portions of the patient's history were reviewed and updated as appropriate: allergies, current medications, past family history, past medical history, past social history, past surgical history and problem list.    Review of Systems   Constitution: Positive for weight gain.   Cardiovascular: Positive for chest pain, dyspnea on exertion and leg swelling. Negative for claudication, cyanosis, irregular heartbeat, palpitations and  "paroxysmal nocturnal dyspnea.   Respiratory: Positive for shortness of breath.    Hematologic/Lymphatic: Negative for bleeding problem. Does not bruise/bleed easily.   Skin: Negative for rash.   Musculoskeletal: Negative for muscle weakness and myalgias.   Gastrointestinal: Positive for change in bowel habit, constipation and diarrhea. Negative for heartburn, nausea and vomiting.   Neurological: Positive for dizziness and numbness.          Objective:     Vitals:    01/06/20 1312   BP: 128/82   BP Location: Right arm   Patient Position: Sitting   Pulse: 113   SpO2: 90%   Weight: (!) 192 kg (423 lb)   Height: 175.3 cm (69\")         Physical Exam   Constitutional: He is oriented to person, place, and time. He appears well-developed and well-nourished.   HENT:   Mouth/Throat: Oropharynx is clear and moist.   Neck: No JVD present. Carotid bruit is not present. No thyromegaly present.   Cardiovascular: Regular rhythm, S1 normal, S2 normal, normal heart sounds and intact distal pulses. Exam reveals no gallop, no S3 and no S4.   No murmur heard.  Pulses:       Carotid pulses are 2+ on the right side, and 2+ on the left side.       Radial pulses are 2+ on the right side, and 2+ on the left side.   Pulmonary/Chest: Breath sounds normal.   Abdominal: Soft. Bowel sounds are normal. He exhibits no mass. There is no tenderness.   Musculoskeletal: He exhibits no edema.   Neurological: He is alert and oriented to person, place, and time.   Skin: Skin is warm and dry. No rash noted.       Lab Review:  Lab Results   Component Value Date    GLUCOSE 100 (H) 01/03/2020    BUN 8 01/03/2020    CREATININE 1.00 01/03/2020    EGFRIFAFRI 109 01/03/2020    BCR 8.0 01/03/2020    K 3.9 01/03/2020    CO2 29.6 (H) 01/03/2020    CALCIUM 9.1 01/03/2020    ALBUMIN 3.80 01/03/2020    AST 18 01/03/2020    ALT 14 01/03/2020     Lab Results   Component Value Date    CHOL 142 01/03/2020    TRIG 96 01/03/2020    HDL 39 (L) 01/03/2020    LDL 84 01/03/2020 "      Lab Results   Component Value Date    WBC 4.20 01/03/2020    HGB 14.6 01/03/2020    HCT 45.4 01/03/2020    MCV 79.8 01/03/2020     01/03/2020     Lab Results   Component Value Date    TSH 1.320 01/03/2020       Procedures    MANUAL DEVICE INTERROGATION: 1/6/2020, [employer]: ICD   RA: P wave is 3.6 mV   RV: R wave is 13.8 mV with a threshold of 0.6 V at 04 msec and an impedance of 464 ohms.  Battery voltage is 3.12 volts.  Events: high voltage is 87 ohms.  Charge time: 9.5 secs   0% pacing.   VVI pacing at rate of 40.           Assessment:   Evan was seen today for surgery clearance.    Diagnoses and all orders for this visit:    Acute on chronic systolic congestive heart failure, NYHA class 4 (CMS/[facility])    Essential hypertension        Impression:  1. Congestive heart failure, stable and asymptomatic.  Currently euvolemic, with chronic functional class III symptoms.  Pt is on Entresto 49mg-51mg and maximal tolerated beta-blocker.  2. Nonischemic cardiomyopathy.  No coronary disease by cath 3 years ago.  No angina.  Intermittently elevated troponins, due to heart failure/demand  3. Morbid obesity  4. Obstructive sleep apnea on CPAP.  Chronic hypoxia.  5. Hypertension, well controlled on carvedilol 25 mg.     Plan:  1. Patient has some mild volume overload today, will IV diurese (failed high-dose p.o. diuretics) to the heart valve center later today.  2. Start metolazone 5 mg daily in addition to the Aldactone and torsemide..  3. Patient would be low cardiovascular risk for bariatric surgery, from an ischemic satandpoint.  Obviously hypoxia/ventilation will be an issue.  As well volume status.  4. Continue current medications.  5. Revisit in 6 MO, or sooner as needed.    Scribed for Bablir Olsen MD by Catalina Pritchett. 1/6/2020  1:25 PM    Balbir Olsen MD      Please note that portions of this note may have been completed with a voice recognition program. Efforts were made to edit the dictations, but  occasionally words are mistranscribed.

## 2020-01-06 NOTE — PROGRESS NOTES
[facility]  Heart and Valve Center      Encounter Date:01/06/2020     Evan Gannon  [address] [facility] [address]  [unfilled]    1990    Gilbert Da Silva PA    Evan Gannon is a 29 y.o. male.      Subjective:     Chief Complaint:  Follow-up   shf     HPI patient presents to the office today as an add-on for ongoing evaluation of his acute on chronic systolic heart failure. He notes significant dyspnea with minimal exertion. Notes abdominal fullness. Notes that he is wearing oxygen all the time now. Notes mild pedal edema. He notes compliance with his medications. He was in the ED 12/26/19 and was initiated on metolazone. He notes mild improvement in his symptoms after starting metolazone. Notes chest pain has improved.       Patient Active Problem List   Diagnosis   • Hypertension   • Morbid obesity (CMS/HCC)   • Severe obstructive sleep apnea   • Nonischemic congestive cardiomyopathy (CMS/HCC)   • Personal history of noncompliance with medical treatment   • CHF (congestive heart failure), NYHA class IV (CMS/HCC)   • Plaque psoriasis   • Painless hematuria   • Pneumonia of right lower lobe due to infectious organism (CMS/HCC)   • CHF (congestive heart failure) (CMS/HCC)   • Myocardial infarction (CMS/HCC)   • COLLIN on CPAP   • On home O2   • Sleep apnea   • Pneumonia   • Psoriasis   • Wears glasses   • Gout   • Morbid obesity with BMI of 60.0-69.9, adult (CMS/HCC)   • Paresthesia       Past Medical History:   Diagnosis Date   • CHF (congestive heart failure) (CMS/HCC)     diagnosed at 21yo, thought to be related to undiagnosed congenital defect- per patient. hospitalized every 3 weeks during the winter due to PNA.  Has significantf fluid retention despite diurectics   • Gout     on allopurinol, controls symptoms   • Hypertension     patient denies   • ICD (implantable cardioverter-defibrillator) in place    • Morbid obesity (CMS/HCC)    • Morbid obesity with BMI of 60.0-69.9, adult  "(CMS/[facility])    • Myocardial infarction (CMS/[facility])     \"almost\" per patient, had heart cath, no stents, has defibrillator   • On home O2     supposed to be 24/7, ran out of O2 and has only been using prn. manged  by cardiology   • COLLIN on CPAP     compliant   • Paresthesia     hands/ arms/ feet, suspected due to poor circulation reportedly   • Plaque psoriasis    • Pneumonia    • Psoriasis    • Sleep apnea    • Wears glasses        Past Surgical History:   Procedure Laterality Date   • CARDIAC CATHETERIZATION N/A 7/13/2017    Procedure: Left Heart Cath;  Surgeon: Balbir Olsen MD;  Location:  [facility]AS[facility];  Service:    • CARDIAC DEFIBRILLATOR PLACEMENT  08/2017   • CARDIAC ELECTROPHYSIOLOGY PROCEDURE N/A 8/15/2017    Procedure: VVI ICD Implant;  Surgeon: Nathanael Richmond DO;  Location:  [facility] EP INVASIVE LOCATION;  Service:    • OTHER SURGICAL HISTORY      ultra light therapy   • SINUS SURGERY      cartilage from ear inserted in nasal cavity   • TONSILLECTOMY AND ADENOIDECTOMY  2000       Family History   Problem Relation Age of Onset   • Diabetes Father    • Diabetes Paternal Grandmother    • Diabetes Maternal Grandfather        Social History     Socioeconomic History   • Marital status: Single     Spouse name: Not on file   • Number of children: 0   • Years of education: Not on file   • Highest education level: Not on file   Occupational History   • Occupation: disabled   Tobacco Use   • Smoking status: Never Smoker   • Smokeless tobacco: Never Used   Substance and Sexual Activity   • Alcohol use: No   • Drug use: No   • Sexual activity: Defer   Social History Narrative    Lives in [facility] with parents and brother.  Disabled from CHF, given pass to work- hasn't gotten hired yet. .             Caffeine use: 6 sodas weekly    Patient lives with parents        Caffeine 1 soda a week       No Known Allergies      Current Outpatient Medications:   •  allopurinol (ZYLOPRIM) 100 MG tablet, Take 1 " tablet by mouth Daily., Disp: 30 tablet, Rfl: 5  •  ASPIRIN ADULT LOW STRENGTH 81 MG EC tablet, TAKE ONE TABLET BY MOUTH DAILY, Disp: 90 tablet, Rfl: 1  •  carvedilol (COREG) 25 MG tablet, TAKE ONE TABLET BY MOUTH TWICE A DAY WITH MEALS, Disp: 60 tablet, Rfl: 5  •  ENTRESTO 49-51 MG tablet, TAKE ONE TABLET BY MOUTH EVERY 12 HOURS, Disp: 60 tablet, Rfl: 5  •  metOLazone (ZAROXOLYN) 5 MG tablet, Take 1 tablet by mouth Daily., Disp: 10 tablet, Rfl: 0  •  Secukinumab (COSENTYX, 300 MG DOSE, SC), Inject 300 mg under the skin into the appropriate area as directed., Disp: , Rfl:   •  spironolactone (ALDACTONE) 25 MG tablet, TAKE ONE TABLET BY MOUTH DAILY, Disp: 30 tablet, Rfl: 5  •  torsemide (DEMADEX) 20 MG tablet, Take 3 tablets by mouth Daily., Disp: 90 tablet, Rfl: 5    The following portions of the patient's history were reviewed today and updated as appropriate: allergies, current medications, past family history, past medical history, past social history, past surgical history and problem list     Review of Systems   Constitution: Positive for malaise/fatigue and weight gain. Negative for chills, decreased appetite, diaphoresis, fever, night sweats and weight loss.   HENT: Negative for congestion, hearing loss, hoarse voice and nosebleeds.    Eyes: Negative for blurred vision, visual disturbance and visual halos.   Cardiovascular: Positive for dyspnea on exertion and leg swelling. Negative for chest pain, claudication, cyanosis, irregular heartbeat, near-syncope, orthopnea, palpitations, paroxysmal nocturnal dyspnea and syncope.   Respiratory: Negative for cough, hemoptysis, shortness of breath, sleep disturbances due to breathing, snoring, sputum production and wheezing.    Hematologic/Lymphatic: Negative for bleeding problem. Does not bruise/bleed easily.   Skin: Negative for dry skin, itching and rash.   Musculoskeletal: Negative for arthritis, falls, joint pain, joint swelling and myalgias.   Gastrointestinal:  "Negative for bloating, abdominal pain, constipation, diarrhea, flatus, heartburn, hematemesis, hematochezia, melena, nausea and vomiting.   Genitourinary: Negative for dysuria, frequency, hematuria, nocturia and urgency.   Neurological: Positive for dizziness and light-headedness. Negative for excessive daytime sleepiness, headaches, loss of balance and weakness.   Psychiatric/Behavioral: Negative for depression. The patient does not have insomnia and is not nervous/anxious.        Objective:     Vitals:    01/06/20 1357   BP: 97/73   BP Location: Right arm   Patient Position: Sitting   Cuff Size: Large Adult   Pulse: 93   Resp: 24   Temp: 96.8 °F (36 °C)   TempSrc: Temporal   SpO2: 95%   Weight: (!) 199 kg (439 lb)   Height: 175.3 cm (69\")     Body mass index is 64.83 kg/m².  Physical Exam   Constitutional: He is oriented to person, place, and time. He appears well-developed and well-nourished. He is active and cooperative. No distress.   HENT:   Head: Normocephalic and atraumatic.   Mouth/Throat: Oropharynx is clear and moist.   Eyes: Pupils are equal, round, and reactive to light. Conjunctivae and EOM are normal.   Neck: Normal range of motion. Neck supple. No JVD present. No tracheal deviation present. No thyromegaly present.   Cardiovascular: Normal rate, regular rhythm, normal heart sounds and intact distal pulses.   Pulmonary/Chest: Effort normal. He has rales.   Abdominal: Bowel sounds are normal. He exhibits distension. There is no tenderness.   Musculoskeletal: Normal range of motion. He exhibits edema (1+ pitting edema noted BLEs).   Neurological: He is alert and oriented to person, place, and time.   Skin: Skin is warm, dry and intact.   Psychiatric: He has a normal mood and affect. His behavior is normal.   Nursing note and vitals reviewed.      Lab and Diagnostic Review:  Results for orders placed or performed in visit on 01/03/20   H. Pylori Breath Test - Breath, Lung   Result Value Ref Range    H. " pylori Breath Test Negative Negative   TSH   Result Value Ref Range    TSH 1.320 0.270 - 4.200 uIU/mL   Lipid Panel   Result Value Ref Range    Total Cholesterol 142 0 - 200 mg/dL    Triglycerides 96 0 - 150 mg/dL    HDL Cholesterol 39 (L) 40 - 60 mg/dL    LDL Cholesterol  84 0 - 100 mg/dL    VLDL Cholesterol 19.2 5 - 40 mg/dL    LDL/HDL Ratio 2.15    Hemoglobin A1c   Result Value Ref Range    Hemoglobin A1C 6.20 (H) 4.80 - 5.60 %   Comprehensive Metabolic Panel   Result Value Ref Range    Glucose 100 (H) 65 - 99 mg/dL    BUN 8 6 - 20 mg/dL    Creatinine 1.00 0.76 - 1.27 mg/dL    Sodium 137 136 - 145 mmol/L    Potassium 3.9 3.5 - 5.2 mmol/L    Chloride 97 (L) 98 - 107 mmol/L    CO2 29.6 (H) 22.0 - 29.0 mmol/L    Calcium 9.1 8.6 - 10.5 mg/dL    Total Protein 8.0 6.0 - 8.5 g/dL    Albumin 3.80 3.50 - 5.20 g/dL    ALT (SGPT) 14 1 - 41 U/L    AST (SGOT) 18 1 - 40 U/L    Alkaline Phosphatase 77 39 - 117 U/L    Total Bilirubin 0.6 0.2 - 1.2 mg/dL    eGFR  African Amer 109 >60 mL/min/1.73    Globulin 4.2 gm/dL    A/G Ratio 0.9 g/dL    BUN/Creatinine Ratio 8.0 7.0 - 25.0    Anion Gap 10.4 5.0 - 15.0 mmol/L   CBC Auto Differential   Result Value Ref Range    WBC 4.20 3.40 - 10.80 10*3/mm3    RBC 5.69 4.14 - 5.80 10*6/mm3    Hemoglobin 14.6 13.0 - 17.7 g/dL    Hematocrit 45.4 37.5 - 51.0 %    MCV 79.8 79.0 - 97.0 fL    MCH 25.7 (L) 26.6 - 33.0 pg    MCHC 32.2 31.5 - 35.7 g/dL    RDW 14.2 12.3 - 15.4 %    RDW-SD 40.2 37.0 - 54.0 fl    MPV 11.6 6.0 - 12.0 fL    Platelets 269 140 - 450 10*3/mm3    Neutrophil % 58.2 42.7 - 76.0 %    Lymphocyte % 28.3 19.6 - 45.3 %    Monocyte % 11.4 5.0 - 12.0 %    Eosinophil % 1.4 0.3 - 6.2 %    Basophil % 0.5 0.0 - 1.5 %    Immature Grans % 0.2 0.0 - 0.5 %    Neutrophils, Absolute 2.44 1.70 - 7.00 10*3/mm3    Lymphocytes, Absolute 1.19 0.70 - 3.10 10*3/mm3    Monocytes, Absolute 0.48 0.10 - 0.90 10*3/mm3    Eosinophils, Absolute 0.06 0.00 - 0.40 10*3/mm3    Basophils, Absolute 0.02 0.00 - 0.20  10*3/mm3    Immature Grans, Absolute 0.01 0.00 - 0.05 10*3/mm3    nRBC 0.0 0.0 - 0.2 /100 WBC       Assessment and Plan:   1. Acute on chronic systolic congestive heart failure, NYHA class 4 (CMS/HCC)  IV diuresis today in office. Patient received 2 mg bumex (NDC 1998-4271-76)  today through a butterfly  in the left ac over slow IV push. During IV diuresis, vitals were monitored and stable. Please see IV diuresis record for those vitals. Patient voided 400 ml in the office prior to discharge from the office. Butterfly was d/c'd and area was free of erythema, ecchymosis, or drainage.  Patient was  instructed to record urinary output for the next 24 hours. Patient will receive a follow up call from the HF center in 24 hours to evaluate urinary output and reassess signs and symptoms. Heart failure education today including signs and symptoms, the role of the heart failure center, daily weights, low sodium diet (less than 1500 mg per day), and daily physical activity. Reviewed HF Zones with patient and family.  Patient to continue current medications as previously ordered.     - Basic Metabolic Panel    2. Nonischemic congestive cardiomyopathy (CMS/HCC)  Continue coreg, entresto, torsemide,aldactone    3. Essential hypertension  Well controlled  HTN Education provided today including signs and symptoms, medication management, daily blood pressure monitoring. Patient encouraged to call the Heart and Valve center with any abnormal readings.           It has been a pleasure to participate in the care of this patient.  Patient was instructed to call the Heart and Valve Center with any questions, concerns, or worsening symptoms.    *Please note that portions of this note were completed with a voice recognition program. Efforts were made to edit the dictations, but occasionally words are mistranscribed.

## 2020-01-07 ENCOUNTER — TELEPHONE (OUTPATIENT)
Dept: CARDIOLOGY | Facility: HOSPITAL | Age: 30
End: 2020-01-07

## 2020-01-08 NOTE — PLAN OF CARE
Problem: Patient Care Overview (Adult)  Goal: Plan of Care Review  Outcome: Ongoing (interventions implemented as appropriate)    08/15/17 1651   Coping/Psychosocial Response Interventions   Plan Of Care Reviewed With patient   Patient Care Overview   Progress no change   Outcome Evaluation   Outcome Summary/Follow up Plan Pt. transfered to unit after having ICD placed. ICD site is covered with bandage and white tape, secured to chest wall. NO s/s of bleeding or edema noted at this time. Pt. requesting to have FC removed, however nogueira was just placed this afternoon for Strict I&O. Patient informed of importance of keeing a strict I&O while on Bumex drip.       Goal: Adult Individualization and Mutuality  Outcome: Ongoing (interventions implemented as appropriate)  Goal: Discharge Needs Assessment  Outcome: Ongoing (interventions implemented as appropriate)    Problem: Cardiac Rhythm Management Device (Adult)  Goal: Signs and Symptoms of Listed Potential Problems Will be Absent or Manageable (Cardiac Rhythm Management Device)  Outcome: Ongoing (interventions implemented as appropriate)       Telephone call to patient. States she is doing better with breastfeeding and feels baby is latching better. Came to [facility] 2 days ago and feels that has helped a lot. States now wondering if she has an over supply of BM developing and has been pumping up to 5 oz right after she breastfeeds.Stluis a has been feeling more engorged and started to breast pump more. Discussed keeping baby at single breast until completely drained before she offers 2nd side and to minimally pump if too much milk is being produced. I referred her to LC and enc her to call to discuss issues with BF.  Plans to come to support group outpt tomorrow and will f/u with LC then as well. States she has had occ increase in lochia but ties into doing too much at home. Saw her MD yesterday and states he released a couple of sutures in the perineum that were uncomfortable and she is feeling better. Discussed use of heat, sitz bath, ice paks and rest for healing as well as sitting on a boppy to help with comfort.Enc her to nap when baby is naping to catch up on sleep since baby was feeding every hour and pt states has been exhausted.  Using Motrin/Tylenol-advised cautioned with taking Motrin on an empty stomach. No passage of clots or heavy lochia-knows to call her MD with any issues.  Overall pt states she has seen an improvement in baby's feedings in last 2 days and knows her resources for support. All questions answered. States she had a very good experience here at [facility].

## 2020-02-07 ENCOUNTER — TELEPHONE (OUTPATIENT)
Dept: FAMILY MEDICINE CLINIC | Facility: CLINIC | Age: 30
End: 2020-02-07

## 2020-02-07 ENCOUNTER — OFFICE VISIT (OUTPATIENT)
Dept: FAMILY MEDICINE CLINIC | Facility: CLINIC | Age: 30
End: 2020-02-07

## 2020-02-07 VITALS
SYSTOLIC BLOOD PRESSURE: 110 MMHG | WEIGHT: 315 LBS | BODY MASS INDEX: 46.65 KG/M2 | OXYGEN SATURATION: 94 % | DIASTOLIC BLOOD PRESSURE: 80 MMHG | HEIGHT: 69 IN | HEART RATE: 104 BPM

## 2020-02-07 DIAGNOSIS — I10 ESSENTIAL HYPERTENSION: ICD-10-CM

## 2020-02-07 DIAGNOSIS — Z00.00 MEDICARE ANNUAL WELLNESS VISIT, SUBSEQUENT: Primary | ICD-10-CM

## 2020-02-07 DIAGNOSIS — Z99.89 OSA ON CPAP: ICD-10-CM

## 2020-02-07 DIAGNOSIS — E66.01 MORBID OBESITY (HCC): ICD-10-CM

## 2020-02-07 DIAGNOSIS — G47.33 SEVERE OBSTRUCTIVE SLEEP APNEA: ICD-10-CM

## 2020-02-07 DIAGNOSIS — G47.33 OSA ON CPAP: ICD-10-CM

## 2020-02-07 DIAGNOSIS — I42.0 NONISCHEMIC CONGESTIVE CARDIOMYOPATHY (HCC): ICD-10-CM

## 2020-02-07 DIAGNOSIS — L40.9 PSORIASIS: ICD-10-CM

## 2020-02-07 DIAGNOSIS — D84.821 IMMUNOSUPPRESSION DUE TO DRUG THERAPY (HCC): ICD-10-CM

## 2020-02-07 DIAGNOSIS — M10.9 GOUT, UNSPECIFIED CAUSE, UNSPECIFIED CHRONICITY, UNSPECIFIED SITE: ICD-10-CM

## 2020-02-07 DIAGNOSIS — Z79.899 IMMUNOSUPPRESSION DUE TO DRUG THERAPY (HCC): ICD-10-CM

## 2020-02-07 PROCEDURE — G0439 PPPS, SUBSEQ VISIT: HCPCS | Performed by: PHYSICIAN ASSISTANT

## 2020-02-07 NOTE — PROGRESS NOTES
"QUICK REFERENCE INFORMATION:  The ABCs of the Annual Wellness Visit    Medicare Subsequent Wellness Visit    Chief Complaint   Patient presents with   • Medicare Wellness-subsequent   • Annual Exam        HPI     Evan Gannon is a 29 y.o. male presenting for subsequent annual wellness visit. Patient states he has been compliant with prescribed diet over the past month. He has been playing the drums and walks for 30 minutes daily for exercise. Denies family history of cancer. He is wanting to proceed with bariatric surgery. He has seen cardiology and pulmonology for cardiopulmonary clearance. Positive responses on ROS representing chronic or minor issues were reviewed in detail.       Past Medical History:   Diagnosis Date   • CHF (congestive heart failure) (CMS/[facility])     diagnosed at 23yo, thought to be related to undiagnosed congenital defect- per patient. hospitalized every 3 weeks during the winter due to PNA.  Has significantf fluid retention despite diurectics   • Gout     on allopurinol, controls symptoms   • Hypertension     patient denies   • ICD (implantable cardioverter-defibrillator) in place    • Morbid obesity (CMS/[facility])    • Morbid obesity with BMI of 60.0-69.9, adult (CMS/[facility])    • Myocardial infarction (CMS/[facility])     \"almost\" per patient, had heart cath, no stents, has defibrillator   • On home O2     supposed to be 24/7, ran out of O2 and has only been using prn. manged  by cardiology   • COLLIN on CPAP     compliant   • Paresthesia     hands/ arms/ feet, suspected due to poor circulation reportedly   • Plaque psoriasis    • Pneumonia    • Psoriasis    • Sleep apnea    • Wears glasses       Past Surgical History:   Procedure Laterality Date   • CARDIAC CATHETERIZATION N/A 7/13/2017    Procedure: Left Heart Cath;  Surgeon: Balbir Olsen MD;  Location: [facility] CATH INVASIVE LOCATION;  Service:    • CARDIAC DEFIBRILLATOR PLACEMENT  08/2017   • CARDIAC ELECTROPHYSIOLOGY PROCEDURE N/A 8/15/2017    " Procedure: VVI ICD Implant;  Surgeon: Nathanael Richmond DO;  Location: [facility] INVASIVE LOCATION;  Service:    • OTHER SURGICAL HISTORY      ultra light therapy   • SINUS SURGERY      cartilage from ear inserted in nasal cavity   • TONSILLECTOMY AND ADENOIDECTOMY  2000     Family History   Problem Relation Age of Onset   • Diabetes Father    • Diabetes Paternal Grandmother    • Diabetes Maternal Grandfather       Social History     Socioeconomic History   • Marital status: Single     Spouse name: Not on file   • Number of children: 0   • Years of education: Not on file   • Highest education level: Not on file   Occupational History   • Occupation: disabled   Tobacco Use   • Smoking status: Never Smoker   • Smokeless tobacco: Never Used   Substance and Sexual Activity   • Alcohol use: No   • Drug use: No   • Sexual activity: Defer   Social History Narrative    Lives in [facility] with parents and brother.  Disabled from CHF, given pass to work- hasn't gotten hired yet. .             Caffeine use: 6 sodas weekly    Patient lives with parents        Caffeine 1 soda a week      No Known Allergies   Outpatient Medications Prior to Visit   Medication Sig Dispense Refill   • ASPIRIN ADULT LOW STRENGTH 81 MG EC tablet TAKE ONE TABLET BY MOUTH DAILY 90 tablet 1   • carvedilol (COREG) 25 MG tablet TAKE ONE TABLET BY MOUTH TWICE A DAY WITH MEALS 60 tablet 5   • ENTRESTO 49-51 MG tablet TAKE ONE TABLET BY MOUTH EVERY 12 HOURS 60 tablet 5   • metOLazone (ZAROXOLYN) 5 MG tablet Take 1 tablet by mouth Daily. 10 tablet 0   • Secukinumab (COSENTYX, 300 MG DOSE, SC) Inject 300 mg under the skin into the appropriate area as directed.     • spironolactone (ALDACTONE) 25 MG tablet TAKE ONE TABLET BY MOUTH DAILY 30 tablet 5   • torsemide (DEMADEX) 20 MG tablet Take 3 tablets by mouth Daily. 90 tablet 5   • allopurinol (ZYLOPRIM) 100 MG tablet Take 1 tablet by mouth Daily. 30 tablet 5     No facility-administered medications  "prior to visit.        Reviewed use of high risk medication in the elderly: not applicable  Reviewed for potential of harmful drug interactions in the elderly: not applicable    The following portions of the patient's history were reviewed and updated as appropriate: allergies, current medications, past family history, past medical history, past social history, past surgical history and problem list.    Review of Systems   Constitutional: Positive for fatigue. Negative for appetite change, chills, diaphoresis, fever and unexpected weight loss.   HENT: Negative for congestion, hearing loss, sore throat, swollen glands and trouble swallowing.    Eyes: Positive for blurred vision (was given new prescription for glasses he needs to ).   Respiratory: Positive for cough and shortness of breath (with cold air). Negative for apnea, choking and wheezing.    Cardiovascular: Positive for leg swelling (improved). Negative for chest pain and palpitations.   Gastrointestinal: Positive for constipation (last couple days). Negative for abdominal pain, diarrhea and GERD.   Endocrine: Positive for polydipsia.   Genitourinary: Positive for frequency and nocturia (x3-5, multiple diuretics). Negative for dysuria, hematuria, erectile dysfunction, scrotal swelling, testicular pain and urinary incontinence.   Musculoskeletal: Negative for arthralgias and myalgias.   Skin: Negative for rash and skin lesions.   Allergic/Immunologic: Negative for environmental allergies.   Neurological: Positive for dizziness (after voiding) and numbness. Negative for syncope, light-headedness and memory problem.   Hematological: Negative for adenopathy.   Psychiatric/Behavioral: Negative for sleep disturbance and depressed mood. The patient is not nervous/anxious.         Vitals:    02/07/20 0903   BP: 110/80   Pulse: 104   SpO2: 94%  Comment: on 2lpm NC   Weight: (!) 195 kg (429 lb)   Height: 175.3 cm (69\")       Objective    Physical Exam "   Constitutional: He is oriented to person, place, and time. He appears well-developed and well-nourished. No distress.   Wearing 02 nasal cannula He is morbidly obese.  HENT:   Head: Normocephalic and atraumatic.   Right Ear: Hearing and tympanic membrane normal.   Left Ear: Hearing and tympanic membrane normal.   Nose: Nose normal.   Mouth/Throat: Uvula is midline, oropharynx is clear and moist and mucous membranes are normal. Normal dentition.   Eyes: Pupils are equal, round, and reactive to light. Conjunctivae, EOM and lids are normal.        Neck: Normal range of motion. Neck supple. Carotid bruit is not present. No thyroid mass and no thyromegaly present.   Cardiovascular: Normal rate, regular rhythm, normal heart sounds and intact distal pulses.   No murmur heard.  Pulmonary/Chest: Effort normal and breath sounds normal. No respiratory distress. He has no wheezes. He has no rhonchi. He has no rales.   Diminished breath sounds throughout   Abdominal: Soft. Bowel sounds are normal. There is no tenderness.   Body habitus makes abdominal exam difficult   Musculoskeletal: Normal range of motion. He exhibits edema.        Right lower leg: He exhibits edema.        Left lower leg: He exhibits edema.   2-3+ pitting BLE edema   Lymphadenopathy:     He has no cervical adenopathy.        Right: No inguinal and no supraclavicular adenopathy present.        Left: No inguinal and no supraclavicular adenopathy present.   Neurological: He is alert and oriented to person, place, and time. He has normal strength. Gait normal.   Bilateral DTRs difficulty to elicit but are symmetric   Skin: Skin is warm and dry. No rash noted. No cyanosis. Nails show no clubbing.   Diffuse, patchy dry and cracked skin, psoriasis. No suspicious nevi   Psychiatric: He has a normal mood and affect. His speech is normal and behavior is normal.   Vitals reviewed.       HEALTH RISK ASSESSMENT    1990    Recent Hospitalizations:  Recently treated  at the following:  [facility].      Current Medical Providers:  Patient Care Team:  Gilbert Da Silva PA as PCP - General (Physician Assistant)  Marah Almeida APRN as PCP - Claims Attributed  Marah Almeida APRN as Nurse Practitioner (Cardiology)  Balbir Olsen MD as Consulting Physician (Cardiology)  Maria R Fuentes RN as Ambulatory  (Population Health)  Arnoldo Whittington MD as Consulting Physician (General Surgery)      Smoking Status:  Social History     Tobacco Use   Smoking Status Never Smoker   Smokeless Tobacco Never Used       Alcohol Consumption:  Social History     Substance and Sexual Activity   Alcohol Use No       Depression Screen:   PHQ-2/PHQ-9 Depression Screening 2/7/2020   Little interest or pleasure in doing things 0   Feeling down, depressed, or hopeless 0   Total Score 0       Health Habits and Functional and Cognitive Screening:  Functional & Cognitive Status 2/7/2020   Do you have difficulty preparing food and eating? No   Do you have difficulty bathing yourself, getting dressed or grooming yourself? No   Do you have difficulty using the toilet? No   Do you have difficulty moving around from place to place? No   Do you have trouble with steps or getting out of a bed or a chair? No   Current Diet Low Carb Diet   Dental Exam Not up to date   Eye Exam Up to date   Exercise (times per week) 2 times per week   Current Exercise Activities Include Walking   Do you need help using the phone?  No   Are you deaf or do you have serious difficulty hearing?  No   Do you need help with transportation? No   Do you need help shopping? No   Do you need help preparing meals?  No   Do you need help with housework?  No   Do you need help with laundry? No   Do you need help managing money? No   Do you ever drive or ride in a car without wearing a seat belt? No   Have you felt unusual stress, anger or loneliness in the last month? No   Who do you live with? Other   If you need  help, do you have trouble finding someone available to you? No   Have you been bothered in the last four weeks by sexual problems? No   Do you have difficulty concentrating, remembering or making decisions? No           Does the patient have evidence of cognitive impairment? No    Aspirin use counseling? Taking ASA appropriately as indicated      Recent Lab Results:  CMP:  Lab Results   Component Value Date    BUN 9 01/06/2020    CREATININE 0.89 01/06/2020    EGFRIFAFRI 124 01/06/2020    BCR 10.1 01/06/2020     01/06/2020    K 3.4 (L) 01/06/2020    CO2 28.6 01/06/2020    CALCIUM 9.1 01/06/2020    ALBUMIN 3.80 01/03/2020    BILITOT 0.6 01/03/2020    ALKPHOS 77 01/03/2020    AST 18 01/03/2020    ALT 14 01/03/2020     Lipid Panel:  Lab Results   Component Value Date    CHOL 142 01/03/2020    TRIG 96 01/03/2020    HDL 39 (L) 01/03/2020    VLDL 19.2 01/03/2020    LDLHDL 2.15 01/03/2020     HbA1c:  Lab Results   Component Value Date    HGBA1C 6.20 (H) 01/03/2020       Visual Acuity:  No exam data present    Age-appropriate Screening Schedule:  Refer to the list below for future screening recommendations based on patient's age, sex and/or medical conditions. Orders for these recommended tests are listed in the plan section. The patient has been provided with a written plan.    Health Maintenance   Topic Date Due   • TDAP/TD VACCINES (1 - Tdap) 11/27/2003   • INFLUENZA VACCINE  Addressed          Advance Care Planning:  ACP discussion was held with the patient during this visit. Patient does not have an advance directive, declines further assistance.    Identification of Risk Factors:  Risk factors include: Cardiovascular risk  Immunizations Discussed/Encouraged (specific immunizations; adacel Tdap )  Obesity/Overweight .    Compared to one year ago, the patient feels his physical health is better.  Compared to one year ago, the patient feels his mental health is the same.      Evan was seen today for medicare  wellness-subsequent and annual exam.    Diagnoses and all orders for this visit:    Medicare annual wellness visit, subsequent  -Counseled patient regarding immunizations, healthy lifestyle, diet, maintaining a healthy weight, and exercise. Annual dental and eye exams were encouraged  -Order for Tdap given  -He has had pneumovax    Severe obstructive sleep apnea  -Nightly CPAP use  -chronic respiratory failure requiring 02    Morbid obesity (CMS/HCC)  -Multiple, significant weight related comorbidities  -Prior cardiology and pulmonology evaluations for clearance - high risk per pulmonology, pt is aware of this  -He wants to proceed with bariatric surgery  -From the standpoint of his other medical conditions, we discussed his low risk for surgery  -Continue low carb diet, exercise as tolerated, monthly follow-up visits for 6 months    Psoriasis  -on Cosentx  -following with dermatology    Gout, unspecified cause, unspecified chronicity, unspecified site  -Monitor uric acid    Nonischemic congestive cardiomyopathy (CMS/[facility])  -severe    Essential hypertension  -adequate control    Immunosuppression due to drug therapy    Other orders  -     Tdap (BOOSTRIX) 5-2.5-18.5 LF-MCG/0.5 injection; Inject 0.5 mL into the appropriate muscle as directed by prescriber 1 (One) Time for 1 dose.        Procedure   Procedures       Patient Self-Management and Personalized Health Advice  The patient has been provided with information about: diet, exercise and weight management and preventive services including:   · Annual Wellness Visit (AWV).      Follow Up:  Return in about 1 month (around 3/7/2020), or if symptoms worsen or fail to improve, for f/u on weight.     An After Visit Summary and PPPS with all of these plans were given to the patient.

## 2020-02-07 NOTE — TELEPHONE ENCOUNTER
Patient called and stated that it would be best if the paperwork for his gastric sleeve would be sent directly to the office for gastric bypass. He would like for this to be done as soon as possible.    Patient Callback # [phone]

## 2020-02-10 NOTE — PROGRESS NOTES
I have reviewed the notes, assessments, and/or procedures performed by Gilbert Da Silva, I concur with her/his documentation of Evan Gannon.

## 2020-02-18 ENCOUNTER — CLINICAL SUPPORT NO REQUIREMENTS (OUTPATIENT)
Dept: CARDIOLOGY | Facility: CLINIC | Age: 30
End: 2020-02-18

## 2020-02-18 DIAGNOSIS — I42.0 NONISCHEMIC CONGESTIVE CARDIOMYOPATHY (HCC): ICD-10-CM

## 2020-02-18 PROCEDURE — 93296 REM INTERROG EVL PM/IDS: CPT | Performed by: INTERNAL MEDICINE

## 2020-02-18 PROCEDURE — 93295 DEV INTERROG REMOTE 1/2/MLT: CPT | Performed by: INTERNAL MEDICINE

## 2020-02-19 ENCOUNTER — OFFICE VISIT (OUTPATIENT)
Dept: CARDIOLOGY | Facility: HOSPITAL | Age: 30
End: 2020-02-19

## 2020-02-19 VITALS
RESPIRATION RATE: 21 BRPM | TEMPERATURE: 97 F | DIASTOLIC BLOOD PRESSURE: 75 MMHG | HEIGHT: 69 IN | BODY MASS INDEX: 46.65 KG/M2 | HEART RATE: 83 BPM | OXYGEN SATURATION: 98 % | SYSTOLIC BLOOD PRESSURE: 122 MMHG | WEIGHT: 315 LBS

## 2020-02-19 DIAGNOSIS — I10 ESSENTIAL HYPERTENSION: ICD-10-CM

## 2020-02-19 DIAGNOSIS — G47.33 OSA (OBSTRUCTIVE SLEEP APNEA): ICD-10-CM

## 2020-02-19 DIAGNOSIS — I50.23 ACUTE ON CHRONIC SYSTOLIC CONGESTIVE HEART FAILURE, NYHA CLASS 4 (HCC): Primary | ICD-10-CM

## 2020-02-19 LAB
ANION GAP SERPL CALCULATED.3IONS-SCNC: 9.8 MMOL/L (ref 5–15)
BUN BLD-MCNC: 11 MG/DL (ref 6–20)
BUN/CREAT SERPL: 10.4 (ref 7–25)
CALCIUM SPEC-SCNC: 8.5 MG/DL (ref 8.6–10.5)
CHLORIDE SERPL-SCNC: 97 MMOL/L (ref 98–107)
CO2 SERPL-SCNC: 29.2 MMOL/L (ref 22–29)
CREAT BLD-MCNC: 1.06 MG/DL (ref 0.76–1.27)
GFR SERPL CREATININE-BSD FRML MDRD: 102 ML/MIN/1.73
GLUCOSE BLD-MCNC: 130 MG/DL (ref 65–99)
NT-PROBNP SERPL-MCNC: 438.3 PG/ML (ref 5–450)
POTASSIUM BLD-SCNC: 3.2 MMOL/L (ref 3.5–5.2)
SODIUM BLD-SCNC: 136 MMOL/L (ref 136–145)

## 2020-02-19 PROCEDURE — 83880 ASSAY OF NATRIURETIC PEPTIDE: CPT | Performed by: NURSE PRACTITIONER

## 2020-02-19 PROCEDURE — 99214 OFFICE O/P EST MOD 30 MIN: CPT | Performed by: NURSE PRACTITIONER

## 2020-02-19 PROCEDURE — 80048 BASIC METABOLIC PNL TOTAL CA: CPT | Performed by: NURSE PRACTITIONER

## 2020-02-19 RX ORDER — IBUPROFEN 800 MG/1
800 TABLET ORAL 2 TIMES DAILY
COMMUNITY
End: 2020-04-03

## 2020-02-20 ENCOUNTER — TELEPHONE (OUTPATIENT)
Dept: CARDIOLOGY | Facility: HOSPITAL | Age: 30
End: 2020-02-20

## 2020-02-20 RX ORDER — SPIRONOLACTONE 50 MG/1
50 TABLET, FILM COATED ORAL DAILY
Qty: 30 TABLET | Refills: 5 | Status: SHIPPED | OUTPATIENT
Start: 2020-02-20 | End: 2020-04-03 | Stop reason: SDUPTHER

## 2020-02-20 NOTE — TELEPHONE ENCOUNTER
Spoke with patient who notes improvement in his dyspnea after IV diuresis yesterday. Reviewed labs with patient. Will increase aldactone to 50 mg daily. Repeat bmp next week. Patient verbalized understanding.

## 2020-02-21 ENCOUNTER — TELEPHONE (OUTPATIENT)
Dept: FAMILY MEDICINE CLINIC | Facility: CLINIC | Age: 30
End: 2020-02-21

## 2020-02-21 NOTE — PROGRESS NOTES
[facility]  Heart [facility] Valve [facility]      Encounter Date:02/19/2020     Evan aGnnon  [address] [facility] [address]  [unfilled]    1990    Gilbert Da Silva PA    Evan Gannon is a 29 y.o. male.      Subjective:     Chief Complaint:  Follow-up and Dizziness   SHF     HPI 29-year-old male reports to the office today for ongoing evaluation of his chronic systolic heart failure.  He notes he has been feeling poorly for the last week.  He reports fatigue generalized weakness, dyspnea with minimal exertion, pedal edema and orthopnea.  He reports he has been experiencing dizziness and lightheadedness over the last week as well.  Reports he recently increased his torsemide 80 mg daily instead of 60 mg daily to help with symptoms of fluid overload.  He does note compliance with his medications.  Patient is in the pre-work-up phase for gastric sleeve.  He has a history of sleep apnea and notes compliance with his CPAP.  Denies chest pain, syncope, PND, abdominal fullness.      Patient Active Problem List   Diagnosis   • Hypertension   • Morbid obesity (CMS/HCC)   • Severe obstructive sleep apnea   • Nonischemic congestive cardiomyopathy (CMS/HCC)   • Personal history of noncompliance with medical treatment   • CHF (congestive heart failure), NYHA class IV (CMS/HCC)   • Plaque psoriasis   • Painless hematuria   • Pneumonia of right lower lobe due to infectious organism (CMS/HCC)   • CHF (congestive heart failure) (CMS/HCC)   • Myocardial infarction (CMS/HCC)   • COLLIN on CPAP   • On home O2   • Sleep apnea   • Psoriasis   • Wears glasses   • Gout   • Morbid obesity with BMI of 60.0-69.9, adult (CMS/HCC)   • Paresthesia       Past Medical History:   Diagnosis Date   • CHF (congestive heart failure) (CMS/HCC)     diagnosed at 23yo, thought to be related to undiagnosed congenital defect- per patient. hospitalized every 3 weeks during the winter due to PNA.  Has significantf fluid retention despite  "diurectics   • Gout     on allopurinol, controls symptoms   • Hypertension     patient denies   • ICD (implantable cardioverter-defibrillator) in place    • Morbid obesity (CMS/[facility])    • Morbid obesity with BMI of 60.0-69.9, adult (CMS/[facility])    • Myocardial infarction (CMS/[facility])     \"almost\" per patient, had heart cath, no stents, has defibrillator   • On home O2     supposed to be 24/7, ran out of O2 and has only been using prn. manged  by cardiology   • COLLIN on CPAP     compliant   • Paresthesia     hands/ arms/ feet, suspected due to poor circulation reportedly   • Plaque psoriasis    • Pneumonia    • Psoriasis    • Sleep apnea    • Wears glasses        Past Surgical History:   Procedure Laterality Date   • CARDIAC CATHETERIZATION N/A 7/13/2017    Procedure: Left Heart Cath;  Surgeon: Balbir Olsen MD;  Location:  [facility] CATH INVASIVE LOCATION;  Service:    • CARDIAC DEFIBRILLATOR PLACEMENT  08/2017   • CARDIAC ELECTROPHYSIOLOGY PROCEDURE N/A 8/15/2017    Procedure: VVI ICD Implant;  Surgeon: Nathanael Richmond DO;  Location:  [facility] EP INVASIVE LOCATION;  Service:    • OTHER SURGICAL HISTORY      ultra light therapy   • SINUS SURGERY      cartilage from ear inserted in nasal cavity   • TONSILLECTOMY AND ADENOIDECTOMY  2000       Family History   Problem Relation Age of Onset   • Diabetes Father    • Diabetes Paternal Grandmother    • Diabetes Maternal Grandfather        Social History     Socioeconomic History   • Marital status: Single     Spouse name: Not on file   • Number of children: 0   • Years of education: Not on file   • Highest education level: Not on file   Occupational History   • Occupation: disabled   Tobacco Use   • Smoking status: Never Smoker   • Smokeless tobacco: Never Used   Substance and Sexual Activity   • Alcohol use: No   • Drug use: No   • Sexual activity: Defer   Social History Narrative    Lives in [facility] with parents and brother.  Disabled from CHF, given pass to work- hasn't gotten " vadim Chaves.             Caffeine use: 6 sodas weekly    Patient lives with parents        Caffeine 1 soda a week        Caffeine 8 servings 3 days a week       No Known Allergies      Current Outpatient Medications:   •  allopurinol (ZYLOPRIM) 100 MG tablet, Take 1 tablet by mouth Daily., Disp: 30 tablet, Rfl: 5  •  ASPIRIN ADULT LOW STRENGTH 81 MG EC tablet, TAKE ONE TABLET BY MOUTH DAILY, Disp: 90 tablet, Rfl: 1  •  carvedilol (COREG) 25 MG tablet, TAKE ONE TABLET BY MOUTH TWICE A DAY WITH MEALS, Disp: 60 tablet, Rfl: 5  •  ENTRESTO 49-51 MG tablet, TAKE ONE TABLET BY MOUTH EVERY 12 HOURS, Disp: 60 tablet, Rfl: 5  •  ibuprofen (ADVIL,MOTRIN) 800 MG tablet, Take 800 mg by mouth 2 (Two) Times a Day., Disp: , Rfl:   •  metOLazone (ZAROXOLYN) 5 MG tablet, Take 1 tablet by mouth Daily., Disp: 10 tablet, Rfl: 0  •  Secukinumab (COSENTYX, 300 MG DOSE, SC), Inject 300 mg under the skin into the appropriate area as directed., Disp: , Rfl:   •  torsemide (DEMADEX) 20 MG tablet, Take 3 tablets by mouth Daily., Disp: 90 tablet, Rfl: 5  •  spironolactone (ALDACTONE) 25 MG tablet, Take 1 tablet by mouth Daily., Disp: 30 tablet, Rfl: 5    The following portions of the patient's history were reviewed today and updated as appropriate: allergies, current medications, past family history, past medical history, past social history, past surgical history and problem list     Review of Systems   Constitution: Positive for malaise/fatigue and weight gain. Negative for chills, decreased appetite, diaphoresis, fever, night sweats and weight loss.   HENT: Positive for congestion. Negative for hearing loss, hoarse voice and nosebleeds.    Eyes: Negative for blurred vision, visual disturbance and visual halos.   Cardiovascular: Positive for dyspnea on exertion, leg swelling and orthopnea. Negative for chest pain, claudication, cyanosis, irregular heartbeat, near-syncope, palpitations, paroxysmal nocturnal dyspnea and syncope.  "  Respiratory: Positive for cough. Negative for hemoptysis, shortness of breath, sleep disturbances due to breathing, snoring, sputum production and wheezing.    Hematologic/Lymphatic: Negative for bleeding problem. Does not bruise/bleed easily.   Skin: Negative for dry skin, itching and rash.   Musculoskeletal: Positive for joint pain. Negative for arthritis, falls, joint swelling and myalgias.   Gastrointestinal: Positive for abdominal pain, constipation and heartburn. Negative for bloating, diarrhea, flatus, hematemesis, hematochezia, melena, nausea and vomiting.   Genitourinary: Negative for dysuria, frequency, hematuria, nocturia and urgency.   Neurological: Positive for dizziness, headaches and light-headedness. Negative for excessive daytime sleepiness, loss of balance and weakness.   Psychiatric/Behavioral: Negative for depression. The patient does not have insomnia and is not nervous/anxious.        Objective:     Vitals:    02/19/20 1344   BP: 122/75   BP Location: Left arm   Patient Position: Sitting   Cuff Size: Adult   Pulse: 83   Resp: 21   Temp: 97 °F (36.1 °C)   TempSrc: Temporal   SpO2: 98%   Weight: (!) 195 kg (429 lb)   Height: 175.3 cm (69\")     Body mass index is 63.35 kg/m².  Physical Exam   Constitutional: He is oriented to person, place, and time. He appears well-developed and well-nourished. He is active and cooperative. No distress.   HENT:   Head: Normocephalic and atraumatic.   Mouth/Throat: Oropharynx is clear and moist.   Eyes: Pupils are equal, round, and reactive to light. Conjunctivae and EOM are normal.   Neck: Normal range of motion. Neck supple. No JVD present. No tracheal deviation present. No thyromegaly present.   Cardiovascular: Normal rate, regular rhythm, normal heart sounds and intact distal pulses.   Pulmonary/Chest: Effort normal. He has rales.   Decreased in bases    Abdominal: Soft. Bowel sounds are normal. He exhibits no distension. There is no tenderness. "   Musculoskeletal: Normal range of motion. He exhibits edema (1+ pitting edema noted BLEs).   Neurological: He is alert and oriented to person, place, and time.   Skin: Skin is warm, dry and intact.   Psychiatric: He has a normal mood and affect. His behavior is normal.   Nursing note and vitals reviewed.      Lab and Diagnostic Review:  Results for orders placed or performed in visit on 02/19/20   proBNP   Result Value Ref Range    proBNP 438.3 5.0 - 450.0 pg/mL   Basic Metabolic Panel   Result Value Ref Range    Glucose 130 (H) 65 - 99 mg/dL    BUN 11 6 - 20 mg/dL    Creatinine 1.06 0.76 - 1.27 mg/dL    Sodium 136 136 - 145 mmol/L    Potassium 3.2 (L) 3.5 - 5.2 mmol/L    Chloride 97 (L) 98 - 107 mmol/L    CO2 29.2 (H) 22.0 - 29.0 mmol/L    Calcium 8.5 (L) 8.6 - 10.5 mg/dL    eGFR  African Amer 102 >60 mL/min/1.73    BUN/Creatinine Ratio 10.4 7.0 - 25.0    Anion Gap 9.8 5.0 - 15.0 mmol/L         Assessment and Plan:   1. Acute on chronic systolic congestive heart failure, NYHA class 4 (CMS/[facility])  IV diuresis today in office. Patient received 80 mg lasix (NDC 1687-5723-30)  today through a butterfly in the left hand over slow IV push. During IV diuresis, vitals were monitored and stable. Please see IV diuresis record for those vitals.  Butterfly was d/c'd and area was free of erythema, ecchymosis, or drainage.  Patient was  instructed to record urinary output for the next 24 hours. Patient will receive a follow up call from the HF center in 24 hours to evaluate urinary output and reassess signs and symptoms.    proBNP  - Basic Metabolic Panel  Heart failure education today including signs and symptoms, the role of the heart failure center, daily weights, low sodium diet (less than 1500 mg per day), and daily physical activity. Reviewed HF Zones with patient and family.  Patient to continue current medications as previously ordered.   Will increase aldactone to 50 mg daily   Repeat bmp next week   2. Essential  hypertension  HTN Education provided today including signs and symptoms, medication management, daily blood pressure monitoring. Patient encouraged to call the Heart and Valve center with any abnormal readings.     3. COLLIN (obstructive sleep apnea)  Compliant with cpap         It has been a pleasure to participate in the care of this patient.  Patient was instructed to call the Heart and Valve Center with any questions, concerns, or worsening symptoms.    *Please note that portions of this note were completed with a voice recognition program. Efforts were made to edit the dictations, but occasionally words are mistranscribed.

## 2020-02-21 NOTE — TELEPHONE ENCOUNTER
The pt missed his appointment today because he could not walk. He would like to discuss with Gilbert possible over the counter medications he can take for his leg pain.     He stated that he cannot take Ibuprofen 800.

## 2020-02-29 ENCOUNTER — EPISODE CHANGES (OUTPATIENT)
Dept: CASE MANAGEMENT | Facility: OTHER | Age: 30
End: 2020-02-29

## 2020-03-06 ENCOUNTER — TELEPHONE (OUTPATIENT)
Dept: CARDIOLOGY | Facility: HOSPITAL | Age: 30
End: 2020-03-06

## 2020-03-06 NOTE — TELEPHONE ENCOUNTER
"----- Message from Shobha Montague sent at 3/6/2020  1:54 PM EST -----  Please Advise  ----- Message -----  From: Libia Rinaldi CMA  Sent: 3/6/2020   1:40 PM EST  To: Shobha Montague    Patient states that he had his oxygen on and states that a bubble was in the oxygen hose but states that he started feeling as if it went into his chest and stomach. Patient states that the feeling has gone away but states that he had still felt the feeling even after he took off his oxygen.    ----- Message -----  From: Kinga Collier RegSched Rep  Sent: 3/6/2020   1:04 PM EST  To: Libia Rinaldi CMA    Please call [facility] [phone] is having a \"bubble feeling\" in his chest.      "

## 2020-04-01 ENCOUNTER — E-VISIT (OUTPATIENT)
Dept: FAMILY MEDICINE CLINIC | Facility: CLINIC | Age: 30
End: 2020-04-01

## 2020-04-01 ENCOUNTER — TELEPHONE (OUTPATIENT)
Dept: CARDIOLOGY | Facility: HOSPITAL | Age: 30
End: 2020-04-01

## 2020-04-01 DIAGNOSIS — J30.9 ALLERGIC RHINITIS, UNSPECIFIED SEASONALITY, UNSPECIFIED TRIGGER: Primary | ICD-10-CM

## 2020-04-01 DIAGNOSIS — R05.9 COUGH: ICD-10-CM

## 2020-04-01 PROCEDURE — 99213 OFFICE O/P EST LOW 20 MIN: CPT | Performed by: NURSE PRACTITIONER

## 2020-04-01 NOTE — TELEPHONE ENCOUNTER
Patient needs medication refills but I am not sure which ones you can do now. He said he is out of most of them that have been prescribed in this office. I did tell him that he may need to be seen in order to get the carvedilol, Entresto. They were ordered April of last year.

## 2020-04-02 ENCOUNTER — TELEPHONE (OUTPATIENT)
Dept: FAMILY MEDICINE CLINIC | Facility: CLINIC | Age: 30
End: 2020-04-02

## 2020-04-02 RX ORDER — MONTELUKAST SODIUM 10 MG/1
10 TABLET ORAL NIGHTLY
Qty: 30 TABLET | Refills: 5 | Status: SHIPPED | OUTPATIENT
Start: 2020-04-02 | End: 2020-08-12

## 2020-04-02 RX ORDER — DEXTROMETHORPHAN HYDROBROMIDE AND PROMETHAZINE HYDROCHLORIDE 15; 6.25 MG/5ML; MG/5ML
5 SYRUP ORAL 4 TIMES DAILY PRN
Qty: 240 ML | Refills: 0 | Status: SHIPPED | OUTPATIENT
Start: 2020-04-02 | End: 2020-04-03

## 2020-04-02 RX ORDER — FLUTICASONE PROPIONATE 50 MCG
2 SPRAY, SUSPENSION (ML) NASAL DAILY
Qty: 1 BOTTLE | Refills: 11 | Status: SHIPPED | OUTPATIENT
Start: 2020-04-02 | End: 2020-08-12

## 2020-04-02 NOTE — PATIENT INSTRUCTIONS
Allergic Rhinitis, Adult  Allergic rhinitis is an allergic reaction that affects the mucous membrane inside the nose. It causes sneezing, a runny or stuffy nose, and the feeling of mucus going down the back of the throat (postnasal drip). Allergic rhinitis can be mild to severe.  There are two types of allergic rhinitis:  · Seasonal. This type is also called hay fever. It happens only during certain seasons.  · Perennial. This type can happen at any time of the year.  What are the causes?  This condition happens when the body's defense system (immune system) responds to certain harmless substances called allergens as though they were germs.   Seasonal allergic rhinitis is triggered by pollen, which can come from grasses, trees, and weeds. Perennial allergic rhinitis may be caused by:  · House dust mites.  · Pet dander.  · Mold spores.  What are the signs or symptoms?  Symptoms of this condition include:  · Sneezing.  · Runny or stuffy nose (nasal congestion).  · Postnasal drip.  · Itchy nose.  · Tearing of the eyes.  · Trouble sleeping.  · Daytime sleepiness.  How is this diagnosed?  This condition may be diagnosed based on:  · Your medical history.  · A physical exam.  · Tests to check for related conditions, such as:  ? Asthma.  ? Pink eye.  ? Ear infection.  ? Upper respiratory infection.  · Tests to find out which allergens trigger your symptoms. These may include skin or blood tests.  How is this treated?  There is no cure for this condition, but treatment can help control symptoms. Treatment may include:  · Taking medicines that block allergy symptoms, such as antihistamines. Medicine may be given as a shot, nasal spray, or pill.  · Avoiding the allergen.  · Desensitization. This treatment involves getting ongoing shots until your body becomes less sensitive to the allergen. This treatment may be done if other treatments do not help.  · If taking medicine and avoiding the allergen does not work, new, stronger  medicines may be prescribed.  Follow these instructions at home:  · Find out what you are allergic to. Common allergens include smoke, dust, and pollen.  · Avoid the things you are allergic to. These are some things you can do to help avoid allergens:  ? Replace carpet with wood, tile, or vinyl jewel. Carpet can trap dander and dust.  ? Do not smoke. Do not allow smoking in your home.  ? Change your heating and air conditioning filter at least once a month.  ? During allergy season:  § Keep windows closed as much as possible.  § Plan outdoor activities when pollen counts are lowest. This is usually during the evening hours.  § When coming indoors, change clothing and shower before sitting on furniture or bedding.  · Take over-the-counter and prescription medicines only as told by your health care provider.  · Keep all follow-up visits as told by your health care provider. This is important.  Contact a health care provider if:  · You have a fever.  · You develop a persistent cough.  · You make whistling sounds when you breathe (you wheeze).  · Your symptoms interfere with your normal daily activities.  Get help right away if:  · You have shortness of breath.  Summary  · This condition can be managed by taking medicines as directed and avoiding allergens.  · Contact your health care provider if you develop a persistent cough or fever.  · During allergy season, keep windows closed as much as possible.  This information is not intended to replace advice given to you by your health care provider. Make sure you discuss any questions you have with your health care provider.  Document Released: 09/12/2002 Document Revised: 01/25/2018 Document Reviewed: 01/25/2018  Elsevier Interactive Patient Education © 2020 [employer] Inc[employer]

## 2020-04-02 NOTE — PROGRESS NOTES
Evan Gannon    1990  [id number]    I have reviewed the e-Visit questionnaire and patient's answers, my assessment and plan are as follows:    HPI  Cough E-Visit     Question 4/1/2020  2:39 PM EDT - Filed by Patient on 4/1/2020   What state are you submitting this questionnaire from? [place]   Do you currently have the following symptoms? No   In the last 14 days, have you had contact with anyone known to have the 2019 Novel Coronavirus or anyone being tested for the 2019 Novel Coronavirus? No   How long have you been coughing? Less than a week   How would you describe the cough? A cough that is part of a cold   How often are you coughing? In spasms that come and go   Does the cough prevent you from sleeping at night? No   What other symptoms have you experienced with the cough? Other   Describe what other symptoms you're experiencing: Na   Do you have a fever? No, I do not have a fever   Is it a productive cough (are you coughing anything up)?  Yes   Please describe the appearance of the mucus:  Clear   Do you have any of the following? Other   Describe your additional symptoms in your own words: Na   Do you smoke? No   Have you ever smoked? Never smoked   Does anyone around you have similar symptoms? I am not sure   Are you experiencing any of the following?    Are you having difficulty breathing? No   Is your coughing worse when you are exposed to pollen, dust, or other things in the environment? No   Are you taking anything to treat the cough? No   Have you ever been diagnosed with asthma, bronchitis, or lung disease? Yes   If you have asthma, please list any asthma medications you take that are not on your medication list:    Please enter a few details about your earlier diagnosis and treatment Na   Have you recently started on any medications for your heart or for blood pressure? No   Have you recently been hospitalized? No   Anything else you would like to add?        Current Outpatient Medications  "  Medication Sig Dispense Refill   • allopurinol (ZYLOPRIM) 100 MG tablet Take 1 tablet by mouth Daily. 30 tablet 5   • ASPIRIN ADULT LOW STRENGTH 81 MG EC tablet TAKE ONE TABLET BY MOUTH DAILY 90 tablet 1   • carvedilol (COREG) 25 MG tablet TAKE ONE TABLET BY MOUTH TWICE A DAY WITH MEALS 60 tablet 5   • ENTRESTO 49-51 MG tablet TAKE ONE TABLET BY MOUTH EVERY 12 HOURS 60 tablet 5   • fluticasone (Flonase) 50 MCG/ACT nasal spray 2 sprays into the nostril(s) as directed by provider Daily. 1 bottle 11   • ibuprofen (ADVIL,MOTRIN) 800 MG tablet Take 800 mg by mouth 2 (Two) Times a Day.     • metOLazone (ZAROXOLYN) 5 MG tablet Take 1 tablet by mouth Daily. 10 tablet 0   • montelukast (Singulair) 10 MG tablet Take 1 tablet by mouth Every Night. 30 tablet 5   • promethazine-dextromethorphan (PROMETHAZINE-DM) 6.25-15 MG/5ML syrup Take 5 mL by mouth 4 (Four) Times a Day As Needed for Cough. 240 mL 0   • Secukinumab (COSENTYX, 300 MG DOSE, SC) Inject 300 mg under the skin into the appropriate area as directed.     • spironolactone (ALDACTONE) 50 MG tablet Take 1 tablet by mouth Daily. 30 tablet 5   • torsemide (DEMADEX) 20 MG tablet Take 3 tablets by mouth Daily. 90 tablet 5     No current facility-administered medications for this visit.         Past Medical History:   Diagnosis Date   • CHF (congestive heart failure) (CMS/HCC)     diagnosed at 23yo, thought to be related to undiagnosed congenital defect- per patient. hospitalized every 3 weeks during the winter due to PNA.  Has significantf fluid retention despite diurectics   • Gout     on allopurinol, controls symptoms   • Hypertension     patient denies   • ICD (implantable cardioverter-defibrillator) in place    • Morbid obesity (CMS/HCC)    • Morbid obesity with BMI of 60.0-69.9, adult (CMS/HCC)    • Myocardial infarction (CMS/HCC)     \"almost\" per patient, had heart cath, no stents, has defibrillator   • On home O2     supposed to be 24/7, ran out of O2 and has only " been using prn. manged  by cardiology   • COLLIN on CPAP     compliant   • Paresthesia     hands/ arms/ feet, suspected due to poor circulation reportedly   • Plaque psoriasis    • Pneumonia    • Psoriasis    • Sleep apnea    • Wears glasses         Past Surgical History:   Procedure Laterality Date   • CARDIAC CATHETERIZATION N/A 7/13/2017    Procedure: Left Heart Cath;  Surgeon: Balbir Olsen MD;  Location:  [facility] CATH INVASIVE LOCATION;  Service:    • CARDIAC DEFIBRILLATOR PLACEMENT  08/2017   • CARDIAC ELECTROPHYSIOLOGY PROCEDURE N/A 8/15/2017    Procedure: VVI ICD Implant;  Surgeon: Nathanael Richmond DO;  Location:  [facility] EP INVASIVE LOCATION;  Service:    • OTHER SURGICAL HISTORY      ultra light therapy   • SINUS SURGERY      cartilage from ear inserted in nasal cavity   • TONSILLECTOMY AND ADENOIDECTOMY  2000        Social History     Socioeconomic History   • Marital status: Single     Spouse name: Not on file   • Number of children: 0   • Years of education: Not on file   • Highest education level: Not on file   Occupational History   • Occupation: disabled   Tobacco Use   • Smoking status: Never Smoker   • Smokeless tobacco: Never Used   Substance and Sexual Activity   • Alcohol use: No   • Drug use: No   • Sexual activity: Defer   Social History Narrative    Lives [place] [facility] with parents and brother.  Disabled from CHF, given pass to work- hasn't gotten hired yet. .             Caffeine use: 6 sodas weekly    Patient lives with parents        Caffeine 1 soda a week        Caffeine 8 servings 3 days a week        Results for orders placed or performed in visit on 02/19/20   proBNP   Result Value Ref Range    proBNP 438.3 5.0 - 450.0 pg/mL   Basic Metabolic Panel   Result Value Ref Range    Glucose 130 (H) 65 - 99 mg/dL    BUN 11 6 - 20 mg/dL    Creatinine 1.06 0.76 - 1.27 mg/dL    Sodium 136 136 - 145 mmol/L    Potassium 3.2 (L) 3.5 - 5.2 mmol/L    Chloride 97 (L) 98 - 107 mmol/L    CO2 29.2 (H)  22.0 - 29.0 mmol/L    Calcium 8.5 (L) 8.6 - 10.5 mg/dL    eGFR  African Amer 102 >60 mL/min/1.73    BUN/Creatinine Ratio 10.4 7.0 - 25.0    Anion Gap 9.8 5.0 - 15.0 mmol/L        ROS  Review of Systems   See above    Assessment/Plan   Problem List Items Addressed This Visit     None      Visit Diagnoses     Allergic rhinitis, unspecified seasonality, unspecified trigger    -  Primary    Relevant Medications    fluticasone (Flonase) 50 MCG/ACT nasal spray    montelukast (Singulair) 10 MG tablet    promethazine-dextromethorphan (PROMETHAZINE-DM) 6.25-15 MG/5ML syrup    Cough        Relevant Medications    promethazine-dextromethorphan (PROMETHAZINE-DM) 6.25-15 MG/5ML syrup      Symptoms described above sound like allergic rhinitis.  I am going to treat him that way.  I will give him Flonase 1 to 2 sprays each nostril daily, Singulair 10 mg daily and some Promethazine DM.  He does have a history of cardiomyopathy currently has Entresto and appropriate treatment for his heart.  I would not give him any Sudafed at this time due to his heart history. Patient was encouraged to keep me informed of any acute changes, lack of improvement, or any new concerning symptoms.  If patient becomes concerned, or has any worsening symptoms, they are to report either here, urgent treatment, or emergency room. Plan of care discussed with pt. They verbalized understanding and agreement.     Any medications prescribed have been sent electronically to   [facility] - [place], [place] - [address] [facility] - [phone]  - [phone]   [address]  [place] [place] [address]  Phone: [phone] Fax: [phone]        SONAM Angeles  04/02/20  3:51 PM    Time Documentation: 10 min

## 2020-04-03 DIAGNOSIS — I42.0 NONISCHEMIC CONGESTIVE CARDIOMYOPATHY (HCC): ICD-10-CM

## 2020-04-03 RX ORDER — DEXTROMETHORPHAN POLISTIREX 30 MG/5ML
60 SUSPENSION ORAL EVERY 12 HOURS SCHEDULED
Qty: 280 ML | Refills: 0 | Status: SHIPPED | OUTPATIENT
Start: 2020-04-03 | End: 2020-07-22

## 2020-04-03 RX ORDER — CARVEDILOL 25 MG/1
25 TABLET ORAL 2 TIMES DAILY WITH MEALS
Qty: 180 TABLET | Refills: 1 | Status: SHIPPED | OUTPATIENT
Start: 2020-04-03 | End: 2020-09-23 | Stop reason: DRUGHIGH

## 2020-04-03 RX ORDER — TORSEMIDE 20 MG/1
60 TABLET ORAL DAILY
Qty: 270 TABLET | Refills: 1 | Status: SHIPPED | OUTPATIENT
Start: 2020-04-03 | End: 2020-08-16 | Stop reason: HOSPADM

## 2020-04-03 RX ORDER — SPIRONOLACTONE 50 MG/1
50 TABLET, FILM COATED ORAL DAILY
Qty: 90 TABLET | Refills: 1 | Status: SHIPPED | OUTPATIENT
Start: 2020-04-03 | End: 2020-07-22 | Stop reason: SDUPTHER

## 2020-04-03 RX ORDER — ASPIRIN 81 MG/1
81 TABLET ORAL DAILY
Qty: 90 TABLET | Refills: 1 | Status: SHIPPED | OUTPATIENT
Start: 2020-04-03 | End: 2020-08-16 | Stop reason: HOSPADM

## 2020-04-03 NOTE — TELEPHONE ENCOUNTER
Last seen on 2/19/20; sent refills to [facility] for spironolactone, torsemide, aspirin, carvedilol, and Entresto.    Libia Anderson, HubertD

## 2020-04-08 ENCOUNTER — TELEPHONE (OUTPATIENT)
Dept: CARDIOLOGY | Facility: HOSPITAL | Age: 30
End: 2020-04-08

## 2020-04-08 ENCOUNTER — TELEMEDICINE (OUTPATIENT)
Dept: FAMILY MEDICINE CLINIC | Facility: CLINIC | Age: 30
End: 2020-04-08

## 2020-04-08 DIAGNOSIS — I42.0 NONISCHEMIC CONGESTIVE CARDIOMYOPATHY (HCC): ICD-10-CM

## 2020-04-08 DIAGNOSIS — J30.2 SEASONAL ALLERGIC RHINITIS, UNSPECIFIED TRIGGER: Primary | ICD-10-CM

## 2020-04-08 DIAGNOSIS — E66.01 MORBID OBESITY (HCC): ICD-10-CM

## 2020-04-08 DIAGNOSIS — I10 ESSENTIAL HYPERTENSION: ICD-10-CM

## 2020-04-08 PROCEDURE — 99214 OFFICE O/P EST MOD 30 MIN: CPT | Performed by: PHYSICIAN ASSISTANT

## 2020-04-08 NOTE — PROGRESS NOTES
"Chief Complaint   Patient presents with   • Weight Check       HPI     Evan Gannon is a pleasant 29 y.o. male scheduled for monthly telephone follow-up on weight excess required for bariatric surgery approval. Patient was last seen in February when he weighed 429 pounds. He missed last month's appointment. He has been eating out less. He states he weighed 435 lbs at home a few days ago but has been retaining more fluid the last few days. His cardiologist adjusted his diuretics which is helping. He has been drinking more Gatorade in place of sodas. He is not quite as active due to the COVID-19 pandemic and only really goes out to go to the store. He does plan to start playing drums for exercise. His mother is a nurse and checks his blood pressure at times but has not of late. His cough and allergies are doing better.       Past Medical History:   Diagnosis Date   • CHF (congestive heart failure) (CMS/HCC)     diagnosed at 23yo, thought to be related to undiagnosed congenital defect- per patient. hospitalized every 3 weeks during the winter due to PNA.  Has significantf fluid retention despite diurectics   • Gout     on allopurinol, controls symptoms   • Hypertension     patient denies   • ICD (implantable cardioverter-defibrillator) in place    • Morbid obesity (CMS/HCC)    • Morbid obesity with BMI of 60.0-69.9, adult (CMS/HCC)    • Myocardial infarction (CMS/HCC)     \"almost\" per patient, had heart cath, no stents, has defibrillator   • On home O2     supposed to be 24/7, ran out of O2 and has only been using prn. manged  by cardiology   • COLLIN on CPAP     compliant   • Paresthesia     hands/ arms/ feet, suspected due to poor circulation reportedly   • Plaque psoriasis    • Pneumonia    • Psoriasis    • Sleep apnea    • Wears glasses        Past Surgical History:   Procedure Laterality Date   • CARDIAC CATHETERIZATION N/A 7/13/2017    Procedure: Left Heart Cath;  Surgeon: Balbir Olsen MD;  Location: [facility]" CATH INVASIVE LOCATION;  Service:    • CARDIAC DEFIBRILLATOR PLACEMENT  08/2017   • CARDIAC ELECTROPHYSIOLOGY PROCEDURE N/A 8/15/2017    Procedure: VVI ICD Implant;  Surgeon: Nathanael Richmond DO;  Location: [facility] INVASIVE LOCATION;  Service:    • OTHER SURGICAL HISTORY      ultra light therapy   • SINUS SURGERY      cartilage from ear inserted in nasal cavity   • TONSILLECTOMY AND ADENOIDECTOMY  2000       Family History   Problem Relation Age of Onset   • Diabetes Father    • Diabetes Paternal Grandmother    • Diabetes Maternal Grandfather        Social History     Socioeconomic History   • Marital status: Single     Spouse name: Not on file   • Number of children: 0   • Years of education: Not on file   • Highest education level: Not on file   Occupational History   • Occupation: disabled   Tobacco Use   • Smoking status: Never Smoker   • Smokeless tobacco: Never Used   Substance and Sexual Activity   • Alcohol use: No   • Drug use: No   • Sexual activity: Defer   Social History Narrative    Lives in [facility] with parents and brother.  Disabled from CHF, given pass to work- hasn't gotten hired yet. .             Caffeine use: 6 sodas weekly    Patient lives with parents        Caffeine 1 soda a week        Caffeine 8 servings 3 days a week       No Known Allergies    ROS    Review of Systems   Constitutional: Negative for fever.   Respiratory: Negative for cough.    Cardiovascular: Positive for leg swelling.       There were no vitals filed for this visit.  There is no height or weight on file to calculate BMI.      Current Outpatient Medications:   •  allopurinol (ZYLOPRIM) 100 MG tablet, Take 1 tablet by mouth Daily., Disp: 30 tablet, Rfl: 5  •  aspirin (Aspirin Adult Low Strength) 81 MG EC tablet, Take 1 tablet by mouth Daily., Disp: 90 tablet, Rfl: 1  •  carvedilol (COREG) 25 MG tablet, Take 1 tablet by mouth 2 (Two) Times a Day With Meals., Disp: 180 tablet, Rfl: 1  •  dextromethorphan  polistirex ER (Delsym) 30 MG/5ML Suspension Extended Release oral suspension, Take 60 mg by mouth Every 12 (Twelve) Hours., Disp: 280 mL, Rfl: 0  •  fluticasone (Flonase) 50 MCG/ACT nasal spray, 2 sprays into the nostril(s) as directed by provider Daily., Disp: 1 bottle, Rfl: 11  •  metOLazone (ZAROXOLYN) 5 MG tablet, Take 1 tablet by mouth Daily., Disp: 10 tablet, Rfl: 0  •  montelukast (Singulair) 10 MG tablet, Take 1 tablet by mouth Every Night., Disp: 30 tablet, Rfl: 5  •  sacubitril-valsartan (Entresto) 49-51 MG tablet, Take 1 tablet by mouth Every 12 (Twelve) Hours., Disp: 180 tablet, Rfl: 1  •  Secukinumab (COSENTYX, 300 MG DOSE, SC), Inject 300 mg under the skin into the appropriate area as directed., Disp: , Rfl:   •  spironolactone (Aldactone) 50 MG tablet, Take 1 tablet by mouth Daily., Disp: 90 tablet, Rfl: 1  •  torsemide (DEMADEX) 20 MG tablet, Take 3 tablets by mouth Daily., Disp: 270 tablet, Rfl: 1    PE    Physical Exam   Constitutional: He appears well-developed and well-nourished. No distress.   Wearing nasal canula He appears overweight.   HENT:   Right Ear: Hearing normal.   Left Ear: Hearing normal.   Eyes: Conjunctivae and EOM are normal.   Pulmonary/Chest: No respiratory distress.   Neurological: He is alert.   Psychiatric: He has a normal mood and affect. His speech is normal and behavior is normal.   Vitals reviewed.      Results    Results for orders placed or performed in visit on 02/19/20   proBNP   Result Value Ref Range    proBNP 438.3 5.0 - 450.0 pg/mL   Basic Metabolic Panel   Result Value Ref Range    Glucose 130 (H) 65 - 99 mg/dL    BUN 11 6 - 20 mg/dL    Creatinine 1.06 0.76 - 1.27 mg/dL    Sodium 136 136 - 145 mmol/L    Potassium 3.2 (L) 3.5 - 5.2 mmol/L    Chloride 97 (L) 98 - 107 mmol/L    CO2 29.2 (H) 22.0 - 29.0 mmol/L    Calcium 8.5 (L) 8.6 - 10.5 mg/dL    eGFR  African Amer 102 >60 mL/min/1.73    BUN/Creatinine Ratio 10.4 7.0 - 25.0    Anion Gap 9.8 5.0 - 15.0 mmol/L        A/P    Problem List Items Addressed This Visit        Cardiovascular and Mediastinum    Hypertension  -Patient states he will ask him mother (who is a RN) to measure his blood pressure when she comes home. He will sent a message through My Chart with his blood pressure      Nonischemic congestive cardiomyopathy (CMS/HCC)  -Discussed reducing sodium intake, drinking water instead of soda and gatorade which also has a fair amount of sugar  -Recent adjustment in diuretics by cardiology       Digestive    Morbid obesity (CMS/HCC)  -Interim weight gain but fluid status also affecting this  -Recommended patient continue to monitor his weights  -Discussed low carb diet and increasing fruits/vegetables, increase activity as tolerated  -1 month telephone follow-up      Other Visit Diagnoses     Seasonal allergic rhinitis, unspecified trigger    -  Primary  -Improved cough on flonase and singulair  -Continue present management          Plan of care was reviewed with patient at the conclusion of today's visit. Education was provided regarding diagnoses, management, prescribed or recommended OTC products, and the importance of compliance with follow-up appointments. The patient was counseled regarding the risks, benefits, and possible side-effects of treatment. Patient expresses understanding and agreement with the management plan.        CARLOS Ogden

## 2020-04-08 NOTE — TELEPHONE ENCOUNTER
Patient called the office and noted that he had an 5 lb weight gain this week. He notes however that he lost 1.5 lbs overnight. He was instructed to weight first thing in the morning,after emptying his bladder. He was instructed to call back tomorrow if no improvement in symptoms. He notes that he is taking 4 torsemide per day (80 mg) and metolazone 5 mg daily.

## 2020-04-17 ENCOUNTER — TELEMEDICINE (OUTPATIENT)
Dept: CARDIOLOGY | Facility: HOSPITAL | Age: 30
End: 2020-04-17

## 2020-04-17 VITALS
SYSTOLIC BLOOD PRESSURE: 149 MMHG | WEIGHT: 315 LBS | HEART RATE: 87 BPM | DIASTOLIC BLOOD PRESSURE: 73 MMHG | OXYGEN SATURATION: 98 % | BODY MASS INDEX: 61.28 KG/M2

## 2020-04-17 DIAGNOSIS — I10 ESSENTIAL HYPERTENSION: ICD-10-CM

## 2020-04-17 DIAGNOSIS — I50.23 ACUTE ON CHRONIC SYSTOLIC HEART FAILURE (HCC): Primary | ICD-10-CM

## 2020-04-17 DIAGNOSIS — I42.0 NONISCHEMIC CONGESTIVE CARDIOMYOPATHY (HCC): ICD-10-CM

## 2020-04-17 PROCEDURE — 99213 OFFICE O/P EST LOW 20 MIN: CPT | Performed by: NURSE PRACTITIONER

## 2020-04-17 RX ORDER — METOLAZONE 5 MG/1
5 TABLET ORAL DAILY
Qty: 15 TABLET | Refills: 0 | Status: SHIPPED | OUTPATIENT
Start: 2020-04-17 | End: 2020-05-05 | Stop reason: SDUPTHER

## 2020-04-17 NOTE — PROGRESS NOTES
You have chosen to receive care through the use of telemedicine. Telemedicine enables health care providers at different locations to provide safe, effective, and convenient care through the use of technology. As with any health care service, there are risks associated with the use of telemedicine, including equipment failure, poor connections, and  issues.    • Do you understand the risks and benefits of telemedicine as I have explained them to you? Yes  • Have your questions regarding telemedicine been answered? Yes  • Do you consent to the use of telemedicine in your medical care today? Yes  [facility]  [facility]  Telemedicine note    04/17/2020         Evan Gannon  [address] [facility] [address]  [unfilled]    1990    Gilbert Da Silva PA    Evan Gannon is a 29 y.o. male.      Subjective:     Chief Complaint:  Congestive Heart Failure and Follow-up       This was an video enabled telemedicine encounter.    HPI 29 year old male with chronic systolic heart failure with video visit. He notes that he has been experiencing worsening heart failure symptoms. He notes that he has gained weight but is unclear how much weight he has gained. He notes that he has been out of metolazone. He notes that he has been taking torsemide 80 mg daily. He denies pedal edema but notes abdominal fullness, worsening dyspnea. He notes that he is wearing his oxygen daily and has increased it to 3 liters daily.       Patient Active Problem List   Diagnosis   • Hypertension   • Morbid obesity (CMS/HCC)   • Severe obstructive sleep apnea   • Nonischemic congestive cardiomyopathy (CMS/HCC)   • Personal history of noncompliance with medical treatment   • CHF (congestive heart failure), NYHA class IV (CMS/HCC)   • Plaque psoriasis   • Painless hematuria   • Pneumonia of right lower lobe due to infectious organism (CMS/HCC)   • CHF (congestive heart failure) (CMS/HCC)   • Myocardial  "infarction (CMS/[facility])   • COLLIN on CPAP   • On home O2   • Sleep apnea   • Psoriasis   • Wears glasses   • Gout   • Morbid obesity with BMI of 60.0-69.9, adult (CMS/[facility])   • Paresthesia       Past Medical History:   Diagnosis Date   • CHF (congestive heart failure) (CMS/[facility])     diagnosed at 21yo, thought to be related to undiagnosed congenital defect- per patient. hospitalized every 3 weeks during the winter due to PNA.  Has significantf fluid retention despite diurectics   • Gout     on allopurinol, controls symptoms   • Hypertension     patient denies   • ICD (implantable cardioverter-defibrillator) in place    • Morbid obesity (CMS/[facility])    • Morbid obesity with BMI of 60.0-69.9, adult (CMS/[facility])    • Myocardial infarction (CMS/HCC)     \"almost\" per patient, had heart cath, no stents, has defibrillator   • On home O2     supposed to be 24/7, ran out of O2 and has only been using prn. manged  by cardiology   • COLLIN on CPAP     compliant   • Paresthesia     hands/ arms/ feet, suspected due to poor circulation reportedly   • Plaque psoriasis    • Pneumonia    • Psoriasis    • Sleep apnea    • Wears glasses        Past Surgical History:   Procedure Laterality Date   • CARDIAC CATHETERIZATION N/A 7/13/2017    Procedure: Left Heart Cath;  Surgeon: Balbir Olsen MD;  Location:  [facility] CATH INVASIVE LOCATION;  Service:    • CARDIAC DEFIBRILLATOR PLACEMENT  08/2017   • CARDIAC ELECTROPHYSIOLOGY PROCEDURE N/A 8/15/2017    Procedure: VVI ICD Implant;  Surgeon: Nathanael Richmond DO;  Location:  [facility] INVASIVE LOCATION;  Service:    • OTHER SURGICAL HISTORY      ultra light therapy   • SINUS SURGERY      cartilage from ear inserted in nasal cavity   • TONSILLECTOMY AND ADENOIDECTOMY  2000       Family History   Problem Relation Age of Onset   • Diabetes Father    • Diabetes Paternal Grandmother    • Diabetes Maternal Grandfather        Social History     Socioeconomic History   • Marital status: Single     Spouse name: Not on " file   • Number of children: 0   • Years of education: Not on file   • Highest education level: Not on file   Occupational History   • Occupation: disabled   Tobacco Use   • Smoking status: Never Smoker   • Smokeless tobacco: Never Used   Substance and Sexual Activity   • Alcohol use: No   • Drug use: No   • Sexual activity: Defer   Social History Narrative    Lives in [facility] with parents and brother.  Disabled from CHF, given pass to work- hasn't gotten hired yet. .             Caffeine use: 6 sodas weekly    Patient lives with parents        Caffeine 1 soda a week        Caffeine 8 servings 3 days a week       No Known Allergies      Current Outpatient Medications:   •  allopurinol (ZYLOPRIM) 100 MG tablet, Take 1 tablet by mouth Daily., Disp: 30 tablet, Rfl: 5  •  aspirin (Aspirin Adult Low Strength) 81 MG EC tablet, Take 1 tablet by mouth Daily., Disp: 90 tablet, Rfl: 1  •  carvedilol (COREG) 25 MG tablet, Take 1 tablet by mouth 2 (Two) Times a Day With Meals., Disp: 180 tablet, Rfl: 1  •  dextromethorphan polistirex ER (Delsym) 30 MG/5ML Suspension Extended Release oral suspension, Take 60 mg by mouth Every 12 (Twelve) Hours., Disp: 280 mL, Rfl: 0  •  fluticasone (Flonase) 50 MCG/ACT nasal spray, 2 sprays into the nostril(s) as directed by provider Daily., Disp: 1 bottle, Rfl: 11  •  metOLazone (ZAROXOLYN) 5 MG tablet, Take 1 tablet by mouth Daily., Disp: 15 tablet, Rfl: 0  •  montelukast (Singulair) 10 MG tablet, Take 1 tablet by mouth Every Night., Disp: 30 tablet, Rfl: 5  •  sacubitril-valsartan (Entresto) 49-51 MG tablet, Take 1 tablet by mouth Every 12 (Twelve) Hours., Disp: 180 tablet, Rfl: 1  •  Secukinumab (COSENTYX, 300 MG DOSE, SC), Inject 300 mg under the skin into the appropriate area as directed., Disp: , Rfl:   •  spironolactone (Aldactone) 50 MG tablet, Take 1 tablet by mouth Daily., Disp: 90 tablet, Rfl: 1  •  torsemide (DEMADEX) 20 MG tablet, Take 3 tablets by mouth Daily., Disp:  270 tablet, Rfl: 1    The following portions of the patient's history were reviewed today and updated as appropriate: allergies, current medications, past family history, past medical history, past social history, past surgical history and problem list     Review of Systems   Constitution: Positive for malaise/fatigue. Negative for chills, decreased appetite, diaphoresis, fever, night sweats, weight gain and weight loss.   HENT: Negative for congestion, hearing loss, hoarse voice and nosebleeds.    Eyes: Negative for blurred vision, visual disturbance and visual halos.   Cardiovascular: Positive for dyspnea on exertion. Negative for chest pain, claudication, cyanosis, irregular heartbeat, leg swelling, near-syncope, orthopnea, palpitations, paroxysmal nocturnal dyspnea and syncope.   Respiratory: Negative for cough, hemoptysis, shortness of breath, sleep disturbances due to breathing, snoring, sputum production and wheezing.    Hematologic/Lymphatic: Negative for bleeding problem. Does not bruise/bleed easily.   Skin: Negative for dry skin, itching and rash.   Musculoskeletal: Positive for joint pain. Negative for arthritis, joint swelling and myalgias.   Gastrointestinal: Positive for bloating. Negative for abdominal pain, constipation, diarrhea, flatus, heartburn, hematemesis, hematochezia, melena, nausea and vomiting.   Genitourinary: Negative for dysuria, frequency, hematuria, nocturia and urgency.   Neurological: Negative for excessive daytime sleepiness, dizziness, headaches, light-headedness, loss of balance and weakness.   Psychiatric/Behavioral: Negative for depression. The patient does not have insomnia and is not nervous/anxious.        Objective:     Vitals:    04/17/20 1256   BP: 149/73   BP Location: Left arm   Patient Position: Sitting   Cuff Size: Adult   Pulse: 87   SpO2: 98%   Weight: (!) 188 kg (415 lb)       Body mass index is 61.28 kg/m².    Physical Exam   Constitutional: He is oriented to  person, place, and time. He appears well-developed and well-nourished.   HENT:   Head: Normocephalic and atraumatic.   Eyes: Pupils are equal, round, and reactive to light.   Neck: Normal range of motion. Neck supple.   Pulmonary/Chest: Effort normal.   Oxygen in place   Abdominal: He exhibits distension.   Neurological: He is alert and oriented to person, place, and time.   Skin: Skin is warm.   Psychiatric: He has a normal mood and affect. His behavior is normal. Judgment and thought content normal.       Lab and Diagnostic Review:  Results for orders placed or performed in visit on 02/19/20   proBNP   Result Value Ref Range    proBNP 438.3 5.0 - 450.0 pg/mL   Basic Metabolic Panel   Result Value Ref Range    Glucose 130 (H) 65 - 99 mg/dL    BUN 11 6 - 20 mg/dL    Creatinine 1.06 0.76 - 1.27 mg/dL    Sodium 136 136 - 145 mmol/L    Potassium 3.2 (L) 3.5 - 5.2 mmol/L    Chloride 97 (L) 98 - 107 mmol/L    CO2 29.2 (H) 22.0 - 29.0 mmol/L    Calcium 8.5 (L) 8.6 - 10.5 mg/dL    eGFR  African Amer 102 >60 mL/min/1.73    BUN/Creatinine Ratio 10.4 7.0 - 25.0    Anion Gap 9.8 5.0 - 15.0 mmol/L         Assessment and Plan:   1. Acute on chronic systolic heart failure (CMS/HCC)  Begin metolazone 5 mg daily for next 3 days   Be seen in office on Monday 4/20/2020 for IV diuresis     2. Essential hypertension    HTN Education provided today including signs and symptoms, medication management, daily blood pressure monitoring. Patient encouraged to call the Heart and Valve center with any abnormal readings.     3. Nonischemic congestive cardiomyopathy (CMS/HCC)  Continue coreg, entresto      This visit has been scheduled as a video visit to comply with patient safety concerns in accordance with CDC recommendations. Total time of discussion was 14 minutes.      It has been a pleasure to participate in the care of this patient.  Patient was instructed to call the [facility] Center with any questions, concerns, or worsening  symptoms.    *Please note that portions of this note were completed with a voice recognition program. Efforts were made to edit the dictations, but occasionally words are mistranscribed.

## 2020-05-05 ENCOUNTER — E-VISIT (OUTPATIENT)
Dept: FAMILY MEDICINE CLINIC | Facility: TELEHEALTH | Age: 30
End: 2020-05-05

## 2020-05-05 RX ORDER — METOLAZONE 5 MG/1
5 TABLET ORAL DAILY
Qty: 15 TABLET | Refills: 0 | Status: SHIPPED | OUTPATIENT
Start: 2020-05-05 | End: 2020-06-07 | Stop reason: SDUPTHER

## 2020-05-21 ENCOUNTER — TELEPHONE (OUTPATIENT)
Dept: CARDIOLOGY | Facility: HOSPITAL | Age: 30
End: 2020-05-21

## 2020-05-21 NOTE — TELEPHONE ENCOUNTER
Patient called and needs to get a letter from provider stating that he has CHF and is disabled so he can get back to school.

## 2020-05-22 ENCOUNTER — TELEMEDICINE (OUTPATIENT)
Dept: FAMILY MEDICINE CLINIC | Facility: CLINIC | Age: 30
End: 2020-05-22

## 2020-05-22 DIAGNOSIS — E66.01 MORBID OBESITY (HCC): Primary | ICD-10-CM

## 2020-05-22 PROCEDURE — 99213 OFFICE O/P EST LOW 20 MIN: CPT | Performed by: PHYSICIAN ASSISTANT

## 2020-05-22 NOTE — PROGRESS NOTES
"Chief Complaint   Patient presents with   • Obesity       HPI     Evan Gannon is a 29 y.o. male who is here for 1 month follow-up on weight. He weighed 380 lbs this morning. He cut out sodas, is eating out less and starting to cook more at home. Having more fish and vegetables. He did have a lapse in his diet early on during the pandemic but has been compliant the last 2 months. Has difficulty cutting out starburst candies. Feels he has less fluid and is able to walk more and play drums for exercise.    Past Medical History:   Diagnosis Date   • CHF (congestive heart failure) (CMS/[facility])     diagnosed at 23yo, thought to be related to undiagnosed congenital defect- per patient. hospitalized every 3 weeks during the winter due to PNA.  Has significantf fluid retention despite diurectics   • Gout     on allopurinol, controls symptoms   • Hypertension     patient denies   • ICD (implantable cardioverter-defibrillator) in place    • Morbid obesity (CMS/[facility])    • Morbid obesity with BMI of 60.0-69.9, adult (CMS/[facility])    • Myocardial infarction (CMS/[facility])     \"almost\" per patient, had heart cath, no stents, has defibrillator   • On home O2     supposed to be 24/7, ran out of O2 and has only been using prn. manged  by cardiology   • COLLIN on CPAP     compliant   • Paresthesia     hands/ arms/ feet, suspected due to poor circulation reportedly   • Plaque psoriasis    • Pneumonia    • Psoriasis    • Sleep apnea    • Wears glasses        Past Surgical History:   Procedure Laterality Date   • CARDIAC CATHETERIZATION N/A 7/13/2017    Procedure: Left Heart Cath;  Surgeon: Balbir Olsen MD;  Location:  [facility] CATH INVASIVE LOC[facility];  Service:    • CARDIAC DEFIBRILLATOR PLACEMENT  08/2017   • CARDIAC ELECTROPHYSIOLOGY PROCEDURE N/A 8/15/2017    Procedure: VVI ICD Implant;  Surgeon: Nathanael Richmond DO;  Location:  [facility] EP INVASIVE LOCATION;  Service:    • OTHER SURGICAL HISTORY      ultra light therapy   • SINUS SURGERY      " cartilage from ear inserted in nasal cavity   • TONSILLECTOMY AND ADENOIDECTOMY  2000       Family History   Problem Relation Age of Onset   • Diabetes Father    • Diabetes Paternal Grandmother    • Diabetes Maternal Grandfather        Social History     Socioeconomic History   • Marital status: Single     Spouse name: Not on file   • Number of children: 0   • Years of education: Not on file   • Highest education level: Not on file   Occupational History   • Occupation: disabled   Tobacco Use   • Smoking status: Never Smoker   • Smokeless tobacco: Never Used   Substance and Sexual Activity   • Alcohol use: No   • Drug use: No   • Sexual activity: Defer   Social History Narrative    Lives in [facility] with parents and brother.  Disabled from CHF, given pass to work- hasn't gotten hired yet. .             Caffeine use: 6 sodas weekly    Patient lives with parents        Caffeine 1 soda a week        Caffeine 8 servings 3 days a week       No Known Allergies    ROS    Review of Systems   Cardiovascular: Negative for chest pain.       There were no vitals filed for this visit.  There is no height or weight on file to calculate BMI.      Current Outpatient Medications:   •  allopurinol (ZYLOPRIM) 100 MG tablet, Take 1 tablet by mouth Daily., Disp: 30 tablet, Rfl: 5  •  aspirin (Aspirin Adult Low Strength) 81 MG EC tablet, Take 1 tablet by mouth Daily., Disp: 90 tablet, Rfl: 1  •  carvedilol (COREG) 25 MG tablet, Take 1 tablet by mouth 2 (Two) Times a Day With Meals., Disp: 180 tablet, Rfl: 1  •  dextromethorphan polistirex ER (Delsym) 30 MG/5ML Suspension Extended Release oral suspension, Take 60 mg by mouth Every 12 (Twelve) Hours., Disp: 280 mL, Rfl: 0  •  fluticasone (Flonase) 50 MCG/ACT nasal spray, 2 sprays into the nostril(s) as directed by provider Daily., Disp: 1 bottle, Rfl: 11  •  metOLazone (ZAROXOLYN) 5 MG tablet, Take 1 tablet by mouth Daily., Disp: 15 tablet, Rfl: 0  •  montelukast (Singulair) 10  MG tablet, Take 1 tablet by mouth Every Night., Disp: 30 tablet, Rfl: 5  •  sacubitril-valsartan (Entresto) 49-51 MG tablet, Take 1 tablet by mouth Every 12 (Twelve) Hours., Disp: 180 tablet, Rfl: 1  •  Secukinumab (COSENTYX, 300 MG DOSE, SC), Inject 300 mg under the skin into the appropriate area as directed., Disp: , Rfl:   •  spironolactone (Aldactone) 50 MG tablet, Take 1 tablet by mouth Daily., Disp: 90 tablet, Rfl: 1  •  torsemide (DEMADEX) 20 MG tablet, Take 3 tablets by mouth Daily., Disp: 270 tablet, Rfl: 1    PE    Physical Exam   Constitutional: He appears well-developed and well-nourished. No distress.   Pulmonary/Chest: Effort normal. No respiratory distress.   Neurological: He is alert.   Psychiatric: He has a normal mood and affect. His behavior is normal.       Results    Results for orders placed or performed in visit on 02/19/20   proBNP   Result Value Ref Range    proBNP 438.3 5.0 - 450.0 pg/mL   Basic Metabolic Panel   Result Value Ref Range    Glucose 130 (H) 65 - 99 mg/dL    BUN 11 6 - 20 mg/dL    Creatinine 1.06 0.76 - 1.27 mg/dL    Sodium 136 136 - 145 mmol/L    Potassium 3.2 (L) 3.5 - 5.2 mmol/L    Chloride 97 (L) 98 - 107 mmol/L    CO2 29.2 (H) 22.0 - 29.0 mmol/L    Calcium 8.5 (L) 8.6 - 10.5 mg/dL    eGFR  African Amer 102 >60 mL/min/1.73    BUN/Creatinine Ratio 10.4 7.0 - 25.0    Anion Gap 9.8 5.0 - 15.0 mmol/L       A/P    Problem List Items Addressed This Visit        Digestive    Morbid obesity (CMS/HCC) - Primary  -Patient is doing well overall. Discussed occasional fruit in place of sweets  -His next visit in June would have been the final monthly visit prior to bariatric surgery but he did miss one visit in March due to the pandemic. He is going to discuss this with his bariatric specialist and let me know how long his monthly appointments need continued  -RTC in 1 month for weight check          Plan of care was reviewed with patient at the conclusion of today's visit. Education  was provided regarding diagnoses, management, and the importance of keeping follow-up appointments. The patient was counseled regarding the risks, benefits, and possible side-effects of treatment. Patient and/or family express understanding and agreement with the management plan.        CARLOS Ogden

## 2020-06-08 ENCOUNTER — TELEPHONE (OUTPATIENT)
Dept: CARDIOLOGY | Facility: HOSPITAL | Age: 30
End: 2020-06-08

## 2020-06-08 RX ORDER — METOLAZONE 5 MG/1
5 TABLET ORAL DAILY
Qty: 15 TABLET | Refills: 3 | Status: SHIPPED | OUTPATIENT
Start: 2020-06-08 | End: 2020-08-21

## 2020-06-18 ENCOUNTER — TELEPHONE (OUTPATIENT)
Dept: CARDIOLOGY | Facility: HOSPITAL | Age: 30
End: 2020-06-18

## 2020-06-18 NOTE — TELEPHONE ENCOUNTER
Marah's patient- Patient states that he has been having gout in the left foot and wants to know what he can take.

## 2020-06-22 DIAGNOSIS — M10.9 ACUTE GOUT OF MULTIPLE SITES, UNSPECIFIED CAUSE: ICD-10-CM

## 2020-06-22 RX ORDER — ALLOPURINOL 100 MG/1
100 TABLET ORAL DAILY
Qty: 30 TABLET | Refills: 5 | Status: SHIPPED | OUTPATIENT
Start: 2020-06-22 | End: 2021-05-18 | Stop reason: SDUPTHER

## 2020-06-26 ENCOUNTER — TELEMEDICINE (OUTPATIENT)
Dept: FAMILY MEDICINE CLINIC | Facility: CLINIC | Age: 30
End: 2020-06-26

## 2020-06-26 DIAGNOSIS — L40.0 PLAQUE PSORIASIS: ICD-10-CM

## 2020-06-26 DIAGNOSIS — E66.01 MORBID OBESITY (HCC): Primary | ICD-10-CM

## 2020-06-26 DIAGNOSIS — I50.22 CHRONIC SYSTOLIC CONGESTIVE HEART FAILURE, NYHA CLASS 4 (HCC): ICD-10-CM

## 2020-06-26 PROCEDURE — 99442 PR PHYS/QHP TELEPHONE EVALUATION 11-20 MIN: CPT | Performed by: PHYSICIAN ASSISTANT

## 2020-06-26 NOTE — PROGRESS NOTES
"Chief Complaint   Patient presents with   • Weight Check   • Shortness of Breath   • Leg Pain     R leg pain - hard to move - sx started last week.   • Foot Pain     Left foot   • Shoulder Pain     Left shoulder        HPI     Evan Gannon is a 29 y.o. male who is here for 1 month telephone follow-up of obesity.     Patient reports his diuretics recently had to be adjusted by his cardiologist due to more fluid and shortness of breath. He was able to get the fluid off and his breathing is now improved.    Inflammatory arthritis/psoriasis: He reports joint swelling and difficulty moving his right leg and left arm since being off of Cosentyx. He needs to repeat a TB test to resume this medication.     Obesity: He has been able to do more exercises and walking the last 2 weeks since his diuretics were adjusted. He states he has eaten healthy overall (aside from take-out chinese once) but has not followed the prescribed bariatric diet. He last checked his weight 2 days ago and cannot recall the measurement. A family member has the scales so he is not able to weight himself today.     Past Medical History:   Diagnosis Date   • CHF (congestive heart failure) (CMS/HCC)     diagnosed at 21yo, thought to be related to undiagnosed congenital defect- per patient. hospitalized every 3 weeks during the winter due to PNA.  Has significantf fluid retention despite diurectics   • Gout     on allopurinol, controls symptoms   • Hypertension     patient denies   • ICD (implantable cardioverter-defibrillator) in place    • Morbid obesity (CMS/HCC)    • Morbid obesity with BMI of 60.0-69.9, adult (CMS/HCC)    • Myocardial infarction (CMS/HCC)     \"almost\" per patient, had heart cath, no stents, has defibrillator   • On home O2     supposed to be 24/7, ran out of O2 and has only been using prn. manged  by cardiology   • COLLIN on CPAP     compliant   • Paresthesia     hands/ arms/ feet, suspected due to poor circulation reportedly   • " Plaque psoriasis    • Pneumonia    • Psoriasis    • Sleep apnea    • Wears glasses        Past Surgical History:   Procedure Laterality Date   • CARDIAC CATHETERIZATION N/A 7/13/2017    Procedure: Left Heart Cath;  Surgeon: Balbir Olsen MD;  Location:  [facility];  Service:    • CARDIAC DEFIBRILLATOR PLACEMENT  08/2017   • CARDIAC ELECTROPHYSIOLOGY PROCEDURE N/A 8/15/2017    Procedure: VVI ICD Implant;  Surgeon: Nathanael Richmond DO;  Location:  [facility] INVASIVE LOCATION;  Service:    • OTHER SURGICAL HISTORY      ultra light therapy   • SINUS SURGERY      cartilage from ear inserted in nasal cavity   • TONSILLECTOMY AND ADENOIDECTOMY  2000       Family History   Problem Relation Age of Onset   • Diabetes Father    • Diabetes Paternal Grandmother    • Diabetes Maternal Grandfather        Social History     Socioeconomic History   • Marital status: Single     Spouse name: Not on file   • Number of children: 0   • Years of education: Not on file   • Highest education level: Not on file   Occupational History   • Occupation: disabled   Tobacco Use   • Smoking status: Never Smoker   • Smokeless tobacco: Never Used   Substance and Sexual Activity   • Alcohol use: No   • Drug use: No   • Sexual activity: Defer   Social History Narrative    Lives in [facility] with parents and brother.  Disabled from CHF, given pass to work- hasn't gotten hired yet. .             Caffeine use: 6 sodas weekly    Patient lives with parents        Caffeine 1 soda a week        Caffeine 8 servings 3 days a week       No Known Allergies    ROS    Review of Systems   Respiratory: Positive for shortness of breath.    Cardiovascular: Positive for leg swelling.   Musculoskeletal: Positive for arthralgias and joint swelling.       There were no vitals filed for this visit.  There is no height or weight on file to calculate BMI.      Current Outpatient Medications:   •  allopurinol (Zyloprim) 100 MG tablet, Take 1 tablet  by mouth Daily., Disp: 30 tablet, Rfl: 5  •  aspirin (Aspirin Adult Low Strength) 81 MG EC tablet, Take 1 tablet by mouth Daily., Disp: 90 tablet, Rfl: 1  •  carvedilol (COREG) 25 MG tablet, Take 1 tablet by mouth 2 (Two) Times a Day With Meals., Disp: 180 tablet, Rfl: 1  •  dextromethorphan polistirex ER (Delsym) 30 MG/5ML Suspension Extended Release oral suspension, Take 60 mg by mouth Every 12 (Twelve) Hours., Disp: 280 mL, Rfl: 0  •  fluticasone (Flonase) 50 MCG/ACT nasal spray, 2 sprays into the nostril(s) as directed by provider Daily., Disp: 1 bottle, Rfl: 11  •  metOLazone (ZAROXOLYN) 5 MG tablet, Take 1 tablet by mouth Daily., Disp: 15 tablet, Rfl: 3  •  montelukast (Singulair) 10 MG tablet, Take 1 tablet by mouth Every Night., Disp: 30 tablet, Rfl: 5  •  sacubitril-valsartan (Entresto) 49-51 MG tablet, Take 1 tablet by mouth Every 12 (Twelve) Hours., Disp: 180 tablet, Rfl: 1  •  Secukinumab (COSENTYX, 300 MG DOSE, SC), Inject 300 mg under the skin into the appropriate area as directed., Disp: , Rfl:   •  spironolactone (Aldactone) 50 MG tablet, Take 1 tablet by mouth Daily., Disp: 90 tablet, Rfl: 1  •  torsemide (DEMADEX) 20 MG tablet, Take 3 tablets by mouth Daily., Disp: 270 tablet, Rfl: 1    PE    Physical Exam   Neurological: He is alert.   Psychiatric: His speech is normal and behavior is normal.       Results    Results for orders placed or performed in visit on 02/19/20   proBNP   Result Value Ref Range    proBNP 438.3 5.0 - 450.0 pg/mL   Basic Metabolic Panel   Result Value Ref Range    Glucose 130 (H) 65 - 99 mg/dL    BUN 11 6 - 20 mg/dL    Creatinine 1.06 0.76 - 1.27 mg/dL    Sodium 136 136 - 145 mmol/L    Potassium 3.2 (L) 3.5 - 5.2 mmol/L    Chloride 97 (L) 98 - 107 mmol/L    CO2 29.2 (H) 22.0 - 29.0 mmol/L    Calcium 8.5 (L) 8.6 - 10.5 mg/dL    eGFR  African Amer 102 >60 mL/min/1.73    BUN/Creatinine Ratio 10.4 7.0 - 25.0    Anion Gap 9.8 5.0 - 15.0 mmol/L       A/P    Problem List Items  Addressed This Visit        Cardiovascular and Mediastinum    CHF (congestive heart failure), NYHA class IV (CMS/[facility])  -Recent improvement after diuretic adjustments  -Continue with management per cardiology       Digestive    Morbid obesity (CMS/HCC) - Primary  -Encouraged compliance with prescribed diet and exercise as tolerated  -Unable to document weight. Advised patient to weigh himself at his first opportunity and message me his weight  -This visit completes 6 months of monthly visits for diet/weight checks required for bariatric surgery approval       Musculoskeletal and Integument    Plaque psoriasis  -Instructed patient to follow-up with dermatology to complete TB test. If he is unable to complete the test there, I would be happy to order this for him          Unable to complete visit using a video connection to the patient. A phone visit was used to complete this visits. Total time of discussion was 11 minutes.Plan of care was reviewed with patient at the conclusion of today's visit. Education was provided regarding diagnoses, management, and the importance of keeping follow-up appointments. The patient was counseled regarding the risks, benefits, and possible side-effects of treatment. Patient and/or family express understanding and agreement with the management plan.        CARLOS Ogden

## 2020-06-29 ENCOUNTER — PATIENT MESSAGE (OUTPATIENT)
Dept: BARIATRICS/WEIGHT MGMT | Facility: CLINIC | Age: 30
End: 2020-06-29

## 2020-06-29 ENCOUNTER — TELEPHONE (OUTPATIENT)
Dept: CARDIOLOGY | Facility: HOSPITAL | Age: 30
End: 2020-06-29

## 2020-06-29 VITALS — WEIGHT: 315 LBS | BODY MASS INDEX: 59.51 KG/M2

## 2020-07-03 ENCOUNTER — E-VISIT (OUTPATIENT)
Dept: FAMILY MEDICINE CLINIC | Facility: TELEHEALTH | Age: 30
End: 2020-07-03

## 2020-07-03 DIAGNOSIS — M79.673 PAIN OF FOOT, UNSPECIFIED LATERALITY: Primary | ICD-10-CM

## 2020-07-03 PROCEDURE — 99421 OL DIG E/M SVC 5-10 MIN: CPT | Performed by: NURSE PRACTITIONER

## 2020-07-03 NOTE — PROGRESS NOTES
"Evan Gannon    1990  [id number]    I have reviewed the e-Visit questionnaire and patient's answers, my assessment and plan are as follows:    HPI  Patient used the symptom of cough to gain access to E-Visit symptom because it would schedule appointment for his actual complaint of tendonitis. His questionnaire states \"I have tendinitis in my foot and I want to know what I can take for it you don't get the option for anything else besides corona or coughing\" He may have been trying to establish a virtual visit with his PCP    Review of Systems - History obtained from chart review  General ROS: negative  Musculoskeletal ROS: positive for - muscle pain and pain in foot - unspecified side        Diagnoses and all orders for this visit:    Pain of foot, unspecified laterality    Contact PCP or go to UC for evaluation    Any medications prescribed have been sent electronically to       SONAM Schreiber  07/03/20  12:23 PM    "

## 2020-07-03 NOTE — PATIENT INSTRUCTIONS
You have contacted the Virtual Care Department for an E-Visit regarding your foot pain. Our E-Visits are only approved for certain symptoms and that is why you are only given a list of symptoms to choose from. If you have any other symptom, you would have to be seen elsewhere. The system is set-up to kick those appointments out. Foot pain is not a diagnosis that we can treat. If you are trying to reach your Primary Care Physician, please call them and they may be willing to set up a Virtual visit for this problem. If they can't treat virtually, they will direct you to their office or to the nearest Urgent Care.  Thank you.

## 2020-07-10 ENCOUNTER — TELEMEDICINE (OUTPATIENT)
Dept: FAMILY MEDICINE CLINIC | Facility: CLINIC | Age: 30
End: 2020-07-10

## 2020-07-10 DIAGNOSIS — Z51.81 ENCOUNTER FOR MEDICATION MONITORING: Primary | ICD-10-CM

## 2020-07-10 DIAGNOSIS — E66.01 MORBID OBESITY (HCC): ICD-10-CM

## 2020-07-10 PROCEDURE — 99441 PR PHYS/QHP TELEPHONE EVALUATION 5-10 MIN: CPT | Performed by: PHYSICIAN ASSISTANT

## 2020-07-10 NOTE — PROGRESS NOTES
"    Chief Complaint   Patient presents with   • TB Test       HPI     Evan Gannon is a pleasant 29 y.o. male who presents for a telehealth visit due to the SARS-CoV-2 pandemic. You have chosen to receive care through a telephone visit. Do you consent to use a telephone visit for your medical care today? Yes.     He has been unable to have a TB test completed in order to continue Coxentyx which is managed by dermatology. He would like to have a TB test completed at our lab. He asks about weight loss medications which we discussed. He has one more monthly weight check to complete for bariatric surgery approval. He is still wanting to proceed with bariatric surgery if it can be approved.     Past Medical History:   Diagnosis Date   • CHF (congestive heart failure) (CMS/[facility])     diagnosed at 23yo, thought to be related to undiagnosed congenital defect- per patient. hospitalized every 3 weeks during the winter due to PNA.  Has significantf fluid retention despite diurectics   • Gout     on allopurinol, controls symptoms   • Hypertension     patient denies   • ICD (implantable cardioverter-defibrillator) in place    • Morbid obesity (CMS/HCC)    • Morbid obesity with BMI of 60.0-69.9, adult (CMS/HCC)    • Myocardial infarction (CMS/HCC)     \"almost\" per patient, had heart cath, no stents, has defibrillator   • On home O2     supposed to be 24/7, ran out of O2 and has only been using prn. manged  by cardiology   • COLLIN on CPAP     compliant   • Paresthesia     hands/ arms/ feet, suspected due to poor circulation reportedly   • Plaque psoriasis    • Pneumonia    • Psoriasis    • Sleep apnea    • Wears glasses        Past Surgical History:   Procedure Laterality Date   • CARDIAC CATHETERIZATION N/A 7/13/2017    Procedure: Left Heart Cath;  Surgeon: Balbir Olsen MD;  Location:  [facility];  Service:    • CARDIAC DEFIBRILLATOR PLACEMENT  08/2017   • CARDIAC ELECTROPHYSIOLOGY PROCEDURE N/A 8/15/2017    " Procedure: VVI ICD Implant;  Surgeon: Nathanael Richmond DO;  Location: [facility] INVASIVE LOCATION;  Service:    • OTHER SURGICAL HISTORY      ultra light therapy   • SINUS SURGERY      cartilage from ear inserted in nasal cavity   • TONSILLECTOMY AND ADENOIDECTOMY  2000       Family History   Problem Relation Age of Onset   • Diabetes Father    • Diabetes Paternal Grandmother    • Diabetes Maternal Grandfather        Social History     Socioeconomic History   • Marital status: Single     Spouse name: Not on file   • Number of children: 0   • Years of education: Not on file   • Highest education level: Not on file   Occupational History   • Occupation: disabled   Tobacco Use   • Smoking status: Never Smoker   • Smokeless tobacco: Never Used   Substance and Sexual Activity   • Alcohol use: No   • Drug use: No   • Sexual activity: Defer   Social History Narrative    Lives in [facility] with parents and brother.  Disabled from CHF, given pass to work- hasn't gotten hired yet. .             Caffeine use: 6 sodas weekly    Patient lives with parents        Caffeine 1 soda a week        Caffeine 8 servings 3 days a week       No Known Allergies    ROS    Review of Systems   Constitutional: Negative for fever.       There were no vitals filed for this visit.  There is no height or weight on file to calculate BMI.      Current Outpatient Medications:   •  allopurinol (Zyloprim) 100 MG tablet, Take 1 tablet by mouth Daily., Disp: 30 tablet, Rfl: 5  •  aspirin (Aspirin Adult Low Strength) 81 MG EC tablet, Take 1 tablet by mouth Daily., Disp: 90 tablet, Rfl: 1  •  carvedilol (COREG) 25 MG tablet, Take 1 tablet by mouth 2 (Two) Times a Day With Meals., Disp: 180 tablet, Rfl: 1  •  dextromethorphan polistirex ER (Delsym) 30 MG/5ML Suspension Extended Release oral suspension, Take 60 mg by mouth Every 12 (Twelve) Hours., Disp: 280 mL, Rfl: 0  •  fluticasone (Flonase) 50 MCG/ACT nasal spray, 2 sprays into the nostril(s) as  directed by provider Daily., Disp: 1 bottle, Rfl: 11  •  metOLazone (ZAROXOLYN) 5 MG tablet, Take 1 tablet by mouth Daily., Disp: 15 tablet, Rfl: 3  •  montelukast (Singulair) 10 MG tablet, Take 1 tablet by mouth Every Night., Disp: 30 tablet, Rfl: 5  •  sacubitril-valsartan (Entresto) 49-51 MG tablet, Take 1 tablet by mouth Every 12 (Twelve) Hours., Disp: 180 tablet, Rfl: 1  •  Secukinumab (COSENTYX, 300 MG DOSE, SC), Inject 300 mg under the skin into the appropriate area as directed., Disp: , Rfl:   •  spironolactone (Aldactone) 50 MG tablet, Take 1 tablet by mouth Daily., Disp: 90 tablet, Rfl: 1  •  torsemide (DEMADEX) 20 MG tablet, Take 3 tablets by mouth Daily., Disp: 270 tablet, Rfl: 1    PE    Physical Exam   Constitutional: No distress.   Neurological: He is alert.   Psychiatric: His speech is normal and behavior is normal.        A/P    Problem List Items Addressed This Visit        Digestive    Morbid obesity (CMS/[facility])  -Counseled patient to continue prescribed bariatric diet and continue physical activity as tolerated  -Discussed medication options. Given that he is in the process of approval for bariatric surgery and wants to proceed, we agreed to defer weight loss medication at this time.       Other Visit Diagnoses     Encounter for medication monitoring    -  Primary    Relevant Orders    TSPOT          Unable to complete visit using a video connection to the patient. A phone visit was used to complete this visits. Total time of discussion was 8 minutes. Plan of care was reviewed with patient at the conclusion of today's visit. Education was provided regarding diagnoses, management, prescribed or recommended OTC products, and the importance of compliance with follow-up appointments. The patient was counseled regarding the risks, benefits, and possible side-effects of treatment. I advised the patient to keep me informed of any acute changes in their status including new, worsening, or persistent  symptoms. Patient expresses understanding and agreement with the management plan.        CARLOS Ogden

## 2020-07-15 ENCOUNTER — TELEMEDICINE (OUTPATIENT)
Dept: FAMILY MEDICINE CLINIC | Facility: CLINIC | Age: 30
End: 2020-07-15

## 2020-07-15 DIAGNOSIS — E66.01 MORBID OBESITY (HCC): Primary | ICD-10-CM

## 2020-07-15 PROCEDURE — 99213 OFFICE O/P EST LOW 20 MIN: CPT | Performed by: PHYSICIAN ASSISTANT

## 2020-07-15 NOTE — PROGRESS NOTES
"Chief Complaint   Patient presents with   • Weight Check       HPI     Evan Gannon is a 29 y.o. male with PMH of CHF who is here for weight check. He states he has been consistent with the prescribed bariatric diet aside from today (he is at [facility]). He did not weight himself this morning. States he is eating low carbs and high protein. He states his weight is \"about the same.\" He thinks he may have gained 6-7 pounds of fluid due to drinking a lot of water. He is walking 30 minutes/day and playing drums for exercise.     Past Medical History:   Diagnosis Date   • CHF (congestive heart failure) (CMS/[facility])     diagnosed at 23yo, thought to be related to undiagnosed congenital defect- per patient. hospitalized every 3 weeks during the winter due to PNA.  Has significantf fluid retention despite diurectics   • Gout     on allopurinol, controls symptoms   • Hypertension     patient denies   • ICD (implantable cardioverter-defibrillator) in place    • Morbid obesity (CMS/[facility])    • Morbid obesity with BMI of 60.0-69.9, adult (CMS/[facility])    • Myocardial infarction (CMS/HCC)     \"almost\" per patient, had heart cath, no stents, has defibrillator   • On home O2     supposed to be 24/7, ran out of O2 and has only been using prn. manged  by cardiology   • COLLIN on CPAP     compliant   • Paresthesia     hands/ arms/ feet, suspected due to poor circulation reportedly   • Plaque psoriasis    • Pneumonia    • Psoriasis    • Sleep apnea    • Wears glasses        Past Surgical History:   Procedure Laterality Date   • CARDIAC CATHETERIZATION N/A 7/13/2017    Procedure: Left Heart Cath;  Surgeon: Balbir Olsen MD;  Location:  [facility];  Service:    • CARDIAC DEFIBRILLATOR PLACEMENT  08/2017   • CARDIAC ELECTROPHYSIOLOGY PROCEDURE N/A 8/15/2017    Procedure: VVI ICD Implant;  Surgeon: Nathanael Richmond DO;  Location:  [facility] INV[facility];  Service:    • OTHER SURGICAL HISTORY      ultra light therapy "   • SINUS SURGERY      cartilage from ear inserted in nasal cavity   • TONSILLECTOMY AND ADENOIDECTOMY  2000       Family History   Problem Relation Age of Onset   • Diabetes Father    • Diabetes Paternal Grandmother    • Diabetes Maternal Grandfather        Social History     Socioeconomic History   • Marital status: Single     Spouse name: Not on file   • Number of children: 0   • Years of education: Not on file   • Highest education level: Not on file   Occupational History   • Occupation: disabled   Tobacco Use   • Smoking status: Never Smoker   • Smokeless tobacco: Never Used   Substance and Sexual Activity   • Alcohol use: No   • Drug use: No   • Sexual activity: Defer   Social History Narrative    Lives in [place] [place] [facility] with parents and brother.  Disabled from CHF, given pass to work- hasn't gotten hired yet. .             Caffeine use: 6 sodas weekly    Patient lives with parents        Caffeine 1 soda a week        Caffeine 8 servings 3 days a week       No Known Allergies    ROS    Review of Systems   Endocrine: Positive for polydipsia.       There were no vitals filed for this visit.  There is no height or weight on file to calculate BMI.      Current Outpatient Medications:   •  allopurinol (Zyloprim) 100 MG tablet, Take 1 tablet by mouth Daily., Disp: 30 tablet, Rfl: 5  •  aspirin (Aspirin Adult Low Strength) 81 MG EC tablet, Take 1 tablet by mouth Daily., Disp: 90 tablet, Rfl: 1  •  carvedilol (COREG) 25 MG tablet, Take 1 tablet by mouth 2 (Two) Times a Day With Meals., Disp: 180 tablet, Rfl: 1  •  dextromethorphan polistirex ER (Delsym) 30 MG/5ML Suspension Extended Release oral suspension, Take 60 mg by mouth Every 12 (Twelve) Hours., Disp: 280 mL, Rfl: 0  •  fluticasone (Flonase) 50 MCG/ACT nasal spray, 2 sprays into the nostril(s) as directed by provider Daily., Disp: 1 bottle, Rfl: 11  •  metOLazone (ZAROXOLYN) 5 MG tablet, Take 1 tablet by mouth Daily., Disp: 15 tablet, Rfl: 3  •   montelukast (Singulair) 10 MG tablet, Take 1 tablet by mouth Every Night., Disp: 30 tablet, Rfl: 5  •  sacubitril-valsartan (Entresto) 49-51 MG tablet, Take 1 tablet by mouth Every 12 (Twelve) Hours., Disp: 180 tablet, Rfl: 1  •  Secukinumab (COSENTYX, 300 MG DOSE, SC), Inject 300 mg under the skin into the appropriate area as directed., Disp: , Rfl:   •  spironolactone (Aldactone) 50 MG tablet, Take 1 tablet by mouth Daily., Disp: 90 tablet, Rfl: 1  •  torsemide (DEMADEX) 20 MG tablet, Take 3 tablets by mouth Daily., Disp: 270 tablet, Rfl: 1    PE    Physical Exam   Constitutional: No distress.   Pulmonary/Chest: Effort normal. No respiratory distress.   Neurological: He is alert.   Psychiatric: He has a normal mood and affect. His speech is normal.       Results    Results for orders placed or performed in visit on 02/19/20   proBNP   Result Value Ref Range    proBNP 438.3 5.0 - 450.0 pg/mL   Basic Metabolic Panel   Result Value Ref Range    Glucose 130 (H) 65 - 99 mg/dL    BUN 11 6 - 20 mg/dL    Creatinine 1.06 0.76 - 1.27 mg/dL    Sodium 136 136 - 145 mmol/L    Potassium 3.2 (L) 3.5 - 5.2 mmol/L    Chloride 97 (L) 98 - 107 mmol/L    CO2 29.2 (H) 22.0 - 29.0 mmol/L    Calcium 8.5 (L) 8.6 - 10.5 mg/dL    eGFR  African Amer 102 >60 mL/min/1.73    BUN/Creatinine Ratio 10.4 7.0 - 25.0    Anion Gap 9.8 5.0 - 15.0 mmol/L       A/P    Problem List Items Addressed This Visit        Digestive    Morbid obesity (CMS/HCC) - Primary  -This is his 6th and final visit for monthly weight checks. He did miss an appointment in March due to coronavirus. His last several visits have been telemedicine visits and I would like to see him in the office for an objective weight to compare with his initial visit.   -His weight has fluctuated with his fluid status and diuretic therapy  -He states he would like to proceed with bariatric surgery as soon as possible if approved and does not want to wait 2 weeks. We will see if he can be  worked in next week.           Plan of care was reviewed with patient at the conclusion of today's visit. Education was provided regarding diagnoses, management, and the importance of keeping follow-up appointments. The patient was counseled regarding the risks, benefits, and possible side-effects of treatment. Patient and/or family express understanding and agreement with the management plan.        CARLOS Ogden   no

## 2020-07-22 ENCOUNTER — LAB (OUTPATIENT)
Dept: LAB | Facility: HOSPITAL | Age: 30
End: 2020-07-22

## 2020-07-22 ENCOUNTER — OFFICE VISIT (OUTPATIENT)
Dept: FAMILY MEDICINE CLINIC | Facility: CLINIC | Age: 30
End: 2020-07-22

## 2020-07-22 VITALS
BODY MASS INDEX: 46.65 KG/M2 | OXYGEN SATURATION: 98 % | SYSTOLIC BLOOD PRESSURE: 128 MMHG | DIASTOLIC BLOOD PRESSURE: 88 MMHG | WEIGHT: 315 LBS | HEART RATE: 102 BPM | HEIGHT: 69 IN

## 2020-07-22 DIAGNOSIS — I10 ESSENTIAL HYPERTENSION: ICD-10-CM

## 2020-07-22 DIAGNOSIS — E66.01 MORBID OBESITY (HCC): Primary | ICD-10-CM

## 2020-07-22 DIAGNOSIS — Z51.81 ENCOUNTER FOR MEDICATION MONITORING: ICD-10-CM

## 2020-07-22 DIAGNOSIS — R73.02 IMPAIRED GLUCOSE TOLERANCE: ICD-10-CM

## 2020-07-22 PROCEDURE — 99213 OFFICE O/P EST LOW 20 MIN: CPT | Performed by: PHYSICIAN ASSISTANT

## 2020-07-22 RX ORDER — SPIRONOLACTONE 50 MG/1
50 TABLET, FILM COATED ORAL DAILY
Qty: 90 TABLET | Refills: 1 | Status: SHIPPED | OUTPATIENT
Start: 2020-07-22 | End: 2020-08-16 | Stop reason: HOSPADM

## 2020-07-22 NOTE — PROGRESS NOTES
"Chief Complaint   Patient presents with   • Obesity       HPI     Evan Gannon is a 29 y.o. male who is here for a weight check. He has lost 29 pounds following a high protein and low carb diet since January. He has been following his prescribed diet with some exceptions of eating out. He walks up stairs and play drums for exercise daily.     Past Medical History:   Diagnosis Date   • CHF (congestive heart failure) (CMS/[facility])     diagnosed at 23yo, thought to be related to undiagnosed congenital defect- per patient. hospitalized every 3 weeks during the winter due to PNA.  Has significantf fluid retention despite diurectics   • Gout     on allopurinol, controls symptoms   • Hypertension     patient denies   • ICD (implantable cardioverter-defibrillator) in place    • Morbid obesity (CMS/[facility])    • Morbid obesity with BMI of 60.0-69.9, adult (CMS/[facility])    • Myocardial infarction (CMS/[facility])     \"almost\" per patient, had heart cath, no stents, has defibrillator   • On home O2     supposed to be 24/7, ran out of O2 and has only been using prn. manged  by cardiology   • COLLIN on CPAP     compliant   • Paresthesia     hands/ arms/ feet, suspected due to poor circulation reportedly   • Plaque psoriasis    • Pneumonia    • Psoriasis    • Sleep apnea    • Wears glasses        Past Surgical History:   Procedure Laterality Date   • CARDIAC CATHETERIZATION N/A 7/13/2017    Procedure: Left Heart Cath;  Surgeon: Balbir Olsen MD;  Location:  [facility];  Service:    • CARDIAC DEFIBRILLATOR PLACEMENT  08/2017   • CARDIAC ELECTROPHYSIOLOGY PROCEDURE N/A 8/15/2017    Procedure: VVI ICD Implant;  Surgeon: Nathanael Richmond DO;  Location:  [facility];  Service:    • OTHER SURGICAL HISTORY      ultra light therapy   • SINUS SURGERY      cartilage from ear inserted in nasal cavity   • TONSILLECTOMY AND ADENOIDECTOMY  2000       Family History   Problem Relation Age of Onset   • Diabetes Father    • " Diabetes Paternal Grandmother    • Diabetes Maternal Grandfather        Social History     Socioeconomic History   • Marital status: Single     Spouse name: Not on file   • Number of children: 0   • Years of education: Not on file   • Highest education level: Not on file   Occupational History   • Occupation: disabled   Tobacco Use   • Smoking status: Never Smoker   • Smokeless tobacco: Never Used   Substance and Sexual Activity   • Alcohol use: No   • Drug use: No   • Sexual activity: Defer   Social History Narrative    Lives in [place] with parents and brother.  Disabled from CHF, given pass to work- hasn't gotten hired yet. .             Caffeine use: 6 sodas weekly    Patient lives with parents        Caffeine 1 soda a week        Caffeine 8 servings 3 days a week       No Known Allergies    ROS    Review of Systems   Constitutional: Negative for chills and fever.   Respiratory: Negative for cough.    Cardiovascular: Negative for chest pain.   Skin: Positive for rash.       Vitals:    07/22/20 1117   BP: 128/88   Pulse: 102   SpO2: 98%     Body mass index is 58.33 kg/m².      Current Outpatient Medications:   •  allopurinol (Zyloprim) 100 MG tablet, Take 1 tablet by mouth Daily., Disp: 30 tablet, Rfl: 5  •  aspirin (Aspirin Adult Low Strength) 81 MG EC tablet, Take 1 tablet by mouth Daily., Disp: 90 tablet, Rfl: 1  •  carvedilol (COREG) 25 MG tablet, Take 1 tablet by mouth 2 (Two) Times a Day With Meals., Disp: 180 tablet, Rfl: 1  •  metOLazone (ZAROXOLYN) 5 MG tablet, Take 1 tablet by mouth Daily., Disp: 15 tablet, Rfl: 3  •  sacubitril-valsartan (Entresto) 49-51 MG tablet, Take 1 tablet by mouth Every 12 (Twelve) Hours., Disp: 180 tablet, Rfl: 1  •  Secukinumab (COSENTYX, 300 MG DOSE, SC), Inject 300 mg under the skin into the appropriate area as directed., Disp: , Rfl:   •  spironolactone (Aldactone) 50 MG tablet, Take 1 tablet by mouth Daily., Disp: 90 tablet, Rfl: 1  •  torsemide (DEMADEX) 20 MG  tablet, Take 3 tablets by mouth Daily., Disp: 270 tablet, Rfl: 1  •  fluticasone (Flonase) 50 MCG/ACT nasal spray, 2 sprays into the nostril(s) as directed by provider Daily., Disp: 1 bottle, Rfl: 11  •  montelukast (Singulair) 10 MG tablet, Take 1 tablet by mouth Every Night., Disp: 30 tablet, Rfl: 5    PE    Physical Exam   Constitutional: He appears well-developed and well-nourished. No distress.   Wearing 02 nasal canula  He is morbidly obese.  HENT:   Head: Normocephalic and atraumatic.   Eyes: Conjunctivae and EOM are normal.   Neck: Normal range of motion.   Cardiovascular: Normal rate, regular rhythm and normal heart sounds.   No murmur heard.  Pulmonary/Chest: Effort normal and breath sounds normal.   Neurological: He is alert. Gait normal.   Skin: Skin is warm and dry.   Psychiatric: He has a normal mood and affect. His speech is normal and behavior is normal.   Vitals reviewed.      Results    Results for orders placed or performed in visit on 02/19/20   proBNP   Result Value Ref Range    proBNP 438.3 5.0 - 450.0 pg/mL   Basic Metabolic Panel   Result Value Ref Range    Glucose 130 (H) 65 - 99 mg/dL    BUN 11 6 - 20 mg/dL    Creatinine 1.06 0.76 - 1.27 mg/dL    Sodium 136 136 - 145 mmol/L    Potassium 3.2 (L) 3.5 - 5.2 mmol/L    Chloride 97 (L) 98 - 107 mmol/L    CO2 29.2 (H) 22.0 - 29.0 mmol/L    Calcium 8.5 (L) 8.6 - 10.5 mg/dL    eGFR  African Amer 102 >60 mL/min/1.73    BUN/Creatinine Ratio 10.4 7.0 - 25.0    Anion Gap 9.8 5.0 - 15.0 mmol/L       A/P    Problem List Items Addressed This Visit        Cardiovascular and Mediastinum    Hypertension  -BP adequately controlled  -Refill spironolactone    Relevant Medications    spironolactone (Aldactone) 50 MG tablet    Other Relevant Orders    Basic Metabolic Panel       Digestive    Morbid obesity (CMS/HCC) - Primary  -He has been able to lose weight with lifestyle improvements  -Weight check form completed   -Counseled patient to follow-up with bariatric  surgery      Other Visit Diagnoses     Impaired glucose tolerance      -Monitor A1C          Plan of care was reviewed with patient at the conclusion of today's visit. Education was provided regarding diagnoses, management, and the importance of keeping follow-up appointments. The patient was counseled regarding the risks, benefits, and possible side-effects of treatment. Patient and/or family express understanding and agreement with the management plan.        CARLOS Ogden

## 2020-07-23 ENCOUNTER — LAB (OUTPATIENT)
Dept: LAB | Facility: HOSPITAL | Age: 30
End: 2020-07-23

## 2020-07-23 DIAGNOSIS — Z51.81 ENCOUNTER FOR MEDICATION MONITORING: Primary | ICD-10-CM

## 2020-07-23 DIAGNOSIS — I10 ESSENTIAL HYPERTENSION: ICD-10-CM

## 2020-07-23 LAB
ANION GAP SERPL CALCULATED.3IONS-SCNC: 9.2 MMOL/L (ref 5–15)
BUN SERPL-MCNC: 15 MG/DL (ref 6–20)
BUN/CREAT SERPL: 14.7 (ref 7–25)
CALCIUM SPEC-SCNC: 9.2 MG/DL (ref 8.6–10.5)
CHLORIDE SERPL-SCNC: 89 MMOL/L (ref 98–107)
CO2 SERPL-SCNC: 35.8 MMOL/L (ref 22–29)
CREAT SERPL-MCNC: 1.02 MG/DL (ref 0.76–1.27)
GFR SERPL CREATININE-BSD FRML MDRD: 106 ML/MIN/1.73
GLUCOSE SERPL-MCNC: 85 MG/DL (ref 65–99)
POTASSIUM SERPL-SCNC: 2.8 MMOL/L (ref 3.5–5.2)
SODIUM SERPL-SCNC: 134 MMOL/L (ref 136–145)

## 2020-07-23 PROCEDURE — 80048 BASIC METABOLIC PNL TOTAL CA: CPT

## 2020-07-23 PROCEDURE — 36415 COLL VENOUS BLD VENIPUNCTURE: CPT

## 2020-07-25 RX ORDER — POTASSIUM CHLORIDE 750 MG/1
10 TABLET, FILM COATED, EXTENDED RELEASE ORAL DAILY
Qty: 30 TABLET | Refills: 0 | Status: SHIPPED | OUTPATIENT
Start: 2020-07-25 | End: 2020-08-03 | Stop reason: SDUPTHER

## 2020-07-27 DIAGNOSIS — R06.00 DYSPNEA, UNSPECIFIED TYPE: ICD-10-CM

## 2020-07-27 DIAGNOSIS — R53.83 FATIGUE, UNSPECIFIED TYPE: Primary | ICD-10-CM

## 2020-07-29 DIAGNOSIS — Z51.81 MEDICATION MONITORING ENCOUNTER: Primary | ICD-10-CM

## 2020-07-30 ENCOUNTER — HOSPITAL ENCOUNTER (OUTPATIENT)
Dept: GENERAL RADIOLOGY | Facility: HOSPITAL | Age: 30
Discharge: HOME | End: 2020-07-30

## 2020-07-30 ENCOUNTER — LAB (OUTPATIENT)
Dept: LAB | Facility: HOSPITAL | Age: 30
End: 2020-07-30

## 2020-07-30 ENCOUNTER — HOSPITAL ENCOUNTER (OUTPATIENT)
Dept: CARDIOLOGY | Facility: HOSPITAL | Age: 30
Discharge: HOME | End: 2020-07-30
Admitting: PHYSICIAN ASSISTANT

## 2020-07-30 DIAGNOSIS — R53.83 FATIGUE, UNSPECIFIED TYPE: ICD-10-CM

## 2020-07-30 DIAGNOSIS — Z51.81 MEDICATION MONITORING ENCOUNTER: ICD-10-CM

## 2020-07-30 DIAGNOSIS — R06.00 DYSPNEA, UNSPECIFIED TYPE: ICD-10-CM

## 2020-07-30 LAB
ALBUMIN SERPL-MCNC: 3.6 G/DL (ref 3.5–5.2)
ALBUMIN/GLOB SERPL: 0.8 G/DL
ALP SERPL-CCNC: 59 U/L (ref 39–117)
ALT SERPL W P-5'-P-CCNC: 11 U/L (ref 1–41)
ANION GAP SERPL CALCULATED.3IONS-SCNC: 8.6 MMOL/L (ref 5–15)
AST SERPL-CCNC: 17 U/L (ref 1–40)
BASOPHILS # BLD AUTO: 0.03 10*3/MM3 (ref 0–0.2)
BASOPHILS NFR BLD AUTO: 0.5 % (ref 0–1.5)
BILIRUB SERPL-MCNC: 1 MG/DL (ref 0–1.2)
BUN SERPL-MCNC: 12 MG/DL (ref 6–20)
BUN/CREAT SERPL: 11.9 (ref 7–25)
CALCIUM SPEC-SCNC: 9.2 MG/DL (ref 8.6–10.5)
CHLORIDE SERPL-SCNC: 91 MMOL/L (ref 98–107)
CO2 SERPL-SCNC: 31.4 MMOL/L (ref 22–29)
CREAT SERPL-MCNC: 1.01 MG/DL (ref 0.76–1.27)
DEPRECATED RDW RBC AUTO: 42 FL (ref 37–54)
EOSINOPHIL # BLD AUTO: 0.04 10*3/MM3 (ref 0–0.4)
EOSINOPHIL NFR BLD AUTO: 0.6 % (ref 0.3–6.2)
ERYTHROCYTE [DISTWIDTH] IN BLOOD BY AUTOMATED COUNT: 14.8 % (ref 12.3–15.4)
GFR SERPL CREATININE-BSD FRML MDRD: 107 ML/MIN/1.73
GLOBULIN UR ELPH-MCNC: 4.5 GM/DL
GLUCOSE SERPL-MCNC: 106 MG/DL (ref 65–99)
HCT VFR BLD AUTO: 39.6 % (ref 37.5–51)
HGB BLD-MCNC: 13.2 G/DL (ref 13–17.7)
IMM GRANULOCYTES # BLD AUTO: 0.02 10*3/MM3 (ref 0–0.05)
IMM GRANULOCYTES NFR BLD AUTO: 0.3 % (ref 0–0.5)
LYMPHOCYTES # BLD AUTO: 1.33 10*3/MM3 (ref 0.7–3.1)
LYMPHOCYTES NFR BLD AUTO: 20.8 % (ref 19.6–45.3)
MCH RBC QN AUTO: 26.2 PG (ref 26.6–33)
MCHC RBC AUTO-ENTMCNC: 33.3 G/DL (ref 31.5–35.7)
MCV RBC AUTO: 78.6 FL (ref 79–97)
MONOCYTES # BLD AUTO: 0.55 10*3/MM3 (ref 0.1–0.9)
MONOCYTES NFR BLD AUTO: 8.6 % (ref 5–12)
NEUTROPHILS NFR BLD AUTO: 4.41 10*3/MM3 (ref 1.7–7)
NEUTROPHILS NFR BLD AUTO: 69.2 % (ref 42.7–76)
NRBC BLD AUTO-RTO: 0 /100 WBC (ref 0–0.2)
PLATELET # BLD AUTO: 307 10*3/MM3 (ref 140–450)
PMV BLD AUTO: 10.9 FL (ref 6–12)
POTASSIUM SERPL-SCNC: 2.7 MMOL/L (ref 3.5–5.2)
PROT SERPL-MCNC: 8.1 G/DL (ref 6–8.5)
RBC # BLD AUTO: 5.04 10*6/MM3 (ref 4.14–5.8)
SODIUM SERPL-SCNC: 131 MMOL/L (ref 136–145)
WBC # BLD AUTO: 6.38 10*3/MM3 (ref 3.4–10.8)

## 2020-07-30 PROCEDURE — 93010 ELECTROCARDIOGRAM REPORT: CPT | Performed by: INTERNAL MEDICINE

## 2020-07-30 PROCEDURE — 85025 COMPLETE CBC W/AUTO DIFF WBC: CPT

## 2020-07-30 PROCEDURE — 93005 ELECTROCARDIOGRAM TRACING: CPT | Performed by: PHYSICIAN ASSISTANT

## 2020-07-30 PROCEDURE — 71046 X-RAY EXAM CHEST 2 VIEWS: CPT

## 2020-07-30 PROCEDURE — 36415 COLL VENOUS BLD VENIPUNCTURE: CPT

## 2020-07-30 PROCEDURE — 86481 TB AG RESPONSE T-CELL SUSP: CPT

## 2020-07-30 PROCEDURE — 80053 COMPREHEN METABOLIC PANEL: CPT

## 2020-08-01 LAB
TSPOT INTERPRETATION: NEGATIVE
TSPOT NIL CONTROL INTERPRETATION: NORMAL
TSPOT PANEL A: 3
TSPOT PANEL B: 1
TSPOT POS CONTROL INTERPRETATION: NORMAL

## 2020-08-03 ENCOUNTER — TELEPHONE (OUTPATIENT)
Dept: FAMILY MEDICINE CLINIC | Facility: CLINIC | Age: 30
End: 2020-08-03

## 2020-08-03 DIAGNOSIS — E87.6 HYPOKALEMIA: Primary | ICD-10-CM

## 2020-08-03 RX ORDER — POTASSIUM CHLORIDE 750 MG/1
10 TABLET, FILM COATED, EXTENDED RELEASE ORAL 2 TIMES DAILY
Qty: 60 TABLET | Refills: 0 | Status: ON HOLD | OUTPATIENT
Start: 2020-08-03 | End: 2020-11-05 | Stop reason: SDUPTHER

## 2020-08-03 NOTE — TELEPHONE ENCOUNTER
Please call the patient to give him the following recommendations: He needs to increase potassium to 10 meq bid and return to the lab on Thursday or Friday this week to repeat his potassium level. I will send a new prescription to his pharmacy.

## 2020-08-03 NOTE — TELEPHONE ENCOUNTER
PER LABS FROM BARIATRIC OFFICE HIS POTASSIUM LEVEL IS LOW AND HIS MEDICATION NEEDS TO BE INCREASE ASAP.     PLEASE CALL PATIENT BACK AND LET HIM KNOW WHAT TO INCREASE IT TO

## 2020-08-04 ENCOUNTER — CONSULT (OUTPATIENT)
Dept: BARIATRICS/WEIGHT MGMT | Facility: CLINIC | Age: 30
End: 2020-08-04

## 2020-08-04 VITALS
WEIGHT: 315 LBS | OXYGEN SATURATION: 95 % | SYSTOLIC BLOOD PRESSURE: 138 MMHG | HEIGHT: 70 IN | RESPIRATION RATE: 18 BRPM | DIASTOLIC BLOOD PRESSURE: 86 MMHG | BODY MASS INDEX: 45.1 KG/M2 | TEMPERATURE: 97.5 F | HEART RATE: 67 BPM

## 2020-08-04 DIAGNOSIS — E66.01 MORBID OBESITY WITH BODY MASS INDEX (BMI) OF 50.0 TO 59.9 IN ADULT (HCC): Primary | ICD-10-CM

## 2020-08-04 PROCEDURE — 99214 OFFICE O/P EST MOD 30 MIN: CPT | Performed by: SURGERY

## 2020-08-04 RX ORDER — CHLORHEXIDINE GLUCONATE 0.12 MG/ML
30 RINSE ORAL
Status: CANCELLED | OUTPATIENT
Start: 2020-08-04 | End: 2020-08-04

## 2020-08-04 RX ORDER — PANTOPRAZOLE SODIUM 40 MG/10ML
40 INJECTION, POWDER, LYOPHILIZED, FOR SOLUTION INTRAVENOUS ONCE
Status: CANCELLED | OUTPATIENT
Start: 2020-08-04 | End: 2020-08-04

## 2020-08-04 RX ORDER — SODIUM CHLORIDE 0.9 % (FLUSH) 0.9 %
3-10 SYRINGE (ML) INJECTION AS NEEDED
Status: CANCELLED | OUTPATIENT
Start: 2020-08-04

## 2020-08-04 RX ORDER — SCOLOPAMINE TRANSDERMAL SYSTEM 1 MG/1
1 PATCH, EXTENDED RELEASE TRANSDERMAL ONCE
Status: CANCELLED | OUTPATIENT
Start: 2020-08-04 | End: 2020-08-04

## 2020-08-04 RX ORDER — GABAPENTIN 100 MG/1
600 CAPSULE ORAL ONCE
Status: CANCELLED | OUTPATIENT
Start: 2020-08-04 | End: 2020-08-04

## 2020-08-04 RX ORDER — SODIUM CHLORIDE 0.9 % (FLUSH) 0.9 %
3 SYRINGE (ML) INJECTION EVERY 12 HOURS SCHEDULED
Status: CANCELLED | OUTPATIENT
Start: 2020-08-04

## 2020-08-04 RX ORDER — SODIUM CHLORIDE 9 MG/ML
125 INJECTION, SOLUTION INTRAVENOUS CONTINUOUS
Status: CANCELLED | OUTPATIENT
Start: 2020-08-04

## 2020-08-04 RX ORDER — ACETAMINOPHEN 500 MG
1000 TABLET ORAL ONCE
Status: CANCELLED | OUTPATIENT
Start: 2020-08-04 | End: 2020-08-04

## 2020-08-12 ENCOUNTER — HOSPITAL ENCOUNTER (INPATIENT)
Facility: HOSPITAL | Age: 30
LOS: 2 days | Discharge: HOME | End: 2020-08-16
Attending: SURGERY | Admitting: SURGERY

## 2020-08-12 ENCOUNTER — APPOINTMENT (OUTPATIENT)
Dept: PREADMISSION TESTING | Facility: HOSPITAL | Age: 30
End: 2020-08-12

## 2020-08-12 DIAGNOSIS — E66.01 MORBID OBESITY WITH BODY MASS INDEX (BMI) OF 50.0 TO 59.9 IN ADULT (HCC): ICD-10-CM

## 2020-08-12 LAB
ABO GROUP BLD: NORMAL
BLD GP AB SCN SERPL QL: NEGATIVE
DEPRECATED RDW RBC AUTO: 42.7 FL (ref 37–54)
ERYTHROCYTE [DISTWIDTH] IN BLOOD BY AUTOMATED COUNT: 15.3 % (ref 12.3–15.4)
HBA1C MFR BLD: 6.5 % (ref 4.8–5.6)
HCT VFR BLD AUTO: 43.6 % (ref 37.5–51)
HGB BLD-MCNC: 14.4 G/DL (ref 13–17.7)
MCH RBC QN AUTO: 26 PG (ref 26.6–33)
MCHC RBC AUTO-ENTMCNC: 33 G/DL (ref 31.5–35.7)
MCV RBC AUTO: 78.7 FL (ref 79–97)
PLATELET # BLD AUTO: 315 10*3/MM3 (ref 140–450)
PMV BLD AUTO: 10.3 FL (ref 6–12)
POTASSIUM SERPL-SCNC: 3.3 MMOL/L (ref 3.5–5.2)
RBC # BLD AUTO: 5.54 10*6/MM3 (ref 4.14–5.8)
RH BLD: POSITIVE
T&S EXPIRATION DATE: NORMAL
WBC # BLD AUTO: 5.96 10*3/MM3 (ref 3.4–10.8)

## 2020-08-12 PROCEDURE — 85027 COMPLETE CBC AUTOMATED: CPT | Performed by: SURGERY

## 2020-08-12 PROCEDURE — U0002 COVID-19 LAB TEST NON-CDC: HCPCS

## 2020-08-12 PROCEDURE — 86901 BLOOD TYPING SEROLOGIC RH(D): CPT

## 2020-08-12 PROCEDURE — 86900 BLOOD TYPING SEROLOGIC ABO: CPT

## 2020-08-12 PROCEDURE — 83036 HEMOGLOBIN GLYCOSYLATED A1C: CPT | Performed by: SURGERY

## 2020-08-12 PROCEDURE — C9803 HOPD COVID-19 SPEC COLLECT: HCPCS

## 2020-08-12 PROCEDURE — 36415 COLL VENOUS BLD VENIPUNCTURE: CPT

## 2020-08-12 PROCEDURE — 86850 RBC ANTIBODY SCREEN: CPT

## 2020-08-12 PROCEDURE — 84132 ASSAY OF SERUM POTASSIUM: CPT | Performed by: SURGERY

## 2020-08-12 RX ORDER — ACETAMINOPHEN 500 MG
500 TABLET ORAL EVERY 6 HOURS PRN
COMMUNITY
End: 2020-09-16

## 2020-08-12 NOTE — PAT
Patient instructed to drink 20 ounces (or until full) of Gatorade and it needs to be completed 1 hour before given arrival time for procedure (NO RED Gatorade)    Patient verbalized understanding.  Per Anesthesia Request, patient instructed not to take their ACE/ARB medications on the AM of surgery.  Blood bank bracelet applied to patient during Pre Admission Testing visit.  Patient instructed not to remove from arm until after procedure and they are discharged from the hospital.  Explained to patient that they may shower and get the bracelet wet, but not to immerse under water for longer periods (bathing, swimming, hand dishwashing, etc).  Patient verbalized understanding.  Patient to apply Chlorhexadine wipes  to surgical area (as instructed) the night before procedure and the AM of procedure. Wipes provided.

## 2020-08-13 ENCOUNTER — ANESTHESIA EVENT (OUTPATIENT)
Dept: PERIOP | Facility: HOSPITAL | Age: 30
End: 2020-08-13

## 2020-08-13 LAB
REF LAB TEST METHOD: NORMAL
SARS-COV-2 RNA RESP QL NAA+PROBE: NOT DETECTED

## 2020-08-13 RX ORDER — SODIUM CHLORIDE, SODIUM LACTATE, POTASSIUM CHLORIDE, CALCIUM CHLORIDE 600; 310; 30; 20 MG/100ML; MG/100ML; MG/100ML; MG/100ML
9 INJECTION, SOLUTION INTRAVENOUS CONTINUOUS
Status: CANCELLED | OUTPATIENT
Start: 2020-08-13

## 2020-08-13 RX ORDER — FAMOTIDINE 10 MG/ML
20 INJECTION, SOLUTION INTRAVENOUS ONCE
Status: CANCELLED | OUTPATIENT
Start: 2020-08-13 | End: 2020-08-13

## 2020-08-13 RX ORDER — FAMOTIDINE 20 MG/1
20 TABLET, FILM COATED ORAL ONCE
Status: CANCELLED | OUTPATIENT
Start: 2020-08-13 | End: 2020-08-13

## 2020-08-14 ENCOUNTER — ANESTHESIA (OUTPATIENT)
Dept: PERIOP | Facility: HOSPITAL | Age: 30
End: 2020-08-14

## 2020-08-14 PROBLEM — E11.9 TYPE 2 DIABETES MELLITUS, WITHOUT LONG-TERM CURRENT USE OF INSULIN: Status: ACTIVE | Noted: 2020-08-14

## 2020-08-14 PROBLEM — E87.6 HYPOKALEMIA: Status: ACTIVE | Noted: 2020-08-14

## 2020-08-14 LAB
ABO GROUP BLD: NORMAL
ANION GAP SERPL CALCULATED.3IONS-SCNC: 13 MMOL/L (ref 5–15)
ANION GAP SERPL CALCULATED.3IONS-SCNC: 24 MMOL/L (ref 5–15)
BUN SERPL-MCNC: 35 MG/DL (ref 6–20)
BUN SERPL-MCNC: 39 MG/DL (ref 6–20)
BUN/CREAT SERPL: 27.6 (ref 7–25)
BUN/CREAT SERPL: 32 (ref 7–25)
CALCIUM SPEC-SCNC: 9.2 MG/DL (ref 8.6–10.5)
CALCIUM SPEC-SCNC: 9.8 MG/DL (ref 8.6–10.5)
CHLORIDE SERPL-SCNC: 90 MMOL/L (ref 98–107)
CHLORIDE SERPL-SCNC: 92 MMOL/L (ref 98–107)
CO2 SERPL-SCNC: 19 MMOL/L (ref 22–29)
CO2 SERPL-SCNC: 26 MMOL/L (ref 22–29)
CREAT SERPL-MCNC: 1.22 MG/DL (ref 0.76–1.27)
CREAT SERPL-MCNC: 1.27 MG/DL (ref 0.76–1.27)
GFR SERPL CREATININE-BSD FRML MDRD: 82 ML/MIN/1.73
GFR SERPL CREATININE-BSD FRML MDRD: 86 ML/MIN/1.73
GLUCOSE BLDC GLUCOMTR-MCNC: 146 MG/DL (ref 70–130)
GLUCOSE BLDC GLUCOMTR-MCNC: 149 MG/DL (ref 70–130)
GLUCOSE SERPL-MCNC: 119 MG/DL (ref 65–99)
GLUCOSE SERPL-MCNC: 185 MG/DL (ref 65–99)
MAGNESIUM SERPL-MCNC: 2 MG/DL (ref 1.6–2.6)
POTASSIUM SERPL-SCNC: 3.1 MMOL/L (ref 3.5–5.2)
POTASSIUM SERPL-SCNC: 3.2 MMOL/L (ref 3.5–5.2)
POTASSIUM SERPL-SCNC: 3.7 MMOL/L (ref 3.5–5.2)
RH BLD: POSITIVE
SODIUM SERPL-SCNC: 131 MMOL/L (ref 136–145)
SODIUM SERPL-SCNC: 133 MMOL/L (ref 136–145)

## 2020-08-14 PROCEDURE — 25010000002 ONDANSETRON PER 1 MG: Performed by: SURGERY

## 2020-08-14 PROCEDURE — 43775 LAP SLEEVE GASTRECTOMY: CPT | Performed by: SURGERY

## 2020-08-14 PROCEDURE — 83735 ASSAY OF MAGNESIUM: CPT | Performed by: NURSE PRACTITIONER

## 2020-08-14 PROCEDURE — 25010000002 BUPRENORPHINE PER 0.1 MG: Performed by: ANESTHESIOLOGY

## 2020-08-14 PROCEDURE — 86900 BLOOD TYPING SEROLOGIC ABO: CPT

## 2020-08-14 PROCEDURE — 25010000002 DEXAMETHASONE SODIUM PHOSPHATE 10 MG/ML SOLUTION: Performed by: ANESTHESIOLOGY

## 2020-08-14 PROCEDURE — 82962 GLUCOSE BLOOD TEST: CPT

## 2020-08-14 PROCEDURE — 88307 TISSUE EXAM BY PATHOLOGIST: CPT | Performed by: SURGERY

## 2020-08-14 PROCEDURE — 25010000002 ENOXAPARIN PER 10 MG: Performed by: SURGERY

## 2020-08-14 PROCEDURE — 25010000003 LIDOCAINE 1 % SOLUTION: Performed by: NURSE ANESTHETIST, CERTIFIED REGISTERED

## 2020-08-14 PROCEDURE — 25010000003 POTASSIUM CHLORIDE PER 2 MEQ: Performed by: SURGERY

## 2020-08-14 PROCEDURE — 25010000002 PHENYLEPHRINE PER 1 ML: Performed by: NURSE ANESTHETIST, CERTIFIED REGISTERED

## 2020-08-14 PROCEDURE — 25010000002 PROPOFOL 10 MG/ML EMULSION: Performed by: NURSE ANESTHETIST, CERTIFIED REGISTERED

## 2020-08-14 PROCEDURE — 84132 ASSAY OF SERUM POTASSIUM: CPT | Performed by: ANESTHESIOLOGY

## 2020-08-14 PROCEDURE — 25010000002 HYDROMORPHONE 1 MG/ML SOLUTION: Performed by: SURGERY

## 2020-08-14 PROCEDURE — 25010000002 HYDROMORPHONE PER 4 MG: Performed by: NURSE ANESTHETIST, CERTIFIED REGISTERED

## 2020-08-14 PROCEDURE — 94660 CPAP INITIATION&MGMT: CPT

## 2020-08-14 PROCEDURE — 99221 1ST HOSP IP/OBS SF/LOW 40: CPT | Performed by: NURSE PRACTITIONER

## 2020-08-14 PROCEDURE — 80048 BASIC METABOLIC PNL TOTAL CA: CPT | Performed by: NURSE PRACTITIONER

## 2020-08-14 PROCEDURE — 25010000002 DEXAMETHASONE PER 1 MG: Performed by: NURSE ANESTHETIST, CERTIFIED REGISTERED

## 2020-08-14 PROCEDURE — 0DB64Z3 EXCISION OF STOMACH, PERCUTANEOUS ENDOSCOPIC APPROACH, VERTICAL: ICD-10-PCS | Performed by: SURGERY

## 2020-08-14 PROCEDURE — 94799 UNLISTED PULMONARY SVC/PX: CPT

## 2020-08-14 PROCEDURE — 86901 BLOOD TYPING SEROLOGIC RH(D): CPT

## 2020-08-14 DEVICE — REINFORCED INTELLIGENT RELOAD, FOR USE WITH SIGNIA STAPLING SYSTEM
Type: IMPLANTABLE DEVICE | Site: STOMACH | Status: FUNCTIONAL
Brand: TRI-STAPLE 2.0

## 2020-08-14 DEVICE — SEALANT WND FIBRIN TISSEEL PREFIL/SYR/PRIMAFZ 10ML: Type: IMPLANTABLE DEVICE | Site: STOMACH | Status: FUNCTIONAL

## 2020-08-14 DEVICE — BLACK REINFORCED INTELLIGENT RELOAD, FOR USE WITH SIGNIA STAPLING SYSTEM
Type: IMPLANTABLE DEVICE | Site: STOMACH | Status: FUNCTIONAL
Brand: TRI-STAPLE 2.0

## 2020-08-14 DEVICE — FLOSEAL HEMOSTATIC MATRIX, 10ML
Type: IMPLANTABLE DEVICE | Site: STOMACH | Status: FUNCTIONAL
Brand: FLOSEAL HEMOSTATIC MATRIX

## 2020-08-14 RX ORDER — PROMETHAZINE HYDROCHLORIDE 25 MG/ML
12.5 INJECTION, SOLUTION INTRAMUSCULAR; INTRAVENOUS EVERY 6 HOURS PRN
Status: DISCONTINUED | OUTPATIENT
Start: 2020-08-14 | End: 2020-08-16 | Stop reason: HOSPADM

## 2020-08-14 RX ORDER — LORAZEPAM 2 MG/ML
0.5 INJECTION INTRAMUSCULAR EVERY 12 HOURS PRN
Status: DISCONTINUED | OUTPATIENT
Start: 2020-08-14 | End: 2020-08-16 | Stop reason: HOSPADM

## 2020-08-14 RX ORDER — GABAPENTIN 100 MG/1
100 CAPSULE ORAL 3 TIMES DAILY
Status: DISCONTINUED | OUTPATIENT
Start: 2020-08-14 | End: 2020-08-16 | Stop reason: HOSPADM

## 2020-08-14 RX ORDER — PANTOPRAZOLE SODIUM 40 MG/10ML
40 INJECTION, POWDER, LYOPHILIZED, FOR SOLUTION INTRAVENOUS
Status: DISCONTINUED | OUTPATIENT
Start: 2020-08-15 | End: 2020-08-16 | Stop reason: HOSPADM

## 2020-08-14 RX ORDER — PROMETHAZINE HYDROCHLORIDE 12.5 MG/1
12.5 TABLET ORAL EVERY 6 HOURS PRN
Status: DISCONTINUED | OUTPATIENT
Start: 2020-08-14 | End: 2020-08-16 | Stop reason: HOSPADM

## 2020-08-14 RX ORDER — PROPOFOL 10 MG/ML
VIAL (ML) INTRAVENOUS AS NEEDED
Status: DISCONTINUED | OUTPATIENT
Start: 2020-08-14 | End: 2020-08-14 | Stop reason: SURG

## 2020-08-14 RX ORDER — SODIUM CHLORIDE 0.9 % (FLUSH) 0.9 %
10 SYRINGE (ML) INJECTION EVERY 12 HOURS SCHEDULED
Status: DISCONTINUED | OUTPATIENT
Start: 2020-08-14 | End: 2020-08-14 | Stop reason: HOSPADM

## 2020-08-14 RX ORDER — BUPRENORPHINE HYDROCHLORIDE 0.32 MG/ML
INJECTION INTRAMUSCULAR; INTRAVENOUS
Status: COMPLETED | OUTPATIENT
Start: 2020-08-14 | End: 2020-08-14

## 2020-08-14 RX ORDER — SCOLOPAMINE TRANSDERMAL SYSTEM 1 MG/1
1 PATCH, EXTENDED RELEASE TRANSDERMAL ONCE
Status: DISCONTINUED | OUTPATIENT
Start: 2020-08-14 | End: 2020-08-14

## 2020-08-14 RX ORDER — TORSEMIDE 20 MG/1
60 TABLET ORAL DAILY
Status: DISCONTINUED | OUTPATIENT
Start: 2020-08-15 | End: 2020-08-15

## 2020-08-14 RX ORDER — LIDOCAINE HYDROCHLORIDE 10 MG/ML
INJECTION, SOLUTION INFILTRATION; PERINEURAL AS NEEDED
Status: DISCONTINUED | OUTPATIENT
Start: 2020-08-14 | End: 2020-08-14 | Stop reason: SURG

## 2020-08-14 RX ORDER — FENTANYL CITRATE 50 UG/ML
50 INJECTION, SOLUTION INTRAMUSCULAR; INTRAVENOUS
Status: DISCONTINUED | OUTPATIENT
Start: 2020-08-14 | End: 2020-08-14 | Stop reason: HOSPADM

## 2020-08-14 RX ORDER — CEFAZOLIN SODIUM IN 0.9 % NACL 3 G/100 ML
3 INTRAVENOUS SOLUTION, PIGGYBACK (ML) INTRAVENOUS EVERY 8 HOURS
Status: DISCONTINUED | OUTPATIENT
Start: 2020-08-14 | End: 2020-08-14

## 2020-08-14 RX ORDER — CEFAZOLIN SODIUM IN 0.9 % NACL 3 G/100 ML
3 INTRAVENOUS SOLUTION, PIGGYBACK (ML) INTRAVENOUS ONCE
Status: DISCONTINUED | OUTPATIENT
Start: 2020-08-14 | End: 2020-08-14 | Stop reason: HOSPADM

## 2020-08-14 RX ORDER — DIPHENHYDRAMINE HYDROCHLORIDE 50 MG/ML
25 INJECTION INTRAMUSCULAR; INTRAVENOUS EVERY 4 HOURS PRN
Status: DISCONTINUED | OUTPATIENT
Start: 2020-08-14 | End: 2020-08-16 | Stop reason: HOSPADM

## 2020-08-14 RX ORDER — BUPIVACAINE HYDROCHLORIDE 2.5 MG/ML
INJECTION, SOLUTION EPIDURAL; INFILTRATION; INTRACAUDAL
Status: COMPLETED | OUTPATIENT
Start: 2020-08-14 | End: 2020-08-14

## 2020-08-14 RX ORDER — ALLOPURINOL 100 MG/1
100 TABLET ORAL DAILY
Status: DISCONTINUED | OUTPATIENT
Start: 2020-08-15 | End: 2020-08-16 | Stop reason: HOSPADM

## 2020-08-14 RX ORDER — LIDOCAINE HYDROCHLORIDE 10 MG/ML
0.5 INJECTION, SOLUTION EPIDURAL; INFILTRATION; INTRACAUDAL; PERINEURAL ONCE AS NEEDED
Status: COMPLETED | OUTPATIENT
Start: 2020-08-14 | End: 2020-08-14

## 2020-08-14 RX ORDER — SPIRONOLACTONE 50 MG/1
50 TABLET, FILM COATED ORAL DAILY
Status: DISCONTINUED | OUTPATIENT
Start: 2020-08-15 | End: 2020-08-15

## 2020-08-14 RX ORDER — PANTOPRAZOLE SODIUM 40 MG/10ML
40 INJECTION, POWDER, LYOPHILIZED, FOR SOLUTION INTRAVENOUS ONCE
Status: COMPLETED | OUTPATIENT
Start: 2020-08-14 | End: 2020-08-14

## 2020-08-14 RX ORDER — HYDROMORPHONE HYDROCHLORIDE 1 MG/ML
0.5 INJECTION, SOLUTION INTRAMUSCULAR; INTRAVENOUS; SUBCUTANEOUS
Status: DISCONTINUED | OUTPATIENT
Start: 2020-08-14 | End: 2020-08-14 | Stop reason: HOSPADM

## 2020-08-14 RX ORDER — ACETAMINOPHEN 500 MG
1000 TABLET ORAL ONCE
Status: COMPLETED | OUTPATIENT
Start: 2020-08-14 | End: 2020-08-14

## 2020-08-14 RX ORDER — CYANOCOBALAMIN 1000 UG/ML
1000 INJECTION, SOLUTION INTRAMUSCULAR; SUBCUTANEOUS ONCE
Status: COMPLETED | OUTPATIENT
Start: 2020-08-15 | End: 2020-08-15

## 2020-08-14 RX ORDER — EPHEDRINE SULFATE 50 MG/ML
5 INJECTION, SOLUTION INTRAVENOUS ONCE AS NEEDED
Status: DISCONTINUED | OUTPATIENT
Start: 2020-08-14 | End: 2020-08-14 | Stop reason: HOSPADM

## 2020-08-14 RX ORDER — FIBRINOGEN HUMAN AND THROMBIN HUMAN 10 ML
KIT TOPICAL AS NEEDED
Status: DISCONTINUED | OUTPATIENT
Start: 2020-08-14 | End: 2020-08-14 | Stop reason: HOSPADM

## 2020-08-14 RX ORDER — CARVEDILOL 12.5 MG/1
25 TABLET ORAL EVERY 12 HOURS SCHEDULED
Status: DISCONTINUED | OUTPATIENT
Start: 2020-08-14 | End: 2020-08-15

## 2020-08-14 RX ORDER — ONDANSETRON 4 MG/1
4 TABLET, FILM COATED ORAL EVERY 6 HOURS PRN
Status: DISCONTINUED | OUTPATIENT
Start: 2020-08-15 | End: 2020-08-16 | Stop reason: HOSPADM

## 2020-08-14 RX ORDER — HYDRALAZINE HYDROCHLORIDE 20 MG/ML
10 INJECTION INTRAMUSCULAR; INTRAVENOUS
Status: DISCONTINUED | OUTPATIENT
Start: 2020-08-14 | End: 2020-08-16

## 2020-08-14 RX ORDER — HYDROMORPHONE HYDROCHLORIDE 2 MG/1
2 TABLET ORAL EVERY 4 HOURS PRN
Status: DISCONTINUED | OUTPATIENT
Start: 2020-08-14 | End: 2020-08-16 | Stop reason: HOSPADM

## 2020-08-14 RX ORDER — SODIUM CHLORIDE AND POTASSIUM CHLORIDE 150; 450 MG/100ML; MG/100ML
25 INJECTION, SOLUTION INTRAVENOUS CONTINUOUS
Status: DISCONTINUED | OUTPATIENT
Start: 2020-08-14 | End: 2020-08-15

## 2020-08-14 RX ORDER — SODIUM CHLORIDE 9 MG/ML
9 INJECTION, SOLUTION INTRAVENOUS CONTINUOUS
Status: DISCONTINUED | OUTPATIENT
Start: 2020-08-14 | End: 2020-08-14 | Stop reason: HOSPADM

## 2020-08-14 RX ORDER — SODIUM CHLORIDE 9 MG/ML
INJECTION, SOLUTION INTRAVENOUS AS NEEDED
Status: DISCONTINUED | OUTPATIENT
Start: 2020-08-14 | End: 2020-08-14 | Stop reason: HOSPADM

## 2020-08-14 RX ORDER — MORPHINE SULFATE 4 MG/ML
4 INJECTION, SOLUTION INTRAMUSCULAR; INTRAVENOUS
Status: DISCONTINUED | OUTPATIENT
Start: 2020-08-14 | End: 2020-08-16 | Stop reason: HOSPADM

## 2020-08-14 RX ORDER — POTASSIUM CHLORIDE 1.5 G/1.77G
10 POWDER, FOR SOLUTION ORAL DAILY
Status: DISCONTINUED | OUTPATIENT
Start: 2020-08-15 | End: 2020-08-16 | Stop reason: SDUPTHER

## 2020-08-14 RX ORDER — GABAPENTIN 300 MG/1
600 CAPSULE ORAL ONCE
Status: COMPLETED | OUTPATIENT
Start: 2020-08-14 | End: 2020-08-14

## 2020-08-14 RX ORDER — ACETAMINOPHEN 500 MG
1000 TABLET ORAL EVERY 8 HOURS SCHEDULED
Status: DISCONTINUED | OUTPATIENT
Start: 2020-08-14 | End: 2020-08-16 | Stop reason: HOSPADM

## 2020-08-14 RX ORDER — NALOXONE HCL 0.4 MG/ML
0.4 VIAL (ML) INJECTION
Status: DISCONTINUED | OUTPATIENT
Start: 2020-08-14 | End: 2020-08-16 | Stop reason: HOSPADM

## 2020-08-14 RX ORDER — MAGNESIUM HYDROXIDE 1200 MG/15ML
LIQUID ORAL AS NEEDED
Status: DISCONTINUED | OUTPATIENT
Start: 2020-08-14 | End: 2020-08-14 | Stop reason: HOSPADM

## 2020-08-14 RX ORDER — CHLORHEXIDINE GLUCONATE 0.12 MG/ML
30 RINSE ORAL
Status: DISCONTINUED | OUTPATIENT
Start: 2020-08-14 | End: 2020-08-14

## 2020-08-14 RX ORDER — SODIUM CHLORIDE 9 MG/ML
125 INJECTION, SOLUTION INTRAVENOUS CONTINUOUS
Status: DISCONTINUED | OUTPATIENT
Start: 2020-08-14 | End: 2020-08-14 | Stop reason: HOSPADM

## 2020-08-14 RX ORDER — DEXAMETHASONE SODIUM PHOSPHATE 4 MG/ML
INJECTION, SOLUTION INTRA-ARTICULAR; INTRALESIONAL; INTRAMUSCULAR; INTRAVENOUS; SOFT TISSUE AS NEEDED
Status: DISCONTINUED | OUTPATIENT
Start: 2020-08-14 | End: 2020-08-14 | Stop reason: SURG

## 2020-08-14 RX ORDER — SODIUM CHLORIDE 0.9 % (FLUSH) 0.9 %
3-10 SYRINGE (ML) INJECTION AS NEEDED
Status: DISCONTINUED | OUTPATIENT
Start: 2020-08-14 | End: 2020-08-14 | Stop reason: HOSPADM

## 2020-08-14 RX ORDER — METOLAZONE 5 MG/1
5 TABLET ORAL DAILY
Status: DISCONTINUED | OUTPATIENT
Start: 2020-08-15 | End: 2020-08-15

## 2020-08-14 RX ORDER — METOCLOPRAMIDE HYDROCHLORIDE 5 MG/ML
10 INJECTION INTRAMUSCULAR; INTRAVENOUS EVERY 6 HOURS PRN
Status: DISCONTINUED | OUTPATIENT
Start: 2020-08-14 | End: 2020-08-16 | Stop reason: HOSPADM

## 2020-08-14 RX ORDER — PROCHLORPERAZINE MALEATE 10 MG
10 TABLET ORAL EVERY 6 HOURS PRN
Status: DISCONTINUED | OUTPATIENT
Start: 2020-08-14 | End: 2020-08-16 | Stop reason: HOSPADM

## 2020-08-14 RX ORDER — ALBUTEROL SULFATE 2.5 MG/3ML
2.5 SOLUTION RESPIRATORY (INHALATION) EVERY 4 HOURS PRN
Status: DISCONTINUED | OUTPATIENT
Start: 2020-08-14 | End: 2020-08-16 | Stop reason: HOSPADM

## 2020-08-14 RX ORDER — OXYCODONE HYDROCHLORIDE 5 MG/1
5 TABLET ORAL EVERY 6 HOURS PRN
Status: DISCONTINUED | OUTPATIENT
Start: 2020-08-14 | End: 2020-08-16 | Stop reason: HOSPADM

## 2020-08-14 RX ORDER — SIMETHICONE 80 MG
80 TABLET,CHEWABLE ORAL 4 TIMES DAILY PRN
Status: DISCONTINUED | OUTPATIENT
Start: 2020-08-14 | End: 2020-08-16 | Stop reason: HOSPADM

## 2020-08-14 RX ORDER — CARVEDILOL 12.5 MG/1
25 TABLET ORAL ONCE
Status: COMPLETED | OUTPATIENT
Start: 2020-08-14 | End: 2020-08-14

## 2020-08-14 RX ORDER — LORAZEPAM 1 MG/1
1 TABLET ORAL EVERY 12 HOURS PRN
Status: DISCONTINUED | OUTPATIENT
Start: 2020-08-14 | End: 2020-08-16 | Stop reason: HOSPADM

## 2020-08-14 RX ORDER — ESMOLOL HYDROCHLORIDE 10 MG/ML
INJECTION INTRAVENOUS AS NEEDED
Status: DISCONTINUED | OUTPATIENT
Start: 2020-08-14 | End: 2020-08-14 | Stop reason: SURG

## 2020-08-14 RX ORDER — NALOXONE HCL 0.4 MG/ML
0.1 VIAL (ML) INJECTION
Status: DISCONTINUED | OUTPATIENT
Start: 2020-08-14 | End: 2020-08-16 | Stop reason: HOSPADM

## 2020-08-14 RX ORDER — DEXAMETHASONE SODIUM PHOSPHATE 10 MG/ML
INJECTION, SOLUTION INTRAMUSCULAR; INTRAVENOUS
Status: COMPLETED | OUTPATIENT
Start: 2020-08-14 | End: 2020-08-14

## 2020-08-14 RX ORDER — ALPRAZOLAM 0.25 MG/1
0.25 TABLET ORAL ONCE AS NEEDED
Status: DISCONTINUED | OUTPATIENT
Start: 2020-08-14 | End: 2020-08-16 | Stop reason: HOSPADM

## 2020-08-14 RX ORDER — SODIUM CHLORIDE 0.9 % (FLUSH) 0.9 %
10 SYRINGE (ML) INJECTION AS NEEDED
Status: DISCONTINUED | OUTPATIENT
Start: 2020-08-14 | End: 2020-08-14 | Stop reason: HOSPADM

## 2020-08-14 RX ORDER — CEFAZOLIN SODIUM IN 0.9 % NACL 3 G/100 ML
3 INTRAVENOUS SOLUTION, PIGGYBACK (ML) INTRAVENOUS EVERY 8 HOURS
Status: COMPLETED | OUTPATIENT
Start: 2020-08-15 | End: 2020-08-15

## 2020-08-14 RX ORDER — SODIUM CHLORIDE 0.9 % (FLUSH) 0.9 %
3 SYRINGE (ML) INJECTION EVERY 12 HOURS SCHEDULED
Status: DISCONTINUED | OUTPATIENT
Start: 2020-08-14 | End: 2020-08-14 | Stop reason: HOSPADM

## 2020-08-14 RX ORDER — ONDANSETRON 2 MG/ML
4 INJECTION INTRAMUSCULAR; INTRAVENOUS EVERY 6 HOURS
Status: DISPENSED | OUTPATIENT
Start: 2020-08-14 | End: 2020-08-15

## 2020-08-14 RX ORDER — ROCURONIUM BROMIDE 10 MG/ML
INJECTION, SOLUTION INTRAVENOUS AS NEEDED
Status: DISCONTINUED | OUTPATIENT
Start: 2020-08-14 | End: 2020-08-14 | Stop reason: SURG

## 2020-08-14 RX ADMIN — HYDROMORPHONE HYDROCHLORIDE 1 MG: 1 INJECTION, SOLUTION INTRAMUSCULAR; INTRAVENOUS; SUBCUTANEOUS at 22:39

## 2020-08-14 RX ADMIN — ROCURONIUM BROMIDE 10 MG: 10 SOLUTION INTRAVENOUS at 16:57

## 2020-08-14 RX ADMIN — SODIUM CHLORIDE 9 ML/HR: 9 INJECTION, SOLUTION INTRAVENOUS at 12:31

## 2020-08-14 RX ADMIN — SACUBITRIL AND VALSARTAN 1 TABLET: 49; 51 TABLET, FILM COATED ORAL at 22:16

## 2020-08-14 RX ADMIN — EPHEDRINE SULFATE 10 MG: 50 INJECTION INTRAMUSCULAR; INTRAVENOUS; SUBCUTANEOUS at 16:05

## 2020-08-14 RX ADMIN — ROCURONIUM BROMIDE 10 MG: 10 SOLUTION INTRAVENOUS at 17:14

## 2020-08-14 RX ADMIN — ESMOLOL HYDROCHLORIDE 40 MG: 10 INJECTION, SOLUTION INTRAVENOUS at 15:52

## 2020-08-14 RX ADMIN — GABAPENTIN 100 MG: 100 CAPSULE ORAL at 22:17

## 2020-08-14 RX ADMIN — CARVEDILOL 25 MG: 12.5 TABLET, FILM COATED ORAL at 12:22

## 2020-08-14 RX ADMIN — EPHEDRINE SULFATE 10 MG: 50 INJECTION INTRAMUSCULAR; INTRAVENOUS; SUBCUTANEOUS at 16:11

## 2020-08-14 RX ADMIN — PROPOFOL 150 MG: 10 INJECTION, EMULSION INTRAVENOUS at 15:52

## 2020-08-14 RX ADMIN — ACETAMINOPHEN 1000 MG: 500 TABLET, FILM COATED ORAL at 22:17

## 2020-08-14 RX ADMIN — CHLORHEXIDINE GLUCONATE 0.12% ORAL RINSE 30 ML: 1.2 LIQUID ORAL at 12:17

## 2020-08-14 RX ADMIN — DEXAMETHASONE SODIUM PHOSPHATE 4 MG: 10 INJECTION, SOLUTION INTRAMUSCULAR; INTRAVENOUS at 16:00

## 2020-08-14 RX ADMIN — POTASSIUM CHLORIDE AND SODIUM CHLORIDE 25 ML/HR: 450; 150 INJECTION, SOLUTION INTRAVENOUS at 22:18

## 2020-08-14 RX ADMIN — EPHEDRINE SULFATE 10 MG: 50 INJECTION INTRAMUSCULAR; INTRAVENOUS; SUBCUTANEOUS at 16:02

## 2020-08-14 RX ADMIN — ROCURONIUM BROMIDE 100 MG: 10 SOLUTION INTRAVENOUS at 15:52

## 2020-08-14 RX ADMIN — ROCURONIUM BROMIDE 10 MG: 10 SOLUTION INTRAVENOUS at 16:42

## 2020-08-14 RX ADMIN — ACETAMINOPHEN 1000 MG: 500 TABLET ORAL at 12:11

## 2020-08-14 RX ADMIN — DEXAMETHASONE SODIUM PHOSPHATE 8 MG: 4 INJECTION, SOLUTION INTRAMUSCULAR; INTRAVENOUS at 15:52

## 2020-08-14 RX ADMIN — ONDANSETRON 4 MG: 2 INJECTION INTRAMUSCULAR; INTRAVENOUS at 22:18

## 2020-08-14 RX ADMIN — BUPIVACAINE HYDROCHLORIDE 60 ML: 2.5 INJECTION, SOLUTION EPIDURAL; INFILTRATION; INTRACAUDAL; PERINEURAL at 16:00

## 2020-08-14 RX ADMIN — LIDOCAINE HYDROCHLORIDE 0.5 ML: 10 INJECTION, SOLUTION EPIDURAL; INFILTRATION; INTRACAUDAL; PERINEURAL at 12:31

## 2020-08-14 RX ADMIN — Medication 3 G: at 15:44

## 2020-08-14 RX ADMIN — PHENYLEPHRINE HYDROCHLORIDE 0.5 MCG/KG/MIN: 10 INJECTION INTRAVENOUS at 16:00

## 2020-08-14 RX ADMIN — LIDOCAINE HYDROCHLORIDE 50 MG: 10 INJECTION, SOLUTION INFILTRATION; PERINEURAL at 15:52

## 2020-08-14 RX ADMIN — PANTOPRAZOLE SODIUM 40 MG: 40 INJECTION, POWDER, FOR SOLUTION INTRAVENOUS at 12:34

## 2020-08-14 RX ADMIN — PROPOFOL 25 MCG/KG/MIN: 10 INJECTION, EMULSION INTRAVENOUS at 16:00

## 2020-08-14 RX ADMIN — CARVEDILOL 25 MG: 12.5 TABLET, FILM COATED ORAL at 22:17

## 2020-08-14 RX ADMIN — GABAPENTIN 600 MG: 300 CAPSULE ORAL at 12:11

## 2020-08-14 RX ADMIN — SCOPALAMINE 1 PATCH: 1 PATCH, EXTENDED RELEASE TRANSDERMAL at 12:13

## 2020-08-14 RX ADMIN — EPHEDRINE SULFATE 10 MG: 50 INJECTION INTRAMUSCULAR; INTRAVENOUS; SUBCUTANEOUS at 16:16

## 2020-08-14 RX ADMIN — HYDROMORPHONE HYDROCHLORIDE 0.5 MG: 1 INJECTION, SOLUTION INTRAMUSCULAR; INTRAVENOUS; SUBCUTANEOUS at 18:32

## 2020-08-14 RX ADMIN — BUPRENORPHINE HYDROCHLORIDE 0.3 MG: 0.32 INJECTION INTRAMUSCULAR; INTRAVENOUS at 16:00

## 2020-08-14 NOTE — PROGRESS NOTES
"[facility] BARIATRIC SURGERY  [address]  [address]  [facility] [id number] [phone]      Patient  Name:  Evan Gannon  :  1990      Date of Visit: 2020    Chief Complaint:  weight gain; unable to maintain weight loss.   Evaluate for possible metabolic and bariatric surgery    History of Present Illness:  Evan Gannon is a 29 y.o. male who presents today for evaluation, education and consultation regarding metabolic and bariatric surgery (MBS).  Since last seen 2020 he has gained 6-1/2 pounds.  However he is down 14-1/2 pounds since seen 2020 and 23 pounds down from when first seen on 12/3/2019.The patient returns for final visit prior to metabolic and bariatric surgery specifically the sleeve gastrectomy.  Original intake evaluation Jodi Howard PA-C dated 12/3/2019 reviewed.    From her evaluation that day:      \"His maximum lifetime weight is 548 pounds.     As above, patient has been overweight for many years, with numerous failed dietary/weight loss attempts.  He now has obesity related comorbidities and as such has decided to pursue weight loss surgery.Pursuing LSG to improve cardiac health with h/o CHF.  Currently not a transplant candidate with high BMI. Knows several people s/p LSG.      CV: CHF diagnosed at 21yo, thought to be related to undiagnosed congenital defect- per patient. hospitalized every 3 weeks during the winter due to PNA.  ICD in place. Although pleased has not been hospitalized yet this winter.  Has significantf fluid retention despite diuretics. Chart review notes HTN and MI, patient denies, says he almost had MI, had heart cath, no stents.  Followed by cardiologist- Dr. Lewis, although says has not seen them in quite some time and needs appt, unknown last ECHO. LOV encouraged bariatric surgery, currently not transplant candidate dur to morbid obesity.  Also followed by [facility], " "manages oxygen requirement.  On ASA daily.      Pulm: significant SOA/ dyspnea, related to CHF with fluid overload. Supposed to be on 24/7 home O2, managed by cardiology. Currently only using prn due to running out of oxygen. Denies seeing pulm, although chart review shows followed by [facility] for COLLIN.      GI: Denies reflux/ heartburn, nausea, vomiting, abd pain, dypshagia, BM issues. No h/o EGD, h pylori or HH.  GB present. Tolerates all foods well.      Psoriasis on cosentyx managed by dermatology- hasn't started this yet but plans to. Gout on allopurinol, denies flares.      Of note, chart lists personal history of medical noncompliance in history. Multiple no show appts with Dr. Olsen.\"     The patient has had issues with morbid obesity for years and only temporary success with non-surgical methods of weight loss.  The patient is seeking LSG to help with the morbid obesity related conditions of congestive heart failure EF 20%, gout, hypertension, obstructive sleep apnea, hypoxia on home oxygen, paresthesias, plaque psoriasis, non-insulin-dependent type 2 diabetes mellitus, hypokalemia, markedly abnormal pulmonary function test, low HDL.    29-year-old disabled super morbidly obese black male from [facility].  He says that after my talk he realized that \"I have been eating all wrong\".  He is aware at his BMI at least theoretically sleeve gastrectomy may be a first stage procedure.  He is a drummer.  He denies any personal or family history of bleeding or clotting disorders.      Past Medical History:   Diagnosis Date   • CHF (congestive heart failure) (CMS/HCC)     diagnosed at 21yo, thought to be related to undiagnosed congenital defect- per patient. hospitalized every 3 weeks during the winter due to PNA.  Has significantf fluid retention despite diurectics   • CPAP (continuous positive airway pressure) dependence     settings at 15   • Gout     on allopurinol, controls symptoms   • " "Hypertension     patient denies   • ICD (implantable cardioverter-defibrillator) in place    • Morbid obesity (CMS/[facility])    • Morbid obesity with BMI of 60.0-69.9, adult (CMS/[facility])    • Myocardial infarction (CMS/[facility])     \"almost\" per patient, had heart cath, no stents, has defibrillator   • On home O2     supposed to be 24/7, ran out of O2 and has only been using prn. manged  by cardiology   • COLLIN on CPAP     compliant   • Paresthesia     hands/ arms/ feet, suspected due to poor circulation reportedly   • Plaque psoriasis    • Pneumonia    • Psoriasis    • Sleep apnea    • Wears glasses      Past Surgical History:   Procedure Laterality Date   • CARDIAC CATHETERIZATION N/A 7/13/2017    Procedure: Left Heart Cath;  Surgeon: Balbir Olsen MD;  Location:  [facility];  Service:    • CARDIAC DEFIBRILLATOR PLACEMENT  08/2017   • CARDIAC DEFIBRILLATOR PLACEMENT     • CARDIAC ELECTROPHYSIOLOGY PROCEDURE N/A 8/15/2017    Procedure: VVI ICD Implant;  Surgeon: Nathanael Richmond DO;  Location:  [facility] INVASIVE [facility];  Service:    • OTHER SURGICAL HISTORY      ultra light therapy   • SINUS SURGERY      cartilage from ear inserted in nasal cavity   • TONSILLECTOMY AND ADENOIDECTOMY  2000       No Known Allergies    Current Outpatient Medications:   •  allopurinol (Zyloprim) 100 MG tablet, Take 1 tablet by mouth Daily., Disp: 30 tablet, Rfl: 5  •  aspirin (Aspirin Adult Low Strength) 81 MG EC tablet, Take 1 tablet by mouth Daily. (Patient taking differently: Take 81 mg by mouth Daily. Last dose 8-9-20 per Dr. Whittington's orders per patient), Disp: 90 tablet, Rfl: 1  •  carvedilol (COREG) 25 MG tablet, Take 1 tablet by mouth 2 (Two) Times a Day With Meals., Disp: 180 tablet, Rfl: 1  •  metOLazone (ZAROXOLYN) 5 MG tablet, Take 1 tablet by mouth Daily., Disp: 15 tablet, Rfl: 3  •  potassium chloride (K-DUR) 10 MEQ CR tablet, Take 1 tablet by mouth 2 (Two) Times a Day. (Patient taking differently: Take 10 mEq by " mouth Daily.), Disp: 60 tablet, Rfl: 0  •  sacubitril-valsartan (Entresto) 49-51 MG tablet, Take 1 tablet by mouth Every 12 (Twelve) Hours., Disp: 180 tablet, Rfl: 1  •  Secukinumab (COSENTYX, 300 MG DOSE, SC), Inject 300 mg under the skin into the appropriate area as directed., Disp: , Rfl:   •  spironolactone (Aldactone) 50 MG tablet, Take 1 tablet by mouth Daily., Disp: 90 tablet, Rfl: 1  •  torsemide (DEMADEX) 20 MG tablet, Take 3 tablets by mouth Daily., Disp: 270 tablet, Rfl: 1  •  acetaminophen (TYLENOL) 500 MG tablet, Take 500 mg by mouth Every 6 (Six) Hours As Needed for Mild Pain ., Disp: , Rfl:   •  apixaban (Eliquis) 2.5 MG tablet tablet, Take 1 tablet by mouth Every 12 (Twelve) Hours for 42 doses., Disp: 42 tablet, Rfl: 0    Social History     Socioeconomic History   • Marital status: Single     Spouse name: Not on file   • Number of children: 0   • Years of education: Not on file   • Highest education level: Not on file   Occupational History   • Occupation: disabled   Tobacco Use   • Smoking status: Never Smoker   • Smokeless tobacco: Never Used   Substance and Sexual Activity   • Alcohol use: No   • Drug use: No   • Sexual activity: Defer   Social History Narrative    Lives in [facility] with parents and brother.  Disabled from CHF, given pass to work- hasn't gotten hired yet. .             Caffeine use: 6 sodas weekly    Patient lives with parents        Caffeine 1 soda a week        Caffeine 8 servings 3 days a week     Family History   Problem Relation Age of Onset   • Diabetes Father    • Diabetes Paternal Grandmother    • Diabetes Maternal Grandfather        Review of Systems   Constitutional: Positive for fatigue. Negative for chills, diaphoresis, fever and unexpected weight loss.   HENT: Negative for congestion and facial swelling.    Eyes: Negative for blurred vision, double vision and discharge.   Respiratory: Positive for shortness of breath. Negative for chest tightness and  stridor.    Cardiovascular: Positive for leg swelling. Negative for chest pain and palpitations.   Gastrointestinal: Negative for blood in stool.   Endocrine: Negative for polydipsia.   Genitourinary: Negative for hematuria.   Musculoskeletal: Positive for arthralgias.   Skin: Negative for color change.   Allergic/Immunologic: Negative for immunocompromised state.   Neurological: Negative for confusion.   Psychiatric/Behavioral: Negative for self-injury.       I have reviewed the ROS and confirm that it's accurate today.    Physical Exam:  Vital Signs:  Weight: (!) 182 kg (401 lb 8 oz)   Body mass index is 57.61 kg/m².  Temp: 97.5 °F (36.4 °C)   Heart Rate: 67   BP: 138/86     Physical Exam   Constitutional: He is oriented to person, place, and time. He appears well-developed and well-nourished.   Wearing oxygen by nasal cannula   HENT:   Head: Normocephalic and atraumatic.   mask   Eyes: Pupils are equal, round, and reactive to light. Conjunctivae and EOM are normal.   Neck: Normal range of motion. Neck supple. Carotid bruit is not present. No tracheal deviation present. No thyromegaly present.   Cardiovascular: Regular rhythm and normal heart sounds. Tachycardia present.   Pulmonary/Chest: Effort normal and breath sounds normal. No respiratory distress.   Abdominal: Soft. He exhibits no distension. There is no hepatosplenomegaly. There is no tenderness.   Psoriatic patches of his abdomen   Musculoskeletal: Normal range of motion. He exhibits no edema or deformity.   Neurological: He is alert and oriented to person, place, and time. No cranial nerve deficit. Coordination normal.   Skin: Skin is warm and dry. No rash noted.   Psoriasis, brawny edema lower extremities   Psychiatric: He has a normal mood and affect. His behavior is normal. Judgment and thought content normal.   Vitals reviewed.      Patient Active Problem List   Diagnosis   • Hypertension   • Morbid obesity (CMS/HCC)   • Severe obstructive sleep apnea    • Nonischemic congestive cardiomyopathy (CMS/HCC)   • Personal history of noncompliance with medical treatment   • CHF (congestive heart failure), NYHA class IV (CMS/HCC)   • Plaque psoriasis   • Painless hematuria   • Pneumonia of right lower lobe due to infectious organism   • CHF (congestive heart failure) (CMS/HCC)   • Myocardial infarction (CMS/HCC)   • COLLIN on CPAP   • On home O2   • Sleep apnea   • Psoriasis   • Wears glasses   • Gout   • Morbid obesity with BMI of 60.0-69.9, adult (CMS/HCC)   • Paresthesia   • Morbid obesity with body mass index (BMI) of 50.0 to 59.9 in adult (CMS/[facility])       Assessment:    Evan Gannon is a 29 y.o. year old male with medically complicated obesity.    Metabolic and bariatric surgery is deemed medically necessary given the following obesity related comorbidities including congestive heart failure, gout, hypertension, obstructive sleep apnea, hypoxia on home oxygen, paresthesias, plaque psoriasis, non-insulin-dependent type 2 diabetes mellitus, hypokalemia, markedly abnormal pulmonary function test, low HDL with current Weight: (!) 182 kg (401 lb 8 oz) and Body mass index is 57.61 kg/m²..    Encounter Diagnosis   Name Primary?   • Morbid obesity with body mass index (BMI) of 50.0 to 59.9 in adult (CMS/[facility]) Yes      Patient is aware that surgery is a tool, and that weight loss and improvement in comorbidities is not guaranteed but only seen in the context of appropriate use, follow up and physical activity.    The patient was present for an approximately a 2.5 hour discussion of the purpose of MBS, how MBS is a tool to assist in achieving weight loss goals, the most common complications and how best to avoid them, and the strategies for short and long term weight loss and improvement in comorbidities.  Ample opportunity to discuss questions was available both in group and during the time of individual examination.    I reviewed his Aaron report which is negative.   "Chest x-ray dated 7/30/2020 showing cardiomegaly without overt edema or effusion.  Left chest wall pacing device with lead intact.  EKG dated 7/30/2020 showing sinus tachycardia rate 108 possible left atrial enlargement when compared with EKG dated 12/16/2019 incomplete left bundle branch block is no longer present.  CBC and CMP dated 7/30/2020 normal except for glucose of 106 sodium 131 potassium 2.7 chloride 91 CO2 31.4 low MCV and low MCH of note H&H 13.2 and 39. Psychosocial evaluation dated 12/3/2019 Estela Knowles, PhD good candidate.  Dietitian evaluation dated 12/3/2019 Mayadeyanira limaly RD showing he drinks two 2 L bottles of regular ginger ale weekly and occasional fountain sodas.  That day he had a white bun for his sandwich, to honey buns regular size findings sandwich crackers and a triple XL 64 ounce fountain Coke frozen pot pie for dinner.  Diet was noted to be marginal in protein and excess of convenience foods.  Negative H. pylori breath test dated 1/3/2020.  Labs dated 1/3/2020 showing a normal TSH normal lipid panel except for an HDL of 39 hemoglobin A1c elevated 6.20 normal CMP except for glucose of 100 chloride of 97 CO2 of 29.6 normal CBC except for a low MCH of note H&H 14.6 and 45.4.  Pulmonary function tests dated 12/12/2019 showing an FVC of 48 FEV1 of 39 DLCO of 29.  Pulmonary clearance dated 12/12/2019 SONAM Schmid with pulmonary function tests read by her showing restrictive airway disease with an FVC of 48% and a total lung capacity of 50%.  She notes severe obstructive sleep apnea on CPAP.  She feels he would be at \"high risk\" once again she feels he has \"quite a bit of restrictive lung disease\" felt to be likely related to his cardiomyopathy pulmonary edema and morbid obesity.  She recommended incentive spirometry, coughing and deep breathing exercises after surgery and using his CPAP.  Cardiology clearance dated 1/6/2020 Dr. Olsen saying it is okay to hold his antiplatelet agents 1 " "week prior in 6 weeks after sleeve gastrectomy.  He does note echocardiogram in July 2017 showed an ejection fraction of 20% and left heart catheterization in July 2017 showed an ejection fraction of 10% and normal coronary arteries.  Echocardiogram in October 2017 ejection fraction 15% left ventricle was severely dilated right ventricle mildly dilated he notes a Biotronik VVI ICD implantation 8/15/2017 by Dr. Olson.  He notes no DFTs were done due to the patient's issues with hypoxemia even with minimal sedation as well as his acute on chronic congestive heart failure.  His conclusion that the patient had stable and asymptomatic congestive heart failure was currently euvolemic and feels he would be at low cardiovascular risk from an ischemic standpoint but felt \"obviously hypoxia/ventilation will be an issue.  As well volume status.\"  Please see scanned records that I have reviewed and signed off on today.  All of this in addition to the patient's unique history and exam has been taken into consideration in determining their appropriate candidacy for MBS.    Complications  of laparoscopic/possible robotic gastric sleeve were discussed. The patient is well aware of the potential complications of surgery that include but not limited to bleeding, infections, deep venous thrombosis, pulmonary embolism, pulmonary complications such as pneumonia, cardiac events, hernias, small bowel obstruction, damage to the spleen or other organs, bowel injury, disfiguring scars, failure to lose weight, need for additional surgery, conversion to an open procedure, and death. Patient is also aware of complications which apply in this particular procedure that can include but are not limited to a \"leak\" at the staple line which in some instances may require conversion to gastric bypass.    The patient is aware if a hiatal hernia is encountered, it likely will be repaired.  R/B/A Rx to hiatal hernia repair were discussed as outlined in " our long consent form.  Briefly risks in addition to those for LSG include recurrent hernia, CLIFFORD, dysphagia, esophageal injury, pneumothorax, injury to the vagus nerves, injury to the thoracic duct, aorta or vena cava.    I discussed avoiding all tobacco products and second hand smoke at least 2 weeks pre-operatively and 6 weeks post-operatively to minimize the risk of sleeve leak.  This included discussing the importance of avoiding even secondhand smoke as the risk of leak is increased.  Examples discussed:  I made it very clear that the patient understands they should avoid even riding in a car where someone has previously smoked in the last 2 weeks, living in a house where someone smokes (even if it's in a separate room/patio/attached garage, etc.) we discussed that they should not have a conversation with a group of people who are smoking even if it's outside.  They can be around wood burning fires and barbecue.  I told them I do not know if marijuana has a same effects but my overall recommendation is to avoid it for 2 weeks prior in 6 weeks after surgery.  They also are aware that nicotine may also increase the risk of leak and I strongly encouraged him to avoid that as well for 2 weeks prior in 6 weeks after surgery.    Discussed the risks, benefits and alternative therapies at great length as outlined in our extensive consent forms, consent videos, and educational teaching process under the direction of the center's .    A copy of the patient's signed informed consent is on file.    R/B/A Rx discussed to postop anticoagulation incl but not limited to bleeding, drug reaction, venothromboembolic events, etc. and agreeable to taking post op  Eliquis 2.5 mg po Q 12 hrs #42      Plan: After evaluation today I think the patient is an extremely high risk candidate for laparoscopic sleeve gastrectomy although not prohibitive based on pulmonary and cardiology consultations as above.  He says his  potassium dosing has already been increased.  He understands that I cannot guarantee that the anesthesiology staff will be comfortable proceeding with his surgery.  He may require mechanical ventilation possibly prolonged possibly indefinitely postoperatively (trach etc).  He seems very aware of the risks and wishes to proceed.  We will hold his aspirin 1 week prior in 6 weeks after surgery as recommended by cardiology.  Once again at least theoretically given his BMI sleeve gastrectomy may be a first stage procedure.  Other issues include gout, hypertension, obstructive sleep apnea, hypoxia on home oxygen, paresthesias, plaque psoriasis, non-insulin-dependent type 2 diabetes mellitus, hypokalemia, markedly abnormal pulmonary function test, low HDL, extremely poor dietitian evaluation.        Arnoldo Whittington MD

## 2020-08-14 NOTE — OP NOTE
Preoperative Diagnosis:   Super Morbid Obesity (401.5 lbs/BMI 57.61) with Multiple Co-Morbidities    Postoperative Diagnosis:   Same    Procedure:                                                      Laparoscopic Sleeve Gastrectomy (85% subtotal vertical gastrectomy)                                                                        EGD                                                                       Surgeon:                                                       ROSALINDA Whittington MD    Anesthesia:                                                   GETA    EBL:                                                              Minimal    Fluids:                                                           Crystalloid    Specimens:                                                   Subtotal gastrectomy    Drains:                                                           None    Counts:                                                          Correct    Complications:                                               None    Indications:   This is a 29 year-old super morbidly obese black male who presents for elective high risk laparoscopic sleeve gastrectomy.  He's undergone our extensive preoperative education teaching and consent process everything's in order and he wishes to proceed.      Operative technique:     The patient was brought to the operating room, and placed supine upon the operating room table.  SCD hose were placed, he underwent uneventful general endotracheal anesthesia per the anesthesiology staff, he received IV Ancef and subcutaneous Lovenox, the anesthesiology staff performed a tap block, and his abdomen was prepped and draped with ChloraPrep in a sterile fashion, an Ioban was used as well, a Breaux catheter was not placed.      Extensive psoriatic abdominal wall lesions again noted.  The peritoneal cavity was entered in the high in the left midclavicular line using an 11 mm trocar and an Optiview  technique and the abdomen was insufflated to a pressure of 15 mmHg with CO2 gas.  Exploratory laparoscopy revealed no evidence of injury from the entrance technique, an enlarged fatty finely nodular left lobe of the liver, and surprisingly extensive omental adhesions along the midline and the length of the falciform ligament to below the umbilicus.  Under direct visualization two 5 mm trochars were placed in the left lateral abdomen and the adhesions which were very dense were dissected off the abdominal wall and falciform ligament.  Under direct visualization a 12 mm fascial splitting trocar was placed several centimeters above into the right of the umbilicus and across from this to the left of midline a 15 mm trocar.  Later in the case under direct visualization the left upper quadrant 5 mm trocar was placed in the right upper quadrant.  Through a stab incision in the epigastrium a Hollis retractor was used to elevate the left lobe of the liver.  As mentioned the liver was quite large and interestingly before even retracting the liver and just exposing the under surface it already had the appearance of patchy ischemia.  The hiatus was exposed, there was no visible hiatal hernia from the anterior view and this was photodocumented.  Beginning approximately two thirds of the way around the greater curvature the stomach, the gastrocolic vessels were divided.  Once again surprisingly dense adhesions of the posterior stomach to the retroperitoneum.  This proceeded proximally taking down all the short gastric vessels and exposing the left selwyn.  There were no posterior hernias or lipomas and this was photodocumented.  The superior spleen was noted to be adhesed to the diaphragm.  There was mild but increased oozing from all surfaces more so than normally encountered.  FloSeal and Ray-Alonzo's were used as needed.  Gastrocolic vessels were then divided medially to a few cm proximal to the pylorus.  The orogastric tube was  positioned into the distal antrum.  The stomach was marked with a marking pen 1 cm lateral to the angle of His, 3 cm away from the angularis, and 6 cm from the pylorus.  The 25 cm bariatric standard clamp was then positioned just inside these markings.  The 85% subtotal vertical sleeve gastrectomy was then performed using a Conservis articulating electronic linear stapler with dual absorbable strips.  The first firing was a 60 mm black load, the next 3 firings were 60 mm purple loads.  The OG and then the Standard clamp were removed.  The sleeve was performed such that it was uniform in size, no hourglassing or narrowing, especially at the angularis, and the final firing was done a centimeter away from the angle of His to hopefully avoid incorporating esophageal fibers.  The subtotal gastrectomy specimen was placed into a large retrieval bag and withdrawn and placed on a separate [place] stand, it was an average size specimen but thicker than usual.  The sleeve was submerged under saline.  Upper endoscopy was performed, and the endoscope was advanced into the duodenal bulb.  A lot of thin bowel noted throughout the stomach.  No air bubbles or leak seen, no bleeding at the staple line, no narrowing at the angularis, no pyloric spasm or deformity, no gastritis, no hiatal hernia or Banuelos's esophagus, and the endoscope was withdrawn.  The subtotal gastrectomy specimen was inspected and it was sent to pathology for permanent section.  Irrigation fluid was suctioned free.  The sleeve was resting nicely and hemostatic.  Remaining FloSeal was placed in the left upper quadrant and the sleeve staple line and LUQ were treated with a total of 10 cc of aerosolized Tisseal fibrin glue. Photodocumentation of the sleeve was obtained.  The Hollis retractor was removed.  Fascia at the 15 mm trocar site incision was closed with a horizontal mattress 0 Vicryl suture placed with a suture passer under direct visualization and  tying the knot extracorporeally.  Remaining trocars were removed under direct visualization, no bleeding noted from their sites.  Subcutaneous tissue in the 15 mm incision was closed with an interrupted 2-0 Vicryl plus suture, and skin in each incision was closed using 3-0 Monocryl plus in an interrupted subcuticular stitch followed by skin-a-fix.  The patient tolerated the procedure well without complication, was taken to the recovery room in stable condition.

## 2020-08-14 NOTE — ANESTHESIA PROCEDURE NOTES
4 quadrant tap block       Patient reassessed immediately prior to procedure    Patient location during procedure: OR  Reason for block: at surgeon's request and post-op pain management  Performed by  CRNA: Rory Millan CRNA  Preanesthetic Checklist  Completed: patient identified, site marked, surgical consent, pre-op evaluation, timeout performed, IV checked, risks and benefits discussed and monitors and equipment checked  Prep:  Pt Position: supine  Sterile barriers:cap, gloves, sterile barriers and mask  Prep: ChloraPrep  Patient monitoring: blood pressure monitoring, continuous pulse oximetry and EKG  Procedure  Sedation:no  Performed under: general  Guidance:ultrasound guided  Images:still images obtained, printed/placed on chart    Laterality:Bilateral  Block Type:TAP  Injection Technique:single-shot  Needle Type:short-bevel and echogenic  Needle Gauge:20 G  Resistance on Injection: none    Medications Used: buprenorphine (BUPRENEX) injection, 0.3 mg  dexamethasone sodium phosphate injection, 4 mg  bupivacaine PF (MARCAINE) 0.25 % injection, 60 mL  Med admintered at 8/14/2020 4:00 PM      Medications  Preservative Free Saline:15ml  Comment:Block Injection:  LA dose divided between Right and Left block        Post Assessment  Injection Assessment: negative aspiration for heme, incremental injection and no paresthesia on injection  Patient Tolerance:comfortable throughout block  Complications:no  Additional Notes      Under Ultrasound guidance, a BBraun 4inch 360 degree needle was advanced with Normal Saline hydro dissection of tissue.  The Internal Oblique and Transversus Abdominus muscles where visualized.  At or before the aponeurosis of Internal Oblique, local anesthetic spread was visualized in the Transversus Abdominus Plane. Injection was made incrementally with aspiration every 5 mls.  There was no  intravascular injection,  injection pressure was normal, there was no neural injection, and the  procedure was completed without difficulty.  Thank You.

## 2020-08-14 NOTE — ANESTHESIA PROCEDURE NOTES
Airway  Urgency: elective    Date/Time: 8/14/2020 3:56 PM  Airway not difficult    General Information and Staff    Patient location during procedure: OR  CRNA: Cristofer Adam CRNA    Indications and Patient Condition  Indications for airway management: airway protection    Preoxygenated: yes  MILS not maintained throughout  Mask difficulty assessment: 1 - vent by mask    Final Airway Details  Final airway type: endotracheal airway      Successful airway: ETT  Cuffed: yes   Successful intubation technique: video laryngoscopy  Facilitating devices/methods: intubating stylet  Endotracheal tube insertion site: oral  Blade: Harika  Blade size: 3  ETT size (mm): 8.0  Cormack-Lehane Classification: grade IIa - partial view of glottis  Placement verified by: chest auscultation and capnometry   Cuff volume (mL): 8  Measured from: lips  ETT/EBT  to lips (cm): 20  Number of attempts at approach: 1  Assessment: lips, teeth, and gum same as pre-op and atraumatic intubation    Additional Comments  Negative epigastric sounds, Breath sound equal bilaterally with symmetric chest rise and fall

## 2020-08-14 NOTE — BRIEF OP NOTE
GASTRIC SLEEVE LAPAROSCOPIC, ESOPHAGOGASTRODUODENOSCOPY  Progress Note    Evan Gannon  8/14/2020    Pre-op Diagnosis:   Morbid obesity with body mass index (BMI) of 50.0 to 59.9 in adult (CMS/[facility]) [E66.01, Z68.43]       Post-Op Diagnosis Codes:     * Morbid obesity with body mass index (BMI) of 50.0 to 59.9 in adult (CMS/[facility]) [E66.01, Z68.43]    Procedure/CPT® Codes:  DE LAP, JAMISON RESTRICT PROC, LONGITUDINAL GASTRECTOMY [02720]  DE ESOPHAGOGASTRODUODENOSCOPY TRANSORAL DIAGNOSTIC [83989]      Procedure(s):  GASTRIC SLEEVE LAPAROSCOPIC  ESOPHAGOGASTRODUODENOSCOPY    Surgeon(s):  Arnoldo Whittington MD    Anesthesia: General with Block    Staff:   Circulator: Chantale Jacobo RN; Marina Key, LAMBERTO; Estela Wong RN  Scrub Person: Watson Agrawal; Mary Laguerre  Nursing Assistant: Rosa Isela Armenta         Estimated Blood Loss: minimal    Urine Voided: * No values recorded between 8/14/2020  3:44 PM and 8/14/2020  5:40 PM *    Specimens:                Specimens     ID Source Type Tests Collected By Collected At Frozen?      A Stomach Tissue · TISSUE PATHOLOGY EXAM   Arnoldo Whittington MD 8/14/20 1724 No     Description: SUB-TOTAL GASTRECTOMY    This specimen was not marked as sent.                Drains: * No LDAs found *    Findings:     Complications: none          Arnoldo Whittington MD     Date: 8/14/2020  Time: 17:40

## 2020-08-14 NOTE — H&P (VIEW-ONLY)
"[facility] BARIATRIC SURGERY  [address]  [address]  [facility] [id number] [phone]      Patient  Name:  Evan Gannon  :  1990      Date of Visit: 2020    Chief Complaint:  weight gain; unable to maintain weight loss.   Evaluate for possible metabolic and bariatric surgery    History of Present Illness:  Evan Gannon is a 29 y.o. male who presents today for evaluation, education and consultation regarding metabolic and bariatric surgery (MBS).  Since last seen 2020 he has gained 6-1/2 pounds.  However he is down 14-1/2 pounds since seen 2020 and 23 pounds down from when first seen on 12/3/2019.The patient returns for final visit prior to metabolic and bariatric surgery specifically the sleeve gastrectomy.  Original intake evaluation Jodi Howard PA-C dated 12/3/2019 reviewed.    From her evaluation that day:      \"His maximum lifetime weight is 548 pounds.     As above, patient has been overweight for many years, with numerous failed dietary/weight loss attempts.  He now has obesity related comorbidities and as such has decided to pursue weight loss surgery.Pursuing LSG to improve cardiac health with h/o CHF.  Currently not a transplant candidate with high BMI. Knows several people s/p LSG.      CV: CHF diagnosed at 21yo, thought to be related to undiagnosed congenital defect- per patient. hospitalized every 3 weeks during the winter due to PNA.  ICD in place. Although pleased has not been hospitalized yet this winter.  Has significantf fluid retention despite diuretics. Chart review notes HTN and MI, patient denies, says he almost had MI, had heart cath, no stents.  Followed by cardiologist- Dr. Lewis, although says has not seen them in quite some time and needs appt, unknown last ECHO. LOV encouraged bariatric surgery, currently not transplant candidate dur to morbid obesity.  Also [facility] by [facility], " "manages oxygen requirement.  On ASA daily.      Pulm: significant SOA/ dyspnea, related to CHF with fluid overload. Supposed to be on 24/7 home O2, managed by cardiology. Currently only using prn due to running out of oxygen. Denies seeing pulm, although chart review shows followed by [facility] for COLLIN.      GI: Denies reflux/ heartburn, nausea, vomiting, abd pain, dypshagia, BM issues. No h/o EGD, h pylori or HH.  GB present. Tolerates all foods well.      Psoriasis on cosentyx managed by dermatology- hasn't started this yet but plans to. Gout on allopurinol, denies flares.      Of note, chart lists personal history of medical noncompliance in history. Multiple no show appts with Dr. Olsen.\"     The patient has had issues with morbid obesity for years and only temporary success with non-surgical methods of weight loss.  The patient is seeking LSG to help with the morbid obesity related conditions of congestive heart failure EF 20%, gout, hypertension, obstructive sleep apnea, hypoxia on home oxygen, paresthesias, plaque psoriasis, non-insulin-dependent type 2 diabetes mellitus, hypokalemia, markedly abnormal pulmonary function test, low HDL.    29-year-old disabled super morbidly obese black male from [facility].  He says that after my talk he realized that \"I have been eating all wrong\".  He is aware at his BMI at least theoretically sleeve gastrectomy may be a first stage procedure.  He is a drummer.  He denies any personal or family history of bleeding or clotting disorders.      Past Medical History:   Diagnosis Date   • CHF (congestive heart failure) (CMS/HCC)     diagnosed at 23yo, thought to be related to undiagnosed congenital defect- per patient. hospitalized every 3 weeks during the winter due to PNA.  Has significantf fluid retention despite diurectics   • CPAP (continuous positive airway pressure) dependence     settings at 15   • Gout     on allopurinol, controls symptoms   • " "Hypertension     patient denies   • ICD (implantable cardioverter-defibrillator) in place    • Morbid obesity (CMS/[facility])    • Morbid obesity with BMI of 60.0-69.9, adult (CMS/[facility])    • Myocardial infarction (CMS/[facility])     \"almost\" per patient, had heart cath, no stents, has defibrillator   • On home O2     supposed to be 24/7, ran out of O2 and has only been using prn. manged  by cardiology   • COLLIN on CPAP     compliant   • Paresthesia     hands/ arms/ feet, suspected due to poor circulation reportedly   • Plaque psoriasis    • Pneumonia    • Psoriasis    • Sleep apnea    • Wears glasses      Past Surgical History:   Procedure Laterality Date   • CARDIAC CATHETERIZATION N/A 7/13/2017    Procedure: Left Heart Cath;  Surgeon: Balbir Olsen MD;  Location:  [facility];  Service:    • CARDIAC DEFIBRILLATOR PLACEMENT  08/2017   • CARDIAC DEFIBRILLATOR PLACEMENT     • CARDIAC ELECTROPHYSIOLOGY PROCEDURE N/A 8/15/2017    Procedure: VVI ICD Implant;  Surgeon: Nathanael Richmond DO;  Location:  [facility] EP INVASIVE LOCATION;  Service:    • OTHER SURGICAL HISTORY      ultra light therapy   • SINUS SURGERY      cartilage from ear inserted in nasal cavity   • TONSILLECTOMY AND ADENOIDECTOMY  2000       No Known Allergies    Current Outpatient Medications:   •  allopurinol (Zyloprim) 100 MG tablet, Take 1 tablet by mouth Daily., Disp: 30 tablet, Rfl: 5  •  aspirin (Aspirin Adult Low Strength) 81 MG EC tablet, Take 1 tablet by mouth Daily. (Patient taking differently: Take 81 mg by mouth Daily. Last dose 8-9-20 per Dr. Whittington's orders per patient), Disp: 90 tablet, Rfl: 1  •  carvedilol (COREG) 25 MG tablet, Take 1 tablet by mouth 2 (Two) Times a Day With Meals., Disp: 180 tablet, Rfl: 1  •  metOLazone (ZAROXOLYN) 5 MG tablet, Take 1 tablet by mouth Daily., Disp: 15 tablet, Rfl: 3  •  potassium chloride (K-DUR) 10 MEQ CR tablet, Take 1 tablet by mouth 2 (Two) Times a Day. (Patient taking differently: Take 10 mEq by " mouth Daily.), Disp: 60 tablet, Rfl: 0  •  sacubitril-valsartan (Entresto) 49-51 MG tablet, Take 1 tablet by mouth Every 12 (Twelve) Hours., Disp: 180 tablet, Rfl: 1  •  Secukinumab (COSENTYX, 300 MG DOSE, SC), Inject 300 mg under the skin into the appropriate area as directed., Disp: , Rfl:   •  spironolactone (Aldactone) 50 MG tablet, Take 1 tablet by mouth Daily., Disp: 90 tablet, Rfl: 1  •  torsemide (DEMADEX) 20 MG tablet, Take 3 tablets by mouth Daily., Disp: 270 tablet, Rfl: 1  •  acetaminophen (TYLENOL) 500 MG tablet, Take 500 mg by mouth Every 6 (Six) Hours As Needed for Mild Pain ., Disp: , Rfl:   •  apixaban (Eliquis) 2.5 MG tablet tablet, Take 1 tablet by mouth Every 12 (Twelve) Hours for 42 doses., Disp: 42 tablet, Rfl: 0    Social History     Socioeconomic History   • Marital status: Single     Spouse name: Not on file   • Number of children: 0   • Years of education: Not on file   • Highest education level: Not on file   Occupational History   • Occupation: disabled   Tobacco Use   • Smoking status: Never Smoker   • Smokeless tobacco: Never Used   Substance and Sexual Activity   • Alcohol use: No   • Drug use: No   • Sexual activity: Defer   Social History Narrative    Lives in [facility] with parents and brother.  Disabled from CHF, given pass to work- hasn't gotten hired yet. .             Caffeine use: 6 sodas weekly    Patient lives with parents        Caffeine 1 soda a week        Caffeine 8 servings 3 days a week     Family History   Problem Relation Age of Onset   • Diabetes Father    • Diabetes Paternal Grandmother    • Diabetes Maternal Grandfather        Review of Systems   Constitutional: Positive for fatigue. Negative for chills, diaphoresis, fever and unexpected weight loss.   HENT: Negative for congestion and facial swelling.    Eyes: Negative for blurred vision, double vision and discharge.   Respiratory: Positive for shortness of breath. Negative for chest tightness and  stridor.    Cardiovascular: Positive for leg swelling. Negative for chest pain and palpitations.   Gastrointestinal: Negative for blood in stool.   Endocrine: Negative for polydipsia.   Genitourinary: Negative for hematuria.   Musculoskeletal: Positive for arthralgias.   Skin: Negative for color change.   Allergic/Immunologic: Negative for immunocompromised state.   Neurological: Negative for confusion.   Psychiatric/Behavioral: Negative for self-injury.       I have reviewed the ROS and confirm that it's accurate today.    Physical Exam:  Vital Signs:  Weight: (!) 182 kg (401 lb 8 oz)   Body mass index is 57.61 kg/m².  Temp: 97.5 °F (36.4 °C)   Heart Rate: 67   BP: 138/86     Physical Exam   Constitutional: He is oriented to person, place, and time. He appears well-developed and well-nourished.   Wearing oxygen by nasal cannula   HENT:   Head: Normocephalic and atraumatic.   mask   Eyes: Pupils are equal, round, and reactive to light. Conjunctivae and EOM are normal.   Neck: Normal range of motion. Neck supple. Carotid bruit is not present. No tracheal deviation present. No thyromegaly present.   Cardiovascular: Regular rhythm and normal heart sounds. Tachycardia present.   Pulmonary/Chest: Effort normal and breath sounds normal. No respiratory distress.   Abdominal: Soft. He exhibits no distension. There is no hepatosplenomegaly. There is no tenderness.   Psoriatic patches of his abdomen   Musculoskeletal: Normal range of motion. He exhibits no edema or deformity.   Neurological: He is alert and oriented to person, place, and time. No cranial nerve deficit. Coordination normal.   Skin: Skin is warm and dry. No rash noted.   Psoriasis, brawny edema lower extremities   Psychiatric: He has a normal mood and affect. His behavior is normal. Judgment and thought content normal.   Vitals reviewed.      Patient Active Problem List   Diagnosis   • Hypertension   • Morbid obesity (CMS/HCC)   • Severe obstructive sleep apnea    • Nonischemic congestive cardiomyopathy (CMS/HCC)   • Personal history of noncompliance with medical treatment   • CHF (congestive heart failure), NYHA class IV (CMS/HCC)   • Plaque psoriasis   • Painless hematuria   • Pneumonia of right lower lobe due to infectious organism   • CHF (congestive heart failure) (CMS/HCC)   • Myocardial infarction (CMS/HCC)   • COLLIN on CPAP   • On home O2   • Sleep apnea   • Psoriasis   • Wears glasses   • Gout   • Morbid obesity with BMI of 60.0-69.9, adult (CMS/HCC)   • Paresthesia   • Morbid obesity with body mass index (BMI) of 50.0 to 59.9 in adult (CMS/[facility])       Assessment:    Evan Gannon is a 29 y.o. year old male with medically complicated obesity.    Metabolic and bariatric surgery is deemed medically necessary given the following obesity related comorbidities including congestive heart failure, gout, hypertension, obstructive sleep apnea, hypoxia on home oxygen, paresthesias, plaque psoriasis, non-insulin-dependent type 2 diabetes mellitus, hypokalemia, markedly abnormal pulmonary function test, low HDL with current Weight: (!) 182 kg (401 lb 8 oz) and Body mass index is 57.61 kg/m²..    Encounter Diagnosis   Name Primary?   • Morbid obesity with body mass index (BMI) of 50.0 to 59.9 in adult (CMS/[facility]) Yes      Patient is aware that surgery is a tool, and that weight loss and improvement in comorbidities is not guaranteed but only seen in the context of appropriate use, follow up and physical activity.    The patient was present for an approximately a 2.5 hour discussion of the purpose of MBS, how MBS is a tool to assist in achieving weight loss goals, the most common complications and how best to avoid them, and the strategies for short and long term weight loss and improvement in comorbidities.  Ample opportunity to discuss questions was available both in group and during the time of individual examination.    I reviewed his Aaron report which is negative.   "Chest x-ray dated 7/30/2020 showing cardiomegaly without overt edema or effusion.  Left chest wall pacing device with lead intact.  EKG dated 7/30/2020 showing sinus tachycardia rate 108 possible left atrial enlargement when compared with EKG dated 12/16/2019 incomplete left bundle branch block is no longer present.  CBC and CMP dated 7/30/2020 normal except for glucose of 106 sodium 131 potassium 2.7 chloride 91 CO2 31.4 low MCV and low MCH of note H&H 13.2 and 39. Psychosocial evaluation dated 12/3/2019 Estela Knowles, PhD good candidate.  Dietitian evaluation dated 12/3/2019 Mayadeyanira limaly RD showing he drinks two 2 L bottles of regular ginger ale weekly and occasional fountain sodas.  That day he had a white bun for his sandwich, to honey buns regular size findings sandwich crackers and a triple XL 64 ounce fountain Coke frozen pot pie for dinner.  Diet was noted to be marginal in protein and excess of convenience foods.  Negative H. pylori breath test dated 1/3/2020.  Labs dated 1/3/2020 showing a normal TSH normal lipid panel except for an HDL of 39 hemoglobin A1c elevated 6.20 normal CMP except for glucose of 100 chloride of 97 CO2 of 29.6 normal CBC except for a low MCH of note H&H 14.6 and 45.4.  Pulmonary function tests dated 12/12/2019 showing an FVC of 48 FEV1 of 39 DLCO of 29.  Pulmonary clearance dated 12/12/2019 SONAM Schmid with pulmonary function tests read by her showing restrictive airway disease with an FVC of 48% and a total lung capacity of 50%.  She notes severe obstructive sleep apnea on CPAP.  She feels he would be at \"high risk\" once again she feels he has \"quite a bit of restrictive lung disease\" felt to be likely related to his cardiomyopathy pulmonary edema and morbid obesity.  She recommended incentive spirometry, coughing and deep breathing exercises after surgery and using his CPAP.  Cardiology clearance dated 1/6/2020 Dr. Olsen saying it is okay to hold his antiplatelet agents 1 " "week prior in 6 weeks after sleeve gastrectomy.  He does note echocardiogram in July 2017 showed an ejection fraction of 20% and left heart catheterization in July 2017 showed an ejection fraction of 10% and normal coronary arteries.  Echocardiogram in October 2017 ejection fraction 15% left ventricle was severely dilated right ventricle mildly dilated he notes a Biotronik VVI ICD implantation 8/15/2017 by Dr. Olson.  He notes no DFTs were done due to the patient's issues with hypoxemia even with minimal sedation as well as his acute on chronic congestive heart failure.  His conclusion that the patient had stable and asymptomatic congestive heart failure was currently euvolemic and feels he would be at low cardiovascular risk from an ischemic standpoint but felt \"obviously hypoxia/ventilation will be an issue.  As well volume status.\"  Please see scanned records that I have reviewed and signed off on today.  All of this in addition to the patient's unique history and exam has been taken into consideration in determining their appropriate candidacy for MBS.    Complications  of laparoscopic/possible robotic gastric sleeve were discussed. The patient is well aware of the potential complications of surgery that include but not limited to bleeding, infections, deep venous thrombosis, pulmonary embolism, pulmonary complications such as pneumonia, cardiac events, hernias, small bowel obstruction, damage to the spleen or other organs, bowel injury, disfiguring scars, failure to lose weight, need for additional surgery, conversion to an open procedure, and death. Patient is also aware of complications which apply in this particular procedure that can include but are not limited to a \"leak\" at the staple line which in some instances may require conversion to gastric bypass.    The patient is aware if a hiatal hernia is encountered, it likely will be repaired.  R/B/A Rx to hiatal hernia repair were discussed as outlined in " our long consent form.  Briefly risks in addition to those for LSG include recurrent hernia, CLIFFORD, dysphagia, esophageal injury, pneumothorax, injury to the vagus nerves, injury to the thoracic duct, aorta or vena cava.    I discussed avoiding all tobacco products and second hand smoke at least 2 weeks pre-operatively and 6 weeks post-operatively to minimize the risk of sleeve leak.  This included discussing the importance of avoiding even secondhand smoke as the risk of leak is increased.  Examples discussed:  I made it very clear that the patient understands they should avoid even riding in a car where someone has previously smoked in the last 2 weeks, living in a house where someone smokes (even if it's in a separate room/patio/attached garage, etc.) we discussed that they should not have a conversation with a group of people who are smoking even if it's outside.  They can be around wood burning fires and barbecue.  I told them I do not know if marijuana has a same effects but my overall recommendation is to avoid it for 2 weeks prior in 6 weeks after surgery.  They also are aware that nicotine may also increase the risk of leak and I strongly encouraged him to avoid that as well for 2 weeks prior in 6 weeks after surgery.    Discussed the risks, benefits and alternative therapies at great length as outlined in our extensive consent forms, consent videos, and educational teaching process under the direction of the center's .    A copy of the patient's signed informed consent is on file.    R/B/A Rx discussed to postop anticoagulation incl but not limited to bleeding, drug reaction, venothromboembolic events, etc. and agreeable to taking post op  Eliquis 2.5 mg po Q 12 hrs #42      Plan: After evaluation today I think the patient is an extremely high risk candidate for laparoscopic sleeve gastrectomy although not prohibitive based on pulmonary and cardiology consultations as above.  He says his  potassium dosing has already been increased.  He understands that I cannot guarantee that the anesthesiology staff will be comfortable proceeding with his surgery.  He may require mechanical ventilation possibly prolonged possibly indefinitely postoperatively (trach etc).  He seems very aware of the risks and wishes to proceed.  We will hold his aspirin 1 week prior in 6 weeks after surgery as recommended by cardiology.  Once again at least theoretically given his BMI sleeve gastrectomy may be a first stage procedure.  Other issues include gout, hypertension, obstructive sleep apnea, hypoxia on home oxygen, paresthesias, plaque psoriasis, non-insulin-dependent type 2 diabetes mellitus, hypokalemia, markedly abnormal pulmonary function test, low HDL, extremely poor dietitian evaluation.        Arnoldo Whittington MD

## 2020-08-14 NOTE — ANESTHESIA PREPROCEDURE EVALUATION
Anesthesia Evaluation                  Airway   Mallampati: IV  Dental      Pulmonary    (+) sleep apnea,   Cardiovascular     (+) hypertension, past MI , CHF ,       Neuro/Psych  GI/Hepatic/Renal/Endo    (+) morbid obesity,      Musculoskeletal     Abdominal    Substance History      OB/GYN          Other        ROS/Med Hx Other: Defibrillator,chf,  Sent here by cardiology   Probable difficult airway                Anesthesia Plan    ASA 4     general with block     intravenous induction     Anesthetic plan, all risks, benefits, and alternatives have been provided, discussed and informed consent has been obtained with: patient.

## 2020-08-14 NOTE — ANESTHESIA POSTPROCEDURE EVALUATION
Patient: Evan Gannon    Procedure Summary     Date:  08/14/20 Room / Location:   [facility]  [facility]    Anesthesia Start:  1544 Anesthesia Stop:  1801    Procedures:       GASTRIC SLEEVE LAPAROSCOPIC (N/A Abdomen)      ESOPHAGOGASTRODUODENOSCOPY (N/A Esophagus) Diagnosis:       Morbid obesity with body mass index (BMI) of 50.0 to 59.9 in adult (CMS/[facility])      (Morbid obesity with body mass index (BMI) of 50.0 to 59.9 in adult (CMS/[facility]) [E66.01, Z68.43])    Surgeon:  Arnoldo Whittington MD Provider:  Yung Gustafson MD    Anesthesia Type:  general with block ASA Status:  4          Anesthesia Type: general with block    Vitals  Vitals Value Taken Time   BP     Temp     Pulse     Resp     SpO2 93 % 8/14/2020  6:00 PM   Vitals shown include unvalidated device data.        Post Anesthesia Care and Evaluation    Patient location during evaluation: PACU  Patient participation: complete - patient participated  Level of consciousness: awake and alert  Pain score: 0  Pain management: adequate  Airway patency: patent  Anesthetic complications: No anesthetic complications  PONV Status: none  Cardiovascular status: hemodynamically stable and acceptable  Respiratory status: nonlabored ventilation, acceptable and nasal cannula  Hydration status: acceptable

## 2020-08-15 ENCOUNTER — APPOINTMENT (OUTPATIENT)
Dept: GENERAL RADIOLOGY | Facility: HOSPITAL | Age: 30
End: 2020-08-15

## 2020-08-15 LAB
ALBUMIN SERPL-MCNC: 3.9 G/DL (ref 3.5–5.2)
ALBUMIN/GLOB SERPL: 0.8 G/DL
ALP SERPL-CCNC: 62 U/L (ref 39–117)
ALT SERPL W P-5'-P-CCNC: 16 U/L (ref 1–41)
ANION GAP SERPL CALCULATED.3IONS-SCNC: 12 MMOL/L (ref 5–15)
AST SERPL-CCNC: 24 U/L (ref 1–40)
BASOPHILS # BLD AUTO: 0.01 10*3/MM3 (ref 0–0.2)
BASOPHILS NFR BLD AUTO: 0.1 % (ref 0–1.5)
BILIRUB SERPL-MCNC: 0.7 MG/DL (ref 0–1.2)
BUN SERPL-MCNC: 40 MG/DL (ref 6–20)
BUN/CREAT SERPL: 20.2 (ref 7–25)
CALCIUM SPEC-SCNC: 9.4 MG/DL (ref 8.6–10.5)
CHLORIDE SERPL-SCNC: 91 MMOL/L (ref 98–107)
CO2 SERPL-SCNC: 29 MMOL/L (ref 22–29)
CREAT SERPL-MCNC: 1.98 MG/DL (ref 0.76–1.27)
DEPRECATED RDW RBC AUTO: 46.2 FL (ref 37–54)
EOSINOPHIL # BLD AUTO: 0 10*3/MM3 (ref 0–0.4)
EOSINOPHIL NFR BLD AUTO: 0 % (ref 0.3–6.2)
ERYTHROCYTE [DISTWIDTH] IN BLOOD BY AUTOMATED COUNT: 15.6 % (ref 12.3–15.4)
GFR SERPL CREATININE-BSD FRML MDRD: 49 ML/MIN/1.73
GLOBULIN UR ELPH-MCNC: 4.8 GM/DL
GLUCOSE BLDC GLUCOMTR-MCNC: 103 MG/DL (ref 70–130)
GLUCOSE BLDC GLUCOMTR-MCNC: 119 MG/DL (ref 70–130)
GLUCOSE BLDC GLUCOMTR-MCNC: 121 MG/DL (ref 70–130)
GLUCOSE BLDC GLUCOMTR-MCNC: 97 MG/DL (ref 70–130)
GLUCOSE SERPL-MCNC: 116 MG/DL (ref 65–99)
HCT VFR BLD AUTO: 42.1 % (ref 37.5–51)
HGB BLD-MCNC: 13.6 G/DL (ref 13–17.7)
IMM GRANULOCYTES # BLD AUTO: 0.03 10*3/MM3 (ref 0–0.05)
IMM GRANULOCYTES NFR BLD AUTO: 0.4 % (ref 0–0.5)
IRON 24H UR-MRATE: 51 MCG/DL (ref 59–158)
LYMPHOCYTES # BLD AUTO: 0.53 10*3/MM3 (ref 0.7–3.1)
LYMPHOCYTES NFR BLD AUTO: 6.8 % (ref 19.6–45.3)
MCH RBC QN AUTO: 26.5 PG (ref 26.6–33)
MCHC RBC AUTO-ENTMCNC: 32.3 G/DL (ref 31.5–35.7)
MCV RBC AUTO: 82.1 FL (ref 79–97)
MONOCYTES # BLD AUTO: 0.67 10*3/MM3 (ref 0.1–0.9)
MONOCYTES NFR BLD AUTO: 8.6 % (ref 5–12)
NEUTROPHILS NFR BLD AUTO: 6.57 10*3/MM3 (ref 1.7–7)
NEUTROPHILS NFR BLD AUTO: 84.1 % (ref 42.7–76)
NRBC BLD AUTO-RTO: 0 /100 WBC (ref 0–0.2)
PLATELET # BLD AUTO: 322 10*3/MM3 (ref 140–450)
PMV BLD AUTO: 10.5 FL (ref 6–12)
POTASSIUM SERPL-SCNC: 4.1 MMOL/L (ref 3.5–5.2)
PROT SERPL-MCNC: 8.7 G/DL (ref 6–8.5)
RBC # BLD AUTO: 5.13 10*6/MM3 (ref 4.14–5.8)
SODIUM SERPL-SCNC: 132 MMOL/L (ref 136–145)
WBC # BLD AUTO: 7.81 10*3/MM3 (ref 3.4–10.8)

## 2020-08-15 PROCEDURE — 85025 COMPLETE CBC W/AUTO DIFF WBC: CPT | Performed by: SURGERY

## 2020-08-15 PROCEDURE — 82962 GLUCOSE BLOOD TEST: CPT

## 2020-08-15 PROCEDURE — 83540 ASSAY OF IRON: CPT | Performed by: SURGERY

## 2020-08-15 PROCEDURE — 25010000002 CYANOCOBALAMIN PER 1000 MCG: Performed by: SURGERY

## 2020-08-15 PROCEDURE — 25010000002 ENOXAPARIN PER 10 MG: Performed by: SURGERY

## 2020-08-15 PROCEDURE — 99024 POSTOP FOLLOW-UP VISIT: CPT | Performed by: SURGERY

## 2020-08-15 PROCEDURE — 25010000002 LORAZEPAM PER 2 MG: Performed by: SURGERY

## 2020-08-15 PROCEDURE — 94799 UNLISTED PULMONARY SVC/PX: CPT

## 2020-08-15 PROCEDURE — 25010000002 THIAMINE PER 100 MG: Performed by: SURGERY

## 2020-08-15 PROCEDURE — 99233 SBSQ HOSP IP/OBS HIGH 50: CPT | Performed by: NURSE PRACTITIONER

## 2020-08-15 PROCEDURE — 25010000002 ONDANSETRON PER 1 MG: Performed by: SURGERY

## 2020-08-15 PROCEDURE — 74240 X-RAY XM UPR GI TRC 1CNTRST: CPT

## 2020-08-15 PROCEDURE — 80053 COMPREHEN METABOLIC PANEL: CPT | Performed by: SURGERY

## 2020-08-15 PROCEDURE — 94660 CPAP INITIATION&MGMT: CPT

## 2020-08-15 PROCEDURE — 25010000002 METOCLOPRAMIDE PER 10 MG: Performed by: SURGERY

## 2020-08-15 PROCEDURE — 0 DIATRIZOATE MEGLUMINE & SODIUM PER 1 ML: Performed by: SURGERY

## 2020-08-15 PROCEDURE — 25010000002 CEFAZOLIN PER 500 MG: Performed by: SURGERY

## 2020-08-15 RX ORDER — SODIUM CHLORIDE 9 MG/ML
100 INJECTION, SOLUTION INTRAVENOUS CONTINUOUS
Status: ACTIVE | OUTPATIENT
Start: 2020-08-15 | End: 2020-08-15

## 2020-08-15 RX ADMIN — ONDANSETRON 4 MG: 2 INJECTION INTRAMUSCULAR; INTRAVENOUS at 09:39

## 2020-08-15 RX ADMIN — ONDANSETRON 4 MG: 2 INJECTION INTRAMUSCULAR; INTRAVENOUS at 04:51

## 2020-08-15 RX ADMIN — GABAPENTIN 100 MG: 100 CAPSULE ORAL at 09:34

## 2020-08-15 RX ADMIN — CEFAZOLIN 3 G: 10 INJECTION, POWDER, FOR SOLUTION INTRAVENOUS at 12:14

## 2020-08-15 RX ADMIN — ENOXAPARIN SODIUM 40 MG: 40 INJECTION SUBCUTANEOUS at 20:19

## 2020-08-15 RX ADMIN — ACETAMINOPHEN 1000 MG: 500 TABLET, FILM COATED ORAL at 14:40

## 2020-08-15 RX ADMIN — ALLOPURINOL 100 MG: 100 TABLET ORAL at 09:34

## 2020-08-15 RX ADMIN — GABAPENTIN 100 MG: 100 CAPSULE ORAL at 20:18

## 2020-08-15 RX ADMIN — POTASSIUM CHLORIDE 10 MEQ: 1.5 POWDER, FOR SOLUTION ORAL at 09:34

## 2020-08-15 RX ADMIN — CEFAZOLIN 3 G: 10 INJECTION, POWDER, FOR SOLUTION INTRAVENOUS at 04:52

## 2020-08-15 RX ADMIN — LORAZEPAM 0.5 MG: 2 INJECTION INTRAMUSCULAR; INTRAVENOUS at 12:26

## 2020-08-15 RX ADMIN — CYANOCOBALAMIN 1000 MCG: 1000 INJECTION, SOLUTION INTRAMUSCULAR; SUBCUTANEOUS at 09:38

## 2020-08-15 RX ADMIN — PANTOPRAZOLE SODIUM 40 MG: 40 INJECTION, POWDER, FOR SOLUTION INTRAVENOUS at 04:51

## 2020-08-15 RX ADMIN — GABAPENTIN 100 MG: 100 CAPSULE ORAL at 18:04

## 2020-08-15 RX ADMIN — METOCLOPRAMIDE 10 MG: 5 INJECTION, SOLUTION INTRAMUSCULAR; INTRAVENOUS at 12:25

## 2020-08-15 RX ADMIN — Medication 30 ML: at 11:47

## 2020-08-15 RX ADMIN — ENOXAPARIN SODIUM 40 MG: 40 INJECTION SUBCUTANEOUS at 09:40

## 2020-08-15 RX ADMIN — ACETAMINOPHEN 1000 MG: 500 TABLET, FILM COATED ORAL at 20:19

## 2020-08-15 RX ADMIN — FOLIC ACID 25 ML/HR: 5 INJECTION, SOLUTION INTRAMUSCULAR; INTRAVENOUS; SUBCUTANEOUS at 09:03

## 2020-08-15 NOTE — PROGRESS NOTES
"Bariatric Surgery Progress Note:      Chief Complaint:  POD #1    Subjective     Interval History:  Doing well.  No complaints.  Pain controlled.  Denies N/V.  No fevers.  Ambulating.  Voiding.  IS 2500.  Needs either CPAP or O2 all the time.    Objective     Vital Signs  Blood pressure 100/66, pulse 86, temperature 97.5 °F (36.4 °C), temperature source Axillary, resp. rate 20, height 175.3 cm (69\"), weight (!) 182 kg (401 lb), SpO2 96 %.      Intake/Output Summary (Last 24 hours) at 8/15/2020 1639  Last data filed at 8/15/2020 1621  Gross per 24 hour   Intake 90 ml   Output 300 ml   Net -210 ml       Physical Exam:  General: Alert, NAD on continuous CPAP during our interaction today  Lungs: Clear  Heart: RRR  Abdomen: soft, appropriate, incisions okay  Extremities: warm, (+) SCDs  Skin: multiple psoriatic plaques on shoulders, chest, and abdomen       Labs:  Lab Results (last 24 hours)     Procedure Component Value Units Date/Time    POC Glucose Once [[id number]]  (Normal) Collected:  08/15/20 1623    Specimen:  Blood Updated:  08/15/20 1625     Glucose 103 mg/dL     Comprehensive Metabolic Panel [[id number]]  (Abnormal) Collected:  08/15/20 1330    Specimen:  Blood Updated:  08/15/20 1409     Glucose 116 mg/dL      BUN 40 mg/dL      Creatinine 1.98 mg/dL      Sodium 132 mmol/L      Potassium 4.1 mmol/L      Comment: Specimen hemolyzed.  Results may be affected.        Chloride 91 mmol/L      CO2 29.0 mmol/L      Calcium 9.4 mg/dL      Total Protein 8.7 g/dL      Albumin 3.90 g/dL      ALT (SGPT) 16 U/L      AST (SGOT) 24 U/L      Alkaline Phosphatase 62 U/L      Total Bilirubin 0.7 mg/dL      eGFR   Amer 49 mL/min/1.73      Globulin 4.8 gm/dL      A/G Ratio 0.8 g/dL      BUN/Creatinine Ratio 20.2     Anion Gap 12.0 mmol/L     Narrative:       GFR Normal >60  Chronic Kidney Disease <60  Kidney Failure <15      Iron [[id number]]  (Abnormal) Collected:  08/15/20 1330    Specimen:  Blood Updated:  08/15/20 1409    "  Iron 51 mcg/dL     CBC & Differential [[id number]] Collected:  08/15/20 1330    Specimen:  Blood Updated:  08/15/20 1348    Narrative:       The following orders were created for panel order CBC & Differential.  Procedure                               Abnormality         Status                     ---------                               -----------         ------                     CBC Auto Differential[[id number]]        Abnormal            Final result                 Please view results for these tests on the individual orders.    CBC Auto Differential [[id number]]  (Abnormal) Collected:  08/15/20 1330    Specimen:  Blood Updated:  08/15/20 1348     WBC 7.81 10*3/mm3      RBC 5.13 10*6/mm3      Hemoglobin 13.6 g/dL      Hematocrit 42.1 %      MCV 82.1 fL      MCH 26.5 pg      MCHC 32.3 g/dL      RDW 15.6 %      RDW-SD 46.2 fl      MPV 10.5 fL      Platelets 322 10*3/mm3      Neutrophil % 84.1 %      Lymphocyte % 6.8 %      Monocyte % 8.6 %      Eosinophil % 0.0 %      Basophil % 0.1 %      Immature Grans % 0.4 %      Neutrophils, Absolute 6.57 10*3/mm3      Lymphocytes, Absolute 0.53 10*3/mm3      Monocytes, Absolute 0.67 10*3/mm3      Eosinophils, Absolute 0.00 10*3/mm3      Basophils, Absolute 0.01 10*3/mm3      Immature Grans, Absolute 0.03 10*3/mm3      nRBC 0.0 /100 WBC     POC Glucose Once [[id number]]  (Normal) Collected:  08/15/20 1204    Specimen:  Blood Updated:  08/15/20 1206     Glucose 119 mg/dL     POC Glucose Once [[id number]]  (Normal) Collected:  08/15/20 0915    Specimen:  Blood Updated:  08/15/20 0916     Glucose 121 mg/dL     Tissue Pathology Exam [[id number]] Collected:  08/14/20 1724    Specimen:  Tissue from Stomach Updated:  08/15/20 0844    Basic Metabolic Panel [[id number]]  (Abnormal) Collected:  08/14/20 2227    Specimen:  Blood Updated:  08/14/20 2312     Glucose 185 mg/dL      BUN 35 mg/dL      Creatinine 1.27 mg/dL      Sodium 131 mmol/L      Potassium 3.7 mmol/L      Chloride 92 mmol/L       CO2 26.0 mmol/L      Calcium 9.2 mg/dL      eGFR   Amer 82 mL/min/1.73      BUN/Creatinine Ratio 27.6     Anion Gap 13.0 mmol/L     Narrative:       GFR Normal >60  Chronic Kidney Disease <60  Kidney Failure <15      Magnesium [[id number]]  (Normal) Collected:  08/14/20 2227    Specimen:  Blood Updated:  08/14/20 2312     Magnesium 2.0 mg/dL     Basic Metabolic Panel [[id number]]  (Abnormal) Collected:  08/14/20 1237    Specimen:  Blood Updated:  08/14/20 2152     Glucose 119 mg/dL      BUN 39 mg/dL      Creatinine 1.22 mg/dL      Sodium 133 mmol/L      Potassium 3.2 mmol/L      Comment: Specimen hemolyzed.  Results may be affected.        Chloride 90 mmol/L      CO2 19.0 mmol/L      Calcium 9.8 mg/dL      eGFR   Amer 86 mL/min/1.73      BUN/Creatinine Ratio 32.0     Anion Gap 24.0 mmol/L     Narrative:       GFR Normal >60  Chronic Kidney Disease <60  Kidney Failure <15      POC Glucose Once [[id number]]  (Abnormal) Collected:  08/14/20 1814    Specimen:  Blood Updated:  08/14/20 1815     Glucose 146 mg/dL             Assessment/Plan     POD # 1 s/p LSG.    Doing well.  Good oral intake, ambulation, and IS of 2500.  UGI normal post sleeve.  Creatinine bumped to 1.98 from baseline of 1.27.  I know we have been running him dry due to heart failure--will discuss with hospitalist team the appropriateness of possibly albumin bolus or just recheck in AM.  Encouraged oral intake.    Continue OOB/ PT/ DVT prophx.

## 2020-08-15 NOTE — PROGRESS NOTES
[facility] Medicine Services  PROGRESS NOTE    Patient Name: Evan Gannon  : 1990  MRN: [id number]    Date of Admission: 2020  Primary Care Physician: Gilbert Da Silva PA    Subjective   Subjective     CC:  Medical Management of ICM/ CHF/ DM post gastric sleeve    HPI:  Patient sitting on bedside chewing gum. States pain is controlled. Tried to take in 30ml fluid earlier and came back up. Getting full very quickly. Has ambulated this morning without lightheadedness/ dizziness.   Breathing currently better than baseline.    Review of Systems  Gen- No fevers, chills  CV- No chest pain, palpitations  Resp- No cough, dyspnea  GI- No N/V/D, abd pain        Objective   Objective     Vital Signs:   Temp:  [96.7 °F (35.9 °C)-99.2 °F (37.3 °C)] 97.3 °F (36.3 °C)  Heart Rate:  [] 81  Resp:  [16-20] 18  BP: ()/(50-83) 94/50        Physical Exam:  Constitutional: No acute distress, awake, alert, sitting on bedside chewing gum  HENT: NCAT, mucous membranes moist  Respiratory: Clear to auscultation bilaterally but diminished 2/2 body habitus, respiratory effort normal   Cardiovascular: RRR, no murmurs, rubs, or gallops, palpable pedal pulses bilaterally  Gastrointestinal: Positive bowel sounds, soft, nontender, nondistended- lap sites CDI  Musculoskeletal: No bilateral ankle edema  Psychiatric: Appropriate affect, cooperative  Neurologic: Oriented x 3, strength symmetric in all extremities, Cranial Nerves grossly intact to confrontation, speech clear  Skin: plaque psoriasis on trunk and extremities      Results Reviewed:  Results from last 7 days   Lab Units 20  1149   WBC 10*3/mm3 5.96   HEMOGLOBIN g/dL 14.4   HEMATOCRIT % 43.6   PLATELETS 10*3/mm3 315     Results from last 7 days   Lab Units 20  2227 20  1237 20  1149   SODIUM mmol/L 131* 133*  --    POTASSIUM mmol/L 3.7 3.2*  3.1* 3.3*   CHLORIDE mmol/L 92* 90*  --    CO2 mmol/L 26.0 19.0*  --     BUN mg/dL 35* 39*  --    CREATININE mg/dL 1.27 1.22  --    GLUCOSE mg/dL 185* 119*  --    CALCIUM mg/dL 9.2 9.8  --      Estimated Creatinine Clearance: 142.1 mL/min (by C-G formula based on SCr of 1.27 mg/dL).    Microbiology Results Abnormal     None          Imaging Results (Last 24 Hours)     ** No results found for the last 24 hours. **          Results for orders placed during the hospital encounter of 10/16/17   Adult Transthoracic Echo Limited W/ Cont if Necessary Per Protocol    Narrative · Left ventricular systolic function is severely decreased. Estimated EF =   15%.  · The left ventricular cavity is severely dilated.  · Right ventricular cavity is mildly dilated.  · Mildly reduced right ventricular systolic function noted.          I have reviewed the medications:  Scheduled Meds:  acetaminophen 1,000 mg Oral Q8H   allopurinol 100 mg Oral Daily   ceFAZolin 3 g Intravenous Q8H   enoxaparin 40 mg Subcutaneous Q12H   gabapentin 100 mg Oral TID   insulin lispro 0-7 Units Subcutaneous 4x Daily With Meals & Nightly   ondansetron 4 mg Intravenous Q6H   pantoprazole 40 mg Intravenous Q AM   potassium chloride 10 mEq Oral Daily   sacubitril-valsartan 1 tablet Oral Q12H     Continuous Infusions:  sodium chloride 0.45 % with KCl 20 mEq 25 mL/hr Last Rate: 25 mL/hr (08/14/20 2218)     PRN Meds:.albuterol  •  ALPRAZolam  •  diphenhydrAMINE  •  ferric gluconate  •  hydrALAZINE  •  HYDROmorphone **AND** naloxone  •  HYDROmorphone  •  LORazepam  •  LORazepam  •  metoclopramide  •  Morphine **AND** naloxone  •  ondansetron **FOLLOWED BY** ondansetron  •  oxyCODONE  •  phenol  •  prochlorperazine  •  promethazine **OR** promethazine  •  promethazine  •  simethicone    Assessment/Plan   Assessment & Plan     Active Hospital Problems    Diagnosis  POA   • **Morbid obesity with body mass index (BMI) of 50.0 to 59.9 in adult (CMS/[facility]) [E66.01, Z68.43]  Not Applicable   • Hypokalemia [E87.6]  Unknown   • Type 2 diabetes  mellitus, without long-term current use of insulin (CMS/HCC) [E11.9]  Unknown   • COLLIN on CPAP [G47.33, Z99.89]  Not Applicable   • On home O2 [Z99.81]  Not Applicable   • Plaque psoriasis [L40.0]  Yes   • CHF (congestive heart failure), NYHA class IV (CMS/HCC) [I50.9]  Yes   • Nonischemic congestive cardiomyopathy (CMS/HCC) [I42.0]  Yes   • Hypertension [I10]  Yes      Resolved Hospital Problems   No resolved problems to display.        Brief Hospital Course to date:  Evan Gannon is a 29 y.o. male with PMH of CHF w/ EF 20% w/ ICD, gout, HTN, COLLIN w/ home CPAP and home oxygen @ 2-4 liters, parasthesias, plaque psoriasis on Cosentryx, T2DM - non insulin dependent and hypokalemia on chronic diuretics and morbid obesity [facility] by Dr. Whittington.       Post op laparoscopic gastric sleeve:  - post op care secondary to Dr. Fraga  - holding Eliquis and aspirin until cleared by Dr. Fraga  - continue IVF per post op orders 1/2 NS w/ 20 KCL @ 25 ml/hr  - pain control, antibiotics, diet per Dr. Whittington  - plan UGI at noon today     T2DM - non insulin dependent:  - FSBG ACHS  - low dose SSI- no changes    Nonischemic cardiomyopathy w/ ICD, EF 20% / HTN:   CHF on chronic diuretics w/ chronic hypokalemia:  Hypotension  - current SBP <100. Will hold metolazone, aldactone and demadex today. Continue IV vitamin replacement and low IVF replacement at 25ml/hr  - hold coreg  - hold am entresto. Will reduce dose and try to resume this evening if BP will tolerate  - discussed with patient. Will need close follow up with [facility], Marah Beckett at [place] for BP/ medication review     COLLIN on CPAP / on home oxygen:  - CPAP prn @ HS  - on 2liters home oxygen baseline (4L with exertion), O2 PRN     Plaque psoriasis:  - holding Cosentryx    DVT Prophylaxis:  lovenox sq      Disposition: I expect the patient to be discharged per Primary service    CODE STATUS:   Code Status and  Medical Interventions:   Ordered at: 08/14/20 1537     Code Status:    CPR     Medical Interventions (Level of Support Prior to Arrest):    Full         Electronically signed by SONAM Hollingsworth, 08/15/20, 10:25.

## 2020-08-15 NOTE — PLAN OF CARE
Patient arrived from PACU early in shift.  Mag, K+ rechecked, now WNL.  Normal sinus with BBB on telemetry.  One episode of emesis, spit up blood tinged sputum x 1.  Voided.  IV dilaudid given for pain x 1.  Tolerating ice chips/ sips.  SCD's in place.  Will continue to monitor.

## 2020-08-15 NOTE — CONSULTS
[facility] Medicine Services  CONSULT NOTE      Patient Name: Evan Gannon  : 1990  MRN: [id number]    Primary Care Physician: Gilbert Da Silva PA  Provider requesting consultation: Arnoldo Whittington MD    Subjective   Subjective     Reason for Consultation:  Medical managment    HPI:  Evan Gannon is a 29 y.o. male with PMH of CHF w/ EF 20% w/ ICD, gout, HTN, COLLIN w/ home CPAP and home oxygen @ 2 liters, parasthesias, plaque psoriasis on Cosentryx, T2DM - non insulin dependent and hypokalemia on chronic diuretics and morbid obesity who presents to [facility] for laparoscopic gastric sleeve surgery by Dr. Whittington. We are asked to see the patient for medical management. He is currently resting on his CPAP, his only complaint is a dry mouth and mild pain in his upper abdomen described as soreness.    Review of Systems   Constitutional: Negative.    HENT: Negative.    Eyes: Negative.    Respiratory: Negative.    Cardiovascular: Positive for leg swelling (chronic).   Gastrointestinal: Positive for abdominal pain (upper abdominal soreness w/ pain 4/10). Negative for nausea and vomiting.   Endocrine: Negative.    Genitourinary: Negative.    Musculoskeletal: Negative.    Skin: Positive for rash (chronic plaque psoriasis).   Allergic/Immunologic: Negative.    Neurological: Negative.    Hematological: Negative.    Psychiatric/Behavioral: Negative.        All other systems reviewed and are negative.     Personal History     Past Medical History:   Diagnosis Date   • CHF (congestive heart failure) (CMS/HCC)     diagnosed at 23yo, thought to be related to undiagnosed congenital defect- per patient. hospitalized every 3 weeks during the winter due to PNA.  Has significantf fluid retention despite diurectics   • CPAP (continuous positive airway pressure) dependence     settings at 15   • Gout     on allopurinol, controls symptoms   • Hypertension     patient denies   • Hypokalemia    •  "ICD (implantable cardioverter-defibrillator) in place    • Low HDL (under 40)    • Morbid obesity (CMS/[facility])    • Morbid obesity with BMI of 60.0-69.9, adult (CMS/[facility])    • Myocardial infarction (CMS/[facility])     \"almost\" per patient, had heart cath, no stents, has defibrillator   • On home O2     supposed to be 24/7, ran out of O2 and has only been using prn. manged  by cardiology   • COLLIN on CPAP     compliant   • Paresthesia     hands/ arms/ feet, suspected due to poor circulation reportedly   • Plaque psoriasis    • Pneumonia    • Psoriasis    • Sleep apnea    • Wears glasses        Past Surgical History:   Procedure Laterality Date   • CARDIAC CATHETERIZATION N/A 7/13/2017    Procedure: Left Heart Cath;  Surgeon: Balbir Olsen MD;  Location:  [facility] CATH INVASIVE LOC[facility];  Service:    • CARDIAC DEFIBRILLATOR PLACEMENT  08/2017   • CARDIAC DEFIBRILLATOR PLACEMENT     • CARDIAC ELECTROPHYSIOLOGY PROCEDURE N/A 8/15/2017    Procedure: VVI ICD Implant;  Surgeon: Nathanael Richmond DO;  Location:  [facility] EP INVASIVE LOCATION;  Service:    • OTHER SURGICAL HISTORY      ultra light therapy   • SINUS SURGERY      cartilage from ear inserted in nasal cavity   • TONSILLECTOMY AND ADENOIDECTOMY  2000       Family History: family history includes Diabetes in his father, maternal grandfather, and paternal grandmother. Otherwise pertinent FHx was reviewed and unremarkable.     Social History:  reports that he has never smoked. He has never used smokeless tobacco. He reports that he does not drink alcohol or use drugs.    Medications:  Medications Prior to Admission   Medication Sig Dispense Refill Last Dose   • potassium chloride (K-DUR) 10 MEQ CR tablet Take 1 tablet by mouth 2 (Two) Times a Day. (Patient taking differently: Take 10 mEq by mouth Daily.) 60 tablet 0 8/14/2020 at 1100   • acetaminophen (TYLENOL) 500 MG tablet Take 500 mg by mouth Every 6 (Six) Hours As Needed for Mild Pain .   8/12/2020   • allopurinol (Zyloprim) 100 " MG tablet Take 1 tablet by mouth Daily. 30 tablet 5 8/12/2020   • apixaban (Eliquis) 2.5 MG tablet tablet Take 1 tablet by mouth Every 12 (Twelve) Hours for 42 doses. 42 tablet 0 Pt never taken   • aspirin (Aspirin Adult Low Strength) 81 MG EC tablet Take 1 tablet by mouth Daily. (Patient taking differently: Take 81 mg by mouth Daily. Last dose 8-9-20 per Dr. Whittington's orders per patient) 90 tablet 1 8/7/2020   • carvedilol (COREG) 25 MG tablet Take 1 tablet by mouth 2 (Two) Times a Day With Meals. 180 tablet 1 8/12/2020   • metOLazone (ZAROXOLYN) 5 MG tablet Take 1 tablet by mouth Daily. 15 tablet 3 8/12/2020   • sacubitril-valsartan (Entresto) 49-51 MG tablet Take 1 tablet by mouth Every 12 (Twelve) Hours. 180 tablet 1 8/12/2020   • Secukinumab (COSENTYX, 300 MG DOSE, SC) Inject 300 mg under the skin into the appropriate area as directed.   More than a month at Unknown time   • spironolactone (Aldactone) 50 MG tablet Take 1 tablet by mouth Daily. 90 tablet 1 8/12/2020   • torsemide (DEMADEX) 20 MG tablet Take 3 tablets by mouth Daily. 270 tablet 1 8/11/2020       Scheduled Meds:    acetaminophen 1,000 mg Oral Q8H   [START ON 8/15/2020] allopurinol 100 mg Oral Daily   carvedilol 25 mg Oral Q12H   [START ON 8/15/2020] ceFAZolin 3 g Intravenous Q8H   [START ON 8/15/2020] cyanocobalamin 1,000 mcg Intramuscular Once   [START ON 8/15/2020] enoxaparin 40 mg Subcutaneous Q12H   gabapentin 100 mg Oral TID   insulin lispro 0-7 Units Subcutaneous 4x Daily With Meals & Nightly   [START ON 8/15/2020] metOLazone 5 mg Oral Daily   [START ON 8/15/2020] IV Fluids 1000 mL + additives 25 mL/hr Intravenous Once   ondansetron 4 mg Intravenous Q6H   [START ON 8/15/2020] pantoprazole 40 mg Intravenous Q AM   [START ON 8/15/2020] potassium chloride 10 mEq Oral Daily   sacubitril-valsartan 1 tablet Oral Q12H   [START ON 8/15/2020] spironolactone 50 mg Oral Daily   [START ON 8/15/2020] torsemide 60 mg Oral Daily     Continuous  Infusions:    sodium chloride 0.45 % with KCl 20 mEq 25 mL/hr     PRN Meds:.albuterol  •  ALPRAZolam  •  diphenhydrAMINE  •  [START ON 8/15/2020] ferric gluconate  •  hydrALAZINE  •  HYDROmorphone **AND** naloxone  •  HYDROmorphone  •  LORazepam  •  LORazepam  •  metoclopramide  •  Morphine **AND** naloxone  •  ondansetron **FOLLOWED BY** [START ON 8/15/2020] ondansetron  •  oxyCODONE  •  phenol  •  prochlorperazine  •  promethazine **OR** promethazine  •  promethazine  •  simethicone    No Known Allergies    Objective   Objective     Vital Signs:   Temp:  [96.7 °F (35.9 °C)-99.2 °F (37.3 °C)] 98.4 °F (36.9 °C)  Heart Rate:  [] 92  Resp:  [16-20] 20  BP: ()/(54-83) 127/67     Physical Exam   Constitutional: He is oriented to person, place, and time. He appears well-developed and well-nourished. No distress.   HENT:   Head: Normocephalic and atraumatic.   Mouth/Throat: No oropharyngeal exudate.   Eyes: Pupils are equal, round, and reactive to light. EOM are normal. No scleral icterus.   Neck: Normal range of motion. No JVD present.   Cardiovascular: Normal rate, regular rhythm, normal heart sounds and intact distal pulses.   No murmur heard.  Pulmonary/Chest: Effort normal and breath sounds normal. No respiratory distress.   Abdominal: Soft. Bowel sounds are decreased. There is tenderness (post op soreness upper abdomen).   Musculoskeletal: Normal range of motion. He exhibits edema (BLE 2+).   Neurological: He is alert and oriented to person, place, and time.   Skin: Skin is warm and dry. He is not diaphoretic.   Scattered plaques psoriasis     Psychiatric: He has a normal mood and affect. His behavior is normal. Judgment and thought content normal.     Results Reviewed:  I have personally reviewed current lab, radiology, and data and agree.    Results from last 7 days   Lab Units 08/12/20  1149   WBC 10*3/mm3 5.96   HEMOGLOBIN g/dL 14.4   HEMATOCRIT % 43.6   PLATELETS 10*3/mm3 315     Results from last 7  days   Lab Units 08/14/20  1237   SODIUM mmol/L 133*   POTASSIUM mmol/L 3.2*  3.1*   CHLORIDE mmol/L 90*   CO2 mmol/L 19.0*   BUN mg/dL 39*   CREATININE mg/dL 1.22   GLUCOSE mg/dL 119*   CALCIUM mg/dL 9.8     Estimated Creatinine Clearance: 147.9 mL/min (by C-G formula based on SCr of 1.22 mg/dL).  Brief Urine Lab Results     None        No results found for: BNP    Microbiology Results Abnormal     None          Imaging Results (Last 24 Hours)     ** No results found for the last 24 hours. **        Results for orders placed during the hospital encounter of 10/16/17   Adult Transthoracic Echo Limited W/ Cont if Necessary Per Protocol    Narrative · Left ventricular systolic function is severely decreased. Estimated EF =   15%.  · The left ventricular cavity is severely dilated.  · Right ventricular cavity is mildly dilated.  · Mildly reduced right ventricular systolic function noted.          Assessment/Plan   Assessment & Plan     Active Hospital Problems    Diagnosis  POA   • **Morbid obesity with body mass index (BMI) of 50.0 to 59.9 in adult (CMS/[facility]) [E66.01, Z68.43]  Not Applicable   • Hypokalemia [E87.6]  Unknown   • Type 2 diabetes mellitus, without long-term current use of insulin (CMS/[facility]) [E11.9]  Unknown   • COLLIN on CPAP [G47.33, Z99.89]  Not Applicable   • On home O2 [Z99.81]  Not Applicable   • Plaque psoriasis [L40.0]  Yes   • CHF (congestive heart failure), NYHA class IV (CMS/[facility]) [I50.9]  Yes   • Nonischemic congestive cardiomyopathy (CMS/[facility]) [I42.0]  Yes   • Hypertension [I10]  Yes      Resolved Hospital Problems   No resolved problems to display.     29 year old male with PMH of CHF w/ EF 20% w/ ICD, gout, HTN, COLLIN w/ home CPAP and home oxygen @ 2 liters, parasthesias, plaque psoriasis on Cosentryx, T2DM - non insulin dependent and hypokalemia on chronic diuretics and morbid obesity who presents to [facility] for laparoscopic gastric sleeve surgery by Dr. Whittington.    Plan:    Post op laparoscopic gastric  sleeve:  - post op care secondary to Dr. Fraga  - holding Eliquis and aspirin  - continue IVF per post op orders 1/2 NS w/ 20 KCL @ 25 ml/hr  - pain control, antibiotics, diet per Dr. Whittington    T2DM - non insulin dependent:  - FSBG ACHS  - low dose SSI    CHF on chronic diuretics w/ chronic hypokalemia:  - repeat bmp and mag now, last potassium @ 1200 was 3.1  - may need sliding scale electrolyte replacment  - restart zaroxolyn, aldactone, torsemide in am  - resume scheduled KCL in am at home dose    Nonischemic cardiomyopathy w/ ICD, EF 20% / HTN:  - continue coreg  - continue entresto    COLLIN on CPAP / on home oxygen:  - CPAP prn @ HS  - on 2 liters home oxygen baseline, O2 PRN    Plaque psoriasis:  - holding Cosentryx    Thank you for allowing [facility] Medicine Service to provide consultative care for your patient, we will continue to follow while clinically appropriate.    Electronically signed by SONAM Machado, 08/14/20, 9:38 PM.

## 2020-08-16 ENCOUNTER — READMISSION MANAGEMENT (OUTPATIENT)
Dept: CALL CENTER | Facility: HOSPITAL | Age: 30
End: 2020-08-16

## 2020-08-16 VITALS
TEMPERATURE: 97.9 F | HEIGHT: 69 IN | HEART RATE: 89 BPM | SYSTOLIC BLOOD PRESSURE: 107 MMHG | DIASTOLIC BLOOD PRESSURE: 59 MMHG | WEIGHT: 315 LBS | OXYGEN SATURATION: 100 % | BODY MASS INDEX: 46.65 KG/M2 | RESPIRATION RATE: 16 BRPM

## 2020-08-16 PROBLEM — I50.22: Status: ACTIVE | Noted: 2017-07-11

## 2020-08-16 PROBLEM — N17.9 ARF (ACUTE RENAL FAILURE) (HCC): Status: ACTIVE | Noted: 2020-08-16

## 2020-08-16 LAB
ALBUMIN SERPL-MCNC: 3.7 G/DL (ref 3.5–5.2)
ALBUMIN/GLOB SERPL: 0.8 G/DL
ALP SERPL-CCNC: 60 U/L (ref 39–117)
ALT SERPL W P-5'-P-CCNC: 13 U/L (ref 1–41)
ANION GAP SERPL CALCULATED.3IONS-SCNC: 9 MMOL/L (ref 5–15)
AST SERPL-CCNC: 23 U/L (ref 1–40)
BASOPHILS # BLD AUTO: 0.04 10*3/MM3 (ref 0–0.2)
BASOPHILS NFR BLD AUTO: 0.5 % (ref 0–1.5)
BILIRUB SERPL-MCNC: 0.8 MG/DL (ref 0–1.2)
BUN SERPL-MCNC: 37 MG/DL (ref 6–20)
BUN/CREAT SERPL: 27.4 (ref 7–25)
CALCIUM SPEC-SCNC: 9.5 MG/DL (ref 8.6–10.5)
CHLORIDE SERPL-SCNC: 90 MMOL/L (ref 98–107)
CO2 SERPL-SCNC: 32 MMOL/L (ref 22–29)
CREAT SERPL-MCNC: 1.35 MG/DL (ref 0.76–1.27)
DEPRECATED RDW RBC AUTO: 46.5 FL (ref 37–54)
EOSINOPHIL # BLD AUTO: 0 10*3/MM3 (ref 0–0.4)
EOSINOPHIL NFR BLD AUTO: 0 % (ref 0.3–6.2)
ERYTHROCYTE [DISTWIDTH] IN BLOOD BY AUTOMATED COUNT: 15.6 % (ref 12.3–15.4)
GFR SERPL CREATININE-BSD FRML MDRD: 77 ML/MIN/1.73
GLOBULIN UR ELPH-MCNC: 4.5 GM/DL
GLUCOSE BLDC GLUCOMTR-MCNC: 103 MG/DL (ref 70–130)
GLUCOSE SERPL-MCNC: 94 MG/DL (ref 65–99)
HCT VFR BLD AUTO: 40.3 % (ref 37.5–51)
HGB BLD-MCNC: 13 G/DL (ref 13–17.7)
IMM GRANULOCYTES # BLD AUTO: 0.02 10*3/MM3 (ref 0–0.05)
IMM GRANULOCYTES NFR BLD AUTO: 0.2 % (ref 0–0.5)
LYMPHOCYTES # BLD AUTO: 0.98 10*3/MM3 (ref 0.7–3.1)
LYMPHOCYTES NFR BLD AUTO: 11.8 % (ref 19.6–45.3)
MCH RBC QN AUTO: 26.7 PG (ref 26.6–33)
MCHC RBC AUTO-ENTMCNC: 32.3 G/DL (ref 31.5–35.7)
MCV RBC AUTO: 82.8 FL (ref 79–97)
MONOCYTES # BLD AUTO: 1.02 10*3/MM3 (ref 0.1–0.9)
MONOCYTES NFR BLD AUTO: 12.3 % (ref 5–12)
NEUTROPHILS NFR BLD AUTO: 6.25 10*3/MM3 (ref 1.7–7)
NEUTROPHILS NFR BLD AUTO: 75.2 % (ref 42.7–76)
NRBC BLD AUTO-RTO: 0 /100 WBC (ref 0–0.2)
PLATELET # BLD AUTO: 304 10*3/MM3 (ref 140–450)
PMV BLD AUTO: 11.1 FL (ref 6–12)
POTASSIUM SERPL-SCNC: 3.7 MMOL/L (ref 3.5–5.2)
PROT SERPL-MCNC: 8.2 G/DL (ref 6–8.5)
RBC # BLD AUTO: 4.87 10*6/MM3 (ref 4.14–5.8)
SODIUM SERPL-SCNC: 131 MMOL/L (ref 136–145)
WBC # BLD AUTO: 8.31 10*3/MM3 (ref 3.4–10.8)

## 2020-08-16 PROCEDURE — 94660 CPAP INITIATION&MGMT: CPT

## 2020-08-16 PROCEDURE — 94799 UNLISTED PULMONARY SVC/PX: CPT

## 2020-08-16 PROCEDURE — 25010000002 ENOXAPARIN PER 10 MG: Performed by: SURGERY

## 2020-08-16 PROCEDURE — 82962 GLUCOSE BLOOD TEST: CPT

## 2020-08-16 PROCEDURE — 99024 POSTOP FOLLOW-UP VISIT: CPT | Performed by: SURGERY

## 2020-08-16 PROCEDURE — 99232 SBSQ HOSP IP/OBS MODERATE 35: CPT | Performed by: INTERNAL MEDICINE

## 2020-08-16 PROCEDURE — 80053 COMPREHEN METABOLIC PANEL: CPT | Performed by: SURGERY

## 2020-08-16 PROCEDURE — 85025 COMPLETE CBC W/AUTO DIFF WBC: CPT | Performed by: SURGERY

## 2020-08-16 RX ORDER — ONDANSETRON 4 MG/1
4 TABLET, ORALLY DISINTEGRATING ORAL EVERY 8 HOURS PRN
Qty: 10 TABLET | Refills: 0 | Status: SHIPPED | OUTPATIENT
Start: 2020-08-16 | End: 2020-11-02

## 2020-08-16 RX ORDER — OMEPRAZOLE 40 MG/1
40 CAPSULE, DELAYED RELEASE ORAL DAILY
Qty: 60 CAPSULE | Refills: 0 | Status: SHIPPED | OUTPATIENT
Start: 2020-08-16 | End: 2020-10-15

## 2020-08-16 RX ORDER — POTASSIUM CHLORIDE 750 MG/1
10 CAPSULE, EXTENDED RELEASE ORAL DAILY
Status: DISCONTINUED | OUTPATIENT
Start: 2020-08-17 | End: 2020-08-16

## 2020-08-16 RX ORDER — OXYCODONE HYDROCHLORIDE 5 MG/1
5 TABLET ORAL EVERY 6 HOURS PRN
Qty: 10 TABLET | Refills: 0 | Status: SHIPPED | OUTPATIENT
Start: 2020-08-16 | End: 2020-09-16

## 2020-08-16 RX ORDER — POTASSIUM CHLORIDE 750 MG/1
10 CAPSULE, EXTENDED RELEASE ORAL DAILY
Status: DISCONTINUED | OUTPATIENT
Start: 2020-08-16 | End: 2020-08-16 | Stop reason: HOSPADM

## 2020-08-16 RX ORDER — SENNOSIDES 8.6 MG
650 CAPSULE ORAL EVERY 6 HOURS
Qty: 20 TABLET | Refills: 0 | Status: SHIPPED | OUTPATIENT
Start: 2020-08-16 | End: 2020-08-25

## 2020-08-16 RX ADMIN — ACETAMINOPHEN 1000 MG: 500 TABLET, FILM COATED ORAL at 05:52

## 2020-08-16 RX ADMIN — ALLOPURINOL 100 MG: 100 TABLET ORAL at 09:12

## 2020-08-16 RX ADMIN — PANTOPRAZOLE SODIUM 40 MG: 40 INJECTION, POWDER, FOR SOLUTION INTRAVENOUS at 05:52

## 2020-08-16 RX ADMIN — POTASSIUM CHLORIDE 10 MEQ: 10 CAPSULE, COATED, EXTENDED RELEASE ORAL at 10:06

## 2020-08-16 RX ADMIN — ENOXAPARIN SODIUM 40 MG: 40 INJECTION SUBCUTANEOUS at 09:15

## 2020-08-16 RX ADMIN — GABAPENTIN 100 MG: 100 CAPSULE ORAL at 10:06

## 2020-08-16 NOTE — OUTREACH NOTE
Prep Survey      Responses   [facility] facility patient discharged from?  [place]   Is LACE score < 7 ?  No   Eligibility  [facility]   Date of Admission  08/14/20   Date of Discharge  08/16/20   Discharge Disposition  Home or Self Care   Discharge diagnosis  Lap gastric sleeve   COVID-19 Test Status  Negative   Does the patient have one of the following disease processes/diagnoses(primary or secondary)?  General Surgery   Does the patient have Home health ordered?  No   Is there a DME ordered?  No   Prep survey completed?  Yes          Ana Castillo RN

## 2020-08-16 NOTE — PROGRESS NOTES
[facility] Medicine Services  PROGRESS NOTE    Patient Name: Evan Gannon  : 1990  MRN: [id number]    Date of Admission: 2020  Primary Care Physician: Gilbert Da Silva PA    Subjective   Subjective     CC:  Medical Management of ICM/ CHF/ DM post gastric sleeve    HPI:  Sleeping on cpap.  He voices no complaints.  Drinking well and tolerating his diet.  Says he is not diabetic.  Glc has been well controlled here with no coverage.      Review of Systems  Gen- No fevers, chills  CV- No chest pain, palpitations  Resp- No cough, dyspnea  GI- No N/V/D, abd pain    Objective   Objective     Vital Signs:   Temp:  [97 °F (36.1 °C)-98.2 °F (36.8 °C)] 97.9 °F (36.6 °C)  Heart Rate:  [] 89  Resp:  [16-24] 16  BP: ()/(43-73) 107/59        Physical Exam:  Constitutional: No acute distress, awake, alert, sleeping but arouses easy  HENT: NCAT, mucous membranes moist  Respiratory: Clear to auscultation bilaterally, respiratory effort normal on cpap  Cardiovascular: RRR, no murmurs, rubs, or gallops  Gastrointestinal: morbidly obese,Positive bowel sounds, soft, lap sites looks ok, nondistended  Musculoskeletal: No bilateral ankle edema  Psychiatric: Appropriate affect, cooperative  Neurologic: Oriented x 3, no focal deficits  Skin: diffuse plaque psoriasis    Results Reviewed:  Results from last 7 days   Lab Units 08/15/20  1330 20  1149   WBC 10*3/mm3 7.81 5.96   HEMOGLOBIN g/dL 13.6 14.4   HEMATOCRIT % 42.1 43.6   PLATELETS 10*3/mm3 322 315     Results from last 7 days   Lab Units 08/15/20  1330 20  2227 20  1237   SODIUM mmol/L 132* 131* 133*   POTASSIUM mmol/L 4.1 3.7 3.2*  3.1*   CHLORIDE mmol/L 91* 92* 90*   CO2 mmol/L 29.0 26.0 19.0*   BUN mg/dL 40* 35* 39*   CREATININE mg/dL 1.98* 1.27 1.22   GLUCOSE mg/dL 116* 185* 119*   CALCIUM mg/dL 9.4 9.2 9.8   ALT (SGPT) U/L 16  --   --    AST (SGOT) U/L 24  --   --      Estimated Creatinine Clearance: 91.2  mL/min (A) (by C-G formula based on SCr of 1.98 mg/dL (H)).    Microbiology Results Abnormal     None          Imaging Results (Last 24 Hours)     Procedure Component Value Units Date/Time    FL Upper GI Single Contrast With KUB [[id number]] Collected:  08/15/20 1324     Updated:  08/15/20 1532    Narrative:       EXAMINATION: FL UPPER GI SINGLE CONTRAST W KUB - 08/15/2020     INDICATION: E66.01-Morbid (severe) obesity due to excess calories;  Z68.43-Body mass index (BMI) 50.0-59.9, adult. Post gastric sleeve.     COMPARISON: NONE     FINDINGS: One minute and four seconds of fluoroscopy time was used.  Patient was evaluated in the standing lateral and both shallow posterior  oblique positions while taking water-soluble contrast by mouth. Contrast  passes immediately through the normal appearing esophagus into the  stomach and on into the duodenum. There is no evidence of extravasation  of contrast and no evidence of stricture.       Impression:       Expected post-op changes of gastric sleeve surgery. No  evidence of extravasation or obstruction.     DICTATED:   08/15/2020  EDITED/ls :   08/15/2020                    Results for orders placed during the hospital encounter of 10/16/17   Adult Transthoracic Echo Limited W/ Cont if Necessary Per Protocol    Narrative · Left ventricular systolic function is severely decreased. Estimated EF =   15%.  · The left ventricular cavity is severely dilated.  · Right ventricular cavity is mildly dilated.  · Mildly reduced right ventricular systolic function noted.          I have reviewed the medications:  Scheduled Meds:    acetaminophen 1,000 mg Oral Q8H   allopurinol 100 mg Oral Daily   enoxaparin 40 mg Subcutaneous Q12H   gabapentin 100 mg Oral TID   pantoprazole 40 mg Intravenous Q AM   potassium chloride 10 mEq Oral Daily     Continuous Infusions:   PRN Meds:.albuterol  •  ALPRAZolam  •  diphenhydrAMINE  •  ferric gluconate  •  HYDROmorphone **AND** naloxone  •   HYDROmorphone  •  LORazepam  •  LORazepam  •  metoclopramide  •  Morphine **AND** naloxone  •  [] ondansetron **FOLLOWED BY** ondansetron  •  oxyCODONE  •  phenol  •  prochlorperazine  •  promethazine **OR** promethazine  •  promethazine  •  simethicone    Assessment/Plan   Assessment & Plan     Active Hospital Problems    Diagnosis  POA   • **Morbid obesity with body mass index (BMI) of 50.0 to 59.9 in adult (CMS/[facility]) [E66.01, Z68.43]  Not Applicable   • ARF (acute renal failure) (CMS/[facility]) [N17.9]  Yes   • Hypokalemia [E87.6]  Unknown   • Type 2 diabetes mellitus, without long-term current use of insulin (CMS/[facility]) [E11.9]  Unknown   • COLLIN on CPAP [G47.33, Z99.89]  Not Applicable   • On home O2 [Z99.81]  Not Applicable   • Plaque psoriasis [L40.0]  Yes   • CHF (congestive heart failure), NYHA class IV (CMS/[facility]) [I50.9]  Yes   • Nonischemic congestive cardiomyopathy (CMS/[facility]) [I42.0]  Yes   • Hypertension [I10]  Yes      Resolved Hospital Problems   No resolved problems to display.        Brief Hospital Course to date:  Evan Gannon is a 29 y.o. male with PMH of CHF w/ EF 20% w/ ICD, gout, HTN, COLLIN w/ home CPAP and home oxygen @ 2-4 liters, parasthesias, plaque psoriasis on Cosentryx, T2DM - non insulin dependent and hypokalemia on chronic diuretics and morbid obesity who presents to [facility] for laparoscopic gastric sleeve surgery by Dr. Whittington.       Post op laparoscopic gastric sleeve on :  - post op care secondary to Dr. Fraga  - holding Eliquis and aspirin until cleared by Dr. Fraga  - UGI on 8/15 shows no leak     T2DM - non insulin dependent:  - he states not diabetic.  On no meds at home for his.  A1c=6.5  - requiring no SS insulin coverage, ok to stopp  - f/u PCP    Nonischemic cardiomyopathy w/ ICD, EF 20% / HTN:   CHF on chronic diuretics w/ chronic hypokalemia:  Hypotension  ARF  - BP is improved and consistently over 100 SBP now.  - will continue to hold diuretics.  Will also stop entresto.  -  Cr up to 1.98 yesterday.  Got small bolus.  - labs pending today d/t difficult stick, d/w RN.  Will be drawn and I will follow up this afternoon.  If still up, will likely give some additional IVF.  - discussed with patient. Will need close follow up with [facility], Marah Beckett at [place] for BP/ medication review     COLLIN on CPAP / on home oxygen:  - CPAP prn @ HS  - on 2liters home oxygen baseline (4L with exertion), O2 PRN     Plaque psoriasis:  - holding Cosentryx    DVT Prophylaxis:  lovenox sq bid      Disposition: I expect the patient to be discharged per Primary service    ADDENDUM 1430: Cr returned 1.35.  D/w Dr. Antoine - plan to send home today.  BP is still low 100s.  Would recommend holding entresto at discharge and only resume zaroxlyn (hold aldactone and demadex).  Unclear what volume and BP will be following surgery with anticipated weight loss.  He will need to see heart and valve clinic within 1 week for determination on if ok to resume.    CODE STATUS:   Code Status and Medical Interventions:   Ordered at: 08/14/20 1537     Code Status:    CPR     Medical Interventions (Level of Support Prior to Arrest):    Full         Electronically signed by Julius Aguilar MD, 08/16/20, 13:36.

## 2020-08-16 NOTE — PROGRESS NOTES
"Bariatric Surgery Progress Note:      Chief Complaint:  POD #2    Subjective     Interval History:  Doing well.  No complaints.  Pain controlled.  Denies N/V.  No fevers.  Ambulating.  Voiding.  IS 1500 reported, less than 2500 yesterday.  Tolerating protein and water without any difficulty.    Objective     Vital Signs  Blood pressure 107/59, pulse 89, temperature 97.9 °F (36.6 °C), temperature source Oral, resp. rate 16, height 175.3 cm (69\"), weight (!) 182 kg (401 lb), SpO2 100 %.      Intake/Output Summary (Last 24 hours) at 8/16/2020 1424  Last data filed at 8/16/2020 0800  Gross per 24 hour   Intake 90 ml   Output 1430 ml   Net -1340 ml       Physical Exam:  General: Alert, NAD, sitting on the edge of the bed  Abdomen: soft, appropriate, incisions okay  Extremities: warm, (+) SCDs  Skin: multiple psoriatic plaques on shoulders, chest, and abdomen       Labs:  Lab Results (last 24 hours)     Procedure Component Value Units Date/Time    Comprehensive Metabolic Panel [[id number]]  (Abnormal) Collected:  08/16/20 1323    Specimen:  Blood Updated:  08/16/20 1418     Glucose 94 mg/dL      BUN 37 mg/dL      Creatinine 1.35 mg/dL      Sodium 131 mmol/L      Potassium 3.7 mmol/L      Comment: Specimen hemolyzed.  Results may be affected.        Chloride 90 mmol/L      CO2 32.0 mmol/L      Calcium 9.5 mg/dL      Total Protein 8.2 g/dL      Albumin 3.70 g/dL      ALT (SGPT) 13 U/L      AST (SGOT) 23 U/L      Alkaline Phosphatase 60 U/L      Total Bilirubin 0.8 mg/dL      eGFR   Amer 77 mL/min/1.73      Globulin 4.5 gm/dL      A/G Ratio 0.8 g/dL      BUN/Creatinine Ratio 27.4     Anion Gap 9.0 mmol/L     Narrative:       GFR Normal >60  Chronic Kidney Disease <60  Kidney Failure <15      CBC & Differential [[id number]] Collected:  08/16/20 1323    Specimen:  Blood Updated:  08/16/20 1401    Narrative:       The following orders were created for panel order CBC & Differential.  Procedure                               " Abnormality         Status                     ---------                               -----------         ------                     CBC Auto Differential[[id number]]        Abnormal            Final result                 Please view results for these tests on the individual orders.    CBC Auto Differential [[id number]]  (Abnormal) Collected:  08/16/20 1323    Specimen:  Blood Updated:  08/16/20 1401     WBC 8.31 10*3/mm3      RBC 4.87 10*6/mm3      Hemoglobin 13.0 g/dL      Hematocrit 40.3 %      MCV 82.8 fL      MCH 26.7 pg      MCHC 32.3 g/dL      RDW 15.6 %      RDW-SD 46.5 fl      MPV 11.1 fL      Platelets 304 10*3/mm3      Neutrophil % 75.2 %      Lymphocyte % 11.8 %      Monocyte % 12.3 %      Eosinophil % 0.0 %      Basophil % 0.5 %      Immature Grans % 0.2 %      Neutrophils, Absolute 6.25 10*3/mm3      Lymphocytes, Absolute 0.98 10*3/mm3      Monocytes, Absolute 1.02 10*3/mm3      Eosinophils, Absolute 0.00 10*3/mm3      Basophils, Absolute 0.04 10*3/mm3      Immature Grans, Absolute 0.02 10*3/mm3      nRBC 0.0 /100 WBC     POC Glucose Once [[id number]]  (Normal) Collected:  08/16/20 0708    Specimen:  Blood Updated:  08/16/20 0717     Glucose 103 mg/dL     POC Glucose Once [[id number]]  (Normal) Collected:  08/15/20 2020    Specimen:  Blood Updated:  08/15/20 2022     Glucose 97 mg/dL     POC Glucose Once [[id number]]  (Normal) Collected:  08/15/20 1623    Specimen:  Blood Updated:  08/15/20 1625     Glucose 103 mg/dL             Assessment/Plan     POD # 2 s/p LSG.    Labs came back late today (just now actually) but his creatinine is trending down to 1.3 which is close to baseline.  D/w Dr. Aguilar--no medical contraindications to discharge.  He is doing quite well from a bariatric perspective.  Encouraged to keep working on IS and increase volumes.      Patient feels well and has met discharge criteria.  Will discharge home with follow up in 1 week with PA.  Rx given for oxycodone, tylenol, PPI, Zofran.   Defer to Dr. Aguilar which antihypertensives and diuretics he will need to remain on due to CHF, but must hold ASA.   Discharge instructions reviewed with patient and all questions answered.

## 2020-08-17 ENCOUNTER — TRANSITIONAL CARE MANAGEMENT TELEPHONE ENCOUNTER (OUTPATIENT)
Dept: CALL CENTER | Facility: HOSPITAL | Age: 30
End: 2020-08-17

## 2020-08-17 NOTE — OUTREACH NOTE
Call Center TCM Note      Responses   [facility] patient discharged from?  [place]   Does the patient have one of the following disease processes/diagnoses(primary or secondary)?  General Surgery   TCM attempt successful?  Yes   Call start time  1023   Call end time  1028   Discharge diagnosis  Lap gastric sleeve   Meds reviewed with patient/caregiver?  Yes   Is the patient having any side effects they believe may be caused by any medication additions or changes?  No   Does the patient have all medications related to this admission filled (includes all antibiotics, pain medications, etc.)  Yes   Is the patient taking all medications as directed (includes completed medication regime)?  Yes   Does the patient have a follow up appointment scheduled with their surgeon?  Yes   Has the patient kept scheduled appointments due by today?  No   What is preventing the patient from keeping their appointments?  -- [Pt missed appt with Kiah Sierra on 8/17/20]   Nursing Interventions  Advised to reschedule appointment   Comments  Video visit with PCP for hospital d/c f/u appt on 8/19/20 at 1:30 pm   Psychosocial issues?  No   Did the patient receive a copy of their discharge instructions?  Yes   Nursing interventions  Reviewed instructions with patient   What is the patient's perception of their health status since discharge?  Improving   Nursing interventions  Nurse provided patient education   Is the patient /caregiver able to teach back basic post-op care?  Lifting as instructed by MD in discharge instructions, Drive as instructed by MD in discharge instructions   Is the patient/caregiver able to teach back signs and symptoms of incisional infection?  Fever   Is the patient/caregiver able to teach back steps to recovery at home?  Rest and rebuild strength, gradually increase activity, Set small, achievable goals for return to baseline health, Eat a well-balance diet [Pt reports he's having trouble getting in his  protein]   Is the patient/caregiver able to teach back the hierarchy of who to call/visit for symptoms/problems? PCP, Specialist, Home health nurse, Urgent Care, ED, 911  Yes   TCM call completed?  Yes          Lupe Escobar RN    8/17/2020, 10:29

## 2020-08-18 PROBLEM — K29.50 CHRONIC GASTRITIS: Status: ACTIVE | Noted: 2020-08-18

## 2020-08-18 LAB
CYTO UR: NORMAL
LAB AP CASE REPORT: NORMAL
LAB AP CLINICAL INFORMATION: NORMAL
PATH REPORT.FINAL DX SPEC: NORMAL
PATH REPORT.GROSS SPEC: NORMAL

## 2020-08-18 NOTE — DISCHARGE SUMMARY
Admission Diagnosis:   Super Morbid Obesity with Multiple Co-morbidities    Discharge Diagnosis:  Same    Principal Procedure Performed:   Laparoscopic sleeve gastrectomy,  EGD    Other procedures performed: Upper GI    Complications: None    Consultations: IM    History of present illness:  This is a disabled 29-year-old morbidly obese patient who presents for elective high risk (cardio/pulm) laparoscopic sleeve gastrectomy.  The preoperative testing and evaluation is in order and the patient is admitted for elective surgery.    Hospital Course:  The patient was admitted and underwent uneventful surgery as described.  Postoperatively the patient was transferred to the bariatric telemetry unit. IM was consulted for his severe med issues.  Upper GI on postoperative day #1 was unremarkable.  He was doing well postoperative day #1 but his creatinine bumped up slightly.  His intravenous fluids were minimized given his cardiac history.  A small bolus was given.  The next day medicine noted his creatinine came down to 1.35 with blood pressure still in the low 100s and they recommended holding Entresto and only resuming Zaroxolyn.  Hold Aldactone and Demadex.  They noted he will need to see his heart valve clinic within 1 week to determine further medical treatment.  At the time of discharge on postoperative day #2 the patient is tolerating a diet and pain is controlled with oral medication.  Once again medicine has cleared him for discharge.  The patient is afebrile and abdominal exam is appropriate, wounds look okay.  The patient is discharged home in good condition.  Discharge instructions were discussed.  The medication reconciliation has been completed.  The patient is to follow-up in 1-2 weeks in the office.  He is to call with any problems questions or concerns.

## 2020-08-19 ENCOUNTER — TELEMEDICINE (OUTPATIENT)
Dept: FAMILY MEDICINE CLINIC | Facility: CLINIC | Age: 30
End: 2020-08-19

## 2020-08-19 DIAGNOSIS — E66.01 MORBID (SEVERE) OBESITY DUE TO EXCESS CALORIES (HCC): Primary | ICD-10-CM

## 2020-08-19 DIAGNOSIS — N17.9 AKI (ACUTE KIDNEY INJURY) (HCC): ICD-10-CM

## 2020-08-19 DIAGNOSIS — I50.22 CHRONIC SYSTOLIC CONGESTIVE HEART FAILURE, NYHA CLASS 4 (HCC): ICD-10-CM

## 2020-08-19 DIAGNOSIS — Z98.84 STATUS POST LAPAROSCOPIC SLEEVE GASTRECTOMY: ICD-10-CM

## 2020-08-19 DIAGNOSIS — R11.2 NON-INTRACTABLE VOMITING WITH NAUSEA, UNSPECIFIED VOMITING TYPE: ICD-10-CM

## 2020-08-19 PROCEDURE — 99214 OFFICE O/P EST MOD 30 MIN: CPT | Performed by: PHYSICIAN ASSISTANT

## 2020-08-19 NOTE — PROGRESS NOTES
"Chief Complaint   Patient presents with   • Follow-up     admission for sleeve gastrectomy       HPI     Evan Gannon is a 29 y.o. male who is here for video hospital follow-up. You have chosen to receive care through a telehealth visit.  Do you consent to use a video/audio connection for your medical care today? Yes.    He was recently admitted to [facility] from 8/14-8/16 for laparoscopic sleeve gastrectomy. He has been drinking water, protein shakes, chicken broth, and sugarless Powerade. He is tolerating p.o. intake well aside from chicken broth which has caused nausea and vomiting x 1 today. He had several loose stools yesterday. Pain controlled with Tylenol and oxycodone intermittently. He has only needed oxycodone 3 times. He tries to minimize taking it due to dizziness and somnolence. Entresto, torsemide, and spironolactone were held at discharge due to low blood pressures and elevated creatinine however he has been taking all of his diuretics in addition to aspirin. His creatinine did improve to 1.3 at discharge. His BP this morning was 135/90 per patient so he did take Entresto today.      Past Medical History:   Diagnosis Date   • CHF (congestive heart failure) (CMS/[facility])     diagnosed at 23yo, thought to be related to undiagnosed congenital defect- per patient. hospitalized every 3 weeks during the winter due to PNA.  Has significantf fluid retention despite diurectics   • CPAP (continuous positive airway pressure) dependence     settings at 15   • Gout     on allopurinol, controls symptoms   • Hypertension     patient denies   • Hypokalemia    • ICD (implantable cardioverter-defibrillator) in place    • Low HDL (under 40)    • Morbid obesity (CMS/HCC)    • Morbid obesity with BMI of 60.0-69.9, adult (CMS/HCC)    • Myocardial infarction (CMS/HCC)     \"almost\" per patient, had heart cath, no stents, has defibrillator   • On home O2     supposed to be 24/7, ran out of O2 and has only been using prn. manged "  by cardiology   • COLLIN on CPAP     compliant   • Paresthesia     hands/ arms/ feet, suspected due to poor circulation reportedly   • Plaque psoriasis    • Pneumonia    • Psoriasis    • Sleep apnea    • Wears glasses        Past Surgical History:   Procedure Laterality Date   • CARDIAC CATHETERIZATION N/A 7/13/2017    Procedure: Left Heart Cath;  Surgeon: Balbir Olsen MD;  Location:  [facility];  Service:    • CARDIAC DEFIBRILLATOR PLACEMENT  08/2017   • CARDIAC DEFIBRILLATOR PLACEMENT     • CARDIAC ELECTROPHYSIOLOGY PROCEDURE N/A 8/15/2017    Procedure: VVI ICD Implant;  Surgeon: Nathanael Richmond DO;  Location:  [facility] EP INVASIVE LOCATION;  Service:    • ENDOSCOPY N/A 8/14/2020    Procedure: ESOPHAGOGASTRODUODENOSCOPY;  Surgeon: Arnoldo Whittington MD;  Location:  [facility] OR;  Service: Bariatric;  Laterality: N/A;   • GASTRIC SLEEVE LAPAROSCOPIC N/A 8/14/2020    Procedure: GASTRIC SLEEVE LAPAROSCOPIC;  Surgeon: Arnoldo Whittington MD;  Location:  [facility] OR;  Service: Bariatric;  Laterality: N/A;   • OTHER SURGICAL HISTORY      ultra light therapy   • SINUS SURGERY      cartilage from ear inserted in nasal cavity   • TONSILLECTOMY AND ADENOIDECTOMY  2000       Family History   Problem Relation Age of Onset   • Diabetes Father    • Diabetes Paternal Grandmother    • Diabetes Maternal Grandfather        Social History     Socioeconomic History   • Marital status: Single     Spouse name: Not on file   • Number of children: 0   • Years of education: Not on file   • Highest education level: Not on file   Occupational History   • Occupation: disabled   Tobacco Use   • Smoking status: Never Smoker   • Smokeless tobacco: Never Used   Substance and Sexual Activity   • Alcohol use: No   • Drug use: No   • Sexual activity: Defer   Social History Narrative    Lives in [facility] [place] [facility] with parents and brother.  Disabled from CHF, given pass to work- hasn't gotten hired yet. .             Caffeine use: 6  sodas weekly    Patient lives with parents        Caffeine 1 soda a week        Caffeine 8 servings 3 days a week       No Known Allergies    ROS    Review of Systems   Constitutional: Negative for chills and fever.   HENT: Negative for trouble swallowing.    Cardiovascular: Negative for chest pain.   Gastrointestinal: Positive for abdominal pain, diarrhea, nausea and vomiting.       There were no vitals filed for this visit.  There is no height or weight on file to calculate BMI.      Current Outpatient Medications:   •  acetaminophen (Tylenol 8 Hour) 650 MG 8 hr tablet, Take 1 tablet by mouth Every 6 (Six) Hours., Disp: 20 tablet, Rfl: 0  •  acetaminophen (TYLENOL) 500 MG tablet, Take 500 mg by mouth Every 6 (Six) Hours As Needed for Mild Pain ., Disp: , Rfl:   •  allopurinol (Zyloprim) 100 MG tablet, Take 1 tablet by mouth Daily., Disp: 30 tablet, Rfl: 5  •  apixaban (Eliquis) 2.5 MG tablet tablet, Take 1 tablet by mouth Every 12 (Twelve) Hours for 42 doses., Disp: 42 tablet, Rfl: 0  •  carvedilol (COREG) 25 MG tablet, Take 1 tablet by mouth 2 (Two) Times a Day With Meals., Disp: 180 tablet, Rfl: 1  •  metOLazone (ZAROXOLYN) 5 MG tablet, Take 1 tablet by mouth Daily., Disp: 15 tablet, Rfl: 3  •  omeprazole (PrilOSEC) 40 MG capsule, Take 1 capsule by mouth Daily for 60 days., Disp: 60 capsule, Rfl: 0  •  ondansetron ODT (Zofran ODT) 4 MG disintegrating tablet, Place 1 tablet on the tongue Every 8 (Eight) Hours As Needed for Nausea or Vomiting., Disp: 10 tablet, Rfl: 0  •  oxyCODONE (Roxicodone) 5 MG immediate release tablet, Take 1 tablet by mouth Every 6 (Six) Hours As Needed for Moderate Pain ., Disp: 10 tablet, Rfl: 0  •  potassium chloride (K-DUR) 10 MEQ CR tablet, Take 1 tablet by mouth 2 (Two) Times a Day. (Patient taking differently: Take 10 mEq by mouth Daily.), Disp: 60 tablet, Rfl: 0  •  Secukinumab (COSENTYX, 300 MG DOSE, SC), Inject 300 mg under the skin into the appropriate area as directed., Disp: ,  Rfl:     PE    Physical Exam   Constitutional: He appears well-developed and well-nourished. No distress.   Cardiovascular:   No murmur heard.  Pulmonary/Chest: Effort normal. No respiratory distress.   Wearing 02 nasal canula   Neurological: He is alert. Gait normal.   Psychiatric: He has a normal mood and affect. His speech is normal.   Vitals reviewed.      Results    Results for orders placed or performed during the hospital encounter of 08/14/20   Potassium   Result Value Ref Range    Potassium 3.1 (L) 3.5 - 5.2 mmol/L   Basic Metabolic Panel   Result Value Ref Range    Glucose 119 (H) 65 - 99 mg/dL    BUN 39 (H) 6 - 20 mg/dL    Creatinine 1.22 0.76 - 1.27 mg/dL    Sodium 133 (L) 136 - 145 mmol/L    Potassium 3.2 (L) 3.5 - 5.2 mmol/L    Chloride 90 (L) 98 - 107 mmol/L    CO2 19.0 (L) 22.0 - 29.0 mmol/L    Calcium 9.8 8.6 - 10.5 mg/dL    eGFR  African Amer 86 >60 mL/min/1.73    BUN/Creatinine Ratio 32.0 (H) 7.0 - 25.0    Anion Gap 24.0 (H) 5.0 - 15.0 mmol/L   Basic Metabolic Panel   Result Value Ref Range    Glucose 185 (H) 65 - 99 mg/dL    BUN 35 (H) 6 - 20 mg/dL    Creatinine 1.27 0.76 - 1.27 mg/dL    Sodium 131 (L) 136 - 145 mmol/L    Potassium 3.7 3.5 - 5.2 mmol/L    Chloride 92 (L) 98 - 107 mmol/L    CO2 26.0 22.0 - 29.0 mmol/L    Calcium 9.2 8.6 - 10.5 mg/dL    eGFR  African Amer 82 >60 mL/min/1.73    BUN/Creatinine Ratio 27.6 (H) 7.0 - 25.0    Anion Gap 13.0 5.0 - 15.0 mmol/L   Magnesium   Result Value Ref Range    Magnesium 2.0 1.6 - 2.6 mg/dL   Comprehensive Metabolic Panel   Result Value Ref Range    Glucose 116 (H) 65 - 99 mg/dL    BUN 40 (H) 6 - 20 mg/dL    Creatinine 1.98 (H) 0.76 - 1.27 mg/dL    Sodium 132 (L) 136 - 145 mmol/L    Potassium 4.1 3.5 - 5.2 mmol/L    Chloride 91 (L) 98 - 107 mmol/L    CO2 29.0 22.0 - 29.0 mmol/L    Calcium 9.4 8.6 - 10.5 mg/dL    Total Protein 8.7 (H) 6.0 - 8.5 g/dL    Albumin 3.90 3.50 - 5.20 g/dL    ALT (SGPT) 16 1 - 41 U/L    AST (SGOT) 24 1 - 40 U/L    Alkaline  Phosphatase 62 39 - 117 U/L    Total Bilirubin 0.7 0.0 - 1.2 mg/dL    eGFR  African Amer 49 (L) >60 mL/min/1.73    Globulin 4.8 gm/dL    A/G Ratio 0.8 g/dL    BUN/Creatinine Ratio 20.2 7.0 - 25.0    Anion Gap 12.0 5.0 - 15.0 mmol/L   Iron   Result Value Ref Range    Iron 51 (L) 59 - 158 mcg/dL   CBC Auto Differential   Result Value Ref Range    WBC 7.81 3.40 - 10.80 10*3/mm3    RBC 5.13 4.14 - 5.80 10*6/mm3    Hemoglobin 13.6 13.0 - 17.7 g/dL    Hematocrit 42.1 37.5 - 51.0 %    MCV 82.1 79.0 - 97.0 fL    MCH 26.5 (L) 26.6 - 33.0 pg    MCHC 32.3 31.5 - 35.7 g/dL    RDW 15.6 (H) 12.3 - 15.4 %    RDW-SD 46.2 37.0 - 54.0 fl    MPV 10.5 6.0 - 12.0 fL    Platelets 322 140 - 450 10*3/mm3    Neutrophil % 84.1 (H) 42.7 - 76.0 %    Lymphocyte % 6.8 (L) 19.6 - 45.3 %    Monocyte % 8.6 5.0 - 12.0 %    Eosinophil % 0.0 (L) 0.3 - 6.2 %    Basophil % 0.1 0.0 - 1.5 %    Immature Grans % 0.4 0.0 - 0.5 %    Neutrophils, Absolute 6.57 1.70 - 7.00 10*3/mm3    Lymphocytes, Absolute 0.53 (L) 0.70 - 3.10 10*3/mm3    Monocytes, Absolute 0.67 0.10 - 0.90 10*3/mm3    Eosinophils, Absolute 0.00 0.00 - 0.40 10*3/mm3    Basophils, Absolute 0.01 0.00 - 0.20 10*3/mm3    Immature Grans, Absolute 0.03 0.00 - 0.05 10*3/mm3    nRBC 0.0 0.0 - 0.2 /100 WBC   Comprehensive Metabolic Panel   Result Value Ref Range    Glucose 94 65 - 99 mg/dL    BUN 37 (H) 6 - 20 mg/dL    Creatinine 1.35 (H) 0.76 - 1.27 mg/dL    Sodium 131 (L) 136 - 145 mmol/L    Potassium 3.7 3.5 - 5.2 mmol/L    Chloride 90 (L) 98 - 107 mmol/L    CO2 32.0 (H) 22.0 - 29.0 mmol/L    Calcium 9.5 8.6 - 10.5 mg/dL    Total Protein 8.2 6.0 - 8.5 g/dL    Albumin 3.70 3.50 - 5.20 g/dL    ALT (SGPT) 13 1 - 41 U/L    AST (SGOT) 23 1 - 40 U/L    Alkaline Phosphatase 60 39 - 117 U/L    Total Bilirubin 0.8 0.0 - 1.2 mg/dL    eGFR  African Amer 77 >60 mL/min/1.73    Globulin 4.5 gm/dL    A/G Ratio 0.8 g/dL    BUN/Creatinine Ratio 27.4 (H) 7.0 - 25.0    Anion Gap 9.0 5.0 - 15.0 mmol/L   CBC Auto  Differential   Result Value Ref Range    WBC 8.31 3.40 - 10.80 10*3/mm3    RBC 4.87 4.14 - 5.80 10*6/mm3    Hemoglobin 13.0 13.0 - 17.7 g/dL    Hematocrit 40.3 37.5 - 51.0 %    MCV 82.8 79.0 - 97.0 fL    MCH 26.7 26.6 - 33.0 pg    MCHC 32.3 31.5 - 35.7 g/dL    RDW 15.6 (H) 12.3 - 15.4 %    RDW-SD 46.5 37.0 - 54.0 fl    MPV 11.1 6.0 - 12.0 fL    Platelets 304 140 - 450 10*3/mm3    Neutrophil % 75.2 42.7 - 76.0 %    Lymphocyte % 11.8 (L) 19.6 - 45.3 %    Monocyte % 12.3 (H) 5.0 - 12.0 %    Eosinophil % 0.0 (L) 0.3 - 6.2 %    Basophil % 0.5 0.0 - 1.5 %    Immature Grans % 0.2 0.0 - 0.5 %    Neutrophils, Absolute 6.25 1.70 - 7.00 10*3/mm3    Lymphocytes, Absolute 0.98 0.70 - 3.10 10*3/mm3    Monocytes, Absolute 1.02 (H) 0.10 - 0.90 10*3/mm3    Eosinophils, Absolute 0.00 0.00 - 0.40 10*3/mm3    Basophils, Absolute 0.04 0.00 - 0.20 10*3/mm3    Immature Grans, Absolute 0.02 0.00 - 0.05 10*3/mm3    nRBC 0.0 0.0 - 0.2 /100 WBC   POC Glucose Once   Result Value Ref Range    Glucose 149 (H) 70 - 130 mg/dL   POC Glucose Once   Result Value Ref Range    Glucose 146 (H) 70 - 130 mg/dL   POC Glucose Once   Result Value Ref Range    Glucose 121 70 - 130 mg/dL   POC Glucose Once   Result Value Ref Range    Glucose 119 70 - 130 mg/dL   POC Glucose Once   Result Value Ref Range    Glucose 103 70 - 130 mg/dL   POC Glucose Once   Result Value Ref Range    Glucose 97 70 - 130 mg/dL   POC Glucose Once   Result Value Ref Range    Glucose 103 70 - 130 mg/dL   ABO/Rh Specimen Verification   Result Value Ref Range    ABO Type O     RH type Positive    Tissue Pathology Exam   Result Value Ref Range    Case Report       Surgical Pathology Report                         Case: [id number]                                  Authorizing Provider:  Arnoldo Whittington MD    Collected:           08/14/2020 05:24 PM          Ordering Location:     [facility]   Received:            08/15/2020 08:44 AM                                 OR                                                                            Pathologist:           Cristofer Batista MD                                                           Specimen:    Stomach, SUB-TOTAL GASTRECTOMY                                                             Clinical Information       The working history is super morbid obesity, 401.5 pounds, BMI 5.761 with multiple comorbidities.        Final Diagnosis        STOMACH, SUBTOTAL GASTRECTOMY:  Mild chronic gastritis; negative for H.pylori on routine stains.  Negative for significant active inflammation, intestinal metaplasia, and neoplasia.  Unremarkable submucosal and muscular layers.    JFJ/dlb       Gross Description       Received in formalin labeled as subtotal gastrectomy.  The specimen consists of a portion of stomach (18.5 x 3.5 x 2.8 cm) with a staple line that runs the length of the specimen and a minimal amount of attached fat.  The serosa is tan/pink, focally hemorrhagic, but otherwise smooth and unremarkable.  The mucosa is tan/pink and smooth with normal rugal folds.  Representative sections are submitted in 3 cassettes.  REGINO/edyta        Microscopic Description       The slides are reviewed and demonstrate histopathologic features supporting the above rendered diagnosis.             A/P    Problem List Items Addressed This Visit        Cardiovascular and Mediastinum    Chronic systolic congestive heart failure, NYHA class 4 (CMS/[facility])      Other Visit Diagnoses     Morbid (severe) obesity due to excess calories (CMS/[facility])    -  Primary    Status post laparoscopic sleeve gastrectomy      -Keep planned follow-up with bariatric surgery on 8/25      Non-intractable vomiting with nausea, unspecified vomiting type    -Counseled patient to hold aspirin  -Continue PPI  -He has zofran to use prn    -If vomiting persists, he was instructed to call his surgeon's office      SAPPHIRE (acute kidney injury) (CMS/HCC)    -Advised holding torsemide and  aldactone  -Continue Entresto if blood pressure allows  -Patient will need to follow-up with the heart and valve clinic this week. He has the contact information and states she will call to schedule an appointment for tomorrow or Friday  -Will need repeat bmp at that time            Plan of care was reviewed with patient at the conclusion of today's visit. Education was provided regarding diagnoses, management, and the importance of keeping follow-up appointments. The patient was counseled regarding the risks, benefits, and possible side-effects of treatment. Patient and/or family express understanding and agreement with the management plan.        CARLOS Ogden

## 2020-08-20 ENCOUNTER — TELEPHONE (OUTPATIENT)
Dept: BARIATRICS/WEIGHT MGMT | Facility: CLINIC | Age: 30
End: 2020-08-20

## 2020-08-21 ENCOUNTER — LAB (OUTPATIENT)
Dept: LAB | Facility: HOSPITAL | Age: 30
End: 2020-08-21

## 2020-08-21 ENCOUNTER — OFFICE VISIT (OUTPATIENT)
Dept: CARDIOLOGY | Facility: HOSPITAL | Age: 30
End: 2020-08-21

## 2020-08-21 ENCOUNTER — TELEPHONE (OUTPATIENT)
Dept: CARDIOLOGY | Facility: HOSPITAL | Age: 30
End: 2020-08-21

## 2020-08-21 VITALS
WEIGHT: 315 LBS | DIASTOLIC BLOOD PRESSURE: 66 MMHG | RESPIRATION RATE: 24 BRPM | TEMPERATURE: 97.6 F | HEART RATE: 97 BPM | SYSTOLIC BLOOD PRESSURE: 135 MMHG | BODY MASS INDEX: 46.65 KG/M2 | OXYGEN SATURATION: 100 % | HEIGHT: 69 IN

## 2020-08-21 DIAGNOSIS — I42.0 NONISCHEMIC CONGESTIVE CARDIOMYOPATHY (HCC): ICD-10-CM

## 2020-08-21 DIAGNOSIS — I50.22 CHRONIC SYSTOLIC CONGESTIVE HEART FAILURE, NYHA CLASS 4 (HCC): Primary | ICD-10-CM

## 2020-08-21 DIAGNOSIS — I10 ESSENTIAL HYPERTENSION: ICD-10-CM

## 2020-08-21 DIAGNOSIS — I50.22 CHRONIC SYSTOLIC CONGESTIVE HEART FAILURE, NYHA CLASS 4 (HCC): ICD-10-CM

## 2020-08-21 LAB
ANION GAP SERPL CALCULATED.3IONS-SCNC: 13.7 MMOL/L (ref 5–15)
BUN SERPL-MCNC: 23 MG/DL (ref 6–20)
BUN/CREAT SERPL: 20.4 (ref 7–25)
CALCIUM SPEC-SCNC: 10.3 MG/DL (ref 8.6–10.5)
CHLORIDE SERPL-SCNC: 88 MMOL/L (ref 98–107)
CO2 SERPL-SCNC: 31.3 MMOL/L (ref 22–29)
CREAT SERPL-MCNC: 1.13 MG/DL (ref 0.76–1.27)
GFR SERPL CREATININE-BSD FRML MDRD: 94 ML/MIN/1.73
GLUCOSE SERPL-MCNC: 94 MG/DL (ref 65–99)
MAGNESIUM SERPL-MCNC: 1.9 MG/DL (ref 1.6–2.6)
NT-PROBNP SERPL-MCNC: 1372 PG/ML (ref 0–450)
POTASSIUM SERPL-SCNC: 3.5 MMOL/L (ref 3.5–5.2)
SODIUM SERPL-SCNC: 133 MMOL/L (ref 136–145)

## 2020-08-21 PROCEDURE — 83880 ASSAY OF NATRIURETIC PEPTIDE: CPT

## 2020-08-21 PROCEDURE — 36415 COLL VENOUS BLD VENIPUNCTURE: CPT

## 2020-08-21 PROCEDURE — 80048 BASIC METABOLIC PNL TOTAL CA: CPT

## 2020-08-21 PROCEDURE — 99214 OFFICE O/P EST MOD 30 MIN: CPT | Performed by: NURSE PRACTITIONER

## 2020-08-21 PROCEDURE — 83735 ASSAY OF MAGNESIUM: CPT

## 2020-08-21 NOTE — TELEPHONE ENCOUNTER
Reviewed labs with patient. Patient to resume entresto and continue holding aldactone, torsemide and metolazone.  Keep scheduled follow-up next week and heart valve center.

## 2020-08-21 NOTE — PROGRESS NOTES
[facility]      08/21/2020         Evan Gannon  [address] [facility] [address]  [unfilled]    1990    Gilbert Da Silva PA    Evan Gannon is a 29 y.o. male.      Subjective:     Chief Complaint:  Follow-up   SHF        HPI 29-year-old male presents the office today for ongoing evaluation of his chronic systolic heart failure.  He underwent a laparoscopic sleeve gastrectomy with Dr. Whittington on 8/14/2020.  Patient did well but did experience a slight bump in creatinine after surgery.  He was given minimal IV fluids.  Entresto, Aldactone and Demadex have been held.  He presents today for follow-up.  He has lost 23.2 pounds in the last week and notes he is feeling well.  Denies chest pain, dyspnea, dizziness, palpitations, presyncope, syncope, orthopnea, PND or pedal edema      Patient Active Problem List   Diagnosis   • Hypertension   • Morbid obesity (CMS/HCC)   • Severe obstructive sleep apnea   • Nonischemic congestive cardiomyopathy (CMS/HCC)   • Personal history of noncompliance with medical treatment   • Chronic systolic congestive heart failure, NYHA class 4 (CMS/HCC)   • Plaque psoriasis   • Painless hematuria   • Pneumonia of right lower lobe due to infectious organism   • CHF (congestive heart failure) (CMS/HCC)   • Myocardial infarction (CMS/HCC)   • COLLIN on CPAP   • On home O2   • Sleep apnea   • Psoriasis   • Wears glasses   • Gout   • Morbid obesity with BMI of 60.0-69.9, adult (CMS/HCC)   • Paresthesia   • Morbid obesity with body mass index (BMI) of 50.0 to 59.9 in adult (CMS/HCC)   • Hypokalemia   • ARF (acute renal failure) (CMS/HCC)   • Chronic gastritis   • Status post laparoscopic sleeve gastrectomy       Past Medical History:   Diagnosis Date   • CHF (congestive heart failure) (CMS/HCC)     diagnosed at 21yo, thought to be related to undiagnosed congenital defect- per patient. hospitalized every 3 weeks during the winter due to PNA.  Has  "significantf fluid retention despite diurectics   • CPAP (continuous positive airway pressure) dependence     settings at 15   • Gout     on allopurinol, controls symptoms   • Hypertension     patient denies   • Hypokalemia    • ICD (implantable cardioverter-defibrillator) in place    • Low HDL (under 40)    • Morbid obesity (CMS/[facility])    • Morbid obesity with BMI of 60.0-69.9, adult (CMS/[facility])    • Myocardial infarction (CMS/[facility])     \"almost\" per patient, had heart cath, no stents, has defibrillator   • On home O2     supposed to be 24/7, ran out of O2 and has only been using prn. manged  by cardiology   • COLLIN on CPAP     compliant   • Paresthesia     hands/ arms/ feet, suspected due to poor circulation reportedly   • Plaque psoriasis    • Pneumonia    • Psoriasis    • Sleep apnea    • Wears glasses        Past Surgical History:   Procedure Laterality Date   • CARDIAC CATHETERIZATION N/A 7/13/2017    Procedure: Left Heart Cath;  Surgeon: Balbir Olsen MD;  Location:  [facility];  Service:    • CARDIAC DEFIBRILLATOR PLACEMENT  08/2017   • CARDIAC DEFIBRILLATOR PLACEMENT     • CARDIAC ELECTROPHYSIOLOGY PROCEDURE N/A 8/15/2017    Procedure: VVI ICD Implant;  Surgeon: Nathanael Richmond DO;  Location:  [facility] EP INVASIVE LOCATION;  Service:    • ENDOSCOPY N/A 8/14/2020    Procedure: ESOPHAGOGASTRODUODENOSCOPY;  Surgeon: Arnoldo Whittington MD;  Location:  [facility] OR;  Service: Bariatric;  Laterality: N/A;   • GASTRIC SLEEVE LAPAROSCOPIC N/A 8/14/2020    Procedure: GASTRIC SLEEVE LAPAROSCOPIC;  Surgeon: Arnoldo Whittington MD;  Location:  [facility] OR;  Service: Bariatric;  Laterality: N/A;   • OTHER SURGICAL HISTORY      ultra light therapy   • SINUS SURGERY      cartilage from ear inserted in nasal cavity   • TONSILLECTOMY AND ADENOIDECTOMY  2000       Family History   Problem Relation Age of Onset   • Diabetes Father    • Diabetes Paternal Grandmother    • Diabetes Maternal Grandfather        Social History "     Socioeconomic History   • Marital status: Single     Spouse name: Not on file   • Number of children: 0   • Years of education: Not on file   • Highest education level: Not on file   Occupational History   • Occupation: disabled   Tobacco Use   • Smoking status: Never Smoker   • Smokeless tobacco: Never Used   Substance and Sexual Activity   • Alcohol use: No   • Drug use: No   • Sexual activity: Defer   Social History Narrative    Lives in [facility] with parents and brother.  Disabled from CHF, given pass to work- hasn't gotten hired yet. .             Caffeine use: 6 sodas weekly    Patient lives with parents        Caffeine 1 soda a week        Caffeine 8 servings 3 days a week       No Known Allergies      Current Outpatient Medications:   •  acetaminophen (Tylenol 8 Hour) 650 MG 8 hr tablet, Take 1 tablet by mouth Every 6 (Six) Hours., Disp: 20 tablet, Rfl: 0  •  acetaminophen (TYLENOL) 500 MG tablet, Take 500 mg by mouth Every 6 (Six) Hours As Needed for Mild Pain ., Disp: , Rfl:   •  allopurinol (Zyloprim) 100 MG tablet, Take 1 tablet by mouth Daily., Disp: 30 tablet, Rfl: 5  •  apixaban (Eliquis) 2.5 MG tablet tablet, Take 1 tablet by mouth Every 12 (Twelve) Hours for 42 doses., Disp: 42 tablet, Rfl: 0  •  carvedilol (COREG) 25 MG tablet, Take 1 tablet by mouth 2 (Two) Times a Day With Meals., Disp: 180 tablet, Rfl: 1  •  omeprazole (PrilOSEC) 40 MG capsule, Take 1 capsule by mouth Daily for 60 days., Disp: 60 capsule, Rfl: 0  •  ondansetron ODT (Zofran ODT) 4 MG disintegrating tablet, Place 1 tablet on the tongue Every 8 (Eight) Hours As Needed for Nausea or Vomiting., Disp: 10 tablet, Rfl: 0  •  oxyCODONE (Roxicodone) 5 MG immediate release tablet, Take 1 tablet by mouth Every 6 (Six) Hours As Needed for Moderate Pain ., Disp: 10 tablet, Rfl: 0  •  potassium chloride (K-DUR) 10 MEQ CR tablet, Take 1 tablet by mouth 2 (Two) Times a Day. (Patient taking differently: Take 10 mEq by mouth  Daily.), Disp: 60 tablet, Rfl: 0  •  sacubitril-valsartan (Entresto) 24-26 MG tablet, Take 1 tablet by mouth 2 (Two) Times a Day., Disp: 60 tablet, Rfl: 3  •  Secukinumab (COSENTYX, 300 MG DOSE, SC), Inject 300 mg under the skin into the appropriate area as directed., Disp: , Rfl:     The following portions of the patient's history were reviewed today and updated as appropriate: allergies, current medications, past family history, past medical history, past social history, past surgical history and problem list     Review of Systems   Constitution: Negative for chills, decreased appetite, diaphoresis, fever, malaise/fatigue, night sweats, weight gain and weight loss.   HENT: Negative for congestion, hearing loss, hoarse voice and nosebleeds.    Eyes: Negative for blurred vision, visual disturbance and visual halos.   Cardiovascular: Negative for chest pain, claudication, cyanosis, dyspnea on exertion, irregular heartbeat, leg swelling, near-syncope, orthopnea, palpitations, paroxysmal nocturnal dyspnea and syncope.   Respiratory: Negative for cough, hemoptysis, shortness of breath, sleep disturbances due to breathing, snoring, sputum production and wheezing.    Hematologic/Lymphatic: Negative for bleeding problem. Does not bruise/bleed easily.   Skin: Negative for dry skin, itching and rash.   Musculoskeletal: Negative for arthritis, falls, joint pain, joint swelling and myalgias.   Gastrointestinal: Negative for bloating, abdominal pain, constipation, diarrhea, flatus, heartburn, hematemesis, hematochezia, melena, nausea and vomiting.   Genitourinary: Negative for dysuria, frequency, hematuria, nocturia and urgency.   Neurological: Negative for excessive daytime sleepiness, dizziness, headaches, light-headedness, loss of balance and weakness.   Psychiatric/Behavioral: Negative for depression. The patient does not have insomnia and is not nervous/anxious.        Objective:     Vitals:    08/21/20 0928   BP: 135/66  "  BP Location: Left arm   Patient Position: Sitting   Cuff Size: Adult   Pulse: 97   Resp: 24   Temp: 97.6 °F (36.4 °C)   TempSrc: Temporal   SpO2: 100%   Weight: (!) 171 kg (377 lb 8 oz)   Height: 175.3 cm (69\")       Body mass index is 55.75 kg/m².    Physical Exam   Constitutional: He is oriented to person, place, and time. He appears well-developed and well-nourished. He is active and cooperative. No distress.   HENT:   Head: Normocephalic and atraumatic.   Mouth/Throat: Oropharynx is clear and moist.   Eyes: Pupils are equal, round, and reactive to light. Conjunctivae and EOM are normal.   Neck: Normal range of motion. Neck supple. No JVD present. No tracheal deviation present. No thyromegaly present.   Cardiovascular: Normal rate, regular rhythm, normal heart sounds and intact distal pulses.   Pulmonary/Chest: Effort normal and breath sounds normal.   Abdominal: Soft. Bowel sounds are normal. He exhibits no distension. There is no tenderness.   Musculoskeletal: Normal range of motion.   Neurological: He is alert and oriented to person, place, and time.   Skin: Skin is warm, dry and intact.   Psoriatic patches noted over patient's body   Psychiatric: He has a normal mood and affect. His behavior is normal.   Nursing note and vitals reviewed.      Lab and Diagnostic Review:  Results for orders placed or performed during the hospital encounter of 08/14/20   Potassium   Result Value Ref Range    Potassium 3.1 (L) 3.5 - 5.2 mmol/L   Basic Metabolic Panel   Result Value Ref Range    Glucose 119 (H) 65 - 99 mg/dL    BUN 39 (H) 6 - 20 mg/dL    Creatinine 1.22 0.76 - 1.27 mg/dL    Sodium 133 (L) 136 - 145 mmol/L    Potassium 3.2 (L) 3.5 - 5.2 mmol/L    Chloride 90 (L) 98 - 107 mmol/L    CO2 19.0 (L) 22.0 - 29.0 mmol/L    Calcium 9.8 8.6 - 10.5 mg/dL    eGFR  African Amer 86 >60 mL/min/1.73    BUN/Creatinine Ratio 32.0 (H) 7.0 - 25.0    Anion Gap 24.0 (H) 5.0 - 15.0 mmol/L   Basic Metabolic Panel   Result Value Ref " Range    Glucose 185 (H) 65 - 99 mg/dL    BUN 35 (H) 6 - 20 mg/dL    Creatinine 1.27 0.76 - 1.27 mg/dL    Sodium 131 (L) 136 - 145 mmol/L    Potassium 3.7 3.5 - 5.2 mmol/L    Chloride 92 (L) 98 - 107 mmol/L    CO2 26.0 22.0 - 29.0 mmol/L    Calcium 9.2 8.6 - 10.5 mg/dL    eGFR  African Amer 82 >60 mL/min/1.73    BUN/Creatinine Ratio 27.6 (H) 7.0 - 25.0    Anion Gap 13.0 5.0 - 15.0 mmol/L   Magnesium   Result Value Ref Range    Magnesium 2.0 1.6 - 2.6 mg/dL   Comprehensive Metabolic Panel   Result Value Ref Range    Glucose 116 (H) 65 - 99 mg/dL    BUN 40 (H) 6 - 20 mg/dL    Creatinine 1.98 (H) 0.76 - 1.27 mg/dL    Sodium 132 (L) 136 - 145 mmol/L    Potassium 4.1 3.5 - 5.2 mmol/L    Chloride 91 (L) 98 - 107 mmol/L    CO2 29.0 22.0 - 29.0 mmol/L    Calcium 9.4 8.6 - 10.5 mg/dL    Total Protein 8.7 (H) 6.0 - 8.5 g/dL    Albumin 3.90 3.50 - 5.20 g/dL    ALT (SGPT) 16 1 - 41 U/L    AST (SGOT) 24 1 - 40 U/L    Alkaline Phosphatase 62 39 - 117 U/L    Total Bilirubin 0.7 0.0 - 1.2 mg/dL    eGFR  African Amer 49 (L) >60 mL/min/1.73    Globulin 4.8 gm/dL    A/G Ratio 0.8 g/dL    BUN/Creatinine Ratio 20.2 7.0 - 25.0    Anion Gap 12.0 5.0 - 15.0 mmol/L   Iron   Result Value Ref Range    Iron 51 (L) 59 - 158 mcg/dL   CBC Auto Differential   Result Value Ref Range    WBC 7.81 3.40 - 10.80 10*3/mm3    RBC 5.13 4.14 - 5.80 10*6/mm3    Hemoglobin 13.6 13.0 - 17.7 g/dL    Hematocrit 42.1 37.5 - 51.0 %    MCV 82.1 79.0 - 97.0 fL    MCH 26.5 (L) 26.6 - 33.0 pg    MCHC 32.3 31.5 - 35.7 g/dL    RDW 15.6 (H) 12.3 - 15.4 %    RDW-SD 46.2 37.0 - 54.0 fl    MPV 10.5 6.0 - 12.0 fL    Platelets 322 140 - 450 10*3/mm3    Neutrophil % 84.1 (H) 42.7 - 76.0 %    Lymphocyte % 6.8 (L) 19.6 - 45.3 %    Monocyte % 8.6 5.0 - 12.0 %    Eosinophil % 0.0 (L) 0.3 - 6.2 %    Basophil % 0.1 0.0 - 1.5 %    Immature Grans % 0.4 0.0 - 0.5 %    Neutrophils, Absolute 6.57 1.70 - 7.00 10*3/mm3    Lymphocytes, Absolute 0.53 (L) 0.70 - 3.10 10*3/mm3    Monocytes,  Absolute 0.67 0.10 - 0.90 10*3/mm3    Eosinophils, Absolute 0.00 0.00 - 0.40 10*3/mm3    Basophils, Absolute 0.01 0.00 - 0.20 10*3/mm3    Immature Grans, Absolute 0.03 0.00 - 0.05 10*3/mm3    nRBC 0.0 0.0 - 0.2 /100 WBC   Comprehensive Metabolic Panel   Result Value Ref Range    Glucose 94 65 - 99 mg/dL    BUN 37 (H) 6 - 20 mg/dL    Creatinine 1.35 (H) 0.76 - 1.27 mg/dL    Sodium 131 (L) 136 - 145 mmol/L    Potassium 3.7 3.5 - 5.2 mmol/L    Chloride 90 (L) 98 - 107 mmol/L    CO2 32.0 (H) 22.0 - 29.0 mmol/L    Calcium 9.5 8.6 - 10.5 mg/dL    Total Protein 8.2 6.0 - 8.5 g/dL    Albumin 3.70 3.50 - 5.20 g/dL    ALT (SGPT) 13 1 - 41 U/L    AST (SGOT) 23 1 - 40 U/L    Alkaline Phosphatase 60 39 - 117 U/L    Total Bilirubin 0.8 0.0 - 1.2 mg/dL    eGFR  African Amer 77 >60 mL/min/1.73    Globulin 4.5 gm/dL    A/G Ratio 0.8 g/dL    BUN/Creatinine Ratio 27.4 (H) 7.0 - 25.0    Anion Gap 9.0 5.0 - 15.0 mmol/L   CBC Auto Differential   Result Value Ref Range    WBC 8.31 3.40 - 10.80 10*3/mm3    RBC 4.87 4.14 - 5.80 10*6/mm3    Hemoglobin 13.0 13.0 - 17.7 g/dL    Hematocrit 40.3 37.5 - 51.0 %    MCV 82.8 79.0 - 97.0 fL    MCH 26.7 26.6 - 33.0 pg    MCHC 32.3 31.5 - 35.7 g/dL    RDW 15.6 (H) 12.3 - 15.4 %    RDW-SD 46.5 37.0 - 54.0 fl    MPV 11.1 6.0 - 12.0 fL    Platelets 304 140 - 450 10*3/mm3    Neutrophil % 75.2 42.7 - 76.0 %    Lymphocyte % 11.8 (L) 19.6 - 45.3 %    Monocyte % 12.3 (H) 5.0 - 12.0 %    Eosinophil % 0.0 (L) 0.3 - 6.2 %    Basophil % 0.5 0.0 - 1.5 %    Immature Grans % 0.2 0.0 - 0.5 %    Neutrophils, Absolute 6.25 1.70 - 7.00 10*3/mm3    Lymphocytes, Absolute 0.98 0.70 - 3.10 10*3/mm3    Monocytes, Absolute 1.02 (H) 0.10 - 0.90 10*3/mm3    Eosinophils, Absolute 0.00 0.00 - 0.40 10*3/mm3    Basophils, Absolute 0.04 0.00 - 0.20 10*3/mm3    Immature Grans, Absolute 0.02 0.00 - 0.05 10*3/mm3    nRBC 0.0 0.0 - 0.2 /100 WBC   POC Glucose Once   Result Value Ref Range    Glucose 149 (H) 70 - 130 mg/dL   POC Glucose  Once   Result Value Ref Range    Glucose 146 (H) 70 - 130 mg/dL   POC Glucose Once   Result Value Ref Range    Glucose 121 70 - 130 mg/dL   POC Glucose Once   Result Value Ref Range    Glucose 119 70 - 130 mg/dL   POC Glucose Once   Result Value Ref Range    Glucose 103 70 - 130 mg/dL   POC Glucose Once   Result Value Ref Range    Glucose 97 70 - 130 mg/dL   POC Glucose Once   Result Value Ref Range    Glucose 103 70 - 130 mg/dL   ABO/Rh Specimen Verification   Result Value Ref Range    ABO Type O     RH type Positive    Tissue Pathology Exam   Result Value Ref Range    Case Report       Surgical Pathology Report                         Case: [id number]                                  Authorizing Provider:  Arnoldo Whittington MD    Collected:           08/14/2020 05:24 PM          Ordering Location:     [facility]   Received:            08/15/2020 08:44 AM                                 OR                                                                           Pathologist:           Cristofer Batista MD                                                           Specimen:    Stomach, SUB-TOTAL GASTRECTOMY                                                             Clinical Information       The working history is super morbid obesity, 401.5 pounds, BMI 5.761 with multiple comorbidities.        Final Diagnosis        STOMACH, SUBTOTAL GASTRECTOMY:  Mild chronic gastritis; negative for H.pylori on routine stains.  Negative for significant active inflammation, intestinal metaplasia, and neoplasia.  Unremarkable submucosal and muscular layers.    JFJ/dlb       Gross Description       Received in formalin labeled as subtotal gastrectomy.  The specimen consists of a portion of stomach (18.5 x 3.5 x 2.8 cm) with a staple line that runs the length of the specimen and a minimal amount of attached fat.  The serosa is tan/pink, focally hemorrhagic, but otherwise smooth and unremarkable.  The mucosa is tan/pink  and smooth with normal rugal folds.  Representative sections are submitted in 3 cassettes.  [place]/dlb        Microscopic Description       The slides are reviewed and demonstrate histopathologic features supporting the above rendered diagnosis.               Assessment and Plan:   1. Chronic systolic congestive heart failure, NYHA class 4 (CMS/HCC)  If creatinine has improved, will restart Entresto 2426 twice daily  Hold torsemide, Aldactone, metolazone for now  - Basic Metabolic Panel; Future  - Magnesium; Future  - proBNP; Future  Heart failure education today including signs and symptoms, the role of the heart failure center, daily weights, low sodium diet (less than 1500 mg per day), and daily physical activity. Reviewed HF Zones with patient and family.  Patient to continue current medications as previously ordered.   2. Nonischemic congestive cardiomyopathy (CMS/HCC)    - Basic Metabolic Panel; Future  - Magnesium; Future  - proBNP; Future    3. Essential hypertension  Slightly low, monitor closely  - Basic Metabolic Panel; Future  - Magnesium; Future  - proBNP; Future      Follow-up in 1 week    It has been a pleasure to participate in the care of this patient.  Patient was instructed to call the [facility] with any questions, concerns, or worsening symptoms.    *Please note that portions of this note were completed with a voice recognition program. Efforts were made to edit the dictations, but occasionally words are mistranscribed.

## 2020-08-24 ENCOUNTER — READMISSION MANAGEMENT (OUTPATIENT)
Dept: CALL CENTER | Facility: HOSPITAL | Age: 30
End: 2020-08-24

## 2020-08-24 NOTE — OUTREACH NOTE
General Surgery Week 2 Survey      Responses   [facility] patient discharged from?  [place]   Does the patient have one of the following disease processes/diagnoses(primary or secondary)?  General Surgery   Week 2 attempt successful?  Yes   Call start time  0908   Call end time  0912   Discharge diagnosis  Lap gastric sleeve   Meds reviewed with patient/caregiver?  Yes   Is the patient taking all medications as directed (includes completed medication regime)?  Yes   Has the patient kept scheduled appointments due by today?  Yes   Comments  Tomorrow has another DR visit   Psychosocial issues?  No   What is the patient's perception of their health status since discharge?  Improving   Is the patient/caregiver able to teach back signs and symptoms of incisional infection?  Increased drainage or bleeding, Incisional warmth   Is the patient/caregiver able to teach back the hierarchy of who to call/visit for symptoms/problems? PCP, Specialist, Home health nurse, Urgent Care, ED, 911  Yes   Additional teach back comments  Pt states he has some nausea but is able to keep fluids down and I have enc him to do this and try to get protein amount in. Incision has healed.    Week 2 call completed?  Yes          Gerda Nava RN

## 2020-08-25 ENCOUNTER — OFFICE VISIT (OUTPATIENT)
Dept: BARIATRICS/WEIGHT MGMT | Facility: CLINIC | Age: 30
End: 2020-08-25

## 2020-08-25 VITALS
OXYGEN SATURATION: 100 % | HEART RATE: 50 BPM | DIASTOLIC BLOOD PRESSURE: 80 MMHG | WEIGHT: 315 LBS | RESPIRATION RATE: 18 BRPM | HEIGHT: 69 IN | TEMPERATURE: 97.1 F | BODY MASS INDEX: 46.65 KG/M2 | SYSTOLIC BLOOD PRESSURE: 134 MMHG

## 2020-08-25 DIAGNOSIS — Z98.84 S/P BARIATRIC SURGERY: Primary | ICD-10-CM

## 2020-08-25 PROCEDURE — 99024 POSTOP FOLLOW-UP VISIT: CPT | Performed by: PHYSICIAN ASSISTANT

## 2020-08-25 RX ORDER — SPIRONOLACTONE 25 MG/1
25 TABLET ORAL DAILY
COMMUNITY
End: 2020-09-23 | Stop reason: SDUPTHER

## 2020-08-25 RX ORDER — URSODIOL 300 MG/1
300 CAPSULE ORAL 2 TIMES DAILY
Qty: 60 CAPSULE | Refills: 5 | Status: SHIPPED | OUTPATIENT
Start: 2020-08-25 | End: 2022-01-19

## 2020-08-25 RX ORDER — TORSEMIDE 20 MG/1
20 TABLET ORAL DAILY
COMMUNITY
End: 2021-01-08 | Stop reason: SDUPTHER

## 2020-08-25 NOTE — PROGRESS NOTES
"[facility] Bariatric Surgery  [address]  [address]  [facility] [id number] [phone]      Patient Name:  Evan Gannon  :  1990      Date of Visit: 2020      Reason for Visit:  POD #11    HPI:  Evan Gannon is a 29 y.o. male s/p LSG 20 GDW    Discharged on POD#2.  Holding Entresto, Aldactone, Demadex.  To f/up w/ heart/valve clinic.      Chart reviewed.  Cardiac eval 20 - doing well.  Resumed Entresto. Still holding Torsemide, Aldactone, Metolazone for now.  Holding ASA as advised x 6 weeks postop.  Restarted Cosentyx yesterday, planning biweekly injections for the next several weeks.      Doing well.  Tolerating protein shakes w/out issue but getting only 60g prot/day.  Having difficulty tolerating PO o/w.  Has tried cottage cheese which \"came back up\".  Denies nausea, reflux, abd.pain, fevers/chills.   Drinking 64+ fluid oz/day w/out issue - water, broth, gatorade zero.  Not yet taking vitamins.  On Omeprazole  and Eliquis .  Needs Actigall.  Ambulating frequently.      Presurgery weight: 401 pounds.  Today's weight is (!) 168 kg (371 lb) pounds, today's  Body mass index is 55.59 kg/m²., and weight loss since surgery is 30 pounds.       Final Diagnosis    STOMACH, SUBTOTAL GASTRECTOMY:  Mild chronic gastritis; negative for H.pylori on routine stains.  Negative for significant active inflammation, intestinal metaplasia, and neoplasia.  Unremarkable submucosal and muscular layers.         Past Medical History:   Diagnosis Date   • CHF (congestive heart failure) (CMS/HCC)     diagnosed at 21yo, thought to be related to undiagnosed congenital defect- per patient. hospitalized every 3 weeks during the winter due to PNA.  Has significantf fluid retention despite diurectics   • CPAP (continuous positive airway pressure) dependence     settings at 15   • Gout     on allopurinol, controls symptoms   • Hypertension     patient denies   • Hypokalemia    • " "ICD (implantable cardioverter-defibrillator) in place    • Low HDL (under 40)    • Morbid obesity (CMS/[facility])    • Morbid obesity with BMI of 60.0-69.9, adult (CMS/[facility])    • Myocardial infarction (CMS/[facility])     \"almost\" per patient, had heart cath, no stents, has defibrillator   • On home O2     supposed to be 24/7, ran out of O2 and has only been using prn. manged  by cardiology   • COLLIN on CPAP     compliant   • Paresthesia     hands/ arms/ feet, suspected due to poor circulation reportedly   • Plaque psoriasis    • Pneumonia    • Psoriasis    • Sleep apnea    • Wears glasses      Past Surgical History:   Procedure Laterality Date   • CARDIAC CATHETERIZATION N/A 7/13/2017    Procedure: Left Heart Cath;  Surgeon: Balbir Olsen MD;  Location:  [facility] CATH INVASIVE LOCATION;  Service:    • CARDIAC DEFIBRILLATOR PLACEMENT  08/2017   • CARDIAC DEFIBRILLATOR PLACEMENT     • CARDIAC ELECTROPHYSIOLOGY PROCEDURE N/A 8/15/2017    Procedure: VVI ICD Implant;  Surgeon: Nathanael Richmond DO;  Location:  [facility] EP INVASIVE LOCATION;  Service:    • ENDOSCOPY N/A 8/14/2020    Procedure: ESOPHAGOGASTRODUODENOSCOPY;  Surgeon: Arnoldo Whittington MD;  Location:  [facility] OR;  Service: Bariatric;  Laterality: N/A;   • GASTRIC SLEEVE LAPAROSCOPIC N/A 8/14/2020    Procedure: GASTRIC SLEEVE LAPAROSCOPIC;  Surgeon: Arnoldo Whittington MD;  Location:  [facility] OR;  Service: Bariatric;  Laterality: N/A;   • OTHER SURGICAL HISTORY      ultra light therapy   • SINUS SURGERY      cartilage from ear inserted in nasal cavity   • TONSILLECTOMY AND ADENOIDECTOMY  2000     Outpatient Medications Marked as Taking for the 8/25/20 encounter (Office Visit) with Britt Cook PA   Medication Sig Dispense Refill   • acetaminophen (TYLENOL) 500 MG tablet Take 500 mg by mouth Every 6 (Six) Hours As Needed for Mild Pain .     • allopurinol (Zyloprim) 100 MG tablet Take 1 tablet by mouth Daily. 30 tablet 5   • apixaban (Eliquis) 2.5 MG tablet tablet Take 1 " "tablet by mouth Every 12 (Twelve) Hours for 42 doses. 42 tablet 0   • omeprazole (PrilOSEC) 40 MG capsule Take 1 capsule by mouth Daily for 60 days. 60 capsule 0   • ondansetron ODT (Zofran ODT) 4 MG disintegrating tablet Place 1 tablet on the tongue Every 8 (Eight) Hours As Needed for Nausea or Vomiting. 10 tablet 0   • potassium chloride (K-DUR) 10 MEQ CR tablet Take 1 tablet by mouth 2 (Two) Times a Day. (Patient taking differently: Take 10 mEq by mouth Daily.) 60 tablet 0   • sacubitril-valsartan (Entresto) 24-26 MG tablet Take 1 tablet by mouth 2 (Two) Times a Day. 60 tablet 3   • Secukinumab (COSENTYX, 300 MG DOSE, SC) Inject 300 mg under the skin into the appropriate area as directed.     • [DISCONTINUED] acetaminophen (Tylenol 8 Hour) 650 MG 8 hr tablet Take 1 tablet by mouth Every 6 (Six) Hours. 20 tablet 0     No Known Allergies    Social History     Socioeconomic History   • Marital status: Single     Spouse name: Not on file   • Number of children: 0   • Years of education: Not on file   • Highest education level: Not on file   Occupational History   • Occupation: disabled   Tobacco Use   • Smoking status: Never Smoker   • Smokeless tobacco: Never Used   Substance and Sexual Activity   • Alcohol use: No   • Drug use: No   • Sexual activity: Defer   Social History Narrative    Lives in [facility] [facility] [place] with parents and brother.  Disabled from CHF, given pass to work- hasn't gotten hired yet. .             Caffeine use: 6 sodas weekly    Patient lives with parents        Caffeine 1 soda a week        Caffeine 8 servings 3 days a week       /80 (BP Location: Left arm, Patient Position: Sitting, Cuff Size: Large Adult)   Pulse 50   Temp 97.1 °F (36.2 °C) (Temporal)   Resp 18   Ht 174 cm (68.5\")   Wt (!) 168 kg (371 lb)   SpO2 100%   BMI 55.59 kg/m²   Physical Exam   Constitutional: He appears well-developed and well-nourished.   on O2   HENT:   wearing a mask, does not cover his mouth at " times   Cardiovascular: Normal rate and regular rhythm.   Pulmonary/Chest: Effort normal.   Abdominal: Soft. There is no tenderness. No hernia.   lap incisions healing well, psoriatic patches all across abdomen   Musculoskeletal: Normal range of motion.   Neurological: He is alert.   Skin: Skin is warm and dry.   Psychiatric: He has a normal mood and affect.         Assessment:   POD #11 s/p LSG 8/14/20 GDW    Patient's Body mass index is 55.59 kg/m². BMI is above normal parameters. Recommendations include: exercise counseling and nutrition counseling.      Plan:  Doing fine.  Continue to advance diet per manual.  Increase protein to 100g/day.  Increase exercise/activity as tolerated.  Reviewed lifting restrictions, nothing >25 lbs x 2 more weeks.  Start vitamins as discussed.  Start Actigall as prescribed.  Continue PPI.  Continue to avoid ASA/NSAIDs/tobacco x 6 weeks postop, steroids x 8 weeks postop.  Follow up w/ Cardiology as planned.  Call w/ problems/concerns.    The patient was instructed to follow up in 3 weeks, sooner if needed.

## 2020-08-31 ENCOUNTER — READMISSION MANAGEMENT (OUTPATIENT)
Dept: CALL CENTER | Facility: HOSPITAL | Age: 30
End: 2020-08-31

## 2020-08-31 NOTE — OUTREACH NOTE
General Surgery Week 3 Survey      Responses   [facility] patient discharged from?  [place]   Does the patient have one of the following disease processes/diagnoses(primary or secondary)?  General Surgery   Week 3 attempt successful?  No   Unsuccessful attempts  Attempt 1          Smitha Nolan RN

## 2020-09-01 ENCOUNTER — READMISSION MANAGEMENT (OUTPATIENT)
Dept: CALL CENTER | Facility: HOSPITAL | Age: 30
End: 2020-09-01

## 2020-09-01 NOTE — OUTREACH NOTE
General Surgery Week 3 Survey      Responses   [facility] patient discharged from?  [place]   Does the patient have one of the following disease processes/diagnoses(primary or secondary)?  General Surgery   Week 3 attempt successful?  Yes   Call start time  0819   Call end time  0824   Medication alerts for this patient  has gotten medication   Meds reviewed with patient/caregiver?  Yes   Is the patient taking all medications as directed (includes completed medication regime)?  Yes   Medication comments  unsure of which vitamins he should take   Has the patient kept scheduled appointments due by today?  Yes   What is the patient's perception of their health status since discharge?  Improving   Week 3 call completed?  Yes   Wrap up additional comments  patient will be calling provider to ask about vitamins, has lost 2 pant sizes and one shirt size          Maria Teresa Barragan RN

## 2020-09-03 ENCOUNTER — OFFICE VISIT (OUTPATIENT)
Dept: BARIATRICS/WEIGHT MGMT | Facility: CLINIC | Age: 30
End: 2020-09-03

## 2020-09-03 DIAGNOSIS — R13.10 DYSPHAGIA, UNSPECIFIED TYPE: ICD-10-CM

## 2020-09-03 DIAGNOSIS — R53.83 FATIGUE, UNSPECIFIED TYPE: ICD-10-CM

## 2020-09-03 DIAGNOSIS — E66.01 MORBID OBESITY WITH BODY MASS INDEX (BMI) OF 50.0 TO 59.9 IN ADULT (HCC): Primary | ICD-10-CM

## 2020-09-03 PROCEDURE — 99024 POSTOP FOLLOW-UP VISIT: CPT | Performed by: SURGERY

## 2020-09-03 NOTE — PROGRESS NOTES
"[facility] Bariatric Surgery  [address]  [address]  [facility] [id number] [phone]        Patient Name: Evan Gannon.  YOB: 1990      Date of Visit: 09/03/2020      Reason for Visit:  Intolerance of some foods    HPI:  Evan Gannon is a 29 y.o. male s/p LSG 8/14/20 GDW    Discharged on POD#2.  Holding Entresto, Aldactone, Demadex.  To f/up w/ heart/valve clinic.      LOV 8/25/20.  Struggling with getting enough protein and diet advancement.    Today he is POD#20.      Doing pretty well, but not tolerating oatmeal well.   Has substernal dysphagia and has to vomit.  Also cannot tolerate soft vegetables.  He tolerates cottage cheese.  Has not tried Greek yogurt.      Doing better with protein intake:  g/day via shakes.  He is on stage 3.      Denies other issues.  No fever.  No abdominal pain.            Past Medical History:   Diagnosis Date   • CHF (congestive heart failure) (CMS/HCC)     diagnosed at 23yo, thought to be related to undiagnosed congenital defect- per patient. hospitalized every 3 weeks during the winter due to PNA.  Has significantf fluid retention despite diurectics   • CPAP (continuous positive airway pressure) dependence     settings at 15   • Gout     on allopurinol, controls symptoms   • Hypertension     patient denies   • Hypokalemia    • ICD (implantable cardioverter-defibrillator) in place    • Low HDL (under 40)    • Morbid obesity (CMS/HCC)    • Morbid obesity with BMI of 60.0-69.9, adult (CMS/HCC)    • Myocardial infarction (CMS/HCC)     \"almost\" per patient, had heart cath, no stents, has defibrillator   • On home O2     supposed to be 24/7, ran out of O2 and has only been using prn. manged  by cardiology   • COLLIN on CPAP     compliant   • Paresthesia     hands/ arms/ feet, suspected due to poor circulation reportedly   • Plaque psoriasis    • Pneumonia    • Psoriasis    • Sleep apnea    • Wears glasses      Past " Surgical History:   Procedure Laterality Date   • CARDIAC CATHETERIZATION N/A 7/13/2017    Procedure: Left Heart Cath;  Surgeon: Balbir Olsen MD;  Location:  [facility] CATH [facility];  Service:    • CARDIAC DEFIBRILLATOR PLACEMENT  08/2017   • CARDIAC DEFIBRILLATOR PLACEMENT     • CARDIAC ELECTROPHYSIOLOGY PROCEDURE N/A 8/15/2017    Procedure: VVI ICD Implant;  Surgeon: Nathanael Richmond DO;  Location:  [facility] EP INVASIVE LOCATION;  Service:    • ENDOSCOPY N/A 8/14/2020    Procedure: ESOPHAGOGASTRODUODENOSCOPY;  Surgeon: Arnoldo Whittington MD;  Location:  [facility] OR;  Service: Bariatric;  Laterality: N/A;   • GASTRIC SLEEVE LAPAROSCOPIC N/A 8/14/2020    Procedure: GASTRIC SLEEVE LAPAROSCOPIC;  Surgeon: Arnoldo Whittington MD;  Location:  [facility] OR;  Service: Bariatric;  Laterality: N/A;   • OTHER SURGICAL HISTORY      ultra light therapy   • SINUS SURGERY      cartilage from ear inserted in nasal cavity   • TONSILLECTOMY AND ADENOIDECTOMY  2000     No outpatient medications have been marked as taking for the 9/3/20 encounter (Appointment) with Luzma Antoine MD.     No Known Allergies    Social History     Socioeconomic History   • Marital status: Single     Spouse name: Not on file   • Number of children: 0   • Years of education: Not on file   • Highest education level: Not on file   Occupational History   • Occupation: disabled   Tobacco Use   • Smoking status: Never Smoker   • Smokeless tobacco: Never Used   Substance and Sexual Activity   • Alcohol use: No   • Drug use: No   • Sexual activity: Defer   Social History Narrative    Lives in [facility] [place] with parents and brother.  Disabled from CHF, given pass to work- hasn't gotten hired yet. .             Caffeine use: 6 sodas weekly    Patient lives with parents        Caffeine 1 soda a week        Caffeine 8 servings 3 days a week     VS and PE deferred due to telephone visit.    Assessment:      ICD-10-CM ICD-9-CM   1. Morbid obesity with  body mass index (BMI) of 50.0 to 59.9 in adult (CMS/[facility]) E66.01 278.01    Z68.43 V85.43   2. Fatigue, unspecified type R53.83 780.79   3. Dysphagia, unspecified type R13.10 787.20       Plan:  Continue w/ good food choices and healthy habits.  Encouraged to focus on high protein, low carb.  Continue routine exercise.  Routine labs ordered.  Continue current vitamin regimen w/ adjustments pending lab results.  Call w/ issues/concerns.  RTC sooner w/ problems.     Slow down the diet advancement to address dysphagia, vomiting.  Suspect he has some swelling and is still learning his limits.    Doing well on protein.      The patient was instructed to follow up at the 1 month appointment.     This visit was conducted as a 5 minute telephone visit, in an effort to limit spread of the novel coronavirus during the 2020 pandemic.

## 2020-09-16 ENCOUNTER — OFFICE VISIT (OUTPATIENT)
Dept: BARIATRICS/WEIGHT MGMT | Facility: CLINIC | Age: 30
End: 2020-09-16

## 2020-09-16 VITALS
WEIGHT: 315 LBS | HEART RATE: 98 BPM | DIASTOLIC BLOOD PRESSURE: 68 MMHG | HEIGHT: 69 IN | TEMPERATURE: 96.9 F | RESPIRATION RATE: 18 BRPM | BODY MASS INDEX: 46.65 KG/M2 | OXYGEN SATURATION: 99 % | SYSTOLIC BLOOD PRESSURE: 122 MMHG

## 2020-09-16 DIAGNOSIS — K91.2 POSTGASTRECTOMY MALABSORPTION: ICD-10-CM

## 2020-09-16 DIAGNOSIS — Z90.3 POSTGASTRECTOMY MALABSORPTION: ICD-10-CM

## 2020-09-16 DIAGNOSIS — Z13.0 SCREENING, IRON DEFICIENCY ANEMIA: ICD-10-CM

## 2020-09-16 DIAGNOSIS — E66.01 MORBID OBESITY WITH BMI OF 50.0-59.9, ADULT (HCC): ICD-10-CM

## 2020-09-16 DIAGNOSIS — E55.9 HYPOVITAMINOSIS D: ICD-10-CM

## 2020-09-16 DIAGNOSIS — R53.83 FATIGUE, UNSPECIFIED TYPE: ICD-10-CM

## 2020-09-16 DIAGNOSIS — Z98.84 STATUS POST BARIATRIC SURGERY: Primary | ICD-10-CM

## 2020-09-16 DIAGNOSIS — Z13.21 MALNUTRITION SCREEN: ICD-10-CM

## 2020-09-16 PROCEDURE — 99024 POSTOP FOLLOW-UP VISIT: CPT | Performed by: PHYSICIAN ASSISTANT

## 2020-09-16 NOTE — PROGRESS NOTES
[facility] Bariatric Surgery  [address]  [address]  [facility] [id number] [phone]      Patient Name:  Evan Gannon.  :  1990      Reason for Visit:  1 month postop    HPI:  Evan Gannon is a 29 y.o. male s/p LSG 20 GDW    Doing well.  Starting to feel hungry at times now.  Dysphagia much better than last office visit, still doesn't tolerate certain foods- like broth.  Vomits immediately after eating some things but denies sensation of nausea, dysphagia or any abdominal pain. Has stitch coming out left incision site. Very pleased with weightloss, had to but new clothes, feeling much better overall.  Denies reflux, nausea, vomiting, abdominal pain, pulmonary issues and fevers.  Tolerating diet progression - on stage 5.  Getting 60-80g prot/day, protein shakes and homemade  shakes.  Drinking 64+ fluid oz/day.  Not taking vitamins, overwhelmed with amount and not sure when to take them.  On Omeprazole . Hasn't picked up actigall, doesn't remember talking about needing this.   Still holding ASA , NSAIDs , Tramadol, Hormones, Diuretics , Steroids and Immunologics.  Taking cosentyx weekly, last injection on Monday. Exercising- walking, going to gym.     Presurgery weight:  401 pounds. Today's weight is (!) 159 kg (351 lb 8 oz) pounds, today's Body mass index is 51.91 kg/m²., and@ weight loss since surgery is 50 pounds.       Past Medical History:   Diagnosis Date   • CHF (congestive heart failure) (CMS/HCC)     diagnosed at 21yo, thought to be related to undiagnosed congenital defect- per patient. hospitalized every 3 weeks during the winter due to PNA.  Has significantf fluid retention despite diurectics   • CPAP (continuous positive airway pressure) dependence     settings at 15   • Gout     on allopurinol, controls symptoms   • Hypertension     patient denies   • Hypokalemia    • ICD (implantable cardioverter-defibrillator) in place    • Low HDL (under 40)   "  • Morbid obesity (CMS/[facility])    • Morbid obesity with BMI of 60.0-69.9, adult (CMS/[facility])    • Myocardial infarction (CMS/[facility])     \"almost\" per patient, had heart cath, no stents, has defibrillator   • On home O2     supposed to be 24/7, ran out of O2 and has only been using prn. manged  by cardiology   • COLLIN on CPAP     compliant   • Paresthesia     hands/ arms/ feet, suspected due to poor circulation reportedly   • Plaque psoriasis    • Pneumonia    • Psoriasis    • Sleep apnea    • Wears glasses      Past Surgical History:   Procedure Laterality Date   • CARDIAC CATHETERIZATION N/A 7/13/2017    Procedure: Left Heart Cath;  Surgeon: Balbir Olsen MD;  Location:  [facility] CATH [facility];  Service:    • CARDIAC DEFIBRILLATOR PLACEMENT  08/2017   • CARDIAC DEFIBRILLATOR PLACEMENT     • CARDIAC ELECTROPHYSIOLOGY PROCEDURE N/A 8/15/2017    Procedure: VVI ICD Implant;  Surgeon: Nathanael Richmond DO;  Location:  [facility] EP INVASIVE LOCATION;  Service:    • ENDOSCOPY N/A 8/14/2020    Procedure: ESOPHAGOGASTRODUODENOSCOPY;  Surgeon: Arnoldo Whittington MD;  Location:  [facility] OR;  Service: Bariatric;  Laterality: N/A;   • GASTRIC SLEEVE LAPAROSCOPIC N/A 8/14/2020    Procedure: GASTRIC SLEEVE LAPAROSCOPIC;  Surgeon: Arnoldo Whittington MD;  Location:  [facility] OR;  Service: Bariatric;  Laterality: N/A;   • OTHER SURGICAL HISTORY      ultra light therapy   • SINUS SURGERY      cartilage from ear inserted in nasal cavity   • TONSILLECTOMY AND ADENOIDECTOMY  2000     Outpatient Medications Marked as Taking for the 9/16/20 encounter (Office Visit) with Jodi Howard PA-C   Medication Sig Dispense Refill   • allopurinol (Zyloprim) 100 MG tablet Take 1 tablet by mouth Daily. 30 tablet 5   • carvedilol (COREG) 25 MG tablet Take 1 tablet by mouth 2 (Two) Times a Day With Meals. 180 tablet 1   • omeprazole (PrilOSEC) 40 MG capsule Take 1 capsule by mouth Daily for 60 days. 60 capsule 0   • ondansetron ODT (Zofran ODT) 4 MG " "disintegrating tablet Place 1 tablet on the tongue Every 8 (Eight) Hours As Needed for Nausea or Vomiting. 10 tablet 0   • potassium chloride (K-DUR) 10 MEQ CR tablet Take 1 tablet by mouth 2 (Two) Times a Day. (Patient taking differently: Take 10 mEq by mouth Daily.) 60 tablet 0   • sacubitril-valsartan (Entresto) 24-26 MG tablet Take 1 tablet by mouth 2 (Two) Times a Day. 60 tablet 3   • Secukinumab (COSENTYX, 300 MG DOSE, SC) Inject 300 mg under the skin into the appropriate area as directed.     • spironolactone (ALDACTONE) 25 MG tablet Take 25 mg by mouth Daily.     • torsemide (DEMADEX) 20 MG tablet Take 20 mg by mouth Daily.     • ursodiol (ACTIGALL) 300 MG capsule Take 1 capsule by mouth 2 (Two) Times a Day. 60 capsule 5   • [DISCONTINUED] acetaminophen (TYLENOL) 500 MG tablet Take 500 mg by mouth Every 6 (Six) Hours As Needed for Mild Pain .       No Known Allergies    Social History     Socioeconomic History   • Marital status: Single     Spouse name: Not on file   • Number of children: 0   • Years of education: Not on file   • Highest education level: Not on file   Occupational History   • Occupation: disabled   Tobacco Use   • Smoking status: Never Smoker   • Smokeless tobacco: Never Used   Substance and Sexual Activity   • Alcohol use: No   • Drug use: No   • Sexual activity: Defer   Social History Narrative    Lives in [facility] with parents and brother.  Disabled from CHF, given pass to work- hasn't gotten hired yet. .             Caffeine use: 6 sodas weekly    Patient lives with parents        Caffeine 1 soda a week        Caffeine 8 servings 3 days a week       /68 (BP Location: Left arm, Patient Position: Sitting, Cuff Size: Large Adult)   Pulse 98   Temp 96.9 °F (36.1 °C) (Temporal)   Resp 18   Ht 175.3 cm (69\")   Wt (!) 159 kg (351 lb 8 oz)   SpO2 99%   BMI 51.91 kg/m²     Physical Exam  Constitutional:       Appearance: He is well-developed. He is obese.      Comments: " With cane   HENT:      Head: Normocephalic and atraumatic.   Cardiovascular:      Rate and Rhythm: Normal rate and regular rhythm.      Heart sounds: Normal heart sounds.   Pulmonary:      Effort: Pulmonary effort is normal.      Breath sounds: Normal breath sounds.      Comments: With NC oxygen  Abdominal:      General: Bowel sounds are normal.      Palpations: Abdomen is soft.      Comments: Incisions healing well   Skin:     General: Skin is warm and dry.   Neurological:      Mental Status: He is alert.   Psychiatric:         Behavior: Behavior normal.           Assessment:  1 month s/p LSG 8/14/20 GDW    ICD-10-CM ICD-9-CM   1. Status post bariatric surgery  Z98.84 V45.86   2. Morbid obesity with BMI of 50.0-59.9, adult (CMS/[facility])  E66.01 278.01    Z68.43 V85.43   3. Fatigue, unspecified type  R53.83 780.79   4. Hypovitaminosis D  E55.9 268.9   5. Screening, iron deficiency anemia  Z13.0 V78.0   6. Malnutrition screen  Z13.21 V77.2   7. Postgastrectomy malabsorption  K91.2 579.3    Z90.3          Plan:  Doing well.  Discussed going slow, symptoms seem to be improving. If abd pain, nausea arise or symptoms worsen, can evaluate further. Continue to advance diet per manual.  Increase protein to 70-100g/day.  Encouraged good food choices - high protein, low carb.  Continue fluids 64+oz per day. Continue routine exercise, lifting restrictions lifted.  Continue PPI. START actigall. Continue to avoid NSAIDS, ASA, tramadol, tobacco x 6 weeks postop, steroids x 8 weeks postop.  Routine bariatric labs ordered.  Start vitamins w/ adjustments pending lab results.  Call w/ problems/concerns.    Patient's Body mass index is 51.91 kg/m². BMI is above normal parameters. Recommendations include: exercise counseling and nutrition counseling.        The patient was instructed to follow up in 2 months, sooner if needed.

## 2020-09-18 ENCOUNTER — TELEPHONE (OUTPATIENT)
Dept: CARDIOLOGY | Facility: HOSPITAL | Age: 30
End: 2020-09-18

## 2020-09-18 NOTE — TELEPHONE ENCOUNTER
Patient left voicemail in regards to paperwork he had requested for him to get a handicap sticker. He ask for the status.

## 2020-09-23 ENCOUNTER — OFFICE VISIT (OUTPATIENT)
Dept: CARDIOLOGY | Facility: HOSPITAL | Age: 30
End: 2020-09-23

## 2020-09-23 VITALS
HEART RATE: 72 BPM | RESPIRATION RATE: 18 BRPM | BODY MASS INDEX: 46.65 KG/M2 | WEIGHT: 315 LBS | DIASTOLIC BLOOD PRESSURE: 63 MMHG | TEMPERATURE: 97.2 F | HEIGHT: 69 IN | OXYGEN SATURATION: 100 % | SYSTOLIC BLOOD PRESSURE: 105 MMHG

## 2020-09-23 DIAGNOSIS — E66.01 MORBID OBESITY (HCC): ICD-10-CM

## 2020-09-23 DIAGNOSIS — I42.0 NONISCHEMIC CONGESTIVE CARDIOMYOPATHY (HCC): ICD-10-CM

## 2020-09-23 DIAGNOSIS — I50.23 ACUTE ON CHRONIC SYSTOLIC HEART FAILURE, NYHA CLASS 3 (HCC): Primary | ICD-10-CM

## 2020-09-23 DIAGNOSIS — I10 ESSENTIAL HYPERTENSION: ICD-10-CM

## 2020-09-23 RX ORDER — CARVEDILOL 12.5 MG/1
12.5 TABLET ORAL 2 TIMES DAILY
Qty: 60 TABLET | Refills: 11
Start: 2020-09-23 | End: 2021-01-08 | Stop reason: SDUPTHER

## 2020-09-23 RX ORDER — SPIRONOLACTONE 25 MG/1
25 TABLET ORAL DAILY
Qty: 30 TABLET | Refills: 0
Start: 2020-09-23 | End: 2021-01-08 | Stop reason: SDUPTHER

## 2020-11-02 ENCOUNTER — APPOINTMENT (OUTPATIENT)
Dept: CT IMAGING | Facility: HOSPITAL | Age: 30
End: 2020-11-02

## 2020-11-02 ENCOUNTER — APPOINTMENT (OUTPATIENT)
Dept: CARDIOLOGY | Facility: HOSPITAL | Age: 30
End: 2020-11-02

## 2020-11-02 ENCOUNTER — HOSPITAL ENCOUNTER (INPATIENT)
Facility: HOSPITAL | Age: 30
LOS: 3 days | Discharge: HOME | End: 2020-11-05
Attending: EMERGENCY MEDICINE | Admitting: INTERNAL MEDICINE

## 2020-11-02 DIAGNOSIS — Z74.09 IMPAIRED MOBILITY: ICD-10-CM

## 2020-11-02 DIAGNOSIS — E87.6 HYPOKALEMIA: Primary | ICD-10-CM

## 2020-11-02 DIAGNOSIS — M62.838 MUSCLE SPASM: ICD-10-CM

## 2020-11-02 DIAGNOSIS — E66.01 MORBID OBESITY (HCC): ICD-10-CM

## 2020-11-02 PROBLEM — J18.9 PNEUMONIA OF RIGHT LOWER LOBE DUE TO INFECTIOUS ORGANISM: Status: RESOLVED | Noted: 2019-06-17 | Resolved: 2020-11-02

## 2020-11-02 PROBLEM — N17.9 AKI (ACUTE KIDNEY INJURY): Status: ACTIVE | Noted: 2020-11-02

## 2020-11-02 LAB
ALBUMIN SERPL-MCNC: 3.9 G/DL (ref 3.5–5.2)
ALBUMIN/GLOB SERPL: 0.9 G/DL
ALP SERPL-CCNC: 70 U/L (ref 39–117)
ALT SERPL W P-5'-P-CCNC: 13 U/L (ref 1–41)
ANION GAP SERPL CALCULATED.3IONS-SCNC: 10 MMOL/L (ref 5–15)
ANION GAP SERPL CALCULATED.3IONS-SCNC: 17 MMOL/L (ref 5–15)
ANION GAP SERPL CALCULATED.3IONS-SCNC: 9 MMOL/L (ref 5–15)
AST SERPL-CCNC: 20 U/L (ref 1–40)
B PARAPERT DNA SPEC QL NAA+PROBE: NOT DETECTED
B PERT DNA SPEC QL NAA+PROBE: NOT DETECTED
BASOPHILS # BLD AUTO: 0.03 10*3/MM3 (ref 0–0.2)
BASOPHILS NFR BLD AUTO: 0.4 % (ref 0–1.5)
BH CV ECHO MEAS - AO MAX PG (FULL): 1.8 MMHG
BH CV ECHO MEAS - AO MAX PG: 2.9 MMHG
BH CV ECHO MEAS - AO MEAN PG (FULL): 1.1 MMHG
BH CV ECHO MEAS - AO MEAN PG: 1.7 MMHG
BH CV ECHO MEAS - AO ROOT AREA (BSA CORRECTED): 1.1
BH CV ECHO MEAS - AO ROOT AREA: 6.6 CM^2
BH CV ECHO MEAS - AO ROOT DIAM: 2.9 CM
BH CV ECHO MEAS - AO V2 MAX: 84.8 CM/SEC
BH CV ECHO MEAS - AO V2 MEAN: 61.2 CM/SEC
BH CV ECHO MEAS - AO V2 VTI: 17.7 CM
BH CV ECHO MEAS - AVA(I,A): 2.7 CM^2
BH CV ECHO MEAS - AVA(I,D): 2.7 CM^2
BH CV ECHO MEAS - AVA(V,A): 3.7 CM^2
BH CV ECHO MEAS - AVA(V,D): 3.7 CM^2
BH CV ECHO MEAS - BSA(HAYCOCK): 2.8 M^2
BH CV ECHO MEAS - BSA: 2.6 M^2
BH CV ECHO MEAS - BZI_BMI: 46.8 KILOGRAMS/M^2
BH CV ECHO MEAS - BZI_METRIC_HEIGHT: 177.8 CM
BH CV ECHO MEAS - BZI_METRIC_WEIGHT: 147.9 KG
BH CV ECHO MEAS - EDV(CUBED): 616.6 ML
BH CV ECHO MEAS - EDV(MOD-SP4): 288 ML
BH CV ECHO MEAS - EDV(TEICH): 395.6 ML
BH CV ECHO MEAS - EF(CUBED): 33.6 %
BH CV ECHO MEAS - EF(MOD-SP4): 21.2 %
BH CV ECHO MEAS - EF(TEICH): 26.2 %
BH CV ECHO MEAS - ESV(CUBED): 409.6 ML
BH CV ECHO MEAS - ESV(MOD-SP4): 227 ML
BH CV ECHO MEAS - ESV(TEICH): 291.8 ML
BH CV ECHO MEAS - FS: 12.7 %
BH CV ECHO MEAS - IVS/LVPW: 1
BH CV ECHO MEAS - IVSD: 0.97 CM
BH CV ECHO MEAS - LA DIMENSION: 3.6 CM
BH CV ECHO MEAS - LA/AO: 1.2
BH CV ECHO MEAS - LAD MAJOR: 4.9 CM
BH CV ECHO MEAS - LAT PEAK E' VEL: 4.5 CM/SEC
BH CV ECHO MEAS - LATERAL E/E' RATIO: 11.1
BH CV ECHO MEAS - LV DIASTOLIC VOL/BSA (35-75): 112.1 ML/M^2
BH CV ECHO MEAS - LV IVRT: 0.15 SEC
BH CV ECHO MEAS - LV MASS(C)D: 432.7 GRAMS
BH CV ECHO MEAS - LV MASS(C)DI: 168.4 GRAMS/M^2
BH CV ECHO MEAS - LV MAX PG: 1.1 MMHG
BH CV ECHO MEAS - LV MEAN PG: 0.57 MMHG
BH CV ECHO MEAS - LV SYSTOLIC VOL/BSA (12-30): 88.4 ML/M^2
BH CV ECHO MEAS - LV V1 MAX: 52.8 CM/SEC
BH CV ECHO MEAS - LV V1 MEAN: 35.2 CM/SEC
BH CV ECHO MEAS - LV V1 VTI: 8.1 CM
BH CV ECHO MEAS - LVIDD: 8.5 CM
BH CV ECHO MEAS - LVIDS: 7.4 CM
BH CV ECHO MEAS - LVLD AP4: 8.5 CM
BH CV ECHO MEAS - LVLS AP4: 8.6 CM
BH CV ECHO MEAS - LVOT AREA (M): 5.7 CM^2
BH CV ECHO MEAS - LVOT AREA: 5.9 CM^2
BH CV ECHO MEAS - LVOT DIAM: 2.7 CM
BH CV ECHO MEAS - LVPWD: 0.96 CM
BH CV ECHO MEAS - MED PEAK E' VEL: 5.8 CM/SEC
BH CV ECHO MEAS - MEDIAL E/E' RATIO: 8.7
BH CV ECHO MEAS - MV A MAX VEL: 48.4 CM/SEC
BH CV ECHO MEAS - MV DEC SLOPE: 372.1 CM/SEC^2
BH CV ECHO MEAS - MV DEC TIME: 0.11 SEC
BH CV ECHO MEAS - MV E MAX VEL: 51.8 CM/SEC
BH CV ECHO MEAS - MV E/A: 1.1
BH CV ECHO MEAS - MV MAX PG: 3.5 MMHG
BH CV ECHO MEAS - MV MEAN PG: 1.5 MMHG
BH CV ECHO MEAS - MV P1/2T MAX VEL: 57 CM/SEC
BH CV ECHO MEAS - MV P1/2T: 44.9 MSEC
BH CV ECHO MEAS - MV V2 MAX: 93.7 CM/SEC
BH CV ECHO MEAS - MV V2 MEAN: 54 CM/SEC
BH CV ECHO MEAS - MV V2 VTI: 12.9 CM
BH CV ECHO MEAS - MVA P1/2T LCG: 3.9 CM^2
BH CV ECHO MEAS - MVA(P1/2T): 4.9 CM^2
BH CV ECHO MEAS - MVA(VTI): 3.7 CM^2
BH CV ECHO MEAS - PA ACC SLOPE: 371.1 CM/SEC^2
BH CV ECHO MEAS - PA ACC TIME: 0.12 SEC
BH CV ECHO MEAS - PA PR(ACCEL): 25.1 MMHG
BH CV ECHO MEAS - RAP SYSTOLE: 3 MMHG
BH CV ECHO MEAS - RV MAX PG: 0.75 MMHG
BH CV ECHO MEAS - RV V1 MAX: 43.4 CM/SEC
BH CV ECHO MEAS - RVDD: 4 CM
BH CV ECHO MEAS - RVSP: 17 MMHG
BH CV ECHO MEAS - SI(AO): 45.6 ML/M^2
BH CV ECHO MEAS - SI(CUBED): 80.6 ML/M^2
BH CV ECHO MEAS - SI(LVOT): 18.6 ML/M^2
BH CV ECHO MEAS - SI(MOD-SP4): 23.7 ML/M^2
BH CV ECHO MEAS - SI(TEICH): 40.4 ML/M^2
BH CV ECHO MEAS - SV(AO): 117.1 ML
BH CV ECHO MEAS - SV(CUBED): 207 ML
BH CV ECHO MEAS - SV(LVOT): 47.8 ML
BH CV ECHO MEAS - SV(MOD-SP4): 61 ML
BH CV ECHO MEAS - SV(TEICH): 103.8 ML
BH CV ECHO MEAS - TAPSE (>1.6): 1.9 CM
BH CV ECHO MEAS - TR MAX PG: 14 MMHG
BH CV ECHO MEAS - TR MAX VEL: 188.6 CM/SEC
BH CV ECHO MEASUREMENTS AVERAGE E/E' RATIO: 10.06
BH CV XLRA - TDI S': 8.99 CM/SEC
BILIRUB SERPL-MCNC: 1.3 MG/DL (ref 0–1.2)
BUN SERPL-MCNC: 13 MG/DL (ref 6–20)
BUN SERPL-MCNC: 14 MG/DL (ref 6–20)
BUN SERPL-MCNC: 17 MG/DL (ref 6–20)
BUN/CREAT SERPL: 12.9 (ref 7–25)
BUN/CREAT SERPL: 13.6 (ref 7–25)
BUN/CREAT SERPL: 14.1 (ref 7–25)
C PNEUM DNA NPH QL NAA+NON-PROBE: NOT DETECTED
CALCIUM SPEC-SCNC: 8.8 MG/DL (ref 8.6–10.5)
CALCIUM SPEC-SCNC: 8.9 MG/DL (ref 8.6–10.5)
CALCIUM SPEC-SCNC: 9.7 MG/DL (ref 8.6–10.5)
CHLORIDE SERPL-SCNC: 87 MMOL/L (ref 98–107)
CHLORIDE SERPL-SCNC: 92 MMOL/L (ref 98–107)
CHLORIDE SERPL-SCNC: 93 MMOL/L (ref 98–107)
CO2 SERPL-SCNC: 31 MMOL/L (ref 22–29)
CO2 SERPL-SCNC: 33 MMOL/L (ref 22–29)
CO2 SERPL-SCNC: 34 MMOL/L (ref 22–29)
CREAT SERPL-MCNC: 0.92 MG/DL (ref 0.76–1.27)
CREAT SERPL-MCNC: 1.03 MG/DL (ref 0.76–1.27)
CREAT SERPL-MCNC: 1.32 MG/DL (ref 0.76–1.27)
DEPRECATED RDW RBC AUTO: 41.5 FL (ref 37–54)
EOSINOPHIL # BLD AUTO: 0.03 10*3/MM3 (ref 0–0.4)
EOSINOPHIL NFR BLD AUTO: 0.4 % (ref 0.3–6.2)
ERYTHROCYTE [DISTWIDTH] IN BLOOD BY AUTOMATED COUNT: 14 % (ref 12.3–15.4)
FLUAV H1 2009 PAND RNA NPH QL NAA+PROBE: NOT DETECTED
FLUAV H1 HA GENE NPH QL NAA+PROBE: NOT DETECTED
FLUAV H3 RNA NPH QL NAA+PROBE: NOT DETECTED
FLUAV SUBTYP SPEC NAA+PROBE: NOT DETECTED
FLUBV RNA ISLT QL NAA+PROBE: NOT DETECTED
GFR SERPL CREATININE-BSD FRML MDRD: 105 ML/MIN/1.73
GFR SERPL CREATININE-BSD FRML MDRD: 120 ML/MIN/1.73
GFR SERPL CREATININE-BSD FRML MDRD: 79 ML/MIN/1.73
GLOBULIN UR ELPH-MCNC: 4.5 GM/DL
GLUCOSE SERPL-MCNC: 105 MG/DL (ref 65–99)
GLUCOSE SERPL-MCNC: 105 MG/DL (ref 65–99)
GLUCOSE SERPL-MCNC: 106 MG/DL (ref 65–99)
HADV DNA SPEC NAA+PROBE: NOT DETECTED
HCOV 229E RNA SPEC QL NAA+PROBE: NOT DETECTED
HCOV HKU1 RNA SPEC QL NAA+PROBE: NOT DETECTED
HCOV NL63 RNA SPEC QL NAA+PROBE: NOT DETECTED
HCOV OC43 RNA SPEC QL NAA+PROBE: NOT DETECTED
HCT VFR BLD AUTO: 45.1 % (ref 37.5–51)
HGB BLD-MCNC: 15.1 G/DL (ref 13–17.7)
HMPV RNA NPH QL NAA+NON-PROBE: NOT DETECTED
HPIV1 RNA SPEC QL NAA+PROBE: NOT DETECTED
HPIV2 RNA SPEC QL NAA+PROBE: NOT DETECTED
HPIV3 RNA NPH QL NAA+PROBE: NOT DETECTED
HPIV4 P GENE NPH QL NAA+PROBE: NOT DETECTED
IMM GRANULOCYTES # BLD AUTO: 0.01 10*3/MM3 (ref 0–0.05)
IMM GRANULOCYTES NFR BLD AUTO: 0.1 % (ref 0–0.5)
LV EF 2D ECHO EST: 20 %
LYMPHOCYTES # BLD AUTO: 1.83 10*3/MM3 (ref 0.7–3.1)
LYMPHOCYTES NFR BLD AUTO: 24.2 % (ref 19.6–45.3)
M PNEUMO IGG SER IA-ACNC: NOT DETECTED
MAGNESIUM SERPL-MCNC: 1.4 MG/DL (ref 1.6–2.6)
MAXIMAL PREDICTED HEART RATE: 193 BPM
MCH RBC QN AUTO: 27.4 PG (ref 26.6–33)
MCHC RBC AUTO-ENTMCNC: 33.5 G/DL (ref 31.5–35.7)
MCV RBC AUTO: 81.7 FL (ref 79–97)
MONOCYTES # BLD AUTO: 0.76 10*3/MM3 (ref 0.1–0.9)
MONOCYTES NFR BLD AUTO: 10.1 % (ref 5–12)
NEUTROPHILS NFR BLD AUTO: 4.9 10*3/MM3 (ref 1.7–7)
NEUTROPHILS NFR BLD AUTO: 64.8 % (ref 42.7–76)
NRBC BLD AUTO-RTO: 0 /100 WBC (ref 0–0.2)
NT-PROBNP SERPL-MCNC: 2950 PG/ML (ref 0–450)
PLATELET # BLD AUTO: 259 10*3/MM3 (ref 140–450)
PMV BLD AUTO: 11.5 FL (ref 6–12)
POTASSIUM SERPL-SCNC: 2.2 MMOL/L (ref 3.5–5.2)
POTASSIUM SERPL-SCNC: 2.6 MMOL/L (ref 3.5–5.2)
POTASSIUM SERPL-SCNC: 2.7 MMOL/L (ref 3.5–5.2)
PROT SERPL-MCNC: 8.4 G/DL (ref 6–8.5)
RBC # BLD AUTO: 5.52 10*6/MM3 (ref 4.14–5.8)
RHINOVIRUS RNA SPEC NAA+PROBE: NOT DETECTED
RSV RNA NPH QL NAA+NON-PROBE: NOT DETECTED
SARS-COV-2 RNA NPH QL NAA+NON-PROBE: NOT DETECTED
SODIUM SERPL-SCNC: 135 MMOL/L (ref 136–145)
SODIUM SERPL-SCNC: 135 MMOL/L (ref 136–145)
SODIUM SERPL-SCNC: 136 MMOL/L (ref 136–145)
STRESS TARGET HR: 164 BPM
WBC # BLD AUTO: 7.56 10*3/MM3 (ref 3.4–10.8)

## 2020-11-02 PROCEDURE — 99285 EMERGENCY DEPT VISIT HI MDM: CPT

## 2020-11-02 PROCEDURE — 25010000003 POTASSIUM CHLORIDE 10 MEQ/100ML SOLUTION: Performed by: EMERGENCY MEDICINE

## 2020-11-02 PROCEDURE — 25010000002 MAGNESIUM SULFATE 2 GM/50ML SOLUTION: Performed by: INTERNAL MEDICINE

## 2020-11-02 PROCEDURE — 74176 CT ABD & PELVIS W/O CONTRAST: CPT

## 2020-11-02 PROCEDURE — 72131 CT LUMBAR SPINE W/O DYE: CPT

## 2020-11-02 PROCEDURE — 85025 COMPLETE CBC W/AUTO DIFF WBC: CPT | Performed by: EMERGENCY MEDICINE

## 2020-11-02 PROCEDURE — 80053 COMPREHEN METABOLIC PANEL: CPT | Performed by: EMERGENCY MEDICINE

## 2020-11-02 PROCEDURE — 94660 CPAP INITIATION&MGMT: CPT

## 2020-11-02 PROCEDURE — 25010000002 HEPARIN (PORCINE) PER 1000 UNITS: Performed by: INTERNAL MEDICINE

## 2020-11-02 PROCEDURE — 25010000003 POTASSIUM CHLORIDE 10 MEQ/100ML SOLUTION: Performed by: INTERNAL MEDICINE

## 2020-11-02 PROCEDURE — 93306 TTE W/DOPPLER COMPLETE: CPT | Performed by: INTERNAL MEDICINE

## 2020-11-02 PROCEDURE — 25010000002 MORPHINE PER 10 MG: Performed by: EMERGENCY MEDICINE

## 2020-11-02 PROCEDURE — 99223 1ST HOSP IP/OBS HIGH 75: CPT | Performed by: INTERNAL MEDICINE

## 2020-11-02 PROCEDURE — 93306 TTE W/DOPPLER COMPLETE: CPT

## 2020-11-02 PROCEDURE — 83880 ASSAY OF NATRIURETIC PEPTIDE: CPT | Performed by: INTERNAL MEDICINE

## 2020-11-02 PROCEDURE — 83735 ASSAY OF MAGNESIUM: CPT | Performed by: INTERNAL MEDICINE

## 2020-11-02 PROCEDURE — 0202U NFCT DS 22 TRGT SARS-COV-2: CPT | Performed by: INTERNAL MEDICINE

## 2020-11-02 RX ORDER — POTASSIUM CHLORIDE 7.45 MG/ML
10 INJECTION INTRAVENOUS
Status: DISCONTINUED | OUTPATIENT
Start: 2020-11-02 | End: 2020-11-05 | Stop reason: HOSPADM

## 2020-11-02 RX ORDER — SODIUM CHLORIDE 0.9 % (FLUSH) 0.9 %
10 SYRINGE (ML) INJECTION AS NEEDED
Status: DISCONTINUED | OUTPATIENT
Start: 2020-11-02 | End: 2020-11-05 | Stop reason: HOSPADM

## 2020-11-02 RX ORDER — SODIUM CHLORIDE 0.9 % (FLUSH) 0.9 %
10 SYRINGE (ML) INJECTION EVERY 12 HOURS SCHEDULED
Status: DISCONTINUED | OUTPATIENT
Start: 2020-11-02 | End: 2020-11-05 | Stop reason: HOSPADM

## 2020-11-02 RX ORDER — POTASSIUM CHLORIDE 1.5 G/1.77G
40 POWDER, FOR SOLUTION ORAL AS NEEDED
Status: DISCONTINUED | OUTPATIENT
Start: 2020-11-02 | End: 2020-11-05 | Stop reason: HOSPADM

## 2020-11-02 RX ORDER — POTASSIUM CHLORIDE 7.45 MG/ML
10 INJECTION INTRAVENOUS ONCE
Status: COMPLETED | OUTPATIENT
Start: 2020-11-02 | End: 2020-11-02

## 2020-11-02 RX ORDER — HEPARIN SODIUM 5000 [USP'U]/ML
5000 INJECTION, SOLUTION INTRAVENOUS; SUBCUTANEOUS EVERY 8 HOURS SCHEDULED
Status: DISCONTINUED | OUTPATIENT
Start: 2020-11-02 | End: 2020-11-05 | Stop reason: HOSPADM

## 2020-11-02 RX ORDER — CALCIUM CARBONATE 750 MG/1
750 TABLET, CHEWABLE ORAL 2 TIMES DAILY PRN
Status: DISCONTINUED | OUTPATIENT
Start: 2020-11-02 | End: 2020-11-05 | Stop reason: HOSPADM

## 2020-11-02 RX ORDER — POTASSIUM CHLORIDE 750 MG/1
40 CAPSULE, EXTENDED RELEASE ORAL AS NEEDED
Status: DISCONTINUED | OUTPATIENT
Start: 2020-11-02 | End: 2020-11-05 | Stop reason: HOSPADM

## 2020-11-02 RX ORDER — TORSEMIDE 20 MG/1
20 TABLET ORAL DAILY
Status: DISCONTINUED | OUTPATIENT
Start: 2020-11-02 | End: 2020-11-05 | Stop reason: HOSPADM

## 2020-11-02 RX ORDER — ALLOPURINOL 100 MG/1
100 TABLET ORAL DAILY
Status: DISCONTINUED | OUTPATIENT
Start: 2020-11-02 | End: 2020-11-05 | Stop reason: HOSPADM

## 2020-11-02 RX ORDER — URSODIOL 300 MG/1
300 CAPSULE ORAL 2 TIMES DAILY
Status: DISCONTINUED | OUTPATIENT
Start: 2020-11-02 | End: 2020-11-05 | Stop reason: HOSPADM

## 2020-11-02 RX ORDER — ACETAMINOPHEN 650 MG/1
650 SUPPOSITORY RECTAL EVERY 4 HOURS PRN
Status: DISCONTINUED | OUTPATIENT
Start: 2020-11-02 | End: 2020-11-05 | Stop reason: HOSPADM

## 2020-11-02 RX ORDER — MAGNESIUM SULFATE HEPTAHYDRATE 40 MG/ML
4 INJECTION, SOLUTION INTRAVENOUS AS NEEDED
Status: DISCONTINUED | OUTPATIENT
Start: 2020-11-02 | End: 2020-11-05 | Stop reason: HOSPADM

## 2020-11-02 RX ORDER — MAGNESIUM SULFATE HEPTAHYDRATE 40 MG/ML
2 INJECTION, SOLUTION INTRAVENOUS AS NEEDED
Status: DISCONTINUED | OUTPATIENT
Start: 2020-11-02 | End: 2020-11-05 | Stop reason: HOSPADM

## 2020-11-02 RX ORDER — ACETAMINOPHEN 325 MG/1
650 TABLET ORAL EVERY 4 HOURS PRN
Status: DISCONTINUED | OUTPATIENT
Start: 2020-11-02 | End: 2020-11-05 | Stop reason: HOSPADM

## 2020-11-02 RX ORDER — CARVEDILOL 12.5 MG/1
12.5 TABLET ORAL 2 TIMES DAILY
Status: DISCONTINUED | OUTPATIENT
Start: 2020-11-02 | End: 2020-11-03

## 2020-11-02 RX ORDER — SPIRONOLACTONE 25 MG/1
25 TABLET ORAL DAILY
Status: DISCONTINUED | OUTPATIENT
Start: 2020-11-02 | End: 2020-11-05 | Stop reason: HOSPADM

## 2020-11-02 RX ORDER — ACETAMINOPHEN 160 MG/5ML
650 SOLUTION ORAL EVERY 4 HOURS PRN
Status: DISCONTINUED | OUTPATIENT
Start: 2020-11-02 | End: 2020-11-05 | Stop reason: HOSPADM

## 2020-11-02 RX ORDER — MORPHINE SULFATE 4 MG/ML
4 INJECTION, SOLUTION INTRAMUSCULAR; INTRAVENOUS ONCE
Status: COMPLETED | OUTPATIENT
Start: 2020-11-02 | End: 2020-11-02

## 2020-11-02 RX ORDER — HYDROCODONE BITARTRATE AND ACETAMINOPHEN 5; 325 MG/1; MG/1
1 TABLET ORAL EVERY 4 HOURS PRN
Status: DISCONTINUED | OUTPATIENT
Start: 2020-11-02 | End: 2020-11-05 | Stop reason: HOSPADM

## 2020-11-02 RX ADMIN — CARVEDILOL 12.5 MG: 12.5 TABLET, FILM COATED ORAL at 20:30

## 2020-11-02 RX ADMIN — SODIUM CHLORIDE 1000 ML: 9 INJECTION, SOLUTION INTRAVENOUS at 06:45

## 2020-11-02 RX ADMIN — TORSEMIDE 20 MG: 20 TABLET ORAL at 12:11

## 2020-11-02 RX ADMIN — MAGNESIUM SULFATE 2 G: 2 INJECTION INTRAVENOUS at 21:13

## 2020-11-02 RX ADMIN — MAGNESIUM SULFATE 2 G: 2 INJECTION INTRAVENOUS at 18:43

## 2020-11-02 RX ADMIN — SACUBITRIL AND VALSARTAN 1 TABLET: 24; 26 TABLET, FILM COATED ORAL at 20:30

## 2020-11-02 RX ADMIN — POTASSIUM CHLORIDE 10 MEQ: 7.46 INJECTION, SOLUTION INTRAVENOUS at 10:48

## 2020-11-02 RX ADMIN — ALLOPURINOL 100 MG: 100 TABLET ORAL at 12:11

## 2020-11-02 RX ADMIN — HYDROCODONE BITARTRATE AND ACETAMINOPHEN 1 TABLET: 5; 325 TABLET ORAL at 20:30

## 2020-11-02 RX ADMIN — HEPARIN SODIUM 5000 UNITS: 5000 INJECTION INTRAVENOUS; SUBCUTANEOUS at 21:13

## 2020-11-02 RX ADMIN — POTASSIUM CHLORIDE 10 MEQ: 7.46 INJECTION, SOLUTION INTRAVENOUS at 14:47

## 2020-11-02 RX ADMIN — SODIUM CHLORIDE, PRESERVATIVE FREE 10 ML: 5 INJECTION INTRAVENOUS at 20:31

## 2020-11-02 RX ADMIN — HYDROCODONE BITARTRATE AND ACETAMINOPHEN 1 TABLET: 5; 325 TABLET ORAL at 12:10

## 2020-11-02 RX ADMIN — SODIUM CHLORIDE 500 ML: 9 INJECTION, SOLUTION INTRAVENOUS at 08:55

## 2020-11-02 RX ADMIN — POTASSIUM CHLORIDE 10 MEQ: 7.46 INJECTION, SOLUTION INTRAVENOUS at 12:13

## 2020-11-02 RX ADMIN — MAGNESIUM SULFATE 2 G: 2 INJECTION INTRAVENOUS at 16:35

## 2020-11-02 RX ADMIN — SACUBITRIL AND VALSARTAN 1 TABLET: 24; 26 TABLET, FILM COATED ORAL at 14:50

## 2020-11-02 RX ADMIN — POTASSIUM CHLORIDE 10 MEQ: 7.46 INJECTION, SOLUTION INTRAVENOUS at 09:43

## 2020-11-02 RX ADMIN — POTASSIUM CHLORIDE 40 MEQ: 10 CAPSULE, COATED, EXTENDED RELEASE ORAL at 20:29

## 2020-11-02 RX ADMIN — POTASSIUM CHLORIDE 10 MEQ: 7.46 INJECTION, SOLUTION INTRAVENOUS at 08:43

## 2020-11-02 RX ADMIN — URSODIOL 300 MG: 300 CAPSULE ORAL at 20:30

## 2020-11-02 RX ADMIN — MORPHINE SULFATE 4 MG: 4 INJECTION, SOLUTION INTRAMUSCULAR; INTRAVENOUS at 06:45

## 2020-11-02 RX ADMIN — POTASSIUM CHLORIDE 10 MEQ: 7.46 INJECTION, SOLUTION INTRAVENOUS at 13:24

## 2020-11-02 NOTE — PLAN OF CARE
Problem: Adult Inpatient Plan of Care  Goal: Plan of Care Review  Outcome: Ongoing, Progressing  Goal: Patient-Specific Goal (Individualized)  Outcome: Ongoing, Progressing  Goal: Absence of Hospital-Acquired Illness or Injury  Outcome: Ongoing, Progressing  Goal: Optimal Comfort and Wellbeing  Outcome: Ongoing, Progressing  Intervention: Provide Person-Centered Care  Recent Flowsheet Documentation  Taken 11/2/2020 1045 by Marta Rodgers, RN  Trust Relationship/Rapport:   care explained   choices provided  Goal: Readiness for Transition of Care  Outcome: Ongoing, Progressing  Intervention: Mutually Develop Transition Plan  Recent Flowsheet Documentation  Taken 11/2/2020 1146 by Marta Rodgers, RN  Equipment Currently Used at Home:   cpap   oxygen   Goal Outcome Evaluation:

## 2020-11-02 NOTE — ED PROVIDER NOTES
Subjective   29-year-old male presents with a complaint of right groin pain.  He says the pain radiates from the right groin down the entire right leg.  It also radiates around to the right low back.  He reports he began having some degree of pain approximately 3 weeks ago reports having an adjustment by a chiropractor that seemed to slightly improve for short period of time.  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  He reports when he attempted to go to sleep at approximately 8-9 pm last night the pain became more pronounced and has kept him up throughout the night.  This morning he called EMS to present for further evaluation.  He denies any direct trauma injury or fall to the right hip or back.  No previous surgical intervention to the hip pelvis or back.  He denies any chest or abdominal pain.  He denies any fever, bodies, chills.  No reported acute respiratory symptoms.  No reported sick contacts.  He does have a known history of CHF and morbid obesity and wears 6 L nasal cannula at baseline.  No other reported aggravating, alleviating, or associated symptoms.          Review of Systems   Constitutional: Negative for chills, fatigue and fever.   HENT: Negative for congestion, ear pain, postnasal drip, sinus pressure and sore throat.    Eyes: Negative for pain, redness and visual disturbance.   Respiratory: Negative for cough, chest tightness and shortness of breath.    Cardiovascular: Negative for chest pain, palpitations and leg swelling.   Gastrointestinal: Negative for abdominal pain, anal bleeding, blood in stool, diarrhea, nausea and vomiting.   Endocrine: Negative for polydipsia and polyuria.   Genitourinary: Negative for difficulty urinating, dysuria, frequency and urgency.   Musculoskeletal: Positive for arthralgias. Negative for back pain and neck pain.   Skin: Negative for pallor and rash.  "  Allergic/Immunologic: Negative for environmental allergies and immunocompromised state.   Neurological: Negative for dizziness, weakness and headaches.   Hematological: Negative for adenopathy.   Psychiatric/Behavioral: Negative for confusion, self-injury and suicidal ideas. The patient is not nervous/anxious.    All other systems reviewed and are negative.      Past Medical History:   Diagnosis Date   • CHF (congestive heart failure) (CMS/[facility])     diagnosed at 21yo, thought to be related to undiagnosed congenital defect- per patient. hospitalized every 3 weeks during the winter due to PNA.  Has significantf fluid retention despite diurectics   • CPAP (continuous positive airway pressure) dependence     settings at 15   • Gout     on allopurinol, controls symptoms   • Hypertension     patient denies   • Hypokalemia    • ICD (implantable cardioverter-defibrillator) in place    • Low HDL (under 40)    • Morbid obesity (CMS/[facility])    • Morbid obesity with BMI of 60.0-69.9, adult (CMS/[facility])    • Myocardial infarction (CMS/[facility])     \"almost\" per patient, had heart cath, no stents, has defibrillator   • On home O2     supposed to be 24/7, ran out of O2 and has only been using prn. manged  by cardiology   • COLLIN on CPAP     compliant   • Paresthesia     hands/ arms/ feet, suspected due to poor circulation reportedly   • Plaque psoriasis    • Pneumonia    • Psoriasis    • Sleep apnea    • Wears glasses        No Known Allergies    Past Surgical History:   Procedure Laterality Date   • CARDIAC CATHETERIZATION N/A 7/13/2017    Procedure: Left Heart Cath;  Surgeon: Balbir Olsen MD;  Location:  [facility] CATH [facility]VASIVE [facility];  Service:    • CARDIAC DEFIBRILLATOR PLACEMENT  08/2017   • CARDIAC DEFIBRILLATOR PLACEMENT     • CARDIAC ELECTROPHYSIOLOGY PROCEDURE N/A 8/15/2017    Procedure: VVI ICD Implant;  Surgeon: Nathanael Richmond DO;  Location:  [facility] EP INVASIVE LOCATION;  Service:    • ENDOSCOPY N/A 8/14/2020    Procedure: " ESOPHAGOGASTRODUODENOSCOPY;  Surgeon: Arnoldo Whittington MD;  Location:  [facility] OR;  Service: Bariatric;  Laterality: N/A;   • GASTRIC SLEEVE LAPAROSCOPIC N/A 8/14/2020    Procedure: GASTRIC SLEEVE LAPAROSCOPIC;  Surgeon: Arnoldo Whittington MD;  Location:  [facility] OR;  Service: Bariatric;  Laterality: N/A;   • OTHER SURGICAL HISTORY      ultra light therapy   • SINUS SURGERY      cartilage from ear inserted in nasal cavity   • TONSILLECTOMY AND ADENOIDECTOMY  2000       Family History   Problem Relation Age of Onset   • Diabetes Father    • Diabetes Paternal Grandmother    • Diabetes Maternal Grandfather        Social History     Socioeconomic History   • Marital status: Single     Spouse name: Not on file   • Number of children: 0   • Years of education: Not on file   • Highest education level: Not on file   Occupational History   • Occupation: disabled   Tobacco Use   • Smoking status: Never Smoker   • Smokeless tobacco: Never Used   Substance and Sexual Activity   • Alcohol use: No   • Drug use: No   • Sexual activity: Defer   Social History Narrative    Lives in [facility] with parents and brother.  Disabled from CHF, given pass to work- hasn't gotten hired yet. .             Caffeine use: 6 sodas weekly    Patient lives with parents        Caffeine 1 soda a week            Caffeine 0           Objective   Physical Exam  Vitals signs and nursing note reviewed.   Constitutional:       General: He is not in acute distress.     Appearance: Normal appearance. He is well-developed. He is not toxic-appearing or diaphoretic.   HENT:      Head: Normocephalic and atraumatic.      Right Ear: External ear normal.      Left Ear: External ear normal.      Nose: Nose normal.   Eyes:      General: Lids are normal.      Pupils: Pupils are equal, round, and reactive to light.   Neck:      Musculoskeletal: Normal range of motion and neck supple.      Trachea: No tracheal deviation.   Cardiovascular:      Rate and  Rhythm: Normal rate and regular rhythm.      Pulses: No decreased pulses.      Heart sounds: Normal heart sounds. No murmur. No friction rub. No gallop.    Pulmonary:      Effort: Pulmonary effort is normal. No respiratory distress.      Breath sounds: Normal breath sounds. No decreased breath sounds, wheezing, rhonchi or rales.   Abdominal:      General: Bowel sounds are normal.      Palpations: Abdomen is soft.      Tenderness: There is no abdominal tenderness. There is no guarding or rebound.   Musculoskeletal: Normal range of motion.         General: No deformity.      Right hip: He exhibits tenderness. He exhibits no bony tenderness, no swelling, no crepitus and no deformity.        Legs:    Lymphadenopathy:      Cervical: No cervical adenopathy.   Skin:     General: Skin is warm and dry.      Findings: No rash.   Neurological:      Mental Status: He is alert and oriented to person, place, and time.      Cranial Nerves: No cranial nerve deficit.      Sensory: No sensory deficit.   Psychiatric:         Speech: Speech normal.         Behavior: Behavior normal.         Thought Content: Thought content normal.         Judgment: Judgment normal.         Procedures           ED Course                                           MDM  Number of Diagnoses or Management Options  Hypokalemia: new and requires workup  Muscle spasm: new and requires workup  Diagnosis management comments: I feel the patient is dehydrated and has associated hypokalemia.  I feel these electrolyte abnormalities are accounting for the patient's right low back pain.  I feel he is abnormalities are secondary to the patient's previous gastric bypass surgery.    IV fluids were initiated in the ER and 6 rounds of IV potassium were initiated.    No significant abnormalities identified on CT scan of lumbar spine or CT scan of the abdomen pelvis.    I discussed the patient with the hospitalist, Dr. Tafoya, who will admit.       Amount and/or Complexity of  Data Reviewed  Clinical lab tests: ordered and reviewed  Tests in the radiology section of CPT®: ordered and reviewed  Decide to obtain previous medical records or to obtain history from someone other than the patient: yes  Review and summarize past medical records: yes  Discuss the patient with other providers: yes  Independent visualization of images, tracings, or specimens: yes        Final diagnoses:   Hypokalemia   Muscle spasm            Odalis Ronquillo MD  11/02/20 6000

## 2020-11-02 NOTE — H&P
[facility] Medicine Services  HISTORY AND PHYSICAL    Patient Name: Evan Gannon  : 1990  MRN: [id number]  Primary Care Physician: Gilbert Da Silva PA  Date of admission: 2020      Subjective   Subjective     Chief Complaint:  Leg cramps    HPI:  Evan Gannon is a 29 y.o. male with PMH of NICM/ systolic HF with EF of 15% (last TTE ) s/p ICD placement, HTN, morbid obesity s/p recent gastric sleeve in August, gout, COLLIN and plaque psoriasis on Cosentyx who presented to the ED with complaints of right leg cramps for the last few days which have significantly worsened in the last 24 hours. 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 Evaluation in the ED revealed a K+ of 2.2. Pt states that he takes K+ replacement but that he notices that it remains undigested when he has a BM.  He denies any SOA, cough, orthopnea or chest pain.          Current COVID Risks are:  [] Fever []  Cough [] Shortness of breath [] Fatigue [] Change in taste or smell    [] Exposure to COVID positive patient  [] High risk facility   [x]  NONE    Review of Systems   General- no F/C, no weight loss or gain, no fatigue  HEENT- no blurry vision, no nasal congestion, no hearing loss, no sore throat, no dysphagia, no oral ulcers, no dental caries, no bleeding gums  CVS- no chest pain, no palpitations  Pulm- no SOA, no cough, no orthopnea, no PND  GI- no diarrhea, no constipation, no BRBPR, no nausea, no vomiting  MSK- no joint pain, no swelling, no erythema  - no dysuria, no hematuria  Neuro- no headaches, no focal motor deficits  Psych- no SI/ HI, no depression/anxiety    All other systems reviewed and are negative.     Personal History     Past Medical History:   Diagnosis Date   • CHF (congestive heart failure) (CMS/[facility])     diagnosed at 23yo, thought to be related to undiagnosed congenital defect- per  "patient. hospitalized every 3 weeks during the winter due to PNA.  Has significantf fluid retention despite diurectics   • CPAP (continuous positive airway pressure) dependence     settings at 15   • Gout     on allopurinol, controls symptoms   • Hypertension     patient denies   • Hypokalemia    • ICD (implantable cardioverter-defibrillator) in place    • Low HDL (under 40)    • Morbid obesity (CMS/[facility])    • Morbid obesity with BMI of 60.0-69.9, adult (CMS/[facility])    • Myocardial infarction (CMS/[facility])     \"almost\" per patient, had heart cath, no stents, has defibrillator   • On home O2     supposed to be 24/7, ran out of O2 and has only been using prn. manged  by cardiology   • COLLIN on CPAP     compliant   • Paresthesia     hands/ arms/ feet, suspected due to poor circulation reportedly   • Plaque psoriasis    • Pneumonia    • Psoriasis    • Sleep apnea    • Wears glasses        Past Surgical History:   Procedure Laterality Date   • CARDIAC CATHETERIZATION N/A 7/13/2017    Procedure: Left Heart Cath;  Surgeon: Balbir Olsen MD;  Location:  [facility] CATH [facility];  Service:    • CARDIAC DEFIBRILLATOR PLACEMENT  08/2017   • CARDIAC DEFIBRILLATOR PLACEMENT     • CARDIAC ELECTROPHYSIOLOGY PROCEDURE N/A 8/15/2017    Procedure: VVI ICD Implant;  Surgeon: Nathanael Richmond DO;  Location:  [facility] EP INVASIVE [facility];  Service:    • ENDOSCOPY N/A 8/14/2020    Procedure: ESOPHAGOGASTRODUODENOSCOPY;  Surgeon: Arnoldo Whittington MD;  Location:  [facility] OR;  Service: Bariatric;  Laterality: N/A;   • GASTRIC SLEEVE LAPAROSCOPIC N/A 8/14/2020    Procedure: GASTRIC SLEEVE LAPAROSCOPIC;  Surgeon: Arnoldo Whittington MD;  Location:  [facility] OR;  Service: Bariatric;  Laterality: N/A;   • OTHER SURGICAL HISTORY      ultra light therapy   • SINUS SURGERY      cartilage from ear inserted in nasal cavity   • TONSILLECTOMY AND ADENOIDECTOMY  2000       Family History: family history includes Diabetes in his father, maternal grandfather, " and paternal grandmother. Mother  shortly after giving birth to pt's sister (still born) when pt was 4 years old- he does not know whether this was due to cardiomyopathy or other etiology. Otherwise pertinent FHx was reviewed and unremarkable.     Social History:  reports that he has never smoked. He has never used smokeless tobacco. He reports that he does not drink alcohol or use drugs.  Social History     Social History Narrative    Lives in [facility] [place] with parents and brother.  Disabled from CHF, given pass to work- hasn't gotten hired yet. .             Caffeine use: 6 sodas weekly    Patient lives with parents        Caffeine 1 soda a week            Caffeine 0       Medications:  Available home medication information reviewed.  Medications Prior to Admission   Medication Sig Dispense Refill Last Dose   • allopurinol (Zyloprim) 100 MG tablet Take 1 tablet by mouth Daily. 30 tablet 5    • carvedilol (COREG) 12.5 MG tablet Take 1 tablet by mouth 2 (Two) Times a Day. 60 tablet 11    • potassium chloride (K-DUR) 10 MEQ CR tablet Take 1 tablet by mouth 2 (Two) Times a Day. (Patient taking differently: Take 10 mEq by mouth Daily.) 60 tablet 0    • sacubitril-valsartan (Entresto) 24-26 MG tablet Take 1 tablet by mouth 2 (Two) Times a Day. 60 tablet 3    • Secukinumab (COSENTYX, 300 MG DOSE, SC) Inject 300 mg under the skin into the appropriate area as directed.      • spironolactone (ALDACTONE) 25 MG tablet Take 1 tablet by mouth Daily. 30 tablet 0    • torsemide (DEMADEX) 20 MG tablet Take 20 mg by mouth Daily.      • ursodiol (ACTIGALL) 300 MG capsule Take 1 capsule by mouth 2 (Two) Times a Day. 60 capsule 5        No Known Allergies    Objective   Objective     Vital Signs:   Temp:  [97.4 °F (36.3 °C)] 97.4 °F (36.3 °C)  Heart Rate:  [85-96] 85  Resp:  [20-30] 20  BP: (108-118)/(45-74) 108/50        Physical Exam   Constitutional: Awake, alert, morbidly obese  Eyes: PERRLA, sclerae anicteric, no  conjunctival injection  HENT: NCAT, mucous membranes moist  Neck: Supple, no thyromegaly, no lymphadenopathy, trachea midline  Respiratory: Clear to auscultation bilaterally, nonlabored respirations   Cardiovascular: RRR, no murmurs, rubs, or gallops, palpable pedal pulses bilaterally  Gastrointestinal: Positive bowel sounds, soft, nontender, nondistended  Musculoskeletal: 1+ pitting edema BLEs to ankles,  no clubbing or cyanosis to extremities  Psychiatric: Appropriate affect, cooperative  Neurologic: Oriented x 3, strength symmetric in all extremities, Cranial Nerves grossly intact to confrontation, speech clear  Skin: No rashes, psoriatic patches on extensor surface of right UE    Results Reviewed:  I have personally reviewed most recent indicated data and agree with findings including:  [x]  Laboratory  []  Radiology  []  EKG/Telemetry  []  Pathology  [x]  Cardiac/Vascular Studies  [x]  Old records  []  Other:  Most pertinent findings include: elevated Cr above his baseline and low K+, mildly low sodium    Results from last 7 days   Lab Units 11/02/20  0641   WBC 10*3/mm3 7.56   HEMOGLOBIN g/dL 15.1   HEMATOCRIT % 45.1   PLATELETS 10*3/mm3 259     Results from last 7 days   Lab Units 11/02/20  0641   SODIUM mmol/L 135*   POTASSIUM mmol/L 2.2*   CHLORIDE mmol/L 87*   CO2 mmol/L 31.0*   BUN mg/dL 17   CREATININE mg/dL 1.32*   GLUCOSE mg/dL 105*   CALCIUM mg/dL 9.7   ALT (SGPT) U/L 13   AST (SGOT) U/L 20   PROBNP pg/mL 2,950.0*     Estimated Creatinine Clearance: 122.5 mL/min (A) (by C-G formula based on SCr of 1.32 mg/dL (H)).  Brief Urine Lab Results     None        Imaging Results (Last 24 Hours)     Procedure Component Value Units Date/Time    CT Abdomen Pelvis Without Contrast [[id number]] Collected: 11/02/20 0815     Updated: 11/02/20 0818    Narrative:      INDICATION:   Right hip and right groin pain. No known injury. Symptoms started 3 weeks ago.    TECHNIQUE:   CT of the abdomen and pelvis without  contrast. Coronal and sagittal reconstructions were obtained.  Radiation dose reduction techniques included automated exposure control or exposure modulation based on body size. Radiation audit for number of CT and  nuclear cardiology exams performed in the last year: 0.      COMPARISON:   Abdomen pelvis CT scan from 2/11/2018.    FINDINGS:  Lung bases: There is a pacemaker. There is old granulomatous disease. The visualized heart is enlarged.    Abdomen: Study is limited by the lack of IV contrast media.    There is been previous gastric surgery probably a gastric sleeve.    There our gallstones gallbladder neck without wall thickening or pericholecystic fluid. The noncontrast liver is unremarkable. The spleen, adrenal glands, and kidneys are unremarkable. The pancreas is partially fatty replaced. There is no abdominal  aortic aneurysm. The inferior vena cava is quite prominent and please correlate for clinical evidence of right heart failure.    The appendix is radiographically unremarkable. There is no evidence for bowel obstruction. There is no free intraperitoneal air.    Pelvis: Bladder apex is mildly tethered by urachal remnant. Mild wall thickening noted previously is not appreciably changed. There are small bilateral fat-containing inguinal hernias.      Impression:        1. Prior gastric sleeve surgery.  2. Apparent pacemaker and cardiac enlargement.  3. Overall enlarged appearance the inferior vena cava suggestive of right heart failure. Please correlate further clinically.  4. Gallstones without evidence for wall thickening or pericholecystic fluid.  5. Study is limited by the lack of IV contrast media.  6. No evidence for bowel obstruction. Unremarkable appearance see appendix.        Signer Name: Tarah Mcdowell MD   Signed: 11/2/2020 8:15 AM   Workstation Name: [email or web address]    [facility]    CT Lumbar Spine Without Contrast [[id number]] Collected: 11/02/20 0755     Updated: 11/02/20  0757    Narrative:      INDICATION:    Low back pain radiating down right leg. No known injury or trauma. Pain started 3 weeks ago.    TECHNIQUE:   CT of the lumbar spine without contrast. Coronal and sagittal reconstructions were obtained.  Radiation dose reduction techniques included automated exposure control or exposure modulation based on body size. Radiation audit for number of CT and nuclear  cardiology exams performed in the last year: 0.      COMPARISON:    CT abdomen pelvis from 2/11/2018.    FINDINGS:  Sagittal alignment is normal. There are multiple small Schmorl's nodes lower thoracic upper lumbar spine levels. There is no acute fracture or bone destruction.    At T12-L1, no significant abnormality.    At L1-2, no significant abnormality.    At L to 3, no significant abnormality.    At L3-4, no significant abnormality.    At L4-5, there is mild bilateral facet degenerative change. There is a small left posterolateral protrusion with mild left inferior foraminal narrowing. There is no canal stenosis.    At L5-S1, there is moderate left side facet arthritis with subchondral cyst formation there is no canal stenosis or foraminal compromise.      Impression:        1. No acute fracture or bone destruction or malalignment.  2. Facet arthritis most prominent on the left-sided L5-S1 where there is subchondral cyst formation.  3. Mild left foraminal narrowing at L4-5.    Signer Name: Tarah Mcdowell MD   Signed: 11/2/2020 7:55 AM   Workstation Name: [email or web address]    [facility]        Results for orders placed during the hospital encounter of 10/16/17   Adult Transthoracic Echo Limited W/ Cont if Necessary Per Protocol    Narrative · Left ventricular systolic function is severely decreased. Estimated EF =   15%.  · The left ventricular cavity is severely dilated.  · Right ventricular cavity is mildly dilated.  · Mildly reduced right ventricular systolic function noted.          Assessment/Plan    Assessment & Plan     Active Hospital Problems    Diagnosis POA   • SAPPHIRE (acute kidney injury) (CMS/HCC) [N17.9] Unknown     Priority: High   • Hypokalemia [E87.6] Yes     Priority: High   • Nonischemic congestive cardiomyopathy (CMS/HCC) [I42.0] Yes     Priority: High   • CHF (congestive heart failure) (CMS/HCC) [I50.9] Yes     Priority: Medium   • Morbid obesity (CMS/[facility]) [E66.01] Yes     Priority: Medium   • Status post laparoscopic sleeve gastrectomy [Z98.84] Not Applicable     Priority: Low   • COLLIN on CPAP [G47.33, Z99.89] Not Applicable     Priority: Low     compliant     • Gout [M10.9] Yes     Priority: Low     on allopurinol, controls symptoms     • Plaque psoriasis [L40.0] Yes     Priority: Low   • Hypertension [I10] Yes     Priority: Low       Mr. Gannon is a 30 yo with PMH of NICM/ systolic HF with EF of 15% (last TTE 2017) s/p ICD placement, HTN, morbid obesity s/p recent gastric sleeve in August, gout, COLLIN and plaque psoriasis on Cosentyx who presented with leg cramps and found to have significant hypokalemia and SAPPHIRE.    Plan:    Hypokalemia  --likely due to use of diuretics for below. Pt is on daily K+ but only 10mEq.  Likely needs higher dose.  --continue replacement protocol, monitor BMP Q 6 hours for now  --add on Mg2+ to earlier labs and replace as necessary    SAPPHIRE  --Cr baseline appears to be 0.9-1.0, is 1.32 on admission  --likely due to volume overload as proBNP is 2,950 and appears mildly volume overloaded on exam  --pt given IVFs in the ED, d/c IVFs  --resume home doses of Torsemide, Spironolactone and Entresto  --If Cr worsens in am, would send urine studies and consult NAL    NICM/systolic HF s/p ICD placement  --last EF was 15% in 2017  --repeat TTE this admission  --strict Is/Os, daily weights  --continue diuretics as per home med rec  --monitor electrolytes closely  --follows with Dr. Olsen and heart and valve clinic    Morbid obesity s/p gastric sleeve  --gastric sleeve in August,  follow up scheduled for 11/5 per pt  --continue with cardiac diet    COLLIN  --CPAP QHS    HTN  --continue home meds- Carvedilol, Entresto, Spironolactone and Torsemide     Gout  --continue Allopurinol    Plaque Psoriasis  --on Cosentyx as outpatient, resume upon d/c       DVT prophylaxis:  VILMA      CODE STATUS:  Full code  Code Status and Medical Interventions:   Ordered at: 11/02/20 1134     Level Of Support Discussed With:    Patient     Code Status:    CPR     Medical Interventions (Level of Support Prior to Arrest):    Full       Admission Status:  I believe this patient meets INPATIENT status due to significant hypokalemia and SAPPHIRE requiring close monitoring of electrolytes as pt is at risk for cardiac arrhythmias.  I feel patient’s risk for adverse outcomes and need for care warrant INPATIENT evaluation and I predict the patient’s care encounter to likely last beyond 2 midnights.      Jemma Queen MD  11/02/20

## 2020-11-02 NOTE — PROGRESS NOTES
Discharge Planning Assessment  [facility]     Patient Name: Evan Gannon  MRN: [id number]  Today's Date: 11/2/2020    Admit Date: 11/2/2020    Discharge Needs Assessment     Row Name 11/02/20 1101       Living Environment    Lives With  parent(s)    Name(s) of Who Lives With Patient  Father Olga Lidia Gannon - [phone]    Current Living Arrangements  home/apartment/condo    Primary Care Provided by  self;parent(s)    Provides Primary Care For  no one    Family Caregiver if Needed  parent(s);significant other    Family Caregiver Names  Father Olga Lidia Gannon - [phone]. Kiah Norman [facility] [phone]    Quality of Family Relationships  involved;helpful;supportive    Able to Return to Prior Arrangements  yes       Resource/Environmental Concerns    Resource/Environmental Concerns  none    Transportation Concerns  car, none       Transition Planning    Patient/Family Anticipates Transition to  home with family    Patient/Family Anticipated Services at Transition  none    Transportation Anticipated  family or friend will provide       Discharge Needs Assessment    Readmission Within the Last 30 Days  no previous admission in last 30 days    Equipment Currently Used at Home  cpap;oxygen;nebulizer    Concerns to be Addressed  denies needs/concerns at this time    Anticipated Changes Related to Illness  none    Equipment Needed After Discharge  walker, rolling    Current Discharge Risk  chronically ill        Discharge Plan     Row Name 11/02/20 1120       Plan    Plan  Initial    Plan Comments  CM spoke with patient at bedside. Patient resides in [facility] with his parents. Patient is independent with ADL's. Patient states he has been having difficulty walking lately and would like a rolling walker at discharge. Patient is on home oxygen, continuous @ 2lpm provided by [employer]. Patient also has a CPAP. Patient states his parents are able to assist him at home with any discharge planning needs. Plan is home  with parents. CM following.    Final Discharge Disposition Code  30 - still a patient        Continued Care and Services - Admitted Since 11/2/2020    Coordination has not been started for this encounter.         Demographic Summary     Row Name 11/02/20 1051       General Information    Admission Type  inpatient    Arrived From  home    Referral Source  emergency department    Reason for Consult  discharge planning    Preferred Language  English     Used During This Interaction  no    General Information Comments  PCP: Gilbert Da Silva PA       Contact Information    Contact Information Comments  Father Olga Lidia Gannon - [phone]        Functional Status     Row Name 11/02/20 1100       Functional Status    Usual Activity Tolerance  fair    Current Activity Tolerance  fair       Functional Status, IADL    Medications  independent    Meal Preparation  independent    Housekeeping  independent    Laundry  independent    Shopping  independent       Mental Status    General Appearance WDL  WDL       Mental Status Summary    Recent Changes in Mental Status/Cognitive Functioning  no changes       Employment/    Employment Status  disabled                Judy Carrillo RN

## 2020-11-03 LAB
ANION GAP SERPL CALCULATED.3IONS-SCNC: 11 MMOL/L (ref 5–15)
ANION GAP SERPL CALCULATED.3IONS-SCNC: 8 MMOL/L (ref 5–15)
ANION GAP SERPL CALCULATED.3IONS-SCNC: 8 MMOL/L (ref 5–15)
BUN SERPL-MCNC: 11 MG/DL (ref 6–20)
BUN SERPL-MCNC: 9 MG/DL (ref 6–20)
BUN SERPL-MCNC: 9 MG/DL (ref 6–20)
BUN/CREAT SERPL: 10.3 (ref 7–25)
BUN/CREAT SERPL: 12.5 (ref 7–25)
BUN/CREAT SERPL: 9.4 (ref 7–25)
CALCIUM SPEC-SCNC: 9 MG/DL (ref 8.6–10.5)
CHLORIDE SERPL-SCNC: 91 MMOL/L (ref 98–107)
CHLORIDE SERPL-SCNC: 93 MMOL/L (ref 98–107)
CHLORIDE SERPL-SCNC: 94 MMOL/L (ref 98–107)
CHOLEST SERPL-MCNC: 147 MG/DL (ref 0–200)
CO2 SERPL-SCNC: 36 MMOL/L (ref 22–29)
CO2 SERPL-SCNC: 36 MMOL/L (ref 22–29)
CO2 SERPL-SCNC: 37 MMOL/L (ref 22–29)
CREAT SERPL-MCNC: 0.87 MG/DL (ref 0.76–1.27)
CREAT SERPL-MCNC: 0.88 MG/DL (ref 0.76–1.27)
CREAT SERPL-MCNC: 0.96 MG/DL (ref 0.76–1.27)
DEPRECATED RDW RBC AUTO: 43.6 FL (ref 37–54)
ERYTHROCYTE [DISTWIDTH] IN BLOOD BY AUTOMATED COUNT: 14.2 % (ref 12.3–15.4)
GFR SERPL CREATININE-BSD FRML MDRD: 114 ML/MIN/1.73
GFR SERPL CREATININE-BSD FRML MDRD: 126 ML/MIN/1.73
GFR SERPL CREATININE-BSD FRML MDRD: 128 ML/MIN/1.73
GLUCOSE SERPL-MCNC: 105 MG/DL (ref 65–99)
GLUCOSE SERPL-MCNC: 116 MG/DL (ref 65–99)
GLUCOSE SERPL-MCNC: 95 MG/DL (ref 65–99)
HBA1C MFR BLD: 6 % (ref 4.8–5.6)
HCT VFR BLD AUTO: 42.5 % (ref 37.5–51)
HDLC SERPL-MCNC: 47 MG/DL (ref 40–60)
HGB BLD-MCNC: 13.8 G/DL (ref 13–17.7)
LDLC SERPL CALC-MCNC: 88 MG/DL (ref 0–100)
LDLC/HDLC SERPL: 1.89 {RATIO}
MAGNESIUM SERPL-MCNC: 2.4 MG/DL (ref 1.6–2.6)
MCH RBC QN AUTO: 27.2 PG (ref 26.6–33)
MCHC RBC AUTO-ENTMCNC: 32.5 G/DL (ref 31.5–35.7)
MCV RBC AUTO: 83.7 FL (ref 79–97)
PLATELET # BLD AUTO: 228 10*3/MM3 (ref 140–450)
PMV BLD AUTO: 11 FL (ref 6–12)
POTASSIUM SERPL-SCNC: 2.7 MMOL/L (ref 3.5–5.2)
POTASSIUM SERPL-SCNC: 2.9 MMOL/L (ref 3.5–5.2)
POTASSIUM SERPL-SCNC: 2.9 MMOL/L (ref 3.5–5.2)
RBC # BLD AUTO: 5.08 10*6/MM3 (ref 4.14–5.8)
SODIUM SERPL-SCNC: 137 MMOL/L (ref 136–145)
SODIUM SERPL-SCNC: 138 MMOL/L (ref 136–145)
SODIUM SERPL-SCNC: 139 MMOL/L (ref 136–145)
TRIGL SERPL-MCNC: 57 MG/DL (ref 0–150)
TSH SERPL DL<=0.05 MIU/L-ACNC: 1.25 UIU/ML (ref 0.27–4.2)
VLDLC SERPL-MCNC: 12 MG/DL (ref 5–40)
WBC # BLD AUTO: 5.42 10*3/MM3 (ref 3.4–10.8)

## 2020-11-03 PROCEDURE — 25010000002 ONDANSETRON PER 1 MG: Performed by: INTERNAL MEDICINE

## 2020-11-03 PROCEDURE — 83735 ASSAY OF MAGNESIUM: CPT | Performed by: INTERNAL MEDICINE

## 2020-11-03 PROCEDURE — 94799 UNLISTED PULMONARY SVC/PX: CPT

## 2020-11-03 PROCEDURE — 80061 LIPID PANEL: CPT | Performed by: INTERNAL MEDICINE

## 2020-11-03 PROCEDURE — 84443 ASSAY THYROID STIM HORMONE: CPT | Performed by: INTERNAL MEDICINE

## 2020-11-03 PROCEDURE — 94660 CPAP INITIATION&MGMT: CPT

## 2020-11-03 PROCEDURE — 80048 BASIC METABOLIC PNL TOTAL CA: CPT | Performed by: INTERNAL MEDICINE

## 2020-11-03 PROCEDURE — 85027 COMPLETE CBC AUTOMATED: CPT | Performed by: INTERNAL MEDICINE

## 2020-11-03 PROCEDURE — 25010000003 POTASSIUM CHLORIDE 10 MEQ/100ML SOLUTION: Performed by: INTERNAL MEDICINE

## 2020-11-03 PROCEDURE — 25010000002 HEPARIN (PORCINE) PER 1000 UNITS: Performed by: INTERNAL MEDICINE

## 2020-11-03 PROCEDURE — 83036 HEMOGLOBIN GLYCOSYLATED A1C: CPT | Performed by: INTERNAL MEDICINE

## 2020-11-03 PROCEDURE — 99233 SBSQ HOSP IP/OBS HIGH 50: CPT | Performed by: INTERNAL MEDICINE

## 2020-11-03 RX ORDER — ONDANSETRON 2 MG/ML
4 INJECTION INTRAMUSCULAR; INTRAVENOUS EVERY 6 HOURS PRN
Status: DISCONTINUED | OUTPATIENT
Start: 2020-11-03 | End: 2020-11-05 | Stop reason: HOSPADM

## 2020-11-03 RX ORDER — POTASSIUM CHLORIDE 750 MG/1
20 CAPSULE, EXTENDED RELEASE ORAL ONCE
Status: COMPLETED | OUTPATIENT
Start: 2020-11-03 | End: 2020-11-03

## 2020-11-03 RX ADMIN — POTASSIUM CHLORIDE 40 MEQ: 10 CAPSULE, COATED, EXTENDED RELEASE ORAL at 04:10

## 2020-11-03 RX ADMIN — ONDANSETRON 4 MG: 2 INJECTION INTRAMUSCULAR; INTRAVENOUS at 12:33

## 2020-11-03 RX ADMIN — HEPARIN SODIUM 5000 UNITS: 5000 INJECTION INTRAVENOUS; SUBCUTANEOUS at 05:44

## 2020-11-03 RX ADMIN — ALLOPURINOL 100 MG: 100 TABLET ORAL at 08:48

## 2020-11-03 RX ADMIN — POTASSIUM CHLORIDE 10 MEQ: 7.46 INJECTION, SOLUTION INTRAVENOUS at 17:07

## 2020-11-03 RX ADMIN — POTASSIUM CHLORIDE 20 MEQ: 10 CAPSULE, COATED, EXTENDED RELEASE ORAL at 08:48

## 2020-11-03 RX ADMIN — POTASSIUM CHLORIDE 10 MEQ: 7.46 INJECTION, SOLUTION INTRAVENOUS at 22:24

## 2020-11-03 RX ADMIN — HYDROCODONE BITARTRATE AND ACETAMINOPHEN 1 TABLET: 5; 325 TABLET ORAL at 17:31

## 2020-11-03 RX ADMIN — TORSEMIDE 20 MG: 20 TABLET ORAL at 11:23

## 2020-11-03 RX ADMIN — POTASSIUM CHLORIDE 40 MEQ: 10 CAPSULE, COATED, EXTENDED RELEASE ORAL at 00:14

## 2020-11-03 RX ADMIN — POTASSIUM CHLORIDE 10 MEQ: 7.46 INJECTION, SOLUTION INTRAVENOUS at 14:19

## 2020-11-03 RX ADMIN — POTASSIUM CHLORIDE 10 MEQ: 7.46 INJECTION, SOLUTION INTRAVENOUS at 20:59

## 2020-11-03 RX ADMIN — URSODIOL 300 MG: 300 CAPSULE ORAL at 20:59

## 2020-11-03 RX ADMIN — HEPARIN SODIUM 5000 UNITS: 5000 INJECTION INTRAVENOUS; SUBCUTANEOUS at 15:33

## 2020-11-03 RX ADMIN — HYDROCODONE BITARTRATE AND ACETAMINOPHEN 1 TABLET: 5; 325 TABLET ORAL at 08:52

## 2020-11-03 RX ADMIN — POTASSIUM CHLORIDE 10 MEQ: 7.46 INJECTION, SOLUTION INTRAVENOUS at 15:31

## 2020-11-03 RX ADMIN — HEPARIN SODIUM 5000 UNITS: 5000 INJECTION INTRAVENOUS; SUBCUTANEOUS at 20:56

## 2020-11-03 RX ADMIN — POTASSIUM CHLORIDE 10 MEQ: 7.46 INJECTION, SOLUTION INTRAVENOUS at 12:11

## 2020-11-03 RX ADMIN — SACUBITRIL AND VALSARTAN 1 TABLET: 24; 26 TABLET, FILM COATED ORAL at 20:56

## 2020-11-03 NOTE — PROGRESS NOTES
Continued Stay Note  [facility]     Patient Name: Evan Gannon  MRN: [id number]  Today's Date: 11/3/2020    Admit Date: 11/2/2020    Discharge Plan     Row Name 11/03/20 1315       Plan    Plan  Social work spoke with [facility] and they will provide the patient with a rolling walker on Wednesday 11/4/2020.        Discharge Codes    No documentation.             SAWYER Edmondson

## 2020-11-03 NOTE — PLAN OF CARE
Goal Outcome Evaluation:  Plan of Care Reviewed With: patient  Progress: no change  Outcome Summary: Potassium remains low at 2.9, replacing per protocol using IV per MD request. MD states possible malabsorption issue with oral potassium. Pt was nauseated after lunch, PRN zofran given. PT/OT consult placed today, patient verbalized concerns about ambulating with muscle spasms in legs.

## 2020-11-03 NOTE — PLAN OF CARE
Problem: Electrolyte Imbalance  Goal: Electrolyte Balance  Outcome: Ongoing, Progressing  Intervention: Monitor and Manage Electrolyte Imbalance  Flowsheets (Taken 11/3/2020 0225)  Fluid/Electrolyte Management: electrolyte supplement initiated     Problem: Electrolyte Imbalance  Goal: Electrolyte Balance  Intervention: Monitor and Manage Electrolyte Imbalance  Flowsheets (Taken 11/3/2020 0225)  Fluid/Electrolyte Management: electrolyte supplement initiated   Goal Outcome Evaluation:

## 2020-11-03 NOTE — PROGRESS NOTES
[facility] Medicine Services  PROGRESS NOTE    Patient Name: Evan Gannon  : 1990  MRN: [id number]    Date of Admission: 2020  Primary Care Physician: Gilbert Da Silva PA    Subjective   Subjective     CC:  Leg cramps    HPI:  Doing well this am, denies any issues overnight, still having some leg cramps but they are better than on admission.  Asking for a walker to ambulate.     Review of Systems  Gen- No fevers, chills  CV- No chest pain, palpitations  Resp- No cough, dyspnea  GI- No N/V/D, abd pain      Objective   Objective     Vital Signs:   Temp:  [97.4 °F (36.3 °C)-98.3 °F (36.8 °C)] 98 °F (36.7 °C)  Heart Rate:  [60-89] 72  Resp:  [16-20] 18  BP: ()/(45-67) 83/46        Physical Exam:  Constitutional: No acute distress, awake, alert, on CPAP (he was sleeping when I walked in), obese   HENT: NCAT, mucous membranes moist  Respiratory: Clear to auscultation bilaterally, respiratory effort normal   Cardiovascular: RRR, no murmurs, rubs, or gallops, palpable pedal pulses bilaterally  Gastrointestinal: Positive bowel sounds, soft, nontender, nondistended  Musculoskeletal: No bilateral ankle edema  Psychiatric: Appropriate affect, cooperative  Neurologic: Oriented x 3, strength symmetric in all extremities, Cranial Nerves grossly intact to confrontation, speech clear  Skin: No rashes    Results Reviewed:  Results from last 7 days   Lab Units 20  0242 20  0641   WBC 10*3/mm3 5.42 7.56   HEMOGLOBIN g/dL 13.8 15.1   HEMATOCRIT % 42.5 45.1   PLATELETS 10*3/mm3 228 259     Results from last 7 days   Lab Units 20  0242 20  1834 20  1305 20  0641   SODIUM mmol/L 138 135* 136 135*   POTASSIUM mmol/L 2.7* 2.6* 2.7* 2.2*   CHLORIDE mmol/L 91* 93* 92* 87*   CO2 mmol/L 36.0* 33.0* 34.0* 31.0*   BUN mg/dL 11 13 14 17   CREATININE mg/dL 0.88 0.92 1.03 1.32*   GLUCOSE mg/dL 116* 106* 105* 105*   CALCIUM mg/dL 9.0 8.8 8.9 9.7   ALT (SGPT) U/L   --   --   --  13   AST (SGOT) U/L  --   --   --  20   PROBNP pg/mL  --   --   --  2,950.0*     Estimated Creatinine Clearance: 185.5 mL/min (by C-G formula based on SCr of 0.88 mg/dL).    Microbiology Results Abnormal     Procedure Component Value - Date/Time    COVID PRE-OP / PRE-PROCEDURE SCREENING ORDER (NO ISOLATION) - Swab, Nasopharynx [[id number]]  (Normal) Collected: 11/02/20 1459    Lab Status: Final result Specimen: Swab from Nasopharynx Updated: 11/02/20 1558    Narrative:      The following orders were created for panel order COVID PRE-OP / PRE-PROCEDURE SCREENING ORDER (NO ISOLATION) - Swab, Nasopharynx.  Procedure                               Abnormality         Status                     ---------                               -----------         ------                     Respiratory Panel PCR w/...[[id number]]  Normal              Final result                 Please view results for these tests on the individual orders.    Respiratory Panel PCR w/COVID-19(SARS-CoV-2) TC/JOE/ALISON/PAD/COR/MAD/SIOMARA In-House, NP Swab in UTM/VTM, 3-4 HR TAT - Swab, Nasopharynx [[id number]]  (Normal) Collected: 11/02/20 1459    Lab Status: Final result Specimen: Swab from Nasopharynx Updated: 11/02/20 1558     ADENOVIRUS, PCR Not Detected     Coronavirus 229E Not Detected     Coronavirus HKU1 Not Detected     Coronavirus NL63 Not Detected     Coronavirus OC43 Not Detected     COVID19 Not Detected     Human Metapneumovirus Not Detected     Human Rhinovirus/Enterovirus Not Detected     Influenza A PCR Not Detected     Influenza A H1 Not Detected     Influenza A H1 2009 PCR Not Detected     Influenza A H3 Not Detected     Influenza B PCR Not Detected     Parainfluenza Virus 1 Not Detected     Parainfluenza Virus 2 Not Detected     Parainfluenza Virus 3 Not Detected     Parainfluenza Virus 4 Not Detected     RSV, PCR Not Detected     Bordetella pertussis pcr Not Detected     Bordetella parapertussis PCR Not Detected      Chlamydophila pneumoniae PCR Not Detected     Mycoplasma pneumo by PCR Not Detected    Narrative:      Fact sheet for providers: https://docs.[employer]/wp-content/uploads/XAV8927-2714-CT1.1-EUA-Provider-Fact-Sheet-3.pdf    Fact sheet for patients: https://docs.[employer]/wp-content/uploads/LWF6370-3177-WR2.1-EUA-Patient-Fact-Sheet-1.pdf          Imaging Results (Last 24 Hours)     Procedure Component Value Units Date/Time    CT Abdomen Pelvis Without Contrast [[id number]] Collected: 11/02/20 0815     Updated: 11/02/20 0818    Narrative:      INDICATION:   Right hip and right groin pain. No known injury. Symptoms started 3 weeks ago.    TECHNIQUE:   CT of the abdomen and pelvis without contrast. Coronal and sagittal reconstructions were obtained.  Radiation dose reduction techniques included automated exposure control or exposure modulation based on body size. Radiation audit for number of CT and  nuclear cardiology exams performed in the last year: 0.      COMPARISON:   Abdomen pelvis CT scan from 2/11/2018.    FINDINGS:  Lung bases: There is a pacemaker. There is old granulomatous disease. The visualized heart is enlarged.    Abdomen: Study is limited by the lack of IV contrast media.    There is been previous gastric surgery probably a gastric sleeve.    There our gallstones gallbladder neck without wall thickening or pericholecystic fluid. The noncontrast liver is unremarkable. The spleen, adrenal glands, and kidneys are unremarkable. The pancreas is partially fatty replaced. There is no abdominal  aortic aneurysm. The inferior vena cava is quite prominent and please correlate for clinical evidence of right heart failure.    The appendix is radiographically unremarkable. There is no evidence for bowel obstruction. There is no free intraperitoneal air.    Pelvis: Bladder apex is mildly tethered by urachal remnant. Mild wall thickening noted previously is not appreciably changed. There are small bilateral  fat-containing inguinal hernias.      Impression:        1. Prior gastric sleeve surgery.  2. Apparent pacemaker and cardiac enlargement.  3. Overall enlarged appearance the inferior vena cava suggestive of right heart failure. Please correlate further clinically.  4. Gallstones without evidence for wall thickening or pericholecystic fluid.  5. Study is limited by the lack of IV contrast media.  6. No evidence for bowel obstruction. Unremarkable appearance see appendix.        Signer Name: Tarah Mcdowell MD   Signed: 11/2/2020 8:15 AM   Workstation Name: JEAN [email or web address]    [facility]          Results for orders placed during the hospital encounter of 11/02/20   Transthoracic Echo Complete With Contrast if Necessary Per Protocol    Narrative · Estimated left ventricular EF = 20%  · The right ventricular cavity is dilated.  · No obvious valvular pathology seen on this difficult study          I have reviewed the medications:  Scheduled Meds:allopurinol, 100 mg, Oral, Daily  carvedilol, 12.5 mg, Oral, BID  heparin (porcine), 5,000 Units, Subcutaneous, Q8H  sacubitril-valsartan, 1 tablet, Oral, BID  sodium chloride, 10 mL, Intravenous, Q12H  spironolactone, 25 mg, Oral, Daily  torsemide, 20 mg, Oral, Daily  ursodiol, 300 mg, Oral, BID      Continuous Infusions:   PRN Meds:.•  acetaminophen **OR** acetaminophen **OR** acetaminophen  •  calcium carbonate EX  •  HYDROcodone-acetaminophen  •  magnesium sulfate **OR** magnesium sulfate **OR** magnesium sulfate  •  potassium chloride **OR** potassium chloride **OR** potassium chloride  •  [COMPLETED] Insert peripheral IV **AND** sodium chloride  •  sodium chloride    Assessment/Plan   Assessment & Plan     Active Hospital Problems    Diagnosis  POA   • SAPPHIRE (acute kidney injury) (CMS/[facility]) [N17.9]  Unknown     Priority: High   • Hypokalemia [E87.6]  Yes     Priority: High   • Nonischemic congestive cardiomyopathy (CMS/[facility]) [I42.0]  Yes     Priority: High   •  CHF (congestive heart failure) (CMS/HCC) [I50.9]  Yes     Priority: Medium   • Morbid obesity (CMS/HCC) [E66.01]  Yes     Priority: Medium   • Status post laparoscopic sleeve gastrectomy [Z98.84]  Not Applicable     Priority: Low   • COLLIN on CPAP [G47.33, Z99.89]  Not Applicable     Priority: Low   • Gout [M10.9]  Yes     Priority: Low   • Plaque psoriasis [L40.0]  Yes     Priority: Low   • Hypertension [I10]  Yes     Priority: Low      Resolved Hospital Problems   No resolved problems to display.        Brief Hospital Course to date:  Evan Gannon is a 29 y.o. male with PMH of NICM/ systolic HF with EF of 15% (last TTE 2017) s/p ICD placement, HTN, morbid obesity s/p recent gastric sleeve in August, gout, COLLIN and plaque psoriasis on Cosentyx who presented with leg cramps and found to have significant hypokalemia and SAPPHIRE.     Plan:     Hypokalemia  --likely due to use of diuretics for below. Pt is on daily K+ but only 10mEq.  Likely needs higher dose.  --continue replacement protocol, monitor BMP Q 6 hours for now  --still significantly hypokalemic, will add oral replacement as well as continue with current protocol as not much improvement in last 24 hours.      SAPPHIRE  --Cr baseline appears to be 0.9-1.0, was 1.32 on admission  --likely due to volume overload as proBNP is 2,950 and appeared mildly volume overloaded on exam  --pt given IVFs in the ED, d/c'd IVFs  --resumed home doses of Torsemide, Spironolactone and Entresto  --resolved     NICM/systolic HF s/p ICD placement  --last EF was 15% in 2017  --repeat TTE this admission shows EF 20%  --strict Is/Os, daily weights  --continue diuretics as per home med rec  --monitor electrolytes closely  --follows with Dr. Olsen and heart and valve clinic     Morbid obesity s/p gastric sleeve  --gastric sleeve in August, follow up scheduled for 11/5 per pt  --continue with cardiac diet     COLLIN  --CPAP QHS     HTN  --hold Coreg this am due to borderline low BP- pt  reports he no longer takes this at home. Continue with Entresto, Spironolactone and Torsemide      Gout  --continue Allopurinol     Plaque Psoriasis  --on Cosentyx as outpatient, resume upon d/c             DVT Prophylaxis:  VILMA      Disposition: I expect the patient to be discharged home once K+ improved    CODE STATUS:   Code Status and Medical Interventions:   Ordered at: 11/02/20 1134     Level Of Support Discussed With:    Patient     Code Status:    CPR     Medical Interventions (Level of Support Prior to Arrest):    Full       Jemma Queen MD  11/03/20

## 2020-11-04 LAB
ANION GAP SERPL CALCULATED.3IONS-SCNC: 11 MMOL/L (ref 5–15)
ANION GAP SERPL CALCULATED.3IONS-SCNC: 9 MMOL/L (ref 5–15)
BUN SERPL-MCNC: 10 MG/DL (ref 6–20)
BUN SERPL-MCNC: 9 MG/DL (ref 6–20)
BUN/CREAT SERPL: 10.8 (ref 7–25)
BUN/CREAT SERPL: 10.8 (ref 7–25)
CALCIUM SPEC-SCNC: 8.5 MG/DL (ref 8.6–10.5)
CALCIUM SPEC-SCNC: 9.7 MG/DL (ref 8.6–10.5)
CHLORIDE SERPL-SCNC: 92 MMOL/L (ref 98–107)
CHLORIDE SERPL-SCNC: 93 MMOL/L (ref 98–107)
CO2 SERPL-SCNC: 30 MMOL/L (ref 22–29)
CO2 SERPL-SCNC: 36 MMOL/L (ref 22–29)
CREAT SERPL-MCNC: 0.83 MG/DL (ref 0.76–1.27)
CREAT SERPL-MCNC: 0.93 MG/DL (ref 0.76–1.27)
DEPRECATED RDW RBC AUTO: 43.6 FL (ref 37–54)
ERYTHROCYTE [DISTWIDTH] IN BLOOD BY AUTOMATED COUNT: 14.5 % (ref 12.3–15.4)
GFR SERPL CREATININE-BSD FRML MDRD: 118 ML/MIN/1.73
GFR SERPL CREATININE-BSD FRML MDRD: 135 ML/MIN/1.73
GLUCOSE SERPL-MCNC: 89 MG/DL (ref 65–99)
GLUCOSE SERPL-MCNC: 89 MG/DL (ref 65–99)
HCT VFR BLD AUTO: 44.5 % (ref 37.5–51)
HGB BLD-MCNC: 14.5 G/DL (ref 13–17.7)
MCH RBC QN AUTO: 26.9 PG (ref 26.6–33)
MCHC RBC AUTO-ENTMCNC: 32.6 G/DL (ref 31.5–35.7)
MCV RBC AUTO: 82.6 FL (ref 79–97)
PLATELET # BLD AUTO: 229 10*3/MM3 (ref 140–450)
PMV BLD AUTO: 11.3 FL (ref 6–12)
POTASSIUM SERPL-SCNC: 3.1 MMOL/L (ref 3.5–5.2)
POTASSIUM SERPL-SCNC: 3.3 MMOL/L (ref 3.5–5.2)
POTASSIUM SERPL-SCNC: 5.6 MMOL/L (ref 3.5–5.2)
RBC # BLD AUTO: 5.39 10*6/MM3 (ref 4.14–5.8)
SODIUM SERPL-SCNC: 133 MMOL/L (ref 136–145)
SODIUM SERPL-SCNC: 138 MMOL/L (ref 136–145)
WBC # BLD AUTO: 4.78 10*3/MM3 (ref 3.4–10.8)

## 2020-11-04 PROCEDURE — 97161 PT EVAL LOW COMPLEX 20 MIN: CPT

## 2020-11-04 PROCEDURE — 25010000002 HEPARIN (PORCINE) PER 1000 UNITS: Performed by: INTERNAL MEDICINE

## 2020-11-04 PROCEDURE — 85027 COMPLETE CBC AUTOMATED: CPT | Performed by: INTERNAL MEDICINE

## 2020-11-04 PROCEDURE — 99233 SBSQ HOSP IP/OBS HIGH 50: CPT | Performed by: INTERNAL MEDICINE

## 2020-11-04 PROCEDURE — 80048 BASIC METABOLIC PNL TOTAL CA: CPT | Performed by: INTERNAL MEDICINE

## 2020-11-04 PROCEDURE — 94660 CPAP INITIATION&MGMT: CPT

## 2020-11-04 PROCEDURE — 94799 UNLISTED PULMONARY SVC/PX: CPT

## 2020-11-04 PROCEDURE — 84132 ASSAY OF SERUM POTASSIUM: CPT | Performed by: INTERNAL MEDICINE

## 2020-11-04 RX ADMIN — SACUBITRIL AND VALSARTAN 1 TABLET: 24; 26 TABLET, FILM COATED ORAL at 20:31

## 2020-11-04 RX ADMIN — TORSEMIDE 20 MG: 20 TABLET ORAL at 08:55

## 2020-11-04 RX ADMIN — URSODIOL 300 MG: 300 CAPSULE ORAL at 08:54

## 2020-11-04 RX ADMIN — SODIUM CHLORIDE, PRESERVATIVE FREE 10 ML: 5 INJECTION INTRAVENOUS at 20:31

## 2020-11-04 RX ADMIN — HEPARIN SODIUM 5000 UNITS: 5000 INJECTION INTRAVENOUS; SUBCUTANEOUS at 13:41

## 2020-11-04 RX ADMIN — POTASSIUM CHLORIDE 40 MEQ: 10 CAPSULE, COATED, EXTENDED RELEASE ORAL at 13:41

## 2020-11-04 RX ADMIN — ALLOPURINOL 100 MG: 100 TABLET ORAL at 08:54

## 2020-11-04 RX ADMIN — URSODIOL 300 MG: 300 CAPSULE ORAL at 20:31

## 2020-11-04 RX ADMIN — SPIRONOLACTONE 25 MG: 25 TABLET ORAL at 08:51

## 2020-11-04 RX ADMIN — HEPARIN SODIUM 5000 UNITS: 5000 INJECTION INTRAVENOUS; SUBCUTANEOUS at 06:04

## 2020-11-04 RX ADMIN — POTASSIUM CHLORIDE 40 MEQ: 10 CAPSULE, COATED, EXTENDED RELEASE ORAL at 17:15

## 2020-11-04 RX ADMIN — POTASSIUM CHLORIDE 40 MEQ: 10 CAPSULE, COATED, EXTENDED RELEASE ORAL at 10:04

## 2020-11-04 RX ADMIN — HEPARIN SODIUM 5000 UNITS: 5000 INJECTION INTRAVENOUS; SUBCUTANEOUS at 21:08

## 2020-11-04 RX ADMIN — SACUBITRIL AND VALSARTAN 1 TABLET: 24; 26 TABLET, FILM COATED ORAL at 08:54

## 2020-11-04 RX ADMIN — POTASSIUM CHLORIDE 40 MEQ: 10 CAPSULE, COATED, EXTENDED RELEASE ORAL at 22:46

## 2020-11-04 RX ADMIN — HYDROCODONE BITARTRATE AND ACETAMINOPHEN 1 TABLET: 5; 325 TABLET ORAL at 17:15

## 2020-11-04 NOTE — NURSING NOTE
[facility]    Patient Name:  Evan Gannon  :  1990  DOS:  20    Active Hospital Problems    Diagnosis   • SAPPHIRE (acute kidney injury) (CMS/HCC)   • Status post laparoscopic sleeve gastrectomy   • Hypokalemia   • CHF (congestive heart failure) (CMS/HCC)   • COLLIN on CPAP     compliant     • Gout     on allopurinol, controls symptoms     • Plaque psoriasis   • Nonischemic congestive cardiomyopathy (CMS/HCC)   • Hypertension   • Morbid obesity (CMS/[facility])       Consult has been received.  Medical records have been reviewed.  [facility].   Patient to be scheduled follow up 3-5 days post discharge.    Echo Results:    TTE 20  · Estimated left ventricular EF = 20%  · The right ventricular cavity is dilated.  · No obvious valvular pathology seen on this difficult study      Heart Failure Quality Measures    ACE I, ARB, ARNI (if EF <40%)     Yes  enstresto    Evidence-based Beta Blocker (EF<40%)    (Bisoprolol, Carvedilol, Metoprolol Succinate)  No  Hypotensive.   Had been on coreg.     Aldosterone Antagonist (EF <40%)  Yes  aldactone

## 2020-11-04 NOTE — PROGRESS NOTES
Continued Stay Note  [facility]     Patient Name: Evan Gannon  MRN: [id number]  Today's Date: 11/4/2020    Admit Date: 11/2/2020    Discharge Plan    Mr. Gannon will have a rolling walker delivered to his room from [place].       Discharge Codes    No documentation.             SAWYER Edmondson

## 2020-11-04 NOTE — THERAPY EVALUATION
"Patient Name: Evan Gannon  : 1990    MRN: [id number]                              Today's Date: 2020       Admit Date: 2020    Visit Dx:     ICD-10-CM ICD-9-CM   1. Hypokalemia  E87.6 276.8   2. Muscle spasm  M62.838 728.85   3. Impaired mobility  Z74.09 799.89   4. Morbid obesity (CMS/[facility])  E66.01 278.01     Patient Active Problem List   Diagnosis   • Hypertension   • Morbid obesity (CMS/[facility])   • Severe obstructive sleep apnea   • Nonischemic congestive cardiomyopathy (CMS/[facility])   • Personal history of noncompliance with medical treatment   • Chronic systolic congestive heart failure, NYHA class 4 (CMS/[facility])   • Plaque psoriasis   • Painless hematuria   • CHF (congestive heart failure) (CMS/[facility])   • Myocardial infarction (CMS/[facility])   • COLLIN on CPAP   • On home O2   • Sleep apnea   • Psoriasis   • Wears glasses   • Gout   • Morbid obesity with BMI of 60.0-69.9, adult (CMS/[facility])   • Paresthesia   • Morbid obesity with body mass index (BMI) of 50.0 to 59.9 in adult (CMS/[facility])   • Hypokalemia   • ARF (acute renal failure) (CMS/[facility])   • Chronic gastritis   • Status post laparoscopic sleeve gastrectomy   • SAPPHIRE (acute kidney injury) (CMS/[facility])     Past Medical History:   Diagnosis Date   • CHF (congestive heart failure) (CMS/[facility])     diagnosed at 21yo, thought to be related to undiagnosed congenital defect- per patient. hospitalized every 3 weeks during the winter due to PNA.  Has significantf fluid retention despite diurectics   • CPAP (continuous positive airway pressure) dependence     settings at 15   • Gout     on allopurinol, controls symptoms   • Hypertension     patient denies   • Hypokalemia    • ICD (implantable cardioverter-defibrillator) in place    • Low HDL (under 40)    • Morbid obesity (CMS/[facility])    • Morbid obesity with BMI of 60.0-69.9, adult (CMS/[facility])    • Myocardial infarction (CMS/[facility])     \"almost\" per patient, had heart cath, no stents, has defibrillator   • On home O2     " supposed to be 24/7, ran out of O2 and has only been using prn. manged  by cardiology   • COLLIN on CPAP     compliant   • Paresthesia     hands/ arms/ feet, suspected due to poor circulation reportedly   • Plaque psoriasis    • Pneumonia    • Psoriasis    • Sleep apnea    • Wears glasses      Past Surgical History:   Procedure Laterality Date   • CARDIAC CATHETERIZATION N/A 7/13/2017    Procedure: Left Heart Cath;  Surgeon: Balbir Olsen MD;  Location:  [facility];  Service:    • CARDIAC DEFIBRILLATOR PLACEMENT  08/2017   • CARDIAC DEFIBRILLATOR PLACEMENT     • CARDIAC ELECTROPHYSIOLOGY PROCEDURE N/A 8/15/2017    Procedure: VVI ICD Implant;  Surgeon: Nathanael Richmond DO;  Location:  [facility] EP INVASIVE LOCATION;  Service:    • ENDOSCOPY N/A 8/14/2020    Procedure: ESOPHAGOGASTRODUODENOSCOPY;  Surgeon: Arnoldo Whittington MD;  Location:  [facility] OR;  Service: Bariatric;  Laterality: N/A;   • GASTRIC SLEEVE LAPAROSCOPIC N/A 8/14/2020    Procedure: GASTRIC SLEEVE LAPAROSCOPIC;  Surgeon: Arnoldo Whittington MD;  Location:  [facility] OR;  Service: Bariatric;  Laterality: N/A;   • OTHER SURGICAL HISTORY      ultra light therapy   • SINUS SURGERY      cartilage from ear inserted in nasal cavity   • TONSILLECTOMY AND ADENOIDECTOMY  2000     General Information     Row Name 11/04/20 1501          Physical Therapy Time and Intention    Document Type  evaluation  -KG     Mode of Treatment  physical therapy  -KG     Row Name 11/04/20 1501          General Information    Patient Profile Reviewed  yes  -KG     Prior Level of Function  independent:;all household mobility;gait;transfer;ADL's;dressing;bathing  -KG     Existing Precautions/Restrictions  fall;oxygen therapy device and L/min  -KG     Barriers to Rehab  medically complex  -KG     Row Name 11/04/20 1501          Living Environment    Lives With  parent(s)  -KG     Row Name 11/04/20 1501          Home Main Entrance    Number of Stairs, Main Entrance  two  -KG      Stair Railings, Main Entrance  none  -KG     Row Name 11/04/20 1501          Stairs Within Home, Primary    Number of Stairs, Within Home, Primary  other (see comments) 12  -KG     Stair Railings, Within Home, Primary  railing on right side (ascending)  -KG     Row Name 11/04/20 1501          Cognition    Orientation Status (Cognition)  oriented x 4  -KG     Row Name 11/04/20 1501          Safety Issues, Functional Mobility    Safety Issues Affecting Function (Mobility)  insight into deficits/self-awareness;safety precaution awareness;safety precautions follow-through/compliance  -KG     Impairments Affecting Function (Mobility)  balance;endurance/activity tolerance;pain;shortness of breath;strength  -KG       User Key  (r) = Recorded By, (t) = Taken By, (c) = Cosigned By    Initials Name Provider Type    KG Tamy Cooper, PT Physical Therapist        Mobility     Row Name 11/04/20 1502          Bed Mobility    Bed Mobility  supine-sit;sit-supine  -KG     Supine-Sit [place] (Bed Mobility)  supervision;verbal cues  -KG     Sit-Supine [place] (Bed Mobility)  minimum assist (75% patient effort);verbal cues  -KG     Assistive Device (Bed Mobility)  bed rails;head of bed elevated  -KG     Comment (Bed Mobility)  VC's for sequencing. Pt required increased time to complete. Required [place] with R LE when returning to supine position.  -KG     Row Name 11/04/20 1502          Transfers    Comment (Transfers)  VC's for sequencing and safe hand placement.  -KG     Row Name 11/04/20 1502          Sit-Stand Transfer    Sit-Stand [place] (Transfers)  contact guard;verbal cues  -KG     Assistive Device (Sit-Stand Transfers)  walker, front-wheeled  -KG     Row Name 11/04/20 1502          Gait/Stairs (Locomotion)    [place] Level (Gait)  contact guard;verbal cues  -KG     Assistive Device (Gait)  walker, front-wheeled  -KG     Distance in Feet (Gait)  400  -KG     Deviations/Abnormal Patterns (Gait)   base of support, wide;tamara decreased;stride length decreased  -KG     Bilateral Gait Deviations  forward flexed posture;heel strike decreased  -KG     Right Sided Gait Deviations  weight shift ability decreased  -KG     Comment (Gait/Stairs)  Pt demonstrated step through gait pattern with slow tamara and decreased step length. Pt with decreased weight shifting onto R side; decreased stance time on R. Pt limited by pain in R LE, but expressed decreased pain with continued ambulation.  -KG       User Key  (r) = Recorded By, (t) = Taken By, (c) = Cosigned By    Initials Name Provider Type    Tamy Wood PT Physical Therapist        Obj/Interventions     Row Name 11/04/20 1505          Range of Motion Comprehensive    Comment, General Range of Motion  BLE WFL  -KG     Row Name 11/04/20 1505          Strength Comprehensive (MMT)    Comment, General Manual Muscle Testing (MMT) Assessment  LLE grossly 5/5; RLE grossly 4/5 limited by pain  -KG     Row Name 11/04/20 1505          Balance    Balance Assessment  sitting static balance;standing static balance;standing dynamic balance  -KG     Static Sitting Balance  WFL;supported;sitting, edge of bed  -KG     Static Standing Balance  WFL;supported;standing  -KG     Dynamic Standing Balance  mild impairment;supported;standing  -KG     Row Name 11/04/20 1505          Sensory Assessment (Somatosensory)    Sensory Assessment (Somatosensory)  LE sensation intact  -KG       User Key  (r) = Recorded By, (t) = Taken By, (c) = Cosigned By    Initials Name Provider Type    Tamy Wood, PT Physical Therapist        Goals/Plan     Row Name 11/04/20 1507          Bed Mobility Goal 1 (PT)    Activity/Assistive Device (Bed Mobility Goal 1, PT)  bed mobility activities, all  -KG     [place] Level/Cues Needed (Bed Mobility Goal 1, PT)  independent  -KG     Time Frame (Bed Mobility Goal 1, PT)  2 weeks  -KG     Progress/Outcomes (Bed Mobility Goal 1, PT)  goal  ongoing  -KG     Row Name 11/04/20 1507          Transfer Goal 1 (PT)    Activity/Assistive Device (Transfer Goal 1, PT)  sit-to-stand/stand-to-sit;bed-to-chair/chair-to-bed;walker, rolling  -KG     [place] Level/Cues Needed (Transfer Goal 1, PT)  modified independence  -KG     Time Frame (Transfer Goal 1, PT)  2 weeks  -KG     Progress/Outcome (Transfer Goal 1, PT)  goal ongoing  -KG     Row Name 11/04/20 1507          Gait Training Goal 1 (PT)    Activity/Assistive Device (Gait Training Goal 1, PT)  gait (walking locomotion);assistive device use;walker, rolling  -KG     [place] Level (Gait Training Goal 1, PT)  modified independence  -KG     Distance (Gait Training Goal 1, PT)  400 feet  -KG     Time Frame (Gait Training Goal 1, PT)  2 weeks  -KG     Progress/Outcome (Gait Training Goal 1, PT)  goal ongoing  -KG     Row Name 11/04/20 1507          Stairs Goal 1 (PT)    Activity/Assistive Device (Stairs Goal 1, PT)  ascending stairs;descending stairs;using handrail, right;step-to-step  -KG     [place] Level/Cues Needed (Stairs Goal 1, PT)  supervision required  -KG     Number of Stairs (Stairs Goal 1, PT)  12  -KG     Time Frame (Stairs Goal 1, PT)  2 weeks  -KG     Progress/Outcome (Stairs Goal 1, PT)  goal ongoing  -KG       User Key  (r) = Recorded By, (t) = Taken By, (c) = Cosigned By    Initials Name Provider Type    KG Tamy Cooper, PT Physical Therapist        Clinical Impression     Row Name 11/04/20 1505          Pain    Additional Documentation  Pain Scale: Numbers Pre/Post-Treatment (Group)  -KG     Row Name 11/04/20 1505          Pain Scale: Numbers Pre/Post-Treatment    Pretreatment Pain Rating  6/10  -KG     Posttreatment Pain Rating  4/10  -KG     Pain Location - Side  Right  -KG     Pain Location - Orientation  lower  -KG     Pain Location  extremity  -KG     Pain Intervention(s)  Repositioned;Ambulation/increased activity  -KG     Row Name 11/04/20 1505          Plan of Care  Review    Plan of Care Reviewed With  patient  -KG     Outcome Summary  PT initial evaluation completed for pt presenting with generalized weakness, R LE pain, and decreased functional mobility. Pt ambulated 400ft with RW and CGA. Pt's decreased independence warrants PT skilled care. Recommend D/C home with assistance and OP PT services.  -KG     Row Name 11/04/20 1503          Therapy Assessment/Plan (PT)    Patient/Family Therapy Goals Statement (PT)  return to PLOF  -KG     Rehab Potential (PT)  good, to achieve stated therapy goals  -KG     Criteria for Skilled Interventions Met (PT)  yes;skilled treatment is necessary  -KG     Row Name 11/04/20 1505          Vital Signs    Pre Systolic BP Rehab  104  -KG     Pre Treatment Diastolic BP  61  -KG     Pretreatment Heart Rate (beats/min)  89  -KG     Posttreatment Heart Rate (beats/min)  99  -KG     Pre SpO2 (%)  94  -KG     O2 Delivery Pre Treatment  supplemental O2  -KG     Post SpO2 (%)  96  -KG     O2 Delivery Post Treatment  supplemental O2  -KG     Pre Patient Position  Supine  -KG     Intra Patient Position  Standing  -KG     Post Patient Position  Supine  -KG     Row Name 11/04/20 150          Positioning and Restraints    Pre-Treatment Position  in bed  -KG     Post Treatment Position  bed  -KG     In Bed  notified nsg;supine;call light within reach;encouraged to call for assist;exit alarm on;side rails up x2  -KG       User Key  (r) = Recorded By, (t) = Taken By, (c) = Cosigned By    Initials Name Provider Type    KG Tamy Cooper, PT Physical Therapist        Outcome Measures     Row Name 11/04/20 1500          How much help from another person do you currently need...    Turning from your back to your side while in flat bed without using bedrails?  3  -KG     Moving from lying on back to sitting on the side of a flat bed without bedrails?  3  -KG     Moving to and from a bed to a chair (including a wheelchair)?  3  -KG     Standing up from a  chair using your arms (e.g., wheelchair, bedside chair)?  3  -KG     Climbing 3-5 steps with a railing?  2  -KG     To walk in hospital room?  3  -KG     AM-PAC 6 Clicks Score (PT)  17  -KG     Row Name 11/04/20 1508          Functional Assessment    Outcome Measure Options  AM-PAC 6 Clicks Basic Mobility (PT)  -KG       User Key  (r) = Recorded By, (t) = Taken By, (c) = Cosigned By    Initials Name Provider Type    KG Tamy Cooper, PT Physical Therapist        Physical Therapy Education                 Title: PT OT SLP Therapies (In Progress)     Topic: Physical Therapy (In Progress)     Point: Mobility training (In Progress)     Learning Progress Summary           Patient Acceptance, E, NR by KG at 11/4/2020 0930                   Point: Home exercise program (Not Started)     Learner Progress:  Not documented in this visit.          Point: Body mechanics (In Progress)     Learning Progress Summary           Patient Acceptance, E, NR by KG at 11/4/2020 0930                   Point: Precautions (In Progress)     Learning Progress Summary           Patient Acceptance, E, NR by KG at 11/4/2020 0930                               User Key     Initials Effective Dates Name Provider Type Discipline    KHADRA 05/22/20 -  Tamy Cooper, PT Physical Therapist PT              PT Recommendation and Plan  Planned Therapy Interventions (PT): balance training, bed mobility training, gait training, stair training, strengthening, transfer training  Plan of Care Reviewed With: patient  Outcome Summary: PT initial evaluation completed for pt presenting with generalized weakness, R LE pain, and decreased functional mobility. Pt ambulated 400ft with RW and CGA. Pt's decreased independence warrants PT skilled care. Recommend D/C home with assistance and OP PT services.     Time Calculation:   PT Charges     Row Name 11/04/20 0930             Time Calculation    Start Time  0930  -KG      PT Received On  11/04/20  -KG       PT Goal Re-Cert Due Date  11/14/20  -KHADRA        User Key  (r) = Recorded By, (t) = Taken By, (c) = Cosigned By    Initials Name Provider Type    Tamy Wood, PT Physical Therapist        Therapy Charges for Today     Code Description Service Date Service Provider Modifiers Qty    39390771033 HC PT EVAL LOW COMPLEXITY 4 11/4/2020 Tamy Cooper, PT GP 1    88485930762 HC PT THER SUPP EA 15 MIN 11/4/2020 Tamy Cooper, PT GP 2          PT G-Codes  Outcome Measure Options: AM-PAC 6 Clicks Basic Mobility (PT)  AM-PAC 6 Clicks Score (PT): 17    Lee Ann Cooper PT  11/4/2020

## 2020-11-04 NOTE — PLAN OF CARE
Goal Outcome Evaluation:  Plan of Care Reviewed With: patient  Progress: no change  Outcome Summary: Pt resting throughout the shift. 6L O2 NC (baseline). PT assist x 1 with a walker. Will continue to monitor.

## 2020-11-04 NOTE — PROGRESS NOTES
[facility] Medicine Services  PROGRESS NOTE    Patient Name: Evan Gannon  : 1990  MRN: [id number]    Date of Admission: 2020  Primary Care Physician: Gilbert Da Silva PA    Subjective   Subjective     CC:  Leg cramps    HPI:  Doing well this am, denies any issues overnight, still having some cramps and limited ability to walk without walker. Hesitant to go home today.     Review of Systems  Gen- No fevers, chills  CV- No chest pain, palpitations  Resp- No cough, dyspnea  GI- No N/V/D, abd pain      Objective   Objective     Vital Signs:   Temp:  [98.2 °F (36.8 °C)-98.9 °F (37.2 °C)] 98.6 °F (37 °C)  Heart Rate:  [60-89] 87  Resp:  [18-22] 20  BP: ()/(40-74) 104/61        Physical Exam:  Constitutional: No acute distress, awake, alert, obese, sitting on commode  HENT: NCAT, mucous membranes moist  Respiratory: Clear to auscultation bilaterally, respiratory effort normal   Cardiovascular: RRR, no murmurs, rubs, or gallops, palpable pedal pulses bilaterally  Gastrointestinal: Positive bowel sounds, soft, nontender, nondistended  Musculoskeletal: No bilateral ankle edema  Psychiatric: Appropriate affect, cooperative  Neurologic: Oriented x 3, strength symmetric in all extremities, Cranial Nerves grossly intact to confrontation, speech clear  Skin: No rashes    Results Reviewed:  Results from last 7 days   Lab Units 20  0828 20  0242 20  0641   WBC 10*3/mm3 4.78 5.42 7.56   HEMOGLOBIN g/dL 14.5 13.8 15.1   HEMATOCRIT % 44.5 42.5 45.1   PLATELETS 10*3/mm3 229 228 259     Results from last 7 days   Lab Units 20  0828 20  0111 20  1820  20  0641   SODIUM mmol/L 138 133* 139   < > 135*   POTASSIUM mmol/L 3.1* 5.6* 2.9*   < > 2.2*   CHLORIDE mmol/L 93* 92* 94*   < > 87*   CO2 mmol/L 36.0* 30.0* 37.0*   < > 31.0*   BUN mg/dL 9 10 9   < > 17   CREATININE mg/dL 0.83 0.93 0.96   < > 1.32*   GLUCOSE mg/dL 89 89 105*   < > 105*   CALCIUM  mg/dL 9.7 8.5* 9.0   < > 9.7   ALT (SGPT) U/L  --   --   --   --  13   AST (SGOT) U/L  --   --   --   --  20   PROBNP pg/mL  --   --   --   --  2,950.0*    < > = values in this interval not displayed.     Estimated Creatinine Clearance: 198.5 mL/min (by C-G formula based on SCr of 0.83 mg/dL).    Microbiology Results Abnormal     Procedure Component Value - Date/Time    COVID PRE-OP / PRE-PROCEDURE SCREENING ORDER (NO ISOLATION) - Swab, Nasopharynx [[id number]]  (Normal) Collected: 11/02/20 1459    Lab Status: Final result Specimen: Swab from Nasopharynx Updated: 11/02/20 1558    Narrative:      The following orders were created for panel order COVID PRE-OP / PRE-PROCEDURE SCREENING ORDER (NO ISOLATION) - Swab, Nasopharynx.  Procedure                               Abnormality         Status                     ---------                               -----------         ------                     Respiratory Panel PCR w/...[[id number]]  Normal              Final result                 Please view results for these tests on the individual orders.    Respiratory Panel PCR w/COVID-19(SARS-CoV-2) [facility] In-House, NP Swab in [facility], 3-4 HR TAT - Swab, Nasopharynx [[id number]]  (Normal) Collected: 11/02/20 1459    Lab Status: Final result Specimen: Swab from Nasopharynx Updated: 11/02/20 4048     ADENOVIRUS, PCR Not Detected     Coronavirus 229E Not Detected     Coronavirus HKU1 Not Detected     Coronavirus NL63 Not Detected     Coronavirus OC43 Not Detected     COVID19 Not Detected     Human Metapneumovirus Not Detected     Human Rhinovirus/Enterovirus Not Detected     Influenza A PCR Not Detected     Influenza A H1 Not Detected     Influenza A H1 2009 PCR Not Detected     Influenza A H3 Not Detected     Influenza B PCR Not Detected     Parainfluenza Virus 1 Not Detected     Parainfluenza Virus 2 Not Detected     Parainfluenza Virus 3 Not Detected     Parainfluenza Virus 4 Not Detected     RSV, PCR Not  Detected     Bordetella pertussis pcr Not Detected     Bordetella parapertussis PCR Not Detected     Chlamydophila pneumoniae PCR Not Detected     Mycoplasma pneumo by PCR Not Detected    Narrative:      Fact sheet for providers: https://docs.[employer]/wp-content/uploads/UUS7297-4633-IA4.1-EUA-Provider-Fact-Sheet-3.pdf    Fact sheet for patients: https://docs.[employer]/wp-content/uploads/BOC7239-5306-UK3.1-EUA-Patient-Fact-Sheet-1.pdf          Imaging Results (Last 24 Hours)     ** No results found for the last 24 hours. **          Results for orders placed during the hospital encounter of 11/02/20   Transthoracic Echo Complete With Contrast if Necessary Per Protocol    Narrative · Estimated left ventricular EF = 20%  · The right ventricular cavity is dilated.  · No obvious valvular pathology seen on this difficult study          I have reviewed the medications:  Scheduled Meds:allopurinol, 100 mg, Oral, Daily  heparin (porcine), 5,000 Units, Subcutaneous, Q8H  sacubitril-valsartan, 1 tablet, Oral, BID  sodium chloride, 10 mL, Intravenous, Q12H  spironolactone, 25 mg, Oral, Daily  torsemide, 20 mg, Oral, Daily  ursodiol, 300 mg, Oral, BID      Continuous Infusions:   PRN Meds:.•  acetaminophen **OR** acetaminophen **OR** acetaminophen  •  calcium carbonate EX  •  HYDROcodone-acetaminophen  •  magnesium sulfate **OR** magnesium sulfate **OR** magnesium sulfate  •  ondansetron  •  potassium chloride **OR** potassium chloride **OR** potassium chloride  •  [COMPLETED] Insert peripheral IV **AND** sodium chloride  •  sodium chloride    Assessment/Plan   Assessment & Plan     Active Hospital Problems    Diagnosis  POA   • SAPPHIRE (acute kidney injury) (CMS/[facility]) [N17.9]  Unknown     Priority: High   • Hypokalemia [E87.6]  Yes     Priority: High   • Nonischemic congestive cardiomyopathy (CMS/[facility]) [I42.0]  Yes     Priority: High   • CHF (congestive heart failure) (CMS/[facility]) [I50.9]  Yes     Priority: Medium   • Morbid  obesity (CMS/[facility]) [E66.01]  Yes     Priority: Medium   • Status post laparoscopic sleeve gastrectomy [Z98.84]  Not Applicable     Priority: Low   • COLLIN on CPAP [G47.33, Z99.89]  Not Applicable     Priority: Low   • Gout [M10.9]  Yes     Priority: Low   • Plaque psoriasis [L40.0]  Yes     Priority: Low   • Hypertension [I10]  Yes     Priority: Low      Resolved Hospital Problems   No resolved problems to display.        Brief Hospital Course to date:  Evan Gannon is a 29 y.o. male with PMH of NICM/ systolic HF with EF of 15% (last TTE 2017) s/p ICD placement, HTN, morbid obesity s/p recent gastric sleeve in August, gout, COLLIN and plaque psoriasis on Cosentyx who presented with leg cramps and found to have significant hypokalemia and SAPPHIRE.     Plan:     Hypokalemia  --likely due to use of diuretics for below. Pt is on daily K+ but only 10mEq.  Likely needs higher dose.  --continue replacement protocol, monitor BMP Q 6 hours for now  --hyperkalemic on check around 1 am today, repeat shows K+ 3.1.  Will continue with replacement per protocol. Likely d/c home tomorrow on PO K+ at 20 mEq      SAPPHIRE  --Cr baseline appears to be 0.9-1.0, was 1.32 on admission  --likely due to volume overload as proBNP is 2,950 and appeared mildly volume overloaded on exam  --pt given IVFs in the ED, d/c'd IVFs  --resumed home doses of Torsemide and Entresto. Pt supposed to be on Spironolactone, but he has been refusing and is noncompliant with this at home.   --resolved     NICM/systolic HF s/p ICD placement  --last EF was 15% in 2017  --repeat TTE this admission shows EF 20%  --strict Is/Os, daily weights  --continue diuretics as per home med rec  --monitor electrolytes closely  --follows with Dr. Olsen and heart and valve clinic     Morbid obesity s/p gastric sleeve  --gastric sleeve in August, follow up scheduled for 11/5 per pt  --continue with cardiac diet     COLLIN  --CPAP QHS     HTN  --pt reports he no longer takes Coreg at  home. Continue with Entresto, Spironolactone (? Compliance at home as he refuses here) and Torsemide      Gout  --continue Allopurinol     Plaque Psoriasis  --on Cosentyx as outpatient, resume upon d/c             DVT Prophylaxis:  VILMA      Disposition: I expect the patient to be discharged home likely tomorrow if K+ is stable and pt able to ambulate well with assistance of walker    CODE STATUS:   Code Status and Medical Interventions:   Ordered at: 11/02/20 1134     Level Of Support Discussed With:    Patient     Code Status:    CPR     Medical Interventions (Level of Support Prior to Arrest):    Full       Jemma Queen MD  11/04/20

## 2020-11-04 NOTE — DISCHARGE SUMMARY
[facility] Medicine Services  DISCHARGE SUMMARY    Patient Name: Evan Gannon  : 1990  MRN: [id number]    Date of Admission: 2020  6:15 AM  Date of Discharge:  2020  Primary Care Physician: Gilbert Da Silva PA    Consults     No orders found from 10/4/2020 to 11/3/2020.          Hospital Course     Presenting Problem:   Hypokalemia [E87.6]  Hypokalemia [E87.6]    Active Hospital Problems    Diagnosis  POA   • SAPPHIRE (acute kidney injury) (CMS/HCC) [N17.9]  Unknown     Priority: High   • Hypokalemia [E87.6]  Yes     Priority: High   • Nonischemic congestive cardiomyopathy (CMS/HCC) [I42.0]  Yes     Priority: High   • CHF (congestive heart failure) (CMS/HCC) [I50.9]  Yes     Priority: Medium   • Morbid obesity (CMS/HCC) [E66.01]  Yes     Priority: Medium   • Status post laparoscopic sleeve gastrectomy [Z98.84]  Not Applicable     Priority: Low   • COLLIN on CPAP [G47.33, Z99.89]  Not Applicable     Priority: Low   • Gout [M10.9]  Yes     Priority: Low   • Plaque psoriasis [L40.0]  Yes     Priority: Low   • Hypertension [I10]  Yes     Priority: Low      Resolved Hospital Problems   No resolved problems to display.          Hospital Course:  Evan Gannon is a 29 y.o. male with PMH of NICM/ systolic HF with EF of 15% (last TTE 2017) s/p ICD placement, HTN, morbid obesity s/p recent gastric sleeve in August, gout, COLLIN and plaque psoriasis on Cosentyx who presented with leg cramps and found to have significant hypokalemia and SAPPHIRE.        Hypokalemia  --likely due to use of diuretics for below. Pt is on BID K+ but only 10mEq.  Likely needs higher dose, aggressively replaced while here.   --will prescribe 20 mEq K+ BID upon d/c.  Needs close follow up with his PCP to ensure this is adequate.      SAPPHIRE  --Cr baseline appears to be 0.9-1.0, was 1.32 on admission  --likely due to volume overload as proBNP is 2,950 and appeared mildly volume overloaded on exam  --pt given IVFs  in the ED, d/c'd IVFs  --resumed home doses of Torsemide, Spironolactone and Entresto. Of note, pt has been refusing his Spironolactone while here, so I doubt compliance at home.   --resolved     NICM/systolic HF s/p ICD placement  --last EF was 15% in 2017  --repeat TTE this admission shows EF 20%  --pt to monitor daily weights at home as well.   --continue diuretics as per home med rec  --follows with Dr. Olsen and heart and valve clinic     Morbid obesity s/p gastric sleeve  --gastric sleeve in August, follow up scheduled for 11/5 per pt  --continue with cardiac diet     COLLIN  --CPAP QHS     HTN  --held Coreg due to borderline low BP- pt reports he no longer takes this at home. Have continued upon d/c but will need to readdress with his PCP if he is truly not taking this.  Continue with Entresto, Spironolactone and Torsemide. Again, ? Compliance with Spironolactone.      Gout  --continue Allopurinol     Plaque Psoriasis  --on Cosentyx as outpatient, resume upon d/c          Discharge Follow Up Recommendations for outpatient labs/diagnostics:  1. Follow up with PCP within one week with repeat BMP at that time.  2. Follow up with Bariatric Surgery as previously scheduled (tomorrow, 11/5)    Day of Discharge     HPI:   No issues overnight. Feels well today. Wants some pain medication for home in case he has more muscle cramps.     Review of Systems  Gen- No fevers, chills  CV- No chest pain, palpitations  Resp- No cough, dyspnea  GI- No N/V/D, abd pain      Vital Signs:   Temp:  [97.9 °F (36.6 °C)-98.9 °F (37.2 °C)] 98.9 °F (37.2 °C)  Heart Rate:  [60-89] 63  Resp:  [18-22] 22  BP: ()/(40-74) 95/44     Physical Exam:  Constitutional: No acute distress, awake, alert,  obese   HENT: NCAT, mucous membranes moist  Respiratory: Clear to auscultation bilaterally, respiratory effort normal   Cardiovascular: RRR, no murmurs, rubs, or gallops, palpable pedal pulses bilaterally  Gastrointestinal: Positive bowel sounds,  soft, nontender, nondistended  Musculoskeletal: No bilateral ankle edema  Psychiatric: Appropriate affect, cooperative  Neurologic: Oriented x 3, strength symmetric in all extremities, Cranial Nerves grossly intact to confrontation, speech clear  Skin: No rashes    Pertinent  and/or Most Recent Results     Results from last 7 days   Lab Units 11/04/20  0111 11/03/20  1820 11/03/20  1106 11/03/20  0242 11/02/20  1834 11/02/20  1305 11/02/20  0641   WBC 10*3/mm3  --   --   --  5.42  --   --  7.56   HEMOGLOBIN g/dL  --   --   --  13.8  --   --  15.1   HEMATOCRIT %  --   --   --  42.5  --   --  45.1   PLATELETS 10*3/mm3  --   --   --  228  --   --  259   SODIUM mmol/L 133* 139 137 138 135* 136 135*   POTASSIUM mmol/L 5.6* 2.9* 2.9* 2.7* 2.6* 2.7* 2.2*   CHLORIDE mmol/L 92* 94* 93* 91* 93* 92* 87*   CO2 mmol/L 30.0* 37.0* 36.0* 36.0* 33.0* 34.0* 31.0*   BUN mg/dL 10 9 9 11 13 14 17   CREATININE mg/dL 0.93 0.96 0.87 0.88 0.92 1.03 1.32*   GLUCOSE mg/dL 89 105* 95 116* 106* 105* 105*   CALCIUM mg/dL 8.5* 9.0 9.0 9.0 8.8 8.9 9.7     Results from last 7 days   Lab Units 11/02/20  0641   BILIRUBIN mg/dL 1.3*   ALK PHOS U/L 70   ALT (SGPT) U/L 13   AST (SGOT) U/L 20     Results from last 7 days   Lab Units 11/03/20  0242   CHOLESTEROL mg/dL 147   TRIGLYCERIDES mg/dL 57   HDL CHOL mg/dL 47     Results from last 7 days   Lab Units 11/03/20  0242 11/02/20  0641   TSH uIU/mL 1.250  --    HEMOGLOBIN A1C % 6.00*  --    PROBNP pg/mL  --  2,950.0*       Brief Urine Lab Results     None          Microbiology Results Abnormal     Procedure Component Value - Date/Time    COVID PRE-OP / PRE-PROCEDURE SCREENING ORDER (NO ISOLATION) - Swab, Nasopharynx [[id number]]  (Normal) Collected: 11/02/20 1459    Lab Status: Final result Specimen: Swab from Nasopharynx Updated: 11/02/20 9375    Narrative:      The following orders were created for panel order COVID PRE-OP / PRE-PROCEDURE SCREENING ORDER (NO ISOLATION) - Swab, Nasopharynx.  Procedure                                Abnormality         Status                     ---------                               -----------         ------                     Respiratory Panel PCR w/...[[id number]]  Normal              Final result                 Please view results for these tests on the individual orders.    Respiratory Panel PCR w/COVID-19(SARS-CoV-2) TC/JOE/ALISON/PAD/COR/MAD/[facility]OMARA In-House, NP Swab in UTM/VTM, 3-4 HR TAT - Swab, Nasopharynx [[id number]]  (Normal) Collected: 11/02/20 1459    Lab Status: Final result Specimen: Swab from Nasopharynx Updated: 11/02/20 1558     ADENOVIRUS, PCR Not Detected     Coronavirus 229E Not Detected     Coronavirus HKU1 Not Detected     Coronavirus NL63 Not Detected     Coronavirus OC43 Not Detected     COVID19 Not Detected     Human Metapneumovirus Not Detected     Human Rhinovirus/Enterovirus Not Detected     Influenza A PCR Not Detected     Influenza A H1 Not Detected     Influenza A H1 2009 PCR Not Detected     Influenza A H3 Not Detected     Influenza B PCR Not Detected     Parainfluenza Virus 1 Not Detected     Parainfluenza Virus 2 Not Detected     Parainfluenza Virus 3 Not Detected     Parainfluenza Virus 4 Not Detected     RSV, PCR Not Detected     Bordetella pertussis pcr Not Detected     Bordetella parapertussis PCR Not Detected     Chlamydophila pneumoniae PCR Not Detected     Mycoplasma pneumo by PCR Not Detected    Narrative:      Fact sheet for providers: https://docs.[employer]/wp-content/uploads/OCX2961-0847-XE7.1-EUA-Provider-Fact-Sheet-3.pdf    Fact sheet for patients: https://docs.[employer]/wp-content/uploads/FTO3426-6218-KP2.1-EUA-Patient-Fact-Sheet-1.pdf          Imaging Results (All)     Procedure Component Value Units Date/Time    CT Abdomen Pelvis Without Contrast [[id number]] Collected: 11/02/20 0815     Updated: 11/02/20 0818    Narrative:      INDICATION:   Right hip and right groin pain. No known injury. Symptoms started 3 weeks  ago.    TECHNIQUE:   CT of the abdomen and pelvis without contrast. Coronal and sagittal reconstructions were obtained.  Radiation dose reduction techniques included automated exposure control or exposure modulation based on body size. Radiation audit for number of CT and  nuclear cardiology exams performed in the last year: 0.      COMPARISON:   Abdomen pelvis CT scan from 2/11/2018.    FINDINGS:  Lung bases: There is a pacemaker. There is old granulomatous disease. The visualized heart is enlarged.    Abdomen: Study is limited by the lack of IV contrast media.    There is been previous gastric surgery probably a gastric sleeve.    There our gallstones gallbladder neck without wall thickening or pericholecystic fluid. The noncontrast liver is unremarkable. The spleen, adrenal glands, and kidneys are unremarkable. The pancreas is partially fatty replaced. There is no abdominal  aortic aneurysm. The inferior vena cava is quite prominent and please correlate for clinical evidence of right heart failure.    The appendix is radiographically unremarkable. There is no evidence for bowel obstruction. There is no free intraperitoneal air.    Pelvis: Bladder apex is mildly tethered by urachal remnant. Mild wall thickening noted previously is not appreciably changed. There are small bilateral fat-containing inguinal hernias.      Impression:        1. Prior gastric sleeve surgery.  2. Apparent pacemaker and cardiac enlargement.  3. Overall enlarged appearance the inferior vena cava suggestive of right heart failure. Please correlate further clinically.  4. Gallstones without evidence for wall thickening or pericholecystic fluid.  5. Study is limited by the lack of IV contrast media.  6. No evidence for bowel obstruction. Unremarkable appearance see appendix.        Signer Name: Tarah Mcdowell MD   Signed: 11/2/2020 8:15 AM   Workstation Name: [email or web address] [email or web address]    [facility]    CT Lumbar Spine Without Contrast  [[id number]] Collected: 11/02/20 0755     Updated: 11/02/20 0757    Narrative:      INDICATION:    Low back pain radiating down right leg. No known injury or trauma. Pain started 3 weeks ago.    TECHNIQUE:   CT of the lumbar spine without contrast. Coronal and sagittal reconstructions were obtained.  Radiation dose reduction techniques included automated exposure control or exposure modulation based on body size. Radiation audit for number of CT and nuclear  cardiology exams performed in the last year: 0.      COMPARISON:    CT abdomen pelvis from 2/11/2018.    FINDINGS:  Sagittal alignment is normal. There are multiple small Schmorl's nodes lower thoracic upper lumbar spine levels. There is no acute fracture or bone destruction.    At T12-L1, no significant abnormality.    At L1-2, no significant abnormality.    At L to 3, no significant abnormality.    At L3-4, no significant abnormality.    At L4-5, there is mild bilateral facet degenerative change. There is a small left posterolateral protrusion with mild left inferior foraminal narrowing. There is no canal stenosis.    At L5-S1, there is moderate left side facet arthritis with subchondral cyst formation there is no canal stenosis or foraminal compromise.      Impression:        1. No acute fracture or bone destruction or malalignment.  2. Facet arthritis most prominent on the left-sided L5-S1 where there is subchondral cyst formation.  3. Mild left foraminal narrowing at L4-5.    Signer Name: Tarah Mcdowell MD   Signed: 11/2/2020 7:55 AM   Workstation Name: [email or web address]    [facility]          Results for orders placed in visit on 10/23/19   SCANNED VASCULAR STUDIES       Results for orders placed in visit on 10/23/19   SCANNED VASCULAR STUDIES       Results for orders placed during the hospital encounter of 11/02/20   Transthoracic Echo Complete With Contrast if Necessary Per Protocol    Narrative · Estimated left ventricular EF = 20%  · The  right ventricular cavity is dilated.  · No obvious valvular pathology seen on this difficult study          Plan for Follow-up of Pending Labs/Results:     Discharge Details        Discharge Medications      New Medications      Instructions Start Date   HYDROcodone-acetaminophen 5-325 MG per tablet  Commonly known as: NORCO   1 tablet, Oral, Every 4 Hours PRN         Changes to Medications      Instructions Start Date   potassium chloride 10 MEQ CR tablet  What changed: how much to take   20 mEq, Oral, 2 Times Daily   Start Date: November 6, 2020        Continue These Medications      Instructions Start Date   allopurinol 100 MG tablet  Commonly known as: Zyloprim   100 mg, Oral, Daily      carvedilol 12.5 MG tablet  Commonly known as: COREG   12.5 mg, Oral, 2 Times Daily      COSENTYX (300 MG DOSE) SC   300 mg, Subcutaneous      sacubitril-valsartan 24-26 MG tablet  Commonly known as: Entresto   1 tablet, Oral, 2 Times Daily      spironolactone 25 MG tablet  Commonly known as: ALDACTONE   25 mg, Oral, Daily      torsemide 20 MG tablet  Commonly known as: DEMADEX   20 mg, Oral, Daily      ursodiol 300 MG capsule  Commonly known as: ACTIGALL   300 mg, Oral, 2 Times Daily             No Known Allergies      Discharge Disposition:      Diet:  Hospital:  Diet Order   Procedures   • Diet Bariatric; Sleeve; 90; Stage 6 + More Grain / Vegetables       Activity:      Restrictions or Other Recommendations:         CODE STATUS:    Code Status and Medical Interventions:   Ordered at: 11/02/20 1134     Level Of Support Discussed With:    Patient     Code Status:    CPR     Medical Interventions (Level of Support Prior to Arrest):    Full       Future Appointments   Date Time Provider Department Center   11/5/2020 11:00 AM Luzma Antoine MD [facility] None   11/23/2020 11:30 AM Marina Rolon APRN [facility] None   12/9/2020 12:00 PM Jodi Howard PA-C [facility] None   3/22/2021  9:00 AM Gilbert Da Silva PA  [facility] DION Queen MD  11/04/20      Time Spent on Discharge:  I spent  35 minutes on this discharge activity which included: face-to-face encounter with the patient, reviewing the data in the system, coordination of the care with the nursing staff as well as consultants, documentation, and entering orders.

## 2020-11-04 NOTE — PLAN OF CARE
Goal Outcome Evaluation:  Plan of Care Reviewed With: patient  Progress: no change  Outcome Summary: Patient resting in the chair and bed throughout the day. Remains on 6L NC which is baseline. Some low BP's but laying on left side and would not move for a re-check. Patients potassium has been replaced PO for a value of 3.1 - recheck at 10pm and in the AM. Will continue to monitor.

## 2020-11-04 NOTE — PLAN OF CARE
Problem: Adult Inpatient Plan of Care  Goal: Plan of Care Review  Recent Flowsheet Documentation  Taken 11/4/2020 1505 by Tamy Cooper, PT  Plan of Care Reviewed With: patient  Outcome Summary: PT initial evaluation completed for pt presenting with generalized weakness, R LE pain, and decreased functional mobility. Pt ambulated 400ft with RW and CGA. Pt's decreased independence warrants PT skilled care. Recommend D/C home with assistance and OP PT services.

## 2020-11-05 ENCOUNTER — READMISSION MANAGEMENT (OUTPATIENT)
Dept: CALL CENTER | Facility: HOSPITAL | Age: 30
End: 2020-11-05

## 2020-11-05 VITALS
HEIGHT: 70 IN | WEIGHT: 315 LBS | HEART RATE: 87 BPM | RESPIRATION RATE: 20 BRPM | BODY MASS INDEX: 45.1 KG/M2 | OXYGEN SATURATION: 100 % | SYSTOLIC BLOOD PRESSURE: 88 MMHG | DIASTOLIC BLOOD PRESSURE: 36 MMHG | TEMPERATURE: 98.5 F

## 2020-11-05 PROBLEM — N17.9 AKI (ACUTE KIDNEY INJURY): Status: RESOLVED | Noted: 2020-11-02 | Resolved: 2020-11-05

## 2020-11-05 LAB
ANION GAP SERPL CALCULATED.3IONS-SCNC: 9 MMOL/L (ref 5–15)
BUN SERPL-MCNC: 12 MG/DL (ref 6–20)
BUN/CREAT SERPL: 16 (ref 7–25)
CALCIUM SPEC-SCNC: 9 MG/DL (ref 8.6–10.5)
CHLORIDE SERPL-SCNC: 96 MMOL/L (ref 98–107)
CO2 SERPL-SCNC: 32 MMOL/L (ref 22–29)
CREAT SERPL-MCNC: 0.75 MG/DL (ref 0.76–1.27)
DEPRECATED RDW RBC AUTO: 44.2 FL (ref 37–54)
ERYTHROCYTE [DISTWIDTH] IN BLOOD BY AUTOMATED COUNT: 14.4 % (ref 12.3–15.4)
GFR SERPL CREATININE-BSD FRML MDRD: >150 ML/MIN/1.73
GLUCOSE SERPL-MCNC: 88 MG/DL (ref 65–99)
HCT VFR BLD AUTO: 38.9 % (ref 37.5–51)
HGB BLD-MCNC: 12.5 G/DL (ref 13–17.7)
MCH RBC QN AUTO: 27.2 PG (ref 26.6–33)
MCHC RBC AUTO-ENTMCNC: 32.1 G/DL (ref 31.5–35.7)
MCV RBC AUTO: 84.6 FL (ref 79–97)
PLATELET # BLD AUTO: 221 10*3/MM3 (ref 140–450)
PMV BLD AUTO: 11.7 FL (ref 6–12)
POTASSIUM SERPL-SCNC: 3.4 MMOL/L (ref 3.5–5.2)
POTASSIUM SERPL-SCNC: 3.8 MMOL/L (ref 3.5–5.2)
RBC # BLD AUTO: 4.6 10*6/MM3 (ref 4.14–5.8)
SODIUM SERPL-SCNC: 137 MMOL/L (ref 136–145)
WBC # BLD AUTO: 4.16 10*3/MM3 (ref 3.4–10.8)

## 2020-11-05 PROCEDURE — 84132 ASSAY OF SERUM POTASSIUM: CPT | Performed by: INTERNAL MEDICINE

## 2020-11-05 PROCEDURE — 94799 UNLISTED PULMONARY SVC/PX: CPT

## 2020-11-05 PROCEDURE — 85027 COMPLETE CBC AUTOMATED: CPT | Performed by: INTERNAL MEDICINE

## 2020-11-05 PROCEDURE — 99239 HOSP IP/OBS DSCHRG MGMT >30: CPT | Performed by: INTERNAL MEDICINE

## 2020-11-05 PROCEDURE — 94660 CPAP INITIATION&MGMT: CPT

## 2020-11-05 PROCEDURE — 25010000002 HEPARIN (PORCINE) PER 1000 UNITS: Performed by: INTERNAL MEDICINE

## 2020-11-05 PROCEDURE — 80048 BASIC METABOLIC PNL TOTAL CA: CPT | Performed by: INTERNAL MEDICINE

## 2020-11-05 RX ORDER — POTASSIUM CHLORIDE 750 MG/1
20 TABLET, FILM COATED, EXTENDED RELEASE ORAL 2 TIMES DAILY
Qty: 60 TABLET | Refills: 0 | Status: SHIPPED | OUTPATIENT
Start: 2020-11-06 | End: 2020-12-06

## 2020-11-05 RX ORDER — HYDROCODONE BITARTRATE AND ACETAMINOPHEN 5; 325 MG/1; MG/1
1 TABLET ORAL EVERY 4 HOURS PRN
Qty: 12 TABLET | Refills: 0 | Status: SHIPPED | OUTPATIENT
Start: 2020-11-05 | End: 2020-11-09

## 2020-11-05 RX ADMIN — TORSEMIDE 20 MG: 20 TABLET ORAL at 08:33

## 2020-11-05 RX ADMIN — SPIRONOLACTONE 25 MG: 25 TABLET ORAL at 08:33

## 2020-11-05 RX ADMIN — SACUBITRIL AND VALSARTAN 1 TABLET: 24; 26 TABLET, FILM COATED ORAL at 08:33

## 2020-11-05 RX ADMIN — POTASSIUM CHLORIDE 40 MEQ: 10 CAPSULE, COATED, EXTENDED RELEASE ORAL at 05:27

## 2020-11-05 RX ADMIN — URSODIOL 300 MG: 300 CAPSULE ORAL at 08:33

## 2020-11-05 RX ADMIN — ALLOPURINOL 100 MG: 100 TABLET ORAL at 08:33

## 2020-11-05 RX ADMIN — HEPARIN SODIUM 5000 UNITS: 5000 INJECTION INTRAVENOUS; SUBCUTANEOUS at 05:26

## 2020-11-05 RX ADMIN — HYDROCODONE BITARTRATE AND ACETAMINOPHEN 1 TABLET: 5; 325 TABLET ORAL at 08:33

## 2020-11-05 NOTE — PLAN OF CARE
Problem: Adult Inpatient Plan of Care  Goal: Plan of Care Review  Outcome: Met  Goal: Patient-Specific Goal (Individualized)  Outcome: Met  Goal: Absence of Hospital-Acquired Illness or Injury  Outcome: Met  Intervention: Identify and Manage Fall Risk  Recent Flowsheet Documentation  Taken 11/5/2020 1000 by Anisa Borja RN  Safety Promotion/Fall Prevention:   activity supervised   assistive device/personal items within reach   clutter free environment maintained   fall prevention program maintained   nonskid shoes/slippers when out of bed   safety round/check completed   room organization consistent  Intervention: Prevent Skin Injury  Recent Flowsheet Documentation  Taken 11/5/2020 1000 by Anisa Borja RN  Body Position: position changed independently  Intervention: Prevent and Manage VTE (venous thromboembolism) Risk  Recent Flowsheet Documentation  Taken 11/5/2020 0800 by Anisa Borja RN  VTE Prevention/Management:   bilateral   dorsiflexion/plantar flexion performed  Goal: Optimal Comfort and Wellbeing  Outcome: Met  Intervention: Provide Person-Centered Care  Recent Flowsheet Documentation  Taken 11/5/2020 0800 by Anisa Borja RN  Trust Relationship/Rapport:   care explained   choices provided  Goal: Readiness for Transition of Care  Outcome: Met     Problem: Pain Acute  Goal: Optimal Pain Control  Outcome: Met  Intervention: Develop Pain Management Plan  Recent Flowsheet Documentation  Taken 11/5/2020 0833 by Anisa Borja RN  Pain Management Interventions: see MAR  Intervention: Optimize Psychosocial Wellbeing  Recent Flowsheet Documentation  Taken 11/5/2020 0800 by Anisa Borja RN  Diversional Activities:   television   smartphone     Problem: Electrolyte Imbalance  Goal: Electrolyte Balance  Outcome: Met   Goal Outcome Evaluation:  Plan of Care Reviewed With: patient  Progress: no change  Outcome Summary: Patient resting in the chair and bed throughout the day. Remains on  6L NC which is baseline. Some low BP's but laying on left side and would not move for a re-check. Patients potassium has been replaced PO for a value of 3.1 - recheck at 10pm and in the AM. Will continue to monitor.

## 2020-11-05 NOTE — PROGRESS NOTES
Case Management Discharge Note      Final Note: Social work spoke with the patient and his rolling walker was delivered to his hospital room. He will have Outpatient physical therpay at [facility] in [place]. Patient [facility] will deliver his home oxygen.    Provided Post Acute Provider List?: Yes  Post Acute Provider List: Outpatient Therapy  Provided Post Acute Provider Quality & Resource List?: Yes  Delivered To: Patient  Method of Delivery: In person    Selected Continued Care - Admitted Since 11/2/2020     Destination    No services have been selected for the patient.              Durable Medical Equipment Coordination complete    Service Provider Selected Services Address Phone Fax    [facility] [place]  [facility]urable Medical [facility] [address] [facility] [address] [phone]    [facility] - [facility]  Durable Medical Equipment [address], [facility] [address] [phone]          Dialysis/Infusion    No services have been selected for the patient.              Home Medical Care    No services have been selected for the patient.              Therapy Coordination complete    Service Provider Selected Services Address Phone Fax    [facility] [place]  Outpatient Physical Therapy [address], [facility] [address] [phone]          Community Resources    No services have been selected for the patient.                  Transportation Services  Private: Car    Final Discharge Disposition Code: 01 - home or self-care

## 2020-11-05 NOTE — PLAN OF CARE
Problem: Adult Inpatient Plan of Care  Goal: Absence of Hospital-Acquired Illness or Injury  Intervention: Identify and Manage Fall Risk  Recent Flowsheet Documentation  Taken 11/5/2020 0200 by Edwardo Wolff RN  Safety Promotion/Fall Prevention:   activity supervised   assistive device/personal items within reach   clutter free environment maintained   elopement precautions   fall prevention program maintained   nonskid shoes/slippers when out of bed   safety round/check completed  Taken 11/5/2020 0000 by Edwardo Wolff RN  Safety Promotion/Fall Prevention:   activity supervised   assistive device/personal items within reach   clutter free environment maintained   elopement precautions   fall prevention program maintained   nonskid shoes/slippers when out of bed   safety round/check completed  Taken 11/4/2020 2200 by Edwardo Wolff RN  Safety Promotion/Fall Prevention:   activity supervised   assistive device/personal items within reach   clutter free environment maintained   elopement precautions   fall prevention program maintained   nonskid shoes/slippers when out of bed   safety round/check completed  Taken 11/4/2020 2000 by Edwardo Wolff RN  Safety Promotion/Fall Prevention:   activity supervised   assistive device/personal items within reach   clutter free environment maintained   elopement precautions   fall prevention program maintained   nonskid shoes/slippers when out of bed   safety round/check completed  Intervention: Prevent Skin Injury  Recent Flowsheet Documentation  Taken 11/5/2020 0200 by Edwardo Wolff RN  Body Position: position changed independently  Taken 11/5/2020 0000 by Edwardo Wolff RN  Body Position: position changed independently  Taken 11/4/2020 2200 by Edwardo Wolff RN  Body Position: position changed independently  Taken 11/4/2020 2000 by Edwardo Wolff RN  Body Position: position changed independently  Intervention: Prevent and Manage VTE (venous thromboembolism) Risk  Recent Flowsheet  Documentation  Taken 11/5/2020 0000 by Edwardo Wolff RN  VTE Prevention/Management:   bilateral   dorsiflexion/plantar flexion performed  Taken 11/4/2020 2000 by Edwardo Wolff RN  VTE Prevention/Management:   bilateral   dorsiflexion/plantar flexion performed  Intervention: Prevent Infection  Recent Flowsheet Documentation  Taken 11/4/2020 2000 by Edwardo Wolff RN  Infection Prevention:   hand hygiene promoted   environmental surveillance performed   rest/sleep promoted   single patient room provided     Problem: Adult Inpatient Plan of Care  Goal: Absence of Hospital-Acquired Illness or Injury  Intervention: Identify and Manage Fall Risk  Recent Flowsheet Documentation  Taken 11/5/2020 0200 by Edwardo Wolff RN  Safety Promotion/Fall Prevention:   activity supervised   assistive device/personal items within reach   clutter free environment maintained   elopement precautions   fall prevention program maintained   nonskid shoes/slippers when out of bed   safety round/check completed  Taken 11/5/2020 0000 by Edwardo Wolff RN  Safety Promotion/Fall Prevention:   activity supervised   assistive device/personal items within reach   clutter free environment maintained   elopement precautions   fall prevention program maintained   nonskid shoes/slippers when out of bed   safety round/check completed  Taken 11/4/2020 2200 by Edwardo Wolff RN  Safety Promotion/Fall Prevention:   activity supervised   assistive device/personal items within reach   clutter free environment maintained   elopement precautions   fall prevention program maintained   nonskid shoes/slippers when out of bed   safety round/check completed  Taken 11/4/2020 2000 by Edwardo Wolff RN  Safety Promotion/Fall Prevention:   activity supervised   assistive device/personal items within reach   clutter free environment maintained   elopement precautions   fall prevention program maintained   nonskid shoes/slippers when out of bed   safety round/check  completed  Intervention: Prevent Skin Injury  Recent Flowsheet Documentation  Taken 11/5/2020 0200 by Edwardo Wolff RN  Body Position: position changed independently  Taken 11/5/2020 0000 by Edwardo Wolff RN  Body Position: position changed independently  Taken 11/4/2020 2200 by Edwardo Wolff RN  Body Position: position changed independently  Taken 11/4/2020 2000 by Edwardo Wolff RN  Body Position: position changed independently  Intervention: Prevent and Manage VTE (venous thromboembolism) Risk  Recent Flowsheet Documentation  Taken 11/5/2020 0000 by Edwardo Wolff RN  VTE Prevention/Management:   bilateral   dorsiflexion/plantar flexion performed  Taken 11/4/2020 2000 by Edwardo Wolff RN  VTE Prevention/Management:   bilateral   dorsiflexion/plantar flexion performed  Intervention: Prevent Infection  Recent Flowsheet Documentation  Taken 11/4/2020 2000 by Edwardo Wolff RN  Infection Prevention:   hand hygiene promoted   environmental surveillance performed   rest/sleep promoted   single patient room provided  Goal: Optimal Comfort and Wellbeing  Intervention: Provide Person-Centered Care  Recent Flowsheet Documentation  Taken 11/4/2020 2000 by Edwardo Wolff RN  Trust Relationship/Rapport:   care explained   choices provided   emotional support provided   empathic listening provided   questions answered   questions encouraged   reassurance provided   thoughts/feelings acknowledged   Goal Outcome Evaluation:  Plan of Care Reviewed With: patient  Progress: no change

## 2020-11-05 NOTE — OUTREACH NOTE
Prep Survey      Responses   [facility] facility patient discharged from?  [place]   Is LACE score < 7 ?  No   Eligibility  [facility]   Date of Admission  11/02/20   Date of Discharge  11/05/20   Discharge Disposition  Home or Self Care   Discharge diagnosis  Hypokalemia   Does the patient have one of the following disease processes/diagnoses(primary or secondary)?  Other   Does the patient have Home health ordered?  No   Is there a DME ordered?  Yes   What DME was ordered?  roling walker delivered to hospital room   Prep survey completed?  Yes          Shanda Mast RN

## 2020-11-06 ENCOUNTER — TRANSITIONAL CARE MANAGEMENT TELEPHONE ENCOUNTER (OUTPATIENT)
Dept: CALL CENTER | Facility: HOSPITAL | Age: 30
End: 2020-11-06

## 2020-11-06 NOTE — OUTREACH NOTE
Call Center TCM Note      Responses   [facility] patient discharged from?  [place]   Does the patient have one of the following disease processes/diagnoses(primary or secondary)?  Other   TCM attempt successful?  Yes   Call start time  1307   Call end time  1315   Discharge diagnosis  Hypokalemia, SAPPHIRE, systolic HF, Morbid obesity s/p gastric sleeve   Is patient permission given to speak with other caregiver?  No   Meds reviewed with patient/caregiver?  Yes   Is the patient having any side effects they believe may be caused by any medication additions or changes?  No   Does the patient have all medications ordered at discharge?  Yes   Is the patient taking all medications as directed (includes completed medication regime)?  Yes   Does the patient have a primary care provider?   Yes   Does the patient have an appointment with their PCP within 7 days of discharge?  Yes   Comments regarding PCP  Hospital Follow Up with CARLOS Ogden Wednesday Nov 11, 2020 3:45 PM   Has the patient kept scheduled appointments due by today?  N/A   Comments  Follow Up with SONAM Hargrove Monday Nov 9, 2020 8:45 AM   Has home health visited the patient within 72 hours of discharge?  N/A   What DME was ordered?  roling walker delivered to hospital room   Has all DME been delivered?  Yes   Psychosocial issues?  No   Comments  patient reports that he has scale at home to monitor daily weight. Patient to contact cardiology with increased swelling, weight gain >2# overnight or >5# in a week. Low sodium diet.    Did the patient receive a copy of their discharge instructions?  Yes   Nursing interventions  Reviewed instructions with patient   What is the patient's perception of their health status since discharge?  Improving   Is the patient/caregiver able to teach back signs and symptoms related to disease process for when to call PCP?  Yes   Is the patient/caregiver able to teach back signs and symptoms related to disease  process for when to call 911?  Yes   Is the patient/caregiver able to teach back the hierarchy of who to call/visit for symptoms/problems? PCP, Specialist, Home health nurse, Urgent Care, ED, 911  Yes   If the patient is a current smoker, are they able to teach back resources for cessation?  Not a smoker   TCM call completed?  Yes          Shana Farrell RN    11/6/2020, 13:16 EST

## 2020-11-16 ENCOUNTER — READMISSION MANAGEMENT (OUTPATIENT)
Dept: CALL CENTER | Facility: HOSPITAL | Age: 30
End: 2020-11-16

## 2020-11-16 NOTE — OUTREACH NOTE
Medical Week 2 Survey      Responses   [facility] patient discharged from?  [place]   Does the patient have one of the following disease processes/diagnoses(primary or secondary)?  Other   Week 2 attempt successful?  Yes   Call start time  1000   Discharge diagnosis  Hypokalemia, SAPPHIRE, systolic HF, Morbid obesity s/p gastric sleeve   Call end time  1011   Meds reviewed with patient/caregiver?  Yes   Is the patient taking all medications as directed (includes completed medication regime)?  Yes   Has the patient kept scheduled appointments due by today?  N/A   What is the patient's perception of their health status since discharge?  Improving   Week 2 Call Completed?  Yes   Wrap up additional comments  Not monitoring weighly daily, counseled to do so.  Weight 148 kg. Missed appt due to transportation.  Advised needs to let office know when not going to make appt, reschedule, do not be a no show.  He is asking for pain medication. Advised hospitalist does not give refills, will need to follow with PCP.  On oxygen mainly at night 2 liters. Pt is disabled.           Marah Jaramillo, RN

## 2020-11-17 ENCOUNTER — TELEPHONE (OUTPATIENT)
Dept: FAMILY MEDICINE CLINIC | Facility: CLINIC | Age: 30
End: 2020-11-17

## 2020-11-17 ENCOUNTER — HOSPITAL ENCOUNTER (EMERGENCY)
Facility: HOSPITAL | Age: 30
Discharge: HOME | End: 2020-11-18
Attending: EMERGENCY MEDICINE | Admitting: EMERGENCY MEDICINE

## 2020-11-17 DIAGNOSIS — M79.604 RIGHT LEG PAIN: Primary | ICD-10-CM

## 2020-11-17 LAB
ALBUMIN SERPL-MCNC: 3.6 G/DL (ref 3.5–5.2)
ALBUMIN/GLOB SERPL: 0.8 G/DL
ALP SERPL-CCNC: 69 U/L (ref 39–117)
ALT SERPL W P-5'-P-CCNC: 7 U/L (ref 1–41)
ANION GAP SERPL CALCULATED.3IONS-SCNC: 13 MMOL/L (ref 5–15)
AST SERPL-CCNC: 13 U/L (ref 1–40)
BASOPHILS # BLD AUTO: 0.03 10*3/MM3 (ref 0–0.2)
BASOPHILS NFR BLD AUTO: 0.5 % (ref 0–1.5)
BILIRUB SERPL-MCNC: 0.7 MG/DL (ref 0–1.2)
BUN SERPL-MCNC: 9 MG/DL (ref 6–20)
BUN/CREAT SERPL: 9.6 (ref 7–25)
CALCIUM SPEC-SCNC: 9.4 MG/DL (ref 8.6–10.5)
CHLORIDE SERPL-SCNC: 98 MMOL/L (ref 98–107)
CO2 SERPL-SCNC: 26 MMOL/L (ref 22–29)
CREAT SERPL-MCNC: 0.94 MG/DL (ref 0.76–1.27)
DEPRECATED RDW RBC AUTO: 43.5 FL (ref 37–54)
EOSINOPHIL # BLD AUTO: 0.03 10*3/MM3 (ref 0–0.4)
EOSINOPHIL NFR BLD AUTO: 0.5 % (ref 0.3–6.2)
ERYTHROCYTE [DISTWIDTH] IN BLOOD BY AUTOMATED COUNT: 14.6 % (ref 12.3–15.4)
GFR SERPL CREATININE-BSD FRML MDRD: 117 ML/MIN/1.73
GLOBULIN UR ELPH-MCNC: 4.5 GM/DL
GLUCOSE SERPL-MCNC: 88 MG/DL (ref 65–99)
HCT VFR BLD AUTO: 44.1 % (ref 37.5–51)
HGB BLD-MCNC: 14.2 G/DL (ref 13–17.7)
HOLD SPECIMEN: NORMAL
HOLD SPECIMEN: NORMAL
IMM GRANULOCYTES # BLD AUTO: 0.03 10*3/MM3 (ref 0–0.05)
IMM GRANULOCYTES NFR BLD AUTO: 0.5 % (ref 0–0.5)
LYMPHOCYTES # BLD AUTO: 1.23 10*3/MM3 (ref 0.7–3.1)
LYMPHOCYTES NFR BLD AUTO: 19.3 % (ref 19.6–45.3)
MAGNESIUM SERPL-MCNC: 1.7 MG/DL (ref 1.6–2.6)
MCH RBC QN AUTO: 26.3 PG (ref 26.6–33)
MCHC RBC AUTO-ENTMCNC: 32.2 G/DL (ref 31.5–35.7)
MCV RBC AUTO: 81.7 FL (ref 79–97)
MONOCYTES # BLD AUTO: 0.69 10*3/MM3 (ref 0.1–0.9)
MONOCYTES NFR BLD AUTO: 10.8 % (ref 5–12)
NEUTROPHILS NFR BLD AUTO: 4.36 10*3/MM3 (ref 1.7–7)
NEUTROPHILS NFR BLD AUTO: 68.4 % (ref 42.7–76)
NRBC BLD AUTO-RTO: 0 /100 WBC (ref 0–0.2)
PLATELET # BLD AUTO: 275 10*3/MM3 (ref 140–450)
PMV BLD AUTO: 11.5 FL (ref 6–12)
POTASSIUM SERPL-SCNC: 3.6 MMOL/L (ref 3.5–5.2)
PROT SERPL-MCNC: 8.1 G/DL (ref 6–8.5)
QT INTERVAL: 326 MS
QTC INTERVAL: 454 MS
RBC # BLD AUTO: 5.4 10*6/MM3 (ref 4.14–5.8)
SODIUM SERPL-SCNC: 137 MMOL/L (ref 136–145)
WBC # BLD AUTO: 6.37 10*3/MM3 (ref 3.4–10.8)
WHOLE BLOOD HOLD SPECIMEN: NORMAL
WHOLE BLOOD HOLD SPECIMEN: NORMAL

## 2020-11-17 PROCEDURE — 85025 COMPLETE CBC W/AUTO DIFF WBC: CPT | Performed by: EMERGENCY MEDICINE

## 2020-11-17 PROCEDURE — 99283 EMERGENCY DEPT VISIT LOW MDM: CPT

## 2020-11-17 PROCEDURE — 83735 ASSAY OF MAGNESIUM: CPT | Performed by: EMERGENCY MEDICINE

## 2020-11-17 PROCEDURE — 93005 ELECTROCARDIOGRAM TRACING: CPT | Performed by: EMERGENCY MEDICINE

## 2020-11-17 PROCEDURE — 93005 ELECTROCARDIOGRAM TRACING: CPT

## 2020-11-17 PROCEDURE — 80053 COMPREHEN METABOLIC PANEL: CPT | Performed by: EMERGENCY MEDICINE

## 2020-11-17 RX ORDER — HYDROCODONE BITARTRATE AND ACETAMINOPHEN 5; 325 MG/1; MG/1
1 TABLET ORAL ONCE
Status: COMPLETED | OUTPATIENT
Start: 2020-11-17 | End: 2020-11-17

## 2020-11-17 RX ADMIN — HYDROCODONE BITARTRATE AND ACETAMINOPHEN 1 TABLET: 5; 325 TABLET ORAL at 23:24

## 2020-11-17 NOTE — TELEPHONE ENCOUNTER
Patient called and stated that he was in the hospital last week and he was in there for leg weakness and leg pain. Patient wanted to know if the provider could refill the Hydrocodone acetaminophen or give him an rx for oxycodone. His leg pain is coming back. Patient does not want to go back to the er. Patient stated his pain is at a 6 right now.     Pharmacy [facility] [phone]    Please call and advise [phone] (H)

## 2020-11-17 NOTE — TELEPHONE ENCOUNTER
Contacted patient to alert him that he will need to wait until he is evaluated before receiving the requested medicine. He states his pain is so bad that he cannot wait for him appt tomorrow and he will go back to the ER for more hydrocodone.     I advised him to still come to his appt tomorrow if he goes to the ED today

## 2020-11-18 VITALS
SYSTOLIC BLOOD PRESSURE: 118 MMHG | WEIGHT: 315 LBS | HEART RATE: 101 BPM | OXYGEN SATURATION: 100 % | RESPIRATION RATE: 20 BRPM | TEMPERATURE: 99.8 F | BODY MASS INDEX: 46.65 KG/M2 | DIASTOLIC BLOOD PRESSURE: 64 MMHG | HEIGHT: 69 IN

## 2020-11-18 RX ORDER — HYDROCODONE BITARTRATE AND ACETAMINOPHEN 5; 325 MG/1; MG/1
1 TABLET ORAL EVERY 4 HOURS PRN
Qty: 5 TABLET | Refills: 0 | Status: SHIPPED | OUTPATIENT
Start: 2020-11-18 | End: 2020-12-09

## 2020-11-18 NOTE — ED PROVIDER NOTES
EMERGENCY DEPARTMENT ENCOUNTER      Pt Name: Evan Gannon  MRN: [id number]  YOB: 1990  Date of evaluation: 11/17/2020  Provider: Nolberto Oliver DO    CHIEF COMPLAINT       Chief Complaint   Patient presents with   • Leg Pain         HISTORY OF PRESENT ILLNESS  (Location/Symptom, Timing/Onset, Context/Setting, Quality, Duration, Modifying Factors, Severity.)   Evan Gannon is a 29 y.o. male who presents to the emergency department for evaluation of a cramping and discomfort in the right upper thigh and leg which is been worse over the last couple days.  He notes intermittent spasms, cramping sensation.  States feels very similar to when he was recently in the hospital few weeks ago with electrolyte abnormalities with hypokalemia.  The patient states he has been taking medications prescribed including his diuretics, potassium supplementation as previously prescribed.  The patient denies any fever, chills, chest pain or difficulty breathing, no cough or congestion.  States he does have appointments following up with his cardiac specialist.  The patient denies any other acute systemic complaints at this time.  No fall, injury or trauma.  Discomfort is significant enough to cause him to use some assistance with ambulation such as a walker/cane.      Nursing notes were reviewed.    REVIEW OF SYSTEMS    (2-9 systems for level 4, 10 or more for level 5)   ROS:  General:  No fevers, no chills, no weakness  Cardiovascular:  No chest pain, no palpitations  Respiratory:  No shortness of breath, no cough, no wheezing  Gastrointestinal:  No pain, no nausea, no vomiting, no diarrhea  Musculoskeletal: Positive right thigh to knee pain, cramping  Skin:  No rash, no easy bruising  Neurologic:  No speech problems, no headache, no extremity numbness, no extremity tingling, no extremity weakness  Psychiatric:  No anxiety  Genitourinary:  No dysuria, no hematuria    Except as noted above the  "remainder of the review of systems was reviewed and negative.       PAST MEDICAL HISTORY     Past Medical History:   Diagnosis Date   • CHF (congestive heart failure) (CMS/[facility])     diagnosed at 23yo, thought to be related to undiagnosed congenital defect- per patient. hospitalized every 3 weeks during the winter due to PNA.  Has significantf fluid retention despite diurectics   • CPAP (continuous positive airway pressure) dependence     settings at 15   • Gout     on allopurinol, controls symptoms   • Hypertension     patient denies   • Hypokalemia    • ICD (implantable cardioverter-defibrillator) in place    • Low HDL (under 40)    • Morbid obesity (CMS/[facility])    • Morbid obesity with BMI of 60.0-69.9, adult (CMS/[facility])    • Myocardial infarction (CMS/[facility])     \"almost\" per patient, had heart cath, no stents, has defibrillator   • On home O2     supposed to be 24/7, ran out of O2 and has only been using prn. manged  by cardiology   • COLLIN on CPAP     compliant   • Paresthesia     hands/ arms/ feet, suspected due to poor circulation reportedly   • Plaque psoriasis    • Pneumonia    • Psoriasis    • Sleep apnea    • Wears glasses          SURGICAL HISTORY       Past Surgical History:   Procedure Laterality Date   • CARDIAC CATHETERIZATION N/A 7/13/2017    Procedure: Left Heart Cath;  Surgeon: Balbir Olsen MD;  Location:  [facility] CATH [facility]ASIVE [facility];  Service:    • CARDIAC DEFIBRILLATOR PLACEMENT  08/2017   • CARDIAC DEFIBRILLATOR PLACEMENT     • CARDIAC ELECTROPHYSIOLOGY PROCEDURE N/A 8/15/2017    Procedure: VVI ICD Implant;  Surgeon: Nathanael Richmond DO;  Location:  [facility] EP INVASIVE LOCATION;  Service:    • ENDOSCOPY N/A 8/14/2020    Procedure: ESOPHAGOGASTRODUODENOSCOPY;  Surgeon: Arnoldo Whittington MD;  Location:  [facility] OR;  Service: Bariatric;  Laterality: N/A;   • GASTRIC SLEEVE LAPAROSCOPIC N/A 8/14/2020    Procedure: GASTRIC SLEEVE LAPAROSCOPIC;  Surgeon: Arnoldo Whittington MD;  Location:  [facility] OR;  " Service: Bariatric;  Laterality: N/A;   • OTHER SURGICAL HISTORY      ultra light therapy   • SINUS SURGERY      cartilage from ear inserted in nasal cavity   • TONSILLECTOMY AND ADENOIDECTOMY  2000         CURRENT MEDICATIONS     No current facility-administered medications for this encounter.     Current Outpatient Medications:   •  allopurinol (Zyloprim) 100 MG tablet, Take 1 tablet by mouth Daily., Disp: 30 tablet, Rfl: 5  •  carvedilol (COREG) 12.5 MG tablet, Take 1 tablet by mouth 2 (Two) Times a Day., Disp: 60 tablet, Rfl: 11  •  HYDROcodone-acetaminophen (NORCO) 5-325 MG per tablet, Take 1 tablet by mouth Every 4 (Four) Hours As Needed for Moderate Pain ., Disp: 5 tablet, Rfl: 0  •  potassium chloride 10 MEQ CR tablet, Take 2 tablets by mouth 2 (Two) Times a Day for 30 days., Disp: 60 tablet, Rfl: 0  •  sacubitril-valsartan (Entresto) 24-26 MG tablet, Take 1 tablet by mouth 2 (Two) Times a Day., Disp: 60 tablet, Rfl: 3  •  Secukinumab (COSENTYX, 300 MG DOSE, SC), Inject 300 mg under the skin into the appropriate area as directed., Disp: , Rfl:   •  spironolactone (ALDACTONE) 25 MG tablet, Take 1 tablet by mouth Daily., Disp: 30 tablet, Rfl: 0  •  torsemide (DEMADEX) 20 MG tablet, Take 20 mg by mouth Daily., Disp: , Rfl:   •  ursodiol (ACTIGALL) 300 MG capsule, Take 1 capsule by mouth 2 (Two) Times a Day., Disp: 60 capsule, Rfl: 5    ALLERGIES     Patient has no known allergies.    FAMILY HISTORY       Family History   Problem Relation Age of Onset   • Diabetes Father    • Diabetes Paternal Grandmother    • Diabetes Maternal Grandfather           SOCIAL HISTORY       Social History     Socioeconomic History   • Marital status: Single     Spouse name: Not on file   • Number of children: 0   • Years of education: Not on file   • Highest education level: Not on file   Occupational History   • Occupation: disabled   Tobacco Use   • Smoking status: Never Smoker   • Smokeless tobacco: Never Used   Substance and  "Sexual Activity   • Alcohol use: No   • Drug use: No   • Sexual activity: Defer   Social History Narrative    Lives in [facility] with parents and brother.  Disabled from CHF, given pass to work- hasn't gotten hired yet. .             Caffeine use: 6 sodas weekly    Patient lives with parents        Caffeine 1 soda a week            Caffeine 0         PHYSICAL EXAM    (up to 7 for level 4, 8 or more for level 5)     Vitals:    11/17/20 2154 11/17/20 2233 11/17/20 2300 11/17/20 2323   BP: 126/71 101/64 121/69    Pulse: 95   101   Resp: 20   20   Temp: 99.8 °F (37.7 °C)      SpO2: 98%  98% 98%   Weight: (!) 148 kg (327 lb)      Height: 175.3 cm (69\")          Physical Exam  General : Patient is morbidly obese, awake, alert, oriented, in no acute distress, nontoxic appearing  HEENT: Pupils are equally round and reactive to light, EOMI, conjunctivae clear, sclerae white, there is no injection no icterus.  Oral mucosa is moist, no exudate. Uvula is midline  Neck: Neck is supple, full range of motion, trachea midline  Cardiac: Heart regular rate, rhythm, no murmurs, rubs, or gallops  Lungs: Lungs are clear to auscultation, there is no wheezing, rhonchi, or rales. There is no use of accessory muscles  Chest wall: There is no tenderness to palpation over the chest wall or over ribs  Abdomen: Abdomen is soft, nontender, nondistended. There are no firm or pulsatile masses, no rebound rigidity or guarding.   Musculoskeletal: There is very mild discomfort to palpation of the right upper mid and lower thigh.  There is no signs of compartment syndrome, no leg swelling, distal pulses neurovascular status are intact.  5 out of 5 strength in all remaining extremities.  No focal muscle deficits are appreciated  Neuro: Motor intact, sensory intact, level of consciousness is normal  Dermatology: Skin is warm and dry  Psych: Mentation is grossly normal, cognition is grossly normal. Affect is appropriate.      DIAGNOSTIC RESULTS "     EKG: All EKG's are interpreted by the Emergency Department Physician who either signs or Co-signs this chart in the absence of a cardiologist.    ECG 12 Lead   Final Result   Test Reason : chf   Blood Pressure : **/** mmHG   Vent. Rate : 117 BPM     Atrial Rate : 117 BPM      P-R Int : 176 ms          QRS Dur : 096 ms       QT Int : 326 ms       P-R-T Axes : 066 047 042 degrees      QTc Int : 454 ms      Sinus tachycardia with premature atrial complexes   Otherwise normal ECG   When compared with ECG of 30-JUL-2020 13:13,   premature atrial complexes are now present   Confirmed by MONIE MENDIETA MD ([id number]) on 11/17/2020 11:17:13 PM      Referred By:  EDMD           Confirmed By:MONIE MENDIETA MD          RADIOLOGY:   Non-plain film images such as CT, Ultrasound and MRI are read by the radiologist. Plain radiographic images are visualized and preliminarily interpreted by the emergency physician with the below findings:      [] Radiologist's Report Reviewed:  No orders to display         ED BEDSIDE ULTRASOUND:   Performed by ED Physician - none    LABS:    I have reviewed and interpreted all of the currently available lab results from this visit (if applicable):  Results for orders placed or performed during the hospital encounter of 11/17/20   Comprehensive Metabolic Panel    Specimen: Blood   Result Value Ref Range    Glucose 88 65 - 99 mg/dL    BUN 9 6 - 20 mg/dL    Creatinine 0.94 0.76 - 1.27 mg/dL    Sodium 137 136 - 145 mmol/L    Potassium 3.6 3.5 - 5.2 mmol/L    Chloride 98 98 - 107 mmol/L    CO2 26.0 22.0 - 29.0 mmol/L    Calcium 9.4 8.6 - 10.5 mg/dL    Total Protein 8.1 6.0 - 8.5 g/dL    Albumin 3.60 3.50 - 5.20 g/dL    ALT (SGPT) 7 1 - 41 U/L    AST (SGOT) 13 1 - 40 U/L    Alkaline Phosphatase 69 39 - 117 U/L    Total Bilirubin 0.7 0.0 - 1.2 mg/dL    eGFR  African Amer 117 >60 mL/min/1.73    Globulin 4.5 gm/dL    A/G Ratio 0.8 g/dL    BUN/Creatinine Ratio 9.6 7.0 - 25.0    Anion Gap 13.0 5.0 - 15.0  "mmol/L   CBC Auto Differential    Specimen: Blood   Result Value Ref Range    WBC 6.37 3.40 - 10.80 10*3/mm3    RBC 5.40 4.14 - 5.80 10*6/mm3    Hemoglobin 14.2 13.0 - 17.7 g/dL    Hematocrit 44.1 37.5 - 51.0 %    MCV 81.7 79.0 - 97.0 fL    MCH 26.3 (L) 26.6 - 33.0 pg    MCHC 32.2 31.5 - 35.7 g/dL    RDW 14.6 12.3 - 15.4 %    RDW-SD 43.5 37.0 - 54.0 fl    MPV 11.5 6.0 - 12.0 fL    Platelets 275 140 - 450 10*3/mm3    Neutrophil % 68.4 42.7 - 76.0 %    Lymphocyte % 19.3 (L) 19.6 - 45.3 %    Monocyte % 10.8 5.0 - 12.0 %    Eosinophil % 0.5 0.3 - 6.2 %    Basophil % 0.5 0.0 - 1.5 %    Immature Grans % 0.5 0.0 - 0.5 %    Neutrophils, Absolute 4.36 1.70 - 7.00 10*3/mm3    Lymphocytes, Absolute 1.23 0.70 - 3.10 10*3/mm3    Monocytes, Absolute 0.69 0.10 - 0.90 10*3/mm3    Eosinophils, Absolute 0.03 0.00 - 0.40 10*3/mm3    Basophils, Absolute 0.03 0.00 - 0.20 10*3/mm3    Immature Grans, Absolute 0.03 0.00 - 0.05 10*3/mm3    nRBC 0.0 0.0 - 0.2 /100 WBC   Magnesium    Specimen: Blood   Result Value Ref Range    Magnesium 1.7 1.6 - 2.6 mg/dL   ECG 12 Lead   Result Value Ref Range    QT Interval 326 ms    QTC Interval 454 ms   Light Blue Top   Result Value Ref Range    Extra Tube hold for add-on    Green Top (Gel)   Result Value Ref Range    Extra Tube Hold for add-ons.    Lavender Top   Result Value Ref Range    Extra Tube hold for add-on    Gold Top - SST   Result Value Ref Range    Extra Tube Hold for add-ons.         All other labs were within normal range or not returned as of this dictation.      EMERGENCY DEPARTMENT COURSE and DIFFERENTIAL DIAGNOSIS/MDM:   Vitals:    Vitals:    11/17/20 2154 11/17/20 2233 11/17/20 2300 11/17/20 2323   BP: 126/71 101/64 121/69    Pulse: 95   101   Resp: 20   20   Temp: 99.8 °F (37.7 °C)      SpO2: 98%  98% 98%   Weight: (!) 148 kg (327 lb)      Height: 175.3 cm (69\")          ED Course as of Nov 18 0008 Tue Nov 17, 2020   2315 KAITLYN query complete. Treatment plan to include limited " course of prescribed  controlled substance. Risks including addiction, benefits, and alternatives presented to patient.       [AP]      ED Course User Index  [AP] Nolberto Oliver,        Patient with a right thigh pain, muscular nature, radiating down to the right knee which is cramping and intermittent in nature.  He feels this is similar when he was recently in the hospital with a mild renal insufficiency and hypokalemia.  Has been doing well over the last 1 week but his symptoms returned over the last few days.  He does have multiple chronic comorbidities from a renal, vascular and cardiac perspective.  He is nontoxic-appearing this evening.  We did obtain IV, labs with blood work reviewed as above.  His kidney function stable, potassium 3.6, magnesium of 1.7, white count of 6.4.  Not appreciate any acute abnormalities to cause the patient's cramping or discomfort.  Without any fall, injury or trauma, negative patient was given a dose of pain medication in the ED. he was updated on the results, findings.  No acute abnormalities on the blood work.  Appears to be a muscular strain, with associated cramping.  We will continue with his current home regimen, medication as previously prescribed, pain control follow-up with his PCP referral to orthopedics as needed. I had a discussion with the patient/family regarding diagnosis, diagnostic results, treatment plan, and medications.  The patient/family indicated understanding of these instructions.  I spent adequate time at the bedside proceeding discharge necessary to personally discuss the aftercare instructions, giving patient education, providing explanations of the results of our evaluations/findings, and my decision making to assure that the patient/family understand the plan of care.  Time was allotted to answer questions at that time and throughout the ED course.  Emphasis was placed on timely follow-up after discharge.  I also discussed the potential for  the development of an acute emergent condition requiring further evaluation, admission, or even surgical intervention. I discussed that we found nothing during the visit today indicating the need for further workup, admission, or the presence of an unstable medical condition.  I encouraged the patient to return to the emergency department immediately for ANY concerns, worsening, new complaints, or if symptoms persist and unable to seek follow-up in a timely fashion.  The patient/family expressed understanding and agreement with this plan.  The patient will follow-up with their PCP in 1-2 days for reevaluation.       MEDICATIONS ADMINISTERED IN ED:  Medications   HYDROcodone-acetaminophen (NORCO) 5-325 MG per tablet 1 tablet (1 tablet Oral Given 11/17/20 3526)       PROCEDURES:  Procedures    CRITICAL CARE TIME    Total Critical Care time was 0 minutes, excluding separately reportable procedures.   There was a high probability of clinically significant/life threatening deterioration in the patient's condition which required my urgent intervention.      FINAL IMPRESSION      1. Right leg pain          DISPOSITION/PLAN     ED Disposition     ED Disposition Condition Comment    Discharge Stable           PATIENT REFERRED TO:  Gilbert Da Silva PA  [address] [address]  [phone]    In 2 days  For reevaluation, referral to orthopedics as needed    [facility] Emergency Department  [address] [address] [phone]    If symptoms worsen      DISCHARGE MEDICATIONS:     Medication List      START taking these medications    HYDROcodone-acetaminophen 5-325 MG per tablet  Commonly known as: NORCO  Take 1 tablet by mouth Every 4 (Four) Hours As Needed for Moderate Pain .        CONTINUE taking these medications    allopurinol 100 MG tablet  Commonly known as: Zyloprim  Take 1 tablet by mouth Daily.     carvedilol 12.5 MG tablet  Commonly known as: COREG  Take  1 tablet by mouth 2 (Two) Times a Day.     COSENTYX (300 MG DOSE) SC     potassium chloride 10 MEQ CR tablet  Take 2 tablets by mouth 2 (Two) Times a Day for 30 days.     sacubitril-valsartan 24-26 MG tablet  Commonly known as: Entresto  Take 1 tablet by mouth 2 (Two) Times a Day.     spironolactone 25 MG tablet  Commonly known as: ALDACTONE  Take 1 tablet by mouth Daily.     torsemide 20 MG tablet  Commonly known as: DEMADEX     ursodiol 300 MG capsule  Commonly known as: ACTIGALL  Take 1 capsule by mouth 2 (Two) Times a Day.           Where to Get Your Medications      You can get these medications from any pharmacy    Bring a paper prescription for each of these medications  · HYDROcodone-acetaminophen 5-325 MG per tablet             Comment: Please note this report has been produced using speech recognition software.      Nolberto Oliver DO  Attending Emergency Physician               Nolberto Oliver,   11/18/20 0009

## 2020-11-24 ENCOUNTER — READMISSION MANAGEMENT (OUTPATIENT)
Dept: CALL CENTER | Facility: HOSPITAL | Age: 30
End: 2020-11-24

## 2020-11-24 NOTE — OUTREACH NOTE
"Medical Week 3 Survey      Responses   [facility] patient discharged from?  [facility]   Does the patient have one of the following disease processes/diagnoses(primary or secondary)?  Other   Week 3 attempt successful?  Yes   Call start time  1615   Call end time  1622   Discharge diagnosis  Hypokalemia, SAPPHIRE, systolic HF, Morbid obesity s/p gastric sleeve   Comments  Pt reports that he is doing well. His glucose levels are \"fine\".    What is the patient's perception of their health status since discharge?  Improving   Week 3 Call Completed?  Yes   Revoked  No further contact(revokes)-requires comment   Graduated/Revoked comments  pt did not want to answer questions.           Kamryn Perez RN  "

## 2020-12-09 ENCOUNTER — TELEPHONE (OUTPATIENT)
Dept: FAMILY MEDICINE CLINIC | Facility: CLINIC | Age: 30
End: 2020-12-09

## 2020-12-09 ENCOUNTER — TELEMEDICINE (OUTPATIENT)
Dept: FAMILY MEDICINE CLINIC | Facility: TELEHEALTH | Age: 30
End: 2020-12-09

## 2020-12-09 ENCOUNTER — TELEMEDICINE (OUTPATIENT)
Dept: BARIATRICS/WEIGHT MGMT | Facility: CLINIC | Age: 30
End: 2020-12-09

## 2020-12-09 VITALS — BODY MASS INDEX: 47.26 KG/M2 | WEIGHT: 315 LBS

## 2020-12-09 VITALS — WEIGHT: 315 LBS | HEIGHT: 69 IN | BODY MASS INDEX: 46.65 KG/M2

## 2020-12-09 DIAGNOSIS — E66.01 OBESITY, CLASS III, BMI 40-49.9 (MORBID OBESITY) (HCC): ICD-10-CM

## 2020-12-09 DIAGNOSIS — M79.604 PAIN OF RIGHT LOWER EXTREMITY: Primary | ICD-10-CM

## 2020-12-09 DIAGNOSIS — K91.2 POSTGASTRECTOMY MALABSORPTION: ICD-10-CM

## 2020-12-09 DIAGNOSIS — R53.83 FATIGUE, UNSPECIFIED TYPE: ICD-10-CM

## 2020-12-09 DIAGNOSIS — Z98.84 STATUS POST BARIATRIC SURGERY: Primary | ICD-10-CM

## 2020-12-09 DIAGNOSIS — Z90.3 POSTGASTRECTOMY MALABSORPTION: ICD-10-CM

## 2020-12-09 DIAGNOSIS — K21.9 GASTROESOPHAGEAL REFLUX DISEASE, UNSPECIFIED WHETHER ESOPHAGITIS PRESENT: ICD-10-CM

## 2020-12-09 DIAGNOSIS — Z13.0 SCREENING, IRON DEFICIENCY ANEMIA: ICD-10-CM

## 2020-12-09 DIAGNOSIS — Z13.21 MALNUTRITION SCREEN: ICD-10-CM

## 2020-12-09 DIAGNOSIS — E55.9 HYPOVITAMINOSIS D: ICD-10-CM

## 2020-12-09 PROCEDURE — 99214 OFFICE O/P EST MOD 30 MIN: CPT | Performed by: PHYSICIAN ASSISTANT

## 2020-12-09 PROCEDURE — 99213 OFFICE O/P EST LOW 20 MIN: CPT | Performed by: NURSE PRACTITIONER

## 2020-12-09 RX ORDER — HALOBETASOL PROPIONATE 0.05 %
1 OINTMENT (GRAM) TOPICAL 2 TIMES DAILY
COMMUNITY
Start: 2020-11-04

## 2020-12-09 RX ORDER — CYCLOBENZAPRINE HCL 10 MG
10 TABLET ORAL 3 TIMES DAILY PRN
Qty: 30 TABLET | Refills: 0 | Status: SHIPPED | OUTPATIENT
Start: 2020-12-09 | End: 2020-12-19

## 2020-12-09 RX ORDER — POTASSIUM CHLORIDE 750 MG/1
10 TABLET, FILM COATED, EXTENDED RELEASE ORAL 2 TIMES DAILY
COMMUNITY
End: 2021-01-08 | Stop reason: SDUPTHER

## 2020-12-09 RX ORDER — SECUKINUMAB 150 MG/ML
INJECTION SUBCUTANEOUS
COMMUNITY
Start: 2020-11-18 | End: 2021-05-18 | Stop reason: SDUPTHER

## 2020-12-09 RX ORDER — OMEPRAZOLE 40 MG/1
40 CAPSULE, DELAYED RELEASE ORAL DAILY
Qty: 30 CAPSULE | Refills: 3 | Status: ON HOLD | OUTPATIENT
Start: 2020-12-09 | End: 2022-01-20

## 2020-12-09 NOTE — PATIENT INSTRUCTIONS
Radicular Pain  Radicular pain is a type of pain that spreads from your back or neck along a spinal nerve. Spinal nerves are nerves that leave the spinal cord and go to the muscles. Radicular pain is sometimes called radiculopathy, radiculitis, or a pinched nerve. When you have this type of pain, you may also have weakness, numbness, or tingling in the area of your body that is supplied by the nerve. The pain may feel sharp and burning. Depending on which spinal nerve is affected, the pain may occur in the:  · Neck area (cervical radicular pain). You may also feel pain, numbness, weakness, or tingling in the arms.  · Mid-spine area (thoracic radicular pain). You would feel this pain in the back and chest. This type is rare.  · Lower back area (lumbar radicular pain). You would feel this pain as low back pain. You may feel pain, numbness, weakness, or tingling in the buttocks or legs. Sciatica is a type of lumbar radicular pain that shoots down the back of the leg.  Radicular pain occurs when one of the spinal nerves becomes irritated or squeezed (compressed). It is often caused by something pushing on a spinal nerve, such as one of the bones of the spine (vertebrae) or one of the round cushions between vertebrae (intervertebral disks). This can result from:  · An injury.  · Wear and tear or aging of a disk.  · The growth of a bone spur that pushes on the nerve.  Radicular pain often goes away when you follow instructions from your health care provider for relieving pain at home.  Follow these instructions at home:  Managing pain         · If directed, put ice on the affected area:  ? Put ice in a plastic bag.  ? Place a towel between your skin and the bag.  ? Leave the ice on for 20 minutes, 2-3 times a day.  · If directed, apply heat to the affected area as often as told by your health care provider. Use the heat source that your health care provider recommends, such as a moist heat pack or a heating pad.  ? Place  a towel between your skin and the heat source.  ? Leave the heat on for 20-30 minutes.  ? Remove the heat if your skin turns bright red. This is especially important if you are unable to feel pain, heat, or cold. You may have a greater risk of getting burned.  Activity    · Do not sit or rest in bed for long periods of time.  · Try to stay as active as possible. Ask your health care provider what type of exercise or activity is best for you.  · Avoid activities that make your pain worse, such as bending and lifting.  · Do not lift anything that is heavier than 10 lb (4.5 kg), or the limit that you are told, until your health care provider says that it is safe.  · Practice using proper technique when lifting items. Proper lifting technique involves bending your knees and rising up.  · Do strength and range-of-motion exercises only as told by your health care provider or physical therapist.  General instructions  · Take over-the-counter and prescription medicines only as told by your health care provider.  · Pay attention to any changes in your symptoms.  · Keep all follow-up visits as told by your health care provider. This is important.  ? Your health care provider may send you to a physical therapist to help with this pain.  Contact a health care provider if:  · Your pain and other symptoms get worse.  · Your pain medicine is not helping.  · Your pain has not improved after a few weeks of home care.  · You have a fever.  Get help right away if:  · You have severe pain, weakness, or numbness.  · You have difficulty with bladder or bowel control.  Summary  · Radicular pain is a type of pain that spreads from your back or neck along a spinal nerve.  · When you have radicular pain, you may also have weakness, numbness, or tingling in the area of your body that is supplied by the nerve.  · The pain may feel sharp or burning.  · Radicular pain may be treated with ice, heat, medicines, or physical therapy.  This  information is not intended to replace advice given to you by your health care provider. Make sure you discuss any questions you have with your health care provider.  Document Revised: 07/02/2019 Document Reviewed: 07/02/2019  Elsevier Patient Education © 2020 [employer]

## 2020-12-09 NOTE — PROGRESS NOTES
"CHIEF COMPLAINT  Cc: leg pain    HPI  Evan Gannon is a 30 y.o. male  presents with complaint of right leg pain. He has been seen the ER for the same pain and was prescribed hydrocodone. That was on 11/17/2020. That helped with pain while he was on it but it is now back. On 11/04 he was hospitalized and i t was found that his electrolytes were also out of balance and his creatinine was elevated. He reports that he was taking a lot of ibuprofen at that time. He also has a history of a gastric sleeve related to morbid obesity.He rates the pain in his leg at a 4 at this time when sitting and a 10 when he tries to walk. No numbness, no tingling, no or change in the leg. 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 He has been taking tylenol for the pain but it is not working.    Review of Systems   Constitutional: Negative for fever.   HENT: Negative for congestion and sore throat.    Respiratory: Negative for cough, shortness of breath and wheezing.    Cardiovascular: Negative for chest pain.   Gastrointestinal: Negative for diarrhea, nausea and vomiting.   Musculoskeletal:        Right leg pain hip to knee, pain level number 4 while sitting and # 10 when he walks, no numbness, no tingling, no color change   Neurological: Negative for light-headedness.       Past Medical History:   Diagnosis Date   • CHF (congestive heart failure) (CMS/[facility])     diagnosed at 21yo, thought to be related to undiagnosed congenital defect- per patient. hospitalized every 3 weeks during the winter due to PNA.  Has significantf fluid retention despite diurectics   • CPAP (continuous positive airway pressure) dependence     settings at 15   • Gout     on allopurinol, controls symptoms   • Hypertension     patient denies   • Hypokalemia    • ICD (implantable cardioverter-defibrillator) in place    • Low HDL (under 40)    • Morbid obesity (CMS/[facility])    • Morbid obesity " "with BMI of 60.0-69.9, adult (CMS/HCC)    • Myocardial infarction (CMS/HCC)     \"almost\" per patient, had heart cath, no stents, has defibrillator   • On home O2     supposed to be 24/7, ran out of O2 and has only been using prn. manged  by cardiology   • COLLIN on CPAP     compliant   • Paresthesia     hands/ arms/ feet, suspected due to poor circulation reportedly   • Plaque psoriasis    • Pneumonia    • Psoriasis    • Sleep apnea    • Wears glasses        Family History   Problem Relation Age of Onset   • Diabetes Father    • Diabetes Paternal Grandmother    • Diabetes Maternal Grandfather        Social History     Socioeconomic History   • Marital status: Single     Spouse name: Not on file   • Number of children: 0   • Years of education: Not on file   • Highest education level: Not on file   Occupational History   • Occupation: disabled   Tobacco Use   • Smoking status: Never Smoker   • Smokeless tobacco: Never Used   Substance and Sexual Activity   • Alcohol use: No   • Drug use: No   • Sexual activity: Defer   Social History Narrative    Lives in [facility] with parents and brother.  Disabled from CHF, given pass to work- hasn't gotten hired yet. .             Caffeine use: 6 sodas weekly    Patient lives with parents        Caffeine 1 soda a week            Caffeine 0         Ht 175.3 cm (69\")   Wt (!) 145 kg (320 lb)   BMI 47.26 kg/m²     PHYSICAL EXAM  Physical Exam   Constitutional: He is oriented to person, place, and time. He appears morbidly obese.  HENT:   Head: Normocephalic and atraumatic.   Right Ear: Hearing and external ear normal.   Left Ear: Hearing and external ear normal.   Nose: Nose normal.   Mouth/Throat: Mouth/Lips are normal.  Eyes: Lids are normal. Right eye exhibits no discharge and no exudate. Left eye exhibits no discharge and no exudate. Right conjunctiva is not injected. Left conjunctiva is not injected.   Pulmonary/Chest: No accessory muscle usage. No tachypnea and no " bradypnea.  No respiratory distress.No use of oxygen by nasal cannulaNo use of oxygen by mask noted.  Abdominal: Abdomen appears normal.   Musculoskeletal:      Comments: Right leg pain from around hip to knee in front, no numbness, no tingling, no color change   Neurological: He is alert and oriented to person, place, and time. No cranial nerve deficit.   Skin: His skin appears normal.  Psychiatric: He has a normal mood and affect. His speech is normal and behavior is normal. Judgment and thought content normal.       Results for orders placed or performed during the hospital encounter of 11/17/20   Comprehensive Metabolic Panel    Specimen: Blood   Result Value Ref Range    Glucose 88 65 - 99 mg/dL    BUN 9 6 - 20 mg/dL    Creatinine 0.94 0.76 - 1.27 mg/dL    Sodium 137 136 - 145 mmol/L    Potassium 3.6 3.5 - 5.2 mmol/L    Chloride 98 98 - 107 mmol/L    CO2 26.0 22.0 - 29.0 mmol/L    Calcium 9.4 8.6 - 10.5 mg/dL    Total Protein 8.1 6.0 - 8.5 g/dL    Albumin 3.60 3.50 - 5.20 g/dL    ALT (SGPT) 7 1 - 41 U/L    AST (SGOT) 13 1 - 40 U/L    Alkaline Phosphatase 69 39 - 117 U/L    Total Bilirubin 0.7 0.0 - 1.2 mg/dL    eGFR  African Amer 117 >60 mL/min/1.73    Globulin 4.5 gm/dL    A/G Ratio 0.8 g/dL    BUN/Creatinine Ratio 9.6 7.0 - 25.0    Anion Gap 13.0 5.0 - 15.0 mmol/L   CBC Auto Differential    Specimen: Blood   Result Value Ref Range    WBC 6.37 3.40 - 10.80 10*3/mm3    RBC 5.40 4.14 - 5.80 10*6/mm3    Hemoglobin 14.2 13.0 - 17.7 g/dL    Hematocrit 44.1 37.5 - 51.0 %    MCV 81.7 79.0 - 97.0 fL    MCH 26.3 (L) 26.6 - 33.0 pg    MCHC 32.2 31.5 - 35.7 g/dL    RDW 14.6 12.3 - 15.4 %    RDW-SD 43.5 37.0 - 54.0 fl    MPV 11.5 6.0 - 12.0 fL    Platelets 275 140 - 450 10*3/mm3    Neutrophil % 68.4 42.7 - 76.0 %    Lymphocyte % 19.3 (L) 19.6 - 45.3 %    Monocyte % 10.8 5.0 - 12.0 %    Eosinophil % 0.5 0.3 - 6.2 %    Basophil % 0.5 0.0 - 1.5 %    Immature Grans % 0.5 0.0 - 0.5 %    Neutrophils, Absolute 4.36 1.70 - 7.00  10*3/mm3    Lymphocytes, Absolute 1.23 0.70 - 3.10 10*3/mm3    Monocytes, Absolute 0.69 0.10 - 0.90 10*3/mm3    Eosinophils, Absolute 0.03 0.00 - 0.40 10*3/mm3    Basophils, Absolute 0.03 0.00 - 0.20 10*3/mm3    Immature Grans, Absolute 0.03 0.00 - 0.05 10*3/mm3    nRBC 0.0 0.0 - 0.2 /100 WBC   Magnesium    Specimen: Blood   Result Value Ref Range    Magnesium 1.7 1.6 - 2.6 mg/dL   ECG 12 Lead   Result Value Ref Range    QT Interval 326 ms    QTC Interval 454 ms   Light Blue Top   Result Value Ref Range    Extra Tube hold for add-on    Green Top (Gel)   Result Value Ref Range    Extra Tube Hold for add-ons.    Lavender Top   Result Value Ref Range    Extra Tube hold for add-on    Gold Top - SST   Result Value Ref Range    Extra Tube Hold for add-ons.        Diagnoses and all orders for this visit:    1. Pain of right lower extremity (Primary)    Other orders  -     cyclobenzaprine (FLEXERIL) 10 MG tablet; Take 1 tablet by mouth 3 (Three) Times a Day As Needed for Muscle Spasms for up to 10 days.  Dispense: 30 tablet; Refill: 0    Xray reviewed from ER visit on 11/02/2020 reviewed and some degenerative changes note L3-4 and L5-S1 shows arthritis and subchondral cyst    Try taking tylenol 500mg every 4 hours so that the medication is in your system more continuously  Take flexeril as ordered and discussed at visit (do not drive while taking this medication)  Take potassium as ordered as low potassium can contribute to muscle spasms    FOLLOW-UP  If symptoms worsen or color change is noted in lower extremity proceed to ER    Advise seeing sports medicine, or PCP for follow , may benefit from PT    COVID-19  if at any time you experience fever AND/OR cough AND/OR shortness of breath AND/OR loss of sense of taste or smell you are being advised to go to nearest urgent care or emergency department for evaluation AND/OR testing    Patient verbalizes understanding of medication dosage, comfort measures, instructions for  treatment and follow-up.    Emelievon López, APRN  12/09/2020  11:46 EST    This visit was performed via Telehealth.  This patient has been instructed to follow-up with their primary care provider if their symptoms worsen or the treatment provided does not resolve their illness.

## 2020-12-09 NOTE — TELEPHONE ENCOUNTER
OK. He has a history of non compliance and 6 no shows for just this practice this year already. He repetitively no shows his cardiology visits as well. I would be fine with dismissal. - Staff message from [place].

## 2020-12-09 NOTE — TELEPHONE ENCOUNTER
Called pt and UC and stated that he said that sent him muscle relaxer.    I talked with him about his NSH and he stated he could not come in and I discussed the need to cancel appointments instead of just not showing up.

## 2020-12-09 NOTE — TELEPHONE ENCOUNTER
PER PT HAD AN APPT SCHEDULED FOR A VIDEO VISIT. PER MIKI PT CANNOT BE EVALUATED VIA VIDEO VISIT, PT NEEDS TO BE EVALUATED AT THE ER BECAUSE HE IS NOT ABLE TO WALK. I RELATED THIS TO THE PATIENT. PATIENT DOES NOT AGREE AND IS REQUESTING TO  SPEAK DIRECTLY WITH MIKI HAWKINS. PLEASE ADVISE.

## 2020-12-09 NOTE — PROGRESS NOTES
"[facility]ariat[facility]  [address]  [address]  [facility] [id number] [phone]        Patient Name:  Evan Gannon.  :  1990      Reason for Visit:   Almost 4 months postop      HPI: Evan Gannon is a 30 y.o. male s/p LSG 20 GDW    This was an audio and video enabled telemedicine encounter due to covid-19 pandemic, patient consents.      LOV 20- doing well, no issues    Was hospitalized 20 with hypokalemia and SAPPHIRE. Suspected due to diuretics and volume overload.     Doing well. Started with acid reflux a couple weeks ago, is no longer taking PPI.   Has been dealing with hypokalemia on chronic diuretics, hasn't followed up with PCP yet fir recheck labs.  Presents today asking for suggestions on how to get in all the intake he needs. No problems eating/ drinking, just isn't hungry.  Is pleased with his progress. his EF has improved by 10% since surgery. Currently has R knee pain and cannot bear weight. He was advised by PCP to come into office or go to ED if unable to get there. Denies dysphagia, nausea, vomiting, abdominal pain, pulmonary issues and fevers.  Getting 70g+ prot/day. Turkey lin, protein shakes, hamburger, tuna.    Eating 1-2 times a day + shake. Doesn't feel hungry.  Drinking 64+ fluid oz/day. Has not had postop labs completed. Taking MVI without iron.  Not on antacid. \"Somewhat active\".      Presurgery weight: 401 pounds.  Today's weight is (!) 145 kg (320 lb) pounds, today's  Body mass index is 47.26 kg/m²., and@ weight loss since surgery is 81 pounds.      Past Medical History:   Diagnosis Date   • CHF (congestive heart failure) (CMS/HCC)     diagnosed at 23yo, thought to be related to undiagnosed congenital defect- per patient. hospitalized every 3 weeks during the winter due to PNA.  Has significantf fluid retention despite diurectics   • CPAP (continuous positive airway pressure) dependence     settings at 15   • Gout  " "   on allopurinol, controls symptoms   • Hypertension     patient denies   • Hypokalemia    • ICD (implantable cardioverter-defibrillator) in place    • Low HDL (under 40)    • Morbid obesity (CMS/[facility])    • Morbid obesity with BMI of 60.0-69.9, adult (CMS/[facility])    • Myocardial infarction (CMS/[facility])     \"almost\" per patient, had heart cath, no stents, has defibrillator   • On home O2     supposed to be 24/7, ran out of O2 and has only been using prn. manged  by cardiology   • COLLIN on CPAP     compliant   • Paresthesia     hands/ arms/ feet, suspected due to poor circulation reportedly   • Plaque psoriasis    • Pneumonia    • Psoriasis    • Sleep apnea    • Wears glasses      Past Surgical History:   Procedure Laterality Date   • CARDIAC CATHETERIZATION N/A 7/13/2017    Procedure: Left Heart Cath;  Surgeon: Balbir Olsen MD;  Location:  [facility]ATH [facility]ASIVE [facility];  Service:    • CARDIAC DEFIBRILLATOR PLACEMENT  08/2017   • CARDIAC DEFIBRILLATOR PLACEMENT     • CARDIAC ELECTROPHYSIOLOGY PROCEDURE N/A 8/15/2017    Procedure: VVI ICD Implant;  Surgeon: Nathanael Richmond DO;  Location:  [facility] EP INVASIVE LOCATION;  Service:    • ENDOSCOPY N/A 8/14/2020    Procedure: ESOPHAGOGASTRODUODENOSCOPY;  Surgeon: Arnoldo Whittington MD;  Location:  [facility] OR;  Service: Bariatric;  Laterality: N/A;   • GASTRIC SLEEVE LAPAROSCOPIC N/A 8/14/2020    Procedure: GASTRIC SLEEVE LAPAROSCOPIC;  Surgeon: Arnoldo Whittington MD;  Location:  [facility] OR;  Service: Bariatric;  Laterality: N/A;   • OTHER SURGICAL HISTORY      ultra light therapy   • SINUS SURGERY      cartilage from ear inserted in nasal cavity   • TONSILLECTOMY AND ADENOIDECTOMY  2000     Outpatient Medications Marked as Taking for the 12/9/20 encounter (Telemedicine) with Jodi Howard PA-C   Medication Sig Dispense Refill   • allopurinol (Zyloprim) 100 MG tablet Take 1 tablet by mouth Daily. 30 tablet 5   • carvedilol (COREG) 12.5 MG tablet Take 1 tablet by mouth 2 (Two) " Times a Day. 60 tablet 11   • Cosentyx Sensoready, 300 MG, 150 MG/ML solution auto-injector      • cyclobenzaprine (FLEXERIL) 10 MG tablet Take 1 tablet by mouth 3 (Three) Times a Day As Needed for Muscle Spasms for up to 10 days. 30 tablet 0   • halobetasol (ULTRAVATE) 0.05 % ointment      • potassium chloride 10 MEQ CR tablet Take 10 mEq by mouth 2 (Two) Times a Day.     • sacubitril-valsartan (Entresto) 24-26 MG tablet Take 1 tablet by mouth 2 (Two) Times a Day. 60 tablet 3   • Secukinumab (COSENTYX, 300 MG DOSE, SC) Inject 300 mg under the skin into the appropriate area as directed.     • spironolactone (ALDACTONE) 25 MG tablet Take 1 tablet by mouth Daily. 30 tablet 0   • torsemide (DEMADEX) 20 MG tablet Take 20 mg by mouth Daily.         No Known Allergies    Social History     Socioeconomic History   • Marital status: Single     Spouse name: Not on file   • Number of children: 0   • Years of education: Not on file   • Highest education level: Not on file   Occupational History   • Occupation: disabled   Tobacco Use   • Smoking status: Never Smoker   • Smokeless tobacco: Never Used   Substance and Sexual Activity   • Alcohol use: No   • Drug use: No   • Sexual activity: Defer   Social History Narrative    Lives in [facility] with parents and brother.  Disabled from CHF, given pass to work- hasn't gotten hired yet. .             Caffeine use: 6 sodas weekly    Patient lives with parents        Caffeine 1 soda a week            Caffeine 0       Wt (!) 145 kg (320 lb)   BMI 47.26 kg/m²     Physical Exam  Constitutional:       Appearance: He is well-developed.   HENT:      Head: Normocephalic and atraumatic.   Cardiovascular:      Rate and Rhythm: Normal rate and regular rhythm.   Pulmonary:      Effort: Pulmonary effort is normal.      Breath sounds: Normal breath sounds.   Abdominal:      General: Bowel sounds are normal.      Palpations: Abdomen is soft.      Comments: Incisions healing well   Skin:      General: Skin is warm and dry.   Neurological:      Mental Status: He is alert.   Psychiatric:         Behavior: Behavior normal.           Assessment:  Almost 4 months s/p LSG 8/14/20 GDW    ICD-10-CM ICD-9-CM   1. Status post bariatric surgery  Z98.84 V45.86   2. Fatigue, unspecified type  R53.83 780.79   3. Gastroesophageal reflux disease, unspecified whether esophagitis present  K21.9 530.81   4. Obesity, Class III, BMI 40-49.9 (morbid obesity) (CMS/[facility])  E66.01 278.01   5. Hypovitaminosis D  E55.9 268.9   6. Screening, iron deficiency anemia  Z13.0 V78.0   7. Malnutrition screen  Z13.21 V77.2   8. Postgastrectomy malabsorption  K91.2 579.3    Z90.3          Plan:  Advised following PCP recs for acute knee pain, also needs PCP follow up for hypokalemia.  Doing well from bariatric standpoint. Advised increasing overall PO intake with more protein and meals per day. Goal protein 70-100g daily, can try powder supplements or protein xavier. Add more real food meals.  Continue w/ good food choices and healthy habits.  Continue fluids 64oz+ daily. Encouraged routine exercise.  Routine bariatric labs ordered.  Continue vitamins w/ adjustments pending lab results.  Call w/ problems/concerns.     Patient's Body mass index is 47.26 kg/m². BMI is above normal parameters. Recommendations include: exercise counseling and nutrition counseling.        The patient was instructed to follow up in 3 months, sooner if needed.      Total time spent w/ patient 25 minutes and 15 minutes spent counseling the patient on nutrition and necessary dietary/lifestyle modifications.

## 2020-12-23 ENCOUNTER — TELEPHONE (OUTPATIENT)
Dept: CARDIOLOGY | Facility: HOSPITAL | Age: 30
End: 2020-12-23

## 2020-12-23 NOTE — TELEPHONE ENCOUNTER
Patient called stating he needs to know if it is okay to take amoxicillin and ibuprofen together. This nurse advised patient it was okay to take the ibuprofen  and amoxicillin. Patient verbalized understanding.

## 2021-01-08 ENCOUNTER — TELEPHONE (OUTPATIENT)
Dept: CARDIOLOGY | Facility: HOSPITAL | Age: 31
End: 2021-01-08

## 2021-01-08 ENCOUNTER — TELEMEDICINE (OUTPATIENT)
Dept: CARDIOLOGY | Facility: HOSPITAL | Age: 31
End: 2021-01-08

## 2021-01-08 VITALS
WEIGHT: 315 LBS | SYSTOLIC BLOOD PRESSURE: 116 MMHG | HEIGHT: 69 IN | BODY MASS INDEX: 46.65 KG/M2 | DIASTOLIC BLOOD PRESSURE: 67 MMHG | HEART RATE: 88 BPM

## 2021-01-08 DIAGNOSIS — I50.22 CHRONIC SYSTOLIC HEART FAILURE (HCC): Primary | ICD-10-CM

## 2021-01-08 DIAGNOSIS — I10 ESSENTIAL HYPERTENSION: ICD-10-CM

## 2021-01-08 DIAGNOSIS — I42.0 NONISCHEMIC CONGESTIVE CARDIOMYOPATHY (HCC): ICD-10-CM

## 2021-01-08 DIAGNOSIS — G47.33 OSA (OBSTRUCTIVE SLEEP APNEA): ICD-10-CM

## 2021-01-08 PROCEDURE — 99214 OFFICE O/P EST MOD 30 MIN: CPT | Performed by: NURSE PRACTITIONER

## 2021-01-08 RX ORDER — SPIRONOLACTONE 25 MG/1
25 TABLET ORAL DAILY
Qty: 30 TABLET | Refills: 5 | Status: SHIPPED | OUTPATIENT
Start: 2021-01-08 | End: 2021-05-18 | Stop reason: SDUPTHER

## 2021-01-08 RX ORDER — AMOXICILLIN 500 MG/1
1 CAPSULE ORAL EVERY 6 HOURS
COMMUNITY
Start: 2020-12-29 | End: 2021-05-18

## 2021-01-08 RX ORDER — IBUPROFEN 800 MG/1
1 TABLET ORAL DAILY
COMMUNITY
Start: 2020-12-29 | End: 2022-01-19

## 2021-01-08 RX ORDER — TORSEMIDE 20 MG/1
20 TABLET ORAL DAILY
Qty: 30 TABLET | Refills: 3 | Status: SHIPPED | OUTPATIENT
Start: 2021-01-08 | End: 2021-01-08 | Stop reason: SDUPTHER

## 2021-01-08 RX ORDER — CARVEDILOL 12.5 MG/1
12.5 TABLET ORAL 2 TIMES DAILY
Qty: 60 TABLET | Refills: 3
Start: 2021-01-08 | End: 2021-05-18 | Stop reason: SDUPTHER

## 2021-01-08 RX ORDER — TORSEMIDE 20 MG/1
TABLET ORAL
Qty: 60 TABLET | Refills: 3 | Status: SHIPPED | OUTPATIENT
Start: 2021-01-08 | End: 2021-05-18 | Stop reason: SDUPTHER

## 2021-01-08 RX ORDER — SACUBITRIL AND VALSARTAN 24; 26 MG/1; MG/1
1 TABLET, FILM COATED ORAL 2 TIMES DAILY
Qty: 60 TABLET | Refills: 3 | Status: SHIPPED | OUTPATIENT
Start: 2021-01-08 | End: 2021-05-18 | Stop reason: SDUPTHER

## 2021-01-08 RX ORDER — POTASSIUM CHLORIDE 750 MG/1
10 TABLET, FILM COATED, EXTENDED RELEASE ORAL 2 TIMES DAILY
Qty: 30 TABLET | Refills: 3 | Status: SHIPPED | OUTPATIENT
Start: 2021-01-08 | End: 2021-11-01 | Stop reason: DRUGHIGH

## 2021-01-08 NOTE — TELEPHONE ENCOUNTER
Pharmacy needs clarification on patient's Toresmide. Prescription states 1 tab QD but taking 3 tablets daily. Is it 1 tablet QD or 1 tablet TID?

## 2021-01-08 NOTE — PROGRESS NOTES
"You have chosen to receive care through the use of telemedicine. Telemedicine enables health care providers at different locations to provide safe, effective, and convenient care through the use of technology. As with any health care service, there are risks associated with the use of telemedicine, including equipment failure, poor connections, and  issues.    • Do you understand the risks and benefits of telemedicine as I have explained them to you? Yes  • Have your questions regarding telemedicine been answered? Yes  • Do you consent to the use of telemedicine in your medical care today? Yes  Chief Complaint  Follow-up (hypokalemia) and Leg Pain    Subjective    History of Present Illness {CC  Problem List  Visit  Diagnosis   Encounters  Notes  Medications  Labs  Result Review Imaging  Media :23}     Evan Gannon presents to [facility] for   History of Present Illness   30 year old male with telemedicine visit for ongoing evaluation of his chronic systolic heart failure.  He reports he is doing well from a cardiac standpoint.  Notes he was diagnosed with Covid yesterday.  Currently denies any symptoms.  He notes he is wearing his oxygen at night but does not require it throughout the day.  Status post bariatric surgery.  He reports he is tolerating p.o. intake much better.  Objective     Vital Signs:   Vitals:    01/08/21 1029   BP: 116/67   BP Location: Other (Comment)   Patient Position: Sitting   Cuff Size: Adult   Pulse: 88   Weight: (!) 144 kg (317 lb)   Height: 175.3 cm (69\")   pre-surgery weight 423 lbs   Body mass index is 46.81 kg/m².  Physical Exam  Vitals signs and nursing note reviewed.   Constitutional:       Appearance: Normal appearance.   HENT:      Head: Normocephalic.   Pulmonary:      Effort: Pulmonary effort is normal.   Neurological:      Mental Status: He is alert and oriented to person, place, and time.   Psychiatric: "         Mood and Affect: Mood normal.         Behavior: Behavior normal.         Thought Content: Thought content normal.              Result Review  Data Reviewed:{ Labs  Result Review  Imaging  Med Tab  Media :23}   Magnesium (11/17/2020 22:08)  Comprehensive Metabolic Panel (11/17/2020 22:08)  CBC & Differential (11/17/2020 22:08)             Assessment and Plan {CC Problem List  Visit Diagnosis  ROS  Review (Popup)  Health Maintenance  Quality  BestPractice  Medications  SmartSets  SnapShot Encounters  Media :23}   1. Chronic systolic heart failure (CMS/HCC)  euvolemic  Continue entresto, coreg, aldactone  Decrease torsemide to 2 tablets daily   Heart failure education today including signs and symptoms, the role of the heart failure center, daily weights, low sodium diet (less than 1500 mg per day), and daily physical activity. Reviewed HF Zones with patient and family.  Patient to continue current medications as previously ordered.     2. Nonischemic congestive cardiomyopathy (CMS/HCC)  Continue entresto, coreg, aldactone, torsemide     3. Essential hypertension  Well controlled  HTN Education provided today including signs and symptoms, medication management, daily blood pressure monitoring. Patient encouraged to call the Heart and Valve center with any abnormal readings.     4. COLLIN (obstructive sleep apnea)  Complaint with cpap           Follow Up {Instructions Charge Capture  Follow-up Communications :23}   Return in about 6 weeks (around 2/19/2021) for Office visit, HF.    Patient was given instructions and counseling regarding his condition or for health maintenance advice. Please see specific information pulled into the AVS if appropriate.  Patient was instructed to call the [facility] Center with any questions, concerns, or worsening symptoms.    *Please note that portions of this note were completed with a voice recognition program. Efforts were made to edit the dictations, but  occasionally words are mistranscribed.

## 2021-01-28 ENCOUNTER — TELEMEDICINE (OUTPATIENT)
Dept: CARDIOLOGY | Facility: HOSPITAL | Age: 31
End: 2021-01-28

## 2021-01-28 VITALS
WEIGHT: 312 LBS | DIASTOLIC BLOOD PRESSURE: 80 MMHG | SYSTOLIC BLOOD PRESSURE: 130 MMHG | BODY MASS INDEX: 46.21 KG/M2 | HEART RATE: 96 BPM | HEIGHT: 69 IN

## 2021-01-28 DIAGNOSIS — I42.0 NONISCHEMIC CONGESTIVE CARDIOMYOPATHY (HCC): ICD-10-CM

## 2021-01-28 DIAGNOSIS — I10 ESSENTIAL HYPERTENSION: ICD-10-CM

## 2021-01-28 DIAGNOSIS — I50.22 CHRONIC SYSTOLIC CHF (CONGESTIVE HEART FAILURE), NYHA CLASS 2 (HCC): Primary | ICD-10-CM

## 2021-01-28 DIAGNOSIS — G47.33 OSA (OBSTRUCTIVE SLEEP APNEA): ICD-10-CM

## 2021-01-28 PROCEDURE — 99214 OFFICE O/P EST MOD 30 MIN: CPT | Performed by: NURSE PRACTITIONER

## 2021-01-28 NOTE — PROGRESS NOTES
"You have chosen to receive care through the use of telemedicine. Telemedicine enables health care providers at different locations to provide safe, effective, and convenient care through the use of technology. As with any health care service, there are risks associated with the use of telemedicine, including equipment failure, poor connections, and  issues.    • Do you understand the risks and benefits of telemedicine as I have explained them to you? Yes  • Have your questions regarding telemedicine been answered? Yes  • Do you consent to the use of telemedicine in your medical care today? Yes  Chief Complaint  Follow-up (medication)    Subjective    History of Present Illness {CC  Problem List  Visit  Diagnosis   Encounters  Notes  Medications  Labs  Result Review Imaging  Media :23}       History of Present Illness   30 year old male presents to the office today for ongoing evaluation of his chronic systolic heart failure. He had bariatic surgery August 2020. Notes he is doing well with is weight loss. Wants to start cardiac rehab. Patient denies chest pain, chest pressure, palpitations, tachycardia, dyspnea, presyncope, syncope, orthopnea, PND, abdominal fullness, early satiety, claudication, cough or edema.NOtes bp has been well controlled   Objective     Vital Signs:   Vitals:    01/28/21 1240   BP: 130/80   BP Location: Left arm   Patient Position: Sitting   Cuff Size: Large Adult   Pulse: 96   Weight: (!) 142 kg (312 lb)   Height: 175.3 cm (69\")     Body mass index is 46.07 kg/m².  Physical Exam  Vitals signs and nursing note reviewed.   Constitutional:       Appearance: Normal appearance.   HENT:      Head: Normocephalic.   Pulmonary:      Effort: Pulmonary effort is normal.   Neurological:      Mental Status: He is alert and oriented to person, place, and time.   Psychiatric:         Mood and Affect: Mood normal.         Behavior: Behavior normal.         Thought Content: " Thought content normal.              Result Review  Data Reviewed:{ Labs  Result Review  Imaging  Med Tab  Media :23}   Basic Metabolic Panel (11/05/2020 04:31)  Potassium (11/05/2020 08:58)  CBC & Differential (11/17/2020 22:08)  Comprehensive Metabolic Panel (11/17/2020 22:08)               Assessment and Plan {CC Problem List  Visit Diagnosis  ROS  Review (Popup)  Health Maintenance  Quality  BestPractice  Medications  SmartSets  SnapShot Encounters  Media :23}   1. Chronic systolic CHF (congestive heart failure), NYHA class 2 (CMS/HCC)  Continue coreg, entreso  - Ambulatory Referral to Cardiac Rehab  Heart failure education today including signs and symptoms, the role of the heart failure center, daily weights, low sodium diet (less than 1500 mg per day), and daily physical activity. Reviewed HF Zones with patient and family.  Patient to continue current medications as previously ordered.   2. Nonischemic congestive cardiomyopathy (CMS/HCC)  nyha 2  3. Essential hypertension  Well controlled on coreg, entresto  4. COLLIN (obstructive sleep apnea)  Compliant with cpap       Follow Up {Instructions Charge Capture  Follow-up Communications :23}   No follow-ups on file.    Patient was given instructions and counseling regarding his condition or for health maintenance advice. Please see specific information pulled into the AVS if appropriate.  Patient was instructed to call the [facility] with any questions, concerns, or worsening symptoms.    *Please note that portions of this note were completed with a voice recognition program. Efforts were made to edit the dictations, but occasionally words are mistranscribed.

## 2021-02-02 ENCOUNTER — DOCUMENTATION (OUTPATIENT)
Dept: CARDIAC REHAB | Facility: HOSPITAL | Age: 31
End: 2021-02-02

## 2021-02-19 ENCOUNTER — TRANSCRIBE ORDERS (OUTPATIENT)
Dept: CARDIAC REHAB | Facility: HOSPITAL | Age: 31
End: 2021-02-19

## 2021-02-19 ENCOUNTER — DOCUMENTATION (OUTPATIENT)
Dept: CARDIAC REHAB | Facility: HOSPITAL | Age: 31
End: 2021-02-19

## 2021-02-19 DIAGNOSIS — I50.23 ACUTE ON CHRONIC SYSTOLIC CHF (CONGESTIVE HEART FAILURE) (HCC): Primary | ICD-10-CM

## 2021-02-19 NOTE — PROGRESS NOTES
Pt. Referred for Phase II Cardiac Rehab. Staff discussed benefits of exercise, program protocol, and educational material provided. Teach back verified.  Patient scheduled for orientation at [facility] on Friday, April 2nd at 1300.

## 2021-03-12 ENCOUNTER — TELEPHONE (OUTPATIENT)
Dept: CARDIOLOGY | Facility: HOSPITAL | Age: 31
End: 2021-03-12

## 2021-03-12 NOTE — TELEPHONE ENCOUNTER
Patient called and stated he needs Marah Almeida SONAM to fill disability paperwork out for him for a program he is in.       Called patient and left VM stating Marah Almeida will not be filling his paperwork out at this time due to canceling/ no showing for his past two appointments. This nurse stated the patient can call his PCP to see if they will be able to assist him with the paperwork he needs. Left a call back number for patient in case he has any questions or concerns.

## 2021-04-02 ENCOUNTER — APPOINTMENT (OUTPATIENT)
Dept: CARDIAC REHAB | Facility: HOSPITAL | Age: 31
End: 2021-04-02

## 2021-05-17 RX ORDER — TORSEMIDE 20 MG/1
TABLET ORAL
Qty: 270 TABLET | Refills: 0 | OUTPATIENT
Start: 2021-05-17

## 2021-05-18 ENCOUNTER — OFFICE VISIT (OUTPATIENT)
Dept: CARDIOLOGY | Facility: HOSPITAL | Age: 31
End: 2021-05-18

## 2021-05-18 ENCOUNTER — LAB (OUTPATIENT)
Dept: LAB | Facility: HOSPITAL | Age: 31
End: 2021-05-18

## 2021-05-18 VITALS
DIASTOLIC BLOOD PRESSURE: 72 MMHG | BODY MASS INDEX: 46.67 KG/M2 | HEART RATE: 73 BPM | WEIGHT: 315 LBS | OXYGEN SATURATION: 96 % | SYSTOLIC BLOOD PRESSURE: 104 MMHG

## 2021-05-18 DIAGNOSIS — G47.33 OSA (OBSTRUCTIVE SLEEP APNEA): ICD-10-CM

## 2021-05-18 DIAGNOSIS — I10 ESSENTIAL HYPERTENSION: ICD-10-CM

## 2021-05-18 DIAGNOSIS — I42.0 NONISCHEMIC CONGESTIVE CARDIOMYOPATHY (HCC): ICD-10-CM

## 2021-05-18 DIAGNOSIS — I50.22 CHRONIC SYSTOLIC CHF (CONGESTIVE HEART FAILURE), NYHA CLASS 2 (HCC): ICD-10-CM

## 2021-05-18 DIAGNOSIS — I50.22 CHRONIC SYSTOLIC CHF (CONGESTIVE HEART FAILURE), NYHA CLASS 2 (HCC): Primary | ICD-10-CM

## 2021-05-18 DIAGNOSIS — M10.9 ACUTE GOUT OF MULTIPLE SITES, UNSPECIFIED CAUSE: ICD-10-CM

## 2021-05-18 LAB
ANION GAP SERPL CALCULATED.3IONS-SCNC: 9.6 MMOL/L (ref 5–15)
BUN SERPL-MCNC: 11 MG/DL (ref 6–20)
BUN/CREAT SERPL: 12.6 (ref 7–25)
CALCIUM SPEC-SCNC: 8.7 MG/DL (ref 8.6–10.5)
CHLORIDE SERPL-SCNC: 96 MMOL/L (ref 98–107)
CO2 SERPL-SCNC: 32.4 MMOL/L (ref 22–29)
CREAT SERPL-MCNC: 0.87 MG/DL (ref 0.76–1.27)
GFR SERPL CREATININE-BSD FRML MDRD: 127 ML/MIN/1.73
GLUCOSE SERPL-MCNC: 85 MG/DL (ref 65–99)
POTASSIUM SERPL-SCNC: 3.1 MMOL/L (ref 3.5–5.2)
SODIUM SERPL-SCNC: 138 MMOL/L (ref 136–145)

## 2021-05-18 PROCEDURE — 99214 OFFICE O/P EST MOD 30 MIN: CPT | Performed by: NURSE PRACTITIONER

## 2021-05-18 PROCEDURE — 80048 BASIC METABOLIC PNL TOTAL CA: CPT

## 2021-05-18 PROCEDURE — 36415 COLL VENOUS BLD VENIPUNCTURE: CPT

## 2021-05-18 RX ORDER — TORSEMIDE 20 MG/1
20 TABLET ORAL 2 TIMES DAILY
Qty: 180 TABLET | Refills: 3 | Status: SHIPPED | OUTPATIENT
Start: 2021-05-18 | End: 2022-01-07

## 2021-05-18 RX ORDER — SPIRONOLACTONE 25 MG/1
25 TABLET ORAL DAILY
Qty: 30 TABLET | Refills: 5 | Status: SHIPPED | OUTPATIENT
Start: 2021-05-18 | End: 2021-11-01 | Stop reason: DRUGHIGH

## 2021-05-18 RX ORDER — CARVEDILOL 12.5 MG/1
12.5 TABLET ORAL 2 TIMES DAILY
Qty: 60 TABLET | Refills: 3
Start: 2021-05-18 | End: 2021-11-03 | Stop reason: DRUGHIGH

## 2021-05-18 RX ORDER — ALLOPURINOL 100 MG/1
100 TABLET ORAL DAILY
Qty: 30 TABLET | Refills: 5 | Status: SHIPPED | OUTPATIENT
Start: 2021-05-18 | End: 2022-01-28 | Stop reason: HOSPADM

## 2021-05-18 RX ORDER — SACUBITRIL AND VALSARTAN 24; 26 MG/1; MG/1
1 TABLET, FILM COATED ORAL 2 TIMES DAILY
Qty: 60 TABLET | Refills: 3 | Status: SHIPPED | OUTPATIENT
Start: 2021-05-18 | End: 2022-02-09 | Stop reason: SDUPTHER

## 2021-05-18 NOTE — PATIENT INSTRUCTIONS
- Labwork on 7th floor  - Office will call for testing scheduling  - Cardiology/sleep medicine appointments

## 2021-05-18 NOTE — PROGRESS NOTES
Chief Complaint  Med Refill    Subjective    History of Present Illness {CC  Problem List  Visit  Diagnosis   Encounters  Notes  Medications  Labs  Result Review Imaging  Media :23}     Evan Gannon presents to [facility] for medication refill and heart failure follow-up. States he has been doing well and without chest pain, edema, dyspnea, palpitations, and dizziness. Wearing O2 nocturnal as instructed. Weight has continued to decrease after surgery. 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   Has lost 107 lbs since gastric bypass surgery.     Objective     Vital Signs:   Vitals:    05/18/21 1445   BP: 104/72   BP Location: Left arm   Patient Position: Sitting   Pulse: 73   SpO2: 96%   Weight: (!) 143 kg (316 lb)     Body mass index is 46.67 kg/m².  Physical Exam  Vitals and nursing note reviewed.   Constitutional:       Appearance: Normal appearance.   HENT:      Head: Normocephalic.   Eyes:      Pupils: Pupils are equal, round, and reactive to light.   Cardiovascular:      Rate and Rhythm: Normal rate and regular rhythm.      Pulses: Normal pulses.      Heart sounds: Normal heart sounds. No murmur heard.     Pulmonary:      Effort: Pulmonary effort is normal.      Breath sounds: Normal breath sounds.   Abdominal:      General: Bowel sounds are normal.      Palpations: Abdomen is soft.   Musculoskeletal:         General: Normal range of motion.      Cervical back: Normal range of motion.      Right lower leg: No edema.      Left lower leg: No edema.   Skin:     General: Skin is warm and dry.      Capillary Refill: Capillary refill takes less than 2 seconds.   Neurological:      Mental Status: He is alert and oriented to person, place, and time.   Psychiatric:         Mood and Affect: Mood normal.         Thought Content: Thought content normal.              Result Review  Data Reviewed:{ Labs  Result Review  Imaging  Med Tab  Media :23}        CBC &  Differential (12/09/2020 13:43)  Comprehensive Metabolic Panel (12/09/2020 13:43)  Ferritin (12/09/2020 13:43)  Folate (12/09/2020 13:43)  Iron (12/09/2020 13:43)  Methylmalonic Acid, Serum (12/09/2020 13:43)  Prealbumin (12/09/2020 13:43)  Vitamin B1, Whole Blood (12/09/2020 13:43)  Vitamin D 25 Hydroxy (12/09/2020 13:43)           Assessment and Plan {CC Problem List  Visit Diagnosis  ROS  Review (Popup)  Health Maintenance  Quality  BestPractice  Medications  SmartSets  SnapShot Encounters  Media :23}     1. Chronic systolic CHF (congestive heart failure), NYHA class 2 (CMS/HCC)  euvolemic  NYHA 1  Continue coreg, entresto, torsemide, aldactone  - Basic Metabolic Panel; Future  - Adult Transthoracic Echo Complete W/ Cont if Necessary Per Protocol; Future  - Ambulatory Referral to Cardiology    2. Nonischemic congestive cardiomyopathy (CMS/HCC)    - Basic Metabolic Panel; Future  - Adult Transthoracic Echo Complete W/ Cont if Necessary Per Protocol; Future  - Ambulatory Referral to Cardiology    3. Essential hypertension  Well controlled on entresto, coreg   - Basic Metabolic Panel; Future  - Ambulatory Referral to Cardiology    4. COLLIN (obstructive sleep apnea)    - Adult Transthoracic Echo Complete W/ Cont if Necessary Per Protocol; Future  - Ambulatory Referral to Sleep Medicine  - Ambulatory Referral to Cardiology    5. Acute gout of multiple sites, unspecified cause    - allopurinol (Zyloprim) 100 MG tablet; Take 1 tablet by mouth Daily.  Dispense: 30 tablet; Refill: 5      Follow Up {Instructions Charge Capture  Follow-up Communications :23}   Return if symptoms worsen or fail to improve.    Patient was given instructions and counseling regarding his condition or for health maintenance advice. Please see specific information pulled into the AVS if appropriate.  Patient was instructed to call the [facility] with any questions, concerns, or worsening symptoms.    *Please note that  portions of this note were completed with a voice recognition program. Efforts were made to edit the dictations, but occasionally words are mistranscribed.

## 2021-05-19 ENCOUNTER — TELEPHONE (OUTPATIENT)
Dept: CARDIOLOGY | Facility: HOSPITAL | Age: 31
End: 2021-05-19

## 2021-05-19 RX ORDER — POTASSIUM CHLORIDE 20 MEQ/1
20 TABLET, EXTENDED RELEASE ORAL DAILY
Qty: 5 TABLET | Refills: 0 | Status: SHIPPED | OUTPATIENT
Start: 2021-05-19 | End: 2021-10-05 | Stop reason: SDUPTHER

## 2021-05-19 NOTE — TELEPHONE ENCOUNTER
Reviewed labs with patient. K 3.1  Patient has been out of aldactone. Will start potassium 20 meq qd x 5 days.

## 2021-10-04 ENCOUNTER — OFFICE VISIT (OUTPATIENT)
Dept: CARDIOLOGY | Facility: HOSPITAL | Age: 31
End: 2021-10-04

## 2021-10-04 VITALS
WEIGHT: 315 LBS | HEART RATE: 106 BPM | OXYGEN SATURATION: 99 % | HEIGHT: 69 IN | TEMPERATURE: 97 F | BODY MASS INDEX: 46.65 KG/M2 | DIASTOLIC BLOOD PRESSURE: 71 MMHG | RESPIRATION RATE: 18 BRPM | SYSTOLIC BLOOD PRESSURE: 107 MMHG

## 2021-10-04 DIAGNOSIS — I10 ESSENTIAL HYPERTENSION: ICD-10-CM

## 2021-10-04 DIAGNOSIS — G47.33 OSA (OBSTRUCTIVE SLEEP APNEA): ICD-10-CM

## 2021-10-04 DIAGNOSIS — I50.23 ACUTE ON CHRONIC SYSTOLIC HEART FAILURE, NYHA CLASS 3 (HCC): Primary | ICD-10-CM

## 2021-10-04 LAB
ANION GAP SERPL CALCULATED.3IONS-SCNC: 11 MMOL/L (ref 5–15)
BUN SERPL-MCNC: 8 MG/DL (ref 6–20)
BUN/CREAT SERPL: 8.8 (ref 7–25)
CALCIUM SPEC-SCNC: 8.8 MG/DL (ref 8.6–10.5)
CHLORIDE SERPL-SCNC: 103 MMOL/L (ref 98–107)
CO2 SERPL-SCNC: 26 MMOL/L (ref 22–29)
CREAT SERPL-MCNC: 0.91 MG/DL (ref 0.76–1.27)
GFR SERPL CREATININE-BSD FRML MDRD: 120 ML/MIN/1.73
GLUCOSE SERPL-MCNC: 96 MG/DL (ref 65–99)
NT-PROBNP SERPL-MCNC: 2603 PG/ML (ref 0–450)
POTASSIUM SERPL-SCNC: 3.4 MMOL/L (ref 3.5–5.2)
SODIUM SERPL-SCNC: 140 MMOL/L (ref 136–145)

## 2021-10-04 PROCEDURE — 80048 BASIC METABOLIC PNL TOTAL CA: CPT | Performed by: NURSE PRACTITIONER

## 2021-10-04 PROCEDURE — 99214 OFFICE O/P EST MOD 30 MIN: CPT | Performed by: NURSE PRACTITIONER

## 2021-10-04 PROCEDURE — 83880 ASSAY OF NATRIURETIC PEPTIDE: CPT | Performed by: NURSE PRACTITIONER

## 2021-10-04 NOTE — PROGRESS NOTES
"Chief Complaint  Shortness of Breath, Follow-up, and Edema    Subjective    History of Present Illness {CC  Problem List  Visit  Diagnosis   Encounters  Notes  Medications  Labs  Result Review Imaging  Media :23}       History of Present Illness   30-year-old male presents the office today as a same-day appointment for ongoing evaluation of his acute on chronic systolic heart failure.  Patient reports he began experiencing dyspnea, cough, weight gain and pedal edema on the day 3 after taking the first dose of his Covid vaccination which was 1 month ago.  He reports he is using oxygen daily which is new for him.  Reports compliance with his medications.  Notes that he experiences dyspnea with minimal exertion.  Patient also reports intermittent dizziness since Covid vaccination.  Objective     Vital Signs:   Vitals:    10/04/21 1245   BP: 107/71   BP Location: Left arm   Patient Position: Sitting   Cuff Size: Large Adult   Pulse: 106   Resp: 18   Temp: 97 °F (36.1 °C)   TempSrc: Temporal   SpO2: 99%   Weight: (!) 154 kg (340 lb 8 oz)   Height: 175.3 cm (69\")     Body mass index is 50.28 kg/m².  Physical Exam  Vitals and nursing note reviewed.   Constitutional:       Appearance: Normal appearance.   HENT:      Head: Normocephalic.   Eyes:      Pupils: Pupils are equal, round, and reactive to light.   Cardiovascular:      Rate and Rhythm: Normal rate and regular rhythm.      Pulses: Normal pulses.      Heart sounds: Normal heart sounds. No murmur heard.     Pulmonary:      Effort: Pulmonary effort is normal.      Breath sounds: Rales present.   Abdominal:      General: Bowel sounds are normal. There is distension.   Musculoskeletal:         General: Normal range of motion.      Cervical back: Normal range of motion.      Right lower leg: Edema (2+ pitting ) present.      Left lower leg: Edema (2+ pitting ) present.   Skin:     General: Skin is warm and dry.      Capillary Refill: Capillary refill takes less " than 2 seconds.   Neurological:      Mental Status: He is alert and oriented to person, place, and time.   Psychiatric:         Mood and Affect: Mood normal.         Thought Content: Thought content normal.              Result Review  Data Reviewed:{ Labs  Result Review  Imaging  Med Tab  Media :23}     Lab Results   Component Value Date    GLUCOSE 85 05/18/2021    CALCIUM 8.7 05/18/2021     05/18/2021    K 3.1 (L) 05/18/2021    CO2 32.4 (H) 05/18/2021    CL 96 (L) 05/18/2021    BUN 11 05/18/2021    CREATININE 0.87 05/18/2021    EGFRIFAFRI 127 05/18/2021    BCR 12.6 05/18/2021    ANIONGAP 9.6 05/18/2021   \  Lab Results   Component Value Date    WBC 6.37 11/17/2020    HGB 14.2 11/17/2020    HCT 44.1 11/17/2020    MCV 81.7 11/17/2020     11/17/2020     Bmp and probnp pending from today            Assessment and Plan {CC Problem List  Visit Diagnosis  ROS  Review (Popup)  Health Maintenance  Quality  BestPractice  Medications  SmartSets  SnapShot Encounters  Media :23}   1. Acute on chronic systolic heart failure, NYHA class 3 (HCC)  IV diuresis today in office. Patient received 80 mg lasix  today through a butterfly  in the left forearm  over slow IV push. During IV diuresis, vitals were monitored and stable. Please see IV diuresis record for those vitals. Patient voided 450 ml in the office prior to discharge from the office. IV was d/c'd and area was free of erythema, ecchymosis, or drainage.   Patient will receive a follow up call from the HF center in 24 hours to reassess signs and symptoms. Heart failure education today including signs and symptoms, the role of the heart failure center, daily weights, low sodium diet (less than 1500 mg per day), and daily physical activity. Reviewed HF Zones with patient and family.  Patient to continue current medications as previously ordered.   - Basic Metabolic Panel; Future  - proBNP; Future  - Basic Metabolic Panel  - proBNP    2. Essential  hypertension  Well controlled     3. COLLIN (obstructive sleep apnea)  Compliant with cpap       Follow Up {Instructions Charge Capture  Follow-up Communications :23}   Return in about 1 week (around 10/11/2021) for Office visit, HF.    Patient was given instructions and counseling regarding his condition or for health maintenance advice. Please see specific information pulled into the AVS if appropriate.  Patient was instructed to call the [facility] with any questions, concerns, or worsening symptoms.    *Please note that portions of this note were completed with a voice recognition program. Efforts were made to edit the dictations, but occasionally words are mistranscribed.

## 2021-10-05 ENCOUNTER — TELEPHONE (OUTPATIENT)
Dept: CARDIOLOGY | Facility: HOSPITAL | Age: 31
End: 2021-10-05

## 2021-10-05 RX ORDER — POTASSIUM CHLORIDE 20 MEQ/1
20 TABLET, EXTENDED RELEASE ORAL DAILY
Qty: 30 TABLET | Refills: 1 | Status: SHIPPED | OUTPATIENT
Start: 2021-10-05 | End: 2022-02-21 | Stop reason: SDUPTHER

## 2021-10-05 NOTE — TELEPHONE ENCOUNTER
Reviewed  labs with patient.  Patient to resume potassium chloride 20 mEq daily.  Patient notes significant improvement in dyspnea and pedal edema after IV diuretics.  Continue current plan of care and keep follow-up in heart valve next week.  Patient verbalized understanding.

## 2021-10-13 ENCOUNTER — TELEPHONE (OUTPATIENT)
Dept: CARDIOLOGY | Facility: HOSPITAL | Age: 31
End: 2021-10-13

## 2021-10-13 NOTE — TELEPHONE ENCOUNTER
Patient states that he was told that he does not have a prescription for oxygen and would like to know if he could come in to get a oxygen test.

## 2021-10-29 ENCOUNTER — HOSPITAL ENCOUNTER (OUTPATIENT)
Dept: CARDIOLOGY | Facility: HOSPITAL | Age: 31
Discharge: HOME | End: 2021-10-29

## 2021-10-29 ENCOUNTER — OFFICE VISIT (OUTPATIENT)
Dept: CARDIOLOGY | Facility: HOSPITAL | Age: 31
End: 2021-10-29

## 2021-10-29 DIAGNOSIS — I50.23 ACUTE ON CHRONIC SYSTOLIC HEART FAILURE, NYHA CLASS 3 (HCC): Primary | ICD-10-CM

## 2021-10-29 DIAGNOSIS — E87.6 HYPOKALEMIA: ICD-10-CM

## 2021-10-29 DIAGNOSIS — R00.0 TACHYCARDIA: ICD-10-CM

## 2021-10-29 DIAGNOSIS — I10 ESSENTIAL HYPERTENSION: ICD-10-CM

## 2021-10-29 LAB
ANION GAP SERPL CALCULATED.3IONS-SCNC: 11.5 MMOL/L (ref 5–15)
BUN SERPL-MCNC: 9 MG/DL (ref 6–20)
BUN/CREAT SERPL: 9.5 (ref 7–25)
CALCIUM SPEC-SCNC: 8.4 MG/DL (ref 8.6–10.5)
CHLORIDE SERPL-SCNC: 99 MMOL/L (ref 98–107)
CO2 SERPL-SCNC: 26.5 MMOL/L (ref 22–29)
CREAT SERPL-MCNC: 0.95 MG/DL (ref 0.76–1.27)
GFR SERPL CREATININE-BSD FRML MDRD: 114 ML/MIN/1.73
GLUCOSE SERPL-MCNC: 77 MG/DL (ref 65–99)
POTASSIUM SERPL-SCNC: 3.4 MMOL/L (ref 3.5–5.2)
QT INTERVAL: 340 MS
QTC INTERVAL: 462 MS
SODIUM SERPL-SCNC: 137 MMOL/L (ref 136–145)

## 2021-10-29 PROCEDURE — 83880 ASSAY OF NATRIURETIC PEPTIDE: CPT | Performed by: NURSE PRACTITIONER

## 2021-10-29 PROCEDURE — 93005 ELECTROCARDIOGRAM TRACING: CPT | Performed by: NURSE PRACTITIONER

## 2021-10-29 PROCEDURE — 99214 OFFICE O/P EST MOD 30 MIN: CPT | Performed by: NURSE PRACTITIONER

## 2021-10-29 PROCEDURE — 80048 BASIC METABOLIC PNL TOTAL CA: CPT | Performed by: NURSE PRACTITIONER

## 2021-10-29 PROCEDURE — 93010 ELECTROCARDIOGRAM REPORT: CPT | Performed by: INTERNAL MEDICINE

## 2021-10-30 LAB — NT-PROBNP SERPL-MCNC: 2967 PG/ML (ref 0–450)

## 2021-11-01 ENCOUNTER — TELEMEDICINE (OUTPATIENT)
Dept: CARDIOLOGY | Facility: HOSPITAL | Age: 31
End: 2021-11-01

## 2021-11-01 ENCOUNTER — TELEPHONE (OUTPATIENT)
Dept: CARDIOLOGY | Facility: HOSPITAL | Age: 31
End: 2021-11-01

## 2021-11-01 VITALS
DIASTOLIC BLOOD PRESSURE: 65 MMHG | BODY MASS INDEX: 46.65 KG/M2 | WEIGHT: 315 LBS | HEART RATE: 117 BPM | HEIGHT: 69 IN | SYSTOLIC BLOOD PRESSURE: 111 MMHG

## 2021-11-01 VITALS
HEART RATE: 120 BPM | DIASTOLIC BLOOD PRESSURE: 55 MMHG | WEIGHT: 315 LBS | TEMPERATURE: 97 F | SYSTOLIC BLOOD PRESSURE: 117 MMHG | OXYGEN SATURATION: 90 % | RESPIRATION RATE: 22 BRPM | HEIGHT: 69 IN | BODY MASS INDEX: 46.65 KG/M2

## 2021-11-01 DIAGNOSIS — I10 ESSENTIAL HYPERTENSION: ICD-10-CM

## 2021-11-01 DIAGNOSIS — E87.6 HYPOKALEMIA: ICD-10-CM

## 2021-11-01 DIAGNOSIS — I50.23 ACUTE ON CHRONIC SYSTOLIC HEART FAILURE, NYHA CLASS 3 (HCC): Primary | ICD-10-CM

## 2021-11-01 PROCEDURE — 99214 OFFICE O/P EST MOD 30 MIN: CPT | Performed by: NURSE PRACTITIONER

## 2021-11-01 RX ORDER — SPIRONOLACTONE 50 MG/1
50 TABLET, FILM COATED ORAL DAILY
Qty: 30 TABLET | Refills: 3 | Status: ON HOLD | OUTPATIENT
Start: 2021-11-01 | End: 2022-01-28 | Stop reason: SDUPTHER

## 2021-11-01 RX ORDER — METOLAZONE 2.5 MG/1
2.5 TABLET ORAL DAILY
Qty: 3 TABLET | Refills: 0 | Status: SHIPPED | OUTPATIENT
Start: 2021-11-01 | End: 2021-11-19 | Stop reason: SDUPTHER

## 2021-11-01 NOTE — PROGRESS NOTES
"You have chosen to receive care through the use of telemedicine. Telemedicine enables health care providers at different locations to provide safe, effective, and convenient care through the use of technology. As with any health care service, there are risks associated with the use of telemedicine, including equipment failure, poor connections, and  issues.    • Do you understand the risks and benefits of telemedicine as I have explained them to you? Yes  • Have your questions regarding telemedicine been answered? Yes  • Do you consent to the use of telemedicine in your medical care today? Yes  Chief Complaint  Rapid Heart Rate, Congestive Heart Failure, and Follow-up    Subjective    History of Present Illness {CC  Problem List  Visit  Diagnosis   Encounters  Notes  Medications  Labs  Result Review Imaging  Media :23}       History of Present Illness   30-year-old male with telemedicine visit today for ongoing evaluation of his acute on chronic systolic heart failure.  Patient reports some improvement in symptoms after IV diuretics.  He reports that he is unable to go out with his portable concentrator secondary to the pulsatile oxygen.  Reports he needs continuous oxygen   Vital Signs:   Vitals:    11/01/21 1448   BP: 111/65   Pulse: 117   Weight: (!) 157 kg (346 lb)   Height: 175.3 cm (69\")     Body mass index is 51.1 kg/m².  Physical Exam  Vitals and nursing note reviewed.   Constitutional:       Appearance: Normal appearance.   HENT:      Head: Normocephalic.   Pulmonary:      Effort: Pulmonary effort is normal.   Neurological:      Mental Status: He is alert and oriented to person, place, and time.   Psychiatric:         Mood and Affect: Mood normal.         Behavior: Behavior normal.         Thought Content: Thought content normal.              Result Review  Data Reviewed:{ Labs  Result Review  Imaging  Med Tab  Media :23}   Lab Results   Component Value Date    GLUCOSE 77 " 10/29/2021    CALCIUM 8.4 (L) 10/29/2021     10/29/2021    K 3.4 (L) 10/29/2021    CO2 26.5 10/29/2021    CL 99 10/29/2021    BUN 9 10/29/2021    CREATININE 0.95 10/29/2021    EGFRIFAFRI 114 10/29/2021    BCR 9.5 10/29/2021    ANIONGAP 11.5 10/29/2021              Assessment and Plan {CC Problem List  Visit Diagnosis  ROS  Review (Popup)  Health Maintenance  Quality  BestPractice  Medications  SmartSets  SnapShot Encounters  Media :23}   1. Acute on chronic systolic heart failure, NYHA class 3 (HCC)  Continue torsemide, Aldactone, Entresto, carvedilol  Begin metolazone 2.5 mg for the next 3 days  Heart failure education today including signs and symptoms, the role of the heart failure center, daily weights, low sodium diet (less than 1500 mg per day), and daily physical activity. Reviewed HF Zones with patient and family.  Patient to continue current medications as previously ordered.   2. Essential hypertension  Well-controlled on Entresto, carvedilol    3. Hypokalemia  Aldactone recently increased to 50 mg daily          Follow Up {Instructions Charge Capture  Follow-up Communications :23}   Return in about 1 week (around 11/8/2021) for Office visit, HF.    Patient was given instructions and counseling regarding his condition or for health maintenance advice. Please see specific information pulled into the AVS if appropriate.  Patient was instructed to call the [facility] with any questions, concerns, or worsening symptoms.    *Please note that portions of this note were completed with a voice recognition program. Efforts were made to edit the dictations, but occasionally words are mistranscribed.

## 2021-11-01 NOTE — PROGRESS NOTES
"Chief Complaint  Follow-up (SHF)    Subjective    History of Present Illness {CC  Problem List  Visit  Diagnosis   Encounters  Notes  Medications  Labs  Result Review Imaging  Media :23}       History of Present Illness   30-year-old male presents the office today for ongoing evaluation of his acute on chronic systolic heart failure.  Patient reports he has been experiencing fluid overload for the past week.  He reports that he just cannot breathe with ambulation.Patient also reports pedal edema, dyspnea and abdominal fullness.  Patient has experienced weight gain but is unclear how much.  Patient currently ambulates with pulsating oxygen and notes he believes that makes it worse.  Patient is requesting continuous oxygen at all times.  Currently denies chest pain, syncope, orthopnea, PND.  Objective     Vital Signs:   Vitals:    10/29/21 1435   BP: 117/55   BP Location: Left arm   Patient Position: Sitting   Pulse: 120   Resp: 22   Temp: 97 °F (36.1 °C)   SpO2: 90% 5 liters    Weight: (!) 157 kg (346 lb 6.4 oz)   Height: 175.3 cm (69\")     Body mass index is 51.15 kg/m².  Physical Exam  Vitals and nursing note reviewed.   Constitutional:       Appearance: Normal appearance. He is obese.   HENT:      Head: Normocephalic.   Eyes:      Pupils: Pupils are equal, round, and reactive to light.   Cardiovascular:      Rate and Rhythm: Normal rate and regular rhythm.      Pulses: Normal pulses.      Heart sounds: Normal heart sounds. No murmur heard.      Pulmonary:      Effort: Pulmonary effort is normal.      Breath sounds: Rales present.   Abdominal:      General: Bowel sounds are normal. There is distension.   Musculoskeletal:         General: Normal range of motion.      Cervical back: Normal range of motion.      Right lower leg: Edema present.      Left lower leg: Edema present.   Skin:     General: Skin is warm and dry.      Capillary Refill: Capillary refill takes less than 2 seconds.   Neurological:      " Mental Status: He is alert and oriented to person, place, and time.   Psychiatric:         Mood and Affect: Mood normal.         Thought Content: Thought content normal.              Result Review  Data Reviewed:{ Labs  Result Review  Imaging  Med Tab  Media :23}     Lab Results   Component Value Date    GLUCOSE 77 10/29/2021    CALCIUM 8.4 (L) 10/29/2021     10/29/2021    K 3.4 (L) 10/29/2021    CO2 26.5 10/29/2021    CL 99 10/29/2021    BUN 9 10/29/2021    CREATININE 0.95 10/29/2021    EGFRIFAFRI 114 10/29/2021    BCR 9.5 10/29/2021    ANIONGAP 11.5 10/29/2021     EKG: Vent. Rate : 111 BPM     Atrial Rate : 111 BPM     P-R Int : 190 ms          QRS Dur : 106 ms      QT Int : 340 ms       P-R-T Axes :  62  80  77 degrees     QTc Int : 462 ms     Sinus tachycardia  Possible Left atrial enlargement  Low voltage QRS     Abnormal ECG  When compared with ECG of 17-NOV-2020 22:15,  Significant changes have occurred  Confirmed by MD Moisés, Louis ([id number]) on 10/29/2021 4:30:21 PM     6-minute walk test today: Pretest vitals 107/71, heart rate 106, respiratory rate 20, O2 sat 92% on 4 L.  Pretest dyspnea score 8 out of 10  Prefatigue score 8 out of 10  Patient ambulated 40 feet and asked the test to be stopped secondary to dyspnea.  Posttest vitals blood pressure 109/78, heart rate 114, respiratory rate 28, O2 sat 87% on 4 L, oxygen sat improved to 93% on 5 liters  Post dyspnea score 10 out of 10  Post fatigue score 10 out of 10    Provider unable to check room air sat due to patient's hypoxia and NESS.          Assessment and Plan {CC Problem List  Visit Diagnosis  ROS  Review (Popup)  Health Maintenance  Quality  BestPractice  Medications  SmartSets  SnapShot Encounters  Media :23}   1. Acute on chronic systolic heart failure, NYHA class 3 (HCC)  IV diuresis today in office. Patient received 80 mg lasix today through a butterfly in the right forearm  over slow IV push. During IV diuresis, vitals were  monitored and stable. Please see IV diuresis record for those vitals. Patient voided 1450 ml in the office prior to discharge from the office. Butterfly  was d/c'd and area was free of erythema, ecchymosis, or drainage.  Patient was  instructed to record urinary output for the next 24 hours. Patient will receive a follow up call from the HF center in 24 hours to evaluate urinary output and reassess signs and symptoms.   - Basic Metabolic Panel; Future  - proBNP; Future  - Basic Metabolic Panel  - proBNP    2. Essential hypertension  Well-controlled on carvedilol, Entresto  - Basic Metabolic Panel; Future  - Basic Metabolic Panel    3. Tachycardia    - ECG 12 Lead; Future  - ECG 12 Lead; Future    4. Hypokalemia  Increase aldactone to 50 mg daily     Will send information for oxygen recert and continuous flow oxygen.  Follow Up {Instructions Charge Capture  Follow-up Communications :23}   Return in about 1 year (around 10/29/2022) for Office visit, HF.    Patient was given instructions and counseling regarding his condition or for health maintenance advice. Please see specific information pulled into the AVS if appropriate.  Patient was instructed to call the [facility] with any questions, concerns, or worsening symptoms.    *Please note that portions of this note were completed with a voice recognition program. Efforts were made to edit the dictations, but occasionally words are mistranscribed.

## 2021-11-01 NOTE — TELEPHONE ENCOUNTER
Patient called and states that [facility] received the pulse oximetry but they are needing to have the walk test before they can give the oxygen.

## 2021-11-02 ENCOUNTER — TELEPHONE (OUTPATIENT)
Dept: CARDIOLOGY | Facility: CLINIC | Age: 31
End: 2021-11-02

## 2021-11-02 ENCOUNTER — OFFICE VISIT (OUTPATIENT)
Dept: CARDIOLOGY | Facility: HOSPITAL | Age: 31
End: 2021-11-02

## 2021-11-02 VITALS
DIASTOLIC BLOOD PRESSURE: 72 MMHG | HEART RATE: 99 BPM | SYSTOLIC BLOOD PRESSURE: 114 MMHG | BODY MASS INDEX: 51.1 KG/M2 | HEIGHT: 69 IN

## 2021-11-02 DIAGNOSIS — I50.23 ACUTE ON CHRONIC SYSTOLIC CONGESTIVE HEART FAILURE, NYHA CLASS 4 (HCC): Primary | ICD-10-CM

## 2021-11-02 DIAGNOSIS — Z99.81 ON HOME O2: ICD-10-CM

## 2021-11-02 DIAGNOSIS — I10 ESSENTIAL HYPERTENSION: ICD-10-CM

## 2021-11-02 PROCEDURE — 99441 PR PHYS/QHP TELEPHONE EVALUATION 5-10 MIN: CPT | Performed by: NURSE PRACTITIONER

## 2021-11-02 NOTE — TELEPHONE ENCOUNTER
Pt hasn't been seen by a cardiology provider in this office since 2018. I spoke w/pt to ask if he is following with another cardiology practice-pt states no. I advised pt that it is best practice to be seen twice a year by a cardiology provider. Pt asks to schedule an appointment. I will notify scheduling to contact pt for an appointment w/Dr. Olsen w/[facility] device check.

## 2021-11-02 NOTE — TELEPHONE ENCOUNTER
I'll forward this to Zahraa- he has been non compliant for the last 3 appointments- last seen January 2020. I will check to make sure it's okay to schedule follow up appt.

## 2021-11-02 NOTE — TELEPHONE ENCOUNTER
Called patient back to educate him on the health benefits of remote home monitoring of his ICD device. Pt states he has returned to his home. I further educated & advised patient that if he changes location for any length of time to take his monitor with him. Pt verbalized understanding.

## 2021-11-02 NOTE — PROGRESS NOTES
"Chief Complaint  Follow-up (SHF )    Subjective    History of Present Illness {CC  Problem List  Visit  Diagnosis   Encounters  Notes  Medications  Labs  Result Review Imaging  Media :23}   You have chosen to receive care through the use of telemedicine. Telemedicine enables health care providers at different locations to provide safe, effective, and convenient care through the use of technology. As with any health care service, there are risks associated with the use of telemedicine, including equipment failure, poor connections, and  issues.    • Do you understand the risks and benefits of telemedicine as I have explained them to you? Yes  • Have your questions regarding telemedicine been answered? Yes  • Do you consent to the use of telemedicine in your medical care today? Yes      History of Present Illness   30 year old with telephone visit today for ongoing evaluation of his acute on chronic systolic heart failure. Patient notes mild improvement in his symptoms of dyspnea and pedal edema. Patient is to start higher dose of aldactone today (50 mg) and metolazone 2. 5mg for the next 3 days. He notes ongoing dyspnea at rest and with exertion. Notes that he voided frequently after IV diuresis on Friday.   Objective     Vital Signs:   Vitals:    11/02/21 0906   BP: 114/72   BP Location: Left arm   Patient Position: Sitting   Pulse: 99   SpO2:    Height: 175.3 cm (69\")     Body mass index is 51.1 kg/m².          Result Review  Data Reviewed:{ Labs  Result Review  Imaging  Med Tab  Media :23}     Lab Results   Component Value Date    GLUCOSE 77 10/29/2021    CALCIUM 8.4 (L) 10/29/2021     10/29/2021    K 3.4 (L) 10/29/2021    CO2 26.5 10/29/2021    CL 99 10/29/2021    BUN 9 10/29/2021    CREATININE 0.95 10/29/2021    EGFRIFAFRI 114 10/29/2021    BCR 9.5 10/29/2021    ANIONGAP 11.5 10/29/2021                Assessment and Plan {CC Problem List  Visit Diagnosis  ROS  Review " (Popup)  Health Maintenance  Quality  BestPractice  Medications  SmartSets  SnapShot Encounters  Media :23}   1. Acute on chronic systolic congestive heart failure, NYHA class 4 (HCC)  Begin metolazone 2. 5mg daily as directed for next 3 days     2. Essential hypertension  Well controlled     3. On home O2  Continue home oxygen     Telephone time 8 minutes     Follow Up {Instructions Charge Capture  Follow-up Communications :23}   Return in about 1 week (around 11/9/2021) for Office visit, HF.    Patient was given instructions and counseling regarding his condition or for health maintenance advice. Please see specific information pulled into the AVS if appropriate.  Patient was instructed to call the [facility] with any questions, concerns, or worsening symptoms.    *Please note that portions of this note were completed with a voice recognition program. Efforts were made to edit the dictations, but occasionally words are mistranscribed.

## 2021-11-03 RX ORDER — CARVEDILOL 25 MG/1
25 TABLET ORAL 2 TIMES DAILY
Qty: 60 TABLET | Refills: 11 | Status: SHIPPED | OUTPATIENT
Start: 2021-11-03 | End: 2022-01-28 | Stop reason: HOSPADM

## 2021-11-15 ENCOUNTER — TELEPHONE (OUTPATIENT)
Dept: CARDIOLOGY | Facility: HOSPITAL | Age: 31
End: 2021-11-15

## 2021-11-15 NOTE — TELEPHONE ENCOUNTER
Patient called requesting that all his oxygen information be sent over to [facility] from Patient Aide.   [facility]   [address], [address]  [place] [address]  Phone: [phone]

## 2021-11-18 ENCOUNTER — TELEPHONE (OUTPATIENT)
Dept: CARDIOLOGY | Facility: HOSPITAL | Age: 31
End: 2021-11-18

## 2021-11-18 NOTE — TELEPHONE ENCOUNTER
Teresa called stating that patient's oxygen on walk test was for 4 liters but order states 2 liters. They state that the walk test and order has to match, They can be reached at [phone].

## 2021-11-19 ENCOUNTER — TELEPHONE (OUTPATIENT)
Dept: CARDIOLOGY | Facility: HOSPITAL | Age: 31
End: 2021-11-19

## 2021-11-19 ENCOUNTER — OFFICE VISIT (OUTPATIENT)
Dept: CARDIOLOGY | Facility: HOSPITAL | Age: 31
End: 2021-11-19

## 2021-11-19 ENCOUNTER — TELEMEDICINE (OUTPATIENT)
Dept: SLEEP MEDICINE | Facility: HOSPITAL | Age: 31
End: 2021-11-19

## 2021-11-19 VITALS
DIASTOLIC BLOOD PRESSURE: 74 MMHG | OXYGEN SATURATION: 96 % | HEART RATE: 119 BPM | WEIGHT: 315 LBS | RESPIRATION RATE: 22 BRPM | SYSTOLIC BLOOD PRESSURE: 110 MMHG | TEMPERATURE: 96.3 F | BODY MASS INDEX: 46.65 KG/M2 | HEIGHT: 69 IN

## 2021-11-19 VITALS — HEIGHT: 69 IN | WEIGHT: 300 LBS | BODY MASS INDEX: 44.43 KG/M2

## 2021-11-19 DIAGNOSIS — Z99.89 OSA ON CPAP: Primary | ICD-10-CM

## 2021-11-19 DIAGNOSIS — G47.33 OSA (OBSTRUCTIVE SLEEP APNEA): ICD-10-CM

## 2021-11-19 DIAGNOSIS — G47.33 OSA ON CPAP: Primary | ICD-10-CM

## 2021-11-19 DIAGNOSIS — I10 ESSENTIAL HYPERTENSION: ICD-10-CM

## 2021-11-19 DIAGNOSIS — I50.23 ACUTE ON CHRONIC SYSTOLIC CONGESTIVE HEART FAILURE, NYHA CLASS 4 (HCC): Primary | ICD-10-CM

## 2021-11-19 LAB
ANION GAP SERPL CALCULATED.3IONS-SCNC: 10.8 MMOL/L (ref 5–15)
BUN SERPL-MCNC: 15 MG/DL (ref 6–20)
BUN/CREAT SERPL: 12.9 (ref 7–25)
CALCIUM SPEC-SCNC: 8.8 MG/DL (ref 8.6–10.5)
CHLORIDE SERPL-SCNC: 96 MMOL/L (ref 98–107)
CO2 SERPL-SCNC: 26.2 MMOL/L (ref 22–29)
CREAT SERPL-MCNC: 1.16 MG/DL (ref 0.76–1.27)
GFR SERPL CREATININE-BSD FRML MDRD: 91 ML/MIN/1.73
GLUCOSE SERPL-MCNC: 107 MG/DL (ref 65–99)
NT-PROBNP SERPL-MCNC: 3553 PG/ML (ref 0–450)
POTASSIUM SERPL-SCNC: 3.9 MMOL/L (ref 3.5–5.2)
SODIUM SERPL-SCNC: 133 MMOL/L (ref 136–145)

## 2021-11-19 PROCEDURE — 80048 BASIC METABOLIC PNL TOTAL CA: CPT | Performed by: NURSE PRACTITIONER

## 2021-11-19 PROCEDURE — 83880 ASSAY OF NATRIURETIC PEPTIDE: CPT | Performed by: NURSE PRACTITIONER

## 2021-11-19 PROCEDURE — 99213 OFFICE O/P EST LOW 20 MIN: CPT | Performed by: NURSE PRACTITIONER

## 2021-11-19 PROCEDURE — 99214 OFFICE O/P EST MOD 30 MIN: CPT | Performed by: NURSE PRACTITIONER

## 2021-11-19 RX ORDER — METOLAZONE 2.5 MG/1
2.5 TABLET ORAL DAILY
Qty: 6 TABLET | Refills: 3 | Status: SHIPPED | OUTPATIENT
Start: 2021-11-19 | End: 2021-12-16 | Stop reason: SDUPTHER

## 2021-11-19 NOTE — PROGRESS NOTES
Chief Complaint:   Chief Complaint   Patient presents with   • Follow-up       HPI:    Evan Gannon is a 30 y.o. male here for follow-up of sleep apnea.  Patient has a history of hypertension, cardiomyopathy, MI, morbid obesity, hypokalemia, ARF, and severe sleep apnea.  Patient was last seen 8/15/2019.  Patient is sleeping 6 hours nightly and does feel rested upon awakening.  Patient will go to sleep within 5 minutes and it does vary from night to night on how many times he does get up.  Patient has an [place] score of 2/24.  We did discuss the Toby recall today and answered patient's questions.  He still needs to register his [email or web address].  We also strongly encouraged he continue use of the 1 he has now until the new 1 arrives.  Patient states that he will do this and has no concerns or complaints.      Current medications are:   Current Outpatient Medications:   •  allopurinol (Zyloprim) 100 MG tablet, Take 1 tablet by mouth Daily., Disp: 30 tablet, Rfl: 5  •  carvedilol (COREG) 25 MG tablet, Take 1 tablet by mouth 2 (Two) Times a Day., Disp: 60 tablet, Rfl: 11  •  halobetasol (ULTRAVATE) 0.05 % ointment, , Disp: , Rfl:   •  ibuprofen (ADVIL,MOTRIN) 800 MG tablet, Take 1 tablet by mouth Daily., Disp: , Rfl:   •  metOLazone (ZAROXOLYN) 2.5 MG tablet, Take 1 tablet by mouth Daily., Disp: 3 tablet, Rfl: 0  •  omeprazole (priLOSEC) 40 MG capsule, Take 1 capsule by mouth Daily., Disp: 30 capsule, Rfl: 3  •  potassium chloride (K-DUR,KLOR-CON) 20 MEQ CR tablet, Take 1 tablet by mouth Daily., Disp: 30 tablet, Rfl: 1  •  sacubitril-valsartan (Entresto) 24-26 MG tablet, Take 1 tablet by mouth 2 (Two) Times a Day., Disp: 60 tablet, Rfl: 3  •  Secukinumab (COSENTYX, 300 MG DOSE, SC), Inject 300 mg under the skin into the appropriate area as directed., Disp: , Rfl:   •  spironolactone (Aldactone) 50 MG tablet, Take 1 tablet by mouth Daily., Disp: 30 tablet, Rfl: 3  •  torsemide (DEMADEX) 20 MG tablet,  Take 1 tablet by mouth 2 (two) times a day. Taking 2 tablets daily, Disp: 180 tablet, Rfl: 3  •  ursodiol (ACTIGALL) 300 MG capsule, Take 1 capsule by mouth 2 (Two) Times a Day., Disp: 60 capsule, Rfl: 5.      The patient's relevant past medical, surgical, family and social history were reviewed and updated in Epic as appropriate.       Review of Systems   Eyes: Positive for visual disturbance.   Respiratory: Positive for apnea, cough and shortness of breath.    Cardiovascular: Positive for leg swelling.   Psychiatric/Behavioral: Positive for sleep disturbance.   All other systems reviewed and are negative.        Objective:    Physical Exam  Pulmonary:      Effort: Pulmonary effort is normal. No respiratory distress.   Neurological:      Mental Status: He is alert and oriented to person, place, and time.   Psychiatric:         Mood and Affect: Mood normal.         Behavior: Behavior normal.         Thought Content: Thought content normal.         Judgment: Judgment normal.     88/90 days of use  Greater than 4-hour use 95.6  90% pressure 10.6  AHI of 0.1  Settings 10-18      ASSESSMENT/PLAN    Diagnoses and all orders for this visit:    1. COLLIN on CPAP (Primary)  -     CPAP Therapy            1. Counseled patient regarding multimodal approach with healthy nutrition, healthy sleep, regular physical activity, social activities, counseling, and medications. Encouraged to practice lateral sleep position. Avoid alcohol and sedatives close to bedtime.  2. Refill supplies x1 year.  Return to clinic 1 year or sooner symptoms warrant.    I have reviewed the results of my evaluation and impression and discussed my recommendations in detail with the patient.      Signed by  SONAM Victor    November 19, 2021      CC: No primary care provider on file.          No ref. provider found

## 2021-11-19 NOTE — TELEPHONE ENCOUNTER
Patient called to have another prescription of Metolazone. He states that last night he started to get a dry cough and states that he has been having some congestion/ chest heaviness. He states that he is going out of town on Sunday and wanted to try to get something done prior to his trip.

## 2021-11-23 ENCOUNTER — TELEPHONE (OUTPATIENT)
Dept: CARDIOLOGY | Facility: CLINIC | Age: 31
End: 2021-11-23

## 2021-11-23 NOTE — TELEPHONE ENCOUNTER
Message received from [email or web address] web site-“No message received for 21 days.”  Called pt & left pt a voice mail to check monitor connections & call device clinic if assistance needed.

## 2021-11-24 NOTE — PROGRESS NOTES
"Chief Complaint  Congestive Heart Failure and Follow-up    Subjective    History of Present Illness {CC  Problem List  Visit  Diagnosis   Encounters  Notes  Medications  Labs  Result Review Imaging  Media :23}       History of Present Illness   30-year-old male presents the office today for ongoing evaluation of his acute on chronic systolic heart failure.  Patient notes compliance with his medications.  He reports ongoing dyspnea on exertion requiring oxygen use, abdominal fullness and pedal edema.  Patient reports he is just not felt well in quite some time.  Denies presyncope, syncope, orthopnea, PND.  Objective     Vital Signs:   Vitals:    11/19/21 1326   BP: 110/74   BP Location: Left arm   Patient Position: Sitting   Cuff Size: Large Adult   Pulse: 119   Resp: 22   Temp: 96.3 °F (35.7 °C)   TempSrc: Temporal   SpO2: 96%   Weight: (!) 152 kg (336 lb 2 oz)   Height: 175.3 cm (69\")     Body mass index is 49.64 kg/m².  Physical Exam  Vitals and nursing note reviewed.   Constitutional:       Appearance: Normal appearance. He is obese.   HENT:      Head: Normocephalic.   Eyes:      Pupils: Pupils are equal, round, and reactive to light.   Cardiovascular:      Rate and Rhythm: Normal rate and regular rhythm.      Pulses: Normal pulses.      Heart sounds: Normal heart sounds. No murmur heard.      Pulmonary:      Effort: Pulmonary effort is normal.      Breath sounds: Rales present.   Abdominal:      General: Bowel sounds are normal. There is distension.   Musculoskeletal:         General: Normal range of motion.      Cervical back: Normal range of motion.      Right lower leg: Edema present.      Left lower leg: Edema present.   Skin:     General: Skin is warm and dry.      Capillary Refill: Capillary refill takes less than 2 seconds.      Comments: Psoriatic lesions noted all over body   Neurological:      Mental Status: He is alert and oriented to person, place, and time.   Psychiatric:         Mood and " Affect: Mood normal.         Thought Content: Thought content normal.              Result Review  Data Reviewed:{ Labs  Result Review  Imaging  Med Tab  Media :23}     Lab Results   Component Value Date    GLUCOSE 107 (H) 11/19/2021    CALCIUM 8.8 11/19/2021     (L) 11/19/2021    K 3.9 11/19/2021    CO2 26.2 11/19/2021    CL 96 (L) 11/19/2021    BUN 15 11/19/2021    CREATININE 1.16 11/19/2021    EGFRIFAFRI 91 11/19/2021    BCR 12.9 11/19/2021    ANIONGAP 10.8 11/19/2021              Assessment and Plan {CC Problem List  Visit Diagnosis  ROS  Review (Popup)  Health Maintenance  Quality  BestPractice  Medications  SmartSets  SnapShot Encounters  Media :23}   1. Acute on chronic systolic congestive heart failure, NYHA class 4 (HCC)  80 mg of Lasix given through butterfly in left forearm.  Site clean dry and intact.  Patient voided once prior to discharge from the office.  - Basic Metabolic Panel; Future  - proBNP; Future  - Basic Metabolic Panel  - proBNP    2. Essential hypertension  Well controlled on coreg, entresto   - Basic Metabolic Panel; Future  - Basic Metabolic Panel    3. COLLIN (obstructive sleep apnea)  Compliant with cpap         Follow Up {Instructions Charge Capture  Follow-up Communications :23}   Return if symptoms worsen or fail to improve.    Patient was given instructions and counseling regarding his condition or for health maintenance advice. Please see specific information pulled into the AVS if appropriate.  Patient was instructed to call the [facility] with any questions, concerns, or worsening symptoms.

## 2021-11-26 PROCEDURE — 93295 DEV INTERROG REMOTE 1/2/MLT: CPT | Performed by: INTERNAL MEDICINE

## 2021-11-26 PROCEDURE — 93296 REM INTERROG EVL PM/IDS: CPT | Performed by: INTERNAL MEDICINE

## 2021-12-16 ENCOUNTER — TELEPHONE (OUTPATIENT)
Dept: CARDIOLOGY | Facility: HOSPITAL | Age: 31
End: 2021-12-16

## 2021-12-16 RX ORDER — METOLAZONE 2.5 MG/1
2.5 TABLET ORAL DAILY
Qty: 6 TABLET | Refills: 3 | Status: SHIPPED | OUTPATIENT
Start: 2021-12-16 | End: 2022-01-07 | Stop reason: SDUPTHER

## 2021-12-16 NOTE — TELEPHONE ENCOUNTER
Pt called ands stated that he doesn't think the torsemide. When he takes the metolazone it does help. He states he is having edema in his stomach, calves and feet and he is having trouble walking because of this. He states he would like to have another refill for the booster pill.

## 2022-01-07 ENCOUNTER — TELEMEDICINE (OUTPATIENT)
Dept: CARDIOLOGY | Facility: HOSPITAL | Age: 32
End: 2022-01-07

## 2022-01-07 DIAGNOSIS — I50.23: Primary | ICD-10-CM

## 2022-01-07 DIAGNOSIS — Z99.81 ON HOME O2: ICD-10-CM

## 2022-01-07 DIAGNOSIS — I10 ESSENTIAL HYPERTENSION: ICD-10-CM

## 2022-01-07 PROCEDURE — 99213 OFFICE O/P EST LOW 20 MIN: CPT | Performed by: NURSE PRACTITIONER

## 2022-01-07 RX ORDER — METOLAZONE 2.5 MG/1
2.5 TABLET ORAL DAILY
Qty: 15 TABLET | Refills: 3 | Status: SHIPPED | OUTPATIENT
Start: 2022-01-07 | End: 2022-01-28 | Stop reason: HOSPADM

## 2022-01-07 RX ORDER — METOLAZONE 2.5 MG/1
2.5 TABLET ORAL DAILY
Qty: 15 TABLET | Refills: 3 | Status: SHIPPED | OUTPATIENT
Start: 2022-01-07 | End: 2022-01-07 | Stop reason: SDUPTHER

## 2022-01-07 RX ORDER — BUMETANIDE 2 MG/1
2 TABLET ORAL DAILY
Qty: 30 TABLET | Refills: 3 | Status: SHIPPED | OUTPATIENT
Start: 2022-01-07 | End: 2022-06-21 | Stop reason: SDUPTHER

## 2022-01-10 VITALS — HEIGHT: 69 IN | BODY MASS INDEX: 46.65 KG/M2 | WEIGHT: 315 LBS

## 2022-01-10 NOTE — PROGRESS NOTES
"Chief Complaint  Follow-up (shf)    Subjective    History of Present Illness {CC  Problem List  Visit  Diagnosis   Encounters  Notes  Medications  Labs  Result Review Imaging  Media :23}     You have chosen to receive care through the use of telemedicine. Telemedicine enables health care providers at different locations to provide safe, effective, and convenient care through the use of technology. As with any health care service, there are risks associated with the use of telemedicine, including equipment failure, poor connections, and  issues.    • Do you understand the risks and benefits of telemedicine as I have explained them to you? Yes  • Have your questions regarding telemedicine been answered? Yes  • Do you consent to the use of telemedicine in your medical care today? Yes    History of Present Illness   31 year old male with telehealth visit today for ongoing evaluation of his acute on chronic systolic heart failure. Patient notes that he has been experiencing intermittent dyspnea, abdominal fullness, pedal edema and weight gain over the last few weeks. Unclear if he is taking all of his medications as directed. Currently denies cp, dizziness, orthopnea, syncope. Notes that he is wearing his oxygen all the time.  Objective     Vital Signs:   Vitals:    01/07/22 1320   Weight: (!) 154 kg (339 lb)   Height: 175.3 cm (69\")     Body mass index is 50.06 kg/m².  Physical Exam  Vitals and nursing note reviewed.   Constitutional:       Appearance: Normal appearance.   HENT:      Head: Normocephalic.   Pulmonary:      Effort: Pulmonary effort is normal.      Comments: Oxygen in place   Neurological:      Mental Status: He is alert and oriented to person, place, and time.   Psychiatric:         Mood and Affect: Mood normal.         Behavior: Behavior normal.         Thought Content: Thought content normal.              Result Review  Data Reviewed:{ Labs  Result Review  Imaging  Med " Tab  Media :23}     Lab Results   Component Value Date    GLUCOSE 107 (H) 11/19/2021    CALCIUM 8.8 11/19/2021     (L) 11/19/2021    K 3.9 11/19/2021    CO2 26.2 11/19/2021    CL 96 (L) 11/19/2021    BUN 15 11/19/2021    CREATININE 1.16 11/19/2021    EGFRIFAFRI 91 11/19/2021    BCR 12.9 11/19/2021    ANIONGAP 10.8 11/19/2021     Lab Results   Component Value Date    WBC 6.37 11/17/2020    HGB 14.2 11/17/2020    HCT 44.1 11/17/2020    MCV 81.7 11/17/2020     11/17/2020                Assessment and Plan {CC Problem List  Visit Diagnosis  ROS  Review (Popup)  Health Maintenance  Quality  BestPractice  Medications  SmartSets  SnapShot Encounters  Media :23}   1. Acute on chronic systolic congestive heart failure, NYHA class 4 (HCC)  Stop torsemide, begin bumex 2 mg daily  Continue entresto, coreg, metolazone, aldactone     2. Essential hypertension  Well controlled on coreg, entresto     3. On home O2  Stable         Follow Up {Instructions Charge Capture  Follow-up Communications :23}   Return in about 1 week (around 1/14/2022) for Office visit, HF.    Patient was given instructions and counseling regarding his condition or for health maintenance advice. Please see specific information pulled into the AVS if appropriate.  Patient was instructed to call the [facility] with any questions, concerns, or worsening symptoms.

## 2022-01-14 ENCOUNTER — TELEPHONE (OUTPATIENT)
Dept: CARDIOLOGY | Facility: HOSPITAL | Age: 32
End: 2022-01-14

## 2022-01-14 NOTE — TELEPHONE ENCOUNTER
Patient states that he was calling to let you know the reason he missed his appointment today was due to his ride not showing up and states that he rescheduled his appointment for Tuesday of next week. Patient states that his edema in his leg went down after having a lot of bleeding from what he thought to be a blood clot burst. He states that he is able to walk better now.

## 2022-01-19 ENCOUNTER — APPOINTMENT (OUTPATIENT)
Dept: CT IMAGING | Facility: HOSPITAL | Age: 32
End: 2022-01-19

## 2022-01-19 ENCOUNTER — APPOINTMENT (OUTPATIENT)
Dept: GENERAL RADIOLOGY | Facility: HOSPITAL | Age: 32
End: 2022-01-19

## 2022-01-19 ENCOUNTER — NURSE TRIAGE (OUTPATIENT)
Dept: CALL CENTER | Facility: HOSPITAL | Age: 32
End: 2022-01-19

## 2022-01-19 ENCOUNTER — HOSPITAL ENCOUNTER (INPATIENT)
Facility: HOSPITAL | Age: 32
LOS: 9 days | Discharge: HOME HEALTH | End: 2022-01-28
Attending: EMERGENCY MEDICINE | Admitting: HOSPITALIST

## 2022-01-19 DIAGNOSIS — K21.9 GASTROESOPHAGEAL REFLUX DISEASE, UNSPECIFIED WHETHER ESOPHAGITIS PRESENT: ICD-10-CM

## 2022-01-19 DIAGNOSIS — I50.23 ACUTE ON CHRONIC SYSTOLIC HEART FAILURE: ICD-10-CM

## 2022-01-19 DIAGNOSIS — K91.2 POSTGASTRECTOMY MALABSORPTION: ICD-10-CM

## 2022-01-19 DIAGNOSIS — Z90.3 POSTGASTRECTOMY MALABSORPTION: ICD-10-CM

## 2022-01-19 DIAGNOSIS — I42.0 NONISCHEMIC CONGESTIVE CARDIOMYOPATHY: ICD-10-CM

## 2022-01-19 DIAGNOSIS — E87.6 HYPOKALEMIA: ICD-10-CM

## 2022-01-19 DIAGNOSIS — Z13.21 MALNUTRITION SCREEN: ICD-10-CM

## 2022-01-19 DIAGNOSIS — E66.01 OBESITY, CLASS III, BMI 40-49.9 (MORBID OBESITY): ICD-10-CM

## 2022-01-19 DIAGNOSIS — R53.83 FATIGUE, UNSPECIFIED TYPE: ICD-10-CM

## 2022-01-19 DIAGNOSIS — I50.23 ACUTE ON CHRONIC SYSTOLIC CHF (CONGESTIVE HEART FAILURE): Primary | ICD-10-CM

## 2022-01-19 DIAGNOSIS — Z13.0 SCREENING, IRON DEFICIENCY ANEMIA: ICD-10-CM

## 2022-01-19 DIAGNOSIS — E55.9 HYPOVITAMINOSIS D: ICD-10-CM

## 2022-01-19 LAB
ALBUMIN SERPL-MCNC: 3.2 G/DL (ref 3.5–5.2)
ALBUMIN/GLOB SERPL: 0.7 G/DL
ALP SERPL-CCNC: 83 U/L (ref 39–117)
ALT SERPL W P-5'-P-CCNC: 14 U/L (ref 1–41)
ANION GAP SERPL CALCULATED.3IONS-SCNC: 7 MMOL/L (ref 5–15)
AST SERPL-CCNC: 19 U/L (ref 1–40)
BACTERIA UR QL AUTO: ABNORMAL /HPF
BASOPHILS # BLD AUTO: 0.03 10*3/MM3 (ref 0–0.2)
BASOPHILS NFR BLD AUTO: 0.7 % (ref 0–1.5)
BILIRUB CONJ SERPL-MCNC: 0.7 MG/DL (ref 0–0.3)
BILIRUB SERPL-MCNC: 1.9 MG/DL (ref 0–1.2)
BILIRUB UR QL STRIP: ABNORMAL
BUN SERPL-MCNC: 11 MG/DL (ref 6–20)
BUN/CREAT SERPL: 11.5 (ref 7–25)
CALCIUM SPEC-SCNC: 8.8 MG/DL (ref 8.6–10.5)
CHLORIDE SERPL-SCNC: 91 MMOL/L (ref 98–107)
CLARITY UR: CLEAR
CO2 SERPL-SCNC: 35 MMOL/L (ref 22–29)
COLOR UR: ABNORMAL
CREAT SERPL-MCNC: 0.96 MG/DL (ref 0.76–1.27)
CRP SERPL-MCNC: 1.58 MG/DL (ref 0–0.5)
D DIMER PPP FEU-MCNC: 1.83 MCGFEU/ML (ref 0–0.56)
DEPRECATED RDW RBC AUTO: 47.7 FL (ref 37–54)
EOSINOPHIL # BLD AUTO: 0.03 10*3/MM3 (ref 0–0.4)
EOSINOPHIL NFR BLD AUTO: 0.7 % (ref 0.3–6.2)
ERYTHROCYTE [DISTWIDTH] IN BLOOD BY AUTOMATED COUNT: 19 % (ref 12.3–15.4)
ERYTHROCYTE [SEDIMENTATION RATE] IN BLOOD: 48 MM/HR (ref 0–15)
FLUAV RNA RESP QL NAA+PROBE: NOT DETECTED
FLUBV RNA RESP QL NAA+PROBE: NOT DETECTED
GFR SERPL CREATININE-BSD FRML MDRD: 112 ML/MIN/1.73
GLOBULIN UR ELPH-MCNC: 4.5 GM/DL
GLUCOSE SERPL-MCNC: 94 MG/DL (ref 65–99)
GLUCOSE UR STRIP-MCNC: NEGATIVE MG/DL
HCT VFR BLD AUTO: 34.7 % (ref 37.5–51)
HGB BLD-MCNC: 11.3 G/DL (ref 13–17.7)
HGB UR QL STRIP.AUTO: NEGATIVE
HOLD SPECIMEN: NORMAL
HYALINE CASTS UR QL AUTO: ABNORMAL /LPF
IMM GRANULOCYTES # BLD AUTO: 0.01 10*3/MM3 (ref 0–0.05)
IMM GRANULOCYTES NFR BLD AUTO: 0.2 % (ref 0–0.5)
KETONES UR QL STRIP: NEGATIVE
LEUKOCYTE ESTERASE UR QL STRIP.AUTO: ABNORMAL
LYMPHOCYTES # BLD AUTO: 0.76 10*3/MM3 (ref 0.7–3.1)
LYMPHOCYTES NFR BLD AUTO: 18.6 % (ref 19.6–45.3)
MAGNESIUM SERPL-MCNC: 1.8 MG/DL (ref 1.6–2.6)
MCH RBC QN AUTO: 23.4 PG (ref 26.6–33)
MCHC RBC AUTO-ENTMCNC: 32.6 G/DL (ref 31.5–35.7)
MCV RBC AUTO: 71.8 FL (ref 79–97)
MONOCYTES # BLD AUTO: 0.6 10*3/MM3 (ref 0.1–0.9)
MONOCYTES NFR BLD AUTO: 14.7 % (ref 5–12)
NEUTROPHILS NFR BLD AUTO: 2.66 10*3/MM3 (ref 1.7–7)
NEUTROPHILS NFR BLD AUTO: 65.1 % (ref 42.7–76)
NITRITE UR QL STRIP: NEGATIVE
NRBC BLD AUTO-RTO: 0 /100 WBC (ref 0–0.2)
NT-PROBNP SERPL-MCNC: 4136 PG/ML (ref 0–450)
PH UR STRIP.AUTO: 7.5 [PH] (ref 5–8)
PLATELET # BLD AUTO: 277 10*3/MM3 (ref 140–450)
PMV BLD AUTO: 9.9 FL (ref 6–12)
POTASSIUM SERPL-SCNC: 2.8 MMOL/L (ref 3.5–5.2)
PROCALCITONIN SERPL-MCNC: 0.07 NG/ML (ref 0–0.25)
PROT SERPL-MCNC: 7.7 G/DL (ref 6–8.5)
PROT UR QL STRIP: ABNORMAL
RBC # BLD AUTO: 4.83 10*6/MM3 (ref 4.14–5.8)
RBC # UR STRIP: ABNORMAL /HPF
REF LAB TEST METHOD: ABNORMAL
RSV RNA NPH QL NAA+NON-PROBE: NOT DETECTED
SARS-COV-2 RNA RESP QL NAA+PROBE: NOT DETECTED
SODIUM SERPL-SCNC: 133 MMOL/L (ref 136–145)
SP GR UR STRIP: 1.04 (ref 1–1.03)
SQUAMOUS #/AREA URNS HPF: ABNORMAL /HPF
TROPONIN T SERPL-MCNC: <0.01 NG/ML (ref 0–0.03)
URATE SERPL-MCNC: 14.7 MG/DL (ref 3.4–7)
UROBILINOGEN UR QL STRIP: ABNORMAL
WBC # UR STRIP: ABNORMAL /HPF
WBC NRBC COR # BLD: 4.09 10*3/MM3 (ref 3.4–10.8)
WHOLE BLOOD HOLD SPECIMEN: NORMAL
WHOLE BLOOD HOLD SPECIMEN: NORMAL

## 2022-01-19 PROCEDURE — 94799 UNLISTED PULMONARY SVC/PX: CPT

## 2022-01-19 PROCEDURE — 93005 ELECTROCARDIOGRAM TRACING: CPT | Performed by: INTERNAL MEDICINE

## 2022-01-19 PROCEDURE — 81001 URINALYSIS AUTO W/SCOPE: CPT | Performed by: INTERNAL MEDICINE

## 2022-01-19 PROCEDURE — 83735 ASSAY OF MAGNESIUM: CPT | Performed by: EMERGENCY MEDICINE

## 2022-01-19 PROCEDURE — 85025 COMPLETE CBC W/AUTO DIFF WBC: CPT

## 2022-01-19 PROCEDURE — 82248 BILIRUBIN DIRECT: CPT | Performed by: INTERNAL MEDICINE

## 2022-01-19 PROCEDURE — 71275 CT ANGIOGRAPHY CHEST: CPT

## 2022-01-19 PROCEDURE — 94660 CPAP INITIATION&MGMT: CPT

## 2022-01-19 PROCEDURE — 93005 ELECTROCARDIOGRAM TRACING: CPT

## 2022-01-19 PROCEDURE — 87637 SARSCOV2&INF A&B&RSV AMP PRB: CPT | Performed by: EMERGENCY MEDICINE

## 2022-01-19 PROCEDURE — 71045 X-RAY EXAM CHEST 1 VIEW: CPT

## 2022-01-19 PROCEDURE — 25010000002 HEPARIN (PORCINE) PER 1000 UNITS: Performed by: NURSE PRACTITIONER

## 2022-01-19 PROCEDURE — 84145 PROCALCITONIN (PCT): CPT | Performed by: NURSE PRACTITIONER

## 2022-01-19 PROCEDURE — 86140 C-REACTIVE PROTEIN: CPT | Performed by: INTERNAL MEDICINE

## 2022-01-19 PROCEDURE — 73140 X-RAY EXAM OF FINGER(S): CPT

## 2022-01-19 PROCEDURE — 99223 1ST HOSP IP/OBS HIGH 75: CPT | Performed by: INTERNAL MEDICINE

## 2022-01-19 PROCEDURE — 85652 RBC SED RATE AUTOMATED: CPT | Performed by: INTERNAL MEDICINE

## 2022-01-19 PROCEDURE — 84550 ASSAY OF BLOOD/URIC ACID: CPT | Performed by: NURSE PRACTITIONER

## 2022-01-19 PROCEDURE — 84484 ASSAY OF TROPONIN QUANT: CPT

## 2022-01-19 PROCEDURE — 83880 ASSAY OF NATRIURETIC PEPTIDE: CPT

## 2022-01-19 PROCEDURE — 25010000002 MAGNESIUM SULFATE 2 GM/50ML SOLUTION: Performed by: INTERNAL MEDICINE

## 2022-01-19 PROCEDURE — 0 IOPAMIDOL PER 1 ML: Performed by: INTERNAL MEDICINE

## 2022-01-19 PROCEDURE — 80053 COMPREHEN METABOLIC PANEL: CPT

## 2022-01-19 PROCEDURE — 85379 FIBRIN DEGRADATION QUANT: CPT | Performed by: INTERNAL MEDICINE

## 2022-01-19 PROCEDURE — 99284 EMERGENCY DEPT VISIT MOD MDM: CPT

## 2022-01-19 PROCEDURE — 93005 ELECTROCARDIOGRAM TRACING: CPT | Performed by: EMERGENCY MEDICINE

## 2022-01-19 RX ORDER — HEPARIN SODIUM 5000 [USP'U]/ML
5000 INJECTION, SOLUTION INTRAVENOUS; SUBCUTANEOUS EVERY 8 HOURS SCHEDULED
Status: DISCONTINUED | OUTPATIENT
Start: 2022-01-19 | End: 2022-01-19

## 2022-01-19 RX ORDER — POTASSIUM CHLORIDE 750 MG/1
40 CAPSULE, EXTENDED RELEASE ORAL AS NEEDED
Status: DISCONTINUED | OUTPATIENT
Start: 2022-01-19 | End: 2022-01-20

## 2022-01-19 RX ORDER — POTASSIUM CHLORIDE 7.45 MG/ML
10 INJECTION INTRAVENOUS
Status: DISCONTINUED | OUTPATIENT
Start: 2022-01-19 | End: 2022-01-20

## 2022-01-19 RX ORDER — SODIUM CHLORIDE 0.9 % (FLUSH) 0.9 %
10 SYRINGE (ML) INJECTION EVERY 12 HOURS SCHEDULED
Status: DISCONTINUED | OUTPATIENT
Start: 2022-01-19 | End: 2022-01-28 | Stop reason: HOSPADM

## 2022-01-19 RX ORDER — POTASSIUM CHLORIDE 1.5 G/1.77G
40 POWDER, FOR SOLUTION ORAL AS NEEDED
Status: DISCONTINUED | OUTPATIENT
Start: 2022-01-19 | End: 2022-01-20

## 2022-01-19 RX ORDER — ALLOPURINOL 100 MG/1
200 TABLET ORAL DAILY
Status: DISCONTINUED | OUTPATIENT
Start: 2022-01-20 | End: 2022-01-28 | Stop reason: HOSPADM

## 2022-01-19 RX ORDER — SODIUM CHLORIDE 0.9 % (FLUSH) 0.9 %
10 SYRINGE (ML) INJECTION AS NEEDED
Status: DISCONTINUED | OUTPATIENT
Start: 2022-01-19 | End: 2022-01-28 | Stop reason: HOSPADM

## 2022-01-19 RX ORDER — POTASSIUM CHLORIDE 7.45 MG/ML
10 INJECTION INTRAVENOUS ONCE
Status: DISCONTINUED | OUTPATIENT
Start: 2022-01-19 | End: 2022-01-20 | Stop reason: SDUPTHER

## 2022-01-19 RX ORDER — HEPARIN SOD,PORCINE/0.9 % NACL 25000/250
19 INTRAVENOUS SOLUTION INTRAVENOUS
Status: DISCONTINUED | OUTPATIENT
Start: 2022-01-20 | End: 2022-01-21

## 2022-01-19 RX ORDER — MAGNESIUM SULFATE HEPTAHYDRATE 40 MG/ML
2 INJECTION, SOLUTION INTRAVENOUS ONCE
Status: COMPLETED | OUTPATIENT
Start: 2022-01-19 | End: 2022-01-19

## 2022-01-19 RX ORDER — COLCHICINE 0.6 MG/1
1.2 TABLET ORAL ONCE
Status: COMPLETED | OUTPATIENT
Start: 2022-01-19 | End: 2022-01-19

## 2022-01-19 RX ORDER — POTASSIUM CHLORIDE 1.5 G/1.77G
40 POWDER, FOR SOLUTION ORAL ONCE
Status: DISCONTINUED | OUTPATIENT
Start: 2022-01-19 | End: 2022-01-20 | Stop reason: SDUPTHER

## 2022-01-19 RX ORDER — CLOBETASOL PROPIONATE 0.5 MG/G
CREAM TOPICAL EVERY 12 HOURS SCHEDULED
Status: DISCONTINUED | OUTPATIENT
Start: 2022-01-19 | End: 2022-01-28 | Stop reason: HOSPADM

## 2022-01-19 RX ORDER — HEPARIN SODIUM 1000 [USP'U]/ML
40 INJECTION, SOLUTION INTRAVENOUS; SUBCUTANEOUS AS NEEDED
Status: DISCONTINUED | OUTPATIENT
Start: 2022-01-19 | End: 2022-01-19 | Stop reason: SDUPTHER

## 2022-01-19 RX ORDER — CARVEDILOL 12.5 MG/1
25 TABLET ORAL 2 TIMES DAILY
Status: DISCONTINUED | OUTPATIENT
Start: 2022-01-20 | End: 2022-01-20

## 2022-01-19 RX ORDER — PANTOPRAZOLE SODIUM 40 MG/1
40 TABLET, DELAYED RELEASE ORAL EVERY MORNING
Status: DISCONTINUED | OUTPATIENT
Start: 2022-01-20 | End: 2022-01-28 | Stop reason: HOSPADM

## 2022-01-19 RX ORDER — GUAIFENESIN 600 MG/1
600 TABLET, EXTENDED RELEASE ORAL EVERY 12 HOURS SCHEDULED
Status: DISCONTINUED | OUTPATIENT
Start: 2022-01-19 | End: 2022-01-28 | Stop reason: HOSPADM

## 2022-01-19 RX ORDER — SECUKINUMAB 150 MG/ML
INJECTION SUBCUTANEOUS
Status: ON HOLD | COMMUNITY
Start: 2022-01-15 | End: 2022-01-20

## 2022-01-19 RX ORDER — ACETAMINOPHEN 325 MG/1
650 TABLET ORAL EVERY 4 HOURS PRN
Status: DISCONTINUED | OUTPATIENT
Start: 2022-01-19 | End: 2022-01-28 | Stop reason: HOSPADM

## 2022-01-19 RX ORDER — POTASSIUM CHLORIDE 1.5 G/1.77G
40 POWDER, FOR SOLUTION ORAL ONCE
Status: COMPLETED | OUTPATIENT
Start: 2022-01-19 | End: 2022-01-19

## 2022-01-19 RX ORDER — HEPARIN SODIUM 1000 [USP'U]/ML
10000 INJECTION, SOLUTION INTRAVENOUS; SUBCUTANEOUS AS NEEDED
Status: DISCONTINUED | OUTPATIENT
Start: 2022-01-19 | End: 2022-01-19 | Stop reason: SDUPTHER

## 2022-01-19 RX ORDER — BENZONATATE 100 MG/1
100 CAPSULE ORAL 3 TIMES DAILY PRN
Status: DISCONTINUED | OUTPATIENT
Start: 2022-01-19 | End: 2022-01-28 | Stop reason: HOSPADM

## 2022-01-19 RX ORDER — ALLOPURINOL 100 MG/1
100 TABLET ORAL DAILY
Status: DISCONTINUED | OUTPATIENT
Start: 2022-01-20 | End: 2022-01-19

## 2022-01-19 RX ORDER — ACETAMINOPHEN 160 MG/5ML
650 SOLUTION ORAL EVERY 4 HOURS PRN
Status: DISCONTINUED | OUTPATIENT
Start: 2022-01-19 | End: 2022-01-28 | Stop reason: HOSPADM

## 2022-01-19 RX ORDER — BENZONATATE 100 MG/1
200 CAPSULE ORAL ONCE
Status: COMPLETED | OUTPATIENT
Start: 2022-01-19 | End: 2022-01-19

## 2022-01-19 RX ORDER — ACETAMINOPHEN 650 MG/1
650 SUPPOSITORY RECTAL EVERY 4 HOURS PRN
Status: DISCONTINUED | OUTPATIENT
Start: 2022-01-19 | End: 2022-01-28 | Stop reason: HOSPADM

## 2022-01-19 RX ORDER — ECHINACEA PURPUREA EXTRACT 125 MG
2 TABLET ORAL AS NEEDED
Status: DISCONTINUED | OUTPATIENT
Start: 2022-01-19 | End: 2022-01-28 | Stop reason: HOSPADM

## 2022-01-19 RX ADMIN — POTASSIUM CHLORIDE 40 MEQ: 1.5 POWDER, FOR SOLUTION ORAL at 17:56

## 2022-01-19 RX ADMIN — MAGNESIUM SULFATE HEPTAHYDRATE 2 G: 2 INJECTION, SOLUTION INTRAVENOUS at 22:30

## 2022-01-19 RX ADMIN — IOPAMIDOL 80 ML: 755 INJECTION, SOLUTION INTRAVENOUS at 21:58

## 2022-01-19 RX ADMIN — GUAIFENESIN 600 MG: 600 TABLET, EXTENDED RELEASE ORAL at 22:29

## 2022-01-19 RX ADMIN — SODIUM CHLORIDE, PRESERVATIVE FREE 10 ML: 5 INJECTION INTRAVENOUS at 22:30

## 2022-01-19 RX ADMIN — SALINE NASAL SPRAY 2 SPRAY: 1.5 SOLUTION NASAL at 22:29

## 2022-01-19 RX ADMIN — BENZONATATE 200 MG: 100 CAPSULE ORAL at 22:29

## 2022-01-19 RX ADMIN — HEPARIN SODIUM 5000 UNITS: 5000 INJECTION, SOLUTION INTRAVENOUS; SUBCUTANEOUS at 22:31

## 2022-01-19 RX ADMIN — POTASSIUM CHLORIDE 40 MEQ: 750 CAPSULE, EXTENDED RELEASE ORAL at 22:53

## 2022-01-19 RX ADMIN — COLCHICINE 1.2 MG: 0.6 TABLET, FILM COATED ORAL at 22:30

## 2022-01-19 NOTE — TELEPHONE ENCOUNTER
"[facility]    Already has an appointment today at 330.    Reason for Disposition  • SEVERE leg swelling (e.g., swelling extends above knee, entire leg is swollen, weeping fluid)    Additional Information  • Negative: SEVERE difficulty breathing (e.g., struggling for each breath, speaks in single words)  • Negative: Looks like a broken bone or dislocated joint (e.g., crooked or deformed)  • Negative: Sounds like a life-threatening emergency to the triager  • Negative: Chest pain  • Negative: Followed a leg injury  • Negative: [1] Small area of swelling AND [2] followed an insect bite to the area  • Negative: Swelling of one ankle joint  • Negative: Swelling of knee is main symptom  • Negative: Pregnant  • Negative: Postpartum (from 0 to 6 weeks after delivery)  • Negative: Difficulty breathing at rest  • Negative: Entire foot is cool or blue in comparison to other side  • Negative: [1] Can't walk or can barely walk AND [2] new-onset  • Negative: [1] Difficulty breathing with exertion (e.g., walking) AND [2] new-onset or worsening  • Negative: [1] Red area or streak AND [2] fever  • Negative: [1] Swelling is painful to touch AND [2] fever  • Negative: [1] Cast on leg or ankle AND [2] now increased pain  • Negative: Patient sounds very sick or weak to the triager    Answer Assessment - Initial Assessment Questions  1. ONSET: \"When did the swelling start?\" (e.g., minutes, hours, days)      About 2 weeks ago  2. LOCATION: \"What part of the leg is swollen?\"  \"Are both legs swollen or just one leg?\"      Both legs up to the stomach  3. SEVERITY: \"How bad is the swelling?\" (e.g., localized; mild, moderate, severe)   - Localized - small area of swelling localized to one leg   - MILD pedal edema - swelling limited to foot and ankle, pitting edema < 1/4 inch (6 mm) deep, rest and elevation eliminate most or all swelling   - MODERATE edema - swelling of lower leg to knee, pitting edema > 1/4 inch (6 mm) deep, rest and elevation only " "partially reduce swelling   - SEVERE edema - swelling extends above knee, facial or hand swelling present       severe  4. REDNESS: \"Does the swelling look red or infected?\"      Not red or hot  5. PAIN: \"Is the swelling painful to touch?\" If Yes, ask: \"How painful is it?\"   (Scale 1-10; mild, moderate or severe)      Certain areas are painful; calves are painful and hard  6. FEVER: \"Do you have a fever?\" If Yes, ask: \"What is it, how was it measured, and when did it start?\"       denies  7. CAUSE: \"What do you think is causing the leg swelling?\"      CHF  8. MEDICAL HISTORY: \"Do you have a history of heart failure, kidney disease, liver failure, or cancer?\"      CHF  9. RECURRENT SYMPTOM: \"Have you had leg swelling before?\" If Yes, ask: \"When was the last time?\" \"What happened that time?\"      Never this bad  10. OTHER SYMPTOMS: \"Do you have any other symptoms?\" (e.g., chest pain, difficulty breathing)        Two fingers are numb which is new; itching  11. PREGNANCY: \"Is there any chance you are pregnant?\" \"When was your last menstrual period?\"        na    Protocols used: LEG SWELLING AND EDEMA-ADULT-AH      "

## 2022-01-20 ENCOUNTER — APPOINTMENT (OUTPATIENT)
Dept: CARDIOLOGY | Facility: HOSPITAL | Age: 32
End: 2022-01-20

## 2022-01-20 ENCOUNTER — APPOINTMENT (OUTPATIENT)
Dept: CT IMAGING | Facility: HOSPITAL | Age: 32
End: 2022-01-20

## 2022-01-20 PROBLEM — I10 HYPERTENSION: Status: RESOLVED | Noted: 2017-02-07 | Resolved: 2022-01-20

## 2022-01-20 PROBLEM — I50.22 CHRONIC SYSTOLIC CONGESTIVE HEART FAILURE, NYHA CLASS 4 (HCC): Status: RESOLVED | Noted: 2017-07-11 | Resolved: 2022-01-20

## 2022-01-20 PROBLEM — R31.9 PAINLESS HEMATURIA: Status: RESOLVED | Noted: 2018-02-12 | Resolved: 2022-01-20

## 2022-01-20 PROBLEM — E66.01 MORBID OBESITY WITH BODY MASS INDEX (BMI) OF 50.0 TO 59.9 IN ADULT: Status: RESOLVED | Noted: 2020-08-04 | Resolved: 2022-01-20

## 2022-01-20 PROBLEM — N17.9 ARF (ACUTE RENAL FAILURE) (HCC): Status: RESOLVED | Noted: 2020-08-16 | Resolved: 2022-01-20

## 2022-01-20 LAB
ALBUMIN SERPL-MCNC: 3.4 G/DL (ref 3.5–5.2)
ALBUMIN/GLOB SERPL: 0.8 G/DL
ALP SERPL-CCNC: 81 U/L (ref 39–117)
ALT SERPL W P-5'-P-CCNC: 15 U/L (ref 1–41)
ANION GAP SERPL CALCULATED.3IONS-SCNC: 12 MMOL/L (ref 5–15)
APTT PPP: 34.2 SECONDS (ref 50–95)
AST SERPL-CCNC: 23 U/L (ref 1–40)
BASOPHILS # BLD MANUAL: 0 10*3/MM3 (ref 0–0.2)
BASOPHILS NFR BLD MANUAL: 0 % (ref 0–1.5)
BH CV ECHO MEAS - AO ROOT AREA (BSA CORRECTED): 1.2
BH CV ECHO MEAS - AO ROOT AREA: 7 CM^2
BH CV ECHO MEAS - AO ROOT DIAM: 3 CM
BH CV ECHO MEAS - ASC AORTA: 2.6 CM
BH CV ECHO MEAS - BSA(HAYCOCK): 2.6 M^2
BH CV ECHO MEAS - BSA(HAYCOCK): 2.6 M^2
BH CV ECHO MEAS - BSA: 2.5 M^2
BH CV ECHO MEAS - BSA: 2.5 M^2
BH CV ECHO MEAS - BZI_BMI: 44.3 KILOGRAMS/M^2
BH CV ECHO MEAS - BZI_BMI: 44.4 KILOGRAMS/M^2
BH CV ECHO MEAS - BZI_METRIC_HEIGHT: 175 CM
BH CV ECHO MEAS - BZI_METRIC_HEIGHT: 175.3 CM
BH CV ECHO MEAS - BZI_METRIC_WEIGHT: 136.1 KG
BH CV ECHO MEAS - BZI_METRIC_WEIGHT: 136.1 KG
BH CV ECHO MEAS - EDV(CUBED): 399.1 ML
BH CV ECHO MEAS - EDV(TEICH): 286.2 ML
BH CV ECHO MEAS - EF(CUBED): 15.8 %
BH CV ECHO MEAS - EF(TEICH): 12.1 %
BH CV ECHO MEAS - ESV(CUBED): 336 ML
BH CV ECHO MEAS - ESV(TEICH): 251.5 ML
BH CV ECHO MEAS - FS: 5.6 %
BH CV ECHO MEAS - IVS/LVPW: 0.84
BH CV ECHO MEAS - IVSD: 0.9 CM
BH CV ECHO MEAS - LA DIMENSION: 5.8 CM
BH CV ECHO MEAS - LA/AO: 1.9
BH CV ECHO MEAS - LAT PEAK E' VEL: 10.2 CM/SEC
BH CV ECHO MEAS - LATERAL E/E' RATIO: 14.1
BH CV ECHO MEAS - LV IVRT: 0.08 SEC
BH CV ECHO MEAS - LV MASS(C)D: 343 GRAMS
BH CV ECHO MEAS - LV MASS(C)DI: 139.9 GRAMS/M^2
BH CV ECHO MEAS - LV MAX PG: 1.3 MMHG
BH CV ECHO MEAS - LV MEAN PG: 0.67 MMHG
BH CV ECHO MEAS - LV V1 MAX: 57.2 CM/SEC
BH CV ECHO MEAS - LV V1 MEAN: 37.5 CM/SEC
BH CV ECHO MEAS - LV V1 VTI: 8.2 CM
BH CV ECHO MEAS - LVIDD: 7.4 CM
BH CV ECHO MEAS - LVIDS: 7 CM
BH CV ECHO MEAS - LVOT AREA (M): 4.5 CM^2
BH CV ECHO MEAS - LVOT AREA: 4.5 CM^2
BH CV ECHO MEAS - LVOT DIAM: 2.4 CM
BH CV ECHO MEAS - LVPWD: 1.1 CM
BH CV ECHO MEAS - MED PEAK E' VEL: 10.4 CM/SEC
BH CV ECHO MEAS - MEDIAL E/E' RATIO: 13.8
BH CV ECHO MEAS - MR MAX PG: 64 MMHG
BH CV ECHO MEAS - MR MAX VEL: 399.6 CM/SEC
BH CV ECHO MEAS - MR MEAN PG: 44.1 MMHG
BH CV ECHO MEAS - MR MEAN VEL: 317.5 CM/SEC
BH CV ECHO MEAS - MR VTI: 125.2 CM
BH CV ECHO MEAS - MV DEC SLOPE: 1243 CM/SEC^2
BH CV ECHO MEAS - MV DEC TIME: 0.12 SEC
BH CV ECHO MEAS - MV E MAX VEL: 144 CM/SEC
BH CV ECHO MEAS - PA MAX PG: 2.4 MMHG
BH CV ECHO MEAS - PA V2 MAX: 77.6 CM/SEC
BH CV ECHO MEAS - RAP SYSTOLE: 15 MMHG
BH CV ECHO MEAS - RVSP: 48 MMHG
BH CV ECHO MEAS - SI(CUBED): 25.7 ML/M^2
BH CV ECHO MEAS - SI(LVOT): 15.1 ML/M^2
BH CV ECHO MEAS - SI(TEICH): 14.1 ML/M^2
BH CV ECHO MEAS - SV(CUBED): 63.1 ML
BH CV ECHO MEAS - SV(LVOT): 37 ML
BH CV ECHO MEAS - SV(TEICH): 34.7 ML
BH CV ECHO MEAS - TR MAX PG: 33 MMHG
BH CV ECHO MEAS - TR MAX VEL: 287.2 CM/SEC
BH CV ECHO MEASUREMENTS AVERAGE E/E' RATIO: 13.98
BH CV LOW VAS LEFT GASTRONEMIUS VESSEL: 1
BH CV LOWER VASCULAR LEFT COMMON FEMORAL AUGMENT: NORMAL
BH CV LOWER VASCULAR LEFT COMMON FEMORAL COMPRESS: NORMAL
BH CV LOWER VASCULAR LEFT COMMON FEMORAL PHASIC: NORMAL
BH CV LOWER VASCULAR LEFT COMMON FEMORAL SPONT: NORMAL
BH CV LOWER VASCULAR LEFT DISTAL FEMORAL AUGMENT: NORMAL
BH CV LOWER VASCULAR LEFT DISTAL FEMORAL COMPRESS: NORMAL
BH CV LOWER VASCULAR LEFT DISTAL FEMORAL PHASIC: NORMAL
BH CV LOWER VASCULAR LEFT DISTAL FEMORAL SPONT: NORMAL
BH CV LOWER VASCULAR LEFT GASTRONEMIUS COMPRESS: NORMAL
BH CV LOWER VASCULAR LEFT GASTRONEMIUS THROMBUS: NORMAL
BH CV LOWER VASCULAR LEFT GREATER SAPH AK COMPRESS: NORMAL
BH CV LOWER VASCULAR LEFT GREATER SAPH BK COMPRESS: NORMAL
BH CV LOWER VASCULAR LEFT LESSER SAPH COMPRESS: NORMAL
BH CV LOWER VASCULAR LEFT MID FEMORAL AUGMENT: NORMAL
BH CV LOWER VASCULAR LEFT MID FEMORAL COMPRESS: NORMAL
BH CV LOWER VASCULAR LEFT MID FEMORAL PHASIC: NORMAL
BH CV LOWER VASCULAR LEFT MID FEMORAL SPONT: NORMAL
BH CV LOWER VASCULAR LEFT PERONEAL COMPRESS: NORMAL
BH CV LOWER VASCULAR LEFT POPLITEAL AUGMENT: NORMAL
BH CV LOWER VASCULAR LEFT POPLITEAL COMPRESS: NORMAL
BH CV LOWER VASCULAR LEFT POPLITEAL PHASIC: NORMAL
BH CV LOWER VASCULAR LEFT POPLITEAL SPONT: NORMAL
BH CV LOWER VASCULAR LEFT POSTERIOR TIBIAL COMPRESS: NORMAL
BH CV LOWER VASCULAR LEFT PROFUNDA FEMORAL AUGMENT: NORMAL
BH CV LOWER VASCULAR LEFT PROFUNDA FEMORAL COMPRESS: NORMAL
BH CV LOWER VASCULAR LEFT PROFUNDA FEMORAL PHASIC: NORMAL
BH CV LOWER VASCULAR LEFT PROFUNDA FEMORAL SPONT: NORMAL
BH CV LOWER VASCULAR LEFT PROXIMAL FEMORAL AUGMENT: NORMAL
BH CV LOWER VASCULAR LEFT PROXIMAL FEMORAL COMPRESS: NORMAL
BH CV LOWER VASCULAR LEFT PROXIMAL FEMORAL PHASIC: NORMAL
BH CV LOWER VASCULAR LEFT PROXIMAL FEMORAL SPONT: NORMAL
BH CV LOWER VASCULAR LEFT SAPHENOFEMORAL JUNCTION AUGMENT: NORMAL
BH CV LOWER VASCULAR LEFT SAPHENOFEMORAL JUNCTION COMPRESS: NORMAL
BH CV LOWER VASCULAR LEFT SAPHENOFEMORAL JUNCTION PHASIC: NORMAL
BH CV LOWER VASCULAR LEFT SAPHENOFEMORAL JUNCTION SPONT: NORMAL
BH CV LOWER VASCULAR RIGHT COMMON FEMORAL AUGMENT: NORMAL
BH CV LOWER VASCULAR RIGHT COMMON FEMORAL COMPRESS: NORMAL
BH CV LOWER VASCULAR RIGHT COMMON FEMORAL PHASIC: NORMAL
BH CV LOWER VASCULAR RIGHT COMMON FEMORAL SPONT: NORMAL
BH CV LOWER VASCULAR RIGHT DISTAL FEMORAL AUGMENT: NORMAL
BH CV LOWER VASCULAR RIGHT DISTAL FEMORAL COMPRESS: NORMAL
BH CV LOWER VASCULAR RIGHT DISTAL FEMORAL PHASIC: NORMAL
BH CV LOWER VASCULAR RIGHT DISTAL FEMORAL SPONT: NORMAL
BH CV LOWER VASCULAR RIGHT GASTRONEMIUS COMPRESS: NORMAL
BH CV LOWER VASCULAR RIGHT GREATER SAPH AK COMPRESS: NORMAL
BH CV LOWER VASCULAR RIGHT GREATER SAPH BK COMPRESS: NORMAL
BH CV LOWER VASCULAR RIGHT LESSER SAPH COMPRESS: NORMAL
BH CV LOWER VASCULAR RIGHT MID FEMORAL AUGMENT: NORMAL
BH CV LOWER VASCULAR RIGHT MID FEMORAL COMPRESS: NORMAL
BH CV LOWER VASCULAR RIGHT MID FEMORAL PHASIC: NORMAL
BH CV LOWER VASCULAR RIGHT MID FEMORAL SPONT: NORMAL
BH CV LOWER VASCULAR RIGHT PERONEAL COMPRESS: NORMAL
BH CV LOWER VASCULAR RIGHT POPLITEAL AUGMENT: NORMAL
BH CV LOWER VASCULAR RIGHT POPLITEAL COMPRESS: NORMAL
BH CV LOWER VASCULAR RIGHT POPLITEAL PHASIC: NORMAL
BH CV LOWER VASCULAR RIGHT POPLITEAL SPONT: NORMAL
BH CV LOWER VASCULAR RIGHT POSTERIOR TIBIAL COMPRESS: NORMAL
BH CV LOWER VASCULAR RIGHT PROFUNDA FEMORAL AUGMENT: NORMAL
BH CV LOWER VASCULAR RIGHT PROFUNDA FEMORAL COMPRESS: NORMAL
BH CV LOWER VASCULAR RIGHT PROFUNDA FEMORAL PHASIC: NORMAL
BH CV LOWER VASCULAR RIGHT PROFUNDA FEMORAL SPONT: NORMAL
BH CV LOWER VASCULAR RIGHT PROXIMAL FEMORAL AUGMENT: NORMAL
BH CV LOWER VASCULAR RIGHT PROXIMAL FEMORAL COMPRESS: NORMAL
BH CV LOWER VASCULAR RIGHT PROXIMAL FEMORAL PHASIC: NORMAL
BH CV LOWER VASCULAR RIGHT PROXIMAL FEMORAL SPONT: NORMAL
BH CV LOWER VASCULAR RIGHT SAPHENOFEMORAL JUNCTION AUGMENT: NORMAL
BH CV LOWER VASCULAR RIGHT SAPHENOFEMORAL JUNCTION COMPRESS: NORMAL
BH CV LOWER VASCULAR RIGHT SAPHENOFEMORAL JUNCTION PHASIC: NORMAL
BH CV LOWER VASCULAR RIGHT SAPHENOFEMORAL JUNCTION SPONT: NORMAL
BH CV VAS BP RIGHT ARM: NORMAL MMHG
BH CV XLRA - RV BASE: 5.1 CM
BH CV XLRA - RV LENGTH: 7.8 CM
BH CV XLRA - RV MID: 3.9 CM
BH CV XLRA - TDI S': 7.7 CM/SEC
BILIRUB SERPL-MCNC: 2.2 MG/DL (ref 0–1.2)
BUN SERPL-MCNC: 12 MG/DL (ref 6–20)
BUN/CREAT SERPL: 11.7 (ref 7–25)
CALCIUM SPEC-SCNC: 9 MG/DL (ref 8.6–10.5)
CHLORIDE SERPL-SCNC: 92 MMOL/L (ref 98–107)
CO2 SERPL-SCNC: 29 MMOL/L (ref 22–29)
CREAT SERPL-MCNC: 1.03 MG/DL (ref 0.76–1.27)
D-LACTATE SERPL-SCNC: 1.8 MMOL/L (ref 0.5–2)
DEPRECATED RDW RBC AUTO: 47.8 FL (ref 37–54)
EOSINOPHIL # BLD MANUAL: 0.04 10*3/MM3 (ref 0–0.4)
EOSINOPHIL NFR BLD MANUAL: 1 % (ref 0.3–6.2)
ERYTHROCYTE [DISTWIDTH] IN BLOOD BY AUTOMATED COUNT: 19.1 % (ref 12.3–15.4)
GFR SERPL CREATININE-BSD FRML MDRD: 103 ML/MIN/1.73
GLOBULIN UR ELPH-MCNC: 4.5 GM/DL
GLUCOSE SERPL-MCNC: 88 MG/DL (ref 65–99)
HCT VFR BLD AUTO: 36 % (ref 37.5–51)
HGB BLD-MCNC: 11.5 G/DL (ref 13–17.7)
INR PPP: 1.52 (ref 0.85–1.16)
IVRT: 77 MSEC
LYMPHOCYTES # BLD MANUAL: 1.27 10*3/MM3 (ref 0.7–3.1)
LYMPHOCYTES NFR BLD MANUAL: 6 % (ref 5–12)
MAGNESIUM SERPL-MCNC: 2.1 MG/DL (ref 1.6–2.6)
MAXIMAL PREDICTED HEART RATE: 191 BPM
MCH RBC QN AUTO: 22.9 PG (ref 26.6–33)
MCHC RBC AUTO-ENTMCNC: 31.9 G/DL (ref 31.5–35.7)
MCV RBC AUTO: 71.7 FL (ref 79–97)
MONOCYTES # BLD: 0.25 10*3/MM3 (ref 0.1–0.9)
MRSA DNA SPEC QL NAA+PROBE: NEGATIVE
NEUTROPHILS # BLD AUTO: 2.66 10*3/MM3 (ref 1.7–7)
NEUTROPHILS NFR BLD MANUAL: 60 % (ref 42.7–76)
NEUTS BAND NFR BLD MANUAL: 3 % (ref 0–5)
PLAT MORPH BLD: NORMAL
PLATELET # BLD AUTO: 285 10*3/MM3 (ref 140–450)
PMV BLD AUTO: 10 FL (ref 6–12)
POTASSIUM SERPL-SCNC: 3.1 MMOL/L (ref 3.5–5.2)
POTASSIUM SERPL-SCNC: 3.5 MMOL/L (ref 3.5–5.2)
PROT SERPL-MCNC: 7.9 G/DL (ref 6–8.5)
PROTHROMBIN TIME: 17.8 SECONDS (ref 11.4–14.4)
QT INTERVAL: 334 MS
QTC INTERVAL: 462 MS
RBC # BLD AUTO: 5.02 10*6/MM3 (ref 4.14–5.8)
RBC MORPH BLD: NORMAL
SODIUM SERPL-SCNC: 133 MMOL/L (ref 136–145)
STRESS TARGET HR: 162 BPM
TSH SERPL DL<=0.05 MIU/L-ACNC: 2.92 UIU/ML (ref 0.27–4.2)
UFH PPP CHRO-ACNC: 0.1 IU/ML (ref 0.3–0.7)
VARIANT LYMPHS NFR BLD MANUAL: 30 % (ref 19.6–45.3)
WBC MORPH BLD: NORMAL
WBC NRBC COR # BLD: 4.23 10*3/MM3 (ref 3.4–10.8)

## 2022-01-20 PROCEDURE — 85610 PROTHROMBIN TIME: CPT | Performed by: INTERNAL MEDICINE

## 2022-01-20 PROCEDURE — 93970 EXTREMITY STUDY: CPT | Performed by: INTERNAL MEDICINE

## 2022-01-20 PROCEDURE — 85520 HEPARIN ASSAY: CPT

## 2022-01-20 PROCEDURE — 85520 HEPARIN ASSAY: CPT | Performed by: INTERNAL MEDICINE

## 2022-01-20 PROCEDURE — 25010000002 PIPERACILLIN SOD-TAZOBACTAM PER 1 G: Performed by: INTERNAL MEDICINE

## 2022-01-20 PROCEDURE — 84132 ASSAY OF SERUM POTASSIUM: CPT | Performed by: INTERNAL MEDICINE

## 2022-01-20 PROCEDURE — 0 IOPAMIDOL PER 1 ML: Performed by: INTERNAL MEDICINE

## 2022-01-20 PROCEDURE — 93306 TTE W/DOPPLER COMPLETE: CPT

## 2022-01-20 PROCEDURE — 93970 EXTREMITY STUDY: CPT

## 2022-01-20 PROCEDURE — 25010000002 VANCOMYCIN 10 G RECONSTITUTED SOLUTION

## 2022-01-20 PROCEDURE — 85025 COMPLETE CBC W/AUTO DIFF WBC: CPT | Performed by: INTERNAL MEDICINE

## 2022-01-20 PROCEDURE — 93010 ELECTROCARDIOGRAM REPORT: CPT | Performed by: INTERNAL MEDICINE

## 2022-01-20 PROCEDURE — 99233 SBSQ HOSP IP/OBS HIGH 50: CPT | Performed by: INTERNAL MEDICINE

## 2022-01-20 PROCEDURE — 99222 1ST HOSP IP/OBS MODERATE 55: CPT | Performed by: INTERNAL MEDICINE

## 2022-01-20 PROCEDURE — 25010000002 HEPARIN (PORCINE) PER 1000 UNITS: Performed by: INTERNAL MEDICINE

## 2022-01-20 PROCEDURE — 87040 BLOOD CULTURE FOR BACTERIA: CPT | Performed by: INTERNAL MEDICINE

## 2022-01-20 PROCEDURE — 85007 BL SMEAR W/DIFF WBC COUNT: CPT | Performed by: INTERNAL MEDICINE

## 2022-01-20 PROCEDURE — 80053 COMPREHEN METABOLIC PANEL: CPT | Performed by: NURSE PRACTITIONER

## 2022-01-20 PROCEDURE — 71275 CT ANGIOGRAPHY CHEST: CPT

## 2022-01-20 PROCEDURE — 83605 ASSAY OF LACTIC ACID: CPT | Performed by: INTERNAL MEDICINE

## 2022-01-20 PROCEDURE — 85730 THROMBOPLASTIN TIME PARTIAL: CPT | Performed by: INTERNAL MEDICINE

## 2022-01-20 PROCEDURE — 25010000002 HEPARIN (PORCINE) PER 1000 UNITS

## 2022-01-20 PROCEDURE — 94799 UNLISTED PULMONARY SVC/PX: CPT

## 2022-01-20 PROCEDURE — 84443 ASSAY THYROID STIM HORMONE: CPT | Performed by: NURSE PRACTITIONER

## 2022-01-20 PROCEDURE — 94660 CPAP INITIATION&MGMT: CPT

## 2022-01-20 PROCEDURE — 83735 ASSAY OF MAGNESIUM: CPT | Performed by: NURSE PRACTITIONER

## 2022-01-20 PROCEDURE — 87641 MR-STAPH DNA AMP PROBE: CPT | Performed by: INTERNAL MEDICINE

## 2022-01-20 PROCEDURE — 93306 TTE W/DOPPLER COMPLETE: CPT | Performed by: INTERNAL MEDICINE

## 2022-01-20 RX ORDER — DIPHENOXYLATE HYDROCHLORIDE AND ATROPINE SULFATE 2.5; .025 MG/1; MG/1
1 TABLET ORAL DAILY
Status: DISCONTINUED | OUTPATIENT
Start: 2022-01-20 | End: 2022-01-28 | Stop reason: HOSPADM

## 2022-01-20 RX ORDER — POTASSIUM CHLORIDE 7.45 MG/ML
10 INJECTION INTRAVENOUS
Status: DISCONTINUED | OUTPATIENT
Start: 2022-01-20 | End: 2022-01-20

## 2022-01-20 RX ORDER — POTASSIUM CHLORIDE 7.45 MG/ML
10 INJECTION INTRAVENOUS
Status: DISCONTINUED | OUTPATIENT
Start: 2022-01-20 | End: 2022-01-20 | Stop reason: SDUPTHER

## 2022-01-20 RX ORDER — HEPARIN SODIUM 1000 [USP'U]/ML
8000 INJECTION, SOLUTION INTRAVENOUS; SUBCUTANEOUS ONCE
Status: COMPLETED | OUTPATIENT
Start: 2022-01-20 | End: 2022-01-20

## 2022-01-20 RX ORDER — POTASSIUM CHLORIDE 750 MG/1
40 CAPSULE, EXTENDED RELEASE ORAL ONCE
Status: COMPLETED | OUTPATIENT
Start: 2022-01-20 | End: 2022-01-20

## 2022-01-20 RX ORDER — POTASSIUM CHLORIDE 1.5 G/1.77G
40 POWDER, FOR SOLUTION ORAL AS NEEDED
Status: DISCONTINUED | OUTPATIENT
Start: 2022-01-20 | End: 2022-01-20 | Stop reason: SDUPTHER

## 2022-01-20 RX ORDER — POTASSIUM CHLORIDE 1.5 G/1.77G
40 POWDER, FOR SOLUTION ORAL AS NEEDED
Status: DISCONTINUED | OUTPATIENT
Start: 2022-01-20 | End: 2022-01-28 | Stop reason: HOSPADM

## 2022-01-20 RX ORDER — POTASSIUM CHLORIDE 750 MG/1
40 CAPSULE, EXTENDED RELEASE ORAL AS NEEDED
Status: DISCONTINUED | OUTPATIENT
Start: 2022-01-20 | End: 2022-01-20 | Stop reason: SDUPTHER

## 2022-01-20 RX ORDER — POTASSIUM CHLORIDE 7.45 MG/ML
10 INJECTION INTRAVENOUS
Status: DISCONTINUED | OUTPATIENT
Start: 2022-01-20 | End: 2022-01-28 | Stop reason: HOSPADM

## 2022-01-20 RX ORDER — BUMETANIDE 0.25 MG/ML
0.5 INJECTION INTRAMUSCULAR; INTRAVENOUS EVERY 12 HOURS
Status: DISCONTINUED | OUTPATIENT
Start: 2022-01-20 | End: 2022-01-21

## 2022-01-20 RX ORDER — CARVEDILOL 12.5 MG/1
12.5 TABLET ORAL 2 TIMES DAILY
Status: DISCONTINUED | OUTPATIENT
Start: 2022-01-20 | End: 2022-01-21

## 2022-01-20 RX ORDER — POTASSIUM CHLORIDE 750 MG/1
40 CAPSULE, EXTENDED RELEASE ORAL AS NEEDED
Status: DISCONTINUED | OUTPATIENT
Start: 2022-01-20 | End: 2022-01-28 | Stop reason: HOSPADM

## 2022-01-20 RX ORDER — BUMETANIDE 0.25 MG/ML
2 INJECTION INTRAMUSCULAR; INTRAVENOUS ONCE
Status: COMPLETED | OUTPATIENT
Start: 2022-01-20 | End: 2022-01-20

## 2022-01-20 RX ADMIN — VANCOMYCIN HYDROCHLORIDE 1500 MG: 10 INJECTION, POWDER, LYOPHILIZED, FOR SOLUTION INTRAVENOUS at 11:50

## 2022-01-20 RX ADMIN — BUMETANIDE 0.5 MG: 0.25 INJECTION INTRAMUSCULAR; INTRAVENOUS at 18:29

## 2022-01-20 RX ADMIN — SODIUM CHLORIDE, PRESERVATIVE FREE 10 ML: 5 INJECTION INTRAVENOUS at 20:29

## 2022-01-20 RX ADMIN — SODIUM CHLORIDE, PRESERVATIVE FREE 10 ML: 5 INJECTION INTRAVENOUS at 08:13

## 2022-01-20 RX ADMIN — GUAIFENESIN 600 MG: 600 TABLET, EXTENDED RELEASE ORAL at 20:28

## 2022-01-20 RX ADMIN — MULTIVITAMIN TABLET 1 TABLET: TABLET at 11:50

## 2022-01-20 RX ADMIN — HEPARIN SODIUM 11 UNITS/KG/HR: 5000 INJECTION INTRAVENOUS; SUBCUTANEOUS at 01:34

## 2022-01-20 RX ADMIN — VANCOMYCIN HYDROCHLORIDE 2000 MG: 10 INJECTION, POWDER, LYOPHILIZED, FOR SOLUTION INTRAVENOUS at 00:17

## 2022-01-20 RX ADMIN — ALLOPURINOL 200 MG: 100 TABLET ORAL at 08:12

## 2022-01-20 RX ADMIN — GUAIFENESIN 600 MG: 600 TABLET, EXTENDED RELEASE ORAL at 08:12

## 2022-01-20 RX ADMIN — IOPAMIDOL 95 ML: 755 INJECTION, SOLUTION INTRAVENOUS at 15:44

## 2022-01-20 RX ADMIN — CLOBETASOL PROPIONATE: 0.5 CREAM TOPICAL at 08:12

## 2022-01-20 RX ADMIN — POTASSIUM CHLORIDE 40 MEQ: 750 CAPSULE, EXTENDED RELEASE ORAL at 03:38

## 2022-01-20 RX ADMIN — BUMETANIDE 2 MG: 0.25 INJECTION INTRAMUSCULAR; INTRAVENOUS at 12:58

## 2022-01-20 RX ADMIN — CARVEDILOL 25 MG: 12.5 TABLET, FILM COATED ORAL at 08:12

## 2022-01-20 RX ADMIN — PANTOPRAZOLE SODIUM 40 MG: 40 TABLET, DELAYED RELEASE ORAL at 04:56

## 2022-01-20 RX ADMIN — TAZOBACTAM SODIUM AND PIPERACILLIN SODIUM 3.38 G: 375; 3 INJECTION, SOLUTION INTRAVENOUS at 04:55

## 2022-01-20 RX ADMIN — TAZOBACTAM SODIUM AND PIPERACILLIN SODIUM 3.38 G: 375; 3 INJECTION, SOLUTION INTRAVENOUS at 00:17

## 2022-01-20 RX ADMIN — CLOBETASOL PROPIONATE: 0.5 CREAM TOPICAL at 20:27

## 2022-01-20 RX ADMIN — SALINE NASAL SPRAY 2 SPRAY: 1.5 SOLUTION NASAL at 03:52

## 2022-01-20 RX ADMIN — SACUBITRIL AND VALSARTAN 1 TABLET: 24; 26 TABLET, FILM COATED ORAL at 08:12

## 2022-01-20 RX ADMIN — HEPARIN SODIUM 8000 UNITS: 1000 INJECTION INTRAVENOUS; SUBCUTANEOUS at 18:26

## 2022-01-20 RX ADMIN — HEPARIN SODIUM 8000 UNITS: 1000 INJECTION INTRAVENOUS; SUBCUTANEOUS at 08:09

## 2022-01-20 RX ADMIN — POTASSIUM CHLORIDE 40 MEQ: 10 CAPSULE, COATED, EXTENDED RELEASE ORAL at 12:59

## 2022-01-20 RX ADMIN — HEPARIN SODIUM 15 UNITS/KG/HR: 5000 INJECTION INTRAVENOUS; SUBCUTANEOUS at 16:27

## 2022-01-21 LAB
ANION GAP SERPL CALCULATED.3IONS-SCNC: 10 MMOL/L (ref 5–15)
BUN SERPL-MCNC: 15 MG/DL (ref 6–20)
BUN/CREAT SERPL: 13.3 (ref 7–25)
CALCIUM SPEC-SCNC: 8.9 MG/DL (ref 8.6–10.5)
CHLORIDE SERPL-SCNC: 97 MMOL/L (ref 98–107)
CO2 SERPL-SCNC: 29 MMOL/L (ref 22–29)
CREAT SERPL-MCNC: 1.13 MG/DL (ref 0.76–1.27)
GFR SERPL CREATININE-BSD FRML MDRD: 93 ML/MIN/1.73
GLUCOSE SERPL-MCNC: 93 MG/DL (ref 65–99)
POTASSIUM SERPL-SCNC: 3.3 MMOL/L (ref 3.5–5.2)
POTASSIUM SERPL-SCNC: 4.4 MMOL/L (ref 3.5–5.2)
SODIUM SERPL-SCNC: 136 MMOL/L (ref 136–145)
UFH PPP CHRO-ACNC: 0.17 IU/ML (ref 0.3–0.7)
UFH PPP CHRO-ACNC: 0.55 IU/ML (ref 0.3–0.7)

## 2022-01-21 PROCEDURE — 85520 HEPARIN ASSAY: CPT

## 2022-01-21 PROCEDURE — 97161 PT EVAL LOW COMPLEX 20 MIN: CPT

## 2022-01-21 PROCEDURE — 99233 SBSQ HOSP IP/OBS HIGH 50: CPT | Performed by: INTERNAL MEDICINE

## 2022-01-21 PROCEDURE — 97535 SELF CARE MNGMENT TRAINING: CPT

## 2022-01-21 PROCEDURE — 99232 SBSQ HOSP IP/OBS MODERATE 35: CPT | Performed by: INTERNAL MEDICINE

## 2022-01-21 PROCEDURE — 94660 CPAP INITIATION&MGMT: CPT

## 2022-01-21 PROCEDURE — 80048 BASIC METABOLIC PNL TOTAL CA: CPT | Performed by: INTERNAL MEDICINE

## 2022-01-21 PROCEDURE — 25010000002 HEPARIN (PORCINE) PER 1000 UNITS: Performed by: INTERNAL MEDICINE

## 2022-01-21 PROCEDURE — 97165 OT EVAL LOW COMPLEX 30 MIN: CPT

## 2022-01-21 PROCEDURE — 84132 ASSAY OF SERUM POTASSIUM: CPT | Performed by: INTERNAL MEDICINE

## 2022-01-21 PROCEDURE — 94799 UNLISTED PULMONARY SVC/PX: CPT

## 2022-01-21 RX ORDER — CARVEDILOL 6.25 MG/1
6.25 TABLET ORAL 2 TIMES DAILY
Status: DISCONTINUED | OUTPATIENT
Start: 2022-01-21 | End: 2022-01-28 | Stop reason: HOSPADM

## 2022-01-21 RX ORDER — BUMETANIDE 0.25 MG/ML
2 INJECTION INTRAMUSCULAR; INTRAVENOUS ONCE
Status: COMPLETED | OUTPATIENT
Start: 2022-01-21 | End: 2022-01-21

## 2022-01-21 RX ADMIN — SODIUM CHLORIDE, PRESERVATIVE FREE 10 ML: 5 INJECTION INTRAVENOUS at 09:13

## 2022-01-21 RX ADMIN — HEPARIN SODIUM 19 UNITS/KG/HR: 5000 INJECTION INTRAVENOUS; SUBCUTANEOUS at 02:02

## 2022-01-21 RX ADMIN — POTASSIUM CHLORIDE 40 MEQ: 750 CAPSULE, EXTENDED RELEASE ORAL at 09:09

## 2022-01-21 RX ADMIN — PANTOPRAZOLE SODIUM 40 MG: 40 TABLET, DELAYED RELEASE ORAL at 09:11

## 2022-01-21 RX ADMIN — GUAIFENESIN 600 MG: 600 TABLET, EXTENDED RELEASE ORAL at 09:10

## 2022-01-21 RX ADMIN — GUAIFENESIN 600 MG: 600 TABLET, EXTENDED RELEASE ORAL at 20:25

## 2022-01-21 RX ADMIN — CLOBETASOL PROPIONATE: 0.5 CREAM TOPICAL at 09:12

## 2022-01-21 RX ADMIN — POTASSIUM CHLORIDE 40 MEQ: 750 CAPSULE, EXTENDED RELEASE ORAL at 14:41

## 2022-01-21 RX ADMIN — APIXABAN 10 MG: 5 TABLET, FILM COATED ORAL at 09:10

## 2022-01-21 RX ADMIN — SACUBITRIL AND VALSARTAN 1 TABLET: 24; 26 TABLET, FILM COATED ORAL at 09:11

## 2022-01-21 RX ADMIN — APIXABAN 10 MG: 5 TABLET, FILM COATED ORAL at 20:25

## 2022-01-21 RX ADMIN — BUMETANIDE 0.5 MG: 0.25 INJECTION INTRAMUSCULAR; INTRAVENOUS at 05:12

## 2022-01-21 RX ADMIN — CARVEDILOL 12.5 MG: 12.5 TABLET, FILM COATED ORAL at 09:11

## 2022-01-21 RX ADMIN — SALINE NASAL SPRAY 2 SPRAY: 1.5 SOLUTION NASAL at 14:36

## 2022-01-21 RX ADMIN — ALLOPURINOL 200 MG: 100 TABLET ORAL at 09:11

## 2022-01-21 RX ADMIN — BUMETANIDE 2 MG: 0.25 INJECTION INTRAMUSCULAR; INTRAVENOUS at 16:58

## 2022-01-21 RX ADMIN — CLOBETASOL PROPIONATE: 0.5 CREAM TOPICAL at 20:25

## 2022-01-21 RX ADMIN — MULTIVITAMIN TABLET 1 TABLET: TABLET at 09:11

## 2022-01-22 LAB
ANION GAP SERPL CALCULATED.3IONS-SCNC: 10 MMOL/L (ref 5–15)
BUN SERPL-MCNC: 19 MG/DL (ref 6–20)
BUN/CREAT SERPL: 14.6 (ref 7–25)
CALCIUM SPEC-SCNC: 8.9 MG/DL (ref 8.6–10.5)
CHLORIDE SERPL-SCNC: 94 MMOL/L (ref 98–107)
CO2 SERPL-SCNC: 30 MMOL/L (ref 22–29)
CREAT SERPL-MCNC: 1.3 MG/DL (ref 0.76–1.27)
GFR SERPL CREATININE-BSD FRML MDRD: 79 ML/MIN/1.73
GLUCOSE SERPL-MCNC: 90 MG/DL (ref 65–99)
MAGNESIUM SERPL-MCNC: 1.7 MG/DL (ref 1.6–2.6)
POTASSIUM SERPL-SCNC: 3.3 MMOL/L (ref 3.5–5.2)
POTASSIUM SERPL-SCNC: 4.2 MMOL/L (ref 3.5–5.2)
SODIUM SERPL-SCNC: 134 MMOL/L (ref 136–145)

## 2022-01-22 PROCEDURE — 94660 CPAP INITIATION&MGMT: CPT

## 2022-01-22 PROCEDURE — 84132 ASSAY OF SERUM POTASSIUM: CPT | Performed by: INTERNAL MEDICINE

## 2022-01-22 PROCEDURE — 99232 SBSQ HOSP IP/OBS MODERATE 35: CPT | Performed by: INTERNAL MEDICINE

## 2022-01-22 PROCEDURE — 80048 BASIC METABOLIC PNL TOTAL CA: CPT | Performed by: INTERNAL MEDICINE

## 2022-01-22 PROCEDURE — 25010000002 DOBUTAMINE PER 250 MG: Performed by: INTERNAL MEDICINE

## 2022-01-22 PROCEDURE — 83735 ASSAY OF MAGNESIUM: CPT | Performed by: INTERNAL MEDICINE

## 2022-01-22 PROCEDURE — 94799 UNLISTED PULMONARY SVC/PX: CPT

## 2022-01-22 RX ORDER — DOBUTAMINE HYDROCHLORIDE 100 MG/100ML
2.5 INJECTION INTRAVENOUS
Status: DISCONTINUED | OUTPATIENT
Start: 2022-01-22 | End: 2022-01-22

## 2022-01-22 RX ORDER — BUMETANIDE 0.25 MG/ML
1 INJECTION INTRAMUSCULAR; INTRAVENOUS ONCE
Status: COMPLETED | OUTPATIENT
Start: 2022-01-22 | End: 2022-01-22

## 2022-01-22 RX ORDER — DOBUTAMINE HYDROCHLORIDE 100 MG/100ML
2.5 INJECTION INTRAVENOUS
Status: DISPENSED | OUTPATIENT
Start: 2022-01-22 | End: 2022-01-28

## 2022-01-22 RX ADMIN — POTASSIUM CHLORIDE 40 MEQ: 750 CAPSULE, EXTENDED RELEASE ORAL at 14:23

## 2022-01-22 RX ADMIN — SACUBITRIL AND VALSARTAN 1 TABLET: 24; 26 TABLET, FILM COATED ORAL at 20:47

## 2022-01-22 RX ADMIN — APIXABAN 10 MG: 5 TABLET, FILM COATED ORAL at 09:47

## 2022-01-22 RX ADMIN — BUMETANIDE 1 MG/HR: 0.25 INJECTION INTRAMUSCULAR; INTRAVENOUS at 11:34

## 2022-01-22 RX ADMIN — SACUBITRIL AND VALSARTAN 1 TABLET: 24; 26 TABLET, FILM COATED ORAL at 09:47

## 2022-01-22 RX ADMIN — CLOBETASOL PROPIONATE: 0.5 CREAM TOPICAL at 09:47

## 2022-01-22 RX ADMIN — GUAIFENESIN 600 MG: 600 TABLET, EXTENDED RELEASE ORAL at 09:47

## 2022-01-22 RX ADMIN — DOBUTAMINE IN DEXTROSE 2.5 MCG/KG/MIN: 100 INJECTION, SOLUTION INTRAVENOUS at 22:03

## 2022-01-22 RX ADMIN — CLOBETASOL PROPIONATE: 0.5 CREAM TOPICAL at 20:47

## 2022-01-22 RX ADMIN — BUMETANIDE 1 MG/HR: 0.25 INJECTION INTRAMUSCULAR; INTRAVENOUS at 19:18

## 2022-01-22 RX ADMIN — BUMETANIDE 1 MG: 0.25 INJECTION INTRAMUSCULAR; INTRAVENOUS at 11:35

## 2022-01-22 RX ADMIN — SODIUM CHLORIDE, PRESERVATIVE FREE 10 ML: 5 INJECTION INTRAVENOUS at 09:48

## 2022-01-22 RX ADMIN — CARVEDILOL 6.25 MG: 6.25 TABLET, FILM COATED ORAL at 20:49

## 2022-01-22 RX ADMIN — MULTIVITAMIN TABLET 1 TABLET: TABLET at 09:47

## 2022-01-22 RX ADMIN — PANTOPRAZOLE SODIUM 40 MG: 40 TABLET, DELAYED RELEASE ORAL at 09:47

## 2022-01-22 RX ADMIN — GUAIFENESIN 600 MG: 600 TABLET, EXTENDED RELEASE ORAL at 20:49

## 2022-01-22 RX ADMIN — DOBUTAMINE IN DEXTROSE 2.5 MCG/KG/MIN: 100 INJECTION, SOLUTION INTRAVENOUS at 11:34

## 2022-01-22 RX ADMIN — APIXABAN 10 MG: 5 TABLET, FILM COATED ORAL at 20:48

## 2022-01-22 RX ADMIN — POTASSIUM CHLORIDE 40 MEQ: 750 CAPSULE, EXTENDED RELEASE ORAL at 09:47

## 2022-01-22 RX ADMIN — HALOBETASOL PROPIONATE 1 APPLICATION: 0.5 CREAM TOPICAL at 20:47

## 2022-01-22 RX ADMIN — CARVEDILOL 6.25 MG: 6.25 TABLET, FILM COATED ORAL at 09:47

## 2022-01-22 RX ADMIN — BUMETANIDE 1 MG/HR: 0.25 INJECTION INTRAMUSCULAR; INTRAVENOUS at 04:12

## 2022-01-22 RX ADMIN — ALLOPURINOL 200 MG: 100 TABLET ORAL at 09:47

## 2022-01-22 RX ADMIN — SALINE NASAL SPRAY 2 SPRAY: 1.5 SOLUTION NASAL at 09:48

## 2022-01-23 LAB
ANION GAP SERPL CALCULATED.3IONS-SCNC: 10 MMOL/L (ref 5–15)
BUN SERPL-MCNC: 20 MG/DL (ref 6–20)
BUN/CREAT SERPL: 18.5 (ref 7–25)
CALCIUM SPEC-SCNC: 8.4 MG/DL (ref 8.6–10.5)
CHLORIDE SERPL-SCNC: 93 MMOL/L (ref 98–107)
CO2 SERPL-SCNC: 29 MMOL/L (ref 22–29)
CREAT SERPL-MCNC: 1.08 MG/DL (ref 0.76–1.27)
GFR SERPL CREATININE-BSD FRML MDRD: 98 ML/MIN/1.73
GLUCOSE SERPL-MCNC: 139 MG/DL (ref 65–99)
MAGNESIUM SERPL-MCNC: 2 MG/DL (ref 1.6–2.6)
POTASSIUM SERPL-SCNC: 3.3 MMOL/L (ref 3.5–5.2)
QT INTERVAL: 348 MS
QTC INTERVAL: 477 MS
SODIUM SERPL-SCNC: 132 MMOL/L (ref 136–145)

## 2022-01-23 PROCEDURE — 94660 CPAP INITIATION&MGMT: CPT

## 2022-01-23 PROCEDURE — 99232 SBSQ HOSP IP/OBS MODERATE 35: CPT | Performed by: NURSE PRACTITIONER

## 2022-01-23 PROCEDURE — 0 MAGNESIUM SULFATE 4 GM/100ML SOLUTION: Performed by: NURSE PRACTITIONER

## 2022-01-23 PROCEDURE — 99233 SBSQ HOSP IP/OBS HIGH 50: CPT | Performed by: INTERNAL MEDICINE

## 2022-01-23 PROCEDURE — 25010000002 DOBUTAMINE PER 250 MG: Performed by: INTERNAL MEDICINE

## 2022-01-23 PROCEDURE — 94799 UNLISTED PULMONARY SVC/PX: CPT

## 2022-01-23 PROCEDURE — 80048 BASIC METABOLIC PNL TOTAL CA: CPT | Performed by: INTERNAL MEDICINE

## 2022-01-23 PROCEDURE — 83735 ASSAY OF MAGNESIUM: CPT | Performed by: NURSE PRACTITIONER

## 2022-01-23 RX ORDER — MAGNESIUM SULFATE HEPTAHYDRATE 40 MG/ML
2 INJECTION, SOLUTION INTRAVENOUS AS NEEDED
Status: DISCONTINUED | OUTPATIENT
Start: 2022-01-23 | End: 2022-01-28 | Stop reason: HOSPADM

## 2022-01-23 RX ORDER — MAGNESIUM SULFATE HEPTAHYDRATE 40 MG/ML
4 INJECTION, SOLUTION INTRAVENOUS AS NEEDED
Status: DISCONTINUED | OUTPATIENT
Start: 2022-01-23 | End: 2022-01-28 | Stop reason: HOSPADM

## 2022-01-23 RX ADMIN — BUMETANIDE 0.5 MG/HR: 0.25 INJECTION INTRAMUSCULAR; INTRAVENOUS at 06:32

## 2022-01-23 RX ADMIN — SACUBITRIL AND VALSARTAN 1 TABLET: 24; 26 TABLET, FILM COATED ORAL at 21:35

## 2022-01-23 RX ADMIN — GUAIFENESIN 600 MG: 600 TABLET, EXTENDED RELEASE ORAL at 09:11

## 2022-01-23 RX ADMIN — APIXABAN 10 MG: 5 TABLET, FILM COATED ORAL at 09:10

## 2022-01-23 RX ADMIN — SACUBITRIL AND VALSARTAN 1 TABLET: 24; 26 TABLET, FILM COATED ORAL at 09:10

## 2022-01-23 RX ADMIN — CARVEDILOL 6.25 MG: 6.25 TABLET, FILM COATED ORAL at 09:11

## 2022-01-23 RX ADMIN — SODIUM CHLORIDE, PRESERVATIVE FREE 10 ML: 5 INJECTION INTRAVENOUS at 09:11

## 2022-01-23 RX ADMIN — DOBUTAMINE IN DEXTROSE 2.5 MCG/KG/MIN: 100 INJECTION, SOLUTION INTRAVENOUS at 21:42

## 2022-01-23 RX ADMIN — DOBUTAMINE IN DEXTROSE 2.5 MCG/KG/MIN: 100 INJECTION, SOLUTION INTRAVENOUS at 09:11

## 2022-01-23 RX ADMIN — PANTOPRAZOLE SODIUM 40 MG: 40 TABLET, DELAYED RELEASE ORAL at 09:10

## 2022-01-23 RX ADMIN — MULTIVITAMIN TABLET 1 TABLET: TABLET at 09:10

## 2022-01-23 RX ADMIN — BUMETANIDE 0.5 MG/HR: 0.25 INJECTION INTRAMUSCULAR; INTRAVENOUS at 21:42

## 2022-01-23 RX ADMIN — CARVEDILOL 6.25 MG: 6.25 TABLET, FILM COATED ORAL at 21:35

## 2022-01-23 RX ADMIN — APIXABAN 10 MG: 5 TABLET, FILM COATED ORAL at 21:35

## 2022-01-23 RX ADMIN — ALLOPURINOL 200 MG: 100 TABLET ORAL at 09:10

## 2022-01-23 RX ADMIN — SODIUM CHLORIDE, PRESERVATIVE FREE 10 ML: 5 INJECTION INTRAVENOUS at 21:36

## 2022-01-23 RX ADMIN — MAGNESIUM SULFATE HEPTAHYDRATE 4 G: 40 INJECTION, SOLUTION INTRAVENOUS at 02:45

## 2022-01-23 RX ADMIN — HALOBETASOL PROPIONATE 1 APPLICATION: 0.5 CREAM TOPICAL at 09:12

## 2022-01-23 RX ADMIN — SALINE NASAL SPRAY 2 SPRAY: 1.5 SOLUTION NASAL at 09:11

## 2022-01-23 RX ADMIN — HALOBETASOL PROPIONATE 1 APPLICATION: 0.5 CREAM TOPICAL at 21:38

## 2022-01-23 RX ADMIN — GUAIFENESIN 600 MG: 600 TABLET, EXTENDED RELEASE ORAL at 21:35

## 2022-01-24 LAB
ANION GAP SERPL CALCULATED.3IONS-SCNC: 6 MMOL/L (ref 5–15)
BUN SERPL-MCNC: 19 MG/DL (ref 6–20)
BUN/CREAT SERPL: 17.8 (ref 7–25)
CALCIUM SPEC-SCNC: 8.4 MG/DL (ref 8.6–10.5)
CHLORIDE SERPL-SCNC: 94 MMOL/L (ref 98–107)
CO2 SERPL-SCNC: 32 MMOL/L (ref 22–29)
CREAT SERPL-MCNC: 1.07 MG/DL (ref 0.76–1.27)
GFR SERPL CREATININE-BSD FRML MDRD: 99 ML/MIN/1.73
GLUCOSE SERPL-MCNC: 76 MG/DL (ref 65–99)
POTASSIUM SERPL-SCNC: 3.4 MMOL/L (ref 3.5–5.2)
POTASSIUM SERPL-SCNC: 3.8 MMOL/L (ref 3.5–5.2)
SODIUM SERPL-SCNC: 132 MMOL/L (ref 136–145)

## 2022-01-24 PROCEDURE — 84132 ASSAY OF SERUM POTASSIUM: CPT | Performed by: HOSPITALIST

## 2022-01-24 PROCEDURE — 25010000002 DOBUTAMINE PER 250 MG: Performed by: INTERNAL MEDICINE

## 2022-01-24 PROCEDURE — 99233 SBSQ HOSP IP/OBS HIGH 50: CPT | Performed by: HOSPITALIST

## 2022-01-24 PROCEDURE — 97116 GAIT TRAINING THERAPY: CPT

## 2022-01-24 PROCEDURE — 99232 SBSQ HOSP IP/OBS MODERATE 35: CPT | Performed by: INTERNAL MEDICINE

## 2022-01-24 PROCEDURE — 94799 UNLISTED PULMONARY SVC/PX: CPT

## 2022-01-24 PROCEDURE — 97110 THERAPEUTIC EXERCISES: CPT

## 2022-01-24 PROCEDURE — 94660 CPAP INITIATION&MGMT: CPT

## 2022-01-24 PROCEDURE — 80048 BASIC METABOLIC PNL TOTAL CA: CPT | Performed by: INTERNAL MEDICINE

## 2022-01-24 RX ORDER — METOLAZONE 5 MG/1
5 TABLET ORAL ONCE
Status: COMPLETED | OUTPATIENT
Start: 2022-01-24 | End: 2022-01-24

## 2022-01-24 RX ADMIN — SACUBITRIL AND VALSARTAN 1 TABLET: 24; 26 TABLET, FILM COATED ORAL at 08:56

## 2022-01-24 RX ADMIN — GUAIFENESIN 600 MG: 600 TABLET, EXTENDED RELEASE ORAL at 08:55

## 2022-01-24 RX ADMIN — CLOBETASOL PROPIONATE: 0.5 CREAM TOPICAL at 08:56

## 2022-01-24 RX ADMIN — METOLAZONE 5 MG: 5 TABLET ORAL at 11:08

## 2022-01-24 RX ADMIN — POTASSIUM CHLORIDE 40 MEQ: 750 CAPSULE, EXTENDED RELEASE ORAL at 09:00

## 2022-01-24 RX ADMIN — HALOBETASOL PROPIONATE 1 APPLICATION: 0.5 CREAM TOPICAL at 09:01

## 2022-01-24 RX ADMIN — DOBUTAMINE IN DEXTROSE 2.5 MCG/KG/MIN: 100 INJECTION, SOLUTION INTRAVENOUS at 22:41

## 2022-01-24 RX ADMIN — POTASSIUM CHLORIDE 40 MEQ: 750 CAPSULE, EXTENDED RELEASE ORAL at 15:12

## 2022-01-24 RX ADMIN — DOBUTAMINE IN DEXTROSE 2.5 MCG/KG/MIN: 100 INJECTION, SOLUTION INTRAVENOUS at 11:09

## 2022-01-24 RX ADMIN — APIXABAN 10 MG: 5 TABLET, FILM COATED ORAL at 08:56

## 2022-01-24 RX ADMIN — SODIUM CHLORIDE, PRESERVATIVE FREE 10 ML: 5 INJECTION INTRAVENOUS at 21:05

## 2022-01-24 RX ADMIN — CARVEDILOL 6.25 MG: 6.25 TABLET, FILM COATED ORAL at 21:05

## 2022-01-24 RX ADMIN — SACUBITRIL AND VALSARTAN 1 TABLET: 24; 26 TABLET, FILM COATED ORAL at 21:05

## 2022-01-24 RX ADMIN — CARVEDILOL 6.25 MG: 6.25 TABLET, FILM COATED ORAL at 08:56

## 2022-01-24 RX ADMIN — HALOBETASOL PROPIONATE 1 APPLICATION: 0.5 CREAM TOPICAL at 21:05

## 2022-01-24 RX ADMIN — MULTIVITAMIN TABLET 1 TABLET: TABLET at 08:55

## 2022-01-24 RX ADMIN — SODIUM CHLORIDE, PRESERVATIVE FREE 10 ML: 5 INJECTION INTRAVENOUS at 08:57

## 2022-01-24 RX ADMIN — ALLOPURINOL 200 MG: 100 TABLET ORAL at 08:56

## 2022-01-24 RX ADMIN — PANTOPRAZOLE SODIUM 40 MG: 40 TABLET, DELAYED RELEASE ORAL at 08:56

## 2022-01-24 RX ADMIN — APIXABAN 10 MG: 5 TABLET, FILM COATED ORAL at 21:05

## 2022-01-24 RX ADMIN — GUAIFENESIN 600 MG: 600 TABLET, EXTENDED RELEASE ORAL at 21:05

## 2022-01-25 LAB
ALBUMIN SERPL-MCNC: 3.2 G/DL (ref 3.5–5.2)
ALBUMIN/GLOB SERPL: 0.8 G/DL
ALP SERPL-CCNC: 96 U/L (ref 39–117)
ALT SERPL W P-5'-P-CCNC: 12 U/L (ref 1–41)
ANION GAP SERPL CALCULATED.3IONS-SCNC: 8 MMOL/L (ref 5–15)
AST SERPL-CCNC: 18 U/L (ref 1–40)
BACTERIA SPEC AEROBE CULT: NORMAL
BACTERIA SPEC AEROBE CULT: NORMAL
BILIRUB SERPL-MCNC: 1.1 MG/DL (ref 0–1.2)
BUN SERPL-MCNC: 22 MG/DL (ref 6–20)
BUN/CREAT SERPL: 19.3 (ref 7–25)
CALCIUM SPEC-SCNC: 9.1 MG/DL (ref 8.6–10.5)
CHLORIDE SERPL-SCNC: 92 MMOL/L (ref 98–107)
CO2 SERPL-SCNC: 34 MMOL/L (ref 22–29)
CREAT SERPL-MCNC: 1.14 MG/DL (ref 0.76–1.27)
DEPRECATED RDW RBC AUTO: 48.7 FL (ref 37–54)
ERYTHROCYTE [DISTWIDTH] IN BLOOD BY AUTOMATED COUNT: 19.5 % (ref 12.3–15.4)
GFR SERPL CREATININE-BSD FRML MDRD: 92 ML/MIN/1.73
GLOBULIN UR ELPH-MCNC: 3.9 GM/DL
GLUCOSE SERPL-MCNC: 82 MG/DL (ref 65–99)
HCT VFR BLD AUTO: 35.3 % (ref 37.5–51)
HGB BLD-MCNC: 11.3 G/DL (ref 13–17.7)
MCH RBC QN AUTO: 23.1 PG (ref 26.6–33)
MCHC RBC AUTO-ENTMCNC: 32 G/DL (ref 31.5–35.7)
MCV RBC AUTO: 72 FL (ref 79–97)
PLATELET # BLD AUTO: 264 10*3/MM3 (ref 140–450)
PMV BLD AUTO: 9.9 FL (ref 6–12)
POTASSIUM SERPL-SCNC: 3.8 MMOL/L (ref 3.5–5.2)
PROT SERPL-MCNC: 7.1 G/DL (ref 6–8.5)
RBC # BLD AUTO: 4.9 10*6/MM3 (ref 4.14–5.8)
SODIUM SERPL-SCNC: 134 MMOL/L (ref 136–145)
WBC NRBC COR # BLD: 3.53 10*3/MM3 (ref 3.4–10.8)

## 2022-01-25 PROCEDURE — 97530 THERAPEUTIC ACTIVITIES: CPT

## 2022-01-25 PROCEDURE — 99232 SBSQ HOSP IP/OBS MODERATE 35: CPT | Performed by: HOSPITALIST

## 2022-01-25 PROCEDURE — 25010000002 DOBUTAMINE PER 250 MG: Performed by: INTERNAL MEDICINE

## 2022-01-25 PROCEDURE — 97116 GAIT TRAINING THERAPY: CPT

## 2022-01-25 PROCEDURE — 85027 COMPLETE CBC AUTOMATED: CPT | Performed by: HOSPITALIST

## 2022-01-25 PROCEDURE — 99232 SBSQ HOSP IP/OBS MODERATE 35: CPT | Performed by: INTERNAL MEDICINE

## 2022-01-25 PROCEDURE — 80053 COMPREHEN METABOLIC PANEL: CPT | Performed by: HOSPITALIST

## 2022-01-25 RX ORDER — METOLAZONE 5 MG/1
5 TABLET ORAL DAILY
Status: DISCONTINUED | OUTPATIENT
Start: 2022-01-25 | End: 2022-01-28 | Stop reason: HOSPADM

## 2022-01-25 RX ADMIN — SACUBITRIL AND VALSARTAN 1 TABLET: 24; 26 TABLET, FILM COATED ORAL at 08:34

## 2022-01-25 RX ADMIN — APIXABAN 10 MG: 5 TABLET, FILM COATED ORAL at 08:34

## 2022-01-25 RX ADMIN — SODIUM CHLORIDE, PRESERVATIVE FREE 10 ML: 5 INJECTION INTRAVENOUS at 21:33

## 2022-01-25 RX ADMIN — HALOBETASOL PROPIONATE 1 APPLICATION: 0.5 CREAM TOPICAL at 08:41

## 2022-01-25 RX ADMIN — GUAIFENESIN 600 MG: 600 TABLET, EXTENDED RELEASE ORAL at 08:35

## 2022-01-25 RX ADMIN — HALOBETASOL PROPIONATE 1 APPLICATION: 0.5 CREAM TOPICAL at 21:34

## 2022-01-25 RX ADMIN — GUAIFENESIN 600 MG: 600 TABLET, EXTENDED RELEASE ORAL at 21:33

## 2022-01-25 RX ADMIN — PANTOPRAZOLE SODIUM 40 MG: 40 TABLET, DELAYED RELEASE ORAL at 08:34

## 2022-01-25 RX ADMIN — MULTIVITAMIN TABLET 1 TABLET: TABLET at 08:35

## 2022-01-25 RX ADMIN — CARVEDILOL 6.25 MG: 6.25 TABLET, FILM COATED ORAL at 08:35

## 2022-01-25 RX ADMIN — DOBUTAMINE IN DEXTROSE 2.5 MCG/KG/MIN: 100 INJECTION, SOLUTION INTRAVENOUS at 11:03

## 2022-01-25 RX ADMIN — BUMETANIDE 0.5 MG/HR: 0.25 INJECTION INTRAMUSCULAR; INTRAVENOUS at 18:56

## 2022-01-25 RX ADMIN — CARVEDILOL 6.25 MG: 6.25 TABLET, FILM COATED ORAL at 21:33

## 2022-01-25 RX ADMIN — METOLAZONE 5 MG: 5 TABLET ORAL at 11:04

## 2022-01-25 RX ADMIN — CLOBETASOL PROPIONATE: 0.5 CREAM TOPICAL at 21:34

## 2022-01-25 RX ADMIN — SACUBITRIL AND VALSARTAN 1 TABLET: 24; 26 TABLET, FILM COATED ORAL at 21:33

## 2022-01-25 RX ADMIN — CLOBETASOL PROPIONATE: 0.5 CREAM TOPICAL at 08:37

## 2022-01-25 RX ADMIN — APIXABAN 10 MG: 5 TABLET, FILM COATED ORAL at 21:33

## 2022-01-25 RX ADMIN — ALLOPURINOL 200 MG: 100 TABLET ORAL at 08:34

## 2022-01-26 ENCOUNTER — APPOINTMENT (OUTPATIENT)
Dept: CARDIOLOGY | Facility: HOSPITAL | Age: 32
End: 2022-01-26

## 2022-01-26 LAB
ALBUMIN SERPL-MCNC: 3.1 G/DL (ref 3.5–5.2)
ALBUMIN/GLOB SERPL: 0.7 G/DL
ALP SERPL-CCNC: 99 U/L (ref 39–117)
ALT SERPL W P-5'-P-CCNC: 14 U/L (ref 1–41)
ANION GAP SERPL CALCULATED.3IONS-SCNC: 10 MMOL/L (ref 5–15)
AST SERPL-CCNC: 21 U/L (ref 1–40)
BILIRUB SERPL-MCNC: 1.1 MG/DL (ref 0–1.2)
BUN SERPL-MCNC: 22 MG/DL (ref 6–20)
BUN/CREAT SERPL: 19 (ref 7–25)
CALCIUM SPEC-SCNC: 9.4 MG/DL (ref 8.6–10.5)
CHLORIDE SERPL-SCNC: 88 MMOL/L (ref 98–107)
CO2 SERPL-SCNC: 36 MMOL/L (ref 22–29)
CREAT SERPL-MCNC: 1.16 MG/DL (ref 0.76–1.27)
DEPRECATED RDW RBC AUTO: 49 FL (ref 37–54)
ERYTHROCYTE [DISTWIDTH] IN BLOOD BY AUTOMATED COUNT: 19.1 % (ref 12.3–15.4)
GFR SERPL CREATININE-BSD FRML MDRD: 90 ML/MIN/1.73
GLOBULIN UR ELPH-MCNC: 4.4 GM/DL
GLUCOSE SERPL-MCNC: 85 MG/DL (ref 65–99)
HCT VFR BLD AUTO: 36.3 % (ref 37.5–51)
HGB BLD-MCNC: 11.6 G/DL (ref 13–17.7)
MCH RBC QN AUTO: 23.4 PG (ref 26.6–33)
MCHC RBC AUTO-ENTMCNC: 32 G/DL (ref 31.5–35.7)
MCV RBC AUTO: 73.2 FL (ref 79–97)
PLATELET # BLD AUTO: 274 10*3/MM3 (ref 140–450)
PMV BLD AUTO: 10 FL (ref 6–12)
POTASSIUM SERPL-SCNC: 3.5 MMOL/L (ref 3.5–5.2)
PROT SERPL-MCNC: 7.5 G/DL (ref 6–8.5)
RBC # BLD AUTO: 4.96 10*6/MM3 (ref 4.14–5.8)
SODIUM SERPL-SCNC: 134 MMOL/L (ref 136–145)
WBC NRBC COR # BLD: 3.07 10*3/MM3 (ref 3.4–10.8)

## 2022-01-26 PROCEDURE — 85027 COMPLETE CBC AUTOMATED: CPT | Performed by: HOSPITALIST

## 2022-01-26 PROCEDURE — 80053 COMPREHEN METABOLIC PANEL: CPT | Performed by: HOSPITALIST

## 2022-01-26 PROCEDURE — 93971 EXTREMITY STUDY: CPT

## 2022-01-26 PROCEDURE — 93971 EXTREMITY STUDY: CPT | Performed by: INTERNAL MEDICINE

## 2022-01-26 PROCEDURE — 99231 SBSQ HOSP IP/OBS SF/LOW 25: CPT | Performed by: HOSPITALIST

## 2022-01-26 PROCEDURE — 99232 SBSQ HOSP IP/OBS MODERATE 35: CPT | Performed by: PHYSICIAN ASSISTANT

## 2022-01-26 PROCEDURE — 94660 CPAP INITIATION&MGMT: CPT

## 2022-01-26 PROCEDURE — 94799 UNLISTED PULMONARY SVC/PX: CPT

## 2022-01-26 PROCEDURE — 25010000002 DOBUTAMINE PER 250 MG: Performed by: INTERNAL MEDICINE

## 2022-01-26 RX ADMIN — HALOBETASOL PROPIONATE 1 APPLICATION: 0.5 CREAM TOPICAL at 20:13

## 2022-01-26 RX ADMIN — APIXABAN 10 MG: 5 TABLET, FILM COATED ORAL at 08:49

## 2022-01-26 RX ADMIN — PANTOPRAZOLE SODIUM 40 MG: 40 TABLET, DELAYED RELEASE ORAL at 08:50

## 2022-01-26 RX ADMIN — APIXABAN 10 MG: 5 TABLET, FILM COATED ORAL at 20:12

## 2022-01-26 RX ADMIN — ALLOPURINOL 200 MG: 100 TABLET ORAL at 08:49

## 2022-01-26 RX ADMIN — ACETAMINOPHEN 650 MG: 325 TABLET, FILM COATED ORAL at 20:18

## 2022-01-26 RX ADMIN — CLOBETASOL PROPIONATE: 0.5 CREAM TOPICAL at 08:50

## 2022-01-26 RX ADMIN — MULTIVITAMIN TABLET 1 TABLET: TABLET at 08:49

## 2022-01-26 RX ADMIN — CLOBETASOL PROPIONATE: 0.5 CREAM TOPICAL at 20:13

## 2022-01-26 RX ADMIN — SACUBITRIL AND VALSARTAN 1 TABLET: 24; 26 TABLET, FILM COATED ORAL at 20:11

## 2022-01-26 RX ADMIN — METOLAZONE 5 MG: 5 TABLET ORAL at 08:50

## 2022-01-26 RX ADMIN — CARVEDILOL 6.25 MG: 6.25 TABLET, FILM COATED ORAL at 20:11

## 2022-01-26 RX ADMIN — SODIUM CHLORIDE, PRESERVATIVE FREE 10 ML: 5 INJECTION INTRAVENOUS at 08:50

## 2022-01-26 RX ADMIN — DOBUTAMINE IN DEXTROSE 2.5 MCG/KG/MIN: 100 INJECTION, SOLUTION INTRAVENOUS at 12:46

## 2022-01-26 RX ADMIN — CARVEDILOL 6.25 MG: 6.25 TABLET, FILM COATED ORAL at 08:49

## 2022-01-26 RX ADMIN — SACUBITRIL AND VALSARTAN 1 TABLET: 24; 26 TABLET, FILM COATED ORAL at 08:49

## 2022-01-26 RX ADMIN — BUMETANIDE 0.5 MG/HR: 0.25 INJECTION INTRAMUSCULAR; INTRAVENOUS at 16:19

## 2022-01-26 RX ADMIN — GUAIFENESIN 600 MG: 600 TABLET, EXTENDED RELEASE ORAL at 20:11

## 2022-01-26 RX ADMIN — HALOBETASOL PROPIONATE 1 APPLICATION: 0.5 CREAM TOPICAL at 08:51

## 2022-01-26 RX ADMIN — DOBUTAMINE IN DEXTROSE 2.5 MCG/KG/MIN: 100 INJECTION, SOLUTION INTRAVENOUS at 00:20

## 2022-01-26 RX ADMIN — GUAIFENESIN 600 MG: 600 TABLET, EXTENDED RELEASE ORAL at 08:50

## 2022-01-26 RX ADMIN — SODIUM CHLORIDE, PRESERVATIVE FREE 10 ML: 5 INJECTION INTRAVENOUS at 20:13

## 2022-01-27 LAB
ALBUMIN SERPL-MCNC: 3.2 G/DL (ref 3.5–5.2)
ALBUMIN/GLOB SERPL: 0.7 G/DL
ALP SERPL-CCNC: 94 U/L (ref 39–117)
ALT SERPL W P-5'-P-CCNC: 13 U/L (ref 1–41)
ANION GAP SERPL CALCULATED.3IONS-SCNC: 8 MMOL/L (ref 5–15)
AST SERPL-CCNC: 24 U/L (ref 1–40)
BH CV LOW VAS LEFT GASTRONEMIUS VESSEL: 1
BH CV LOWER VASCULAR LEFT COMMON FEMORAL AUGMENT: NORMAL
BH CV LOWER VASCULAR LEFT COMMON FEMORAL COMPRESS: NORMAL
BH CV LOWER VASCULAR LEFT COMMON FEMORAL PHASIC: NORMAL
BH CV LOWER VASCULAR LEFT COMMON FEMORAL SPONT: NORMAL
BH CV LOWER VASCULAR LEFT DISTAL FEMORAL AUGMENT: NORMAL
BH CV LOWER VASCULAR LEFT DISTAL FEMORAL COMPRESS: NORMAL
BH CV LOWER VASCULAR LEFT DISTAL FEMORAL PHASIC: NORMAL
BH CV LOWER VASCULAR LEFT DISTAL FEMORAL SPONT: NORMAL
BH CV LOWER VASCULAR LEFT GASTRONEMIUS COMPRESS: NORMAL
BH CV LOWER VASCULAR LEFT GREATER SAPH AK COMPRESS: NORMAL
BH CV LOWER VASCULAR LEFT GREATER SAPH BK COMPRESS: NORMAL
BH CV LOWER VASCULAR LEFT LESSER SAPH COMPRESS: NORMAL
BH CV LOWER VASCULAR LEFT MID FEMORAL AUGMENT: NORMAL
BH CV LOWER VASCULAR LEFT MID FEMORAL COMPRESS: NORMAL
BH CV LOWER VASCULAR LEFT MID FEMORAL PHASIC: NORMAL
BH CV LOWER VASCULAR LEFT MID FEMORAL SPONT: NORMAL
BH CV LOWER VASCULAR LEFT PERONEAL COMPRESS: NORMAL
BH CV LOWER VASCULAR LEFT POPLITEAL AUGMENT: NORMAL
BH CV LOWER VASCULAR LEFT POPLITEAL COMPRESS: NORMAL
BH CV LOWER VASCULAR LEFT POPLITEAL PHASIC: NORMAL
BH CV LOWER VASCULAR LEFT POPLITEAL SPONT: NORMAL
BH CV LOWER VASCULAR LEFT POSTERIOR TIBIAL COMPRESS: NORMAL
BH CV LOWER VASCULAR LEFT PROFUNDA FEMORAL AUGMENT: NORMAL
BH CV LOWER VASCULAR LEFT PROFUNDA FEMORAL COMPRESS: NORMAL
BH CV LOWER VASCULAR LEFT PROFUNDA FEMORAL PHASIC: NORMAL
BH CV LOWER VASCULAR LEFT PROFUNDA FEMORAL SPONT: NORMAL
BH CV LOWER VASCULAR LEFT PROXIMAL FEMORAL AUGMENT: NORMAL
BH CV LOWER VASCULAR LEFT PROXIMAL FEMORAL COMPRESS: NORMAL
BH CV LOWER VASCULAR LEFT PROXIMAL FEMORAL PHASIC: NORMAL
BH CV LOWER VASCULAR LEFT PROXIMAL FEMORAL SPONT: NORMAL
BH CV LOWER VASCULAR LEFT SAPHENOFEMORAL JUNCTION AUGMENT: NORMAL
BH CV LOWER VASCULAR LEFT SAPHENOFEMORAL JUNCTION COMPRESS: NORMAL
BH CV LOWER VASCULAR LEFT SAPHENOFEMORAL JUNCTION PHASIC: NORMAL
BH CV LOWER VASCULAR LEFT SAPHENOFEMORAL JUNCTION SPONT: NORMAL
BH CV LOWER VASCULAR RIGHT COMMON FEMORAL AUGMENT: NORMAL
BH CV LOWER VASCULAR RIGHT COMMON FEMORAL COMPRESS: NORMAL
BH CV LOWER VASCULAR RIGHT COMMON FEMORAL PHASIC: NORMAL
BH CV LOWER VASCULAR RIGHT COMMON FEMORAL SPONT: NORMAL
BILIRUB SERPL-MCNC: 1.3 MG/DL (ref 0–1.2)
BUN SERPL-MCNC: 25 MG/DL (ref 6–20)
BUN/CREAT SERPL: 21 (ref 7–25)
CALCIUM SPEC-SCNC: 9.3 MG/DL (ref 8.6–10.5)
CHLORIDE SERPL-SCNC: 85 MMOL/L (ref 98–107)
CO2 SERPL-SCNC: 37 MMOL/L (ref 22–29)
CREAT SERPL-MCNC: 1.19 MG/DL (ref 0.76–1.27)
DEPRECATED RDW RBC AUTO: 50.9 FL (ref 37–54)
ERYTHROCYTE [DISTWIDTH] IN BLOOD BY AUTOMATED COUNT: 19.4 % (ref 12.3–15.4)
GFR SERPL CREATININE-BSD FRML MDRD: 88 ML/MIN/1.73
GLOBULIN UR ELPH-MCNC: 4.4 GM/DL
GLUCOSE SERPL-MCNC: 91 MG/DL (ref 65–99)
HCT VFR BLD AUTO: 38.3 % (ref 37.5–51)
HGB BLD-MCNC: 11.9 G/DL (ref 13–17.7)
MAXIMAL PREDICTED HEART RATE: 191 BPM
MCH RBC QN AUTO: 23.2 PG (ref 26.6–33)
MCHC RBC AUTO-ENTMCNC: 31.1 G/DL (ref 31.5–35.7)
MCV RBC AUTO: 74.8 FL (ref 79–97)
PLATELET # BLD AUTO: 249 10*3/MM3 (ref 140–450)
PMV BLD AUTO: 10.1 FL (ref 6–12)
POTASSIUM SERPL-SCNC: 3.3 MMOL/L (ref 3.5–5.2)
PROT SERPL-MCNC: 7.6 G/DL (ref 6–8.5)
RBC # BLD AUTO: 5.12 10*6/MM3 (ref 4.14–5.8)
SODIUM SERPL-SCNC: 130 MMOL/L (ref 136–145)
STRESS TARGET HR: 162 BPM
WBC NRBC COR # BLD: 2.71 10*3/MM3 (ref 3.4–10.8)

## 2022-01-27 PROCEDURE — 97116 GAIT TRAINING THERAPY: CPT

## 2022-01-27 PROCEDURE — 85027 COMPLETE CBC AUTOMATED: CPT | Performed by: PHYSICIAN ASSISTANT

## 2022-01-27 PROCEDURE — 94799 UNLISTED PULMONARY SVC/PX: CPT

## 2022-01-27 PROCEDURE — 99232 SBSQ HOSP IP/OBS MODERATE 35: CPT | Performed by: INTERNAL MEDICINE

## 2022-01-27 PROCEDURE — 94660 CPAP INITIATION&MGMT: CPT

## 2022-01-27 PROCEDURE — 99231 SBSQ HOSP IP/OBS SF/LOW 25: CPT | Performed by: HOSPITALIST

## 2022-01-27 PROCEDURE — 80053 COMPREHEN METABOLIC PANEL: CPT | Performed by: HOSPITALIST

## 2022-01-27 PROCEDURE — 25010000002 DOBUTAMINE PER 250 MG: Performed by: INTERNAL MEDICINE

## 2022-01-27 RX ADMIN — DOBUTAMINE IN DEXTROSE 2.5 MCG/KG/MIN: 100 INJECTION, SOLUTION INTRAVENOUS at 00:15

## 2022-01-27 RX ADMIN — SODIUM CHLORIDE, PRESERVATIVE FREE 10 ML: 5 INJECTION INTRAVENOUS at 09:02

## 2022-01-27 RX ADMIN — SACUBITRIL AND VALSARTAN 1 TABLET: 24; 26 TABLET, FILM COATED ORAL at 08:59

## 2022-01-27 RX ADMIN — MULTIVITAMIN TABLET 1 TABLET: TABLET at 08:59

## 2022-01-27 RX ADMIN — POTASSIUM CHLORIDE 40 MEQ: 750 CAPSULE, EXTENDED RELEASE ORAL at 14:00

## 2022-01-27 RX ADMIN — GUAIFENESIN 600 MG: 600 TABLET, EXTENDED RELEASE ORAL at 08:59

## 2022-01-27 RX ADMIN — DOBUTAMINE IN DEXTROSE 2.5 MCG/KG/MIN: 100 INJECTION, SOLUTION INTRAVENOUS at 22:46

## 2022-01-27 RX ADMIN — SACUBITRIL AND VALSARTAN 1 TABLET: 24; 26 TABLET, FILM COATED ORAL at 20:42

## 2022-01-27 RX ADMIN — METOLAZONE 5 MG: 5 TABLET ORAL at 08:59

## 2022-01-27 RX ADMIN — SODIUM CHLORIDE, PRESERVATIVE FREE 10 ML: 5 INJECTION INTRAVENOUS at 20:42

## 2022-01-27 RX ADMIN — CARVEDILOL 6.25 MG: 6.25 TABLET, FILM COATED ORAL at 08:59

## 2022-01-27 RX ADMIN — CLOBETASOL PROPIONATE: 0.5 CREAM TOPICAL at 20:42

## 2022-01-27 RX ADMIN — PANTOPRAZOLE SODIUM 40 MG: 40 TABLET, DELAYED RELEASE ORAL at 08:59

## 2022-01-27 RX ADMIN — BUMETANIDE 0.5 MG/HR: 0.25 INJECTION INTRAMUSCULAR; INTRAVENOUS at 14:51

## 2022-01-27 RX ADMIN — POTASSIUM CHLORIDE 40 MEQ: 750 CAPSULE, EXTENDED RELEASE ORAL at 18:46

## 2022-01-27 RX ADMIN — ALLOPURINOL 200 MG: 100 TABLET ORAL at 08:59

## 2022-01-27 RX ADMIN — HALOBETASOL PROPIONATE 1 APPLICATION: 0.5 CREAM TOPICAL at 09:01

## 2022-01-27 RX ADMIN — HALOBETASOL PROPIONATE 1 APPLICATION: 0.5 CREAM TOPICAL at 20:43

## 2022-01-27 RX ADMIN — GUAIFENESIN 600 MG: 600 TABLET, EXTENDED RELEASE ORAL at 20:42

## 2022-01-27 RX ADMIN — POTASSIUM CHLORIDE 40 MEQ: 750 CAPSULE, EXTENDED RELEASE ORAL at 08:58

## 2022-01-27 RX ADMIN — APIXABAN 10 MG: 5 TABLET, FILM COATED ORAL at 08:59

## 2022-01-27 RX ADMIN — CARVEDILOL 6.25 MG: 6.25 TABLET, FILM COATED ORAL at 20:42

## 2022-01-27 RX ADMIN — APIXABAN 10 MG: 5 TABLET, FILM COATED ORAL at 20:42

## 2022-01-27 RX ADMIN — CLOBETASOL PROPIONATE: 0.5 CREAM TOPICAL at 09:01

## 2022-01-28 ENCOUNTER — READMISSION MANAGEMENT (OUTPATIENT)
Dept: CALL CENTER | Facility: HOSPITAL | Age: 32
End: 2022-01-28

## 2022-01-28 VITALS
TEMPERATURE: 98.3 F | RESPIRATION RATE: 18 BRPM | OXYGEN SATURATION: 100 % | SYSTOLIC BLOOD PRESSURE: 85 MMHG | WEIGHT: 300.4 LBS | HEART RATE: 95 BPM | DIASTOLIC BLOOD PRESSURE: 55 MMHG | BODY MASS INDEX: 43.01 KG/M2 | HEIGHT: 70 IN

## 2022-01-28 PROBLEM — E87.6 HYPOKALEMIA: Status: RESOLVED | Noted: 2020-08-14 | Resolved: 2022-01-28

## 2022-01-28 LAB
ALBUMIN SERPL-MCNC: 3 G/DL (ref 3.5–5.2)
ALBUMIN/GLOB SERPL: 0.7 G/DL
ALP SERPL-CCNC: 96 U/L (ref 39–117)
ALT SERPL W P-5'-P-CCNC: 18 U/L (ref 1–41)
ANION GAP SERPL CALCULATED.3IONS-SCNC: 8 MMOL/L (ref 5–15)
AST SERPL-CCNC: 27 U/L (ref 1–40)
BILIRUB SERPL-MCNC: 1.3 MG/DL (ref 0–1.2)
BUN SERPL-MCNC: 32 MG/DL (ref 6–20)
BUN/CREAT SERPL: 24.1 (ref 7–25)
CALCIUM SPEC-SCNC: 9.1 MG/DL (ref 8.6–10.5)
CHLORIDE SERPL-SCNC: 87 MMOL/L (ref 98–107)
CO2 SERPL-SCNC: 36 MMOL/L (ref 22–29)
CREAT SERPL-MCNC: 1.33 MG/DL (ref 0.76–1.27)
DEPRECATED RDW RBC AUTO: 47.2 FL (ref 37–54)
ERYTHROCYTE [DISTWIDTH] IN BLOOD BY AUTOMATED COUNT: 19 % (ref 12.3–15.4)
GFR SERPL CREATININE-BSD FRML MDRD: 77 ML/MIN/1.73
GLOBULIN UR ELPH-MCNC: 4.4 GM/DL
GLUCOSE SERPL-MCNC: 90 MG/DL (ref 65–99)
HCT VFR BLD AUTO: 37.7 % (ref 37.5–51)
HGB BLD-MCNC: 12.2 G/DL (ref 13–17.7)
MCH RBC QN AUTO: 23.3 PG (ref 26.6–33)
MCHC RBC AUTO-ENTMCNC: 32.4 G/DL (ref 31.5–35.7)
MCV RBC AUTO: 72.1 FL (ref 79–97)
PLATELET # BLD AUTO: 270 10*3/MM3 (ref 140–450)
PMV BLD AUTO: 9.9 FL (ref 6–12)
POTASSIUM SERPL-SCNC: 3.7 MMOL/L (ref 3.5–5.2)
PROT SERPL-MCNC: 7.4 G/DL (ref 6–8.5)
RBC # BLD AUTO: 5.23 10*6/MM3 (ref 4.14–5.8)
SODIUM SERPL-SCNC: 131 MMOL/L (ref 136–145)
WBC NRBC COR # BLD: 2.87 10*3/MM3 (ref 3.4–10.8)

## 2022-01-28 PROCEDURE — 94799 UNLISTED PULMONARY SVC/PX: CPT

## 2022-01-28 PROCEDURE — 99232 SBSQ HOSP IP/OBS MODERATE 35: CPT | Performed by: NURSE PRACTITIONER

## 2022-01-28 PROCEDURE — 85027 COMPLETE CBC AUTOMATED: CPT | Performed by: HOSPITALIST

## 2022-01-28 PROCEDURE — 80053 COMPREHEN METABOLIC PANEL: CPT | Performed by: HOSPITALIST

## 2022-01-28 PROCEDURE — 99239 HOSP IP/OBS DSCHRG MGMT >30: CPT | Performed by: HOSPITALIST

## 2022-01-28 PROCEDURE — 94660 CPAP INITIATION&MGMT: CPT

## 2022-01-28 RX ORDER — METOLAZONE 5 MG/1
5 TABLET ORAL DAILY PRN
Qty: 25 TABLET | Refills: 6 | Status: SHIPPED | OUTPATIENT
Start: 2022-01-28

## 2022-01-28 RX ORDER — ALLOPURINOL 100 MG/1
200 TABLET ORAL DAILY
Qty: 30 TABLET | Refills: 1 | Status: ON HOLD | OUTPATIENT
Start: 2022-01-29 | End: 2022-04-02 | Stop reason: SDUPTHER

## 2022-01-28 RX ORDER — BUMETANIDE 0.25 MG/ML
2 INJECTION INTRAMUSCULAR; INTRAVENOUS ONCE
Status: COMPLETED | OUTPATIENT
Start: 2022-01-28 | End: 2022-01-28

## 2022-01-28 RX ORDER — SPIRONOLACTONE 50 MG/1
50 TABLET, FILM COATED ORAL DAILY
Qty: 30 TABLET | Refills: 3 | Status: SHIPPED | OUTPATIENT
Start: 2022-01-28 | End: 2022-06-21 | Stop reason: SDUPTHER

## 2022-01-28 RX ORDER — CARVEDILOL 6.25 MG/1
6.25 TABLET ORAL 2 TIMES DAILY
Qty: 60 TABLET | Refills: 11 | Status: SHIPPED | OUTPATIENT
Start: 2022-01-28

## 2022-01-28 RX ORDER — OMEPRAZOLE 40 MG/1
40 CAPSULE, DELAYED RELEASE ORAL DAILY
Qty: 30 CAPSULE | Refills: 3 | Status: SHIPPED | OUTPATIENT
Start: 2022-01-28 | End: 2022-05-05 | Stop reason: SDUPTHER

## 2022-01-28 RX ADMIN — SACUBITRIL AND VALSARTAN 1 TABLET: 24; 26 TABLET, FILM COATED ORAL at 09:54

## 2022-01-28 RX ADMIN — SODIUM CHLORIDE, PRESERVATIVE FREE 10 ML: 5 INJECTION INTRAVENOUS at 11:33

## 2022-01-28 RX ADMIN — CARVEDILOL 6.25 MG: 6.25 TABLET, FILM COATED ORAL at 09:15

## 2022-01-28 RX ADMIN — METOLAZONE 5 MG: 5 TABLET ORAL at 09:14

## 2022-01-28 RX ADMIN — APIXABAN 5 MG: 5 TABLET, FILM COATED ORAL at 10:12

## 2022-01-28 RX ADMIN — HALOBETASOL PROPIONATE 1 APPLICATION: 0.5 CREAM TOPICAL at 10:13

## 2022-01-28 RX ADMIN — CLOBETASOL PROPIONATE: 0.5 CREAM TOPICAL at 09:13

## 2022-01-28 RX ADMIN — MULTIVITAMIN TABLET 1 TABLET: TABLET at 10:13

## 2022-01-28 RX ADMIN — GUAIFENESIN 600 MG: 600 TABLET, EXTENDED RELEASE ORAL at 09:54

## 2022-01-28 RX ADMIN — BUMETANIDE 2 MG: 0.25 INJECTION INTRAMUSCULAR; INTRAVENOUS at 15:31

## 2022-01-28 RX ADMIN — ALLOPURINOL 200 MG: 100 TABLET ORAL at 09:53

## 2022-01-29 NOTE — OUTREACH NOTE
Prep Survey      Responses   [facility] patient discharged from? [place]   Is LACE score < 7 ? No   Emergency Room discharge w/ pulse ox? No   Eligibility TCM   [facility]   Date of Admission 01/19/22   Date of Discharge 01/28/22   Discharge Disposition Home-Health Care Svc   Discharge diagnosis Acute on chronic systolic heart failure ,  Nonischemic congestive cardiomyopathy    Does the patient have one of the following disease processes/diagnoses(primary or secondary)? CHF   Does the patient have Home health ordered? Yes   What is the Home health agency?  [facility] -    Is there a DME ordered? No  [Patient current with home O2]   Comments regarding appointments [facility] 1 week,  See AVS   Medication alerts for this patient On Eliquis   General alerts for this patient EF of 20%   Prep survey completed? Yes          Carlota Bhakta RN

## 2022-01-31 ENCOUNTER — TRANSITIONAL CARE MANAGEMENT TELEPHONE ENCOUNTER (OUTPATIENT)
Dept: CALL CENTER | Facility: HOSPITAL | Age: 32
End: 2022-01-31

## 2022-01-31 ENCOUNTER — PRIOR AUTHORIZATION (OUTPATIENT)
Dept: CARDIOLOGY | Facility: CLINIC | Age: 32
End: 2022-01-31

## 2022-01-31 NOTE — TELEPHONE ENCOUNTER
Evan Gannon     Key: [id number] -     PA Case ID: [id number] -     Rx #: [id number] Need help?     Call us at [phone]  Status  Sent to [facility]  Drug  Verquvo 2.5MG tablets  Form  [employer] Electronic Prior Authorization Request Form (2017 NCPDP)  Original Claim Info  70,MR,569 NON-FORMULARY DRUG, CONTACT [employer] FOR ALTERNATIVES

## 2022-01-31 NOTE — OUTREACH NOTE
Call Center TCM Note      Responses   [facility] patient discharged from? [place]   Does the patient have one of the following disease processes/diagnoses(primary or secondary)? CHF   TCM attempt successful? No  [No current Verbal Release]   Unsuccessful attempts Attempt 1           Megan Tafoya RN    1/31/2022, 15:09 EST

## 2022-01-31 NOTE — OUTREACH NOTE
Call Center TCM Note      Responses   [facility] patient discharged from? [place]   Does the patient have one of the following disease processes/diagnoses(primary or secondary)? CHF   TCM attempt successful? Yes   Call start time 1651   Call end time 1658   General alerts for this patient EF of 20%   Discharge diagnosis Acute on chronic systolic heart failure ,  Nonischemic congestive cardiomyopathy    Medication alerts for this patient On Eliquis   Meds reviewed with patient/caregiver? Yes   Is the patient having any side effects they believe may be caused by any medication additions or changes? No   Does the patient have all medications ordered at discharge? Yes   Is the patient taking all medications as directed (includes completed medication regime)? Yes   Comments regarding appointments 2/1/22   Does the patient have a primary care provider?  Yes   Does the patient have an appointment with their PCP within 7 days of discharge? Yes   Comments regarding PCP HOSP DC FU 2/4/22 @ 11:15 am.    [facility]    Has home health visited the patient within 72 hours of discharge? Unsure   Home health interventions Other   Pulse Ox monitoring None   Psychosocial issues? No   Did the patient receive a copy of their discharge instructions? Yes   Nursing interventions Reviewed instructions with patient   What is the patient's perception of their health status since discharge? Improving   Nursing interventions Nurse provided patient education   Is the patient weighing daily? No   Does the patient have scales? No   Is the patient able to teach back Heart Failure diet management? Yes   Is the patient able to teach back Heart Failure Zones? Yes   Is the patient able to teach back signs and symptoms of worsening condition? (i.e. weight gain, shortness of air, etc.) Yes   Is the patient/caregiver able to teach back the hierarchy of who to call/visit for symptoms/problems? PCP,  Specialist, Home health nurse, Urgent Care, ED, 911 Yes   TCM call completed? Yes   Wrap up additional comments Pt reports he is doing better at this time. Pt does not have a scale to support his wt and he will discuss with cardio tomorrow.            Megan Tafoya RN    1/31/2022, 16:59 EST

## 2022-02-01 NOTE — DISCHARGE PLACEMENT REQUEST
"Sivakumar Queen (31 y.o. Male)             Date of Birth Social Security Number Address Home Phone MRN    1990  [address] [place] [id number] [phone] [id number]    Anabaptism Marital Status             Alevism Single       Admission Date Admission Type Admitting Provider Attending Provider Department, Room/Bed    1/19/22 Emergency Maci Renee DO  [facility] 4H, S467/1    Discharge Date Discharge Disposition Discharge Destination          1/28/2022 Home or Self Care              Attending Provider: (none)   Allergies: No Known Allergies    Isolation: None   Infection: None   Code Status: Prior   Advance Care Planning Activity    Ht: 177.8 cm (70\")   Wt: 136 kg (300 lb 6.4 oz)    Admission Cmt: None   Principal Problem: Acute on chronic systolic heart failure (HCC) [I50.23] More...                 Active Insurance as of 1/19/2022     Primary Coverage     Payor Plan Insurance Group Employer/Plan Group    MEDICARE MEDICARE A & B      Payor Plan Address Payor Plan Phone Number Payor Plan Fax Number Effective Dates    [address] [phone]  12/1/2015 - None Entered    [facility] [id number]       Subscriber Name Subscriber Birth Date Member ID       SIVAKUMAR QUEEN 1990 [id number]           Secondary Coverage     Payor Plan Insurance Group Employer/Plan Group    [facility] BY JOON [facility] BY JOON [id number]     Payor Plan Address Payor Plan Phone Number Payor Plan Fax Number Effective Dates    [address]   1/1/2021 - None Entered    [facility] [address]       Subscriber Name Subscriber Birth Date Member ID       SIVAKUMAR QUEEN 1990 [id number]                 Emergency Contacts      (Rel.) Home Phone Work Phone Mobile Phone    SPENSER QUEEN (Father) -- -- [phone]    LOAN CUNHA (Significant Other) [phone] -- [phone]          [address]  [address] [facility] [address]  Phone:  " [phone]  Fax:  [phone] Date: 2022      Ambulatory Referral to Home Health     Patient:  Evan Gannon MRN:  [id number]   [address]  [facility] [address] :  1990  SSN:    Phone: [phone] Sex:  M      INSURANCE PAYOR PLAN GROUP # SUBSCRIBER ID   Primary:  Secondary:    MEDICARE [employer] BY JOON [id number]  [id number]    [id number]  [id number]      Referring Provider Information:  JOSE ANTONIO HENDERSON Phone: [phone] Fax: [phone]      Referral Information:   # Visits:  999 Referral Type: Home Health [42]   Urgency:  Routine Referral Reason: Specialty Services Required   Start Date: 2022 End Date:  To be determined by Insurer   Diagnosis: Acute on chronic systolic CHF (congestive heart failure) (HCC) (I50.23 [ICD-10-CM] 428.23,428.0 [ICD-9-CM])      Refer to Dept:   Refer to Provider:   Refer to Facility:       Face to Face Visit Date: 2022  Follow-up provider for Plan of Care? I treated the patient in an acute care facility and will not continue treatment after discharge.  Follow-up provider: ARNALDO LOPEZ [[id number]]  Reason/Clinical Findings: acute on chronic systolic heart failure  Describe mobility limitations that make leaving home difficult: impaired physical mobility and gait endurance  Nursing/Therapeutic Services Requested: Skilled Nursing  Skilled nursing orders: Medication education  Skilled nursing orders: CHF management  Frequency: 1 Week 1     This document serves as a request of services and does not constitute Insurance authorization or approval of services.  To determine eligibility, please contact the members Insurance carrier to verify and review coverage.     If you have medical questions regarding this request for services. Please contact [address] at [phone] during normal business hours.        Authorizing Provider:Jose Antonio Henderson MD  Authorizing Provider's NPI: [id number]  Order Entered  By: Gilbert Kong RN 2022  3:34 PM     Electronically signed by: Jose Antonio Henderson MD 2022  3:34 PM            Discharge Summary      Maci Renee DO at 22 1403              [facility] Medicine Services  DISCHARGE SUMMARY    Patient Name: Evan Gannon  : 1990  MRN: [id number]    Date of Admission: 2022  4:34 PM  Date of Discharge:  2022  Primary Care Physician: Marek Díaz MD    Consults     Date and Time Order Name Status Description    2022 12:35 AM Inpatient Cardiology Consult Completed           Hospital Course     Presenting Problem:   Acute on chronic systolic heart failure (HCC) [I50.23]    Active Hospital Problems    Diagnosis  POA   • **Acute on chronic systolic heart failure (HCC) [I50.23]  Yes   • On home O2 [Z99.81]  Not Applicable   • Severe obstructive sleep apnea [G47.33]  Yes   • Plaque psoriasis [L40.0]  Yes   • Nonischemic congestive cardiomyopathy (HCC) [I42.0]  Yes   • Morbid obesity (HCC) [E66.01]  Yes      Resolved Hospital Problems    Diagnosis Date Resolved POA   • Hypokalemia [E87.6] 2022 Yes      Hospital Course:  Evan Gannon is a 31 y.o. male with history of an ICM/chronic systolic heart failure with a EF of 20% s/p ICD, hypertension, COLLIN, plaque psoriasis.  Patient presents with progressively worsening SOA despite changing diuretics admitted with acute on chronic systolic heart failure.     This patient's problems and plans were partially entered by my partner and updated as appropriate by me 22.     Acute on chronic systolic heart failure  Nonischemic cardiomyopathy  Suspected PE  -Echo from 2022 with EF of 20%, repeat continues to show severely decreased LV SF with EF<20%, also with RVSP 48 mmHg  -Chest x-ray with mild vascular congestion, BNP 4000, repeat CTA chest is negative for PE   -Cardiology following, continue Coreg and Entresto at the decreased doses (these should not  be held for hypotension)  -Continue strict I/O, daily weights  -Patient will be referred as an outpatient to [facility] for possible consideration for heart transplant/LVAD on d/c  - plan for d/c when patient is euvolemic  - patient states he continues to gain weight daily  - d/c'd IV Bumex and Dobutamine     Acute left lower extremity DVT  -Continue Eliquis  - repeated US of LLE at patient request 1/26/22 and DVT is stable with no new findings.     COLLIN-continue CPAP qhs     Suspected gouty flare  -Continue allopurinol,   -s/p colchicine x1 holding further as patient is also on Coreg     Suspected pneumonia  -Chest x-ray with no overt infiltrate, CTA chest unremarkable and negative for PE  -Patient was without leukocytosis, pro-Taz was low, MRSA PCR was negative, as such pneumonia was unlikely, antibiotics have since been discontinued,     Hypokalemia  -Continue to monitor as we diurese and replete per protocol     Morbid obesity with BMI 42  -He is is s/p gastric sleeve 8/2020  -has had significant weight loss of 50lbs secondary to diuresis     Plaque psoriasis-continue home halobetasol    Discharge Follow Up Recommendations for outpatient labs/diagnostics:  -F/u with heart and valve clinic in 1 week for repeat labwork  - increased Metolazone to 5mg PO daily  - Decrease Carvedilol to 6.25mg PO BID  - Verquvo added per cardiology  - Take Eliquis as prescribed for DVT- f/u with Primary care physician in 1 month.  - follow low sat, cardiac diet.   - f/u with [facility] outpatient per Dr. Olsen to discuss transplant    Day of Discharge     HPI:   Patient sitting in bedside chair, states his swelling in much improved in his legs, breathing is stable.    Review of Systems  Gen- No fevers, chills  CV- No chest pain, palpitations  Resp- No cough, dyspnea  GI- No N/V/D, abd pain  Musc- b/l LE edema    Vital Signs:   Temp:  [97.8 °F (36.6 °C)-98.3 °F (36.8 °C)] 98.3 °F (36.8 °C)  Heart Rate:  [] 84  Resp:  [16-20] 18  BP:  ()/(33-85) 85/55  Flow (L/min):  [4] 4      Physical Exam:  Constitutional: No acute distress, awake, alert  HENT: NCAT, mucous membranes moist  Respiratory: Nonlabored respirations, moving air well, 4L NC in place  Cardiovascular: RRR, no murmurs, rubs, or gallops  Gastrointestinal: Positive bowel sounds, soft, nontender, nondistended  Musculoskeletal: Bilateral lower extremity edema is 1+ non pitting b/l  Psychiatric: Appropriate affect, cooperative  Neurologic: Oriented x 3, focal  Skin: Multiple pigmented plaques    Pertinent  and/or Most Recent Results     LAB RESULTS:      Lab 01/28/22  0436 01/27/22  0454 01/26/22  0512 01/25/22  0711   WBC 2.87* 2.71* 3.07* 3.53   HEMOGLOBIN 12.2* 11.9* 11.6* 11.3*   HEMATOCRIT 37.7 38.3 36.3* 35.3*   PLATELETS 270 249 274 264   MCV 72.1* 74.8* 73.2* 72.0*         Lab 01/28/22  0436 01/27/22  0454 01/26/22  0512 01/25/22  0711 01/24/22 1952 01/24/22  0955 01/24/22  0955 01/23/22  0838 01/23/22  0838 01/22/22 1952 01/22/22  0621   SODIUM 131* 130* 134* 134*  --   --  132*   < > 132*  --    < >   POTASSIUM 3.7 3.3* 3.5 3.8 3.8   < > 3.4*   < > 3.3* 4.2   < >   CHLORIDE 87* 85* 88* 92*  --   --  94*   < > 93*  --    < >   CO2 36.0* 37.0* 36.0* 34.0*  --   --  32.0*   < > 29.0  --    < >   ANION GAP 8.0 8.0 10.0 8.0  --   --  6.0   < > 10.0  --    < >   BUN 32* 25* 22* 22*  --   --  19   < > 20  --    < >   CREATININE 1.33* 1.19 1.16 1.14  --   --  1.07   < > 1.08  --    < >   GLUCOSE 90 91 85 82  --   --  76   < > 139*  --    < >   CALCIUM 9.1 9.3 9.4 9.1  --   --  8.4*   < > 8.4*  --    < >   MAGNESIUM  --   --   --   --   --   --   --   --  2.0 1.7  --     < > = values in this interval not displayed.         Lab 01/28/22  0436 01/27/22  0454 01/26/22  0512 01/25/22  0711   TOTAL PROTEIN 7.4 7.6 7.5 7.1   ALBUMIN 3.00* 3.20* 3.10* 3.20*   GLOBULIN 4.4 4.4 4.4 3.9   ALT (SGPT) 18 13 14 12   AST (SGOT) 27 24 21 18   BILIRUBIN 1.3* 1.3* 1.1 1.1   ALK PHOS 96 94 99 96                      Brief Urine Lab Results  (Last result in the past 365 days)      Color   Clarity   Blood   Leuk Est   Nitrite   Protein   CREAT   Urine HCG        01/19/22 2308 Dark Yellow   Clear   Negative   Trace   Negative   Trace               Microbiology Results (last 10 days)     Procedure Component Value - Date/Time    MRSA Screen, PCR (Inpatient) - Swab, Nares [[id number]]  (Normal) Collected: 01/20/22 1100    Lab Status: Final result Specimen: Swab from Nares Updated: 01/20/22 1310     MRSA PCR Negative    Narrative:      MRSA Negative    Blood Culture - Blood, Hand, Right [[id number]]  (Normal) Collected: 01/20/22 0012    Lab Status: Final result Specimen: Blood from Hand, Right Updated: 01/25/22 0215     Blood Culture No growth at 5 days    Blood Culture - Blood, Arm, Left [[id number]]  (Normal) Collected: 01/20/22 0012    Lab Status: Final result Specimen: Blood from Arm, Left Updated: 01/25/22 0215     Blood Culture No growth at 5 days    COVID-19, FLU A/B, RSV PCR - Swab, Nasopharynx [[id number]]  (Normal) Collected: 01/19/22 1807    Lab Status: Final result Specimen: Swab from Nasopharynx Updated: 01/19/22 1854     COVID19 Not Detected     Influenza A PCR Not Detected     Influenza B PCR Not Detected     RSV, PCR Not Detected    Narrative:      Fact sheet for providers: https://www.fda.gov/media/950449/download    Fact sheet for patients: https://www.fda.gov/media/663392/download    Test performed by PCR.          Adult Transthoracic Echo Complete W/ Cont if Necessary Per Protocol    Result Date: 1/20/2022  · The left ventricular cavity is severely dilated. Left ventricular systolic function is severely decreased. Left ventricular ejection fraction appears to be less than 20%. · Four-chamber dilation present · Moderate mitral valve regurgitation is present. · Estimated right ventricular systolic pressure from tricuspid regurgitation is moderately elevated (48 mmHg).      XR Finger 2+ View Left    Result  Date: 1/23/2022  EXAMINATION: XR FINGER 2+ VW, LEFT-01/19/2022:  INDICATION: Increased swelling to left hand; I50.23-Acute on chronic systolic (congestive) heart failure; E87.6-Hypokalemia.  COMPARISON: NONE.  FINDINGS:  Three views of the left index finger reveal no evidence of acute bony abnormality. The cortex is intact. Diffuse soft tissue swelling. Joint spaces are preserved.      No acute bony abnormality.  D:  01/20/2022 E:  01/20/2022  This report was finalized on 1/23/2022 7:35 PM by Dr. Crystal Brunner MD.      XR Chest 1 View    Result Date: 1/19/2022  EXAMINATION: XR CHEST 1 VW-  INDICATION: SOA triage protocol  COMPARISON: 7/30/2020  FINDINGS: Left-sided single lead ICD is again noted. The heart shadow is markedly enlarged, further increased from 7/30/2020. The pulmonary vasculature is mildly prominent, stable from the prior exam. No lung consolidation effusion or pneumothorax is seen. There is minimal if any edema.       Markedly enlarged heart shadow, further increased from 7/30/2020. Mild pulmonary vascular congestion without evidence of overt congestive failure.    This report was finalized on 1/19/2022 4:21 PM by Dr. Yves Thayer MD.      Duplex Venous Lower Extremity - Left CAR    Result Date: 1/27/2022  · Sub-acute left lower extremity deep vein thrombosis noted in the gastrocnemius. · All other left sided veins appeared normal.      Duplex Venous Lower Extremity - Bilateral CAR    Result Date: 1/20/2022  · Sub-acute left lower extremity deep vein thrombosis noted in the gastrocnemius. · All other veins appeared normal bilaterally.      CT Angiogram Chest    Result Date: 1/20/2022  EXAMINATION: CT ANGIOGRAM CHEST-  INDICATION: PE suspected, high prob; I50.23-Acute on chronic systolic (congestive) heart failure; E87.6-Hypokalemia  TECHNIQUE: Spiral acquisition 3 mm post IV contrast images through the chest and upper abdomen with sagittal and coronal 2-D reconstructions.  The radiation dose  reduction device was turned on for each scan per the ALARA (As Low as Reasonably Achievable) protocol.  COMPARISON: 1/19/2022 angiographic chest CT scan  FINDINGS: Prior exam report indicates mildly suboptimal study, small right pleural effusion and likely mild pulmonary edema.  Today's exam shows good contrast opacification of pulmonary arteries, averaging 255 Hounsfield units at the level of the main pulmonary artery, and no filling defects are seen to suggest pulmonary embolic disease. There do appear to be mildly prominent right and left hilar lymph nodes, and a very small pericardial effusion. No significant mediastinal adenopathy is seen. Heart is enlarged overall. There is a small right pleural effusion and no evidence of left pleural effusion. Lung window images show prominent pulmonary vasculature and decreased interstitial disease, presumably resolving mild interstitial edema. No new pulmonary parenchymal disease is seen.  Included images of the upper abdomen show extensive fatty liver change. No significant abnormalities are seen included spleen pancreatic tail, adrenal glands, or upper renal poles. There is a large IVC, and reflux of contrast in the hepatic veins which may reflect some degree of right heart dysfunction.        1. Today's study shows good contrast opacification of pulmonary arteries and no evidence of pulmonary embolic disease. 2. Cardiomegaly, very small pericardial effusion and small right pleural effusion. 3. Resolving pulmonary vascular congestion.   This report was finalized on 1/20/2022 3:58 PM by Dr. Yves Thayer MD.      CT Angiogram Chest    Result Date: 1/19/2022  CTA Chest INDICATION: Hypoxia, concern for pulmonary embolus or pneumonia TECHNIQUE: CT angiogram of the chest with IV contrast. 3-D reconstructions were obtained and reviewed.   Radiation dose reduction techniques included automated exposure control or exposure modulation based on body size. Count of known CT and  cardiac nuc med studies performed in previous 12 months: 0. COMPARISON: 6/17/2019 FINDINGS: Suboptimal opacification of the pulmonary arteries. Additionally there is motion degradation on this exam. Exam is considered nondiagnostic for the evaluation of pulmonary embolus. Marked cardiomegaly. Small pericardial effusion. Small right pleural effusion. Mosaic attenuation within the lungs could represent edema. There is no confluent infiltrate. No pneumothorax. No acute upper abdominal abnormality allowing for artifact, limited field-of-view and contrast bolus timing. No acute osseous abnormality.     1.  Suboptimal opacification of the pulmonary arteries. Additionally there is motion degradation on this exam. Exam is considered nondiagnostic for the evaluation of pulmonary embolus. 2.  Marked cardiomegaly with small right pleural effusion. Likely mild pulmonary edema. 3.  Small pericardial effusion Signer Name: JENAE YEN MD  Signed: 1/19/2022 10:12 PM  Workstation [facility]: [facility]  [facility]      Results for orders placed during the hospital encounter of 01/19/22    Duplex Venous Lower Extremity - Left CAR    Interpretation Summary  · Sub-acute left lower extremity deep vein thrombosis noted in the gastrocnemius.  · All other left sided veins appeared normal.      Results for orders placed during the hospital encounter of 01/19/22    Duplex Venous Lower Extremity - Left CAR    Interpretation Summary  · Sub-acute left lower extremity deep vein thrombosis noted in the gastrocnemius.  · All other left sided veins appeared normal.      Results for orders placed during the hospital encounter of 01/19/22    Adult Transthoracic Echo Complete W/ Cont if Necessary Per Protocol    Interpretation Summary  · The left ventricular cavity is severely dilated. Left ventricular systolic function is severely decreased. Left ventricular ejection fraction appears to be less than 20%.  · Four-chamber dilation  present  · Moderate mitral valve regurgitation is present.  · Estimated right ventricular systolic pressure from tricuspid regurgitation is moderately elevated (48 mmHg).      Plan for Follow-up of Pending Labs/Results: none    Discharge Details        Discharge Medications      New Medications      Instructions Start Date   apixaban 5 MG tablet tablet  Commonly known as: ELIQUIS   5 mg, Oral, Every 12 Hours Scheduled      pharmacy consult - MTM   Does not apply, Daily      Vericiguat 2.5 MG tablet   2.5 mg, Oral, Daily         Changes to Medications      Instructions Start Date   allopurinol 100 MG tablet  Commonly known as: Zyloprim  What changed: how much to take   200 mg, Oral, Daily   Start Date: January 29, 2022     carvedilol 6.25 MG tablet  Commonly known as: COREG  What changed:   · medication strength  · how much to take   6.25 mg, Oral, 2 Times Daily      metOLazone 5 MG tablet  Commonly known as: ZAROXOLYN  What changed:   · medication strength  · how much to take  · when to take this  · reasons to take this   5 mg, Oral, Daily PRN      spironolactone 50 MG tablet  Commonly known as: Aldactone  What changed: how much to take   50 mg, Oral, Daily         Continue These Medications      Instructions Start Date   bumetanide 2 MG tablet  Commonly known as: BUMEX   2 mg, Oral, Daily, Stop torsemide      COSENTYX (300 MG DOSE) SC   300 mg, Subcutaneous      Entresto 24-26 MG tablet  Generic drug: sacubitril-valsartan   1 tablet, Oral, 2 Times Daily      halobetasol 0.05 % ointment  Commonly known as: ULTRAVATE   1 application, Topical, 2 Times Daily      omeprazole 40 MG capsule  Commonly known as: priLOSEC   40 mg, Oral, Daily      potassium chloride 20 MEQ CR tablet  Commonly known as: K-DUR,KLOR-CON   20 mEq, Oral, Daily             No Known Allergies      Discharge Disposition:  Home or Self Care    Diet:  Hospital:  Diet Order   Procedures   • Diet Regular; Cardiac       Activity: as  tolerated      Restrictions or Other Recommendations:  Cardiac diet, 1500 cc fluid restriction, low salt        CODE STATUS:    Code Status and Medical Interventions:   Ordered at: 01/19/22 2003     Level Of Support Discussed With:    Patient     Code Status (Patient has no pulse and is not breathing):    CPR (Attempt to Resuscitate)     Medical Interventions (Patient has pulse or is breathing):    Full Support       Future Appointments   Date Time Provider Department Center   2/1/2022  2:15 PM Marah Almeida APRN [facility]   2/4/2022 11:15 AM Arnaldo Lopez MD [facility]   2/28/2022  2:45 PM Marina Rolon APRN [facility]   11/18/2022 10:45 AM Kiah Sierra APRN [facility]       Additional Instructions for the Follow-ups that You Need to Schedule     Ambulatory Referral to [facility] [place] - [facility]   As directed      Repeat BMP in 1 week    Order Comments: Repeat BMP in 1 week     Service Requested: Heart Failure Clinic         Ambulatory Referral to Home Health   As directed      Face to Face Visit Date: 1/21/2022    Follow-up provider for Plan of Care?: I treated the patient in an acute care facility and will not continue treatment after discharge.    Follow-up provider: ARNALDO LOPEZ [[id number]]    Reason/Clinical Findings: acute on chronic systolic heart failure    Describe mobility limitations that make leaving home difficult: impaired physical mobility and gait endurance    Nursing/Therapeutic Services Requested: Skilled Nursing    Skilled nursing orders: Medication education CHF management    Frequency: 1 Week 1         Discharge Follow-up with PCP   As directed       Currently Documented PCP:    Arnaldo Lopez MD    PCP Phone Number:    [phone]     Follow Up Details: 1 month to discuss DVT and Eliquis         Discharge Follow-up with Specialty: heart and valve; 1 Week   As directed      Specialty: heart and valve    Follow Up: 1 Week         Discharge  Follow-up with Specialty: tulio/moira; 3 Weeks   As directed      Specialty: tulio/clemeganer    Follow Up: 3 Weeks                     Maci Renee DO  01/28/22      Time Spent on Discharge:  I spent  35  minutes on this discharge activity which included: face-to-face encounter with the patient, reviewing the data in the system, coordination of the care with the nursing staff as well as consultants, documentation, and entering orders.            Electronically signed by Maci Renee DO at 01/28/22 8866

## 2022-02-02 ENCOUNTER — TELEPHONE (OUTPATIENT)
Dept: INTERNAL MEDICINE | Facility: CLINIC | Age: 32
End: 2022-02-02

## 2022-02-02 NOTE — TELEPHONE ENCOUNTER
Caller: [facility]     Relationship:     Best call back number: [phone]    What orders are you requesting (i.e. lab or imaging): ORDERS FOR HOME HEALTH SERVICES.    In what timeframe would the patient need to come in: [facility] ALISA [id number], WILL RUN FOR 8 WEEKS. SIVAKUMAR HAS A NEW PATIENT EXAM [id number] WITH  DR LOPEZ

## 2022-02-07 ENCOUNTER — READMISSION MANAGEMENT (OUTPATIENT)
Dept: CALL CENTER | Facility: HOSPITAL | Age: 32
End: 2022-02-07

## 2022-02-07 NOTE — OUTREACH NOTE
CHF Week 1 Survey      Responses   [facility] [place] patient discharged from? [facility]   Does the patient have one of the following disease processes/diagnoses(primary or secondary)? CHF          Maria Teresa Barragan RN

## 2022-02-09 ENCOUNTER — OFFICE VISIT (OUTPATIENT)
Dept: INTERNAL MEDICINE | Facility: CLINIC | Age: 32
End: 2022-02-09

## 2022-02-09 ENCOUNTER — READMISSION MANAGEMENT (OUTPATIENT)
Dept: CALL CENTER | Facility: HOSPITAL | Age: 32
End: 2022-02-09

## 2022-02-09 VITALS
BODY MASS INDEX: 45.97 KG/M2 | HEART RATE: 104 BPM | HEIGHT: 69 IN | DIASTOLIC BLOOD PRESSURE: 60 MMHG | TEMPERATURE: 97.1 F | OXYGEN SATURATION: 96 % | SYSTOLIC BLOOD PRESSURE: 118 MMHG | RESPIRATION RATE: 24 BRPM | WEIGHT: 310.4 LBS

## 2022-02-09 DIAGNOSIS — G47.33 OSA ON CPAP: ICD-10-CM

## 2022-02-09 DIAGNOSIS — E11.69 TYPE 2 DIABETES MELLITUS WITH OTHER SPECIFIED COMPLICATION, WITHOUT LONG-TERM CURRENT USE OF INSULIN: ICD-10-CM

## 2022-02-09 DIAGNOSIS — I42.0 NONISCHEMIC CONGESTIVE CARDIOMYOPATHY: ICD-10-CM

## 2022-02-09 DIAGNOSIS — I50.23 ACUTE ON CHRONIC SYSTOLIC HEART FAILURE: Primary | ICD-10-CM

## 2022-02-09 DIAGNOSIS — E66.01 MORBID OBESITY: ICD-10-CM

## 2022-02-09 DIAGNOSIS — Z99.89 OSA ON CPAP: ICD-10-CM

## 2022-02-09 LAB
EXPIRATION DATE: NORMAL
HBA1C MFR BLD: 6.2 %
Lab: NORMAL

## 2022-02-09 PROCEDURE — 3044F HG A1C LEVEL LT 7.0%: CPT | Performed by: STUDENT IN AN ORGANIZED HEALTH CARE EDUCATION/TRAINING PROGRAM

## 2022-02-09 PROCEDURE — 99495 TRANSJ CARE MGMT MOD F2F 14D: CPT | Performed by: STUDENT IN AN ORGANIZED HEALTH CARE EDUCATION/TRAINING PROGRAM

## 2022-02-09 PROCEDURE — 1111F DSCHRG MED/CURRENT MED MERGE: CPT | Performed by: STUDENT IN AN ORGANIZED HEALTH CARE EDUCATION/TRAINING PROGRAM

## 2022-02-09 PROCEDURE — 83036 HEMOGLOBIN GLYCOSYLATED A1C: CPT | Performed by: STUDENT IN AN ORGANIZED HEALTH CARE EDUCATION/TRAINING PROGRAM

## 2022-02-09 RX ORDER — SACUBITRIL AND VALSARTAN 24; 26 MG/1; MG/1
1 TABLET, FILM COATED ORAL 2 TIMES DAILY
Qty: 60 TABLET | Refills: 3 | Status: SHIPPED | OUTPATIENT
Start: 2022-02-09 | End: 2022-07-28 | Stop reason: SDUPTHER

## 2022-02-09 NOTE — OUTREACH NOTE
CHF Week 2 Survey      Responses   [facility] patient discharged from? [place]   Does the patient have one of the following disease processes/diagnoses(primary or secondary)? CHF   Week 2 attempt successful? Yes   Call start time 1609   Call end time 1613   General alerts for this patient EF of 20%   Discharge diagnosis Acute on chronic systolic heart failure ,  Nonischemic congestive cardiomyopathy    Meds reviewed with patient/caregiver? Yes   Is the patient having any side effects they believe may be caused by any medication additions or changes? No   Does the patient have all medications ordered at discharge? Yes   Is the patient taking all medications as directed (includes completed medication regime)? Yes   Does the patient have a primary care provider?  Yes   Does the patient have an appointment with their PCP within 7 days of discharge? Yes   Has the patient kept scheduled appointments due by today? Yes   What is the Home health agency?  [facility] -    Has home health visited the patient within 72 hours of discharge? Yes   Pulse Ox monitoring None   Psychosocial issues? No   Did the patient receive a copy of their discharge instructions? Yes   Nursing interventions Reviewed instructions with patient   What is the patient's perception of their health status since discharge? Improving   Nursing interventions Nurse provided patient education   Is the patient weighing daily? Yes   Does the patient have scales? Yes   Daily weight interventions Education provided on importance of daily weight   Is the patient able to teach back Heart Failure diet management? Yes   Is the patient able to teach back Heart Failure Zones? Yes   Is the patient able to teach back signs and symptoms of worsening condition? (i.e. weight gain, shortness of air, etc.) Yes   If the patient is a current smoker, are they able to teach back resources for cessation? Not a smoker   Is the patient/caregiver able to teach back  the hierarchy of who to call/visit for symptoms/problems? PCP, Specialist, Home health nurse, Urgent Care, ED, 911 Yes   CHF Week 2 call completed? Yes          Arlet Barragan RN

## 2022-02-10 ENCOUNTER — DOCUMENTATION (OUTPATIENT)
Dept: CARDIAC REHAB | Facility: HOSPITAL | Age: 32
End: 2022-02-10

## 2022-02-10 ENCOUNTER — TRANSCRIBE ORDERS (OUTPATIENT)
Dept: CARDIAC REHAB | Facility: HOSPITAL | Age: 32
End: 2022-02-10

## 2022-02-10 ENCOUNTER — TELEPHONE (OUTPATIENT)
Dept: CARDIAC REHAB | Facility: HOSPITAL | Age: 32
End: 2022-02-10

## 2022-02-10 DIAGNOSIS — I50.23 ACUTE ON CHRONIC SYSTOLIC CHF (CONGESTIVE HEART FAILURE): Primary | ICD-10-CM

## 2022-02-10 NOTE — TELEPHONE ENCOUNTER
Pt. Referred for Phase II Cardiac Rehab. Staff discussed benefits of exercise, program protocol, and educational material provided. Teach back verified.  Patient scheduled for orientation at [facility] on March 8th at 9:00am.

## 2022-02-10 NOTE — PLAN OF CARE
Problem: Patient Care Overview (Adult)  Goal: Plan of Care Review  Outcome: Ongoing (interventions implemented as appropriate)    03/02/17 7792   Outcome Evaluation   Outcome Summary/Follow up Plan Pt refused labs this AM, then consented around 930 AM. Refused PT also but agreed to participate this afternoon. Pt also refused his Lovenox this AM. Pt would like to go home tomorrow. VSS. NSR/Tachycardia. No s/s of distress. No c/o pain.        Goal: Adult Individualization and Mutuality  Outcome: Ongoing (interventions implemented as appropriate)  Goal: Discharge Needs Assessment  Outcome: Ongoing (interventions implemented as appropriate)    Problem: Cardiac: Heart Failure (Adult)  Goal: Signs and Symptoms of Listed Potential Problems Will be Absent or Manageable (Cardiac: Heart Failure)  Outcome: Ongoing (interventions implemented as appropriate)       2 = A lot of assistance

## 2022-02-14 NOTE — PROGRESS NOTES
"Transitional Care Follow Up Visit  Subjective     Evan Gannon is a 31 y.o. male who presents for a transitional care management visit.    Within 48 business hours after discharge our office contacted him via telephone to coordinate his care and needs.      I reviewed and discussed the details of that call along with the discharge summary, hospital problems, inpatient lab results, inpatient diagnostic studies, and consultation reports with Evan.     Current outpatient and discharge medications have been reconciled for the patient.  Reviewed by: Marek Díaz MD      Date of TCM Phone Call 1/28/2022   [facility]   Date of Admission 1/19/2022   Date of Discharge 1/28/2022   Risk for Readmission (LACE Score) -   Discharge Disposition Home-Health Care Svc     Risk for Readmission (LACE) No data recorded    History of Present Illness   Course During Hospital Stay:  Per hospitalist discharge summary on 1/28/22:  \"Evan Gannon is a 31 y.o. male with history of an ICM/chronic systolic heart failure with a EF of 20% s/p ICD, hypertension, COLLIN, plaque psoriasis.  Patient presents with progressively worsening SOA despite changing diuretics admitted with acute on chronic systolic heart failure.    Acute on chronic systolic heart failure  Nonischemic cardiomyopathy  Suspected PE  -Echo from 11/2/2022 with EF of 20%, repeat continues to show severely decreased LV SF with EF<20%, also with RVSP 48 mmHg  -Chest x-ray with mild vascular congestion, BNP 4000, repeat CTA chest is negative for PE   -Cardiology following, continue Coreg and Entresto at the decreased doses (these should not be held for hypotension)  -Continue strict I/O, daily weights  -Patient will be referred as an outpatient to  for possible consideration for heart transplant/LVAD on d/c  - plan for d/c when patient is euvolemic  - patient states he continues to gain weight daily  - d/c'd IV Bumex and Dobutamine       Acute " "left lower extremity DVT  -Continue Eliquis  - repeated US of LLE at patient request 22 and DVT is stable with no new findings.\"    Patient doing well today. Lower extremity swelling has improved considerably since discharge. He denies pain of lower extremities. Denies SOB, chest pain, palpitations. Compliant with home medications. He has not heard from transplant clinic regarding possible heart transplant. Currently on home oxygen which he has been on for about 3 years. At rest, usually on 2-3L. After exertion he will occasionally raise to 4L.        The following portions of the patient's history were reviewed and updated as appropriate: allergies, current medications, past family history, past medical history, past social history, past surgical history and problem list.    Review of Systems   All other systems reviewed and are negative.      Objective   Physical Exam  Constitutional:       General: He is not in acute distress.     Appearance: He is obese. He is not toxic-appearing.   Cardiovascular:      Rate and Rhythm: Normal rate and regular rhythm.   Pulmonary:      Effort: Pulmonary effort is normal.      Breath sounds: Normal breath sounds.   Musculoskeletal:      Right lower le+ Pitting Edema present.      Left lower le+ Pitting Edema present.   Skin:     Capillary Refill: Capillary refill takes less than 2 seconds.      Comments: Plaque psoriasis on lower extremities    Neurological:      General: No focal deficit present.      Mental Status: He is oriented to person, place, and time.      Gait: Gait normal.   Psychiatric:         Mood and Affect: Mood normal.         Behavior: Behavior normal.         Assessment/Plan   Diagnoses and all orders for this visit:    1. Acute on chronic systolic heart failure (HCC) (Primary)  -     Ambulatory Referral to Cardiac Rehab  -     POC Glycosylated Hemoglobin (Hb A1C)  -     sacubitril-valsartan (Entresto) 24-26 MG tablet; Take 1 tablet by mouth 2 (Two) " Times a Day.  Dispense: 60 tablet; Refill: 3    2. Nonischemic congestive cardiomyopathy (HCC)  -     Ambulatory Referral to Cardiac Rehab  -     POC Glycosylated Hemoglobin (Hb A1C)  -     sacubitril-valsartan (Entresto) 24-26 MG tablet; Take 1 tablet by mouth 2 (Two) Times a Day.  Dispense: 60 tablet; Refill: 3    3. Type 2 diabetes mellitus with other specified complication, without long-term current use of insulin (HCC)  Comments:  History of T2DM A1c 6.2 today in prediabetic range. Lifestyle modification discussed.   Orders:  -     POC Glycosylated Hemoglobin (Hb A1C)    4. COLLIN on CPAP  Comments:  continue CPAP    5. Morbid obesity (HCC)  Comments:  history of gastric sleeve. Continue to work on diet and exercise      Cardiology follow up on 2/28/22. Encouraged patient to inquire about heart transplant    Marek Díaz MD

## 2022-02-16 ENCOUNTER — READMISSION MANAGEMENT (OUTPATIENT)
Dept: CALL CENTER | Facility: HOSPITAL | Age: 32
End: 2022-02-16

## 2022-02-16 NOTE — OUTREACH NOTE
CHF Week 3 Survey      Responses   [facility] patient discharged from? [facility]   Does the patient have one of the following disease processes/diagnoses(primary or secondary)? CHF   Week 3 attempt successful? Yes   Call start time 1242   Call end time 1243   Discharge diagnosis Acute on chronic systolic heart failure ,  Nonischemic congestive cardiomyopathy    Meds reviewed with patient/caregiver? Yes   Is the patient taking all medications as directed (includes completed medication regime)? Yes   Has the patient kept scheduled appointments due by today? Yes   Pulse Ox monitoring None   Psychosocial issues? No   Comments pt reports edema resolved.    What is the patient's perception of their health status since discharge? Returned to baseline/stable   CHF Week 3 call completed? Yes   Revoked No further contact(revokes)-requires comment   Is the patient interested in additional calls from an ambulatory ?  NOTE:  applies to high risk patients requiring additional follow-up. No   Graduated/Revoked comments anne Perez RN

## 2022-02-21 RX ORDER — POTASSIUM CHLORIDE 1500 MG/1
TABLET, EXTENDED RELEASE ORAL
Qty: 30 TABLET | Refills: 1 | OUTPATIENT
Start: 2022-02-21

## 2022-02-21 RX ORDER — POTASSIUM CHLORIDE 20 MEQ/1
20 TABLET, EXTENDED RELEASE ORAL DAILY
Qty: 30 TABLET | Refills: 1 | Status: SHIPPED | OUTPATIENT
Start: 2022-02-21

## 2022-02-28 ENCOUNTER — OFFICE VISIT (OUTPATIENT)
Dept: CARDIOLOGY | Facility: CLINIC | Age: 32
End: 2022-02-28

## 2022-02-28 VITALS
DIASTOLIC BLOOD PRESSURE: 60 MMHG | BODY MASS INDEX: 45.39 KG/M2 | HEART RATE: 100 BPM | OXYGEN SATURATION: 98 % | SYSTOLIC BLOOD PRESSURE: 110 MMHG | WEIGHT: 306.45 LBS | HEIGHT: 69 IN

## 2022-02-28 DIAGNOSIS — I10 ESSENTIAL HYPERTENSION: ICD-10-CM

## 2022-02-28 DIAGNOSIS — I82.402 ACUTE DEEP VEIN THROMBOSIS (DVT) OF LEFT LOWER EXTREMITY, UNSPECIFIED VEIN: ICD-10-CM

## 2022-02-28 DIAGNOSIS — I50.22 CHRONIC SYSTOLIC CHF (CONGESTIVE HEART FAILURE), NYHA CLASS 2: Primary | ICD-10-CM

## 2022-02-28 PROCEDURE — 99214 OFFICE O/P EST MOD 30 MIN: CPT | Performed by: NURSE PRACTITIONER

## 2022-02-28 NOTE — PROGRESS NOTES
Subjective:     Encounter Date:02/28/2022    Primary Care Physician: Marek Díaz MD      Patient ID: Evan Gannon is a 31 y.o. male.    Chief Complaint:Acute on chronic systolic heart failure, Shortness of Breath, Dizziness, and Edema      PROBLEM LIST:     1. Acute on chronic systolic (congestive) heart failure/nonischemic cardiomyopathy  a. Echocardiogram 4/28/14: LVEF 0.20-0.25, mild MR, moderate TR   b. Echocardiogram 11/7/14: LVEF 0.20-0.25, all valves are structurally and functionally normal with no hemodynamically evident valve disease   c. Echocardiogram, 2/7/2017: LVEF 20%, RV mildly dilated, cardiac valves anatomically and functionally normal, LV moderately dilated  d. Echocardiogram, 7/13/2017: LVEF 20%., Moderately reduced RV systolic function noted, mild MR  e. [facility], 7/13/2017: LVEF 10%; left ventricular end-diastolic pressure of 30 with normal coronary arteries  f. [facility] hospitalization 9/5/17-9/8/17 for CHF exacerbation  g. [facility] 10/16/17-10/19/17 for CHF exacerbation and was referred to [facility] for transplant but has to lose 200 pounds to qualify  h. Echocardiogram 10/16/17: LVEF 0.15, LV severely dilated, RV mildly dilated, mildly reduced RV systolic function noted  i. Biotronik VVI ICD implantation 8/15/17 by Dr. Richmond, no DFTs done due to patient's issues with hypoxemia with even minimal sedation as well as acute on chronic CHF  2. Hypertension  3. Morbid obesity  1. Gastric sleeve 2020  2. BMI now 45 down from 60   4. Obstructive sleep apnea, noncompliant with CPAP  5. Personal history of noncompliance with medical treatment  6. Microcytic anemia   7. Severe psoriasis  8. [facility] ED 12/12/17 for chest pain with mildly elevated troponin 0.08, positive d-dimer, negative CTA of the chest, no evidence of ACS, PE, or dissection with discharge within 5 hours  9. Surgical history  a. Left heart catheterization  b. ICD implantation  c. Tonsillectomy and adenoidectomy  d. Sinus surgery        No Known  Allergies      Current Outpatient Medications:   •  allopurinol (Zyloprim) 100 MG tablet, Take 2 tablets by mouth Daily., Disp: 30 tablet, Rfl: 1  •  apixaban (ELIQUIS) 5 MG tablet tablet, Take 1 tablet by mouth Every 12 (Twelve) Hours. Indications: DVT/PE (active thrombosis), Disp: 60 tablet, Rfl: 6  •  bumetanide (BUMEX) 2 MG tablet, Take 1 tablet by mouth Daily. Stop torsemide, Disp: 30 tablet, Rfl: 3  •  carvedilol (COREG) 6.25 MG tablet, Take 1 tablet by mouth 2 (Two) Times a Day., Disp: 60 tablet, Rfl: 11  •  halobetasol (ULTRAVATE) 0.05 % ointment, Apply 1 application topically to the appropriate area as directed 2 (Two) Times a Day., Disp: , Rfl:   •  metOLazone (ZAROXOLYN) 5 MG tablet, Take 1 tablet by mouth Daily As Needed (edema)., Disp: 25 tablet, Rfl: 6  •  O2 (OXYGEN), Inhale 2-6 L/min Daily., Disp: , Rfl:   •  omeprazole (priLOSEC) 40 MG capsule, Take 1 capsule by mouth Daily., Disp: 30 capsule, Rfl: 3  •  pharmacy consult - MTM, Daily., Disp: , Rfl:   •  potassium chloride (K-DUR,KLOR-CON) 20 MEQ CR tablet, Take 1 tablet by mouth Daily., Disp: 30 tablet, Rfl: 1  •  sacubitril-valsartan (Entresto) 24-26 MG tablet, Take 1 tablet by mouth 2 (Two) Times a Day., Disp: 60 tablet, Rfl: 3  •  Secukinumab (COSENTYX, 300 MG DOSE, SC), Inject 300 mg under the skin into the appropriate area as directed Every 30 (Thirty) Days., Disp: , Rfl:   •  spironolactone (Aldactone) 50 MG tablet, Take 1 tablet by mouth Daily., Disp: 30 tablet, Rfl: 3  •  Vericiguat 2.5 MG tablet, Take 2.5 mg by mouth Daily., Disp: 30 tablet, Rfl: 11        History of Present Illness    Patient is a 31-year-old -American male who is here today for hospital follow-up status post admission for congestive heart failure.  He required temporary dobutamine infusion secondary to hypotension and to assist with diuresis.  He was eventually discharged home.  Since that time overall feels to be doing stable.  Has started Vericiguat and seems to  "be tolerating this.  Has had increased volume intake over the last few days and is starting to notice a little bit of lower extremity edema.  Discussed with patient that we will likely be referring him back to  for establishment of care to consider transplant.  Needs refills on metolazone and Eliquis.  He was incidentally noted to have lower extremity DVT at the time of his hospitalization and started on Eliquis.    The following portions of the patient's history were reviewed and updated as appropriate: allergies, current medications, past family history, past medical history, past social history, past surgical history and problem list.      Social History     Tobacco Use   • Smoking status: Never Smoker   • Smokeless tobacco: Never Used   Vaping Use   • Vaping Use: Never used   Substance Use Topics   • Alcohol use: No   • Drug use: No         Review of Systems   Constitutional: Positive for malaise/fatigue.   Cardiovascular: Positive for leg swelling. Negative for chest pain, dyspnea on exertion, palpitations and syncope.   Respiratory: Negative.  Negative for shortness of breath.    Hematologic/Lymphatic: Negative for bleeding problem. Does not bruise/bleed easily.   Skin: Negative for rash.   Musculoskeletal: Negative for muscle weakness and myalgias.   Gastrointestinal: Negative for heartburn, nausea and vomiting.   Neurological: Negative for dizziness, light-headedness, loss of balance and numbness.          Objective:   /60 (BP Location: Right arm, Patient Position: Sitting)   Pulse 100   Ht 175.3 cm (69\")   Wt (!) 139 kg (306 lb 7.2 oz)   SpO2 98%   BMI 45.25 kg/m²         Vitals reviewed.   Constitutional:       Appearance: Well-developed. Chronically ill-appearing.      Comments: Morbidly obese, wearing supplemental oxygen   Neck:      Vascular: No JVD.      Trachea: No tracheal deviation.   Pulmonary:      Effort: Pulmonary effort is normal.      Breath sounds: Normal breath sounds. "   Cardiovascular:      Normal rate. Regular rhythm.   Pulses:     Intact distal pulses.   Edema:     Peripheral edema present.     Ankle: bilateral trace edema of the ankle.  Abdominal:      General: Bowel sounds are normal.      Palpations: Abdomen is soft.      Tenderness: There is no abdominal tenderness.   Musculoskeletal:         General: No deformity. Skin:     General: Skin is warm and dry.   Neurological:      Mental Status: Alert, oriented to person, place, and time and oriented to person, place and time.         Procedures          Assessment:   Assessment/Plan      Diagnoses and all orders for this visit:    1. Chronic systolic CHF (congestive heart failure), NYHA class 2 (HCC) (Primary).  Stable.  Mild volume overload.  Currently on Bumex.  Needs refill on metolazone.    2. Essential hypertension, stable.  On Entresto and beta-blocker.    3. Acute deep vein thrombosis (DVT) of left lower extremity, unspecified vein (HCC).  Stable.  On Eliquis.      Plan:  1. We will plan to increase Vericiguat to 5 mg daily for 2 weeks.  If he tolerates this dose after 2 weeks he is to increase this to 10 mg.  2. Continue other current cardiac medications.  3. We will send in refills for metolazone and Eliquis.  4. Will refer patient to the [facility] to establish with her cardiac transplant team.  5. We will follow-up in our office in 6 weeks time or sooner if needed.       Marina MASTERS     Dictated utilizing Dragon dictation

## 2022-03-07 DIAGNOSIS — I42.0 NONISCHEMIC CONGESTIVE CARDIOMYOPATHY: Primary | ICD-10-CM

## 2022-03-08 ENCOUNTER — TELEPHONE (OUTPATIENT)
Dept: INTERNAL MEDICINE | Facility: CLINIC | Age: 32
End: 2022-03-08

## 2022-03-15 ENCOUNTER — PATIENT MESSAGE (OUTPATIENT)
Dept: INTERNAL MEDICINE | Facility: CLINIC | Age: 32
End: 2022-03-15

## 2022-03-15 DIAGNOSIS — E66.01 MORBID (SEVERE) OBESITY DUE TO EXCESS CALORIES: Primary | ICD-10-CM

## 2022-03-15 DIAGNOSIS — E66.01 MORBID (SEVERE) OBESITY DUE TO EXCESS CALORIES: ICD-10-CM

## 2022-03-17 NOTE — TELEPHONE ENCOUNTER
From: Evan Gannon  To: Marek Díaz MD  Sent: 3/15/2022 9:16 AM EDT  Subject: Heart doctor     They ask to see if you can Prescribe me Ozempic

## 2022-03-25 ENCOUNTER — TELEPHONE (OUTPATIENT)
Dept: CARDIOLOGY | Facility: CLINIC | Age: 32
End: 2022-03-25

## 2022-03-30 ENCOUNTER — APPOINTMENT (OUTPATIENT)
Dept: GENERAL RADIOLOGY | Facility: HOSPITAL | Age: 32
End: 2022-03-30

## 2022-03-30 ENCOUNTER — APPOINTMENT (OUTPATIENT)
Dept: CT IMAGING | Facility: HOSPITAL | Age: 32
End: 2022-03-30

## 2022-03-30 ENCOUNTER — HOSPITAL ENCOUNTER (OUTPATIENT)
Facility: HOSPITAL | Age: 32
Setting detail: OBSERVATION
LOS: 1 days | Discharge: HOME HEALTH | End: 2022-04-02
Attending: EMERGENCY MEDICINE | Admitting: INTERNAL MEDICINE

## 2022-03-30 ENCOUNTER — TELEMEDICINE (OUTPATIENT)
Dept: INTERNAL MEDICINE | Facility: CLINIC | Age: 32
End: 2022-03-30

## 2022-03-30 ENCOUNTER — APPOINTMENT (OUTPATIENT)
Dept: CARDIOLOGY | Facility: HOSPITAL | Age: 32
End: 2022-03-30

## 2022-03-30 DIAGNOSIS — R29.898 RIGHT LEG WEAKNESS: Primary | ICD-10-CM

## 2022-03-30 DIAGNOSIS — J81.1 CHRONIC PULMONARY EDEMA: ICD-10-CM

## 2022-03-30 DIAGNOSIS — M79.604 PAIN IN BOTH LOWER EXTREMITIES: Primary | ICD-10-CM

## 2022-03-30 DIAGNOSIS — M79.605 PAIN IN BOTH LOWER EXTREMITIES: Primary | ICD-10-CM

## 2022-03-30 PROBLEM — R73.03 PREDIABETES: Status: ACTIVE | Noted: 2022-03-30

## 2022-03-30 PROBLEM — L40.0 PLAQUE PSORIASIS: Status: ACTIVE | Noted: 2022-03-30

## 2022-03-30 PROBLEM — K21.9 GERD WITHOUT ESOPHAGITIS: Status: ACTIVE | Noted: 2022-03-30

## 2022-03-30 PROBLEM — I50.22 CHRONIC SYSTOLIC CHF (CONGESTIVE HEART FAILURE) (HCC): Status: ACTIVE | Noted: 2022-01-19

## 2022-03-30 LAB
ALBUMIN SERPL-MCNC: 3.3 G/DL (ref 3.5–5.2)
ALBUMIN/GLOB SERPL: 0.6 G/DL
ALP SERPL-CCNC: 89 U/L (ref 39–117)
ALT SERPL W P-5'-P-CCNC: 8 U/L (ref 1–41)
ANION GAP SERPL CALCULATED.3IONS-SCNC: 10 MMOL/L (ref 5–15)
ANISOCYTOSIS BLD QL: NORMAL
AST SERPL-CCNC: 13 U/L (ref 1–40)
BASOPHILS # BLD AUTO: 0.03 10*3/MM3 (ref 0–0.2)
BASOPHILS NFR BLD AUTO: 0.6 % (ref 0–1.5)
BH CV LOWER VASCULAR LEFT COMMON FEMORAL AUGMENT: NORMAL
BH CV LOWER VASCULAR LEFT COMMON FEMORAL COMPRESS: NORMAL
BH CV LOWER VASCULAR LEFT COMMON FEMORAL PHASIC: NORMAL
BH CV LOWER VASCULAR LEFT COMMON FEMORAL SPONT: NORMAL
BH CV LOWER VASCULAR RIGHT COMMON FEMORAL AUGMENT: NORMAL
BH CV LOWER VASCULAR RIGHT COMMON FEMORAL COMPRESS: NORMAL
BH CV LOWER VASCULAR RIGHT COMMON FEMORAL PHASIC: NORMAL
BH CV LOWER VASCULAR RIGHT COMMON FEMORAL SPONT: NORMAL
BH CV LOWER VASCULAR RIGHT DISTAL FEMORAL AUGMENT: NORMAL
BH CV LOWER VASCULAR RIGHT DISTAL FEMORAL COMPRESS: NORMAL
BH CV LOWER VASCULAR RIGHT DISTAL FEMORAL PHASIC: NORMAL
BH CV LOWER VASCULAR RIGHT DISTAL FEMORAL SPONT: NORMAL
BH CV LOWER VASCULAR RIGHT GASTRONEMIUS COMPRESS: NORMAL
BH CV LOWER VASCULAR RIGHT GREATER SAPH AK COMPRESS: NORMAL
BH CV LOWER VASCULAR RIGHT GREATER SAPH BK COMPRESS: NORMAL
BH CV LOWER VASCULAR RIGHT LESSER SAPH COMPRESS: NORMAL
BH CV LOWER VASCULAR RIGHT MID FEMORAL AUGMENT: NORMAL
BH CV LOWER VASCULAR RIGHT MID FEMORAL COMPRESS: NORMAL
BH CV LOWER VASCULAR RIGHT MID FEMORAL PHASIC: NORMAL
BH CV LOWER VASCULAR RIGHT MID FEMORAL SPONT: NORMAL
BH CV LOWER VASCULAR RIGHT PERONEAL COMPRESS: NORMAL
BH CV LOWER VASCULAR RIGHT POPLITEAL AUGMENT: NORMAL
BH CV LOWER VASCULAR RIGHT POPLITEAL COMPRESS: NORMAL
BH CV LOWER VASCULAR RIGHT POPLITEAL PHASIC: NORMAL
BH CV LOWER VASCULAR RIGHT POPLITEAL SPONT: NORMAL
BH CV LOWER VASCULAR RIGHT POSTERIOR TIBIAL COMPRESS: NORMAL
BH CV LOWER VASCULAR RIGHT PROFUNDA FEMORAL AUGMENT: NORMAL
BH CV LOWER VASCULAR RIGHT PROFUNDA FEMORAL COMPRESS: NORMAL
BH CV LOWER VASCULAR RIGHT PROFUNDA FEMORAL PHASIC: NORMAL
BH CV LOWER VASCULAR RIGHT PROFUNDA FEMORAL SPONT: NORMAL
BH CV LOWER VASCULAR RIGHT PROXIMAL FEMORAL AUGMENT: NORMAL
BH CV LOWER VASCULAR RIGHT PROXIMAL FEMORAL COMPRESS: NORMAL
BH CV LOWER VASCULAR RIGHT PROXIMAL FEMORAL PHASIC: NORMAL
BH CV LOWER VASCULAR RIGHT PROXIMAL FEMORAL SPONT: NORMAL
BH CV LOWER VASCULAR RIGHT SAPHENOFEMORAL JUNCTION AUGMENT: NORMAL
BH CV LOWER VASCULAR RIGHT SAPHENOFEMORAL JUNCTION COMPRESS: NORMAL
BH CV LOWER VASCULAR RIGHT SAPHENOFEMORAL JUNCTION PHASIC: NORMAL
BH CV LOWER VASCULAR RIGHT SAPHENOFEMORAL JUNCTION SPONT: NORMAL
BILIRUB SERPL-MCNC: 1.2 MG/DL (ref 0–1.2)
BUN SERPL-MCNC: 25 MG/DL (ref 6–20)
BUN/CREAT SERPL: 26 (ref 7–25)
CALCIUM SPEC-SCNC: 9.1 MG/DL (ref 8.6–10.5)
CHLORIDE SERPL-SCNC: 91 MMOL/L (ref 98–107)
CK SERPL-CCNC: 26 U/L (ref 20–200)
CO2 SERPL-SCNC: 34 MMOL/L (ref 22–29)
CREAT SERPL-MCNC: 0.96 MG/DL (ref 0.76–1.27)
CRP SERPL-MCNC: 4.92 MG/DL (ref 0–0.5)
D-LACTATE SERPL-SCNC: 1.4 MMOL/L (ref 0.5–2)
DEPRECATED RDW RBC AUTO: 53.6 FL (ref 37–54)
EGFRCR SERPLBLD CKD-EPI 2021: 109.7 ML/MIN/1.73
EOSINOPHIL # BLD AUTO: 0.33 10*3/MM3 (ref 0–0.4)
EOSINOPHIL NFR BLD AUTO: 6.4 % (ref 0.3–6.2)
ERYTHROCYTE [DISTWIDTH] IN BLOOD BY AUTOMATED COUNT: 20.4 % (ref 12.3–15.4)
ERYTHROCYTE [SEDIMENTATION RATE] IN BLOOD: 103 MM/HR (ref 0–15)
FLUAV SUBTYP SPEC NAA+PROBE: NOT DETECTED
FLUBV RNA ISLT QL NAA+PROBE: NOT DETECTED
GLOBULIN UR ELPH-MCNC: 5.1 GM/DL
GLUCOSE BLDC GLUCOMTR-MCNC: 110 MG/DL (ref 70–130)
GLUCOSE SERPL-MCNC: 97 MG/DL (ref 65–99)
HBA1C MFR BLD: 6.2 % (ref 4.8–5.6)
HCT VFR BLD AUTO: 40.5 % (ref 37.5–51)
HGB BLD-MCNC: 13.1 G/DL (ref 13–17.7)
HOLD SPECIMEN: NORMAL
IMM GRANULOCYTES # BLD AUTO: 0.01 10*3/MM3 (ref 0–0.05)
IMM GRANULOCYTES NFR BLD AUTO: 0.2 % (ref 0–0.5)
LYMPHOCYTES # BLD AUTO: 0.85 10*3/MM3 (ref 0.7–3.1)
LYMPHOCYTES NFR BLD AUTO: 16.5 % (ref 19.6–45.3)
MAGNESIUM SERPL-MCNC: 1.5 MG/DL (ref 1.6–2.6)
MCH RBC QN AUTO: 24.3 PG (ref 26.6–33)
MCHC RBC AUTO-ENTMCNC: 32.3 G/DL (ref 31.5–35.7)
MCV RBC AUTO: 75 FL (ref 79–97)
MONOCYTES # BLD AUTO: 0.81 10*3/MM3 (ref 0.1–0.9)
MONOCYTES NFR BLD AUTO: 15.8 % (ref 5–12)
NEUTROPHILS NFR BLD AUTO: 3.11 10*3/MM3 (ref 1.7–7)
NEUTROPHILS NFR BLD AUTO: 60.5 % (ref 42.7–76)
NRBC BLD AUTO-RTO: 0 /100 WBC (ref 0–0.2)
NT-PROBNP SERPL-MCNC: 4034 PG/ML (ref 0–450)
PLAT MORPH BLD: NORMAL
PLATELET # BLD AUTO: 294 10*3/MM3 (ref 140–450)
PMV BLD AUTO: 10 FL (ref 6–12)
POTASSIUM SERPL-SCNC: 3.1 MMOL/L (ref 3.5–5.2)
PROT SERPL-MCNC: 8.4 G/DL (ref 6–8.5)
RBC # BLD AUTO: 5.4 10*6/MM3 (ref 4.14–5.8)
SARS-COV-2 RNA PNL SPEC NAA+PROBE: NOT DETECTED
SODIUM SERPL-SCNC: 135 MMOL/L (ref 136–145)
TARGETS BLD QL SMEAR: NORMAL
URATE SERPL-MCNC: 11.2 MG/DL (ref 3.4–7)
WBC MORPH BLD: NORMAL
WBC NRBC COR # BLD: 5.14 10*3/MM3 (ref 3.4–10.8)
WHOLE BLOOD HOLD SPECIMEN: NORMAL
WHOLE BLOOD HOLD SPECIMEN: NORMAL

## 2022-03-30 PROCEDURE — 87636 SARSCOV2 & INF A&B AMP PRB: CPT | Performed by: INTERNAL MEDICINE

## 2022-03-30 PROCEDURE — 83605 ASSAY OF LACTIC ACID: CPT | Performed by: EMERGENCY MEDICINE

## 2022-03-30 PROCEDURE — 93971 EXTREMITY STUDY: CPT | Performed by: INTERNAL MEDICINE

## 2022-03-30 PROCEDURE — 72131 CT LUMBAR SPINE W/O DYE: CPT

## 2022-03-30 PROCEDURE — 82962 GLUCOSE BLOOD TEST: CPT

## 2022-03-30 PROCEDURE — 70450 CT HEAD/BRAIN W/O DYE: CPT

## 2022-03-30 PROCEDURE — 80053 COMPREHEN METABOLIC PANEL: CPT | Performed by: EMERGENCY MEDICINE

## 2022-03-30 PROCEDURE — 0 IOPAMIDOL PER 1 ML: Performed by: EMERGENCY MEDICINE

## 2022-03-30 PROCEDURE — 96375 TX/PRO/DX INJ NEW DRUG ADDON: CPT

## 2022-03-30 PROCEDURE — 94660 CPAP INITIATION&MGMT: CPT

## 2022-03-30 PROCEDURE — 70496 CT ANGIOGRAPHY HEAD: CPT

## 2022-03-30 PROCEDURE — 99214 OFFICE O/P EST MOD 30 MIN: CPT

## 2022-03-30 PROCEDURE — C9803 HOPD COVID-19 SPEC COLLECT: HCPCS

## 2022-03-30 PROCEDURE — 99223 1ST HOSP IP/OBS HIGH 75: CPT | Performed by: INTERNAL MEDICINE

## 2022-03-30 PROCEDURE — 85652 RBC SED RATE AUTOMATED: CPT | Performed by: NURSE PRACTITIONER

## 2022-03-30 PROCEDURE — 86140 C-REACTIVE PROTEIN: CPT | Performed by: NURSE PRACTITIONER

## 2022-03-30 PROCEDURE — 73610 X-RAY EXAM OF ANKLE: CPT

## 2022-03-30 PROCEDURE — 85025 COMPLETE CBC W/AUTO DIFF WBC: CPT | Performed by: EMERGENCY MEDICINE

## 2022-03-30 PROCEDURE — 70498 CT ANGIOGRAPHY NECK: CPT

## 2022-03-30 PROCEDURE — 25010000002 DEXAMETHASONE PER 1 MG

## 2022-03-30 PROCEDURE — 83880 ASSAY OF NATRIURETIC PEPTIDE: CPT | Performed by: EMERGENCY MEDICINE

## 2022-03-30 PROCEDURE — 99214 OFFICE O/P EST MOD 30 MIN: CPT | Performed by: STUDENT IN AN ORGANIZED HEALTH CARE EDUCATION/TRAINING PROGRAM

## 2022-03-30 PROCEDURE — 93005 ELECTROCARDIOGRAM TRACING: CPT | Performed by: EMERGENCY MEDICINE

## 2022-03-30 PROCEDURE — 71045 X-RAY EXAM CHEST 1 VIEW: CPT

## 2022-03-30 PROCEDURE — 83036 HEMOGLOBIN GLYCOSYLATED A1C: CPT | Performed by: NURSE PRACTITIONER

## 2022-03-30 PROCEDURE — 84550 ASSAY OF BLOOD/URIC ACID: CPT | Performed by: NURSE PRACTITIONER

## 2022-03-30 PROCEDURE — 83735 ASSAY OF MAGNESIUM: CPT | Performed by: NURSE PRACTITIONER

## 2022-03-30 PROCEDURE — 82550 ASSAY OF CK (CPK): CPT | Performed by: EMERGENCY MEDICINE

## 2022-03-30 PROCEDURE — 99284 EMERGENCY DEPT VISIT MOD MDM: CPT

## 2022-03-30 PROCEDURE — 85007 BL SMEAR W/DIFF WBC COUNT: CPT | Performed by: EMERGENCY MEDICINE

## 2022-03-30 PROCEDURE — 73630 X-RAY EXAM OF FOOT: CPT

## 2022-03-30 PROCEDURE — 93971 EXTREMITY STUDY: CPT

## 2022-03-30 RX ORDER — ACETAMINOPHEN 325 MG/1
650 TABLET ORAL EVERY 4 HOURS PRN
Status: DISCONTINUED | OUTPATIENT
Start: 2022-03-30 | End: 2022-04-02 | Stop reason: HOSPADM

## 2022-03-30 RX ORDER — BISACODYL 5 MG/1
5 TABLET, DELAYED RELEASE ORAL DAILY PRN
Status: DISCONTINUED | OUTPATIENT
Start: 2022-03-30 | End: 2022-04-02 | Stop reason: HOSPADM

## 2022-03-30 RX ORDER — SODIUM CHLORIDE 0.9 % (FLUSH) 0.9 %
10 SYRINGE (ML) INJECTION AS NEEDED
Status: DISCONTINUED | OUTPATIENT
Start: 2022-03-30 | End: 2022-04-02 | Stop reason: HOSPADM

## 2022-03-30 RX ORDER — ASPIRIN 81 MG/1
81 TABLET, CHEWABLE ORAL DAILY
Status: DISCONTINUED | OUTPATIENT
Start: 2022-03-30 | End: 2022-04-01

## 2022-03-30 RX ORDER — AMOXICILLIN 250 MG
2 CAPSULE ORAL 2 TIMES DAILY
Status: DISCONTINUED | OUTPATIENT
Start: 2022-03-30 | End: 2022-04-02 | Stop reason: HOSPADM

## 2022-03-30 RX ORDER — SODIUM CHLORIDE 0.9 % (FLUSH) 0.9 %
10 SYRINGE (ML) INJECTION EVERY 12 HOURS SCHEDULED
Status: DISCONTINUED | OUTPATIENT
Start: 2022-03-30 | End: 2022-04-02 | Stop reason: HOSPADM

## 2022-03-30 RX ORDER — ATORVASTATIN CALCIUM 40 MG/1
80 TABLET, FILM COATED ORAL NIGHTLY
Status: DISCONTINUED | OUTPATIENT
Start: 2022-03-30 | End: 2022-04-01

## 2022-03-30 RX ORDER — DEXTROSE MONOHYDRATE 25 G/50ML
25 INJECTION, SOLUTION INTRAVENOUS
Status: DISCONTINUED | OUTPATIENT
Start: 2022-03-30 | End: 2022-04-02 | Stop reason: HOSPADM

## 2022-03-30 RX ORDER — METOLAZONE 5 MG/1
5 TABLET ORAL DAILY PRN
Status: DISCONTINUED | OUTPATIENT
Start: 2022-03-30 | End: 2022-04-02 | Stop reason: HOSPADM

## 2022-03-30 RX ORDER — HYDROCODONE BITARTRATE AND ACETAMINOPHEN 7.5; 325 MG/1; MG/1
1 TABLET ORAL EVERY 4 HOURS PRN
Status: DISCONTINUED | OUTPATIENT
Start: 2022-03-30 | End: 2022-04-02 | Stop reason: HOSPADM

## 2022-03-30 RX ORDER — POTASSIUM CHLORIDE 750 MG/1
40 CAPSULE, EXTENDED RELEASE ORAL AS NEEDED
Status: DISCONTINUED | OUTPATIENT
Start: 2022-03-30 | End: 2022-04-02 | Stop reason: HOSPADM

## 2022-03-30 RX ORDER — POTASSIUM CHLORIDE 1.5 G/1.77G
40 POWDER, FOR SOLUTION ORAL AS NEEDED
Status: DISCONTINUED | OUTPATIENT
Start: 2022-03-30 | End: 2022-04-02 | Stop reason: HOSPADM

## 2022-03-30 RX ORDER — SPIRONOLACTONE 25 MG/1
50 TABLET ORAL DAILY
Status: DISCONTINUED | OUTPATIENT
Start: 2022-03-30 | End: 2022-04-02 | Stop reason: HOSPADM

## 2022-03-30 RX ORDER — BISACODYL 10 MG
10 SUPPOSITORY, RECTAL RECTAL DAILY PRN
Status: DISCONTINUED | OUTPATIENT
Start: 2022-03-30 | End: 2022-04-02 | Stop reason: HOSPADM

## 2022-03-30 RX ORDER — ACETAMINOPHEN 650 MG/1
650 SUPPOSITORY RECTAL EVERY 4 HOURS PRN
Status: DISCONTINUED | OUTPATIENT
Start: 2022-03-30 | End: 2022-04-02 | Stop reason: HOSPADM

## 2022-03-30 RX ORDER — CYCLOBENZAPRINE HCL 10 MG
5 TABLET ORAL 3 TIMES DAILY PRN
Status: DISCONTINUED | OUTPATIENT
Start: 2022-03-30 | End: 2022-04-02 | Stop reason: HOSPADM

## 2022-03-30 RX ORDER — NICOTINE POLACRILEX 4 MG
15 LOZENGE BUCCAL
Status: DISCONTINUED | OUTPATIENT
Start: 2022-03-30 | End: 2022-04-02 | Stop reason: HOSPADM

## 2022-03-30 RX ORDER — POLYETHYLENE GLYCOL 3350 17 G/17G
17 POWDER, FOR SOLUTION ORAL DAILY PRN
Status: DISCONTINUED | OUTPATIENT
Start: 2022-03-30 | End: 2022-04-02 | Stop reason: HOSPADM

## 2022-03-30 RX ORDER — POTASSIUM CHLORIDE 29.8 MG/ML
20 INJECTION INTRAVENOUS
Status: DISCONTINUED | OUTPATIENT
Start: 2022-03-30 | End: 2022-04-02 | Stop reason: HOSPADM

## 2022-03-30 RX ORDER — DEXAMETHASONE SODIUM PHOSPHATE 4 MG/ML
2 INJECTION, SOLUTION INTRA-ARTICULAR; INTRALESIONAL; INTRAMUSCULAR; INTRAVENOUS; SOFT TISSUE ONCE
Status: COMPLETED | OUTPATIENT
Start: 2022-03-30 | End: 2022-03-30

## 2022-03-30 RX ORDER — ASPIRIN 300 MG/1
300 SUPPOSITORY RECTAL DAILY
Status: DISCONTINUED | OUTPATIENT
Start: 2022-03-30 | End: 2022-04-01

## 2022-03-30 RX ORDER — ALLOPURINOL 100 MG/1
200 TABLET ORAL DAILY
Status: DISCONTINUED | OUTPATIENT
Start: 2022-03-30 | End: 2022-04-02 | Stop reason: HOSPADM

## 2022-03-30 RX ORDER — CARVEDILOL 6.25 MG/1
6.25 TABLET ORAL 2 TIMES DAILY
Status: DISCONTINUED | OUTPATIENT
Start: 2022-03-30 | End: 2022-04-02 | Stop reason: HOSPADM

## 2022-03-30 RX ORDER — ACETAMINOPHEN 160 MG/5ML
650 SOLUTION ORAL EVERY 4 HOURS PRN
Status: DISCONTINUED | OUTPATIENT
Start: 2022-03-30 | End: 2022-04-02 | Stop reason: HOSPADM

## 2022-03-30 RX ORDER — BUMETANIDE 1 MG/1
2 TABLET ORAL DAILY
Status: DISCONTINUED | OUTPATIENT
Start: 2022-03-30 | End: 2022-04-02 | Stop reason: HOSPADM

## 2022-03-30 RX ORDER — OXYCODONE AND ACETAMINOPHEN 7.5; 325 MG/1; MG/1
1 TABLET ORAL ONCE
Status: COMPLETED | OUTPATIENT
Start: 2022-03-30 | End: 2022-03-30

## 2022-03-30 RX ORDER — CLOBETASOL PROPIONATE 0.5 MG/G
CREAM TOPICAL EVERY 12 HOURS SCHEDULED
Status: DISCONTINUED | OUTPATIENT
Start: 2022-03-30 | End: 2022-04-02 | Stop reason: HOSPADM

## 2022-03-30 RX ORDER — PANTOPRAZOLE SODIUM 40 MG/1
40 TABLET, DELAYED RELEASE ORAL EVERY MORNING
Status: DISCONTINUED | OUTPATIENT
Start: 2022-03-31 | End: 2022-04-02 | Stop reason: HOSPADM

## 2022-03-30 RX ADMIN — Medication 10 ML: at 20:38

## 2022-03-30 RX ADMIN — BUMETANIDE 2 MG: 1 TABLET ORAL at 20:38

## 2022-03-30 RX ADMIN — Medication 10 ML: at 21:44

## 2022-03-30 RX ADMIN — ATORVASTATIN CALCIUM 80 MG: 40 TABLET, FILM COATED ORAL at 20:37

## 2022-03-30 RX ADMIN — IOPAMIDOL 100 ML: 755 INJECTION, SOLUTION INTRAVENOUS at 17:02

## 2022-03-30 RX ADMIN — VERICIGUAT 2.5 MG: 2.5 TABLET, FILM COATED ORAL at 20:43

## 2022-03-30 RX ADMIN — APIXABAN 5 MG: 5 TABLET, FILM COATED ORAL at 20:38

## 2022-03-30 RX ADMIN — SENNOSIDES AND DOCUSATE SODIUM 2 TABLET: 50; 8.6 TABLET ORAL at 21:44

## 2022-03-30 RX ADMIN — CLOBETASOL PROPIONATE: 0.5 CREAM TOPICAL at 21:44

## 2022-03-30 RX ADMIN — CARVEDILOL 6.25 MG: 6.25 TABLET, FILM COATED ORAL at 20:38

## 2022-03-30 RX ADMIN — DEXAMETHASONE SODIUM PHOSPHATE 2 MG: 4 INJECTION, SOLUTION INTRAMUSCULAR; INTRAVENOUS at 20:39

## 2022-03-30 RX ADMIN — HYDROCODONE BITARTRATE AND ACETAMINOPHEN 1 TABLET: 7.5; 325 TABLET ORAL at 21:47

## 2022-03-30 RX ADMIN — ALLOPURINOL 200 MG: 100 TABLET ORAL at 20:37

## 2022-03-30 RX ADMIN — OXYCODONE HYDROCHLORIDE AND ACETAMINOPHEN 1 TABLET: 7.5; 325 TABLET ORAL at 15:35

## 2022-03-30 RX ADMIN — ASPIRIN 81 MG 81 MG: 81 TABLET ORAL at 20:37

## 2022-03-30 RX ADMIN — Medication 10 ML: at 20:46

## 2022-03-30 RX ADMIN — SACUBITRIL AND VALSARTAN 1 TABLET: 24; 26 TABLET, FILM COATED ORAL at 20:38

## 2022-03-30 RX ADMIN — SPIRONOLACTONE 50 MG: 25 TABLET ORAL at 20:37

## 2022-03-30 NOTE — PROGRESS NOTES
You have chosen to receive care through a telehealth visit.  Do you consent to use a audio/video connection for your medical care today? Yes    This was an audio and video enabled telemedicine encounter.     Chief Complaint  No chief complaint on file.    Subjective    Patient is a 31-year-old male with past medical history significant for acute on chronic systolic heart failure currently on home O2, severe obstructive sleep apnea, nonischemic congestive cardiomyopathy, morbid obesity who was seen on video call for evaluation of lower extremity pain and weakness.  He states that this is been an issue in the past that comes and goes over the last few years.  He has been told that he needs to drink fluids and electrolytes and this issue will go away.  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  He was sitting for long periods of time on wooden pews.  He states that for the last 2 days he has not been able to get out of his bed and walk more than a few steps at a time.  He is requesting pain medication.  He denies fevers or chills at this time.  Denies chest pain or shortness of breath.  He was recently seen by [facility]ology for evaluation of heart transplant for his cardiomyopathy and heart failure.  He was uptitrated on many of his diuretics.  He was also recently started on Ozempic for weight loss.    Review of Systems  As stated above.  The following portions of the patient's history were reviewed and updated as appropriate: allergies, current medications, past family history, past medical history, past social history, past surgical history and problem list.    No Known Allergies  Current Outpatient Medications on File Prior to Visit   Medication Sig Dispense Refill   • allopurinol (Zyloprim) 100 MG tablet Take 2 tablets by mouth Daily. 30 tablet 1   • apixaban (ELIQUIS) 5 MG tablet tablet Take 1 tablet by mouth Every 12 (Twelve) Hours. Indications: DVT/PE (active  thrombosis) 60 tablet 6   • bumetanide (BUMEX) 2 MG tablet Take 1 tablet by mouth Daily. Stop torsemide 30 tablet 3   • carvedilol (COREG) 6.25 MG tablet Take 1 tablet by mouth 2 (Two) Times a Day. 60 tablet 11   • halobetasol (ULTRAVATE) 0.05 % ointment Apply 1 application topically to the appropriate area as directed 2 (Two) Times a Day.     • metOLazone (ZAROXOLYN) 5 MG tablet Take 1 tablet by mouth Daily As Needed (edema). 25 tablet 6   • O2 (OXYGEN) Inhale 2-6 L/min Daily.     • omeprazole (priLOSEC) 40 MG capsule Take 1 capsule by mouth Daily. 30 capsule 3   • pharmacy consult - MTM Daily.     • potassium chloride (K-DUR,KLOR-CON) 20 MEQ CR tablet Take 1 tablet by mouth Daily. 30 tablet 1   • sacubitril-valsartan (Entresto) 24-26 MG tablet Take 1 tablet by mouth 2 (Two) Times a Day. 60 tablet 3   • Secukinumab (COSENTYX, 300 MG DOSE, SC) Inject 300 mg under the skin into the appropriate area as directed Every 30 (Thirty) Days.     • Semaglutide,0.25 or 0.5MG/DOS, (OZEMPIC) 2 MG/1.5ML solution pen-injector Inject 0.25 mg under the skin into the appropriate area as directed 1 (One) Time Per Week. 1.5 mL 0   • spironolactone (Aldactone) 50 MG tablet Take 1 tablet by mouth Daily. 30 tablet 3   • Vericiguat 2.5 MG tablet Take 2.5 mg by mouth Daily. 30 tablet 11     No current facility-administered medications on file prior to visit.     No orders of the defined types were placed in this encounter.      Social History     Tobacco Use   Smoking Status Never Smoker   Smokeless Tobacco Never Used        Objective   There were no vitals filed for this visit.  There is no height or weight on file to calculate BMI.    Physical Exam   Constitutional: He appears well-developed and well-nourished. No distress.   HENT:   Head: Normocephalic and atraumatic.   Eyes: Conjunctivae and EOM are normal.   Neck: Neck normal appearance.  Pulmonary/Chest: Effort normal. No tachypnea.  No respiratory distress.Use of oxygen by  nasal  cannula noted. He no audible wheeze...  Neurological: He is alert.   Psychiatric: He has a normal mood and affect.   Vitals reviewed.      Diagnoses and all orders for this visit:    1. Pain in both lower extremities (Primary)      Given my limited ability to evaluate patient on video call I advised patient to go to his nearest ER immediately.  I am concerned for possible rhabdomyolysis versus DVT.  I explained to him risks involved with not seeking medical attention which includes death-patient voiced understanding.  Patient is agreeable to go to the ER for evaluation.      Return for Next scheduled follow up.    Follow up if symptoms worsen or persist or has new or concerning symptoms, go to ER for severe symptoms.   I discussed my findings, recommendations, and plan of care with the patient. Reviewed common medication effects and side effects and to report side effects immediately. Encouraged medication compliance and the importance of keeping scheduled follow up appointments. Pt verbalized understanding and agreement.    If labs or images are ordered we will contact you with the results within the next week.  If you have not heard from us after a week please call our office to inquire about the results.     I have reviewed the limitations of a telehealth visit (such as lack of a physical exam and unable to obtain vital signs) and advised the patient that they may need to follow up for an office visit should their symptoms or concerns persist, worsen, or change.    Marek Díaz MD    * Please note that portions of this note may have been completed with a voice recognition program. Efforts were made to edit the dictation but occasionally words are erroneously transcribed.

## 2022-03-31 LAB
CHOLEST SERPL-MCNC: 146 MG/DL (ref 0–200)
GLUCOSE BLDC GLUCOMTR-MCNC: 103 MG/DL (ref 70–130)
GLUCOSE BLDC GLUCOMTR-MCNC: 103 MG/DL (ref 70–130)
GLUCOSE BLDC GLUCOMTR-MCNC: 115 MG/DL (ref 70–130)
GLUCOSE BLDC GLUCOMTR-MCNC: 121 MG/DL (ref 70–130)
HDLC SERPL-MCNC: 38 MG/DL (ref 40–60)
LDLC SERPL CALC-MCNC: 97 MG/DL (ref 0–100)
LDLC/HDLC SERPL: 2.58 {RATIO}
MAGNESIUM SERPL-MCNC: 1.8 MG/DL (ref 1.6–2.6)
PROCALCITONIN SERPL-MCNC: <0.2 NG/ML (ref 0–0.25)
QT INTERVAL: 358 MS
QTC INTERVAL: 484 MS
TRIGL SERPL-MCNC: 49 MG/DL (ref 0–150)
VLDLC SERPL-MCNC: 11 MG/DL (ref 5–40)

## 2022-03-31 PROCEDURE — 83735 ASSAY OF MAGNESIUM: CPT | Performed by: INTERNAL MEDICINE

## 2022-03-31 PROCEDURE — 82962 GLUCOSE BLOOD TEST: CPT

## 2022-03-31 PROCEDURE — G0378 HOSPITAL OBSERVATION PER HR: HCPCS

## 2022-03-31 PROCEDURE — 97535 SELF CARE MNGMENT TRAINING: CPT

## 2022-03-31 PROCEDURE — 99214 OFFICE O/P EST MOD 30 MIN: CPT | Performed by: PSYCHIATRY & NEUROLOGY

## 2022-03-31 PROCEDURE — 25010000002 MAGNESIUM SULFATE 2 GM/50ML SOLUTION: Performed by: INTERNAL MEDICINE

## 2022-03-31 PROCEDURE — 99232 SBSQ HOSP IP/OBS MODERATE 35: CPT | Performed by: INTERNAL MEDICINE

## 2022-03-31 PROCEDURE — 97110 THERAPEUTIC EXERCISES: CPT

## 2022-03-31 PROCEDURE — 97162 PT EVAL MOD COMPLEX 30 MIN: CPT

## 2022-03-31 PROCEDURE — 84145 PROCALCITONIN (PCT): CPT | Performed by: INTERNAL MEDICINE

## 2022-03-31 PROCEDURE — 94660 CPAP INITIATION&MGMT: CPT

## 2022-03-31 PROCEDURE — 97165 OT EVAL LOW COMPLEX 30 MIN: CPT

## 2022-03-31 PROCEDURE — 80061 LIPID PANEL: CPT

## 2022-03-31 RX ORDER — MAGNESIUM SULFATE 1 G/100ML
1 INJECTION INTRAVENOUS AS NEEDED
Status: DISCONTINUED | OUTPATIENT
Start: 2022-03-31 | End: 2022-04-02 | Stop reason: HOSPADM

## 2022-03-31 RX ORDER — MAGNESIUM SULFATE HEPTAHYDRATE 40 MG/ML
4 INJECTION, SOLUTION INTRAVENOUS AS NEEDED
Status: DISCONTINUED | OUTPATIENT
Start: 2022-03-31 | End: 2022-04-02 | Stop reason: HOSPADM

## 2022-03-31 RX ORDER — MAGNESIUM SULFATE HEPTAHYDRATE 40 MG/ML
2 INJECTION, SOLUTION INTRAVENOUS AS NEEDED
Status: DISCONTINUED | OUTPATIENT
Start: 2022-03-31 | End: 2022-04-02 | Stop reason: HOSPADM

## 2022-03-31 RX ORDER — COLCHICINE 0.6 MG/1
0.6 TABLET ORAL DAILY
Status: DISCONTINUED | OUTPATIENT
Start: 2022-03-31 | End: 2022-04-02 | Stop reason: HOSPADM

## 2022-03-31 RX ADMIN — CARVEDILOL 6.25 MG: 6.25 TABLET, FILM COATED ORAL at 08:54

## 2022-03-31 RX ADMIN — ALLOPURINOL 200 MG: 100 TABLET ORAL at 08:54

## 2022-03-31 RX ADMIN — ASPIRIN 81 MG 81 MG: 81 TABLET ORAL at 08:54

## 2022-03-31 RX ADMIN — CARVEDILOL 6.25 MG: 6.25 TABLET, FILM COATED ORAL at 20:36

## 2022-03-31 RX ADMIN — SACUBITRIL AND VALSARTAN 1 TABLET: 24; 26 TABLET, FILM COATED ORAL at 08:55

## 2022-03-31 RX ADMIN — SPIRONOLACTONE 50 MG: 25 TABLET ORAL at 08:53

## 2022-03-31 RX ADMIN — SENNOSIDES AND DOCUSATE SODIUM 2 TABLET: 50; 8.6 TABLET ORAL at 20:36

## 2022-03-31 RX ADMIN — ATORVASTATIN CALCIUM 80 MG: 40 TABLET, FILM COATED ORAL at 20:36

## 2022-03-31 RX ADMIN — PANTOPRAZOLE SODIUM 40 MG: 40 TABLET, DELAYED RELEASE ORAL at 06:04

## 2022-03-31 RX ADMIN — MAGNESIUM SULFATE HEPTAHYDRATE 2 G: 2 INJECTION, SOLUTION INTRAVENOUS at 12:15

## 2022-03-31 RX ADMIN — Medication 10 ML: at 20:37

## 2022-03-31 RX ADMIN — BUMETANIDE 2 MG: 1 TABLET ORAL at 08:53

## 2022-03-31 RX ADMIN — Medication 10 ML: at 08:55

## 2022-03-31 RX ADMIN — COLCHICINE 0.6 MG: 0.6 TABLET, FILM COATED ORAL at 15:33

## 2022-03-31 RX ADMIN — VERICIGUAT 2.5 MG: 2.5 TABLET, FILM COATED ORAL at 10:20

## 2022-03-31 RX ADMIN — SENNOSIDES AND DOCUSATE SODIUM 2 TABLET: 50; 8.6 TABLET ORAL at 08:53

## 2022-03-31 RX ADMIN — SACUBITRIL AND VALSARTAN 1 TABLET: 24; 26 TABLET, FILM COATED ORAL at 20:36

## 2022-03-31 RX ADMIN — HYDROCODONE BITARTRATE AND ACETAMINOPHEN 1 TABLET: 7.5; 325 TABLET ORAL at 20:46

## 2022-03-31 RX ADMIN — CLOBETASOL PROPIONATE: 0.5 CREAM TOPICAL at 20:38

## 2022-03-31 RX ADMIN — APIXABAN 5 MG: 5 TABLET, FILM COATED ORAL at 20:36

## 2022-03-31 RX ADMIN — APIXABAN 5 MG: 5 TABLET, FILM COATED ORAL at 08:54

## 2022-04-01 LAB
ANION GAP SERPL CALCULATED.3IONS-SCNC: 10 MMOL/L (ref 5–15)
BUN SERPL-MCNC: 33 MG/DL (ref 6–20)
BUN/CREAT SERPL: 35.1 (ref 7–25)
CALCIUM SPEC-SCNC: 9.1 MG/DL (ref 8.6–10.5)
CHLORIDE SERPL-SCNC: 89 MMOL/L (ref 98–107)
CO2 SERPL-SCNC: 32 MMOL/L (ref 22–29)
CREAT SERPL-MCNC: 0.94 MG/DL (ref 0.76–1.27)
DEPRECATED RDW RBC AUTO: 53.3 FL (ref 37–54)
EGFRCR SERPLBLD CKD-EPI 2021: 112.5 ML/MIN/1.73
ERYTHROCYTE [DISTWIDTH] IN BLOOD BY AUTOMATED COUNT: 19.9 % (ref 12.3–15.4)
ERYTHROCYTE [SEDIMENTATION RATE] IN BLOOD: 103 MM/HR (ref 0–15)
GLUCOSE BLDC GLUCOMTR-MCNC: 93 MG/DL (ref 70–130)
GLUCOSE BLDC GLUCOMTR-MCNC: 96 MG/DL (ref 70–130)
GLUCOSE SERPL-MCNC: 91 MG/DL (ref 65–99)
HCT VFR BLD AUTO: 37.2 % (ref 37.5–51)
HGB BLD-MCNC: 12 G/DL (ref 13–17.7)
MAGNESIUM SERPL-MCNC: 1.8 MG/DL (ref 1.6–2.6)
MCH RBC QN AUTO: 24.4 PG (ref 26.6–33)
MCHC RBC AUTO-ENTMCNC: 32.3 G/DL (ref 31.5–35.7)
MCV RBC AUTO: 75.8 FL (ref 79–97)
PLATELET # BLD AUTO: 286 10*3/MM3 (ref 140–450)
PMV BLD AUTO: 10.9 FL (ref 6–12)
POTASSIUM SERPL-SCNC: 3.4 MMOL/L (ref 3.5–5.2)
POTASSIUM SERPL-SCNC: 3.5 MMOL/L (ref 3.5–5.2)
RBC # BLD AUTO: 4.91 10*6/MM3 (ref 4.14–5.8)
SODIUM SERPL-SCNC: 131 MMOL/L (ref 136–145)
WBC NRBC COR # BLD: 4.15 10*3/MM3 (ref 3.4–10.8)

## 2022-04-01 PROCEDURE — 83735 ASSAY OF MAGNESIUM: CPT | Performed by: INTERNAL MEDICINE

## 2022-04-01 PROCEDURE — 85652 RBC SED RATE AUTOMATED: CPT | Performed by: INTERNAL MEDICINE

## 2022-04-01 PROCEDURE — 99213 OFFICE O/P EST LOW 20 MIN: CPT | Performed by: PSYCHIATRY & NEUROLOGY

## 2022-04-01 PROCEDURE — 84132 ASSAY OF SERUM POTASSIUM: CPT | Performed by: INTERNAL MEDICINE

## 2022-04-01 PROCEDURE — G0378 HOSPITAL OBSERVATION PER HR: HCPCS

## 2022-04-01 PROCEDURE — 96365 THER/PROPH/DIAG IV INF INIT: CPT

## 2022-04-01 PROCEDURE — 99232 SBSQ HOSP IP/OBS MODERATE 35: CPT | Performed by: INTERNAL MEDICINE

## 2022-04-01 PROCEDURE — 82962 GLUCOSE BLOOD TEST: CPT

## 2022-04-01 PROCEDURE — 94799 UNLISTED PULMONARY SVC/PX: CPT

## 2022-04-01 PROCEDURE — 25010000002 MAGNESIUM SULFATE 2 GM/50ML SOLUTION: Performed by: INTERNAL MEDICINE

## 2022-04-01 PROCEDURE — 96366 THER/PROPH/DIAG IV INF ADDON: CPT

## 2022-04-01 PROCEDURE — 97530 THERAPEUTIC ACTIVITIES: CPT

## 2022-04-01 PROCEDURE — 85027 COMPLETE CBC AUTOMATED: CPT | Performed by: INTERNAL MEDICINE

## 2022-04-01 PROCEDURE — 80048 BASIC METABOLIC PNL TOTAL CA: CPT | Performed by: INTERNAL MEDICINE

## 2022-04-01 PROCEDURE — 94660 CPAP INITIATION&MGMT: CPT

## 2022-04-01 RX ADMIN — VERICIGUAT 2.5 MG: 2.5 TABLET, FILM COATED ORAL at 08:46

## 2022-04-01 RX ADMIN — SPIRONOLACTONE 50 MG: 25 TABLET ORAL at 08:45

## 2022-04-01 RX ADMIN — BUMETANIDE 2 MG: 1 TABLET ORAL at 08:45

## 2022-04-01 RX ADMIN — Medication 10 ML: at 08:46

## 2022-04-01 RX ADMIN — APIXABAN 5 MG: 5 TABLET, FILM COATED ORAL at 08:44

## 2022-04-01 RX ADMIN — Medication 10 ML: at 21:07

## 2022-04-01 RX ADMIN — MAGNESIUM SULFATE HEPTAHYDRATE 2 G: 2 INJECTION, SOLUTION INTRAVENOUS at 09:24

## 2022-04-01 RX ADMIN — APIXABAN 5 MG: 5 TABLET, FILM COATED ORAL at 21:06

## 2022-04-01 RX ADMIN — CARVEDILOL 6.25 MG: 6.25 TABLET, FILM COATED ORAL at 21:06

## 2022-04-01 RX ADMIN — POTASSIUM CHLORIDE 40 MEQ: 750 CAPSULE, EXTENDED RELEASE ORAL at 09:24

## 2022-04-01 RX ADMIN — SACUBITRIL AND VALSARTAN 1 TABLET: 24; 26 TABLET, FILM COATED ORAL at 08:45

## 2022-04-01 RX ADMIN — CLOBETASOL PROPIONATE: 0.5 CREAM TOPICAL at 08:47

## 2022-04-01 RX ADMIN — ALLOPURINOL 200 MG: 100 TABLET ORAL at 08:44

## 2022-04-01 RX ADMIN — COLCHICINE 0.6 MG: 0.6 TABLET, FILM COATED ORAL at 08:45

## 2022-04-01 RX ADMIN — ASPIRIN 81 MG 81 MG: 81 TABLET ORAL at 08:44

## 2022-04-01 RX ADMIN — CARVEDILOL 6.25 MG: 6.25 TABLET, FILM COATED ORAL at 08:45

## 2022-04-01 RX ADMIN — PANTOPRAZOLE SODIUM 40 MG: 40 TABLET, DELAYED RELEASE ORAL at 06:38

## 2022-04-01 RX ADMIN — POTASSIUM CHLORIDE 40 MEQ: 750 CAPSULE, EXTENDED RELEASE ORAL at 14:31

## 2022-04-01 RX ADMIN — SACUBITRIL AND VALSARTAN 1 TABLET: 24; 26 TABLET, FILM COATED ORAL at 21:06

## 2022-04-01 NOTE — PLAN OF CARE
Goal Outcome Evaluation:  Plan of Care Reviewed With: patient        Progress: improving  Outcome Evaluation: vss, SR on tele, denies pain at tis time, up with waler and managing well.

## 2022-04-01 NOTE — PLAN OF CARE
Goal Outcome Evaluation:  Plan of Care Reviewed With: patient        Progress: no change  Outcome Evaluation: received to room, oriented to unit and POC, denies pain, ST on tele. vss     Wrong patient

## 2022-04-01 NOTE — PROGRESS NOTES
"Neurology       Patient Care Team:  Marek Díaz MD as PCP - General (Family Medicine)  Marah Almeida APRN as Nurse Practitioner (Cardiology)  Balbir Olsen MD as Consulting Physician (Cardiology)  Arnoldo Whittington MD as Consulting Physician (General Surgery)    Chief complaint: Right leg pain    History: Patient is feeling dramatically better after 2 doses of colchicine.    Pain is essentially gone.  Past Medical History:   Diagnosis Date   • CHF (congestive heart failure) (HCC)     diagnosed at 21yo, thought to be related to undiagnosed congenital defect- per patient. hospitalized every 3 weeks during the winter due to PNA.  Has significantf fluid retention despite diurectics   • CPAP (continuous positive airway pressure) dependence     settings at 15   • Gout     on allopurinol, controls symptoms   • Hypertension     patient denies   • Hypokalemia    • ICD (implantable cardioverter-defibrillator) in place    • Low HDL (under 40)    • Morbid obesity (HCC)    • Morbid obesity with BMI of 60.0-69.9, adult ([facility])    • Myocardial infarction (HCC)     \"almost\" per patient, had heart cath, no stents, has defibrillator   • On home O2     supposed to be 24/7, ran out of O2 and has only been using prn. manged  by cardiology   • COLLIN on CPAP     compliant   • Paresthesia     hands/ arms/ feet, suspected due to poor circulation reportedly   • Plaque psoriasis    • Pneumonia    • Psoriasis    • Sleep apnea    • Wears glasses        Vital Signs   Vitals:    04/01/22 1100 04/01/22 1135 04/01/22 1200 04/01/22 1300   BP:  103/64     BP Location:  Right arm     Patient Position:  Lying     Pulse: 83 84 93 79   Resp:  18     Temp:  97.9 °F (36.6 °C)     TempSrc:  Oral     SpO2: 100% 96% 99% 100%   Weight:       Height:           Physical Exam:   General: Pleasant and              Neuro: Oriented to person place and time.        Results Review:  Sed rate remains elevated at 103  Results from last 7 days   Lab Units " 04/01/22  0629   WBC 10*3/mm3 4.15   HEMOGLOBIN g/dL 12.0*   HEMATOCRIT % 37.2*   PLATELETS 10*3/mm3 286     Results from last 7 days   Lab Units 04/01/22  0629 03/30/22  1332   SODIUM mmol/L 131* 135*   POTASSIUM mmol/L 3.5 3.1*   CHLORIDE mmol/L 89* 91*   CO2 mmol/L 32.0* 34.0*   BUN mg/dL 33* 25*   CREATININE mg/dL 0.94 0.96   CALCIUM mg/dL 9.1 9.1   BILIRUBIN mg/dL  --  1.2   ALK PHOS U/L  --  89   ALT (SGPT) U/L  --  8   AST (SGOT) U/L  --  13   GLUCOSE mg/dL 91 97       Imaging Results (Last 24 Hours)     ** No results found for the last 24 hours. **          Assessment:  Acute right leg pain secondary to gout    Plan:  We will see the patient on request.  No evidence of neuromuscular or cerebrovascular disease.    Comment:  Improved         I discussed the patients findings and my recommendations with patient and primary care team    Lionel Castillo MD  04/01/22  14:40 EDT

## 2022-04-01 NOTE — PLAN OF CARE
Problem: Adult Inpatient Plan of Care  Goal: Plan of Care Review  Recent Flowsheet Documentation  Taken 4/1/2022 1621 by Nicky Draper, PT  Progress: improving  Outcome Evaluation: Patient with great improvement, Ambulating 500 feet with walker in hallway. O2 stable. Mild tachycardia noted which resolved within 2 minutes. No need for skilled PT. May ambulate with nursing on floor   Goal Outcome Evaluation:           Progress: improving  Outcome Evaluation: Patient with great improvement, Ambulating 500 feet with walker in hallway. O2 stable. Mild tachycardia noted which resolved within 2 minutes. No need for skilled PT. May ambulate with nursing on floor

## 2022-04-01 NOTE — CASE MANAGEMENT/SOCIAL WORK
Continued Stay Note  [facility]     Patient Name: Evan Gannon  MRN: [id number]  Today's Date: 4/1/2022    Admit Date: 3/30/2022     Discharge Plan     Row Name 04/01/22 1203       Plan    Plan Comments MSW completed wheels application for pt to get his wheels re-established through [employer]. Once pt approved again, they will notify pt by mail.               Discharge Codes    No documentation.               Expected Discharge Date and Time     Expected Discharge Date Expected Discharge Time    Apr 2, 2022             SAWYER Macedo

## 2022-04-01 NOTE — PROGRESS NOTES
[facility] Medicine Services  PROGRESS NOTE    Patient Name: Evan Gannon  : 1990  MRN: [id number]    Date of Admission: 3/30/2022  Primary Care Physician: Marek Díaz MD    Subjective   Subjective     CC:  Right leg pain    HPI:  Leg pain much improved and nearly resolved today. Has been up and walking, bearing weight on right leg without difficulty. Mild dyspnea, but at baseline.    ROS:  Gen- No fevers, chills  CV- No chest pain, palpitations  Resp- No cough, mild dyspnea  GI- No N/V/D, abd pain        Objective   Objective     Vital Signs:   Temp:  [97.4 °F (36.3 °C)-97.9 °F (36.6 °C)] 97.9 °F (36.6 °C)  Heart Rate:  [75-99] 99  Resp:  [16-18] 18  BP: ()/(58-75) 103/64  Flow (L/min):  [3-4] 4     Physical Exam:  Constitutional - no acute distress, nontoxic, up in chair  HEENT-NCAT, mucous membranes moist  CV-RRR, S1 S2 normal, no m/r/g  Resp-faint crackles at bases  Abd-soft, nontender, nondistended, normoactive bowel sounds, obese  Ext-+ LE edema bilaterally.  Patient able to actively flex and extend right ankle and knee without difficulty.  No tenderness with palpation of lower leg, around ankle joint or foot (medial and lateral surfaces).  No warmth or erythema  Neuro-alert and oriented, speech clear, moves all extremities   Psych-normal affect   Skin- No rash on exposed UE or LE bilaterally      Results Reviewed:  LAB RESULTS:      Lab 22  0629 22  0704 22  1332   WBC 4.15  --  5.14   HEMOGLOBIN 12.0*  --  13.1   HEMATOCRIT 37.2*  --  40.5   PLATELETS 286  --  294   NEUTROS ABS  --   --  3.11   IMMATURE GRANS (ABS)  --   --  0.01   LYMPHS ABS  --   --  0.85   MONOS ABS  --   --  0.81   EOS ABS  --   --  0.33   MCV 75.8*  --  75.0*   SED RATE 103*  --  103*   CRP  --   --  4.92*   PROCALCITONIN  --  <0.20  --    LACTATE  --   --  1.4         Lab 22  0629 22  0704 22  1827 22  1332   SODIUM 131*  --   --  135*    POTASSIUM 3.5  --   --  3.1*   CHLORIDE 89*  --   --  91*   CO2 32.0*  --   --  34.0*   ANION GAP 10.0  --   --  10.0   BUN 33*  --   --  25*   CREATININE 0.94  --   --  0.96   EGFR 112.5  --   --  109.7   GLUCOSE 91  --   --  97   CALCIUM 9.1  --   --  9.1   MAGNESIUM 1.8 1.8 1.5*  --    HEMOGLOBIN A1C  --   --   --  6.20*         Lab 03/30/22  1332   TOTAL PROTEIN 8.4   ALBUMIN 3.30*   GLOBULIN 5.1   ALT (SGPT) 8   AST (SGOT) 13   BILIRUBIN 1.2   ALK PHOS 89         Lab 03/30/22  1332   PROBNP 4,034.0*         Lab 03/31/22  0704   CHOLESTEROL 146   LDL CHOL 97   HDL CHOL 38*   TRIGLYCERIDES 49             Brief Urine Lab Results  (Last result in the past 365 days)      Color   Clarity   Blood   Leuk Est   Nitrite   Protein   CREAT   Urine HCG        01/19/22 2308 Dark Yellow   Clear   Negative   Trace   Negative   Trace                 Microbiology Results Abnormal     Procedure Component Value - Date/Time    COVID-19 and FLU A/B PCR - Swab, Nasopharynx [[id number]]  (Normal) Collected: 03/30/22 1553    Lab Status: Final result Specimen: Swab from Nasopharynx Updated: 03/30/22 1631     COVID19 Not Detected     Influenza A PCR Not Detected     Influenza B PCR Not Detected    Narrative:      Fact sheet for providers: https://www.fda.gov/media/042221/download    Fact sheet for patients: https://www.fda.gov/media/142896/download    Test performed by PCR.          No radiology results from the last 24 hrs    Results for orders placed during the hospital encounter of 01/19/22    Adult Transthoracic Echo Complete W/ Cont if Necessary Per Protocol    Interpretation Summary  · The left ventricular cavity is severely dilated. Left ventricular systolic function is severely decreased. Left ventricular ejection fraction appears to be less than 20%.  · Four-chamber dilation present  · Moderate mitral valve regurgitation is present.  · Estimated right ventricular systolic pressure from tricuspid regurgitation is moderately  elevated (48 mmHg).      I have reviewed the medications:  Scheduled Meds:allopurinol, 200 mg, Oral, Daily  apixaban, 5 mg, Oral, Q12H  bumetanide, 2 mg, Oral, Daily  carvedilol, 6.25 mg, Oral, BID  clobetasol, , Topical, Q12H  colchicine, 0.6 mg, Oral, Daily  pantoprazole, 40 mg, Oral, QAM  sacubitril-valsartan, 1 tablet, Oral, BID  senna-docusate sodium, 2 tablet, Oral, BID  sodium chloride, 10 mL, Intravenous, Q12H  sodium chloride, 10 mL, Intravenous, Q12H  spironolactone, 50 mg, Oral, Daily  Vericiguat, 2.5 mg, Oral, Daily      Continuous Infusions:   PRN Meds:.•  acetaminophen **OR** acetaminophen **OR** acetaminophen  •  senna-docusate sodium **AND** polyethylene glycol **AND** bisacodyl **AND** bisacodyl  •  cyclobenzaprine  •  dextrose  •  dextrose  •  glucagon (human recombinant)  •  HYDROcodone-acetaminophen  •  magnesium sulfate **OR** magnesium sulfate in D5W 1g/100mL (PREMIX) **OR** magnesium sulfate  •  metOLazone  •  potassium chloride **OR** potassium chloride **OR** potassium chloride  •  sodium chloride  •  sodium chloride  •  sodium chloride    Assessment/Plan   Assessment & Plan     Active Hospital Problems    Diagnosis  POA   • Right leg weakness [R29.898]  Yes   • GERD without esophagitis [K21.9]  Unknown   • Plaque psoriasis [L40.0]  Yes   • Prediabetes [R73.03]  Yes   • Chronic systolic CHF (congestive heart failure) ([facility]) [I50.22]  Yes   • Hypokalemia [E87.6]  Unknown   • Chronic gout [M1A.9XX0]  Yes   • COLLIN (obstructive sleep apnea) [G47.33]  Yes   • Obesity, Class III, BMI 40-49.9 (morbid obesity) ([facility]) [E66.01]  Yes      Resolved Hospital Problems   No resolved problems to display.        Brief Hospital Course to date:  Evan Gannon is a 31 y.o. male with history of hypertension, morbid obesity status post gastric sleeve, gout, COLLIN, plaque psoriasis, nonischemic cardiomyopathy with EF 20% s/p ICD placement who presents with right leg pain and weakness that started approximately 5  days before admission.  Initially the pain started in his low back radiating down his right thigh to his foot after sitting for prolonged period on a wooden bench.  Back and leg pain subsequently improved but he was left with discomfort in his right foot.  Mr. Stauffer was started on Ozempic 2 weeks ago and is on several diuretics.  He has missed a few doses of allopurinol.  He also recently bought new shoes and his insoles do not fit inside the shoes.    Right LE pain and weakness  Probable gout flare  -Uric acid level 11.2, sed rate 103, CRP 4.92  -Recent increase in patient's diuretics after visit to [facility] cardiology-diuresis can at times precipitating gout flare  -Normal CK level  -active flexion/extension of right ankle and knee with generally full ROM and no tenderness of ankle/foot with palpation  -Symptomatic improvement after dexamethasone given in ED  -Continue allopurinol   -Started colchicine 0.6 mg p.o. daily  -Foot plain films show increased first TMT joint DJD - if pain recurrs despite empiric treatment for gout, consider further imaging    NICM, LVEF 20%  -Follows with [facility] cardiology  -Continue Bumex, Coreg, Entresto, Aldactone, Variciguat    Hypokalemia  -Replace potassium.    DM 2    History of left lower extremity DVT  -Continue Eliquis    Plaque psoriasis    COLLIN  -CPAP  -On home oxygen      DVT prophylaxis:  Medical and mechanical DVT prophylaxis orders are present.       AM-PAC 6 Clicks Score (PT): 24 (04/01/22 1626)    Disposition: I expect the patient to be discharged home possibly Saturday    CODE STATUS:   Code Status and Medical Interventions:   Ordered at: 03/30/22 1611     Level Of Support Discussed With:    Patient     Code Status (Patient has no pulse and is not breathing):    CPR (Attempt to Resuscitate)     Medical Interventions (Patient has pulse or is breathing):    Full Support       Calixto Adrian MD  04/01/22

## 2022-04-01 NOTE — DISCHARGE PLACEMENT REQUEST
"Yoli LIVE, RN,   [phone]    Sivakumar Queen (31 y.o. Male)             Date of Birth   1990    Social Security Number       Address   [address] [place] [address]    Home Phone   [phone]    MRN   [id number]       Protestant   Gnosticism    Marital Status   Single                            Admission Date   3/30/22    Admission Type   Emergency    Admitting Provider   Calixto Adrian MD    Attending Provider   Calixto Adrian MD    Department, Room/Bed   [facility] 3F, S327/1       Discharge Date       Discharge Disposition       Discharge Destination                               Attending Provider: Calixto Adrian MD    Allergies: No Known Allergies    Isolation: None   Infection: None   Code Status: CPR   Advance Care Planning Activity    Ht: 175.3 cm (69\")   Wt: 139 kg (306 lb)    Admission Cmt: None   Principal Problem: None                Active Insurance as of 3/30/2022     Primary Coverage     Payor Plan Insurance Group Employer/Plan Group    MEDICARE MEDICARE A & B      Payor Plan Address Payor Plan Phone Number Payor Plan Fax Number Effective Dates    [address] [phone]  12/1/2015 - None Entered    [facility] [id number]       Subscriber Name Subscriber Birth Date Member ID       SIVAKUMAR QUEEN 1990 [id number]           Secondary Coverage     Payor Plan Insurance Group Employer/Plan Group    [facility] [place] [facility] BY JOON [id number]     Payor Plan Address Payor Plan Phone Number Payor Plan Fax Number Effective Dates    [address]   1/1/2021 - None Entered    [facility] [id number]       Subscriber Name Subscriber Birth Date Member ID       SIVAKUMAR QUEEN 1990 [id number]                 Emergency Contacts      (Rel.) Home Phone Work Phone Mobile Phone    SPENSER QUEEN (Father) [phone] -- [phone]    LOAN CUNHA (Significant Other) [phone] -- [phone]             "   History & Physical      Chan Arora III, DO at 22 1613              [facility]  HISTORY AND PHYSICAL    Patient Name: Evan Gannon  : 1990  MRN: [id number]  Primary Care Physician: Marek Díaz MD  Date of admission: 3/30/2022      Subjective   Subjective     Chief Complaint:  Right leg pain and weakness    HPI:  Evan Gannon is a 31 y.o. male PMH HTN, morbid obesity s/p gastric sleeve 2020, gout, COLLIN< plaque psoriasis on Cosentyx, NICM/systolic heart failure EF 20% s/p ICD placement, HTN presenting with right leg pain and weakness that started 5 days ago. He was playing the drums on Friday and had been sitting on a wooden seat for 1.5 hours prior to onset. He states his right leg was weak and painful. Initially, the pain started in his lower back, radiating down into the internal thigh to his foot. He states the pain in his pain has resolved, but the right foot continues. He is unable to ambulate unassisted due to right leg weakness and right foot pain. In the past, he has experienced either right or left lower extremity pain that he contributes to electrolyte disturbance. Pt just started Ozempic 2 weeks ago and is on several diuretics. He also has hx of gout. Pt has missed 2 doses of eliquis (yesterday and today) and a few doses of allopurinol. Pt also states he bought new shoes and his insoles do not fit inside these shoes.     Review of Systems   Constitutional: Positive for activity change and diaphoresis.   HENT: Negative.    Eyes: Negative.    Respiratory: Positive for shortness of breath.    Cardiovascular: Positive for leg swelling. Negative for chest pain and palpitations.   Gastrointestinal: Negative.    Endocrine: Negative.    Genitourinary: Negative.    Musculoskeletal:        Right foot pain. No sensation change   Skin: Negative.         Plaque Psoriasis   Allergic/Immunologic: Negative.    Neurological:      "   Right leg weakness   Hematological: Negative.    Psychiatric/Behavioral: Negative.      All other systems reviewed and are negative.     Personal History     Past Medical History:   Diagnosis Date   • CHF (congestive heart failure) (HCC)     diagnosed at 21yo, thought to be related to undiagnosed congenital defect- per patient. hospitalized every 3 weeks during the winter due to PNA.  Has significantf fluid retention despite diurectics   • CPAP (continuous positive airway pressure) dependence     settings at 15   • Gout     on allopurinol, controls symptoms   • Hypertension     patient denies   • Hypokalemia    • ICD (implantable cardioverter-defibrillator) in place    • Low HDL (under 40)    • Morbid obesity (HCC)    • Morbid obesity with BMI of 60.0-69.9, adult (HCC)    • Myocardial infarction (HCC)     \"almost\" per patient, had heart cath, no stents, has defibrillator   • On home O2     supposed to be 24/7, ran out of O2 and has only been using prn. manged  by cardiology   • COLLIN on CPAP     compliant   • Paresthesia     hands/ arms/ feet, suspected due to poor circulation reportedly   • Plaque psoriasis    • Pneumonia    • Psoriasis    • Sleep apnea    • Wears glasses        Past Surgical History:   Procedure Laterality Date   • CARDIAC CATHETERIZATION N/A 7/13/2017    Procedure: Left Heart Cath;  Surgeon: Balbir Olsen MD;  Location:  [facility];  Service:    • CARDIAC DEFIBRILLATOR PLACEMENT  08/2017   • CARDIAC DEFIBRILLATOR PLACEMENT     • CARDIAC ELECTROPHYSIOLOGY PROCEDURE N/A 8/15/2017    Procedure: VVI ICD Implant;  Surgeon: Nathanael Richmond DO;  Location:  [facility] EP INVASIVE LOCATION;  Service:    • ENDOSCOPY N/A 8/14/2020    Procedure: ESOPHAGOGASTRODUODENOSCOPY;  Surgeon: Arnoldo Whittington MD;  Location: [facility] OR;  Service: Bariatric;  Laterality: N/A;   • GASTRIC SLEEVE LAPAROSCOPIC N/A 8/14/2020    Procedure: GASTRIC SLEEVE LAPAROSCOPIC;  Surgeon: Arnoldo Whittington MD;  " Location: [facility];  Service: Bariatric;  Laterality: N/A;   • OTHER SURGICAL HISTORY      ultra light therapy   • SINUS SURGERY      cartilage from ear inserted in nasal cavity   • TONSILLECTOMY AND ADENOIDECTOMY  2000       Family History: family history includes Diabetes in his father, maternal grandfather, and paternal grandmother. Otherwise pertinent FHx was reviewed and unremarkable.     Social History:  reports that he has never smoked. He has never used smokeless tobacco. He reports that he does not drink alcohol and does not use drugs.  Social History     Social History Narrative    Lives in [facility] with parents and brother.  Disabled from CHF, given pass to work- hasn't gotten hired yet. .             Caffeine use: 6 sodas weekly    Patient lives with parents        Caffeine 1 soda a week            Caffeine 0       Medications:    Available home medication information reviewed.  (Not in a hospital admission)      No Known Allergies    Objective   Objective     Vital Signs:   Temp:  [98 °F (36.7 °C)] 98 °F (36.7 °C)  Heart Rate:  [105-116] 114  Resp:  [22] 22  BP: (104-114)/(67-93) 114/93        Physical Exam  Vitals and nursing note reviewed.   Constitutional:       General: He is not in acute distress.     Appearance: Normal appearance. He is obese. He is not ill-appearing.   HENT:      Head: Normocephalic and atraumatic.      Nose: Nose normal.      Mouth/Throat:      Mouth: Mucous membranes are dry.   Eyes:      General: No scleral icterus.     Conjunctiva/sclera: Conjunctivae normal.      Pupils: Pupils are equal, round, and reactive to light.   Cardiovascular:      Rate and Rhythm: Normal rate.      Pulses: Normal pulses.      Heart sounds: Normal heart sounds.   Pulmonary:      Breath sounds: Normal breath sounds.      Comments: Diminished in the bases due to habitus  Abdominal:      General: Bowel sounds are normal.      Tenderness: There is no abdominal tenderness.      Comments:  Round, obese   Musculoskeletal:         General: Swelling and tenderness present.      Cervical back: Normal range of motion and neck supple.      Right lower leg: Edema present.      Comments: Mild right foot edema, pain in the right foot at the plantar fascia area with palpitation.    Skin:     General: Skin is warm and dry.      Comments: Psoriatic plaquing to the right lower extremity   Neurological:      General: No focal deficit present.      Mental Status: He is alert and oriented to person, place, and time.      Sensory: No sensory deficit.      Motor: Weakness present.      Comments: Right leg weakness   Psychiatric:         Mood and Affect: Mood normal.         Behavior: Behavior normal.         Results Reviewed:  I have personally reviewed current lab and radiology data.    Results from last 7 days   Lab Units 03/30/22  1332   WBC 10*3/mm3 5.14   HEMOGLOBIN g/dL 13.1   HEMATOCRIT % 40.5   PLATELETS 10*3/mm3 294     Results from last 7 days   Lab Units 03/30/22  1332   SODIUM mmol/L 135*   POTASSIUM mmol/L 3.1*   CHLORIDE mmol/L 91*   CO2 mmol/L 34.0*   BUN mg/dL 25*   CREATININE mg/dL 0.96   GLUCOSE mg/dL 97   CALCIUM mg/dL 9.1   ALT (SGPT) U/L 8   AST (SGOT) U/L 13   PROBNP pg/mL 4,034.0*   LACTATE mmol/L 1.4     Estimated Creatinine Clearance: 157.4 mL/min (by C-G formula based on SCr of 0.96 mg/dL).  Brief Urine Lab Results  (Last result in the past 365 days)      Color   Clarity   Blood   Leuk Est   Nitrite   Protein   CREAT   Urine HCG        01/19/22 2308 Dark Yellow   Clear   Negative   Trace   Negative   Trace               Imaging Results (Last 24 Hours)     Procedure Component Value Units Date/Time    CT Angiogram Head w AI Analysis of LVO [[id number]] Resulted: 03/30/22 1629     Updated: 03/30/22 1629    CT Angiogram Neck [[id number]] Resulted: 03/30/22 1629     Updated: 03/30/22 1629    CT Head Without Contrast [[id number]] Collected: 03/30/22 1459     Updated: 03/30/22 1511    Narrative:       DATE OF EXAM: 3/30/2022 2:44 PM     PROCEDURE: CT HEAD WO CONTRAST-, CT LUMBAR SPINE WO CONTRAST-     INDICATIONS: right leg weakness     COMPARISON: No comparisons available.     TECHNIQUE: Routine transaxial and coronal reconstruction images were  obtained through the head without the administration of contrast.  Automated exposure control and iterative reconstruction methods were  used.     Axial noncontrast CT of the lumbar spine with multiplanar reconstruction     The radiation dose reduction device was turned on for each scan per the  ALARA (As Low as Reasonably Achievable) protocol.     FINDINGS:  CT head: Gray-white differentiation is maintained and there is no  evidence of intracranial hemorrhage, mass or mass effect. The ventricles  are normal in size and configuration. The orbits are unremarkable and  the paranasal sinuses are grossly clear. The calvarium is intact, with  incidental diffuse hyperostosis noted.     CT lumbar spine: Vertebral body heights are maintained and there is no  evidence of acute fracture. There is no suspicious lytic or sclerotic  osseous lesion. There is mild straightening, otherwise without evidence  of listhesis or subluxation. The paraspinal soft tissues are  unremarkable. No significant spondylosis changes are evident.       Impression:      No acute intracranial abnormality.     Lumbar spine demonstrates no evidence of fracture, malalignment or  significant spondylosis change.     This report was finalized on 3/30/2022 3:08 PM by Reji William.       CT Lumbar Spine Without Contrast [[id number]] Collected: 03/30/22 1459     Updated: 03/30/22 1511    Narrative:      DATE OF EXAM: 3/30/2022 2:44 PM     PROCEDURE: CT HEAD WO CONTRAST-, CT LUMBAR SPINE WO CONTRAST-     INDICATIONS: right leg weakness     COMPARISON: No comparisons available.     TECHNIQUE: Routine transaxial and coronal reconstruction images were  obtained through the head without the administration of  contrast.  Automated exposure control and iterative reconstruction methods were  used.     Axial noncontrast CT of the lumbar spine with multiplanar reconstruction     The radiation dose reduction device was turned on for each scan per the  ALARA (As Low as Reasonably Achievable) protocol.     FINDINGS:  CT head: Gray-white differentiation is maintained and there is no  evidence of intracranial hemorrhage, mass or mass effect. The ventricles  are normal in size and configuration. The orbits are unremarkable and  the paranasal sinuses are grossly clear. The calvarium is intact, with  incidental diffuse hyperostosis noted.     CT lumbar spine: Vertebral body heights are maintained and there is no  evidence of acute fracture. There is no suspicious lytic or sclerotic  osseous lesion. There is mild straightening, otherwise without evidence  of listhesis or subluxation. The paraspinal soft tissues are  unremarkable. No significant spondylosis changes are evident.       Impression:      No acute intracranial abnormality.     Lumbar spine demonstrates no evidence of fracture, malalignment or  significant spondylosis change.     This report was finalized on 3/30/2022 3:08 PM by Reji William.       XR Foot 3+ View Right [[id number]] Collected: 03/30/22 1450     Updated: 03/30/22 1456    Narrative:      DATE OF EXAM: 3/30/2022 2:26 PM     PROCEDURE: XR FOOT 3+ VW RIGHT-     INDICATIONS: Pain, no trauma     COMPARISON: Right foot 8/29/2018     TECHNIQUE: Three routine standard radiographic views were obtained of  the right foot.     FINDINGS:  There is mild progression of first TMT joint DJD. Very mild first MTP  joint DJD appears stable. No advanced degenerative changes are  appreciated elsewhere. Bony structures appear anatomically aligned and  grossly intact. No fracture line foreign body, or soft tissue gas is  seen.        Impression:      Increased first TMT joint DJD compared to 8/29/2018 exam. No evidence of  acute  trauma to the right foot. Consider follow-up imaging if the  patient's symptoms persist or worsen.     This report was finalized on 3/30/2022 2:52 PM by Dr. Yves Thayer MD.       XR Ankle 3+ View Right [[id number]] Collected: 03/30/22 1447     Updated: 03/30/22 1455    Narrative:      DATE OF EXAM: 3/30/2022 2:25 PM     PROCEDURE: XR ANKLE 3+ VW RIGHT-     INDICATIONS: pain     COMPARISON: Right ankle 8/29/2028     TECHNIQUE: Three radiologic views of the right ankle were obtained.     FINDINGS:  History indicates right ankle pain and swelling but no history of  trauma.     Bones of the right ankle joint appear anatomically aligned and intact.  There is perhaps minimal joint space narrowing and very small marginal  osteophytes, but no advanced degenerative change is seen. No bony  erosive change or periosteal reaction is seen. There does appear to be  some generalized subcutaneous edema but this was also present on the  2018 exam.        Impression:      Minimal ankle joint DJD. No acute bony abnormality is identified.     This report was finalized on 3/30/2022 2:50 PM by Dr. Yves Thayer MD.       XR Chest 1 View [[id number]] Collected: 03/30/22 1448     Updated: 03/30/22 1453    Narrative:      DATE OF EXAM: 3/30/2022 2:26 PM     PROCEDURE: XR CHEST 1 VW-     INDICATIONS: Swelling in his ankles, history CHF     COMPARISON: 1/19/2022     TECHNIQUE: Single radiographic AP view of the chest was obtained.     FINDINGS:  Left chest wall ICD projects unchanged. There are findings of mild  cardiogenic interstitial edema with cardiac enlargement and small  bilateral pleural effusions. No pneumothorax.        Impression:      Findings of cardiogenic interstitial edema with small bilateral pleural  effusions.     This report was finalized on 3/30/2022 2:49 PM by Reji William.           Results for orders placed during the hospital encounter of 01/19/22    Adult Transthoracic Echo Complete W/ Cont if Necessary Per  Protocol    Interpretation Summary  · The left ventricular cavity is severely dilated. Left ventricular systolic function is severely decreased. Left ventricular ejection fraction appears to be less than 20%.  · Four-chamber dilation present  · Moderate mitral valve regurgitation is present.  · Estimated right ventricular systolic pressure from tricuspid regurgitation is moderately elevated (48 mmHg).      Assessment/Plan   Assessment / Plan     Active Hospital Problems    Diagnosis  POA   • Right leg weakness [R29.898]  Yes   • GERD without esophagitis [K21.9]  Unknown   • Plaque psoriasis [L40.0]  Yes   • Prediabetes [R73.03]  Yes   • Chronic systolic CHF (congestive heart failure) ([facility]) [I50.22]  Yes     · Echo (1/20/2022): LVEF <20%.  Severely dilated left ventricle.     • Hypokalemia [E87.6]  Unknown   • Chronic gout [M1A.9XX0]  Yes     on allopurinol, controls symptoms     • COLLIN (obstructive sleep apnea) [G47.33]  Yes     compliant     • Obesity, Class III, BMI 40-49.9 (morbid obesity) ([facility]) [E66.01]  Yes     Hospital Course to Date:  Evan Gannon is a 31 y.o. male PMH HTN, morbid obesity s/p gastric sleeve August 2020, gout, COLLIN< plaque psoriasis on Cosentyx, NICM/systolic heart failure EF 20% s/p ICD placement, HTN presenting with right leg pain and weakness that started 5 days ago.    Right leg weakness  - Consult neuro  - CT head = no acute intracranial abnormality.    - CT L-spine Lumbar spine demonstrates no evidence of fracture, malalignment or spondylosis  - CTA head/neck = no acute abnormality within limitations of the study      Right leg pain/Right foot pain likely secondary to gout  - PT/OT eval and treat  - pain management  - he has missed 2-3 doses of allopurinol and uric acid level is elevated, will add his allopurinol back  - CK normal, Sed rate, CRP pending  - Xray right foot: Increased first TMT joint DJD compared to 8/29/2018.  No evidence of acute trauma to the right foot.  -X-ray  right ankle: Minimal ankle joint DJD without acute bony abnormality.  -Venous duplex right lower extremity: Normal right lower extremity venous duplex    Hypokalemia  - replace per protocol    NICM/systolic heart failure LVEF 20% with defibrillator  - Chest x-ray findings of cardiogenic interstitial edema with small bilateral pleural effusion  - Oxygen dependent  - Bumex, Zaroxolyn, Aldactone  - Vericiguat, entresto, coreg,     DMII  - holding Ozempic  - SSI, H1E-yllxvt accordingly    Hx LLE DVT 1/2022  - Eliquis    Gout  - allopurinol     Morbid Obesity Class III BMI 45  - complicates all aspects of care    Plaque psoriasis  - Halobetasol  - holding Cosentyx    GERD  - omeprazole    COLLIN  - Wears oxygen 4-7 Liters/NC  - CPAP    DVT prophylaxis:  Eliquis    CODE STATUS:    Code Status and Medical Interventions:   Ordered at: 03/30/22 1613     Level Of Support Discussed With:    Patient     Code Status (Patient has no pulse and is not breathing):    CPR (Attempt to Resuscitate)     Medical Interventions (Patient has pulse or is breathing):    Full Support       Admission Status:  inpatient      Electronically signed by SONAM Scruggs, 03/30/22, 4:13 PM EDT.      Attending   Admission Attestation       I have seen and examined the patient, performing an independent face-to-face diagnostic evaluation with plan of care reviewed and developed with the advanced practice clinician (APC).      Brief Summary Statement:   Evan Gannon is a 31 y.o. male with history of 4-chamber cardiomyopathy and is on the cardiac transplant list who states that he had onset of right leg pain 5 days ago. He had been playing drums and was on a wooden seat for 1.5 hours before his symptoms started. He confirms the pain started in his lumbar spine and then would radiate from there to the medial right thigh and inferiorly to the medial and posterior aspect of his right foot. The leg and foot became weaker as the pain increased. He  "states this has affected his ability to ambulate and that he cannot do so without assistance due to the pain. He state he has had this before in both extremities in the past \"and it was when my electrolytes were bad.\"    Remainder of detailed HPI is as noted by APC and has been reviewed and/or edited by me for completeness.    Attending Physical Exam:  Constitutional: Awake, alert, very pleasant, NAD.  Eyes: PERRLA, sclerae anicteric, no conjunctival injection  HENT: NCAT, mucous membranes dry.  Neck: Supple, no thyromegaly, no lymphadenopathy, trachea midline  Respiratory: Clear to auscultation bilaterally but \"distant\" breath sounds likely due to body habitus, nonlabored respirations   Cardiovascular: RRR, no murmurs, rubs, or gallops, palpable pedal pulses bilaterally  Gastrointestinal: Positive bowel sounds, soft, nontender, nondistended  Musculoskeletal: 1+ RLE ankle edema, trace on the left, no clubbing or cyanosis to extremities; right foot plantar aspect with mild inflammation c/w normal left.  Psychiatric: Appropriate affect, cooperative  Neurologic: Oriented x 3, strength symmetric in upper extremities, LLE 5/5 proximal and distal, 4/5 proximal and distal RLE. Cranial Nerves grossly intact to confrontation, speech clear  Skin: No rashes, poor turgor.    Brief Assessment/Plan :  See detailed assessment and plan developed with APC which I have reviewed and/or edited for completeness.        Admission Status: I believe that this patient meets inpatient criteria for pain control, neuro consult and workup/management of pain likely associated with gout in a patient with a number of chronic comorbidities.      Chan Arora III,   22                Electronically signed by Chan Arora III, DO at 22 0122          Physical Therapy Notes (most recent note)      Clover Perdomo, PT at 22 2177  Version 1 of 1         Patient Name: Evan Landry Jagdeep  : " "1990    MRN: [id number]                              Today's Date: 3/31/2022       Admit Date: 3/30/2022    Visit Dx:     ICD-10-CM ICD-9-CM   1. Right leg weakness  R29.898 729.89   2. Chronic pulmonary edema  J81.1 514     Patient Active Problem List   Diagnosis   • Obesity, Class III, BMI 40-49.9 (morbid obesity) ([facility])   • Severe obstructive sleep apnea   • Nonischemic congestive cardiomyopathy ([facility])   • Personal history of noncompliance with medical treatment   • Plaque psoriasis   • COLLIN (obstructive sleep apnea)   • On home O2   • Psoriasis   • Wears glasses   • Chronic gout   • Paresthesia   • Hypokalemia   • Chronic gastritis   • Status post laparoscopic sleeve gastrectomy   • Chronic systolic CHF (congestive heart failure) ([facility])   • Right leg weakness   • GERD without esophagitis   • Plaque psoriasis   • Prediabetes     Past Medical History:   Diagnosis Date   • CHF (congestive heart failure) ([facility])     diagnosed at 21yo, thought to be related to undiagnosed congenital defect- per patient. hospitalized every 3 weeks during the winter due to PNA.  Has significantf fluid retention despite diurectics   • CPAP (continuous positive airway pressure) dependence     settings at 15   • Gout     on allopurinol, controls symptoms   • Hypertension     patient denies   • Hypokalemia    • ICD (implantable cardioverter-defibrillator) in place    • Low HDL (under 40)    • Morbid obesity ([facility])    • Morbid obesity with BMI of 60.0-69.9, adult ([facility])    • Myocardial infarction ([facility])     \"almost\" per patient, had heart cath, no stents, has defibrillator   • On home O2     supposed to be 24/7, ran out of O2 and has only been using prn. manged  by cardiology   • COLLIN on CPAP     compliant   • Paresthesia     hands/ arms/ feet, suspected due to poor circulation reportedly   • Plaque psoriasis    • Pneumonia    • Psoriasis    • Sleep apnea    • Wears glasses      Past Surgical History:   Procedure Laterality Date   • CARDIAC " CATHETERIZATION N/A 7/13/2017    Procedure: Left Heart Cath;  Surgeon: Balbir Olsen MD;  Location:  [facility];  Service:    • CARDIAC DEFIBRILLATOR PLACEMENT  08/2017   • CARDIAC DEFIBRILLATOR PLACEMENT     • CARDIAC ELECTROPHYSIOLOGY PROCEDURE N/A 8/15/2017    Procedure: VVI ICD Implant;  Surgeon: Nathanael Richmond DO;  Location:  [facility] EP INVASIVE LOCATION;  Service:    • ENDOSCOPY N/A 8/14/2020    Procedure: ESOPHAGOGASTRODUODENOSCOPY;  Surgeon: Arnoldo Whittington MD;  Location:  [facility];  Service: Bariatric;  Laterality: N/A;   • GASTRIC SLEEVE LAPAROSCOPIC N/A 8/14/2020    Procedure: GASTRIC SLEEVE LAPAROSCOPIC;  Surgeon: Arnoldo Whittington MD;  Location:  [facility];  Service: Bariatric;  Laterality: N/A;   • OTHER SURGICAL HISTORY      ultra light therapy   • SINUS SURGERY      cartilage from ear inserted in nasal cavity   • TONSILLECTOMY AND ADENOIDECTOMY  2000      General Information     Row Name 03/31/22 1651          Physical Therapy Time and Intention    Document Type evaluation  -BA     Mode of Treatment physical therapy;co-treatment  -BA     Row Name 03/31/22 1651          General Information    Patient Profile Reviewed yes  -BA     Prior Level of Function independent:;all household mobility;gait;transfer;bed mobility;ADL's;using stairs;dependent:;driving  Pt reported he did not use an AD for ambulation, but began using a FWW for the last few days.  Reported no falls.  -BA     Existing Precautions/Restrictions fall;oxygen therapy device and L/min;other (see comments)  EF less than 20% and on heart transplant list, chronic use of O2, recent hx of BLE instability and knee buckling  -BA     Barriers to Rehab medically complex;previous functional deficit  -BA     Row Name 03/31/22 1651          Living Environment    People in Home significant other;child(rishi), dependent  -BA     Row Name 03/31/22 1651          Home Main Entrance    Number of Stairs, Main Entrance four  -BA     Stair  Railings, Main Entrance none  -     Row Name 03/31/22 1651          Stairs Within Home, Primary    Stairs, Within Home, Primary 5+5 with 2 landings in between  -     Number of Stairs, Within Home, Primary eleven  -BA     Stair Railings, Within Home, Primary railings on both sides of stairs  -     Row Name 03/31/22 1651          Cognition    Orientation Status (Cognition) oriented x 4  -     Row Name 03/31/22 1651          Safety Issues, Functional Mobility    Safety Issues Affecting Function (Mobility) awareness of need for assistance;insight into deficits/self-awareness;positioning of assistive device;safety precaution awareness;safety precautions follow-through/compliance;sequencing abilities  -     Impairments Affecting Function (Mobility) balance;endurance/activity tolerance;coordination;motor control;postural/trunk control;shortness of breath;strength;pain  -           User Key  (r) = Recorded By, (t) = Taken By, (c) = Cosigned By    Initials Name Provider Type     Clover Perdomo, PT Physical Therapist               Mobility     Row Name 03/31/22 1658          Bed Mobility    Bed Mobility supine-sit;scooting/bridging  -     Scooting/Bridging [place] (Bed Mobility) contact guard;verbal cues  -     Supine-Sit [place] (Bed Mobility) contact guard;verbal cues  -     Assistive Device (Bed Mobility) head of bed elevated;bed rails  -     Comment, (Bed Mobility) VCs/TCs for sequencing and hand placement.  Extended time for bed mobility d/t increased effort and slower movements.  Reported no dizziness with sitting EOB.  O2 sats stable at 94% on 3L.  -     Row Name 03/31/22 1658          Sit-Stand Transfer    Sit-Stand [place] (Transfers) minimum assist (75% patient effort);verbal cues  -     Assistive Device (Sit-Stand Transfers) walker, front-wheeled  -     Comment, (Sit-Stand Transfer) STS x 2 reps from EOB and toilet with VCs/TCs for appropriate pre-positioning,  sequencing, and safe hand placement on FWW.  Reported no dizziness with standing.  -     Row Name 03/31/22 2102          Gait/Stairs (Locomotion)    [place] Level (Gait) minimum assist (75% patient effort);verbal cues  -     Assistive Device (Gait) walker, front-wheeled  -     Distance in Feet (Gait) 25  -     Deviations/Abnormal Patterns (Gait) bilateral deviations;antalgic;tamara decreased;gait speed decreased;stride length decreased;weight shifting decreased;base of support, wide  -     Bilateral Gait Deviations forward flexed posture;heel strike decreased  -     Comment, (Gait/Stairs) Pt demonstrated step through gait pattern at slow pace and with forward, rounded trunk posture.  Increased effort with advancing RLE for first several steps.  Exhibited 1 seated rest break d/t toileting.  VCs/TCs for occ walker management, staying closer to FWW, sequencing, upright posture, and improved stride length.  Gait distance limited by fatigue.  -           User Key  (r) = Recorded By, (t) = Taken By, (c) = Cosigned By    Initials Name Provider Type     Clover Perdomo, PT Physical Therapist               Obj/Interventions     Row Name 03/31/22 1706          Range of Motion Comprehensive    General Range of Motion bilateral lower extremity ROM WFL  -     Row Name 03/31/22 1706          Strength Comprehensive (MMT)    General Manual Muscle Testing (MMT) Assessment lower extremity strength deficits identified  -     Comment, General Manual Muscle Testing (MMT) Assessment LLE grossly 4+/5 and RLE grossly 4 to 4+/5.  -     Row Name 03/31/22 1706          Motor Skills    Motor Skills coordination  -     Coordination lower extremity;heel to shin;left;WFL;right;moderate impairment  -     Therapeutic Exercise hip;knee;ankle  -     Row Name 03/31/22 1706          Hip (Therapeutic Exercise)    Hip (Therapeutic Exercise) strengthening exercise  -     Hip Strengthening (Therapeutic Exercise)  bilateral;heel slides;aBduction;aDduction;sitting;5 repetitions  -     Row Name 03/31/22 1706          Knee (Therapeutic Exercise)    Knee (Therapeutic Exercise) strengthening exercise  -     Knee Strengthening (Therapeutic Exercise) bilateral;SLR (straight leg raise);sitting;5 repetitions  -     Row Name 03/31/22 1706          Ankle (Therapeutic Exercise)    Ankle (Therapeutic Exercise) AROM (active range of motion)  -     Ankle AROM (Therapeutic Exercise) bilateral;dorsiflexion;plantarflexion;sitting;10 repetitions  -     Row Name 03/31/22 1706          Balance    Balance Assessment sitting static balance;sitting dynamic balance;sit to stand dynamic balance;standing static balance;standing dynamic balance  -     Static Sitting Balance independent  -     Dynamic Sitting Balance supervision  -     Position, Sitting Balance supported;sitting edge of bed  -     Sit to Stand Dynamic Balance minimal assist;verbal cues  -     Static Standing Balance minimal assist;verbal cues  -     Dynamic Standing Balance minimal assist;verbal cues  -     Position/Device Used, Standing Balance supported;walker, front-wheeled  -     Balance Interventions sitting;sit to stand;standing;supported;static;dynamic;occupation based/functional task  -     Comment, Balance Mild instability with standing and ambulation activity with use of FWW.  No LOB or LE buckling noted throughout.  -     Row Name 03/31/22 1706          Sensory Assessment (Somatosensory)    Sensory Assessment (Somatosensory) LE sensation intact  -           User Key  (r) = Recorded By, (t) = Taken By, (c) = Cosigned By    Initials Name Provider Type     Clover Perdomo, PT Physical Therapist               Goals/Plan     Row Name 03/31/22 1719          Bed Mobility Goal 1 (PT)    Activity/Assistive Device (Bed Mobility Goal 1, PT) sit to supine/supine to sit  -     [place] Level/Cues Needed (Bed Mobility Goal 1, PT) independent  -      Time Frame (Bed Mobility Goal 1, PT) long term goal (LTG);2 weeks  -BA     Row Name 03/31/22 1719          Transfer Goal 1 (PT)    Activity/Assistive Device (Transfer Goal 1, PT) sit-to-stand/stand-to-sit;bed-to-chair/chair-to-bed;walker, rolling  -BA     [place] Level/Cues Needed (Transfer Goal 1, PT) modified independence  -BA     Time Frame (Transfer Goal 1, PT) long term goal (LTG);2 weeks  -BA     Row Name 03/31/22 1719          Gait Training Goal 1 (PT)    Activity/Assistive Device (Gait Training Goal 1, PT) gait (walking locomotion);assistive device use;walker, rolling  -BA     [place] Level (Gait Training Goal 1, PT) modified independence  -BA     Distance (Gait Training Goal 1, PT) 350  -BA     Time Frame (Gait Training Goal 1, PT) long term goal (LTG);2 weeks  -BA     Row Name 03/31/22 1719          Stairs Goal 1 (PT)    Activity/Assistive Device (Stairs Goal 1, PT) ascending stairs;descending stairs;using handrail, left;using handrail, right  -BA     [place] Level/Cues Needed (Stairs Goal 1, PT) supervision required  -BA     Number of Stairs (Stairs Goal 1, PT) 11  -BA     Time Frame (Stairs Goal 1, PT) long term goal (LTG);2 weeks  -           User Key  (r) = Recorded By, (t) = Taken By, (c) = Cosigned By    Initials Name Provider Type     Clover Perdomo, PT Physical Therapist               Clinical Impression     Row Name 03/31/22 1710          Pain    Pretreatment Pain Rating 1/10  -BA     Posttreatment Pain Rating 0/10 - no pain  -BA     Pain Location - Side/Orientation Right  -BA     Pain Location lower  -     Pain Location - extremity  -     Pre/Posttreatment Pain Comment Pt reported most of pain is now in R ankle, but continuing to improve.  Tolerated activity; RN aware and managing.  -BA     Pain Intervention(s) Ambulation/increased activity;Repositioned  -     Row Name 03/31/22 1710          Plan of Care Review    Plan of Care Reviewed With patient  -BA     Outcome  Evaluation PT initial Eval completed.  Pt presents with RLE weakness, RLE pain, balance deficits, coordination impairments, decreased functional endurance, and decreased indep with functional mobility.  Pt ambulated 25ft with Gare[place] and FWW.  Mild instability with no LOB noted.  Rec use of FWW for ambulation at this time for improved stability and energy conservation.  VSS on 3L O2.  Education provided on BLE HEP with good participation.  Pt warrants skilled IP PT to improve indep with mobility and return to PLOF.  Rec HWA and HH PT/OT upon d/c with transition to OP PT/OT in the future.  -     Row Name 03/31/22 1710          Therapy Assessment/Plan (PT)    Rehab Potential (PT) good, to achieve stated therapy goals  -     Criteria for Skilled Interventions Met (PT) yes;meets criteria;skilled treatment is necessary  -     Row Name 03/31/22 1710          Vital Signs    Pre Systolic BP Rehab 107  -BA     Pre Treatment Diastolic BP 67  -BA     Post Systolic BP Rehab 101  -BA     Post Treatment Diastolic BP 65  -BA     Pretreatment Heart Rate (beats/min) 102  -BA     Posttreatment Heart Rate (beats/min) 93  -BA     Pre SpO2 (%) 99  -BA     O2 Delivery Pre Treatment nasal cannula  3L  -BA     Intra SpO2 (%) 94  -BA     O2 Delivery Intra Treatment nasal cannula  3L  -BA     Post SpO2 (%) 98  -BA     O2 Delivery Post Treatment nasal cannula  3L  -BA     Pre Patient Position Supine  -BA     Intra Patient Position Standing  -BA     Post Patient Position Sitting  -     Row Name 03/31/22 1710          Positioning and Restraints    Pre-Treatment Position in bed  -BA     Post Treatment Position chair  -BA     In Chair notified nsg;reclined;sitting;call light within reach;encouraged to call for assist;exit alarm on;waffle cushion;legs elevated  -           User Key  (r) = Recorded By, (t) = Taken By, (c) = Cosigned By    Initials Name Provider Type    Clover Zhang, PT Physical Therapist               Outcome  Measures     Row Name 03/31/22 1721 03/31/22 0845       How much help from another person do you currently need...    Turning from your back to your side while in flat bed without using bedrails? 4  -BA 4  -EO    Moving from lying on back to sitting on the side of a flat bed without bedrails? 3  -BA 3  -EO    Moving to and from a bed to a chair (including a wheelchair)? 3  -BA 3  -EO    Standing up from a chair using your arms (e.g., wheelchair, bedside chair)? 3  -BA 3  -EO    Climbing 3-5 steps with a railing? 2  -BA 3  -EO    To walk in hospital room? 3  -BA 3  -EO    AM-PAC 6 Clicks Score (PT) 18  -BA 19  -EO    Row Name 03/31/22 1721 03/31/22 1423       Modified [place] Scale    Pre-Stroke Modified [place] Scale 6 - Unable to determine (UTD) from the medical record documentation  -BA 6 - Unable to determine (UTD) from the medical record documentation  -JY    Modified [place] Scale 3 - Moderate disability.  Requiring some help, but able to walk without assistance.  -BA 2 - Slight disability.  Unable to carry out all previous activities but able to look after own affairs without assistance.  -JY    Row Name 03/31/22 1721 03/31/22 1423       Functional Assessment    Outcome Measure Options AM-PAC 6 Clicks Basic Mobility (PT);Modified [place]  -BA AM-PAC 6 Clicks Daily Activity (OT);Modified [place]  -JY          User Key  (r) = Recorded By, (t) = Taken By, (c) = Cosigned By    Initials Name Provider Type    Ginna Meehan, RN Registered Nurse    Chantale Ospina, OT Occupational Therapist    Clover Zhang, PT Physical Therapist                             Physical Therapy Education                 Title: PT OT SLP Therapies (In Progress)     Topic: Physical Therapy (In Progress)     Point: Mobility training (In Progress)     Learning Progress Summary           Patient Acceptance, E, NR by BA at 3/31/2022 1722                   Point: Home exercise program (In Progress)     Learning Progress Summary            Patient Acceptance, E, NR by  at 3/31/2022 1722                   Point: Body mechanics (In Progress)     Learning Progress Summary           Patient Acceptance, E, NR by  at 3/31/2022 1722                   Point: Precautions (In Progress)     Learning Progress Summary           Patient Acceptance, E, NR by  at 3/31/2022 1722                               User Key     Initials Effective Dates Name Provider Type Discipline     09/21/21 -  Clover Perdomo, PT Physical Therapist PT              PT Recommendation and Plan  Planned Therapy Interventions (PT): balance training, bed mobility training, gait training, home exercise program, motor coordination training, neuromuscular re-education, patient/family education, postural re-education, stair training, strengthening, transfer training  Plan of Care Reviewed With: patient  Outcome Evaluation: PT initial Eval completed.  Pt presents with RLE weakness, RLE pain, balance deficits, coordination impairments, decreased functional endurance, and decreased indep with functional mobility.  Pt ambulated 25ft with [place] and FWW.  Mild instability with no LOB noted.  Rec use of FWW for ambulation at this time for improved stability and energy conservation.  VSS on 3L O2.  Education provided on BLE HEP with good participation.  Pt warrants skilled IP PT to improve indep with mobility and return to PLOF.  Rec HWA and HH PT/OT upon d/c with transition to OP PT/OT in the future.     Time Calculation:    PT Charges     Row Name 03/31/22 1726             Time Calculation    Start Time 0923  -BA      PT Received On 03/31/22  -      PT Goal Re-Cert Due Date 04/10/22  -              Time Calculation- PT    Total Timed Code Minutes- PT 15 minute(s)  -BA              Timed Charges    83625 - PT Therapeutic Exercise Minutes 15  -BA              Untimed Charges    PT Eval/Re-eval Minutes 64  -BA              Total Minutes    Timed Charges Total Minutes 15  -BA      Untimed  Charges Total Minutes 64  -BA       Total Minutes 79  -BA            User Key  (r) = Recorded By, (t) = Taken By, (c) = Cosigned By    Initials Name Provider Type    Clover Zhang, PT Physical Therapist              Therapy Charges for Today     Code Description Service Date Service Provider Modifiers Qty    61810633784 HC PT THER PROC EA 15 MIN 3/31/2022 Clover Perdomo, PT GP 1    15467784252 HC PT EVAL MOD COMPLEXITY 4 3/31/2022 Clover Perdomo, PT GP 1          PT G-Codes  Outcome Measure Options: AM-PAC 6 Clicks Basic Mobility (PT), Modified [place]  AM-PAC 6 Clicks Score (PT): 18  AM-PAC 6 Clicks Score (OT): 20  Modified [place] Scale: 3 - Moderate disability.  Requiring some help, but able to walk without assistance.    Clover Perdomo, PT  3/31/2022      Electronically signed by Clover Perdomo, PT at 22 1730          Occupational Therapy Notes (most recent note)      Chantale Brasher, OT at 22 0912          Patient Name: Evan Gannon  : 1990    MRN: [id number]                              Today's Date: 3/31/2022       Admit Date: 3/30/2022    Visit Dx:     ICD-10-CM ICD-9-CM   1. Right leg weakness  R29.898 729.89   2. Chronic pulmonary edema  J81.1 514     Patient Active Problem List   Diagnosis   • Obesity, Class III, BMI 40-49.9 (morbid obesity) ([facility])   • Severe obstructive sleep apnea   • Nonischemic congestive cardiomyopathy ([facility])   • Personal history of noncompliance with medical treatment   • Plaque psoriasis   • COLLIN (obstructive sleep apnea)   • On home O2   • Psoriasis   • Wears glasses   • Chronic gout   • Paresthesia   • Hypokalemia   • Chronic gastritis   • Status post laparoscopic sleeve gastrectomy   • Chronic systolic CHF (congestive heart failure) ([facility])   • Right leg weakness   • GERD without esophagitis   • Plaque psoriasis   • Prediabetes     Past Medical History:   Diagnosis Date   • CHF (congestive heart failure) ([facility])     diagnosed at 23yo,  "thought to be related to undiagnosed congenital defect- per patient. hospitalized every 3 weeks during the winter due to PNA.  Has significantf fluid retention despite diurectics   • CPAP (continuous positive airway pressure) dependence     settings at 15   • Gout     on allopurinol, controls symptoms   • Hypertension     patient denies   • Hypokalemia    • ICD (implantable cardioverter-defibrillator) in place    • Low HDL (under 40)    • Morbid obesity (HCC)    • Morbid obesity with BMI of 60.0-69.9, adult (HCC)    • Myocardial infarction (HCC)     \"almost\" per patient, had heart cath, no stents, has defibrillator   • On home O2     supposed to be 24/7, ran out of O2 and has only been using prn. manged  by cardiology   • COLLIN on CPAP     compliant   • Paresthesia     hands/ arms/ feet, suspected due to poor circulation reportedly   • Plaque psoriasis    • Pneumonia    • Psoriasis    • Sleep apnea    • Wears glasses      Past Surgical History:   Procedure Laterality Date   • CARDIAC CATHETERIZATION N/A 7/13/2017    Procedure: Left Heart Cath;  Surgeon: Balbir Olsen MD;  Location:  [facility];  Service:    • CARDIAC DEFIBRILLATOR PLACEMENT  08/2017   • CARDIAC DEFIBRILLATOR PLACEMENT     • CARDIAC ELECTROPHYSIOLOGY PROCEDURE N/A 8/15/2017    Procedure: VVI ICD Implant;  Surgeon: Nathanael Richmond DO;  Location:  [facility];  Service:    • ENDOSCOPY N/A 8/14/2020    Procedure: ESOPHAGOGASTRODUODENOSCOPY;  Surgeon: Arnoldo Whittington MD;  Location:  [facility];  Service: Bariatric;  Laterality: N/A;   • GASTRIC SLEEVE LAPAROSCOPIC N/A 8/14/2020    Procedure: GASTRIC SLEEVE LAPAROSCOPIC;  Surgeon: Arnoldo Whittington MD;  Location:  [facility];  Service: Bariatric;  Laterality: N/A;   • OTHER SURGICAL HISTORY      ultra light therapy   • SINUS SURGERY      cartilage from ear inserted in nasal cavity   • TONSILLECTOMY AND ADENOIDECTOMY  2000      General Information     Row Name 03/31/22 " 1341          OT Time and Intention    Document Type evaluation  -JY     Mode of Treatment occupational therapy;physical therapy;co-treatment;other (see comments)  approved by [facility]  -     Row Name 03/31/22 1341          General Information    Patient Profile Reviewed yes  -JY     Prior Level of Function independent:;all household mobility;gait;transfer;bed mobility;feeding;grooming;dressing;bathing;home management;cooking;cleaning;using stairs;dependent:;driving  pt reports I in ADLs, related t/fs, mobility routinely using no AD however used FWW recently with concerning s/s, able to complete IADLs however S.O. would assist as needed, dependent on others for driving  -JY     Existing Precautions/Restrictions fall;oxygen therapy device and L/min;other (see comments)  EF 20% on heart transplant list, chronic use of O2, recent hx of BLE instability and knee buckling  -JY     Barriers to Rehab medically complex;previous functional deficit  -JY     Row Name 03/31/22 1341          Occupational Profile    Environmental Supports and Barriers (Occupational Profile) step over tub/shower combo, standard toilet height, DME includes FWW  -     Row Name 03/31/22 1341          Living Environment    People in Home significant other;child(rishi), dependent;other (see comments)  fiance and 2 daughters  -[employer]     Row Name 03/31/22 1341          Home Main Entrance    Number of Stairs, Main Entrance four  -JY     Stair Railings, Main Entrance none  -Y     Row Name 03/31/22 1341          Stairs Within Home, Primary    Number of Stairs, Within Home, Primary eleven;other (see comments)  5 + 5 with two landings in between  -JY     Stair Railings, Within Home, Primary railings on both sides of stairs  -JY     Stairs Comment, Within Home, Primary must ascend steps inside to access bedroom, able to use bathroom downstairs  -[employer]     Row Name 03/31/22 1341          Cognition    Orientation Status (Cognition) oriented x 4  -JY     Row Name 03/31/22  1341          Safety Issues, Functional Mobility    Safety Issues Affecting Function (Mobility) positioning of assistive device;safety precaution awareness;safety precautions follow-through/compliance;sequencing abilities;other (see comments)  initial sequencing for FWW mgmt d/t unfamiliarity  -JEFRA     Impairments Affecting Function (Mobility) balance;endurance/activity tolerance;shortness of breath;strength  -JY     Comment, Safety Issues/Impairments (Mobility) alert and able to follow commands; pain at B LEs improved however pt fatigued with more dynamic tasks  -JY           User Key  (r) = Recorded By, (t) = Taken By, (c) = Cosigned By    Initials Name Provider Type    Chantale Ospina, OT Occupational Therapist                 Mobility/ADL's     Row Name 03/31/22 1356          Bed Mobility    Bed Mobility supine-sit;scooting/bridging  -JY     Scooting/Bridging [place] (Bed Mobility) contact guard;verbal cues  -JY     Supine-Sit [place] (Bed Mobility) contact guard;verbal cues  -JY     Comment, (Bed Mobility) increased time and effort and CGA to advance LEs to EOB, maintained HOB elevated and bed rails for support, denied any dizziness or feeling LH at EOB  -JY     Row Name 03/31/22 1356          Transfers    Transfers sit-stand transfer;stand-sit transfer;toilet transfer  -JY     Comment, (Transfers) skilled cues for optimal hand placement for controlled ascend, descend including reaching back prior to sitting, moving RLE out forward if necessary for comfort and pushing up from seated surface with at least 1 UE vs at FWW; utilized FWW Throughout  -JY     Sit-Stand [place] (Transfers) minimum assist (75% patient effort);verbal cues  -JY     Stand-Sit [place] (Transfers) minimum assist (75% patient effort);verbal cues  -JY     [place] Level (Toilet Transfer) minimum assist (75% patient effort);verbal cues  -JY     Assistive Device (Toilet Transfer) commode;grab bars/safety frame;walker,  front-wheeled  -RADHA     [facility] Name 03/31/22 [facility]          Sit-Stand Transfer    Assistive Device (Sit-Stand Transfers) walker, front-wheeled  -RADHA     [facility] Name 03/31/22 [facility]          Toilet Transfer    Comment, (Toilet Transfer) increased time, effort in ascend from toilet d/t lower height and gross min A for descend given safety in lowering self, recommended BSC over toilet or riser ideally with UE support in order to maximize I and safety  -JY     Row Name 03/31/22 [facility]          Functional Mobility    Functional Mobility- Ind. Level minimum assist (75% patient effort);verbal cues required  -     Functional Mobility- Device rolling walker  -     Functional Mobility-Distance (Feet) --  in room distances for ADLs  -     Functional Mobility- Comment initial education and cues for optimal FWW use, mgmt and cues as needed on optimal placement to ensure close proximity and safety tala in turning  -JY     Row Name 03/31/22 [facility]          Activities of Daily Living    BADL Assessment/Intervention upper body dressing;lower body dressing;toileting;grooming  -JY     Row Name 03/31/22 [facility]6          Stand-Sit Transfer    Assistive Device (Stand-Sit Transfers) walker, front-wheeled  -RADHA     [facility] Name 03/31/22 [facility]6          Upper Body Dressing Assessment/Training    B[place]win Level (Upper Body Dressing) doff;don;pajama/robe;contact guard assist;verbal cues  -JY     Position (Upper Body Dressing) unsupported sitting;edge of bed sitting  -Y     Comment, (Upper Body Dressing) CGA for proximal and posterior mgmt of gown including tying and untying  -JY     Row Name 03/31/22 [facility]6          Lower Body Dressing Assessment/Training    B[place]win Level (Lower Body Dressing) doff;don;socks;supervision;verbal cues  -JY     Position (Lower Body Dressing) supported sitting;edge of bed sitting  -Y     Comment, (Lower Body Dressing) spv during elevation of LEs to bed height for proximal reach  -JY     Row Name 03/31/22 [facility]6           Toileting Assessment/Training    [place] Level (Toileting) perform perineal hygiene;independent;adjust/manage clothing;contact guard assist;verbal cues  -     Assistive Devices (Toileting) commode;grab bar/safety frame  -J     Position (Toileting) unsupported sitting;supported standing  -JY     Comment, (Toileting) CGA for balance during clothing management up/down at toilet and I in seated hygiene; cued to use grab bar at pt's R UE for assist in standing prior to toileting ADLs  -     Row Name 03/31/22 1356          Grooming Assessment/Training    [place] Level (Grooming) wash face, hands;oral care regimen;standby assist  -     Position (Grooming) supported standing  -JY     Comment, (Grooming) pt able to stand at sink and complete hand washing iwth gross SBA, CGA for balance while standing  -           User Key  (r) = Recorded By, (t) = Taken By, (c) = Cosigned By    Initials Name Provider Type    Chantale Ospina OT Occupational Therapist               Obj/Interventions     Row Name 03/31/22 1406          Sensory Assessment (Somatosensory)    Sensory Assessment (Somatosensory) bilateral UE;sensation intact  -     Bilateral UE Sensory Assessment general sensation;light touch awareness;light touch localization;intact  -     Sensory Assessment denies any numbness or tingling and able to recognize all LT stimuli as intact and symmetrical at BUEs  -[facility] Name 03/31/22 1406          Vision Assessment/Intervention    Visual Impairment/Limitations corrective lenses full-time  -     Vision Assessment Comment denies any current blurred vision this date while wearing eyewear, does report intermittent and transient blurred vision over last ~ 1 month  -     Row Name 03/31/22 1406          Range of Motion Comprehensive    General Range of Motion bilateral upper extremity ROM WFL  -[facility] Name 03/31/22 1406          Strength Comprehensive (MMT)    General Manual Muscle Testing (MMT)  Assessment upper extremity strength deficits identified  -JY     Comment, General Manual Muscle Testing (MMT) Assessment gross BUE MMS 4+/5 per MMT  -JY     Row Name 03/31/22 1406          Motor Skills    Motor Skills functional endurance  -JY     Functional Endurance pt presents with decreased activity tolerance toward more dynamic standing tasks, tolerating ~ 8 minutes standing after toileting when completing sellf care at [facility]  -JY     Row Name 03/31/22 1406          Balance    Balance Assessment sitting static balance;sitting dynamic balance;standing static balance;standing dynamic balance  -JY     Static Sitting Balance independent  -JY     Dynamic Sitting Balance supervision;other (see comments)  LBD  -JY     Position, Sitting Balance supported;unsupported;sitting edge of bed  -JY     Static Standing Balance minimal assist;verbal cues  -JY     Dynamic Standing Balance minimal assist;verbal cues;other (see comments)  fxl transfer  -JY     Position/Device Used, Standing Balance walker, front-wheeled  -JY     Balance Interventions sitting;standing;static;dynamic;sit to stand;supported;occupation based/functional task  -JY     Comment, Balance no overt LOB during seated or standing tasks, no knee or LE buckling, pt used FWW throughout in standing ; SPO2 > 90% throuhgout  -JY           User Key  (r) = Recorded By, (t) = Taken By, (c) = Cosigned By    Initials Name Provider Type    Chantale Ospina OT Occupational Therapist               Goals/Plan     Row Name 03/31/22 6023          Transfer Goal 1 (OT)    Activity/Assistive Device (Transfer Goal 1, OT) sit-to-stand/stand-to-sit;bed-to-chair/chair-to-bed;toilet;commode;walker, rolling  -JEFRA     [place] Level/Cues Needed (Transfer Goal 1, OT) supervision required;verbal cues required  -JY     Time Frame (Transfer Goal 1, OT) long term goal (LTG);by discharge  -JY     Progress/Outcome (Transfer Goal 1, OT) goal ongoing  -J     Row Name 03/31/22 8817           Dressing Goal 1 (OT)    Activity/Device (Dressing Goal 1, OT) upper body dressing;lower body dressing;other (see comments)  w/ AE as needed for increased I and safety, ECWS  -JY     [place]/Cues Needed (Dressing Goal 1, OT) modified independence;verbal cues required  -JY     Time Frame (Dressing Goal 1, OT) long term goal (LTG);by discharge  -JY     Progress/Outcome (Dressing Goal 1, OT) goal ongoing  -JY     Row Name 03/31/22 1424          Toileting Goal 1 (OT)    Activity/Device (Toileting Goal 1, OT) adjust/manage clothing;perform perineal hygiene;commode;grab bar/safety frame;raised toilet seat  -JY     [place] Level/Cues Needed (Toileting Goal 1, OT) standby assist;verbal cues required  -JY     Time Frame (Toileting Goal 1, OT) long term goal (LTG);by discharge  -JY     Progress/Outcome (Toileting Goal 1, OT) goal ongoing  -JY     Row Name 03/31/22 1424          Problem Specific Goal 1 (OT)    Problem Specific Goal 1 (OT) Pt to demonstrate increased activity tolerance in standing upward of 10 mins with supplemental O2 and FWW w/ VSS in order to progress position for ADLs, related t/fs  -JY     Time Frame (Problem Specific Goal 1, OT) long term goal (LTG);by discharge  -JY     Progress/Outcome (Problem Specific Goal 1, OT) goal ongoing  -iiyuma     Row Name 03/31/22 1424          Therapy Assessment/Plan (OT)    Planned Therapy Interventions (OT) activity tolerance training;adaptive equipment training;BADL retraining;functional balance retraining;occupation/activity based interventions;patient/caregiver education/training;strengthening exercise;transfer/mobility retraining  -JY           User Key  (r) = Recorded By, (t) = Taken By, (c) = Cosigned By    Initials Name Provider Type    Chantale Ospina, VALERIE Occupational Therapist               Clinical Impression     Row Name 03/31/22 1411          Pain Assessment    Pretreatment Pain Rating 1/10  -JY     Posttreatment Pain Rating 0/10 - no pain  -JY     Pain  Location - Side/Orientation Right  -JY     Pain Location lower  -JY     Pain Location - extremity  -JY     Pre/Posttreatment Pain Comment pt initially rated pain at R ankle, foot at 1/10 with anticipated increase w/ mobility however pt did not experience increased pain and reported at 0/10 at OT departure.  -JY     Pain Intervention(s) Repositioned;Ambulation/increased activity  -JY     Additional Documentation Pain Scale: FACES Pre/Post-Treatment (Group)  -JY     Row Name 03/31/22 1411          Plan of Care Review    Plan of Care Reviewed With patient  -JY     Progress no change  OT IE  -JY     Outcome Evaluation OT eval complete. Pt A & O x 4, initially reported pain at R LE at foot/ankle however did not report any once sitting in recliner. Pt presents with decreased I in ADLs, related t/fs compared to PLOF impacted by decreased fxl endurance, balance impairments, muscle weakness at BUEs and need for occasional safety A. Pt did not report increase in pain with bed mobility or ADL related tasks, t/fs. Pt presents with exertion after grossly 8 mins standing in bathroom with supplemental O2 used throughout. Variable A for ADLs provided. Recommend IPOT POC and home with A and HHOT/PT at d/c when medically ready with anticipated transition to outpatient after HH.  -JY     Row Name 03/31/22 1411          Therapy Assessment/Plan (OT)    Patient/Family Therapy Goal Statement (OT) to increase I in ADLs, related t/fs, return to PLOF  -JY     Rehab Potential (OT) good, to achieve stated therapy goals  -JY     Criteria for Skilled Therapeutic Interventions Met (OT) yes;skilled treatment is necessary  -JY     Therapy Frequency (OT) daily  -JY     Row Name 03/31/22 1411          Therapy Plan Review/Discharge Plan (OT)    Anticipated Discharge Disposition (OT) home with assist;home with home health  -JY     Row Name 03/31/22 1411          Vital Signs    Pre Systolic BP Rehab 107  -JY     Pre Treatment Diastolic BP 67  -JY      Post Systolic BP Rehab 101  -JY     Post Treatment Diastolic BP 65  -JY     Pretreatment Heart Rate (beats/min) 102  -JY     Posttreatment Heart Rate (beats/min) 99  -JY     Pre SpO2 (%) 94  -JY     O2 Delivery Pre Treatment supplemental O2  -JY     O2 Delivery Intra Treatment supplemental O2  -JY     Post SpO2 (%) 95  -JY     O2 Delivery Post Treatment supplemental O2  -JY     Pre Patient Position Supine  -JY     Intra Patient Position Standing  -JY     Post Patient Position Sitting  -JY     Row Name 03/31/22 1411          Positioning and Restraints    Pre-Treatment Position in bed  -JY     Post Treatment Position chair  -JY     In Chair notified nsg;sitting;call light within reach;encouraged to call for assist;with PT;waffle cushion  -JY           User Key  (r) = Recorded By, (t) = Taken By, (c) = Cosigned By    Initials Name Provider Type    Chantale Ospina, VALERIE Occupational Therapist               Outcome Measures     Row Name 03/31/22 1423          How much help from another is currently needed...    Putting on and taking off regular lower body clothing? 3  -JY     Bathing (including washing, rinsing, and drying) 3  -JY     Toileting (which includes using toilet bed pan or urinal) 3  -JY     Putting on and taking off regular upper body clothing 3  -JY     Taking care of personal grooming (such as brushing teeth) 4  -JY     Eating meals 4  -JY     AM-PAC 6 Clicks Score (OT) 20  -JY     Row Name 03/31/22 0845          How much help from another person do you currently need...    Turning from your back to your side while in flat bed without using bedrails? 4  -EO     Moving from lying on back to sitting on the side of a flat bed without bedrails? 3  -EO     Moving to and from a bed to a chair (including a wheelchair)? 3  -EO     Standing up from a chair using your arms (e.g., wheelchair, bedside chair)? 3  -EO     Climbing 3-5 steps with a railing? 3  -EO     To walk in hospital room? 3  -EO     AM-PAC 6 Clicks  Score (PT) 19  -EO     Row Name 03/31/22 1423          Modified [place] Scale    Pre-Stroke Modified [place] Scale 6 - Unable to determine (UTD) from the medical record documentation  -JEFRA     Modified [place] Scale 2 - Slight disability.  Unable to carry out all previous activities but able to look after own affairs without assistance.  -RADHA     Row Name 03/31/22 1423          Functional Assessment    Outcome Measure Options AM-PAC 6 Clicks Daily Activity (OT);Modified [place]  -JY           User Key  (r) = Recorded By, (t) = Taken By, (c) = Cosigned By    Initials Name Provider Type    Ginna Meehan RN Registered Nurse    Chantale Ospina OT Occupational Therapist                Occupational Therapy Education                 Title: PT OT SLP Therapies (In Progress)     Topic: Occupational Therapy (In Progress)     Point: ADL training (In Progress)     Description:   Instruct learner(s) on proper safety adaptation and remediation techniques during self care or transfers.   Instruct in proper use of assistive devices.              Learning Progress Summary           Patient Acceptance, E,D, NR by RADHA at 3/31/2022 0912                   Point: Home exercise program (Not Started)     Description:   Instruct learner(s) on appropriate technique for monitoring, assisting and/or progressing therapeutic exercises/activities.              Learner Progress:  Not documented in this visit.          Point: Precautions (In Progress)     Description:   Instruct learner(s) on prescribed precautions during self-care and functional transfers.              Learning Progress Summary           Patient Acceptance, E,D, NR by RADHA at 3/31/2022 0912    Acceptance, E, VU by SC at 3/31/2022 0357                   Point: Body mechanics (In Progress)     Description:   Instruct learner(s) on proper positioning and spine alignment during self-care, functional mobility activities and/or exercises.              Learning Progress Summary            Patient Acceptance, E,D, NR by RADHA at 3/31/2022 0912                               User Key     Initials Effective Dates Name Provider Type Discipline    RADHA 06/16/21 -  Chantale Brasher OT Occupational Therapist OT    SC 03/14/22 -  Lisha Blunt RN Registered Nurse Nurse              OT Recommendation and Plan  Planned Therapy Interventions (OT): activity tolerance training, adaptive equipment training, BADL retraining, functional balance retraining, occupation/activity based interventions, patient/caregiver education/training, strengthening exercise, transfer/mobility retraining  Therapy Frequency (OT): daily  Plan of Care Review  Plan of Care Reviewed With: patient  Progress: no change (OT IE)  Outcome Evaluation: OT eval complete. Pt A & O x 4, initially reported pain at R LE at foot/ankle however did not report any once sitting in recliner. Pt presents with decreased I in ADLs, related t/fs compared to PLOF impacted by decreased fxl endurance, balance impairments, muscle weakness at BUEs and need for occasional safety A. Pt did not report increase in pain with bed mobility or ADL related tasks, t/fs. Pt presents with exertion after grossly 8 mins standing in bathroom with supplemental O2 used throughout. Variable A for ADLs provided. Recommend IPOT POC and home with A and HHOT/PT at d/c when medically ready with anticipated transition to outpatient after HH.     Time Calculation:     Therapy Charges for Today     Code Description Service Date Service Provider Modifiers Qty    [id number]  OT SELF CARE/MGMT/TRAIN EA 15 MIN 3/31/2022 Chantale Brasher OT GO 1    [id number] HC OT EVAL LOW COMPLEXITY 4 3/31/2022 Chantale Brasher OT GO 1               Chantale Brasher OT  3/31/2022    Electronically signed by Chantale Brasher OT at 03/31/22 [address] [place] [id number]  Phone:  [phone]  Fax:  [phone] Date: Apr 1, 2022      Ambulatory Referral to  Home Health     Patient:  Evan Gannon MRN:  [id number]   [address] [facility] [address] :  1990  SSN:    Phone: [phone] Sex:  M      INSURANCE PAYOR PLAN GROUP # SUBSCRIBER ID   Primary:  Secondary:    [facility]IC[facility] [id number]  [id number]    [id number]  [id number]      Referring Provider Information:  SHAISTA FERRARA Phone: [phone] Fax: [phone]      Referral Information:   # Visits:  999 Referral Type: Home Health [42]   Urgency:  Routine Referral Reason: Specialty Services Required   Start Date: 2022 End Date:  To be determined by Insurer   Diagnosis: Right leg weakness (R29.898 [ICD-10-CM] 729.89 [ICD-9-CM])      Refer to Dept:   Refer to Provider:   Refer to Provider Phone:   Refer to Facility:       Face to Face Visit Date: 2022  Follow-up provider for Plan of Care? I treated the patient in an acute care facility and will not continue treatment after discharge.  Follow-up provider: ARNALDO LOPEZ [[id number]]  Reason/Clinical Findings: Right leg weakness  Describe mobility limitations that make leaving home difficult: Impaired physical mobility and gait endurance  Nursing/Therapeutic Services Requested: Physical Therapy  Nursing/Therapeutic Services Requested: Occupational Therapy  PT orders: Gait Training  PT orders: Transfer training  PT orders: Strengthening  PT orders: Home safety assessment  Weight Bearing Status: Full Weight Bearing  Occupational orders: Activities of daily living  Occupational orders: Energy conservation  Occupational orders: Strengthening  Occupational orders: Cognition  Occupational orders: Fine motor  Occupational orders: Home safety assessment  Frequency: 1 Week 1     This document serves as a request of services and does not constitute Insurance authorization or approval of services.  To determine eligibility, please contact the members Insurance carrier to verify and review  coverage.     If you have medical questions regarding this request for services. Please contact [address] at [phone] during normal business hours.        Authorizing Provider:Calixto Adrian MD  Authorizing Provider's NPI: [id number]  Order Entered By: Yoli Dennis RN 4/1/2022  2:15 PM     Electronically signed by: Calixto Adrian MD 4/1/2022  2:15 PM

## 2022-04-01 NOTE — CASE MANAGEMENT/SOCIAL WORK
Continued Stay Note  [facility]     Patient Name: Evan Gannon  MRN: [id number]  Today's Date: 4/1/2022    Admit Date: 3/30/2022     Discharge Plan     Row Name 04/01/22 1637       Plan    Plan Home with home health    Patient/Family in Agreement with Plan   yes    Plan Comments     I anticipate Mr. Gannon to be medically ready for discharge tomorrow. I am working on obtaining home health physical and occupational therapy with [facility] early next week. I will follow up on this Monday morning as it is end of day.     If Mr. Gannon is not able to find transportation home by friends, the weekend  will provide him a Lyft and portable oxygen tank. The weekend  can be reached at ext. [phone].     I was unable to reach [employer] today regarding Mr. Gannon' requests for a cpap replacement, continuous portable oxygen tank, and smaller concentrator for home. I advise him to follow up on Monday, he is agreeable.        Final Discharge Disposition Code 06 - home with home health care                Expected Discharge Date and Time     Expected Discharge Date Expected Discharge Time    Apr 2, 2022             Yoli Dennis RN

## 2022-04-02 ENCOUNTER — READMISSION MANAGEMENT (OUTPATIENT)
Dept: CALL CENTER | Facility: HOSPITAL | Age: 32
End: 2022-04-02

## 2022-04-02 VITALS
TEMPERATURE: 98 F | WEIGHT: 288 LBS | HEIGHT: 69 IN | DIASTOLIC BLOOD PRESSURE: 55 MMHG | SYSTOLIC BLOOD PRESSURE: 98 MMHG | HEART RATE: 99 BPM | OXYGEN SATURATION: 99 % | RESPIRATION RATE: 18 BRPM | BODY MASS INDEX: 42.65 KG/M2

## 2022-04-02 PROBLEM — R29.898 RIGHT LEG WEAKNESS: Status: RESOLVED | Noted: 2022-03-30 | Resolved: 2022-04-02

## 2022-04-02 LAB
ALBUMIN SERPL-MCNC: 3.4 G/DL (ref 3.5–5.2)
ALBUMIN/GLOB SERPL: 0.7 G/DL
ALP SERPL-CCNC: 92 U/L (ref 39–117)
ALT SERPL W P-5'-P-CCNC: 10 U/L (ref 1–41)
ANION GAP SERPL CALCULATED.3IONS-SCNC: 8 MMOL/L (ref 5–15)
AST SERPL-CCNC: 16 U/L (ref 1–40)
BASOPHILS # BLD AUTO: 0.04 10*3/MM3 (ref 0–0.2)
BASOPHILS NFR BLD AUTO: 1 % (ref 0–1.5)
BILIRUB SERPL-MCNC: 0.6 MG/DL (ref 0–1.2)
BUN SERPL-MCNC: 33 MG/DL (ref 6–20)
BUN/CREAT SERPL: 34.7 (ref 7–25)
CALCIUM SPEC-SCNC: 9.2 MG/DL (ref 8.6–10.5)
CHLORIDE SERPL-SCNC: 95 MMOL/L (ref 98–107)
CO2 SERPL-SCNC: 33 MMOL/L (ref 22–29)
CREAT SERPL-MCNC: 0.95 MG/DL (ref 0.76–1.27)
DEPRECATED RDW RBC AUTO: 52.6 FL (ref 37–54)
EGFRCR SERPLBLD CKD-EPI 2021: 111.1 ML/MIN/1.73
EOSINOPHIL # BLD AUTO: 0.07 10*3/MM3 (ref 0–0.4)
EOSINOPHIL NFR BLD AUTO: 1.7 % (ref 0.3–6.2)
ERYTHROCYTE [DISTWIDTH] IN BLOOD BY AUTOMATED COUNT: 19.6 % (ref 12.3–15.4)
ERYTHROCYTE [SEDIMENTATION RATE] IN BLOOD: 35 MM/HR (ref 0–15)
GLOBULIN UR ELPH-MCNC: 4.6 GM/DL
GLUCOSE SERPL-MCNC: 130 MG/DL (ref 65–99)
HCT VFR BLD AUTO: 39.6 % (ref 37.5–51)
HGB BLD-MCNC: 13.1 G/DL (ref 13–17.7)
IMM GRANULOCYTES # BLD AUTO: 0.02 10*3/MM3 (ref 0–0.05)
IMM GRANULOCYTES NFR BLD AUTO: 0.5 % (ref 0–0.5)
LYMPHOCYTES # BLD AUTO: 0.84 10*3/MM3 (ref 0.7–3.1)
LYMPHOCYTES NFR BLD AUTO: 20.6 % (ref 19.6–45.3)
MAGNESIUM SERPL-MCNC: 1.5 MG/DL (ref 1.6–2.6)
MCH RBC QN AUTO: 25 PG (ref 26.6–33)
MCHC RBC AUTO-ENTMCNC: 33.1 G/DL (ref 31.5–35.7)
MCV RBC AUTO: 75.6 FL (ref 79–97)
MONOCYTES # BLD AUTO: 0.59 10*3/MM3 (ref 0.1–0.9)
MONOCYTES NFR BLD AUTO: 14.5 % (ref 5–12)
NEUTROPHILS NFR BLD AUTO: 2.51 10*3/MM3 (ref 1.7–7)
NEUTROPHILS NFR BLD AUTO: 61.7 % (ref 42.7–76)
NRBC BLD AUTO-RTO: 0 /100 WBC (ref 0–0.2)
PLATELET # BLD AUTO: 319 10*3/MM3 (ref 140–450)
PMV BLD AUTO: 10.4 FL (ref 6–12)
POTASSIUM SERPL-SCNC: 3.5 MMOL/L (ref 3.5–5.2)
PROT SERPL-MCNC: 8 G/DL (ref 6–8.5)
RBC # BLD AUTO: 5.24 10*6/MM3 (ref 4.14–5.8)
SODIUM SERPL-SCNC: 136 MMOL/L (ref 136–145)
WBC NRBC COR # BLD: 4.07 10*3/MM3 (ref 3.4–10.8)

## 2022-04-02 PROCEDURE — 80053 COMPREHEN METABOLIC PANEL: CPT | Performed by: INTERNAL MEDICINE

## 2022-04-02 PROCEDURE — 94799 UNLISTED PULMONARY SVC/PX: CPT

## 2022-04-02 PROCEDURE — 99239 HOSP IP/OBS DSCHRG MGMT >30: CPT | Performed by: INTERNAL MEDICINE

## 2022-04-02 PROCEDURE — 85025 COMPLETE CBC W/AUTO DIFF WBC: CPT | Performed by: INTERNAL MEDICINE

## 2022-04-02 PROCEDURE — 25010000002 MAGNESIUM SULFATE IN D5W 1G/100ML (PREMIX) 1-5 GM/100ML-% SOLUTION: Performed by: INTERNAL MEDICINE

## 2022-04-02 PROCEDURE — 83735 ASSAY OF MAGNESIUM: CPT | Performed by: INTERNAL MEDICINE

## 2022-04-02 PROCEDURE — 85652 RBC SED RATE AUTOMATED: CPT | Performed by: INTERNAL MEDICINE

## 2022-04-02 PROCEDURE — G0378 HOSPITAL OBSERVATION PER HR: HCPCS

## 2022-04-02 PROCEDURE — 94660 CPAP INITIATION&MGMT: CPT

## 2022-04-02 PROCEDURE — 96366 THER/PROPH/DIAG IV INF ADDON: CPT

## 2022-04-02 RX ORDER — ALLOPURINOL 100 MG/1
100 TABLET ORAL DAILY
Qty: 30 TABLET | Refills: 0 | Status: SHIPPED | OUTPATIENT
Start: 2022-04-02 | End: 2022-06-21 | Stop reason: SDUPTHER

## 2022-04-02 RX ORDER — COLCHICINE 0.6 MG/1
0.3 TABLET ORAL DAILY
Qty: 3 TABLET | Refills: 0 | Status: SHIPPED | OUTPATIENT
Start: 2022-04-03 | End: 2022-04-06 | Stop reason: SDUPTHER

## 2022-04-02 RX ORDER — COLCHICINE 0.6 MG/1
0.3 TABLET ORAL DAILY
Qty: 3 TABLET | Refills: 0 | Status: SHIPPED | OUTPATIENT
Start: 2022-04-02 | End: 2022-04-02 | Stop reason: SDUPTHER

## 2022-04-02 RX ADMIN — CARVEDILOL 6.25 MG: 6.25 TABLET, FILM COATED ORAL at 08:22

## 2022-04-02 RX ADMIN — APIXABAN 5 MG: 5 TABLET, FILM COATED ORAL at 08:23

## 2022-04-02 RX ADMIN — PANTOPRAZOLE SODIUM 40 MG: 40 TABLET, DELAYED RELEASE ORAL at 08:22

## 2022-04-02 RX ADMIN — COLCHICINE 0.6 MG: 0.6 TABLET, FILM COATED ORAL at 08:22

## 2022-04-02 RX ADMIN — SACUBITRIL AND VALSARTAN 1 TABLET: 24; 26 TABLET, FILM COATED ORAL at 08:22

## 2022-04-02 RX ADMIN — MAGNESIUM SULFATE HEPTAHYDRATE 1 G: 1 INJECTION, SOLUTION INTRAVENOUS at 13:30

## 2022-04-02 RX ADMIN — Medication 10 ML: at 08:23

## 2022-04-02 RX ADMIN — ALLOPURINOL 200 MG: 100 TABLET ORAL at 08:22

## 2022-04-02 RX ADMIN — SPIRONOLACTONE 50 MG: 25 TABLET ORAL at 08:22

## 2022-04-02 RX ADMIN — POTASSIUM CHLORIDE 40 MEQ: 750 CAPSULE, EXTENDED RELEASE ORAL at 16:38

## 2022-04-02 RX ADMIN — MAGNESIUM SULFATE HEPTAHYDRATE 1 G: 1 INJECTION, SOLUTION INTRAVENOUS at 14:39

## 2022-04-02 RX ADMIN — BUMETANIDE 2 MG: 1 TABLET ORAL at 08:23

## 2022-04-02 RX ADMIN — POTASSIUM CHLORIDE 40 MEQ: 750 CAPSULE, EXTENDED RELEASE ORAL at 13:30

## 2022-04-02 RX ADMIN — MAGNESIUM SULFATE HEPTAHYDRATE 1 G: 1 INJECTION, SOLUTION INTRAVENOUS at 15:32

## 2022-04-02 NOTE — OUTREACH NOTE
Prep Survey    Flowsheet Row Responses   [facility] patient discharged from? [place]   Is LACE score < 7 ? Yes   Emergency Room discharge w/ pulse ox? No   Eligibility [facility]   Date of Admission 03/30/22   Date of Discharge 04/02/22   Discharge Disposition Home or Self Care   Discharge diagnosis Right LE pain and weakness   Does the patient have one of the following disease processes/diagnoses(primary or secondary)? Other   Does the patient have Home health ordered? Yes   What is the Home health agency?  [facility]    Is there a DME ordered? No   Prep survey completed? Yes          MARYLIN TAYLOR - Registered Nurse

## 2022-04-02 NOTE — DISCHARGE SUMMARY
[facility] Medicine Services  DISCHARGE SUMMARY    Patient Name: Evan Gannon  : 1990  MRN: [id number]    Date of Admission: 3/30/2022  1:15 PM  Date of Discharge:  22  Primary Care Physician: Marek Díaz MD    Consults     No orders found from 3/1/2022 to 3/31/2022.          Hospital Course     Presenting Problem:   Right leg weakness [R29.898]    Active Hospital Problems    Diagnosis  POA   • GERD without esophagitis [K21.9]  Yes   • Plaque psoriasis [L40.0]  Yes   • Prediabetes [R73.03]  Yes   • Chronic systolic CHF (congestive heart failure) (Formerly [facility]) [I50.22]  Yes   • Hypokalemia [E87.6]  Yes   • Chronic gout [M1A.9XX0]  Yes   • COLLIN (obstructive sleep apnea) [G47.33]  Yes   • Obesity, Class III, BMI 40-49.9 (morbid obesity) (Formerly [facility]) [E66.01]  Yes      Resolved Hospital Problems    Diagnosis Date Resolved POA   • Right leg weakness [R29.898] 2022 Yes          Hospital Course:  Evan Gannon is a 31 y.o. male with history of hypertension, morbid obesity status post gastric sleeve, gout, COLLIN, plaque psoriasis, nonischemic cardiomyopathy with EF 20% s/p ICD placement who presents with right leg pain and weakness that started approximately 5 days before admission.  Initially the pain started in his low back radiating down his right thigh to his foot after sitting for prolonged period on a wooden bench.  Back and leg pain subsequently improved but he was left with discomfort in his right foot.  Mr. Stauffer was started on Ozempic 2 weeks ago and is on several diuretics.  He has missed a few doses of allopurinol.  He also recently bought new shoes and his insoles do not fit inside the shoes.     Right LE pain and weakness  Probable gout flare  -patient seen by neurology, no evidence of neuromuscular or cerebrovascular disease evident  -Uric acid level 11.2, sed rate 103, CRP 4.92  -Recent increase in patient's diuretics after visit to [facility]ology-diuresis can  at times precipitating gout flare  -Normal CK level  -Symptomatic improvement after dexamethasone given in ED  -resumed allopurinol (patient says he is out of this medication at home)  -Started colchicine, further improvement in symptoms noted, sed rate lowered to 35 on day of discharge  - because of interaction with home carvedilol, will discharge home on reduced dose colchicine to complete 7 days total therapy  -Foot plain films show increased first TMT joint DJD - if pain recurrs despite empiric treatment for gout, consider further imaging     NICM, LVEF 20%  -Follows with  cardiology  -Continue Bumex, Coreg, Entresto, Aldactone, Variciguat     Hypokalemia  -Replaced Potassium and Magnesium as needed     DM 2     History of left lower extremity DVT  -Continue Eliquis     Plaque psoriasis     COLLIN  -CPAP  -On home oxygen    Discharge Follow Up Recommendations for outpatient labs/diagnostics:  Consider bmp and magnesium level at PCP followup.    Day of Discharge     HPI:   No leg or ankle pain.  Has been up walking about the room.  Wants to go home today.    Review of Systems  Gen- No fevers, chills  CV- No chest pain, palpitations  Resp- No cough, dyspnea  GI- No N/V/D, abd pain        Vital Signs:   Temp:  [97.5 °F (36.4 °C)-98 °F (36.7 °C)] 98 °F (36.7 °C)  Heart Rate:  [74-99] 92  Resp:  [18] 18  BP: ()/(50-67) 98/55  Flow (L/min):  [4] 4      Physical Exam:  Constitutional - no acute distress, nontoxic, in bed  HEENT-NCAT, mucous membranes moist  CV-RRR, S1 S2 normal, no m/r/g  Resp-CTAB, no wheezes, rhonchi or rales  Abd-soft, nontender, nondistended, normoactive bowel sounds, obese  Ext-+ LE edema.  Neuro-alert and oriented, speech clear, moves all extremities   Psych-normal affect   Skin- No rash on exposed UE or LE bilaterally      Pertinent  and/or Most Recent Results     LAB RESULTS:      Lab 04/02/22  1023 04/01/22  0629 03/31/22  0704 03/30/22  1332   WBC 4.07 4.15  --  5.14   HEMOGLOBIN 13.1  12.0*  --  13.1   HEMATOCRIT 39.6 37.2*  --  40.5   PLATELETS 319 286  --  294   NEUTROS ABS 2.51  --   --  3.11   IMMATURE GRANS (ABS) 0.02  --   --  0.01   LYMPHS ABS 0.84  --   --  0.85   MONOS ABS 0.59  --   --  0.81   EOS ABS 0.07  --   --  0.33   MCV 75.6* 75.8*  --  75.0*   SED RATE 35* 103*  --  103*   CRP  --   --   --  4.92*   PROCALCITONIN  --   --  <0.20  --    LACTATE  --   --   --  1.4         Lab 04/02/22  1227 04/01/22  1803 04/01/22  0629 03/31/22  0704 03/30/22  1827 03/30/22  1332   SODIUM 136  --  131*  --   --  135*   POTASSIUM 3.5 3.4* 3.5  --   --  3.1*   CHLORIDE 95*  --  89*  --   --  91*   CO2 33.0*  --  32.0*  --   --  34.0*   ANION GAP 8.0  --  10.0  --   --  10.0   BUN 33*  --  33*  --   --  25*   CREATININE 0.95  --  0.94  --   --  0.96   EGFR 111.1  --  112.5  --   --  109.7   GLUCOSE 130*  --  91  --   --  97   CALCIUM 9.2  --  9.1  --   --  9.1   MAGNESIUM 1.5*  --  1.8 1.8 1.5*  --    HEMOGLOBIN A1C  --   --   --   --   --  6.20*         Lab 04/02/22  1227 03/30/22  1332   TOTAL PROTEIN 8.0 8.4   ALBUMIN 3.40* 3.30*   GLOBULIN 4.6 5.1   ALT (SGPT) 10 8   AST (SGOT) 16 13   BILIRUBIN 0.6 1.2   ALK PHOS 92 89         Lab 03/30/22  1332   PROBNP 4,034.0*         Lab 03/31/22  0704   CHOLESTEROL 146   LDL CHOL 97   HDL CHOL 38*   TRIGLYCERIDES 49             Brief Urine Lab Results  (Last result in the past 365 days)      Color   Clarity   Blood   Leuk Est   Nitrite   Protein   CREAT   Urine HCG        01/19/22 2308 Dark Yellow   Clear   Negative   Trace   Negative   Trace               Microbiology Results (last 10 days)     Procedure Component Value - Date/Time    COVID-19 and FLU A/B PCR - Swab, Nasopharynx [[id number]]  (Normal) Collected: 03/30/22 1553    Lab Status: Final result Specimen: Swab from Nasopharynx Updated: 03/30/22 1631     COVID19 Not Detected     Influenza A PCR Not Detected     Influenza B PCR Not Detected    Narrative:      Fact sheet for providers:  https://www.fda.gov/media/611964/download    Fact sheet for patients: https://www.fda.gov/media/359799/download    Test performed by PCR.          XR Ankle 3+ View Right    Result Date: 3/30/2022  DATE OF EXAM: 3/30/2022 2:25 PM  PROCEDURE: XR ANKLE 3+ VW RIGHT-  INDICATIONS: pain  COMPARISON: Right ankle 8/29/2028  TECHNIQUE: Three radiologic views of the right ankle were obtained.  FINDINGS: History indicates right ankle pain and swelling but no history of trauma.  Bones of the right ankle joint appear anatomically aligned and intact. There is perhaps minimal joint space narrowing and very small marginal osteophytes, but no advanced degenerative change is seen. No bony erosive change or periosteal reaction is seen. There does appear to be some generalized subcutaneous edema but this was also present on the 2018 exam.      Minimal ankle joint DJD. No acute bony abnormality is identified.  This report was finalized on 3/30/2022 2:50 PM by Dr. Yves Thayer MD.      XR Foot 3+ View Right    Result Date: 3/30/2022  DATE OF EXAM: 3/30/2022 2:26 PM  PROCEDURE: XR FOOT 3+ VW RIGHT-  INDICATIONS: Pain, no trauma  COMPARISON: Right foot 8/29/2018  TECHNIQUE: Three routine standard radiographic views were obtained of the right foot.  FINDINGS: There is mild progression of first TMT joint DJD. Very mild first MTP joint DJD appears stable. No advanced degenerative changes are appreciated elsewhere. Bony structures appear anatomically aligned and grossly intact. No fracture line foreign body, or soft tissue gas is seen.      Increased first TMT joint DJD compared to 8/29/2018 exam. No evidence of acute trauma to the right foot. Consider follow-up imaging if the patient's symptoms persist or worsen.  This report was finalized on 3/30/2022 2:52 PM by Dr. Yves Thayer MD.      CT Head Without Contrast    Result Date: 3/30/2022  DATE OF EXAM: 3/30/2022 2:44 PM  PROCEDURE: CT HEAD WO CONTRAST-, CT LUMBAR SPINE WO CONTRAST-  INDICATIONS:  right leg weakness  COMPARISON: No comparisons available.  TECHNIQUE: Routine transaxial and coronal reconstruction images were obtained through the head without the administration of contrast. Automated exposure control and iterative reconstruction methods were used.  Axial noncontrast CT of the lumbar spine with multiplanar reconstruction  The radiation dose reduction device was turned on for each scan per the ALARA (As Low as Reasonably Achievable) protocol.  FINDINGS: CT head: Gray-white differentiation is maintained and there is no evidence of intracranial hemorrhage, mass or mass effect. The ventricles are normal in size and configuration. The orbits are unremarkable and the paranasal sinuses are grossly clear. The calvarium is intact, with incidental diffuse hyperostosis noted.  CT lumbar spine: Vertebral body heights are maintained and there is no evidence of acute fracture. There is no suspicious lytic or sclerotic osseous lesion. There is mild straightening, otherwise without evidence of listhesis or subluxation. The paraspinal soft tissues are unremarkable. No significant spondylosis changes are evident.      No acute intracranial abnormality.  Lumbar spine demonstrates no evidence of fracture, malalignment or significant spondylosis change.  This report was finalized on 3/30/2022 3:08 PM by Reji William.      CT Angiogram Neck    Result Date: 3/30/2022  DATE OF EXAM: 3/30/2022 4:29 PM  PROCEDURE: CT ANGIOGRAM NECK-, CT ANGIOGRAM HEAD W AI ANALYSIS OF LVO-  INDICATIONS: Stroke, follow up; R29.898-Other symptoms and signs involving the musculoskeletal system; J81.1-Chronic pulmonary edema  COMPARISON: CT face 2/2/2019  TECHNIQUE: CTA of the head and CTA of the neck was performed after the intravenous administration of 100 mL of Isovue 370. Reconstructed coronal and sagittal images were also obtained. In addition, a 3-D volume rendered image was obtained after post processing. Automated exposure control  and iterative reconstruction methods were used. AI analysis of LVO was utilized for the CTA Head imaging portion of the study.  The radiation dose reduction device was turned on for each scan per the ALARA (As Low as Reasonably Achievable) protocol.  Stenosis measurement was performed by the NASCET or similar method.  FINDINGS: The exam is limited owing to poor contrast opacification of the arterial structures of the head and neck. 3-D volume rendered rotational reformats are severely limited due to poor opacification of the vessels, essentially nonvisualized on the rotational reformats.  CTA HEAD: Within the limitation of poor contrast bolus, no evidence of abrupt cut off/large vessel occlusion, flow-limiting stenosis, dissection, or aneurysm. Diminutive appearing/hypoplastic right anterior communicating artery; the anterior cerebral arteries appear to arise solely from the left anterior communicating artery, a normal variant.  CTA NECK: Poor contrast bolus as well as extensive beam hardening/photon starvation in the lower neck and upper chest in part owing to body habitus as well as adjacent venous contrast bolus severely limits assessment of the aortic arch, aortic arch branch vessels, and the proximal portion of the carotid and vertebral arteries. The carotid bifurcations appear unremarkable. The cervical internal carotid arteries appear normal. Very poorly opacified cervical vertebral arteries.  Extravascular findings: Cardiomegaly. Cardiac pacer device in place. Upper lungs appear grossly clear. There is straightening of the cervical spine with moderate central height loss of the C4 vertebral body and severe central height loss of the C6 vertebral body, without significant bony retropulsion, grossly similar in appearance dating back to February 2019 CT. Extensive periodontal disease. Hyperostosis interna.      The exam is severely limited owing to suboptimal opacification of the arteries of the head and neck,  due largely to poor contrast bolus likely relating to heart failure. Additionally, there is extensive streak artifact, beam hardening, and photon starvation in the neck and upper chest owing to body habitus as well as large contrast bolus within the central veins.  Within the above-stated limitation, there is no evidence of large vessel occlusion/abrupt cut off or high-grade stenosis in the head. The carotid bifurcations and cervical ICAs appear normal. The common carotid arteries and the cervical vertebral arteries are not well imaged.  This report was finalized on 3/30/2022 6:07 PM by Laci Espitia MD.      CT Lumbar Spine Without Contrast    Result Date: 3/30/2022  DATE OF EXAM: 3/30/2022 2:44 PM  PROCEDURE: CT HEAD WO CONTRAST-, CT LUMBAR SPINE WO CONTRAST-  INDICATIONS: right leg weakness  COMPARISON: No comparisons available.  TECHNIQUE: Routine transaxial and coronal reconstruction images were obtained through the head without the administration of contrast. Automated exposure control and iterative reconstruction methods were used.  Axial noncontrast CT of the lumbar spine with multiplanar reconstruction  The radiation dose reduction device was turned on for each scan per the ALARA (As Low as Reasonably Achievable) protocol.  FINDINGS: CT head: Gray-white differentiation is maintained and there is no evidence of intracranial hemorrhage, mass or mass effect. The ventricles are normal in size and configuration. The orbits are unremarkable and the paranasal sinuses are grossly clear. The calvarium is intact, with incidental diffuse hyperostosis noted.  CT lumbar spine: Vertebral body heights are maintained and there is no evidence of acute fracture. There is no suspicious lytic or sclerotic osseous lesion. There is mild straightening, otherwise without evidence of listhesis or subluxation. The paraspinal soft tissues are unremarkable. No significant spondylosis changes are evident.      No acute intracranial  abnormality.  Lumbar spine demonstrates no evidence of fracture, malalignment or significant spondylosis change.  This report was finalized on 3/30/2022 3:08 PM by Reji William.      XR Chest 1 View    Result Date: 3/30/2022  DATE OF EXAM: 3/30/2022 2:26 PM  PROCEDURE: XR CHEST 1 VW-  INDICATIONS: Swelling in his ankles, history CHF  COMPARISON: 1/19/2022  TECHNIQUE: Single radiographic AP view of the chest was obtained.  FINDINGS: Left chest wall ICD projects unchanged. There are findings of mild cardiogenic interstitial edema with cardiac enlargement and small bilateral pleural effusions. No pneumothorax.      Findings of cardiogenic interstitial edema with small bilateral pleural effusions.  This report was finalized on 3/30/2022 2:49 PM by Reji William.      Duplex Venous Lower Extremity - Right CAR    Result Date: 3/30/2022  · Normal right lower extremity venous duplex scan.      CT Angiogram Head w AI Analysis of LVO    Result Date: 3/30/2022  DATE OF EXAM: 3/30/2022 4:29 PM  PROCEDURE: CT ANGIOGRAM NECK-, CT ANGIOGRAM HEAD W AI ANALYSIS OF LVO-  INDICATIONS: Stroke, follow up; R29.898-Other symptoms and signs involving the musculoskeletal system; J81.1-Chronic pulmonary edema  COMPARISON: CT face 2/2/2019  TECHNIQUE: CTA of the head and CTA of the neck was performed after the intravenous administration of 100 mL of Isovue 370. Reconstructed coronal and sagittal images were also obtained. In addition, a 3-D volume rendered image was obtained after post processing. Automated exposure control and iterative reconstruction methods were used. AI analysis of LVO was utilized for the CTA Head imaging portion of the study.  The radiation dose reduction device was turned on for each scan per the ALARA (As Low as Reasonably Achievable) protocol.  Stenosis measurement was performed by the NASCET or similar method.  FINDINGS: The exam is limited owing to poor contrast opacification of the arterial structures of  the head and neck. 3-D volume rendered rotational reformats are severely limited due to poor opacification of the vessels, essentially nonvisualized on the rotational reformats.  CTA HEAD: Within the limitation of poor contrast bolus, no evidence of abrupt cut off/large vessel occlusion, flow-limiting stenosis, dissection, or aneurysm. Diminutive appearing/hypoplastic right anterior communicating artery; the anterior cerebral arteries appear to arise solely from the left anterior communicating artery, a normal variant.  CTA NECK: Poor contrast bolus as well as extensive beam hardening/photon starvation in the lower neck and upper chest in part owing to body habitus as well as adjacent venous contrast bolus severely limits assessment of the aortic arch, aortic arch branch vessels, and the proximal portion of the carotid and vertebral arteries. The carotid bifurcations appear unremarkable. The cervical internal carotid arteries appear normal. Very poorly opacified cervical vertebral arteries.  Extravascular findings: Cardiomegaly. Cardiac pacer device in place. Upper lungs appear grossly clear. There is straightening of the cervical spine with moderate central height loss of the C4 vertebral body and severe central height loss of the C6 vertebral body, without significant bony retropulsion, grossly similar in appearance dating back to February 2019 CT. Extensive periodontal disease. Hyperostosis interna.      The exam is severely limited owing to suboptimal opacification of the arteries of the head and neck, due largely to poor contrast bolus likely relating to heart failure. Additionally, there is extensive streak artifact, beam hardening, and photon starvation in the neck and upper chest owing to body habitus as well as large contrast bolus within the central veins.  Within the above-stated limitation, there is no evidence of large vessel occlusion/abrupt cut off or high-grade stenosis in the head. The carotid  bifurcations and cervical ICAs appear normal. The common carotid arteries and the cervical vertebral arteries are not well imaged.  This report was finalized on 3/30/2022 6:07 PM by Laci Espitia MD.        Results for orders placed during the hospital encounter of 03/30/22    Duplex Venous Lower Extremity - Right CAR    Interpretation Summary  · Normal right lower extremity venous duplex scan.      Results for orders placed during the hospital encounter of 03/30/22    Duplex Venous Lower Extremity - Right CAR    Interpretation Summary  · Normal right lower extremity venous duplex scan.      Results for orders placed during the hospital encounter of 01/19/22    Adult Transthoracic Echo Complete W/ Cont if Necessary Per Protocol    Interpretation Summary  · The left ventricular cavity is severely dilated. Left ventricular systolic function is severely decreased. Left ventricular ejection fraction appears to be less than 20%.  · Four-chamber dilation present  · Moderate mitral valve regurgitation is present.  · Estimated right ventricular systolic pressure from tricuspid regurgitation is moderately elevated (48 mmHg).      Plan for Follow-up of Pending Labs/Results:     Discharge Details        Discharge Medications      New Medications      Instructions Start Date   colchicine 0.6 MG tablet   0.3 mg, Oral, Daily         Changes to Medications      Instructions Start Date   allopurinol 100 MG tablet  Commonly known as: Zyloprim  What changed: how much to take   100 mg, Oral, Daily         Continue These Medications      Instructions Start Date   bumetanide 2 MG tablet  Commonly known as: BUMEX   2 mg, Oral, Daily, Stop torsemide      carvedilol 6.25 MG tablet  Commonly known as: COREG   6.25 mg, Oral, 2 Times Daily      COSENTYX (300 MG DOSE) SC   300 mg, Subcutaneous, Every 30 Days      Eliquis 5 MG tablet tablet  Generic drug: apixaban   5 mg, Oral, Every 12 Hours Scheduled      Entresto 24-26 MG tablet  Generic  drug: sacubitril-valsartan   1 tablet, Oral, 2 Times Daily      halobetasol 0.05 % ointment  Commonly known as: ULTRAVATE   1 application, Topical, 2 Times Daily      metOLazone 5 MG tablet  Commonly known as: ZAROXOLYN   5 mg, Oral, Daily PRN      O2  Commonly known as: OXYGEN   2-6 L/min, Inhalation, Daily      omeprazole 40 MG capsule  Commonly known as: priLOSEC   40 mg, Oral, Daily      pharmacy consult - MTM   Does not apply, Daily      potassium chloride 20 MEQ CR tablet  Commonly known as: K-DUR,KLOR-CON   20 mEq, Oral, Daily      Semaglutide(0.25 or 0.5MG/DOS) 2 MG/1.5ML solution pen-injector  Commonly known as: OZEMPIC   0.25 mg, Subcutaneous, Weekly      spironolactone 50 MG tablet  Commonly known as: Aldactone   50 mg, Oral, Daily      Vericiguat 2.5 MG tablet   2.5 mg, Oral, Daily             No Known Allergies      Discharge Disposition:  Home or Self Care    Diet:  Hospital:  Diet Order   Procedures   • Diet Regular; Cardiac, Consistent Carbohydrate       Activity:      Restrictions or Other Recommendations:         CODE STATUS:    Code Status and Medical Interventions:   Ordered at: 03/30/22 1613     Level Of Support Discussed With:    Patient     Code Status (Patient has no pulse and is not breathing):    CPR (Attempt to Resuscitate)     Medical Interventions (Patient has pulse or is breathing):    Full Support       Future Appointments   Date Time Provider Department Center   5/11/2022  9:45 AM Arnaldo Lopez MD [facility]   6/20/2022  2:15 PM Balbir Olsen MD [facility]   11/18/2022 10:45 AM Kiah Sierra APRN [facility]       Additional Instructions for the Follow-ups that You Need to Schedule     Ambulatory Referral to Home Health   As directed      Face to Face Visit Date: 4/1/2022    Follow-up provider for Plan of Care?: I treated the patient in an acute care facility and will not continue treatment after discharge.    Follow-up provider: ARNALDO LOPEZ [[id number]]     Reason/Clinical Findings: Right leg weakness    Describe mobility limitations that make leaving home difficult: Impaired physical mobility and gait endurance    Nursing/Therapeutic Services Requested: Physical Therapy Occupational Therapy    PT orders: Gait Training Transfer training Strengthening Home safety assessment    Weight Bearing Status: Full Weight Bearing    Occupational orders: Activities of daily living Energy conservation Strengthening Cognition Fine motor Home safety assessment    Frequency: 1 Week 1         Discharge Follow-up with PCP   As directed       Currently Documented PCP:    Marek Díaz MD    PCP Phone Number:    [phone]     Follow Up Details: follow up with PCP in one week                     Calixto Adrian MD  04/02/22      Time Spent on Discharge:  I spent  40  minutes on this discharge activity which included: face-to-face encounter with the patient, reviewing the data in the system, coordination of the care with the nursing staff as well as consultants, documentation, and entering orders.

## 2022-04-04 ENCOUNTER — PATIENT MESSAGE (OUTPATIENT)
Dept: INTERNAL MEDICINE | Facility: CLINIC | Age: 32
End: 2022-04-04

## 2022-04-04 ENCOUNTER — TRANSITIONAL CARE MANAGEMENT TELEPHONE ENCOUNTER (OUTPATIENT)
Dept: CALL CENTER | Facility: HOSPITAL | Age: 32
End: 2022-04-04

## 2022-04-04 RX ORDER — APIXABAN 2.5 MG/1
TABLET, FILM COATED ORAL
Qty: 42 TABLET | Refills: 0 | OUTPATIENT
Start: 2022-04-04

## 2022-04-04 NOTE — TELEPHONE ENCOUNTER
From: Evan Gannon  To: Marek Díaz MD  Sent: 4/4/2022 1:25 PM EDT  Subject: Meds refill     I want to know if I can get my Eliquis and the one that starts with a V

## 2022-04-04 NOTE — OUTREACH NOTE
"Call Center TCM Note    Flowsheet Row Responses   [facility] patient discharged from? [facility]   Does the patient have one of the following disease processes/diagnoses(primary or secondary)? Other   TCM attempt successful? Yes   Call start time 1448   Call end time 1453   Discharge diagnosis Right LE pain and weakness   Meds reviewed with patient/caregiver? Yes   Is the patient having any side effects they believe may be caused by any medication additions or changes? No   Does the patient have all medications ordered at discharge? Yes   Is the patient taking all medications as directed (includes completed medication regime)? Yes   Does the patient have a primary care provider?  Yes   Does the patient have an appointment with their PCP within 7 days of discharge? Yes   Has the patient kept scheduled appointments due by today? N/A   What is the Home health agency?  [facility]    Has home health visited the patient within 72 hours of discharge? Call prior to 72 hours   Psychosocial issues? No   Notified Case Management DME   Comments Pt states he spoke with case management regarding an \"oxygen machine\".  Wanted to know how to contact them.  Will send message to have them reach out to pt.    Did the patient receive a copy of their discharge instructions? Yes   Nursing interventions Reviewed instructions with patient   What is the patient's perception of their health status since discharge? Improving   Is the patient/caregiver able to teach back signs and symptoms related to disease process for when to call PCP? Yes   Is the patient/caregiver able to teach back signs and symptoms related to disease process for when to call 911? Yes   Is the patient/caregiver able to teach back the hierarchy of who to call/visit for symptoms/problems? PCP, Specialist, Home health nurse, Urgent Care, ED, 911 Yes   If the patient is a current smoker, are they able to teach back resources for cessation? Not a smoker   Additional " "teach back comments Pt states \"all I'm doing is peeing and I'm worried about keeping my electrolytes in balance.\"  States he is on a fluid restriction and they have increase his water pill to double the amount.     TCM call completed? Yes   Wrap up additional comments Pt has concerns regarding keeping electrolytes in balance with increase of water pill.  Will send message to case management regarding oxygen.           Emeli Fernandez LPN    4/4/2022, 14:57 EDT      "

## 2022-04-06 ENCOUNTER — OFFICE VISIT (OUTPATIENT)
Dept: INTERNAL MEDICINE | Facility: CLINIC | Age: 32
End: 2022-04-06

## 2022-04-06 ENCOUNTER — LAB (OUTPATIENT)
Dept: LAB | Facility: HOSPITAL | Age: 32
End: 2022-04-06

## 2022-04-06 VITALS
BODY MASS INDEX: 42.21 KG/M2 | TEMPERATURE: 97.1 F | RESPIRATION RATE: 16 BRPM | HEART RATE: 101 BPM | DIASTOLIC BLOOD PRESSURE: 62 MMHG | WEIGHT: 285.8 LBS | SYSTOLIC BLOOD PRESSURE: 126 MMHG | OXYGEN SATURATION: 99 %

## 2022-04-06 DIAGNOSIS — M10.279 ACUTE DRUG-INDUCED GOUT OF FOOT, UNSPECIFIED LATERALITY: ICD-10-CM

## 2022-04-06 DIAGNOSIS — I50.33 ACUTE ON CHRONIC DIASTOLIC (CONGESTIVE) HEART FAILURE: ICD-10-CM

## 2022-04-06 DIAGNOSIS — I50.33 ACUTE ON CHRONIC DIASTOLIC (CONGESTIVE) HEART FAILURE: Primary | ICD-10-CM

## 2022-04-06 DIAGNOSIS — I42.0 CONGESTIVE CARDIOMYOPATHY: ICD-10-CM

## 2022-04-06 LAB
ANION GAP SERPL CALCULATED.3IONS-SCNC: 9 MMOL/L (ref 5–15)
BUN SERPL-MCNC: 18 MG/DL (ref 6–20)
BUN/CREAT SERPL: 16.4 (ref 7–25)
CALCIUM SPEC-SCNC: 9.6 MG/DL (ref 8.6–10.5)
CHLORIDE SERPL-SCNC: 96 MMOL/L (ref 98–107)
CO2 SERPL-SCNC: 31 MMOL/L (ref 22–29)
CREAT SERPL-MCNC: 1.1 MG/DL (ref 0.76–1.27)
EGFRCR SERPLBLD CKD-EPI 2021: 93.2 ML/MIN/1.73
GLUCOSE SERPL-MCNC: 85 MG/DL (ref 65–99)
MAGNESIUM SERPL-MCNC: 2 MG/DL (ref 1.6–2.6)
POTASSIUM SERPL-SCNC: 4.2 MMOL/L (ref 3.5–5.2)
SODIUM SERPL-SCNC: 136 MMOL/L (ref 136–145)
URATE SERPL-MCNC: 8.5 MG/DL (ref 3.4–7)

## 2022-04-06 PROCEDURE — 80048 BASIC METABOLIC PNL TOTAL CA: CPT | Performed by: STUDENT IN AN ORGANIZED HEALTH CARE EDUCATION/TRAINING PROGRAM

## 2022-04-06 PROCEDURE — 84550 ASSAY OF BLOOD/URIC ACID: CPT | Performed by: STUDENT IN AN ORGANIZED HEALTH CARE EDUCATION/TRAINING PROGRAM

## 2022-04-06 PROCEDURE — 99495 TRANSJ CARE MGMT MOD F2F 14D: CPT | Performed by: STUDENT IN AN ORGANIZED HEALTH CARE EDUCATION/TRAINING PROGRAM

## 2022-04-06 PROCEDURE — 36415 COLL VENOUS BLD VENIPUNCTURE: CPT

## 2022-04-06 PROCEDURE — 83735 ASSAY OF MAGNESIUM: CPT | Performed by: STUDENT IN AN ORGANIZED HEALTH CARE EDUCATION/TRAINING PROGRAM

## 2022-04-06 PROCEDURE — 1111F DSCHRG MED/CURRENT MED MERGE: CPT | Performed by: STUDENT IN AN ORGANIZED HEALTH CARE EDUCATION/TRAINING PROGRAM

## 2022-04-06 RX ORDER — COLCHICINE 0.6 MG/1
0.6 TABLET ORAL DAILY PRN
Qty: 30 TABLET | Refills: 0 | Status: SHIPPED | OUTPATIENT
Start: 2022-04-06 | End: 2022-07-28 | Stop reason: SDUPTHER

## 2022-04-06 RX ORDER — METOLAZONE 5 MG/1
5 TABLET ORAL DAILY
Qty: 30 TABLET | Refills: 3 | Status: SHIPPED | OUTPATIENT
Start: 2022-04-06 | End: 2022-07-28 | Stop reason: SDUPTHER

## 2022-04-06 NOTE — PROGRESS NOTES
"Transitional Care Follow Up Visit  Subjective     Evan Gannon is a 31 y.o. male who presents for a transitional care management visit.    Within 48 business hours after discharge our office contacted him via telephone to coordinate his care and needs.      I reviewed and discussed the details of that call along with the discharge summary, hospital problems, inpatient lab results, inpatient diagnostic studies, and consultation reports with Evan.     Current outpatient and discharge medications have been reconciled for the patient.  Reviewed by: Marek Díaz MD      Date of TCM Phone Call 4/2/2022   [facility]   Date of Admission 3/30/2022   Date of Discharge 4/2/2022   Risk for Readmission (LACE Score) -   Discharge Disposition Home or Self Care     Risk for Readmission (LACE) Score: 6 (4/2/2022  6:01 AM)      History of Present Illness   Course During Hospital Stay:   \"Evan Gannon is a 31 y.o. male with history of hypertension, morbid obesity status post gastric sleeve, gout, COLLIN, plaque psoriasis, nonischemic cardiomyopathy with EF 20% s/p ICD placement who presents with right leg pain and weakness that started approximately 5 days before admission.  Initially the pain started in his low back radiating down his right thigh to his foot after sitting for prolonged period on a wooden bench.  Back and leg pain subsequently improved but he was left with discomfort in his right foot.  Mr. Stauffer was started on Ozempic 2 weeks ago and is on several diuretics.  He has missed a few doses of allopurinol.  He also recently bought new shoes and his insoles do not fit inside the shoes.     Right LE pain and weakness  Probable gout flare  -patient seen by neurology, no evidence of neuromuscular or cerebrovascular disease evident  -Uric acid level 11.2, sed rate 103, CRP 4.92  -Recent increase in patient's diuretics after visit to [facility]-diuresis can at times precipitating " "gout flare  -Normal CK level  -Symptomatic improvement after dexamethasone given in ED  -resumed allopurinol (patient says he is out of this medication at home)  -Started colchicine, further improvement in symptoms noted, sed rate lowered to 35 on day of discharge  - because of interaction with home carvedilol, will discharge home on reduced dose colchicine to complete 7 days total therapy  -Foot plain films show increased first TMT joint DJD - if pain recurrs despite empiric treatment for gout, consider further imaging     NICM, LVEF 20%  -Follows with  cardiology  -Continue Bumex, Coreg, Entresto, Aldactone, Variciguat     Hypokalemia  -Replaced Potassium and Magnesium as needed     DM 2     History of left lower extremity DVT  -Continue Eliquis     Plaque psoriasis     COLLIN  -CPAP  -On home oxygen\"    Patient is feeling much better today. No longer has right lower extremity pain. Finished course of colchicine. Denies fever or chills. Would like to get portable oxygen if possible, currently has oxygen tank at home.      The following portions of the patient's history were reviewed and updated as appropriate: allergies, current medications, past family history, past medical history, past social history, past surgical history and problem list.    Review of Systems    Objective   Physical Exam  Constitutional:       General: He is not in acute distress.     Appearance: He is obese. He is not toxic-appearing.   Cardiovascular:      Rate and Rhythm: Normal rate and regular rhythm.   Pulmonary:      Effort: Pulmonary effort is normal.      Breath sounds: Normal breath sounds.   Musculoskeletal:      Right lower le+ Pitting Edema present.      Left lower le+ Pitting Edema present.   Skin:     Capillary Refill: Capillary refill takes less than 2 seconds.      Comments: Plaque psoriasis on lower extremities    Neurological:      General: No focal deficit present.      Mental Status: He is oriented to person, " place, and time.      Gait: Gait normal.   Psychiatric:         Mood and Affect: Mood normal.         Behavior: Behavior normal.         Assessment/Plan   Diagnoses and all orders for this visit:    1. Acute on chronic diastolic (congestive) heart failure (HCC) (Primary)  -     Oxygen Therapy  -     Basic metabolic panel; Future  -     Magnesium; Future  -     metOLazone (ZAROXOLYN) 5 MG tablet; Take 1 tablet by mouth Daily.  Dispense: 30 tablet; Refill: 3    2. Congestive cardiomyopathy (HCC)  -     Oxygen Therapy  -     metOLazone (ZAROXOLYN) 5 MG tablet; Take 1 tablet by mouth Daily.  Dispense: 30 tablet; Refill: 3    3. Acute drug-induced gout of foot, unspecified laterality  -     Uric acid; Future  -     colchicine 0.6 MG tablet; Take 1 tablet by mouth Daily As Needed for Muscle / Joint Pain (gout flare up).  Dispense: 30 tablet; Refill: 0    Other orders  -     Vericiguat 2.5 MG tablet; Take 1 tablet by mouth Daily.  Dispense: 30 tablet; Refill: 11  -     apixaban (ELIQUIS) 5 MG tablet tablet; Take 1 tablet by mouth Every 12 (Twelve) Hours. Indications: DVT/PE (active thrombosis)  Dispense: 60 tablet; Refill: 6        Refilled diuretics for heart failure- patient to follow up with [facility] later this month. Per notes, advised to stop metolazone once weight is <285 which he is. He is advised to use this as needed if accumulating more fluid. Check BMP and mag levels today. Check uric acid level, if >6, will increase allopurinol.         Follow up in 3 months for physical    Marek Díaz MD  04/06/22    Follow up

## 2022-04-18 ENCOUNTER — TELEPHONE (OUTPATIENT)
Dept: CARDIOLOGY | Facility: CLINIC | Age: 32
End: 2022-04-18

## 2022-04-18 NOTE — TELEPHONE ENCOUNTER
Called to inform pt that his biotronik monitor is not transmitting. He is in the hospital at  getting his work up for [facility].

## 2022-05-05 DIAGNOSIS — E66.01 OBESITY, CLASS III, BMI 40-49.9 (MORBID OBESITY): ICD-10-CM

## 2022-05-05 DIAGNOSIS — E55.9 HYPOVITAMINOSIS D: ICD-10-CM

## 2022-05-05 DIAGNOSIS — Z13.21 MALNUTRITION SCREEN: ICD-10-CM

## 2022-05-05 DIAGNOSIS — R53.83 FATIGUE, UNSPECIFIED TYPE: ICD-10-CM

## 2022-05-05 DIAGNOSIS — K21.9 GASTROESOPHAGEAL REFLUX DISEASE, UNSPECIFIED WHETHER ESOPHAGITIS PRESENT: ICD-10-CM

## 2022-05-05 DIAGNOSIS — Z90.3 POSTGASTRECTOMY MALABSORPTION: ICD-10-CM

## 2022-05-05 DIAGNOSIS — Z13.0 SCREENING, IRON DEFICIENCY ANEMIA: ICD-10-CM

## 2022-05-05 DIAGNOSIS — K91.2 POSTGASTRECTOMY MALABSORPTION: ICD-10-CM

## 2022-05-05 NOTE — TELEPHONE ENCOUNTER
Says he is out of his heart meds has been out for a couple days asked if we can call it in for him

## 2022-05-05 NOTE — TELEPHONE ENCOUNTER
Called and spoke with patient, he states that he needs all his medications refilled. Called and confirmed with pharmacy which medications he has refills currently and what does need refills.   Please confirm refills on omeprazole as we haven't prescribed it for him before.     LV: 04/06/2022  NV: 05/11/2022  A1C: 3/30/2022-6.20

## 2022-05-06 RX ORDER — OMEPRAZOLE 40 MG/1
40 CAPSULE, DELAYED RELEASE ORAL DAILY
Qty: 30 CAPSULE | Refills: 3 | Status: SHIPPED | OUTPATIENT
Start: 2022-05-06 | End: 2022-07-28 | Stop reason: SDUPTHER

## 2022-05-11 ENCOUNTER — TELEPHONE (OUTPATIENT)
Dept: INTERNAL MEDICINE | Facility: CLINIC | Age: 32
End: 2022-05-11

## 2022-05-11 NOTE — TELEPHONE ENCOUNTER
Caller: [facility]    Relationship: Provider    Best call back number: [phone] FAX: [phone]    What was the call regarding: PLEASE FAX ANY CARDIOLOGY TESTING: SPECIFICALLY ALONDRA POZO ACT    Do you require a callback: ONLY IF NEEDED- REQUESTING TEST RESULTS TO BE FAXED AS SOON AS POSSIBLE

## 2022-05-13 NOTE — TELEPHONE ENCOUNTER
Called and spoke with . Informed her that patient no-showed the appointment he had scheduled with Dr. Díaz on 5/11/2022 and we were not able to obtain labs.

## 2022-05-17 ENCOUNTER — TELEPHONE (OUTPATIENT)
Dept: CARDIOLOGY | Facility: CLINIC | Age: 32
End: 2022-05-17

## 2022-05-17 NOTE — TELEPHONE ENCOUNTER
Called patient in regards to missed remote transmission. He will turn monitor off and back on and make sure it is plugged in when he gets home.

## 2022-06-16 RX ORDER — ALLOPURINOL 100 MG/1
TABLET ORAL
Qty: 30 TABLET | Refills: 0 | OUTPATIENT
Start: 2022-06-16

## 2022-06-21 ENCOUNTER — TELEMEDICINE (OUTPATIENT)
Dept: INTERNAL MEDICINE | Facility: CLINIC | Age: 32
End: 2022-06-21

## 2022-06-21 DIAGNOSIS — I42.0 CONGESTIVE CARDIOMYOPATHY: Primary | ICD-10-CM

## 2022-06-21 DIAGNOSIS — I50.42 CHRONIC COMBINED SYSTOLIC AND DIASTOLIC CONGESTIVE HEART FAILURE: ICD-10-CM

## 2022-06-21 DIAGNOSIS — Z99.89 OSA ON CPAP: ICD-10-CM

## 2022-06-21 DIAGNOSIS — G47.33 OSA ON CPAP: ICD-10-CM

## 2022-06-21 PROCEDURE — 99213 OFFICE O/P EST LOW 20 MIN: CPT | Performed by: FAMILY MEDICINE

## 2022-06-21 RX ORDER — SPIRONOLACTONE 50 MG/1
50 TABLET, FILM COATED ORAL DAILY
Qty: 30 TABLET | Refills: 3 | Status: SHIPPED | OUTPATIENT
Start: 2022-06-21

## 2022-06-21 RX ORDER — ALLOPURINOL 100 MG/1
100 TABLET ORAL DAILY
Qty: 30 TABLET | Refills: 0 | Status: SHIPPED | OUTPATIENT
Start: 2022-06-21

## 2022-06-21 RX ORDER — BUMETANIDE 2 MG/1
2 TABLET ORAL DAILY
Qty: 30 TABLET | Refills: 3 | Status: SHIPPED | OUTPATIENT
Start: 2022-06-21

## 2022-06-21 NOTE — PROGRESS NOTES
Mode of Visit: Video  Location of patient: home  You have chosen to receive care through a telehealth visit.  Does the patient consent to use a video/audio connection for your medical care today? Yes  The visit included audio and video interaction. No technical issues occurred during this visit.         Hal Gannon is a 31 y.o. male.     History of Present Illness     Video Visit was done today because of COVID-19.  patient has consented to receive care via Video Visit   Patient location ; home     CC; medication refill   Pt with CHF , CAD, morbid obesity, GERD, COLLIN on CPAP and GERD.  He is following with cardiology at  , plan to get heart transplant.  He dose not have any cough / SOB /CP or other sx   Has NICM with LVEF 20%, on Bumex, Coreg, Entresto, Aldactone, Variciguat  He just request refill on his medication including tylenol with codeine   COLLIN on CPAP and home oxygen      Current Outpatient Medications on File Prior to Visit   Medication Sig Dispense Refill   • allopurinol (Zyloprim) 100 MG tablet Take 1 tablet by mouth Daily. 30 tablet 0   • apixaban (ELIQUIS) 5 MG tablet tablet Take 1 tablet by mouth Every 12 (Twelve) Hours. Indications: DVT/PE (active thrombosis) 60 tablet 6   • carvedilol (COREG) 6.25 MG tablet Take 1 tablet by mouth 2 (Two) Times a Day. 60 tablet 11   • colchicine 0.6 MG tablet Take 1 tablet by mouth Daily As Needed for Muscle / Joint Pain (gout flare up). 30 tablet 0   • halobetasol (ULTRAVATE) 0.05 % ointment Apply 1 application topically to the appropriate area as directed 2 (Two) Times a Day.     • metOLazone (ZAROXOLYN) 5 MG tablet Take 1 tablet by mouth Daily As Needed (edema). 25 tablet 6   • metOLazone (ZAROXOLYN) 5 MG tablet Take 1 tablet by mouth Daily. 30 tablet 3   • O2 (OXYGEN) Inhale 2-6 L/min Daily.     • omeprazole (priLOSEC) 40 MG capsule Take 1 capsule by mouth Daily. 30 capsule 3   • potassium chloride (K-DUR,KLOR-CON) 20 MEQ CR tablet Take 1  tablet by mouth Daily. 30 tablet 1   • sacubitril-valsartan (Entresto) 24-26 MG tablet Take 1 tablet by mouth 2 (Two) Times a Day. 60 tablet 3   • Secukinumab (COSENTYX, 300 MG DOSE, SC) Inject 300 mg under the skin into the appropriate area as directed Every 30 (Thirty) Days.     • Semaglutide,0.25 or 0.5MG/DOS, (OZEMPIC) 2 MG/1.5ML solution pen-injector Inject 0.25 mg under the skin into the appropriate area as directed 1 (One) Time Per Week. 1.5 mL 0   • Vericiguat 2.5 MG tablet Take 1 tablet by mouth Daily. 30 tablet 11   • [DISCONTINUED] allopurinol (Zyloprim) 100 MG tablet Take 1 tablet by mouth Daily. 30 tablet 0   • [DISCONTINUED] bumetanide (BUMEX) 2 MG tablet Take 1 tablet by mouth Daily. Stop torsemide 30 tablet 3   • [DISCONTINUED] spironolactone (Aldactone) 50 MG tablet Take 1 tablet by mouth Daily. 30 tablet 3     No current facility-administered medications on file prior to visit.       The following portions of the patient's history were reviewed and updated as appropriate: allergies, current medications, past family history, past medical history, past social history, past surgical history and problem list.    Review of Systems   Respiratory: Negative for cough and shortness of breath.    Cardiovascular: Negative for chest pain.       Objective   There were no vitals taken for this visit.  Physical Exam  Constitutional:       General: He is not in acute distress.     Appearance: He is not ill-appearing, toxic-appearing or diaphoretic.   Neurological:      Mental Status: He is alert and oriented to person, place, and time.   Psychiatric:         Mood and Affect: Mood normal.         Behavior: Behavior normal.         Thought Content: Thought content normal.           Assessment & Plan   Diagnoses and all orders for this visit:    1. Congestive cardiomyopathy (HCC) (Primary)    2. COLLIN on CPAP    3. Chronic combined systolic and diastolic congestive heart failure (HCC)    Other orders  -      spironolactone (Aldactone) 50 MG tablet; Take 1 tablet by mouth Daily.  Dispense: 30 tablet; Refill: 3  -     bumetanide (BUMEX) 2 MG tablet; Take 1 tablet by mouth Daily. Stop torsemide  Dispense: 30 tablet; Refill: 3      The rest of his medication was refilled by Dr Díaz with enough refills   For tylenol # , I discussed with him that this is control sub, I can not do refill as he needs CSA with urine drug screen and I relocated to another office at [place] in less than 10 days so I can not help him with that.          I have reviewed the limitations of a telehealth visit (such as lack of a physical exam and unable to obtain vital signs) and advised the patient that they may need to follow up for an office visit should their symptoms or concerns persist, worsen, or change.  Patient was encouraged to keep me informed of any acute changes, lack of improvement, or any new concerning symptoms.   I discussed my findings,recommendations, and plan of care was with the patient. They verbalized understanding and agreement.

## 2022-06-21 NOTE — TELEPHONE ENCOUNTER
Caller: Evan Gannon    Relationship: Self    Best call back number:  [phone]    Requested Prescriptions:   Requested Prescriptions     Pending Prescriptions Disp Refills   • allopurinol (Zyloprim) 100 MG tablet 30 tablet 0     Sig: Take 1 tablet by mouth Daily.    MEDICATION FOR FEET    Pharmacy where request should be sent: [facility] [place] [id number] [address] AT [facility] - [phone] [facility] [phone] FX     Additional details provided by patient: PATIENT STATES HE DOES NOT WANT HIS FEET TO GET SWOLLEN AND STATES HE HAS AN UPCOMING APPOINTMENT 07/11/22    Does the patient have less than a 3 day supply:  [x] Yes  [] No    Shobha Martinez   06/21/22 12:56 EDT

## 2022-06-21 NOTE — TELEPHONE ENCOUNTER
Temp supply of allopurinol sent  Please check on his oxygen order from 4/6/22 from Dr. Díaz. ( did he get his O2, what company, does he need a 6 min walk test...)

## 2022-06-21 NOTE — TELEPHONE ENCOUNTER
Rx Refill Note  Requested Prescriptions     Pending Prescriptions Disp Refills   • allopurinol (Zyloprim) 100 MG tablet 30 tablet 0     Sig: Take 1 tablet by mouth Daily.   • O2 (OXYGEN)       Sig: Inhale 2-6 L/min Daily.      Last filled:  Last office visit with prescribing clinician: Visit date not found      Next office visit with prescribing clinician: 7/11/2022 April HAYDEN Barragan MA  06/21/22, 13:34 EDT     Please advise, looks like this patient is one that is offboarding from Dr. Díaz.

## 2022-06-22 NOTE — TELEPHONE ENCOUNTER
Spoke with Patient aids yesterday (06/21/2022). Titi had his O2 refilled in May and is not due for a refill of this again. I made sure they were aware of the provider change from Dr. Díaz to Monica Laguerre so all orders going forward would make it to the correct provider. They made the change and advised they would be in touch when he needed a refill.

## 2022-07-28 ENCOUNTER — OFFICE VISIT (OUTPATIENT)
Dept: INTERNAL MEDICINE | Facility: CLINIC | Age: 32
End: 2022-07-28

## 2022-07-28 VITALS
WEIGHT: 289 LBS | BODY MASS INDEX: 42.8 KG/M2 | SYSTOLIC BLOOD PRESSURE: 128 MMHG | HEART RATE: 100 BPM | TEMPERATURE: 97.6 F | HEIGHT: 69 IN | OXYGEN SATURATION: 97 % | DIASTOLIC BLOOD PRESSURE: 74 MMHG

## 2022-07-28 DIAGNOSIS — Z13.21 MALNUTRITION SCREEN: ICD-10-CM

## 2022-07-28 DIAGNOSIS — M10.279 ACUTE DRUG-INDUCED GOUT OF FOOT, UNSPECIFIED LATERALITY: ICD-10-CM

## 2022-07-28 DIAGNOSIS — K91.2 POSTGASTRECTOMY MALABSORPTION: ICD-10-CM

## 2022-07-28 DIAGNOSIS — Z98.84 STATUS POST LAPAROSCOPIC SLEEVE GASTRECTOMY: ICD-10-CM

## 2022-07-28 DIAGNOSIS — Z79.899 ENCOUNTER FOR LONG-TERM (CURRENT) USE OF MEDICATIONS: ICD-10-CM

## 2022-07-28 DIAGNOSIS — L40.0 PLAQUE PSORIASIS: ICD-10-CM

## 2022-07-28 DIAGNOSIS — R73.03 PREDIABETES: ICD-10-CM

## 2022-07-28 DIAGNOSIS — K21.9 GASTROESOPHAGEAL REFLUX DISEASE, UNSPECIFIED WHETHER ESOPHAGITIS PRESENT: ICD-10-CM

## 2022-07-28 DIAGNOSIS — Z13.0 SCREENING, IRON DEFICIENCY ANEMIA: ICD-10-CM

## 2022-07-28 DIAGNOSIS — I42.0 NONISCHEMIC CONGESTIVE CARDIOMYOPATHY: ICD-10-CM

## 2022-07-28 DIAGNOSIS — M25.50 ARTHRALGIA, UNSPECIFIED JOINT: ICD-10-CM

## 2022-07-28 DIAGNOSIS — Z90.3 POSTGASTRECTOMY MALABSORPTION: ICD-10-CM

## 2022-07-28 DIAGNOSIS — I50.23 ACUTE ON CHRONIC SYSTOLIC HEART FAILURE: ICD-10-CM

## 2022-07-28 DIAGNOSIS — I50.22 CHRONIC SYSTOLIC CHF (CONGESTIVE HEART FAILURE): Primary | ICD-10-CM

## 2022-07-28 DIAGNOSIS — R53.83 FATIGUE, UNSPECIFIED TYPE: ICD-10-CM

## 2022-07-28 DIAGNOSIS — G47.33 OSA (OBSTRUCTIVE SLEEP APNEA): ICD-10-CM

## 2022-07-28 DIAGNOSIS — E66.01 OBESITY, CLASS III, BMI 40-49.9 (MORBID OBESITY): ICD-10-CM

## 2022-07-28 DIAGNOSIS — E55.9 HYPOVITAMINOSIS D: ICD-10-CM

## 2022-07-28 PROCEDURE — 99214 OFFICE O/P EST MOD 30 MIN: CPT | Performed by: FAMILY MEDICINE

## 2022-07-28 RX ORDER — OMEPRAZOLE 40 MG/1
40 CAPSULE, DELAYED RELEASE ORAL DAILY
Qty: 30 CAPSULE | Refills: 3 | Status: SHIPPED | OUTPATIENT
Start: 2022-07-28

## 2022-07-28 RX ORDER — SACUBITRIL AND VALSARTAN 24; 26 MG/1; MG/1
1 TABLET, FILM COATED ORAL 2 TIMES DAILY
Qty: 60 TABLET | Refills: 3 | Status: SHIPPED | OUTPATIENT
Start: 2022-07-28

## 2022-07-28 RX ORDER — METOPROLOL SUCCINATE 25 MG/1
25 TABLET, EXTENDED RELEASE ORAL DAILY
COMMUNITY
Start: 2022-05-02

## 2022-07-28 RX ORDER — COLCHICINE 0.6 MG/1
0.6 TABLET ORAL DAILY PRN
Qty: 30 TABLET | Refills: 0 | Status: SHIPPED | OUTPATIENT
Start: 2022-07-28

## 2022-07-28 RX ORDER — TRAMADOL HYDROCHLORIDE 50 MG/1
50 TABLET ORAL EVERY 6 HOURS PRN
Qty: 40 TABLET | Refills: 0 | Status: SHIPPED | OUTPATIENT
Start: 2022-07-28

## 2022-07-28 NOTE — PROGRESS NOTES
"Hal Gannon is a 31 y.o. male.     Chief Complaint   Patient presents with   • Establish Care     Transfer from Dr. Díaz     • history of heart failure   • Pain     Knee, elbow and ankle.  Ongoing intermittent pain 1+month       Visit Vitals  /74   Pulse 100   Temp 97.6 °F (36.4 °C)   Ht 175.3 cm (69\")   Wt 131 kg (289 lb)   SpO2 97%   BMI 42.68 kg/m²       Wt Readings from Last 3 Encounters:   07/28/22 131 kg (289 lb)   04/06/22 130 kg (285 lb 12.8 oz)   04/02/22 131 kg (288 lb)       Pain  This is a chronic (pt has plaque and joint psoriasis) problem. The current episode started more than 1 year ago. The problem occurs intermittently. The problem has been waxing and waning. Associated symptoms include arthralgias, joint swelling, myalgias, a rash and vertigo (occasional). Pertinent negatives include no abdominal pain, anorexia, change in bowel habit, chest pain, chills, congestion, coughing, diaphoresis, fatigue, fever, headaches, nausea, neck pain, numbness, sore throat, swollen glands, urinary symptoms, visual change, vomiting or weakness. Nothing aggravates the symptoms. Treatments tried: Cosentyx. Improvement on treatment: pt waiting on insurance.   Congestive Heart Failure  Presents for initial visit. The disease course has been stable. Associated symptoms include claudication, edema, nocturia and shortness of breath. Pertinent negatives include no abdominal pain, chest pain, chest pressure, fatigue, muscle weakness, near-syncope, orthopnea, palpitations, paroxysmal nocturnal dyspnea (occasional better with diuretic) or unexpected weight change. Past treatments include angiotensin receptor blockers and weight loss (eliquis). The treatment provided moderate relief. Compliance with prior treatments has been good. His past medical history is significant for CAD and DVT (4 months ago left leg). There is no history of anemia, arrhythmia, chronic lung disease, CVA, DM, HTN, " hyperthyroidism, myocarditis, PE or valvular heart disease. He has multiple 2nd degree relatives with heart disease.   Coronary Artery Disease  Presents for initial visit. The disease course has been stable. Symptoms include shortness of breath. Pertinent negatives include no chest pain, chest pressure, chest tightness, dizziness, leg swelling, muscle weakness, palpitations or weight gain. Risk factors include premature CAD in family, obesity and stress. Risk factors do not include decreased physical activity, diabetes, hyperlipidemia or hypertension. Past treatments include beta blockers and anticoagulant (eliquis). The treatment provided moderate relief. Compliance with prior treatments has been good. His past medical history is significant for cardiomyopathy, CHF and past myocardial infarction. There is no history of angina pectoris, arrhythmia, pericarditis or valvular heart disease. Past surgical history does not include angioplasty, CABG or cardiac stents.      Pt here to establish care.   Pt has CHF with reduced ejection fraction. Pt has implanted defibrillator.  Pt has been dealing with heart attack and CHF for 8 yrs.  Pt is getting prepped for heart transplant at .  Pt used to be 600#    Pt has plaque psoriasis.    Pt has CPAP and is trying to get new machine.     Pt had gastric sleeve. Pt states that he has not had diabetes. He had prediabetes.   Pt has multi joint pain from psoriasis. Pt has CHF and should not take NSAIDs.   The following portions of the patient's history were reviewed and updated as appropriate: allergies, current medications, past family history, past medical history, past social history, past surgical history and problem list.    Past Medical History:   Diagnosis Date   • CHF (congestive heart failure) (HCC)     diagnosed at 23yo, thought to be related to undiagnosed congenital defect- per patient. hospitalized every 3 weeks during the winter due to PNA.  Has significantf fluid  "retention despite diurectics   • CPAP (continuous positive airway pressure) dependence     settings at 15   • Gout     on allopurinol, controls symptoms   • Hypertension     patient denies   • Hypokalemia    • ICD (implantable cardioverter-defibrillator) in place    • Low HDL (under 40)    • Morbid obesity (HCC)    • Morbid obesity with BMI of 60.0-69.9, adult (HCC)    • Myocardial infarction (HCC)     \"almost\" per patient, had heart cath, no stents, has defibrillator   • On home O2     supposed to be 24/7, ran out of O2 and has only been using prn. manged  by cardiology   • COLLIN on CPAP     compliant   • Paresthesia     hands/ arms/ feet, suspected due to poor circulation reportedly   • Plaque psoriasis    • Pneumonia    • Psoriasis    • Sleep apnea    • Wears glasses       Past Surgical History:   Procedure Laterality Date   • CARDIAC CATHETERIZATION N/A 7/13/2017    Procedure: Left Heart Cath;  Surgeon: Balbir Olsen MD;  Location:  [facility];  Service:    • CARDIAC DEFIBRILLATOR PLACEMENT  08/2017   • CARDIAC DEFIBRILLATOR PLACEMENT     • CARDIAC ELECTROPHYSIOLOGY PROCEDURE N/A 8/15/2017    Procedure: VVI ICD Implant;  Surgeon: Nathanael Richmond DO;  Location:  [facility];  Service:    • ENDOSCOPY N/A 8/14/2020    Procedure: ESOPHAGOGASTRODUODENOSCOPY;  Surgeon: Arnoldo Whittington MD;  Location:  [facility];  Service: Bariatric;  Laterality: N/A;   • GASTRIC SLEEVE LAPAROSCOPIC N/A 8/14/2020    Procedure: GASTRIC SLEEVE LAPAROSCOPIC;  Surgeon: Arnoldo Whittington MD;  Location:  [facility];  Service: Bariatric;  Laterality: N/A;   • OTHER SURGICAL HISTORY      ultra light therapy   • SINUS SURGERY      cartilage from ear inserted in nasal cavity   • TONSILLECTOMY AND ADENOIDECTOMY  2000      Family History   Problem Relation Age of Onset   • Diabetes Father    • Diabetes Paternal Grandmother    • Diabetes Maternal Grandfather       Social History     Socioeconomic History   • " Marital status: Single   • Number of children: 0   Tobacco Use   • Smoking status: Never Smoker   • Smokeless tobacco: Never Used   Vaping Use   • Vaping Use: Never used   Substance and Sexual Activity   • Alcohol use: No   • Drug use: No   • Sexual activity: Defer      No Known Allergies    Review of Systems   Constitutional: Negative for chills, diaphoresis, fatigue, fever, unexpected weight change and weight gain.   HENT: Negative for congestion and sore throat.    Respiratory: Positive for shortness of breath. Negative for cough and chest tightness.    Cardiovascular: Positive for claudication. Negative for chest pain, palpitations, leg swelling and near-syncope.   Gastrointestinal: Negative for abdominal pain, anorexia, change in bowel habit, nausea and vomiting.   Genitourinary: Positive for nocturia.   Musculoskeletal: Positive for arthralgias, joint swelling and myalgias. Negative for muscle weakness and neck pain.   Skin: Positive for rash.   Neurological: Positive for vertigo (occasional). Negative for dizziness, weakness, numbness and headaches.       Objective   Physical Exam  Vitals and nursing note reviewed.   Constitutional:       Appearance: He is well-developed.   HENT:      Head: Normocephalic.      Right Ear: External ear normal.      Left Ear: External ear normal.      Nose: Nose normal.   Eyes:      General: Lids are normal.      Conjunctiva/sclera: Conjunctivae normal.      Pupils: Pupils are equal, round, and reactive to light.   Neck:      Thyroid: No thyroid mass or thyromegaly.      Vascular: No carotid bruit.      Trachea: Trachea normal.   Cardiovascular:      Rate and Rhythm: Normal rate and regular rhythm.      Heart sounds: No murmur heard.  Pulmonary:      Effort: Pulmonary effort is normal. No respiratory distress.      Breath sounds: Normal breath sounds. No decreased breath sounds, wheezing, rhonchi or rales.   Chest:      Chest wall: No tenderness.   Abdominal:      General: Bowel  sounds are normal.      Palpations: Abdomen is soft.      Tenderness: There is no abdominal tenderness.   Musculoskeletal:         General: Normal range of motion.      Cervical back: Normal range of motion and neck supple.      Right lower leg: No edema.      Left lower leg: No edema.   Skin:     General: Skin is warm and dry.   Neurological:      Mental Status: He is alert and oriented to person, place, and time.   Psychiatric:         Behavior: Behavior normal.         Assessment & Plan   Diagnoses and all orders for this visit:    1. Chronic systolic CHF (congestive heart failure) ([facility]) (Primary)    2. Fatigue, unspecified type  -     omeprazole (priLOSEC) 40 MG capsule; Take 1 capsule by mouth Daily.  Dispense: 30 capsule; Refill: 3    3. Gastroesophageal reflux disease, unspecified whether esophagitis present  -     omeprazole (priLOSEC) 40 MG capsule; Take 1 capsule by mouth Daily.  Dispense: 30 capsule; Refill: 3    4. Obesity, Class III, BMI 40-49.9 (morbid obesity) ([facility])  -     omeprazole (priLOSEC) 40 MG capsule; Take 1 capsule by mouth Daily.  Dispense: 30 capsule; Refill: 3    5. Hypovitaminosis D  -     omeprazole (priLOSEC) 40 MG capsule; Take 1 capsule by mouth Daily.  Dispense: 30 capsule; Refill: 3    6. Screening, iron deficiency anemia  -     omeprazole (priLOSEC) 40 MG capsule; Take 1 capsule by mouth Daily.  Dispense: 30 capsule; Refill: 3    7. Malnutrition screen  -     omeprazole (priLOSEC) 40 MG capsule; Take 1 capsule by mouth Daily.  Dispense: 30 capsule; Refill: 3    8. Postgastrectomy malabsorption  -     omeprazole (priLOSEC) 40 MG capsule; Take 1 capsule by mouth Daily.  Dispense: 30 capsule; Refill: 3    9. Acute drug-induced gout of foot, unspecified laterality  -     colchicine 0.6 MG tablet; Take 1 tablet by mouth Daily As Needed for Muscle / Joint Pain (gout flare up).  Dispense: 30 tablet; Refill: 0    10. Acute on chronic systolic heart failure (HCC)  -     sacubitril-valsartan  (Entresto) 24-26 MG tablet; Take 1 tablet by mouth 2 (Two) Times a Day.  Dispense: 60 tablet; Refill: 3    11. Nonischemic congestive cardiomyopathy (HCC)  -     sacubitril-valsartan (Entresto) 24-26 MG tablet; Take 1 tablet by mouth 2 (Two) Times a Day.  Dispense: 60 tablet; Refill: 3    12. Status post laparoscopic sleeve gastrectomy    13. Plaque psoriasis  -     traMADol (ULTRAM) 50 MG tablet; Take 1 tablet by mouth Every 6 (Six) Hours As Needed for Moderate Pain .  Dispense: 40 tablet; Refill: 0    14. COLLIN (obstructive sleep apnea)    15. Prediabetes    16. Arthralgia, unspecified joint  -     traMADol (ULTRAM) 50 MG tablet; Take 1 tablet by mouth Every 6 (Six) Hours As Needed for Moderate Pain .  Dispense: 40 tablet; Refill: 0    17. Encounter for long-term (current) use of medications  -     Cancel: Compliance Drug Analysis, Ur - Urine, Clean Catch; Future  -     Compliance Drug Analysis, Ur - Urine, Clean Catch; Future  -     Compliance Drug Analysis, Ur - Urine, Clean Catch    Other orders  -     Semaglutide,0.25 or 0.5MG/DOS, (OZEMPIC) 2 MG/1.5ML solution pen-injector; Inject 0.25 mg under the skin into the appropriate area as directed 1 (One) Time Per Week.  Dispense: 1.5 mL; Refill: 1      Pt will get covid vaccine soon.  Lab is being ordered by the transplant team.  Patient is trying to maintain good health as much as possible to stay on the transplant list for new heart.             Current Outpatient Medications:   •  allopurinol (Zyloprim) 100 MG tablet, Take 1 tablet by mouth Daily., Disp: 30 tablet, Rfl: 0  •  apixaban (ELIQUIS) 5 MG tablet tablet, Take 1 tablet by mouth Every 12 (Twelve) Hours. Indications: DVT/PE (active thrombosis), Disp: 60 tablet, Rfl: 6  •  bumetanide (BUMEX) 2 MG tablet, Take 1 tablet by mouth Daily. Stop torsemide, Disp: 30 tablet, Rfl: 3  •  carvedilol (COREG) 6.25 MG tablet, Take 1 tablet by mouth 2 (Two) Times a Day., Disp: 60 tablet, Rfl: 11  •  colchicine 0.6 MG  tablet, Take 1 tablet by mouth Daily As Needed for Muscle / Joint Pain (gout flare up)., Disp: 30 tablet, Rfl: 0  •  halobetasol (ULTRAVATE) 0.05 % ointment, Apply 1 application topically to the appropriate area as directed 2 (Two) Times a Day., Disp: , Rfl:   •  metOLazone (ZAROXOLYN) 5 MG tablet, Take 1 tablet by mouth Daily As Needed (edema)., Disp: 25 tablet, Rfl: 6  •  metoprolol succinate XL (TOPROL-XL) 25 MG 24 hr tablet, Take 25 mg by mouth Daily., Disp: , Rfl:   •  O2 (OXYGEN), Inhale 2-6 L/min Daily., Disp: , Rfl:   •  omeprazole (priLOSEC) 40 MG capsule, Take 1 capsule by mouth Daily., Disp: 30 capsule, Rfl: 3  •  potassium chloride (K-DUR,KLOR-CON) 20 MEQ CR tablet, Take 1 tablet by mouth Daily., Disp: 30 tablet, Rfl: 1  •  sacubitril-valsartan (Entresto) 24-26 MG tablet, Take 1 tablet by mouth 2 (Two) Times a Day., Disp: 60 tablet, Rfl: 3  •  Secukinumab (COSENTYX, 300 MG DOSE, SC), Inject 300 mg under the skin into the appropriate area as directed Every 30 (Thirty) Days., Disp: , Rfl:   •  Semaglutide,0.25 or 0.5MG/DOS, (OZEMPIC) 2 MG/1.5ML solution pen-injector, Inject 0.25 mg under the skin into the appropriate area as directed 1 (One) Time Per Week., Disp: 1.5 mL, Rfl: 1  •  spironolactone (Aldactone) 50 MG tablet, Take 1 tablet by mouth Daily., Disp: 30 tablet, Rfl: 3  •  Vericiguat 2.5 MG tablet, Take 1 tablet by mouth Daily., Disp: 30 tablet, Rfl: 11  •  metOLazone (ZAROXOLYN) 5 MG tablet, Take 1 tablet by mouth Daily., Disp: 30 tablet, Rfl: 3  •  traMADol (ULTRAM) 50 MG tablet, Take 1 tablet by mouth Every 6 (Six) Hours As Needed for Moderate Pain ., Disp: 40 tablet, Rfl: 0    Return in about 3 months (around 10/28/2022), or if symptoms worsen or fail to improve, for Recheck controls..     Hemoglobin A1C   Date Value Ref Range Status   04/18/2022 5.9 (H) <5.7 % Final   03/30/2022 6.20 (H) 4.80 - 5.60 % Final   02/09/2022 6.2 % Final   11/03/2020 6.00 (H) 4.80 - 5.60 % Final   08/12/2020 6.50 (H)  4.80 - 5.60 % Final          Echocardiogram from [facility].  Done on 4/18/2022  This result has an attachment that is not available.   •  Left Ventricle: The left ventricle is severely dilated. There is normal   left ventricular myocardial thickness and mass.   •  Right ventricle size is mildly dilated and systolic function is   severely reduced.   •  The aortic valve appears to be trileaflet. There is no valvular   regurgitation. There is no hemodynamically significant valvular aortic   stenosis.   •  There is no recent echo study available for direct [id number]   comparison.     Left Ventricle   The left ventricle is severely dilated. There is normal left ventricular myocardial thickness and mass. The LVEF as measured by biplane volume is 14%. There is global hypokinesis of the left ventricle present. No left ventricular mass or thrombus is seen.     Right Ventricle   Right ventricle size is mildly dilated. The right ventricular systolic function is severely reduced. Doppler findings do not suggest pulmonary hypertension. Although the spectral Doppler envelope of TR was not adequate for calculating the PAP, the estimated PAP based upon other 2D and Doppler features suggests that the PAP is probably normal or at most mildly elevated.     Left Atrium   The left atrial size is normal with an indexed volume of 16-34 mL/m2.     Right Atrium   The right atrial volume index is normal (18-32mL/m2).     IVC/SVC   Based on the IVC size and respiratory variation, the estimated right atrial pressure is 8mmHg.     Mitral Valve   The mitral valve is normal in appearance with no evidence of mitral valve prolapse. There is mild mitral regurgitation. There is no mitral stenosis.     Tricuspid Valve   The tricuspid valve was not well visualized. There is trace tricuspid regurgitation. There is no tricuspid stenosis.     Aortic Valve   The aortic valve appears to be trileaflet. There is no valvular regurgitation.  There is no hemodynamically significant valvular aortic stenosis.     Pulmonic Valve   The pulmonic valve was not well visualized. There is trace pulmonic regurgitation. There is no pulmonic stenosis.     Pericardium   No pericardial effusion.     Great Vessels   The aortic root is normal in size. The sinus of Valsalva (aortic root) diameter is 31 mm by leading edge to leading edge method. In the maximally visualized portion, the ascending aorta appears normal in size. The ascending aorta diameter is 24 mm.     Extracardiac   There is no pleural effusion.     Study Details   A complete transthoracic echocardiogram using two-dimensional (2D), m-mode, color and spectral flow Doppler imaging was performed. Definity contrast was used during the study. BP: 94/64 mmHg. Heart Rate: 88 bpm. Height: 173 cm. Weight: 125 kg. BSA: 2.47 m2.

## 2022-08-09 ENCOUNTER — TELEMEDICINE (OUTPATIENT)
Dept: INTERNAL MEDICINE | Facility: CLINIC | Age: 32
End: 2022-08-09

## 2022-08-09 DIAGNOSIS — L40.0 PLAQUE PSORIASIS: ICD-10-CM

## 2022-08-09 DIAGNOSIS — M25.50 MULTIPLE JOINT PAIN: Primary | ICD-10-CM

## 2022-08-09 PROCEDURE — 99214 OFFICE O/P EST MOD 30 MIN: CPT | Performed by: NURSE PRACTITIONER

## 2022-08-09 NOTE — PROGRESS NOTES
This was an audio and video enabled visit.   This provider is located at the [facility] (through [facility] [place] [facility]), [address], [place] [address] using a secure [employer] Video Visit through [employer]. Evan  is being seen remotely via telehealth at their  home address in [place], and stated they are in a secure environment for this session. Evan  condition being diagnosed/treated is felt to be appropriate for telemedicine. Evan may be asked to present for in office testing and/or evaluation if felt to be unsafe for telemedicine.  The provider identified herself as well as her credentials. The patient consents to be seen remotely, and when consent is given they understand that the consent allows for patient identifiable information to be sent to a third party as needed. They may refuse to be seen remotely at any time. The electronic data is encrypted and password protected, and the patient has been advised of the potential risks to privacy not withstanding such measures.  You have chosen to receive care through a telehealth visit. Do you consent to use a video/audio connection for your medical care today? Yes      Subjective   Evan Gannon is a 31 y.o. male.     Chief Complaint   Patient presents with   • Knee Pain   • Elbow Pain   • Ankle Pain              History of Present Illness   Evan Gannon is a 31-year-old male who is being seen via video visit today at his request to follow-up on pain to his right elbow, left knee and ankle.   He has chronic pain that is worsening recently.  He was seen by Dr. Flowers for this.  It was felt to be related to psoriatic arthritis as he does have plaque psoriasis . She started him on tramadol.   He tells me that this is not helping him at all.  He is having trouble getting up and down.  He is concerned that this decreased mobility is going to make him gain more weight and cause more heart problems for him  as he does have chronic cardiac issues including cardiomyopathy and CHF.        The following portions of the patient's history were reviewed and updated as appropriate: allergies, current medications, past family history, past medical history, past social history, past surgical history and problem list.        Review of Systems   Constitutional: Positive for activity change. Negative for appetite change and fever.   Respiratory: Negative for shortness of breath.    Cardiovascular: Negative for chest pain.   Gastrointestinal: Negative for abdominal pain.   Musculoskeletal: Positive for arthralgias, gait problem and myalgias.           No outpatient medications have been marked as taking for the 8/9/22 encounter (Telemedicine) with Monica Laguerre APRN.     No Known Allergies        Objective   Physical Exam   Constitutional: He is oriented to person, place, and time. He appears well-developed and well-nourished. No distress.   HENT:   Head: Normocephalic and atraumatic.   Right Ear: External ear normal.   Left Ear: External ear normal.   Mouth/Throat: Oropharynx is clear and moist.   Eyes: Right eye exhibits no discharge. Left eye exhibits no discharge. No scleral icterus.   Neck: Neck normal appearance.  Pulmonary/Chest: Effort normal. No accessory muscle usage.  No respiratory distress.No use of oxygen by nasal cannula  Abdominal: Abdomen appears normal.   Neurological: He is alert and oriented to person, place, and time.   Skin: Skin is dry. He is not diaphoretic. No erythema. No pallor.   Psychiatric: He has a normal mood and affect. His speech is normal and behavior is normal. Judgment normal. He is attentive.         There were no vitals filed for this visit.  There is no height or weight on file to calculate BMI.        Assessment & Plan   Diagnoses and all orders for this visit:    1. Multiple joint pain (Primary)    2. Plaque psoriasis    He was just seen 7/28/2022 by his PCP, Dr. Flowers who started him  on tramadol.  He has not had relief with 50 mg.  I have advised him that he can increase to tramadol 100 mg 3 times a day if needed.  If this is not effective, he needs to follow-up with his PCP.  Return to routine follow-up with PCP as well-due in 3m         Return if symptoms worsen or fail to improve.    I have reviewed the limitations of a telehealth visit (such as lack of a physical exam and unable to obtain vital signs) and advised the patient that they may need to follow up for an office visit should their symptoms or concerns persist, worsen, or change.  Patient was encouraged to keep me informed of any acute changes, lack of improvement, or any new concerning symptoms.   I discussed my findings,recommendations, and plan of care was with the patient. They verbalized understanding and agreement.    Dictated Utilizing Dragon Dictation   Please note that portions of this note were completed with a voice recognition program.   Part of this note may be an electronic transcription/translation of spoken language to printed text using the Dragon Dictation System.

## 2022-09-18 ENCOUNTER — APPOINTMENT (OUTPATIENT)
Dept: CT IMAGING | Facility: HOSPITAL | Age: 32
End: 2022-09-18

## 2022-09-18 ENCOUNTER — HOSPITAL ENCOUNTER (EMERGENCY)
Facility: HOSPITAL | Age: 32
Discharge: HOME | End: 2022-09-19
Attending: EMERGENCY MEDICINE | Admitting: EMERGENCY MEDICINE

## 2022-09-18 DIAGNOSIS — Z86.79 HISTORY OF CHF (CONGESTIVE HEART FAILURE): ICD-10-CM

## 2022-09-18 DIAGNOSIS — R07.89 CHEST WALL PAIN: Primary | ICD-10-CM

## 2022-09-18 DIAGNOSIS — E87.6 HYPOKALEMIA: ICD-10-CM

## 2022-09-18 DIAGNOSIS — Z86.79 HISTORY OF PULMONARY HYPERTENSION: ICD-10-CM

## 2022-09-18 DIAGNOSIS — R06.02 SHORTNESS OF BREATH: ICD-10-CM

## 2022-09-18 LAB
ALBUMIN SERPL-MCNC: 3.6 G/DL (ref 3.5–5.2)
ALBUMIN/GLOB SERPL: 0.8 G/DL
ALP SERPL-CCNC: 90 U/L (ref 39–117)
ALT SERPL W P-5'-P-CCNC: 9 U/L (ref 1–41)
ANION GAP SERPL CALCULATED.3IONS-SCNC: 10 MMOL/L (ref 5–15)
AST SERPL-CCNC: 15 U/L (ref 1–40)
BASOPHILS # BLD AUTO: 0.03 10*3/MM3 (ref 0–0.2)
BASOPHILS NFR BLD AUTO: 0.7 % (ref 0–1.5)
BILIRUB SERPL-MCNC: 0.6 MG/DL (ref 0–1.2)
BUN SERPL-MCNC: 15 MG/DL (ref 6–20)
BUN/CREAT SERPL: 15.5 (ref 7–25)
CALCIUM SPEC-SCNC: 8.7 MG/DL (ref 8.6–10.5)
CHLORIDE SERPL-SCNC: 94 MMOL/L (ref 98–107)
CO2 SERPL-SCNC: 33 MMOL/L (ref 22–29)
CREAT SERPL-MCNC: 0.97 MG/DL (ref 0.76–1.27)
DEPRECATED RDW RBC AUTO: 40.5 FL (ref 37–54)
EGFRCR SERPLBLD CKD-EPI 2021: 108.4 ML/MIN/1.73
EOSINOPHIL # BLD AUTO: 0.02 10*3/MM3 (ref 0–0.4)
EOSINOPHIL NFR BLD AUTO: 0.5 % (ref 0.3–6.2)
ERYTHROCYTE [DISTWIDTH] IN BLOOD BY AUTOMATED COUNT: 13.5 % (ref 12.3–15.4)
FLUAV RNA RESP QL NAA+PROBE: NOT DETECTED
FLUBV RNA RESP QL NAA+PROBE: NOT DETECTED
GLOBULIN UR ELPH-MCNC: 4.3 GM/DL
GLUCOSE SERPL-MCNC: 94 MG/DL (ref 65–99)
HCT VFR BLD AUTO: 45.7 % (ref 37.5–51)
HGB BLD-MCNC: 15.1 G/DL (ref 13–17.7)
HOLD SPECIMEN: NORMAL
IMM GRANULOCYTES # BLD AUTO: 0.01 10*3/MM3 (ref 0–0.05)
IMM GRANULOCYTES NFR BLD AUTO: 0.2 % (ref 0–0.5)
LYMPHOCYTES # BLD AUTO: 1.05 10*3/MM3 (ref 0.7–3.1)
LYMPHOCYTES NFR BLD AUTO: 23.8 % (ref 19.6–45.3)
MAGNESIUM SERPL-MCNC: 1.6 MG/DL (ref 1.6–2.6)
MCH RBC QN AUTO: 27.3 PG (ref 26.6–33)
MCHC RBC AUTO-ENTMCNC: 33 G/DL (ref 31.5–35.7)
MCV RBC AUTO: 82.5 FL (ref 79–97)
MONOCYTES # BLD AUTO: 0.52 10*3/MM3 (ref 0.1–0.9)
MONOCYTES NFR BLD AUTO: 11.8 % (ref 5–12)
NEUTROPHILS NFR BLD AUTO: 2.79 10*3/MM3 (ref 1.7–7)
NEUTROPHILS NFR BLD AUTO: 63 % (ref 42.7–76)
NRBC BLD AUTO-RTO: 0 /100 WBC (ref 0–0.2)
NT-PROBNP SERPL-MCNC: 2019 PG/ML (ref 0–450)
PLATELET # BLD AUTO: 290 10*3/MM3 (ref 140–450)
PMV BLD AUTO: 10.1 FL (ref 6–12)
POTASSIUM SERPL-SCNC: 2.7 MMOL/L (ref 3.5–5.2)
PROT SERPL-MCNC: 7.9 G/DL (ref 6–8.5)
QT INTERVAL: 390 MS
QTC INTERVAL: 497 MS
RBC # BLD AUTO: 5.54 10*6/MM3 (ref 4.14–5.8)
SARS-COV-2 RNA RESP QL NAA+PROBE: NOT DETECTED
SODIUM SERPL-SCNC: 137 MMOL/L (ref 136–145)
TROPONIN T SERPL-MCNC: 0.02 NG/ML (ref 0–0.03)
WBC NRBC COR # BLD: 4.42 10*3/MM3 (ref 3.4–10.8)
WHOLE BLOOD HOLD COAG: NORMAL
WHOLE BLOOD HOLD SPECIMEN: NORMAL

## 2022-09-18 PROCEDURE — 80053 COMPREHEN METABOLIC PANEL: CPT | Performed by: EMERGENCY MEDICINE

## 2022-09-18 PROCEDURE — 83880 ASSAY OF NATRIURETIC PEPTIDE: CPT | Performed by: EMERGENCY MEDICINE

## 2022-09-18 PROCEDURE — 25010000002 MAGNESIUM SULFATE IN D5W 1G/100ML (PREMIX) 1-5 GM/100ML-% SOLUTION: Performed by: EMERGENCY MEDICINE

## 2022-09-18 PROCEDURE — 87636 SARSCOV2 & INF A&B AMP PRB: CPT | Performed by: EMERGENCY MEDICINE

## 2022-09-18 PROCEDURE — 0 POTASSIUM CHLORIDE 10 MEQ/100ML SOLUTION: Performed by: EMERGENCY MEDICINE

## 2022-09-18 PROCEDURE — 71275 CT ANGIOGRAPHY CHEST: CPT

## 2022-09-18 PROCEDURE — 36415 COLL VENOUS BLD VENIPUNCTURE: CPT

## 2022-09-18 PROCEDURE — 93005 ELECTROCARDIOGRAM TRACING: CPT

## 2022-09-18 PROCEDURE — 84484 ASSAY OF TROPONIN QUANT: CPT | Performed by: EMERGENCY MEDICINE

## 2022-09-18 PROCEDURE — 96365 THER/PROPH/DIAG IV INF INIT: CPT

## 2022-09-18 PROCEDURE — 96367 TX/PROPH/DG ADDL SEQ IV INF: CPT

## 2022-09-18 PROCEDURE — 85025 COMPLETE CBC W/AUTO DIFF WBC: CPT | Performed by: EMERGENCY MEDICINE

## 2022-09-18 PROCEDURE — 0 IOPAMIDOL PER 1 ML: Performed by: EMERGENCY MEDICINE

## 2022-09-18 PROCEDURE — 99284 EMERGENCY DEPT VISIT MOD MDM: CPT

## 2022-09-18 PROCEDURE — 83735 ASSAY OF MAGNESIUM: CPT | Performed by: EMERGENCY MEDICINE

## 2022-09-18 PROCEDURE — 93005 ELECTROCARDIOGRAM TRACING: CPT | Performed by: EMERGENCY MEDICINE

## 2022-09-18 RX ORDER — POTASSIUM CHLORIDE 750 MG/1
40 CAPSULE, EXTENDED RELEASE ORAL ONCE
Status: COMPLETED | OUTPATIENT
Start: 2022-09-18 | End: 2022-09-18

## 2022-09-18 RX ORDER — SODIUM CHLORIDE 0.9 % (FLUSH) 0.9 %
10 SYRINGE (ML) INJECTION AS NEEDED
Status: DISCONTINUED | OUTPATIENT
Start: 2022-09-18 | End: 2022-09-19 | Stop reason: HOSPADM

## 2022-09-18 RX ORDER — MAGNESIUM SULFATE 1 G/100ML
1 INJECTION INTRAVENOUS ONCE
Status: COMPLETED | OUTPATIENT
Start: 2022-09-18 | End: 2022-09-18

## 2022-09-18 RX ORDER — POTASSIUM CHLORIDE 7.45 MG/ML
10 INJECTION INTRAVENOUS ONCE
Status: COMPLETED | OUTPATIENT
Start: 2022-09-18 | End: 2022-09-18

## 2022-09-18 RX ORDER — POTASSIUM CHLORIDE 1.5 G/1.77G
40 POWDER, FOR SOLUTION ORAL ONCE
Status: COMPLETED | OUTPATIENT
Start: 2022-09-18 | End: 2022-09-18

## 2022-09-18 RX ADMIN — IOPAMIDOL 100 ML: 755 INJECTION, SOLUTION INTRAVENOUS at 22:02

## 2022-09-18 RX ADMIN — POTASSIUM CHLORIDE 40 MEQ: 750 CAPSULE, EXTENDED RELEASE ORAL at 22:44

## 2022-09-18 RX ADMIN — POTASSIUM CHLORIDE 10 MEQ: 7.46 INJECTION, SOLUTION INTRAVENOUS at 21:48

## 2022-09-18 RX ADMIN — MAGNESIUM SULFATE HEPTAHYDRATE 1 G: 1 INJECTION, SOLUTION INTRAVENOUS at 22:43

## 2022-09-18 RX ADMIN — POTASSIUM CHLORIDE 40 MEQ: 1.5 POWDER, FOR SOLUTION ORAL at 21:49

## 2022-09-19 VITALS
TEMPERATURE: 98 F | SYSTOLIC BLOOD PRESSURE: 112 MMHG | DIASTOLIC BLOOD PRESSURE: 71 MMHG | BODY MASS INDEX: 41.47 KG/M2 | RESPIRATION RATE: 18 BRPM | HEIGHT: 69 IN | WEIGHT: 280 LBS | HEART RATE: 87 BPM | OXYGEN SATURATION: 100 %

## 2022-09-19 NOTE — ED PROVIDER NOTES
[facility]    EMERGENCY DEPARTMENT ENCOUNTER      Pt Name: Evan Gannon  MRN: [id number]  YOB: 1990  Date of evaluation: 9/18/2022  Provider: Riki Castillo MD    CHIEF COMPLAINT       Chief Complaint   Patient presents with   • Shortness of Breath         HISTORY OF PRESENT ILLNESS  (Location/Symptom, Timing/Onset, Context/Setting, Quality, Duration, Modifying Factors, Severity.)   Evan Gannon is a 31 y.o. male who presents to the emergency department with moderate aching right-sided chest wall pain over the course of the past week along with mild shortness of breath does not worsen with exertion.  Denies any anterior chest pain, vomiting, abdominal pain, or other associated symptoms.  He has history of coronary disease in addition to CHF.  He states that the right-sided chest wall pain is only present when he takes a deep breath or coughs.  He does admit that he has not been taking any of his medications over the course of the past couple of days including his potassium supplement and Eliquis which she takes due to history of PE.      Nursing notes were reviewed.    REVIEW OF SYSTEMS    (2-9 systems for level 4, 10 or more for level 5)   ROS:  General:  No fevers, no chills, no weakness  Cardiovascular: Chest wall pain  Respiratory: Shortness of breath  Gastrointestinal:  No pain, no nausea, no vomiting, no diarrhea  Musculoskeletal:  No muscle pain, no joint pain  Skin:  No rash  Neurologic:  No speech problems, no headache, no extremity numbness, no extremity tingling, no extremity weakness  Psychiatric:  No anxiety  Genitourinary:  No dysuria, no hematuria    Except as noted above the remainder of the review of systems was reviewed and negative.       PAST MEDICAL HISTORY     Past Medical History:   Diagnosis Date   • CHF (congestive heart failure) (HCC)     diagnosed at 23yo, thought to be related to undiagnosed congenital defect- per patient. hospitalized every 3 weeks during  "the winter due to PNA.  Has significantf fluid retention despite diurectics   • CPAP (continuous positive airway pressure) dependence     settings at 15   • Gout     on allopurinol, controls symptoms   • Hypertension     patient denies   • Hypokalemia    • ICD (implantable cardioverter-defibrillator) in place    • Low HDL (under 40)    • Morbid obesity (HCC)    • Morbid obesity with BMI of 60.0-69.9, adult (HCC)    • Myocardial infarction (HCC)     \"almost\" per patient, had heart cath, no stents, has defibrillator   • On home O2     supposed to be 24/7, ran out of O2 and has only been using prn. manged  by cardiology   • COLLIN on CPAP     compliant   • Paresthesia     hands/ arms/ feet, suspected due to poor circulation reportedly   • Plaque psoriasis    • Pneumonia    • Psoriasis    • Sleep apnea    • Wears glasses          SURGICAL HISTORY       Past Surgical History:   Procedure Laterality Date   • CARDIAC CATHETERIZATION N/A 7/13/2017    Procedure: Left Heart Cath;  Surgeon: Balbir Olsen MD;  Location:  [facility];  Service:    • CARDIAC DEFIBRILLATOR PLACEMENT  08/2017   • CARDIAC DEFIBRILLATOR PLACEMENT     • CARDIAC ELECTROPHYSIOLOGY PROCEDURE N/A 8/15/2017    Procedure: VVI ICD Implant;  Surgeon: Nathanael Richmond DO;  Location:  [facility];  Service:    • ENDOSCOPY N/A 8/14/2020    Procedure: ESOPHAGOGASTRODUODENOSCOPY;  Surgeon: Arnoldo Whittington MD;  Location:  [facility] OR;  Service: Bariatric;  Laterality: N/A;   • GASTRIC SLEEVE LAPAROSCOPIC N/A 8/14/2020    Procedure: GASTRIC SLEEVE LAPAROSCOPIC;  Surgeon: Arnoldo Whittington MD;  Location:  [facility] OR;  Service: Bariatric;  Laterality: N/A;   • OTHER SURGICAL HISTORY      ultra light therapy   • SINUS SURGERY      cartilage from ear inserted in nasal cavity   • TONSILLECTOMY AND ADENOIDECTOMY  2000         CURRENT MEDICATIONS     No current facility-administered medications for this encounter.    Current Outpatient " Medications:   •  allopurinol (Zyloprim) 100 MG tablet, Take 1 tablet by mouth Daily., Disp: 30 tablet, Rfl: 0  •  apixaban (ELIQUIS) 5 MG tablet tablet, Take 1 tablet by mouth Every 12 (Twelve) Hours. Indications: DVT/PE (active thrombosis), Disp: 60 tablet, Rfl: 6  •  bumetanide (BUMEX) 2 MG tablet, Take 1 tablet by mouth Daily. Stop torsemide, Disp: 30 tablet, Rfl: 3  •  carvedilol (COREG) 6.25 MG tablet, Take 1 tablet by mouth 2 (Two) Times a Day., Disp: 60 tablet, Rfl: 11  •  colchicine 0.6 MG tablet, Take 1 tablet by mouth Daily As Needed for Muscle / Joint Pain (gout flare up)., Disp: 30 tablet, Rfl: 0  •  halobetasol (ULTRAVATE) 0.05 % ointment, Apply 1 application topically to the appropriate area as directed 2 (Two) Times a Day., Disp: , Rfl:   •  metOLazone (ZAROXOLYN) 5 MG tablet, Take 1 tablet by mouth Daily As Needed (edema)., Disp: 25 tablet, Rfl: 6  •  metoprolol succinate XL (TOPROL-XL) 25 MG 24 hr tablet, Take 25 mg by mouth Daily., Disp: , Rfl:   •  O2 (OXYGEN), Inhale 2-6 L/min Daily., Disp: , Rfl:   •  omeprazole (priLOSEC) 40 MG capsule, Take 1 capsule by mouth Daily., Disp: 30 capsule, Rfl: 3  •  potassium chloride (K-DUR,KLOR-CON) 20 MEQ CR tablet, Take 1 tablet by mouth Daily., Disp: 30 tablet, Rfl: 1  •  sacubitril-valsartan (Entresto) 24-26 MG tablet, Take 1 tablet by mouth 2 (Two) Times a Day., Disp: 60 tablet, Rfl: 3  •  Secukinumab (COSENTYX, 300 MG DOSE, SC), Inject 300 mg under the skin into the appropriate area as directed Every 30 (Thirty) Days., Disp: , Rfl:   •  Semaglutide,0.25 or 0.5MG/DOS, (OZEMPIC) 2 MG/1.5ML solution pen-injector, Inject 0.25 mg under the skin into the appropriate area as directed 1 (One) Time Per Week., Disp: 1.5 mL, Rfl: 1  •  spironolactone (Aldactone) 50 MG tablet, Take 1 tablet by mouth Daily., Disp: 30 tablet, Rfl: 3  •  traMADol (ULTRAM) 50 MG tablet, Take 1 tablet by mouth Every 6 (Six) Hours As Needed for Moderate Pain ., Disp: 40 tablet, Rfl: 0  •   Vericiguat 2.5 MG tablet, Take 1 tablet by mouth Daily., Disp: 30 tablet, Rfl: 11    ALLERGIES     Patient has no known allergies.    FAMILY HISTORY       Family History   Problem Relation Age of Onset   • Diabetes Father    • Diabetes Paternal Grandmother    • Diabetes Maternal Grandfather           SOCIAL HISTORY       Social History     Socioeconomic History   • Marital status: Single   • Number of children: 0   Tobacco Use   • Smoking status: Never Smoker   • Smokeless tobacco: Never Used   Vaping Use   • Vaping Use: Never used   Substance and Sexual Activity   • Alcohol use: No   • Drug use: No   • Sexual activity: Defer         PHYSICAL EXAM    (up to 7 for level 4, 8 or more for level 5)     Vitals:    09/18/22 2300 09/18/22 2330 09/19/22 0000 09/19/22 0030   BP: 128/50 124/77 121/80 112/71   BP Location:       Patient Position:       Pulse: 93 94 110 87   Resp:       Temp:       TempSrc:       SpO2: 100% 98% 100% 100%   Weight:       Height:           Physical Exam  General: Awake, alert, no acute distress.  HEENT: Conjunctivae normal.  Neck: Trachea midline.  Cardiac: Heart regular rate, rhythm, no murmurs, rubs, or gallops  Lungs: Lungs are clear to auscultation, there is no wheezing, rhonchi, or rales. There is no use of accessory muscles.  Chest wall: Moderate right-sided chest wall tenderness  Abdomen: Abdomen is soft, nontender, nondistended. There are no firm or pulsatile masses, no rebound rigidity or guarding.   Musculoskeletal: No deformity.  Neuro: Alert and oriented x 4.  Dermatology: Skin is warm and dry  Psych: Mentation is grossly normal, cognition is grossly normal. Affect is appropriate.        DIAGNOSTIC RESULTS     EKG: All EKGs are interpreted by the Emergency Department Physician who either signs or Co-signs this chart in the absence of a cardiologist.    ECG 12 Lead   Final Result   Test Reason : soa, tachycardia   Blood Pressure :   */*   mmHG   Vent. Rate :  98 BPM     Atrial Rate :   98 BPM      P-R Int : 206 ms          QRS Dur : 116 ms       QT Int : 390 ms       P-R-T Axes :  38  43  29 degrees      QTc Int : 497 ms      Normal sinus rhythm   Prolonged QT   Abnormal ECG   When compared with ECG of 30-MAR-2022 13:26,   No significant change was found   Confirmed by NANDO VAUGHAN ([id number]) on 9/18/2022 9:38:50 PM      Referred By:            Confirmed By: NANDO VAUGHAN          RADIOLOGY:   Non-plain film images such as CT, Ultrasound and MRI are read by the radiologist. Plain radiographic images are visualized and preliminarily interpreted by the emergency physician with the below findings:      [x] Radiologist's Report Reviewed:  CT Angiogram Chest   Final Result      No CTA evidence of pulmonary embolism or acute aortic pathology.      Redemonstrated prominent cardiomegaly.      Reflux of contrast material from the right heart into the IVC and pelvic veins, which can be seen with right heart dysfunction.      Redemonstrated prominent bilateral hilar and mediastinal lymph nodes, similar to the prior exam.      Areas of subsegmental atelectasis bilaterally.      Small areas of cystic change in both lungs, particularly in the right lower lobe. Mild centrilobular emphysematous change is possible. Other cystic processes are possible. The appearance is similar to the prior CT.      Electronically signed by:  Marek Styles M.D.     9/18/2022 9:41 PM Mountain Time            ED BEDSIDE ULTRASOUND:   Performed by ED Physician - none    LABS:    I have reviewed and interpreted all of the currently available lab results from this visit (if applicable):  Results for orders placed or performed during the hospital encounter of 09/18/22   COVID-19 and FLU A/B PCR - Swab, Nasopharynx    Specimen: Nasopharynx; Swab   Result Value Ref Range    COVID19 Not Detected Not Detected - Ref. Range    Influenza A PCR Not Detected Not Detected    Influenza B PCR Not Detected Not Detected   Comprehensive Metabolic  Panel    Specimen: Blood   Result Value Ref Range    Glucose 94 65 - 99 mg/dL    BUN 15 6 - 20 mg/dL    Creatinine 0.97 0.76 - 1.27 mg/dL    Sodium 137 136 - 145 mmol/L    Potassium 2.7 (L) 3.5 - 5.2 mmol/L    Chloride 94 (L) 98 - 107 mmol/L    CO2 33.0 (H) 22.0 - 29.0 mmol/L    Calcium 8.7 8.6 - 10.5 mg/dL    Total Protein 7.9 6.0 - 8.5 g/dL    Albumin 3.60 3.50 - 5.20 g/dL    ALT (SGPT) 9 1 - 41 U/L    AST (SGOT) 15 1 - 40 U/L    Alkaline Phosphatase 90 39 - 117 U/L    Total Bilirubin 0.6 0.0 - 1.2 mg/dL    Globulin 4.3 gm/dL    A/G Ratio 0.8 g/dL    BUN/Creatinine Ratio 15.5 7.0 - 25.0    Anion Gap 10.0 5.0 - 15.0 mmol/L    eGFR 108.4 >60.0 mL/min/1.73   Troponin    Specimen: Blood   Result Value Ref Range    Troponin T 0.018 0.000 - 0.030 ng/mL   BNP    Specimen: Blood   Result Value Ref Range    proBNP 2,019.0 (H) 0.0 - 450.0 pg/mL   CBC Auto Differential    Specimen: Blood   Result Value Ref Range    WBC 4.42 3.40 - 10.80 10*3/mm3    RBC 5.54 4.14 - 5.80 10*6/mm3    Hemoglobin 15.1 13.0 - 17.7 g/dL    Hematocrit 45.7 37.5 - 51.0 %    MCV 82.5 79.0 - 97.0 fL    MCH 27.3 26.6 - 33.0 pg    MCHC 33.0 31.5 - 35.7 g/dL    RDW 13.5 12.3 - 15.4 %    RDW-SD 40.5 37.0 - 54.0 fl    MPV 10.1 6.0 - 12.0 fL    Platelets 290 140 - 450 10*3/mm3    Neutrophil % 63.0 42.7 - 76.0 %    Lymphocyte % 23.8 19.6 - 45.3 %    Monocyte % 11.8 5.0 - 12.0 %    Eosinophil % 0.5 0.3 - 6.2 %    Basophil % 0.7 0.0 - 1.5 %    Immature Grans % 0.2 0.0 - 0.5 %    Neutrophils, Absolute 2.79 1.70 - 7.00 10*3/mm3    Lymphocytes, Absolute 1.05 0.70 - 3.10 10*3/mm3    Monocytes, Absolute 0.52 0.10 - 0.90 10*3/mm3    Eosinophils, Absolute 0.02 0.00 - 0.40 10*3/mm3    Basophils, Absolute 0.03 0.00 - 0.20 10*3/mm3    Immature Grans, Absolute 0.01 0.00 - 0.05 10*3/mm3    nRBC 0.0 0.0 - 0.2 /100 WBC   Magnesium    Specimen: Blood   Result Value Ref Range    Magnesium 1.6 1.6 - 2.6 mg/dL   ECG 12 Lead   Result Value Ref Range    QT Interval 390 ms    QTC  Interval 497 ms   Green Top (Gel)   Result Value Ref Range    Extra Tube Hold for add-ons.    Lavender Top   Result Value Ref Range    Extra Tube hold for add-on    Gold Top - SST   Result Value Ref Range    Extra Tube Hold for add-ons.    Gray Top   Result Value Ref Range    Extra Tube Hold for add-ons.    Light Blue Top   Result Value Ref Range    Extra Tube Hold for add-ons.         All other labs were within normal range or not returned as of this dictation.      EMERGENCY DEPARTMENT COURSE and DIFFERENTIAL DIAGNOSIS/MDM:   Vitals:    Vitals:    09/18/22 2300 09/18/22 2330 09/19/22 0000 09/19/22 0030   BP: 128/50 124/77 121/80 112/71   BP Location:       Patient Position:       Pulse: 93 94 110 87   Resp:       Temp:       TempSrc:       SpO2: 100% 98% 100% 100%   Weight:       Height:                This patient presents with complaints of right-sided chest wall pain that is reproducible and is only present with coughing most consistent with musculoskeletal etiology in addition to shortness of breath.  Do not feel that pain is consistent with cardiac cause and ECG and troponin are within normal limits.  CTA chest demonstrates no evidence of PE, pneumonia, pneumothorax, pericardial effusion, dissection, or other acute intrathoracic pathology.  Patient noted to be hypokalemic in the setting of the patient not taking his potassium supplements over the course the past couple days.  This was replaced with both IV and p.o. dosing in addition to magnesium.  There is no evidence of additional emergent pathology at this time.  Patient on baseline nasal cannula without any respiratory distress whatsoever and was feeling better at the time of discharge.  Feel he is appropriate for discharge with close outpatient follow-up.    I had a discussion with the patient/family regarding diagnosis, diagnostic results, treatment plan, and medications.  The patient/family indicated understanding of these instructions.  I spent  adequate time at the bedside preceding discharge necessary to personally discuss the aftercare instructions, giving patient education, providing explanations of the results of our evaluations/findings, and my decision making to assure that the patient/family understand the plan of care.  Time was allotted to answer questions at that time and throughout the ED course.  Emphasis was placed on timely follow-up after discharge.  I also discussed the potential for the development of an acute emergent condition requiring further evaluation, admission, or even surgical intervention. I discussed that we found nothing during the visit today indicating the need for further workup, admission, or the presence of an unstable medical condition.  I encouraged the patient to return to the emergency department immediately for ANY concerns, worsening, new complaints, or if symptoms persist and unable to seek follow-up in a timely fashion.  The patient/family expressed understanding and agreement with this plan.  The patient will follow-up with their PCP in 1-2 days for reevaluation.       MEDICATIONS ADMINISTERED IN ED:  Medications   magnesium sulfate in D5W 1g/100mL (PREMIX) (0 g Intravenous Stopped 9/18/22 2343)   potassium chloride 10 mEq in 100 mL IVPB (0 mEq Intravenous Stopped 9/18/22 2344)   potassium chloride (KLOR-CON) packet 40 mEq (40 mEq Oral Given 9/18/22 2149)   iopamidol (ISOVUE-370) 76 % injection 100 mL (100 mL Intravenous Given 9/18/22 2202)   potassium chloride (MICRO-K) CR capsule 40 mEq (40 mEq Oral Given 9/18/22 2244)       PROCEDURES:  Procedures    CRITICAL CARE TIME    Total Critical Care time was 0 minutes, excluding separately reportable procedures.   There was a high probability of clinically significant/life threatening deterioration in the patient's condition which required my urgent intervention.      FINAL IMPRESSION      1. Chest wall pain    2. Shortness of breath    3. Hypokalemia    4. History of  CHF (congestive heart failure)    5. History of pulmonary hypertension          DISPOSITION/PLAN     ED Disposition     ED Disposition   Discharge    Condition   Stable    Comment   --             PATIENT REFERRED TO:  Eli Flowers MD  [address]  [place] [address]  [phone]    Schedule an appointment as soon as possible for a visit in 2 days      [facility]  [address] [address] [phone]    If symptoms worsen      DISCHARGE MEDICATIONS:     Medication List      CONTINUE taking these medications    allopurinol 100 MG tablet  Commonly known as: Zyloprim  Take 1 tablet by mouth Daily.     apixaban 5 MG tablet tablet  Commonly known as: ELIQUIS  Take 1 tablet by mouth Every 12 (Twelve) Hours. Indications: DVT/PE (active thrombosis)     bumetanide 2 MG tablet  Commonly known as: BUMEX  Take 1 tablet by mouth Daily. Stop torsemide     carvedilol 6.25 MG tablet  Commonly known as: COREG  Take 1 tablet by mouth 2 (Two) Times a Day.     colchicine 0.6 MG tablet  Take 1 tablet by mouth Daily As Needed for Muscle / Joint Pain (gout flare up).     COSENTYX (300 MG DOSE) SC     Entresto 24-26 MG tablet  Generic drug: sacubitril-valsartan  Take 1 tablet by mouth 2 (Two) Times a Day.     halobetasol 0.05 % ointment  Commonly known as: ULTRAVATE     metOLazone 5 MG tablet  Commonly known as: ZAROXOLYN  Take 1 tablet by mouth Daily As Needed (edema).     metoprolol succinate XL 25 MG 24 hr tablet  Commonly known as: TOPROL-XL     O2  Commonly known as: OXYGEN     omeprazole 40 MG capsule  Commonly known as: priLOSEC  Take 1 capsule by mouth Daily.     potassium chloride 20 MEQ CR tablet  Commonly known as: K-DUR,KLOR-CON  Take 1 tablet by mouth Daily.     Semaglutide(0.25 or 0.5MG/DOS) 2 MG/1.5ML solution pen-injector  Commonly known as: OZEMPIC  Inject 0.25 mg under the skin into the appropriate area as directed 1 (One) Time Per  Week.     spironolactone 50 MG tablet  Commonly known as: Aldactone  Take 1 tablet by mouth Daily.     traMADol 50 MG tablet  Commonly known as: ULTRAM  Take 1 tablet by mouth Every 6 (Six) Hours As Needed for Moderate Pain .     Vericiguat 2.5 MG tablet  Take 1 tablet by mouth Daily.                Comment: Please note this report has been produced using speech recognition software.      Riki Castillo MD  Attending Emergency Physician               Riki Castillo MD  09/20/22 8798

## 2022-12-12 RX ORDER — POTASSIUM CHLORIDE 1500 MG/1
TABLET, EXTENDED RELEASE ORAL
Qty: 30 TABLET | Refills: 1 | OUTPATIENT
Start: 2022-12-12

## 2023-02-07 RX ORDER — SEMAGLUTIDE 1.34 MG/ML
INJECTION, SOLUTION SUBCUTANEOUS
Qty: 1.5 ML | Refills: 0 | Status: SHIPPED | OUTPATIENT
Start: 2023-02-07

## 2023-02-07 NOTE — TELEPHONE ENCOUNTER
Last seen 8/9.  Last refilled 7/28 with 1 refill.  No scheduled appointment. LM that due for an appointment

## 2023-04-05 NOTE — TELEPHONE ENCOUNTER
OVER DUE FOR AN APPT    LVM TO RTN CALL TO MAKE AN APPT. HUB PLEASE HELP SCHEDULE AND THEN WE CAN SEND IN THE RX

## 2023-04-11 RX ORDER — SEMAGLUTIDE 1.34 MG/ML
INJECTION, SOLUTION SUBCUTANEOUS
Qty: 1.5 ML | Refills: 0 | OUTPATIENT
Start: 2023-04-11

## 2023-05-01 RX ORDER — SEMAGLUTIDE 1.34 MG/ML
INJECTION, SOLUTION SUBCUTANEOUS
Qty: 1.5 ML | Refills: 0 | OUTPATIENT
Start: 2023-05-01

## 2023-05-09 RX ORDER — SEMAGLUTIDE 1.34 MG/ML
INJECTION, SOLUTION SUBCUTANEOUS
Qty: 1.5 ML | Refills: 0 | OUTPATIENT
Start: 2023-05-09

## 2023-05-09 NOTE — TELEPHONE ENCOUNTER
Last seen 8/9 for an acute visit.  Last filled 2/7. Pt has been notified that appointment is needed for future refills.  No appointment has been made.  Med has been denied until he schedules an appointment.

## 2023-05-16 NOTE — TELEPHONE ENCOUNTER
Called patient to review blood pressure readings. Left voicemail.   What Type Of Note Output Would You Prefer (Optional)?: Bullet Format How Severe Is Your Rash?: moderate Is This A New Presentation, Or A Follow-Up?: Rash Additional History: upon asking the pt questions prior to the exam: it is concluded that the pt;\\n does not wear nail polish, states that her wedding rings are gypsy gold, and that the pt does not have prior hx of atopic dermatitis.

## 2023-08-12 DIAGNOSIS — L40.0 PLAQUE PSORIASIS: ICD-10-CM

## 2023-08-12 DIAGNOSIS — M25.50 ARTHRALGIA, UNSPECIFIED JOINT: ICD-10-CM

## 2023-08-14 RX ORDER — TRAMADOL HYDROCHLORIDE 50 MG/1
TABLET ORAL
Qty: 40 TABLET | OUTPATIENT
Start: 2023-08-14

## 2023-08-14 NOTE — TELEPHONE ENCOUNTER
Rx Refill Note  Requested Prescriptions     Pending Prescriptions Disp Refills    traMADol (ULTRAM) 50 MG tablet [Pharmacy Med Name: traMADol HCL 50MG TABLET] 40 tablet      Sig: TAKE ONE TABLET BY MOUTH EVERY 6 HOURS AS NEEDED FOR MODERATE PAIN      Last office visit with prescribing clinician: 7/28/2022   Last telemedicine visit with prescribing clinician: Visit date not found   Next office visit with prescribing clinician: Visit date not found   {

## 2023-08-16 DIAGNOSIS — M25.50 ARTHRALGIA, UNSPECIFIED JOINT: ICD-10-CM

## 2023-08-16 DIAGNOSIS — L40.0 PLAQUE PSORIASIS: ICD-10-CM

## 2023-08-16 RX ORDER — SEMAGLUTIDE 1.34 MG/ML
INJECTION, SOLUTION SUBCUTANEOUS
Qty: 1.5 ML | Refills: 0 | OUTPATIENT
Start: 2023-08-16

## 2023-08-16 RX ORDER — TRAMADOL HYDROCHLORIDE 50 MG/1
50 TABLET ORAL EVERY 6 HOURS PRN
Qty: 40 TABLET | Refills: 0 | OUTPATIENT
Start: 2023-08-16

## 2023-08-16 NOTE — TELEPHONE ENCOUNTER
HUB: PLEASE HAVE PATIENT SCHEDULE AN APPOINTMENT FOR THESE REFILLS.  LAST SEEN 7/28/2022 (1 YEAR AGO) WE CANNOT FILL THIS UNTIL SEEN    Last seen 7/28/22.  Last filled Tramadol on 7/28/22 and OZEMPIC ON 2/7 with notice to schedule an appointment.  No appointment scheduled.

## 2023-09-15 NOTE — TELEPHONE ENCOUNTER
Called and spoke with Dianelys, informed her that we will see him on the 4th and determine what home health orders he needs. She verbalized understanding and had no further questions.      none

## 2023-11-14 ENCOUNTER — READMISSION MANAGEMENT (OUTPATIENT)
Dept: CALL CENTER | Facility: HOSPITAL | Age: 33
End: 2023-11-14
Payer: MEDICARE

## 2023-11-14 NOTE — OUTREACH NOTE
Prep Survey      Flowsheet Row Responses   [facility] discharged from? Non-[facility]   Is LACE score < 7 ? Non-BH Discharge   Eligibility [facility]   Date of Admission 11/05/23   Date of Discharge 11/13/23   Discharge Disposition Home or Self Care   Discharge diagnosis Hypervolemia   Does the patient have one of the following disease processes/diagnoses(primary or secondary)? Other   Prep survey completed? Yes            Jordyn WHEELER - Registered Nurse

## 2023-11-15 ENCOUNTER — TRANSITIONAL CARE MANAGEMENT TELEPHONE ENCOUNTER (OUTPATIENT)
Dept: CALL CENTER | Facility: HOSPITAL | Age: 33
End: 2023-11-15
Payer: MEDICARE

## 2023-11-15 NOTE — OUTREACH NOTE
Call Center TCM Note      Flowsheet Row Responses   [facility] patient discharged from? Non-  []   Does the patient have one of the following disease processes/diagnoses(primary or secondary)? Other   TCM attempt successful? No   Unsuccessful attempts Attempt 2            Megan Tafoya RN    11/15/2023, 14:40 EST

## 2023-11-15 NOTE — OUTREACH NOTE
Call Center TCM Note      Flowsheet Row Responses   [facility] patient discharged from? Non-  []   Does the patient have one of the following disease processes/diagnoses(primary or secondary)? Other   TCM attempt successful? No   Unsuccessful attempts Attempt 1            Megan Tafoya RN    11/15/2023, 13:37 EST

## 2023-11-16 ENCOUNTER — TRANSITIONAL CARE MANAGEMENT TELEPHONE ENCOUNTER (OUTPATIENT)
Dept: CALL CENTER | Facility: HOSPITAL | Age: 33
End: 2023-11-16
Payer: MEDICARE

## 2023-11-16 NOTE — OUTREACH NOTE
Call Center TCM Note      Flowsheet Row Responses   [facility] patient discharged from? Non-BH   Does the patient have one of the following disease processes/diagnoses(primary or secondary)? Other   TCM attempt successful? No   Unsuccessful attempts Attempt 3            Douglas Cannon RN    11/16/2023, 17:00 EST
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
## 2024-08-19 NOTE — PLAN OF CARE
"Patient woke up and came out into the hallway asking for help. Patient stated \"help me, theres acid  on my bed and its on my face and hair\". Patient was reassured there was no acid on her bed and patient asked \" are you sure? I thought there was acid on the bed, I don't know what Im doing. Is it okay to lay down?\" Patient reassured the bed is safe and there is no acid. Patient went back to bed, drank some water and asked \" are you going to be there if I need help?\". Patient was assured writing nurse would be at the nurses station if she needed anything. Will continue to monitor for safety.  " Problem: Patient Care Overview (Adult)  Goal: Plan of Care Review  Outcome: Ongoing (interventions implemented as appropriate)  Pt vitals stable this shift. He is NSR on monitor and we had discussion about his non-compliance of home medications having a direct effect on his health.    02/08/17 0622   Coping/Psychosocial Response Interventions   Plan Of Care Reviewed With patient   Patient Care Overview   Progress no change

## (undated) DEVICE — ENDOPATH XCEL BLADELESS TROCARS WITH STABILITY SLEEVES: Brand: ENDOPATH XCEL

## (undated) DEVICE — PK BARIATRIC 10

## (undated) DEVICE — INTENDED USE FOR SURGICAL MARKING ON INTACT SKIN, ALSO PROVIDES A PERMANENT METHOD OF IDENTIFYING OBJECTS IN THE OPERATING ROOM: Brand: WRITESITE® REGULAR TIP SKIN MARKER

## (undated) DEVICE — LEX ELECTRO PHYSIOLOGY: Brand: MEDLINE INDUSTRIES, INC.

## (undated) DEVICE — ENDOPATH XCEL UNIVERSAL TROCAR STABLILITY SLEEVES: Brand: ENDOPATH XCEL

## (undated) DEVICE — TISSUE RETRIEVAL SYSTEM: Brand: INZII RETRIEVAL SYSTEM

## (undated) DEVICE — CANNULA,ADULT,SOFT-TOUCH,7'TUBE,UC: Brand: PENDING

## (undated) DEVICE — ST EXT IV SMARTSITE 2VLV SP M LL 5ML IV1

## (undated) DEVICE — CANN NASL CO2 DIVIDED A/

## (undated) DEVICE — UNDRPD COMFRT GLD DRYPAD 36X57IN

## (undated) DEVICE — Device: Brand: DEFENDO AIR/WATER/SUCTION AND BIOPSY VALVE

## (undated) DEVICE — CONTN GRAD MEAS TRIANG 32OZ BLK

## (undated) DEVICE — MEDI-VAC YANKAUER SUCTION HANDLE W/BULBOUS TIP: Brand: CARDINAL HEALTH

## (undated) DEVICE — CAUTERY TIP POLISHER: Brand: DEVON

## (undated) DEVICE — IRRIGATOR BULB ASEPTO 60CC STRL

## (undated) DEVICE — Device

## (undated) DEVICE — POWER SHELL: Brand: SIGNIA

## (undated) DEVICE — ADULT, W/LG. BACK PAD, RADIOTRANSPARENT ELEMENT AND LEAD WIRE: Brand: DEFIBRILLATION ELECTRODES

## (undated) DEVICE — TUBING, SUCTION, 1/4" X 10', STRAIGHT: Brand: MEDLINE

## (undated) DEVICE — SOL NACL 0.9PCT 1000ML

## (undated) DEVICE — DRSNG SURESITE123 4X4.8IN

## (undated) DEVICE — DECANT BG O JET

## (undated) DEVICE — [HIGH FLOW INSUFFLATOR,  DO NOT USE IF PACKAGE IS DAMAGED,  KEEP DRY,  KEEP AWAY FROM SUNLIGHT,  PROTECT FROM HEAT AND RADIOACTIVE SOURCES.]: Brand: PNEUMOSURE

## (undated) DEVICE — GOWN,NON-REINFORCED,SIRUS,SET IN SLV,XXL: Brand: MEDLINE

## (undated) DEVICE — CATH DIAG EXPO .056 FL3.5 6F 100CM

## (undated) DEVICE — INTRO TEAR AWAY/LVD W/SD PRT 8F 13CM

## (undated) DEVICE — SHT AIR TRANSFR COMFRT GLIDE LAT 40X80IN

## (undated) DEVICE — APPL HEMOS FOR DELIVERY FLOSEAL

## (undated) DEVICE — LIMB HOLDERS: Brand: DEROYAL

## (undated) DEVICE — INTRAOPERATIVE COVER KIT, 10 PACK: Brand: SITE-RITE

## (undated) DEVICE — A2000 MULTI-USE SYRINGE KIT, P/N 701277-003KIT CONTENTS: 100ML CONTRAST RESERVOIR AND TUBING WITH CONTRAST SPIKE AND CLAMP: Brand: A2000 MULTI-USE SYRINGE KIT

## (undated) DEVICE — MODEL BT2000 P/N 700287-012KIT CONTENTS: MANIFOLD WITH SALINE AND CONTRAST PORTS, SALINE TUBING WITH SPIKE AND HAND SYRINGE, TRANSDUCER: Brand: BT2000 AUTOMATED MANIFOLD KIT

## (undated) DEVICE — MARYLAND JAW LAPAROSCOPIC SEALER/DIVIDER COATED: Brand: LIGASURE

## (undated) DEVICE — DECANTER: Brand: UNBRANDED

## (undated) DEVICE — APPL CHLORAPREP TINTED 26ML TEAL

## (undated) DEVICE — PENCL E/S HNDSWCH ROCKRBTN HOLSTR 10FT

## (undated) DEVICE — GLV SURG SENSICARE PI MIC PF SZ8.5 LF STRL

## (undated) DEVICE — CLMP STD 25CM DISP

## (undated) DEVICE — DEV COMP RAD PRELUDESYNC 29CM

## (undated) DEVICE — TROCAR: Brand: KII FIOS FIRST ENTRY

## (undated) DEVICE — 3M™ STERI-STRIP™ REINFORCED ADHESIVE SKIN CLOSURES, R1547, 1/2 IN X 4 IN (12 MM X 100 MM), 6 STRIPS/ENVELOPE: Brand: 3M™ STERI-STRIP™

## (undated) DEVICE — CATH DIAG EXPO M/ PK 6FR FL4/FR4 PIG 3PK

## (undated) DEVICE — PK CATH CARD 10

## (undated) DEVICE — USE OF THE SMARTNEEDLE DEVICE IS INDICATED WHEN BLOOD FLOW MUST BE DETECTED FOR PERCUTANEOUS VESSEL CANNULATION. THE VESSEL MUST BE OF A CALIBER WHICH WOULD NORMALLY BE PUNCTURED WITH A NEEDLE AND/OR CATHETER OF THIS SIZE OR LARGER.: Brand: SMARTNEEDLE® VASCULAR ACCESS SYSTEM

## (undated) DEVICE — PAD E/S GRND SGL/FOIL 9FT/CORD DISP

## (undated) DEVICE — SET PRIMARY GRVTY 10DP MALE LL 104IN

## (undated) DEVICE — ST INF PRI SMRTSTE 20DRP 2VLV 24ML 117

## (undated) DEVICE — GLIDESHEATH SLENDER STAINLESS STEEL KIT: Brand: GLIDESHEATH SLENDER

## (undated) DEVICE — CONTN FORMALN PREFIL 20ML CA/256

## (undated) DEVICE — FLTR PLUMEPORT LAP W/CONN STRL

## (undated) DEVICE — ENDOPATH 5MM ENDOSCOPIC BLUNT TIP DISSECTORS (12 POUCHES CONTAINING 3 DISSECTORS EACH): Brand: ENDOPATH

## (undated) DEVICE — GW INQWIRE FC PTFE STD J/1.5 .035 260

## (undated) DEVICE — CVR HNDL LIGHT RIGID

## (undated) DEVICE — MODEL AT P54, P/N 700608-035KIT CONTENTS: HAND CONTROLLER, 3-WAY HIGH-PRESSURE STOPCOCK WITH ROTATING END AND PREMIUM HIGH-PRESSURE TUBING: Brand: ANGIOTOUCH® KIT